# Patient Record
Sex: FEMALE | Race: WHITE | NOT HISPANIC OR LATINO | Employment: FULL TIME
[De-identification: names, ages, dates, MRNs, and addresses within clinical notes are randomized per-mention and may not be internally consistent; named-entity substitution may affect disease eponyms.]

---

## 2017-10-29 ENCOUNTER — HEALTH MAINTENANCE LETTER (OUTPATIENT)
Age: 42
End: 2017-10-29

## 2020-03-01 ENCOUNTER — HEALTH MAINTENANCE LETTER (OUTPATIENT)
Age: 45
End: 2020-03-01

## 2020-12-14 ENCOUNTER — HEALTH MAINTENANCE LETTER (OUTPATIENT)
Age: 45
End: 2020-12-14

## 2021-02-27 ENCOUNTER — HEALTH MAINTENANCE LETTER (OUTPATIENT)
Age: 46
End: 2021-02-27

## 2021-04-17 ENCOUNTER — HEALTH MAINTENANCE LETTER (OUTPATIENT)
Age: 46
End: 2021-04-17

## 2021-10-02 ENCOUNTER — HEALTH MAINTENANCE LETTER (OUTPATIENT)
Age: 46
End: 2021-10-02

## 2022-05-14 ENCOUNTER — HEALTH MAINTENANCE LETTER (OUTPATIENT)
Age: 47
End: 2022-05-14

## 2022-09-03 ENCOUNTER — HEALTH MAINTENANCE LETTER (OUTPATIENT)
Age: 47
End: 2022-09-03

## 2023-01-14 ENCOUNTER — HEALTH MAINTENANCE LETTER (OUTPATIENT)
Age: 48
End: 2023-01-14

## 2023-04-23 ENCOUNTER — HEALTH MAINTENANCE LETTER (OUTPATIENT)
Age: 48
End: 2023-04-23

## 2024-02-17 ENCOUNTER — HEALTH MAINTENANCE LETTER (OUTPATIENT)
Age: 49
End: 2024-02-17

## 2024-05-24 ENCOUNTER — APPOINTMENT (OUTPATIENT)
Dept: MRI IMAGING | Facility: CLINIC | Age: 49
End: 2024-05-24
Attending: STUDENT IN AN ORGANIZED HEALTH CARE EDUCATION/TRAINING PROGRAM
Payer: COMMERCIAL

## 2024-05-24 ENCOUNTER — HOSPITAL ENCOUNTER (EMERGENCY)
Facility: CLINIC | Age: 49
Discharge: HOME | End: 2024-05-24
Attending: STUDENT IN AN ORGANIZED HEALTH CARE EDUCATION/TRAINING PROGRAM | Admitting: STUDENT IN AN ORGANIZED HEALTH CARE EDUCATION/TRAINING PROGRAM
Payer: COMMERCIAL

## 2024-05-24 VITALS
OXYGEN SATURATION: 97 % | SYSTOLIC BLOOD PRESSURE: 154 MMHG | RESPIRATION RATE: 17 BRPM | HEIGHT: 64 IN | TEMPERATURE: 98 F | WEIGHT: 230 LBS | HEART RATE: 102 BPM | BODY MASS INDEX: 39.27 KG/M2 | DIASTOLIC BLOOD PRESSURE: 85 MMHG

## 2024-05-24 DIAGNOSIS — R20.0 NUMBNESS: ICD-10-CM

## 2024-05-24 LAB
ANION GAP SERPL CALCULATED.3IONS-SCNC: 13 MMOL/L (ref 7–15)
BASOPHILS # BLD AUTO: 0.1 10E3/UL (ref 0–0.2)
BASOPHILS NFR BLD AUTO: 1 %
BUN SERPL-MCNC: 15.7 MG/DL (ref 6–20)
CALCIUM SERPL-MCNC: 10 MG/DL (ref 8.6–10)
CHLORIDE SERPL-SCNC: 104 MMOL/L (ref 98–107)
CREAT SERPL-MCNC: 0.94 MG/DL (ref 0.51–0.95)
DEPRECATED HCO3 PLAS-SCNC: 23 MMOL/L (ref 22–29)
EGFRCR SERPLBLD CKD-EPI 2021: 74 ML/MIN/1.73M2
EOSINOPHIL # BLD AUTO: 0.2 10E3/UL (ref 0–0.7)
EOSINOPHIL NFR BLD AUTO: 2 %
ERYTHROCYTE [DISTWIDTH] IN BLOOD BY AUTOMATED COUNT: 13.5 % (ref 10–15)
GLUCOSE SERPL-MCNC: 89 MG/DL (ref 70–99)
HCT VFR BLD AUTO: 38.5 % (ref 35–47)
HGB BLD-MCNC: 13 G/DL (ref 11.7–15.7)
IMM GRANULOCYTES # BLD: 0.1 10E3/UL
IMM GRANULOCYTES NFR BLD: 1 %
LYMPHOCYTES # BLD AUTO: 2.8 10E3/UL (ref 0.8–5.3)
LYMPHOCYTES NFR BLD AUTO: 29 %
MCH RBC QN AUTO: 30 PG (ref 26.5–33)
MCHC RBC AUTO-ENTMCNC: 33.8 G/DL (ref 31.5–36.5)
MCV RBC AUTO: 89 FL (ref 78–100)
MONOCYTES # BLD AUTO: 0.5 10E3/UL (ref 0–1.3)
MONOCYTES NFR BLD AUTO: 5 %
NEUTROPHILS # BLD AUTO: 6.2 10E3/UL (ref 1.6–8.3)
NEUTROPHILS NFR BLD AUTO: 64 %
NRBC # BLD AUTO: 0 10E3/UL
NRBC BLD AUTO-RTO: 0 /100
PLATELET # BLD AUTO: 304 10E3/UL (ref 150–450)
POTASSIUM SERPL-SCNC: 3.7 MMOL/L (ref 3.4–5.3)
RBC # BLD AUTO: 4.34 10E6/UL (ref 3.8–5.2)
SODIUM SERPL-SCNC: 140 MMOL/L (ref 135–145)
WBC # BLD AUTO: 9.8 10E3/UL (ref 4–11)

## 2024-05-24 PROCEDURE — 80048 BASIC METABOLIC PNL TOTAL CA: CPT | Performed by: STUDENT IN AN ORGANIZED HEALTH CARE EDUCATION/TRAINING PROGRAM

## 2024-05-24 PROCEDURE — 99284 EMERGENCY DEPT VISIT MOD MDM: CPT | Mod: 25

## 2024-05-24 PROCEDURE — 36415 COLL VENOUS BLD VENIPUNCTURE: CPT | Performed by: STUDENT IN AN ORGANIZED HEALTH CARE EDUCATION/TRAINING PROGRAM

## 2024-05-24 PROCEDURE — 70551 MRI BRAIN STEM W/O DYE: CPT

## 2024-05-24 PROCEDURE — 85025 COMPLETE CBC W/AUTO DIFF WBC: CPT | Performed by: STUDENT IN AN ORGANIZED HEALTH CARE EDUCATION/TRAINING PROGRAM

## 2024-05-24 ASSESSMENT — ACTIVITIES OF DAILY LIVING (ADL)
ADLS_ACUITY_SCORE: 35
ADLS_ACUITY_SCORE: 37

## 2024-05-24 ASSESSMENT — COLUMBIA-SUICIDE SEVERITY RATING SCALE - C-SSRS
2. HAVE YOU ACTUALLY HAD ANY THOUGHTS OF KILLING YOURSELF IN THE PAST MONTH?: NO
6. HAVE YOU EVER DONE ANYTHING, STARTED TO DO ANYTHING, OR PREPARED TO DO ANYTHING TO END YOUR LIFE?: NO
1. IN THE PAST MONTH, HAVE YOU WISHED YOU WERE DEAD OR WISHED YOU COULD GO TO SLEEP AND NOT WAKE UP?: NO

## 2024-05-24 ASSESSMENT — ENCOUNTER SYMPTOMS
SHORTNESS OF BREATH: 0
ABDOMINAL PAIN: 0
FACIAL SWELLING: 1
EYE PAIN: 1
NEUROLOGICAL NEGATIVE: 1
PHOTOPHOBIA: 1

## 2024-05-24 NOTE — ED PROVIDER NOTES
EMERGENCY DEPARTMENT ENCOUNTER      NAME: Jory Ambrose  AGE: 48 year old female  YOB: 1975  MRN: [id number]  EVALUATION DATE & TIME: 2024  5:36 PM    PCP: [place] Joshua    ED PROVIDER: Joseph Platt M.D.      Chief Complaint   Patient presents with    Tingling         FINAL IMPRESSION:  1. Numbness          ED COURSE & MEDICAL DECISION MAKIN:14 PM 2024 I met with the patient to gather history and perform an initial exam.    Pertinent Labs & Imaging studies reviewed. (See chart for details)  48 year old female presents to the Emergency Department for evaluation of numbness which occurred at 2 AM today.  Patient describes an episode of numbness and then feeling faint.  Symptoms were very brief and have since improved.  Eye symptoms have completely abated.  Did not believe that this represented foreign body or even glaucoma or any significant eye issue although that was considered as was fluorescein exam and transfer for eye evaluation did not believe this is necessary she has not had any symptoms since then.  No symptoms to suggest ACS.  Blood work was sent to rule out electrolyte abnormality and these are all normal.  Because of the symptoms and the possibility of intracranial process we will pursue MRI and this was completely normal.  Patient remains asymptomatic and while we do not have an etiology for the patient's symptoms and certainly TIA is possible I felt that the relatively minor component to them was conducive to discharge and follow-up although certainly admission was considered based on that as a possible diagnosis of do not believe it is necessary and that the patient will be okay going home.  Shared decision-making was utilized to elaborate this and I discussed with the patient and her .    At the conclusion of the encounter I discussed the results of all of the tests and the disposition. The questions were answered. The patient or family  acknowledged understanding and was agreeable with the care plan.              Medical Decision Making  Obtained supplemental history:Supplemental history obtained?: Documented in chart and Family Member/Significant Other  Reviewed external records: External records reviewed?: Outpatient Record: [facility] on 5/24/2024   Care impacted by chronic illness:Cancer/Chemotherapy  Care significantly affected by social determinants of health:N/A  Did you consider but not order tests?: Work up considered but not performed and documented in chart, if applicable  Did you interpret images independently?: Independent interpretation of ECG and images noted in documentation, when applicable.  Consultation discussion with other provider:Did you involve another provider (consultant, , pharmacy, etc.)?: No  Discharge. No recommendations on prescription strength medication(s). See documentation for any additional details.    This patient involved a high degree of complexity in medical decision making, as significant risks were present and assessed. Recent encounters & results in medical record reviewed by me.     All workup (i.e. any EKG/labs/imaging as per charting below) reviewed and independently interpreted by me. See respective sections for details.       minutes of critical care time     MEDICATIONS GIVEN IN THE EMERGENCY:  Medications - No data to display    NEW PRESCRIPTIONS STARTED AT TODAY'S ER VISIT  Discharge Medication List as of 5/24/2024 10:10 PM             =================================================================    HPI    Patient information was obtained from: patient and     Use of : Doris Ring Rudy Ambrose is a 48 year old female with a pertinent history of hodgkin lymphoma, PE, and hypothyroidism who presents for evaluation of burning in the right eye.     Patient woke up at 2 AM this morning with a sharp, burning sensation in her right eye. She  "described the pain like there was \"chili oil or pepper spray\" in her eye. She then went to the bathroom where she flushed out the eye. Although the water did help relieve the eye pain for a while, the discomfort persisted a bit and the pain started to extend into the right side of her face, tongue, and left arm. She also had tongue swelling and drooling with difficulty speaking due to these symptoms. Additionally, she describes a subjective numbness sensation from around the right eye to the side of her face and left arm. Her  notes she suddenly became very weak in the legs and her legs gave out. He caught the patient and had to carry her back to bed. The patient's  reports this all occurred in the matter of ~2 minutes. Following this, the patient reports residual burning sensation surrounding the right eye and throat although less severe than the initial event. Patient reports a slight headache with associated photophobia, which may be affiliated with some medications she takes. Patient denies any eye injury, vision changes, other neurological issues, chest pain, abdominal pain, or shortness of breath. Otherwise no other complaints or concerns.     Patient also reports prior treatment for Hodgkins lymphoma. During her lymphoma treatment in 2015, she had had an episodes of left-sided weakness, but work-up was negative for stroke; her symptoms were attributed to stress, and she did not have any recurrent neurological symptoms until today. She also has a history of vertigo and syncope.     REVIEW OF SYSTEMS   Review of Systems   HENT:  Positive for drooling (Resolved) and facial swelling (tongue; resolved).    Eyes:  Positive for photophobia and pain (burning senstion in right eye).   Respiratory:  Negative for shortness of breath.    Cardiovascular:  Negative for chest pain.   Gastrointestinal:  Negative for abdominal pain.   Musculoskeletal:         Left arm burning sensation (resolved)   Neurological: " Negative.        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    brother    Seasonal allergies     Shingles     Aug 2010       PAST SURGICAL HISTORY:  Past Surgical History:   Procedure Laterality Date    CHOLECYSTECTOMY      gallstone pancreatitis Jan 2010    Lymphectomy  2/2010    right neck area           CURRENT MEDICATIONS:    acyclovir (ZOVIRAX) 400 MG tablet  albuterol (PROAIR HFA) 108 (90 BASE) MCG/ACT inhaler  Calcium Carbonate-Vitamin D (CALCIUM 600 + D OR)  cetirizine (ZYRTEC) 10 MG tablet  clindamycin (CLEOCIN T) 1 % lotion  diphenhydrAMINE (BENADRYL) 25 MG tablet  estradiol (VAGIFEM) 10 MCG TABS  fluticasone (FLONASE) 50 MCG/ACT nasal spray  fluticasone-salmeterol (ADVAIR DISKUS) 250-50 MCG/DOSE diskus inhaler  GENGRAF 25 MG capsule  levothyroxine (SYNTHROID, LEVOTHROID) 88 MCG tablet  Magnesium Oxide 500 MG CAPS  MELATONIN PO  methylPREDNISolone (MEDROL) 32 MG tablet  montelukast (SINGULAIR) 10 MG tablet  Multiple Vitamin (MULTI-VITAMIN PO)  omeprazole (PRILOSEC) 20 MG capsule  pentamidine (NEBUPENT) 300 MG inhalation solution  VITAMIN D PO        ALLERGIES:  Allergies   Allergen Reactions    Dye [Contrast Dye] Swelling     Pre-medicated with methylprednisolone    Erythromycin Hives    Morphine Hcl      Causes change in mental status    Penicillins Hives     Patient cannot recall if she has tried cephalosporins in the past.     Shellfish Allergy      Shrimp, crab, lobster    Sulfa Antibiotics Hives       FAMILY HISTORY:  Family History   Problem Relation Age of Onset    Cancer Mother          thyroid cancer    Unknown/Adopted Father     Breast Cancer No family hx of     Cancer - colorectal No family hx of     Prostate Cancer No family hx of     Hypertension Mother     C.A.EDWARD. Maternal Grandmother     KENIA.AGIULIA. Paternal Grandmother        SOCIAL HISTORY:   Social History     Socioeconomic History    Marital status:      Spouse name: Yasir    Number of children: 0   Tobacco Use    Smoking status: Never    Smokeless tobacco: Never    Tobacco comments:     denies tabacco use   Substance and Sexual Activity    Alcohol use: No     Comment: minimal alcohol use, 1 glass of wine in 6 months    Drug use: No    Sexual activity: Yes     Partners: Male     Birth control/protection: None     Social Determinants of Health     Financial Resource Strain: Low Risk  (1/16/2024)    [facility]    Overall Financial Resource Strain (CARDIA)     Difficulty of Paying Living Expenses: Not hard at all   Food Insecurity: No Food Insecurity (1/16/2024)    [facility]    Hunger Vital Sign     Worried About Running Out of Food in the Last Year: Never true     Ran Out of Food in the Last Year: Never true   Transportation Needs: No Transportation Needs (1/16/2024)    [facility]    PRAPARE - Transportation     Lack of Transportation (Medical): No     Lack of Transportation (Non-Medical): No   Physical Activity: Insufficiently Active (10/24/2023)    [facility]    Exercise Vital Sign     Days of Exercise per Week: 2 days     Minutes of Exercise per Session: 30 min   Stress: Stress Concern Present (10/24/2023)    Received from [facility]  "[facility]    [place] [employer] - Occupational Stress Questionnaire     Feeling of Stress : To some extent   Social Connections: Moderately Integrated (10/24/2023)    [facility]    Social Connection and Isolation Panel [NHANES]     Frequency of Communication with Friends and Family: More than three times a week     Frequency of Social Gatherings with Friends and Family: Once a week     Attends [employer] Services: More than 4 times per year     Active Member of Clubs or Organizations: No     Attends Club or Organization Meetings: Patient declined     Marital Status:    Interpersonal Safety: Not At Risk (10/24/2023)    [facility]    Humiliation, Afraid, Rape, and Kick questionnaire     Fear of Current or Ex-Partner: No     Emotionally Abused: No     Physically Abused: No     Sexually Abused: No   Housing Stability: Low Risk  (1/16/2024)    Received from [facility] [employer] [facility]     Retired - What is your living situation today? : I have a steady place to live       VITALS:  BP (!) 154/85   Pulse 102   Temp 98  F (36.7  C) (Temporal)   Resp 17   Ht 1.626 m (5' 4\")   Wt 104.3 kg (230 lb)   SpO2 97%   BMI 39.48 kg/m        PHYSICAL EXAM    PHYSICAL EXAM    VITAL SIGNS: BP (!) 154/85   Pulse 102   Temp 98  F (36.7  C) (Temporal)   Resp 17   Ht 1.626 m (5' 4\")   Wt 104.3 kg (230 lb)   SpO2 97%   BMI 39.48 kg/m    Constitutional:  Well developed, well nourished,    EYES: Conjunctivae clear, no discharge  HENT: Atraumatic, normocephalic, bilateral external ears normal.  Oropharynx moist. Nose normal.   Neck: Normal ROM , Supple   Respiratory:  No respiratory distress, normal " nonlabored respirations.   Cardiovascular:  Distal perfusion appears intact  Musculoskeletal:  No edema appreciated, No cyanosis, No clubbing. Good range of motion in all major joints.   Integument:  Warm, Dry, No erythema, No rash.   Neurologic:  Alert and oriented. No focal deficits noted.  Ambulatory  Patient is alert and oriented ×3.  Face is symmetric.  Speech is normal.  Visual fields are full.  Cranial nerves II through XII are intact.  Strength is full and equal in both upper and lower extremities.  Sensory is intact to sharp and light touch.  Patient has a normal steady gait.  Coordination is intact.  Normal Romberg and finger nose to finger.  Reflexes are 2+ throughout.  Toes are downgoing.   Psychiatric:  Affect normal         LAB:  All pertinent labs reviewed and interpreted.  Labs Ordered and Resulted from Time of ED Arrival to Time of ED Departure   BASIC METABOLIC PANEL - Normal       Result Value    Sodium 140      Potassium 3.7      Chloride 104      Carbon Dioxide (CO2) 23      Anion Gap 13      Urea Nitrogen 15.7      Creatinine 0.94      GFR Estimate 74      Calcium 10.0      Glucose 89     CBC WITH PLATELETS AND DIFFERENTIAL    WBC Count 9.8      RBC Count 4.34      Hemoglobin 13.0      Hematocrit 38.5      MCV 89      MCH 30.0      MCHC 33.8      RDW 13.5      Platelet Count 304      % Neutrophils 64      % Lymphocytes 29      % Monocytes 5      % Eosinophils 2      % Basophils 1      % Immature Granulocytes 1      NRBCs per 100 WBC 0      Absolute Neutrophils 6.2      Absolute Lymphocytes 2.8      Absolute Monocytes 0.5      Absolute Eosinophils 0.2      Absolute Basophils 0.1      Absolute Immature Granulocytes 0.1      Absolute NRBCs 0.0         RADIOLOGY:  Reviewed all pertinent imaging. Please see official radiology report.  MR Brain w/o Contrast   Final Result   IMPRESSION:   1.  No no evidence of acute infarction or other acute intracranial abnormality.   2.  Mild presumed chronic small  vessel ischemic change.            I, Vivien Guevara, am serving as a scribe to document services personally performed by Dr. Joseph Platt based on my observation and the provider's statements to me. I, Joseph Platt MD attest that Vivien Guevara is acting in a scribe capacity, has observed my performance of the services and has documented them in accordance with my direction.    Joseph Platt M.D.  Emergency Medicine  [facility] EMERGENCY ROOM  [address] [id number]  [phone]  Dept: [phone]       Joseph Platt MD  05/27/24 1148

## 2024-05-24 NOTE — ED TRIAGE NOTES
PT reports at 0200 this morning she woke up with throat, left arm, and lip tingling. She reports her tongue was swollen as well. PT states this lasted less than 5 minutes and went away. She went to sleep but noticed when she woke up she still feels tingling in throat and her left arm and minimal tingling on her forehead.      Triage Assessment (Adult)       Row Name 05/24/24 1445          Triage Assessment    Airway WDL WDL        Respiratory WDL    Respiratory WDL WDL        Skin Circulation/Temperature WDL    Skin Circulation/Temperature WDL WDL        Cardiac WDL    Cardiac WDL X     Cardiac Rhythm ST        Peripheral/Neurovascular WDL    Peripheral Neurovascular WDL neurovascular assessment upper        Cognitive/Neuro/Behavioral WDL    Cognitive/Neuro/Behavioral WDL WDL        LUE Neurovascular Assessment    Sensation LUE tingling present        RUE Neurovascular Assessment    Sensation RUE no numbness;no tenderness;no tingling        LLE Neurovascular Assessment    Sensation LLE no numbness;no tenderness;no tingling        RLE Neurovascular Assessment    Sensation RLE no numbness;no tenderness;no tingling

## 2024-06-06 ENCOUNTER — APPOINTMENT (OUTPATIENT)
Dept: NUCLEAR MEDICINE | Facility: CLINIC | Age: 49
End: 2024-06-06
Attending: EMERGENCY MEDICINE
Payer: COMMERCIAL

## 2024-06-06 ENCOUNTER — APPOINTMENT (OUTPATIENT)
Dept: ULTRASOUND IMAGING | Facility: CLINIC | Age: 49
End: 2024-06-06
Attending: EMERGENCY MEDICINE
Payer: COMMERCIAL

## 2024-06-06 ENCOUNTER — HOSPITAL ENCOUNTER (EMERGENCY)
Facility: CLINIC | Age: 49
Discharge: HOME | End: 2024-06-06
Attending: EMERGENCY MEDICINE | Admitting: EMERGENCY MEDICINE
Payer: COMMERCIAL

## 2024-06-06 ENCOUNTER — APPOINTMENT (OUTPATIENT)
Dept: RADIOLOGY | Facility: CLINIC | Age: 49
End: 2024-06-06
Attending: EMERGENCY MEDICINE
Payer: COMMERCIAL

## 2024-06-06 VITALS
BODY MASS INDEX: 39.48 KG/M2 | SYSTOLIC BLOOD PRESSURE: 123 MMHG | TEMPERATURE: 97.1 F | RESPIRATION RATE: 24 BRPM | OXYGEN SATURATION: 100 % | WEIGHT: 230 LBS | HEART RATE: 81 BPM | DIASTOLIC BLOOD PRESSURE: 78 MMHG

## 2024-06-06 DIAGNOSIS — R07.9 CHEST PAIN, UNSPECIFIED TYPE: ICD-10-CM

## 2024-06-06 LAB
ALBUMIN SERPL BCG-MCNC: 4.3 G/DL (ref 3.5–5.2)
ALP SERPL-CCNC: 92 U/L (ref 40–150)
ALT SERPL W P-5'-P-CCNC: 24 U/L (ref 0–50)
ANION GAP SERPL CALCULATED.3IONS-SCNC: 12 MMOL/L (ref 7–15)
AST SERPL W P-5'-P-CCNC: 25 U/L (ref 0–45)
ATRIAL RATE - MUSE: 101 BPM
BASOPHILS # BLD AUTO: 0.1 10E3/UL (ref 0–0.2)
BASOPHILS NFR BLD AUTO: 1 %
BILIRUB SERPL-MCNC: 0.2 MG/DL
BUN SERPL-MCNC: 10.2 MG/DL (ref 6–20)
CALCIUM SERPL-MCNC: 9.6 MG/DL (ref 8.6–10)
CHLORIDE SERPL-SCNC: 103 MMOL/L (ref 98–107)
CREAT SERPL-MCNC: 0.93 MG/DL (ref 0.51–0.95)
DEPRECATED HCO3 PLAS-SCNC: 24 MMOL/L (ref 22–29)
DIASTOLIC BLOOD PRESSURE - MUSE: NORMAL MMHG
EGFRCR SERPLBLD CKD-EPI 2021: 75 ML/MIN/1.73M2
EOSINOPHIL # BLD AUTO: 0.1 10E3/UL (ref 0–0.7)
EOSINOPHIL NFR BLD AUTO: 2 %
ERYTHROCYTE [DISTWIDTH] IN BLOOD BY AUTOMATED COUNT: 13.2 % (ref 10–15)
GLUCOSE SERPL-MCNC: 81 MG/DL (ref 70–99)
HCT VFR BLD AUTO: 39.1 % (ref 35–47)
HGB BLD-MCNC: 12.6 G/DL (ref 11.7–15.7)
IMM GRANULOCYTES # BLD: 0.1 10E3/UL
IMM GRANULOCYTES NFR BLD: 1 %
INTERPRETATION ECG - MUSE: NORMAL
LIPASE SERPL-CCNC: 23 U/L (ref 13–60)
LYMPHOCYTES # BLD AUTO: 2.3 10E3/UL (ref 0.8–5.3)
LYMPHOCYTES NFR BLD AUTO: 33 %
MCH RBC QN AUTO: 29.2 PG (ref 26.5–33)
MCHC RBC AUTO-ENTMCNC: 32.2 G/DL (ref 31.5–36.5)
MCV RBC AUTO: 91 FL (ref 78–100)
MONOCYTES # BLD AUTO: 0.5 10E3/UL (ref 0–1.3)
MONOCYTES NFR BLD AUTO: 7 %
NEUTROPHILS # BLD AUTO: 4.1 10E3/UL (ref 1.6–8.3)
NEUTROPHILS NFR BLD AUTO: 57 %
NRBC # BLD AUTO: 0 10E3/UL
NRBC BLD AUTO-RTO: 0 /100
NT-PROBNP SERPL-MCNC: 101 PG/ML (ref 0–450)
P AXIS - MUSE: 12 DEGREES
PLATELET # BLD AUTO: 318 10E3/UL (ref 150–450)
POTASSIUM SERPL-SCNC: 4.3 MMOL/L (ref 3.4–5.3)
PR INTERVAL - MUSE: 158 MS
PROT SERPL-MCNC: 7.2 G/DL (ref 6.4–8.3)
QRS DURATION - MUSE: 92 MS
QT - MUSE: 360 MS
QTC - MUSE: 466 MS
R AXIS - MUSE: -4 DEGREES
RBC # BLD AUTO: 4.32 10E6/UL (ref 3.8–5.2)
SODIUM SERPL-SCNC: 139 MMOL/L (ref 135–145)
SYSTOLIC BLOOD PRESSURE - MUSE: NORMAL MMHG
T AXIS - MUSE: 8 DEGREES
TROPONIN T SERPL HS-MCNC: 7 NG/L
TROPONIN T SERPL HS-MCNC: 8 NG/L
VENTRICULAR RATE- MUSE: 101 BPM
WBC # BLD AUTO: 7.1 10E3/UL (ref 4–11)

## 2024-06-06 PROCEDURE — 71046 X-RAY EXAM CHEST 2 VIEWS: CPT

## 2024-06-06 PROCEDURE — 84484 ASSAY OF TROPONIN QUANT: CPT | Mod: 91 | Performed by: EMERGENCY MEDICINE

## 2024-06-06 PROCEDURE — 83880 ASSAY OF NATRIURETIC PEPTIDE: CPT | Performed by: EMERGENCY MEDICINE

## 2024-06-06 PROCEDURE — 93970 EXTREMITY STUDY: CPT

## 2024-06-06 PROCEDURE — 93005 ELECTROCARDIOGRAM TRACING: CPT | Performed by: EMERGENCY MEDICINE

## 2024-06-06 PROCEDURE — 78582 LUNG VENTILAT&PERFUS IMAGING: CPT

## 2024-06-06 PROCEDURE — 82374 ASSAY BLOOD CARBON DIOXIDE: CPT | Performed by: EMERGENCY MEDICINE

## 2024-06-06 PROCEDURE — 96375 TX/PRO/DX INJ NEW DRUG ADDON: CPT

## 2024-06-06 PROCEDURE — 84075 ASSAY ALKALINE PHOSPHATASE: CPT | Performed by: EMERGENCY MEDICINE

## 2024-06-06 PROCEDURE — 96374 THER/PROPH/DIAG INJ IV PUSH: CPT | Mod: 59

## 2024-06-06 PROCEDURE — A9567 TECHNETIUM TC-99M AEROSOL: HCPCS | Performed by: EMERGENCY MEDICINE

## 2024-06-06 PROCEDURE — A9540 TC99M MAA: HCPCS | Performed by: EMERGENCY MEDICINE

## 2024-06-06 PROCEDURE — 83690 ASSAY OF LIPASE: CPT | Performed by: EMERGENCY MEDICINE

## 2024-06-06 PROCEDURE — 250N000011 HC RX IP 250 OP 636: Mod: JZ | Performed by: FAMILY MEDICINE

## 2024-06-06 PROCEDURE — 99285 EMERGENCY DEPT VISIT HI MDM: CPT | Mod: 25

## 2024-06-06 PROCEDURE — 85025 COMPLETE CBC W/AUTO DIFF WBC: CPT | Performed by: EMERGENCY MEDICINE

## 2024-06-06 PROCEDURE — 36415 COLL VENOUS BLD VENIPUNCTURE: CPT | Performed by: EMERGENCY MEDICINE

## 2024-06-06 PROCEDURE — 343N000001 HC RX 343: Performed by: EMERGENCY MEDICINE

## 2024-06-06 PROCEDURE — 272N000035 NM LUNG SCAN VENTILATION AND PERFUSION

## 2024-06-06 RX ORDER — ONDANSETRON 2 MG/ML
4 INJECTION INTRAMUSCULAR; INTRAVENOUS ONCE
Status: COMPLETED | OUTPATIENT
Start: 2024-06-06 | End: 2024-06-06

## 2024-06-06 RX ORDER — ZINC GLUCONATE 50 MG
50 TABLET ORAL DAILY
COMMUNITY
End: 2024-10-07

## 2024-06-06 RX ORDER — SPIRONOLACTONE 25 MG/1
1 TABLET ORAL DAILY
COMMUNITY
Start: 2024-01-01

## 2024-06-06 RX ORDER — ATORVASTATIN CALCIUM 20 MG/1
1 TABLET, FILM COATED ORAL AT BEDTIME
Status: ON HOLD | COMMUNITY
Start: 2023-09-29 | End: 2024-09-13

## 2024-06-06 RX ORDER — LORAZEPAM 0.5 MG/1
0.5 TABLET ORAL DAILY PRN
COMMUNITY
Start: 2023-11-29

## 2024-06-06 RX ORDER — ONDANSETRON 4 MG/1
4 TABLET, ORALLY DISINTEGRATING ORAL EVERY 6 HOURS PRN
Qty: 20 TABLET | Refills: 0 | Status: SHIPPED | OUTPATIENT
Start: 2024-06-06 | End: 2024-06-09

## 2024-06-06 RX ORDER — IPRATROPIUM BROMIDE AND ALBUTEROL SULFATE 2.5; .5 MG/3ML; MG/3ML
1 SOLUTION RESPIRATORY (INHALATION) EVERY 6 HOURS PRN
COMMUNITY
Start: 2024-01-05

## 2024-06-06 RX ORDER — PHENTERMINE HYDROCHLORIDE 37.5 MG/1
37.5 TABLET ORAL
COMMUNITY
Start: 2024-04-04 | End: 2024-10-07

## 2024-06-06 RX ORDER — CLOPIDOGREL BISULFATE 75 MG/1
1 TABLET ORAL DAILY
COMMUNITY
Start: 2024-06-05 | End: 2025-06-05

## 2024-06-06 RX ORDER — BUDESONIDE AND FORMOTEROL FUMARATE DIHYDRATE 80; 4.5 UG/1; UG/1
1 AEROSOL RESPIRATORY (INHALATION)
COMMUNITY
Start: 2023-12-18

## 2024-06-06 RX ORDER — SIMETHICONE 125 MG
125 TABLET,CHEWABLE ORAL 4 TIMES DAILY PRN
Qty: 40 TABLET | Refills: 0 | Status: SHIPPED | OUTPATIENT
Start: 2024-06-06

## 2024-06-06 RX ORDER — FLUDROCORTISONE ACETATE 0.1 MG/1
0.1 TABLET ORAL DAILY
COMMUNITY
Start: 2023-11-14

## 2024-06-06 RX ORDER — LEVOTHYROXINE SODIUM 125 UG/1
125 TABLET ORAL DAILY
COMMUNITY
Start: 2024-05-08

## 2024-06-06 RX ORDER — TRAZODONE HYDROCHLORIDE 100 MG/1
100 TABLET ORAL AT BEDTIME
COMMUNITY
Start: 2024-05-02

## 2024-06-06 RX ORDER — BUPROPION HYDROCHLORIDE 300 MG/1
300 TABLET ORAL EVERY MORNING
COMMUNITY
Start: 2023-11-07

## 2024-06-06 RX ADMIN — ONDANSETRON 4 MG: 2 INJECTION INTRAMUSCULAR; INTRAVENOUS at 18:29

## 2024-06-06 RX ADMIN — KIT FOR THE PREPARATION OF TECHNETIUM TC 99M ALBUMIN AGGREGATED 8.5 MILLICURIE: 2.5 INJECTION, POWDER, FOR SOLUTION INTRAVENOUS at 15:59

## 2024-06-06 RX ADMIN — KIT FOR THE PREPARATION OF TECHNETIUM TC 99M PENTETATE 57.5 MILLICURIE: 20 INJECTION, POWDER, LYOPHILIZED, FOR SOLUTION INTRAVENOUS; RESPIRATORY (INHALATION) at 15:58

## 2024-06-06 RX ADMIN — FAMOTIDINE 20 MG: 10 INJECTION INTRAVENOUS at 18:29

## 2024-06-06 ASSESSMENT — COLUMBIA-SUICIDE SEVERITY RATING SCALE - C-SSRS
1. IN THE PAST MONTH, HAVE YOU WISHED YOU WERE DEAD OR WISHED YOU COULD GO TO SLEEP AND NOT WAKE UP?: NO
2. HAVE YOU ACTUALLY HAD ANY THOUGHTS OF KILLING YOURSELF IN THE PAST MONTH?: NO
6. HAVE YOU EVER DONE ANYTHING, STARTED TO DO ANYTHING, OR PREPARED TO DO ANYTHING TO END YOUR LIFE?: NO

## 2024-06-06 ASSESSMENT — ACTIVITIES OF DAILY LIVING (ADL)
ADLS_ACUITY_SCORE: 37

## 2024-06-06 NOTE — DISCHARGE INSTRUCTIONS
Follow-up with the rapid access cardiology clinic to discuss your symptoms and potentially have further outpatient cardiology testing.  I have placed a referral and they should contact you to schedule appointment but if they do not please call the number to make an appointment.  Can also follow-up with your primary care doctor.    Return to the ER for any new or worsening concerns.

## 2024-06-06 NOTE — ED PROVIDER NOTES
"EMERGENCY DEPARTMENT ENCOUNTER      NAME: Jory Ambrose  AGE: 48 year old female  YOB: 1975  MRN: [id number]  EVALUATION DATE & TIME: 6/6/2024 12:28 PM    PCP: [facility]    ED PROVIDER: Katherine Jaimes MD      Chief Complaint   Patient presents with    Chest Pain    Nausea         FINAL IMPRESSION:  1. Chest pain, unspecified type          ED COURSE & MEDICAL DECISION MAKING:    Pertinent Labs & Imaging studies reviewed. (See chart for details)    12:34 PM I introduced myself to the patient, obtained patient history, performed a physical exam, and discussed plan for ED workup including potential diagnostic laboratory/imaging studies and interventions.    48 year old female with a pertinent history of Hodgkin's lymphoma s/p chemo and radiation now in remission since 2015, hypothyroidism, s/p bone marrow transplant, hypertension, hyperlipidemia, prediabetes, pulmonary embolus (no longer anticoagulated), s/p cholecystectomy, recent possible TIA started on plavix by PCP who presents to this ED for evaluation of chest pain and nausea.  On exam, patient is in no acute distress.  She reports that the pain she was having has now resolved since she arrived here and it lasted a couple of hours.  She states it started while she was working on her computer.  Denies any association with exertion.  She is somewhat reproducible with pain to palpation of the left-sided chest wall and under the axilla.  There is no obvious rash to suggest shingles at this time but did discuss monitoring for this.  She has had shingles in the past which would make this somewhat less likely but does states she has had a \"multiple times.\"  She does have a prior history of PE and is no longer anticoagulated other than recently starting Plavix for possible TIA.  She actually started this yesterday.  She has no signs or symptoms to suggest allergic reaction.  Do have concern for possible PE as it also " "appears that she is taking oral estrogen which would increase her risk factors for this.  Unfortunately she has a contrast allergy involving swelling and states that she needs the 24-hour prep with steroids and thus I cannot do a CT scan of the chest with contrast.  Will obtain VQ scan and bilateral lower extremity Doppler venous ultrasounds due to her report that she has some \"discomfort in her legs.\"  She has had frequent belching so did consider GERD as well.  Has no abdominal pain or tenderness thus making intra-abdominal pathology less likely.  Considered acute coronary syndrome with her presentation as well.  Also considered pneumonia, pneumothorax, costochondritis or musculoskeletal etiology, less likely aortic dissection as she has equal pulses and normal blood pressure and the pain has resolved.  Again she cannot tolerate contrast without premedication 24 hours in advance so cannot obtain CTA.  Will obtain chest x-ray to evaluate mediastinum.  She did have a CT with contrast back in 2021 that did not show any obvious sign of aortic pathology.  There was also no obvious sign of lymphadenopathy there.  Overall again felt that dissection was less likely with the fact that she has equal pulses no neurologic deficit and normal blood pressure with pain that has resolved.  She reported some \"numbness\" in her left arm but has no sensation changes here and this has resolved.  Also has no motor deficits or any focal neurologic deficit here.  As a result with her overall presentation did not feel that this would represent intracranial pathology and she just had a workup for this and has started Plavix.  No recent trauma to the area either.    Lipase within normal limits making pancreatitis unlikely.  2 troponins are within normal limits and high-sensitivity which is reassuring.  EKG obtained which reveals initially some mild sinus tachycardia at 101 however this resolved spontaneously and she has no signs of acute " ischemia on EKG.  No obvious sign of pericarditis on EKG either.  CBC reveals normal white blood cell count of 7.1 with a hemoglobin of 12.6.  CMP unremarkable as well.  Normal alk phos AST ALT and bilirubin.  Normal creatinine.    Chest x-ray reveals low lung volumes with bibasilar atelectasis however no focal consolidation.  Her lung sounds are clear on my exam and equal.  No pleural effusion or pneumothorax.  Normal heart size and mediastinal contours.  She does have some air-filled loops in the bowel in the visualized upper abdomen which is nonspecific.  Do feel this is likely with causing her belching but is having normal bowel movements and no abdominal tenderness thus again did not feel that intra-abdominal pathology or obstruction would be likely.  VQ scan without any sign of PE.  Bilateral lower extremity Doppler venous ultrasound pending.  She had declined any need for pain medication as her symptoms had resolved.  She remained symptom-free here.  Vital signs are within normal limits and she is afebrile.  At this point discussed that at this point we have not found any life-threatening pathology.  With her reassuring troponins and low risk heart score could follow-up with rapid access cardiology clinic for outpatient cardiac testing and she was in agreement with this.  At this point we are awaiting lower extremity Doppler venous ultrasound and if this is negative can go home with rapid access cardiology follow-up with referral that I have already placed.  She and her  were in agreement with this plan.  We did discuss that the exact etiology is not known at this time.  Do question potential for musculoskeletal versus GERD as it is reproducible and she is also having belching.  Discussed strict return precautions to the ER for any new or worsening concerns.  Patient and her  voiced understanding.  Patient's care was signed out to my colleague Dr. Riley pending ultrasound in stable  condition.  She was in agreement with this plan.         At the conclusion of the encounter I discussed the results of all of the tests and the disposition. The questions were answered. The patient or family acknowledged understanding and was agreeable with the care plan.      Medical Decision Making  Obtained supplemental history:Supplemental history obtained?: Family Member/Significant Other  Reviewed external records: External records reviewed?: Inpatient Record: [facility] ER on 5/24/24 and Outpatient Record: [facility] on 6/3/24  Care impacted by chronic illness:Hyperlipidemia and Hypertension  Care significantly affected by social determinants of health:N/A  Did you consider but not order tests?: Work up considered but not performed and documented in chart, if applicable  Did you interpret images independently?: Independent interpretation of ECG and images noted in documentation, when applicable.  Consultation discussion with other provider:Did you involve another provider (consultant, , pharmacy, etc.)?: No  Admission considered. Patient was signed out to the oncoming physician, disposition pending.      MEDICATIONS GIVEN IN THE EMERGENCY:  Medications   technetium pentetate Tc99m (DTPA) inhaled radioisotope 55-65 millicurie (57.5 millicuries Inhalation $Given 6/6/24 1558)   technetium pertechnetate with albumin (Tc99m MAA) radioisotope injection 7-10 millicurie (8.5 millicuries Intravenous $Given 6/6/24 1559)       NEW PRESCRIPTIONS STARTED AT TODAY'S ER VISIT  New Prescriptions    No medications on file          =================================================================    HPI    Patient information was obtained from: patient    Use of : Doris        Jory Ambrose is a 48 year old female with a pertinent history of Hodgkin's lymphoma s/p chemo and radiation now in remission since 2015, hypothyroidism, s/p bone marrow transplant, hypertension, hyperlipidemia,  "prediabetes, pulmonary embolus (no longer anticoagulated), s/p cholecystectomy, recent possible TIA started on plavix by PCP who presents to this ED for evaluation of chest pain and nausea.     Per chart review,  Patient visited [facility] ER on 5/24/24 regarding numbness occurring at 2 AM today. Patient also felt faint. Symptoms were brief and have since improved. Eye symptoms ablated. No symptoms to suggest ACS. Blood work and electrolytes normal. MRI normal. Patient remains asymptomatic and do not have etiology for patient's symptoms, TIA is possible. Minor component of symptoms conducive to discharge and follow-up. Admission considered but not necessary and patient is okay going home.     Patient visited [facility] on 6/3/24 regarding ER follow-up from 5/24/24. Transient neurologic episode with left-sided facial involvement and left upper extremity without deficits. Schedule carotid ultrasound, echocardiogram, review EKG and C-reactive protein, then follow-up with neurology for recommendations. Aspirin can not be used to do history of aspirin allergies with rashes in the past. If possible, use Plavix and/or Eliquis. Results show mild to moderate atherosclerotic plaque at the carotid bifurcations, greater on the left. 50-69% diameter bilateral CA stenoses by velocity criteria.     Patient was working at her desk from home when she began to develop left sided  She called her  at 11:40, and indicate symptoms began ~10 minutes prior to phone call. Patient reports left-sided chest pain that radiates under her jaw, and wraps around under her armpit. Patient notes left arm \"numbness\" and discomfort in her legs. She reports nausea and shortness of breath.  and patient also report constant burping that may be attributed to acid reflux. Patient's chest pain is currently subsided now. She reports the pain lasted approximately 2 hours and resolved on arrival here. Also no further numbness. She " "denies any association of the pain with exertion.     Patient denies syncope, vomiting, leg swelling, fever, cough, diarrhea, or abdominal pain. No recent injury and no recent sick contacts. She reports chest pressure in the past but no pain like this. Patient is not on anticoagulants other than plavix that was recently started. She reports history of PE when she had hodgkin's lymphoma back in 2015 and was eventually taken off anticoagulation at that time.     Patient reports history of cholecystectomy. Patient had a PE in 2013. Patient had Hodgkin's disease and during her treatment, she reported having a \"reduced heart function\" but denies any recent cardiac evaluation. Last stem cell treatment was in 2016-17. No rashes, has history of shingles previously.    Patient's mother has a history of heart attack 1.5 years ago (around 65 years old). Grandma on maternal side had 5 vessel bypass. Patient's aunt had a heart attack. Grandma on paternal side has 2 heart attacks.    REVIEW OF SYSTEMS   Pertinent positives and negatives are documented in the HPI. All other systems reviewed and are negative.      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    brother    Seasonal allergies     Shingles     Aug 2010       PAST SURGICAL HISTORY:  Past Surgical History:   Procedure Laterality Date    CHOLECYSTECTOMY      gallstone pancreatitis Jan 2010    Lymphectomy  2/2010    right neck area           CURRENT MEDICATIONS:    albuterol (PROAIR HFA) 108 (90 BASE) MCG/ACT inhaler  atorvastatin (LIPITOR) 20 MG " tablet  budesonide-formoterol (SYMBICORT) 80-4.5 MCG/ACT Inhaler  buPROPion (WELLBUTRIN XL) 300 MG 24 hr tablet  Calcium Carbonate-Vitamin D (CALCIUM 600 + D OR)  cetirizine (ZYRTEC) 10 MG tablet  clopidogrel (PLAVIX) 75 MG tablet  diphenhydrAMINE (BENADRYL) 25 MG tablet  estradiol (VAGIFEM) 10 MCG TABS  fludrocortisone (FLORINEF) 0.1 MG tablet  fluticasone (FLONASE) 50 MCG/ACT nasal spray  ipratropium - albuterol 0.5 mg/2.5 mg/3 mL (DUONEB) 0.5-2.5 (3) MG/3ML neb solution  levothyroxine (SYNTHROID/LEVOTHROID) 125 MCG tablet  LORazepam (ATIVAN) 0.5 MG tablet  Magnesium Oxide 500 MG CAPS  methylPREDNISolone (MEDROL) 32 MG tablet  Multiple Vitamin (MULTI-VITAMIN PO)  omeprazole (PRILOSEC) 20 MG capsule  phentermine (ADIPEX-P) 37.5 MG tablet  spironolactone (ALDACTONE) 25 MG tablet  traZODone (DESYREL) 100 MG tablet  zinc gluconate 50 MG tablet        ALLERGIES:  Allergies   Allergen Reactions    Dye [Contrast Dye] Swelling     Pre-medicated with methylprednisolone    Shellfish Allergy Shortness Of Breath, Anaphylaxis and Swelling     Shrimp, crab, lobster    Penicillins Hives     Patient cannot recall if she has tried cephalosporins in the past.     Erythromycin Hives    Morphine Hcl      Causes change in mental status    Sulfa Antibiotics Hives and Rash       FAMILY HISTORY:  Family History   Problem Relation Age of Onset    Cancer Mother         thyroid cancer    Unknown/Adopted Father     Breast Cancer No family hx of     Cancer - colorectal No family hx of     Prostate Cancer No family hx of     Hypertension Mother     C.A.D. Maternal Grandmother     C.A.D. Paternal Grandmother        SOCIAL HISTORY:   Social History     Socioeconomic History    Marital status:      Spouse name: Yasir    Number of children: 0   Tobacco Use    Smoking status: Never    Smokeless tobacco: Never    Tobacco comments:     denies tabacco use   Substance and Sexual Activity    Alcohol use: No     Comment: minimal alcohol use, 1  glass of wine in 6 months    Drug use: No    Sexual activity: Yes     Partners: Male     Birth control/protection: None     Social Determinants of Health     Financial Resource Strain: Low Risk  (1/16/2024)    [facility]    Overall Financial Resource Strain (CARDIA)     Difficulty of Paying Living Expenses: Not hard at all   Food Insecurity: No Food Insecurity (1/16/2024)    [facility]    Hunger Vital Sign     Worried About Running Out of Food in the Last Year: Never true     Ran Out of Food in the Last Year: Never true   Transportation Needs: No Transportation Needs (1/16/2024)    [facility]    PRAPARE - Transportation     Lack of Transportation (Medical): No     Lack of Transportation (Non-Medical): No   Physical Activity: Insufficiently Active (10/24/2023)    [facility]    Exercise Vital Sign     Days of Exercise per Week: 2 days     Minutes of Exercise per Session: 30 min   Stress: Stress Concern Present (10/24/2023)    [facility]    [place] [employer] - Occupational Stress Questionnaire     Feeling of Stress : To some extent   Social Connections: Moderately Integrated (10/24/2023)    [facility]    Social Connection and Isolation Panel [NHANES]     Frequency of Communication with Friends and Family: More than three times a week     Frequency of Social Gatherings with Friends and Family: Once a week     Attends  Denominational Services: More than 4 times per year     Active Member of Clubs or Organizations: No     Attends Club or Organization Meetings: Patient declined     Marital Status:    Interpersonal Safety: Not At Risk (10/24/2023)    [facility]    Humiliation, Afraid, Rape, and Kick questionnaire     Fear of Current or Ex-Partner: No     Emotionally Abused: No     Physically Abused: No     Sexually Abused: No   Housing Stability: Low Risk  (1/16/2024)    Received from [facility]     Retired - What is your living situation today? : I have a steady place to live       VITALS:  BP (!) 111/90   Pulse 83   Temp 97.1  F (36.2  C) (Temporal)   Resp 24   Wt 104.3 kg (230 lb)   SpO2 98%   BMI 39.48 kg/m      PHYSICAL EXAM    Physical Exam  Constitutional: Well developed, Well nourished, NAD, GCS 15  HENT: Normocephalic, Atraumatic, Bilateral external ears normal, Oropharynx normal, mucous membranes moist, Nose normal. Neck-  Normal range of motion, No tenderness, Supple, No stridor.    Eyes: PERRL, EOMI, Conjunctiva normal, No discharge.   Respiratory: Normal breath sounds, No respiratory distress, No wheezing or crackles, Speaks in full sentences easily.    Cardiovascular: Normal heart rate, Regular rhythm, No murmurs, No rubs, No gallops. 2+ radial pulses bilaterally. Patient has some mild reproducible pain in the left chest wall and under the axilla, no obvious lymphadenopathy, no rash  GI: Bowel sounds normal, Soft, No tenderness, No masses, No rebound or guarding. No CVA tenderness bilaterally   Musculoskeletal: 2+ DP pulses. No notable lower extremity edema. No cyanosis, No clubbing. Good range of motion in all major joints. No tenderness to palpation or major deformities noted. No midline tenderness of the CTLS spine.    Integument: Warm, Dry, No erythema,  No rash. No petechiae.    Neurologic: Alert & oriented x 3, 5/5 strength in all 4 extremities bilaterally. Sensation intact to light touch in all 4 extremities and the face bilaterally. No focal deficits noted.    Psychiatric: Cooperative.      LAB:  All pertinent labs reviewed and interpreted.  Results for orders placed or performed during the hospital encounter of 06/06/24   NM Lung Scan Ventilation and Perfusion    Impression    IMPRESSION:    No evidence of pulmonary embolism.   Chest XR,  PA & LAT    Impression    IMPRESSION: Low lung volumes with bibasilar atelectasis, however no focal consolidation. No pleural effusion or pneumothorax. Normal heart size and mediastinal contours.    Prominent air-filled loops of bowel in the visualized upper abdomen, nonspecific. Consider further evaluation with CT abdomen pelvis if clinically warranted.   Comprehensive metabolic panel   Result Value Ref Range    Sodium 139 135 - 145 mmol/L    Potassium 4.3 3.4 - 5.3 mmol/L    Carbon Dioxide (CO2) 24 22 - 29 mmol/L    Anion Gap 12 7 - 15 mmol/L    Urea Nitrogen 10.2 6.0 - 20.0 mg/dL    Creatinine 0.93 0.51 - 0.95 mg/dL    GFR Estimate 75 >60 mL/min/1.73m2    Calcium 9.6 8.6 - 10.0 mg/dL    Chloride 103 98 - 107 mmol/L    Glucose 81 70 - 99 mg/dL    Alkaline Phosphatase 92 40 - 150 U/L    AST 25 0 - 45 U/L    ALT 24 0 - 50 U/L    Protein Total 7.2 6.4 - 8.3 g/dL    Albumin 4.3 3.5 - 5.2 g/dL    Bilirubin Total 0.2 <=1.2 mg/dL   Result Value Ref Range    Troponin T, High Sensitivity 8 <=14 ng/L   Nt probnp inpatient (BNP)   Result Value Ref Range    N terminal Pro BNP Inpatient 101 0 - 450 pg/mL   Result Value Ref Range    Lipase 23 13 - 60 U/L   CBC with platelets and differential   Result Value Ref Range    WBC Count 7.1 4.0 - 11.0 10e3/uL    RBC Count 4.32 3.80 - 5.20 10e6/uL    Hemoglobin 12.6 11.7 - 15.7 g/dL    Hematocrit 39.1 35.0 - 47.0 %    MCV 91 78 - 100 fL    MCH 29.2 26.5 - 33.0 pg    MCHC 32.2 31.5 - 36.5 g/dL     RDW 13.2 10.0 - 15.0 %    Platelet Count 318 150 - 450 10e3/uL    % Neutrophils 57 %    % Lymphocytes 33 %    % Monocytes 7 %    % Eosinophils 2 %    % Basophils 1 %    % Immature Granulocytes 1 %    NRBCs per 100 WBC 0 <1 /100    Absolute Neutrophils 4.1 1.6 - 8.3 10e3/uL    Absolute Lymphocytes 2.3 0.8 - 5.3 10e3/uL    Absolute Monocytes 0.5 0.0 - 1.3 10e3/uL    Absolute Eosinophils 0.1 0.0 - 0.7 10e3/uL    Absolute Basophils 0.1 0.0 - 0.2 10e3/uL    Absolute Immature Granulocytes 0.1 <=0.4 10e3/uL    Absolute NRBCs 0.0 10e3/uL   Result Value Ref Range    Troponin T, High Sensitivity 7 <=14 ng/L   ECG 12-LEAD WITH MUSE (LHE)   Result Value Ref Range    Systolic Blood Pressure  mmHg    Diastolic Blood Pressure  mmHg    Ventricular Rate 101 BPM    Atrial Rate 101 BPM    DC Interval 158 ms    QRS Duration 92 ms     ms    QTc 466 ms    P Axis 12 degrees    R AXIS -4 degrees    T Axis 8 degrees    Interpretation ECG       Sinus tachycardia  Minimal voltage criteria for LVH, may be normal variant  Possible Inferior infarct (cited on or before 06-SEP-2013)  Abnormal ECG  When compared with ECG of 12-MAY-2014 09:57,  No significant change was found  Confirmed by SEE ED PROVIDER NOTE FOR, ECG INTERPRETATION ([id number]),  ALFIE VANCE ([id number]) on 6/6/2024 1:12:35 PM         RADIOLOGY:  Reviewed all pertinent imaging. Please see official radiology report.  NM Lung Scan Ventilation and Perfusion   Final Result   IMPRESSION:      No evidence of pulmonary embolism.      Chest XR,  PA & LAT   Final Result   IMPRESSION: Low lung volumes with bibasilar atelectasis, however no focal consolidation. No pleural effusion or pneumothorax. Normal heart size and mediastinal contours.      Prominent air-filled loops of bowel in the visualized upper abdomen, nonspecific. Consider further evaluation with CT abdomen pelvis if clinically warranted.      US Lower Extremity Venous Duplex Bilateral    (Results Pending)       EKG:     Performed at: 1313    Impression: Sinus tachycardia.  No evidence of acute ischemia.  Unchanged from prior EKG on 5-.      I have independently reviewed and interpreted the EKG(s) documented above.    PROCEDURES:   None      [facility] System Documentation:   CMS Diagnoses:               I, Vivien Guevara, am serving as a scribe to document services personally performed by Katherine Jaimes MD based on my observation and the provider's statements to me. I, Katherine Jaimes MD, attest that Vivien Guevara is acting in a scribe capacity, has observed my performance of the services and has documented them in accordance with my direction.    Katherine Jaimes MD  [facility] EMERGENCY ROOM  [address] [id number] [phone]     Katherine Jaimes MD  06/06/24 1800

## 2024-06-06 NOTE — ED NOTES
EMERGENCY DEPARTMENT SIGN OUT NOTE        ED COURSE AND MEDICAL DECISION MAKING  3:45 PM  Patient was signed out to me by Dr Katherine Jaimes.  In brief, Jory Ambrose is a 48 year old female who initially presented with left-sided chest pain radiating to the back, jaw, under armpits, associated with nausea and vomiting.  Pain-free in the emergency department.  Patient with multiple recent emergency department visits in the last couple of weeks for lightheadedness, numbness, transient neurologic symptoms of facial and left upper extremity numbness, with no findings at time of evaluation.  Patient with MRI brain May 24 with no evidence for intracranial abnormality.  Labs independently interpreted by me with normal hepatic panel, normal lipase, normal BMP, troponin 8 which likely is negative given time of onset of symptoms but repeat is pending.  VQ scan negative.  5:39 PM repeat troponin is 7, ultrasound is still pending.  6:14 PM ultrasound is negative for acute findings.  Patient stable for discharge.  She still having a little bit of nausea, but generally feeling better.    FINAL IMPRESSION    1. Chest pain, unspecified type        ED MEDS  Medications   technetium pentetate Tc99m (DTPA) inhaled radioisotope 55-65 millicurie (57.5 millicuries Inhalation $Given 6/6/24 1169)   technetium pertechnetate with albumin (Tc99m MAA) radioisotope injection 7-10 millicurie (8.5 millicuries Intravenous $Given 6/6/24 1559)       LAB  Labs Ordered and Resulted from Time of ED Arrival to Time of ED Departure   COMPREHENSIVE METABOLIC PANEL - Normal       Result Value    Sodium 139      Potassium 4.3      Carbon Dioxide (CO2) 24      Anion Gap 12      Urea Nitrogen 10.2      Creatinine 0.93      GFR Estimate 75      Calcium 9.6      Chloride 103      Glucose 81      Alkaline Phosphatase 92      AST 25      ALT 24      Protein Total 7.2      Albumin 4.3      Bilirubin Total 0.2     TROPONIN T, HIGH SENSITIVITY - Normal     Troponin T, High Sensitivity 8     NT PROBNP INPATIENT - Normal    N terminal Pro BNP Inpatient 101     LIPASE - Normal    Lipase 23     TROPONIN T, HIGH SENSITIVITY - Normal    Troponin T, High Sensitivity 7     CBC WITH PLATELETS AND DIFFERENTIAL    WBC Count 7.1      RBC Count 4.32      Hemoglobin 12.6      Hematocrit 39.1      MCV 91      MCH 29.2      MCHC 32.2      RDW 13.2      Platelet Count 318      % Neutrophils 57      % Lymphocytes 33      % Monocytes 7      % Eosinophils 2      % Basophils 1      % Immature Granulocytes 1      NRBCs per 100 WBC 0      Absolute Neutrophils 4.1      Absolute Lymphocytes 2.3      Absolute Monocytes 0.5      Absolute Eosinophils 0.1      Absolute Basophils 0.1      Absolute Immature Granulocytes 0.1      Absolute NRBCs 0.0         RADIOLOGY    NM Lung Scan Ventilation and Perfusion   Final Result   IMPRESSION:      No evidence of pulmonary embolism.      Chest XR,  PA & LAT   Final Result   IMPRESSION: Low lung volumes with bibasilar atelectasis, however no focal consolidation. No pleural effusion or pneumothorax. Normal heart size and mediastinal contours.      Prominent air-filled loops of bowel in the visualized upper abdomen, nonspecific. Consider further evaluation with CT abdomen pelvis if clinically warranted.      US Lower Extremity Venous Duplex Bilateral    (Results Pending)       DISCHARGE MEDS  New Prescriptions    No medications on file       Drew Riley MD  [facility] EMERGENCY ROOM  [facility] [facility] [address] [phone]         Drew Riley MD  06/06/24 2661

## 2024-06-06 NOTE — ED NOTES
Pt c/o her IV site hurting. The IV was assessed and found to patent. Pt was unable to tolerate the saline locked IV. IV removed.

## 2024-06-06 NOTE — MEDICATION SCRIBE - ADMISSION MEDICATION HISTORY
Medication Scribe Admission Medication History    Admission medication history is complete. The information provided in this note is only as accurate as the sources available at the time of the update.    Information Source(s): Patient and CareEverywhere/SureScripts via in-person    Pertinent Information: If admitted, patient is able to get inhalers here.     Changes made to PTA medication list:  Added:   Zinc 50 mg  Atorvastatin 20 mg  Budesonide-formoterol 80-4.5 mcg/act  Bupropion  mg  Clopidogrel 75 mg  Fludrocortisone 0.1 mg  Ipratropium-albuterol 0.5-3 mg/3 mL  Lorazepam 0.5 mg  Phentermine 37.5 mg  Spironolactone 25 mg  Trazodone 100 mg  Metformin  mg  Deleted:   Acyclovir 400 mg  Clindamycin 1% lotion  Fluticasone-salmeterol 250-50 mcg/act  Gengraf 25 mg  Melatonin 3 mg  Montelukast 10 mg  Pentamidine 300 mg inhalation solution  Vitamin D 2,000 international unit(s)   Changed:   Levothyroxine 88 mcg --> 125 mcg  Magnesium oxide 500 mg 2 tab TID --> 2 tab at bedtime     Allergies reviewed with patient and updates made in EHR: yes    Medication History Completed By: Cortez Lopez 6/6/2024 4:43 PM    PTA Med List   Medication Sig Last Dose    albuterol (PROAIR HFA) 108 (90 BASE) MCG/ACT inhaler Inhale 2 puffs into the lungs every 6 hours as needed. Past Week at last wk, has with    atorvastatin (LIPITOR) 20 MG tablet Take 1 tablet by mouth at bedtime 6/5/2024 at PM    budesonide-formoterol (SYMBICORT) 80-4.5 MCG/ACT Inhaler Inhale 2 puffs into the lungs daily Past Week at few days ago, able to bring if admitted    buPROPion (WELLBUTRIN XL) 300 MG 24 hr tablet Take 300 mg by mouth every morning 6/6/2024 at AM    Calcium Carbonate-Vitamin D (CALCIUM 600 + D OR) Take 1 tablet by mouth daily 6/5/2024 at AM    cetirizine (ZYRTEC) 10 MG tablet Take 1 tablet (10 mg) by mouth daily 6/5/2024 at PM    clopidogrel (PLAVIX) 75 MG tablet Take 1 tablet by mouth daily 6/6/2024 at 1100    diphenhydrAMINE  (BENADRYL) 25 MG tablet Take 25 mg by mouth every 6 hours as needed More than a month at PRN    estradiol (VAGIFEM) 10 MCG TABS Twice weekly (Patient taking differently: 1 tablet every 30 days Taking PRN) More than a month at PRN    fludrocortisone (FLORINEF) 0.1 MG tablet Take 0.05-0.1 mg by mouth daily 6/6/2024 at AM    fluticasone (FLONASE) 50 MCG/ACT nasal spray Spray 2 sprays into both nostrils daily as needed  Past Week at PRN few days ago    ipratropium - albuterol 0.5 mg/2.5 mg/3 mL (DUONEB) 0.5-2.5 (3) MG/3ML neb solution Take 1 vial by nebulization every 6 hours as needed More than a month at PRN Feb    levothyroxine (SYNTHROID/LEVOTHROID) 125 MCG tablet Take 125 mcg by mouth daily 6/6/2024 at AM    LORazepam (ATIVAN) 0.5 MG tablet Take 0.5 mg by mouth daily as needed for anxiety Past Week at 6/4 HS    Magnesium Oxide 500 MG CAPS Take 2 tablets by mouth at bedtime 6/5/2024 at HS    methylPREDNISolone (MEDROL) 32 MG tablet Take 1 tablet 12 hours prior to scan and 1 tablet 2 hours prior to scan More than a month at Feb    Multiple Vitamin (MULTI-VITAMIN PO) Take 1 tablet by mouth daily 6/5/2024 at AM    omeprazole (PRILOSEC) 20 MG capsule Take 1 capsule (20 mg) by mouth daily 6/5/2024 at PM    phentermine (ADIPEX-P) 37.5 MG tablet Take 37.5 mg by mouth every morning (before breakfast) 6/6/2024 at AM    spironolactone (ALDACTONE) 25 MG tablet Take 1 tablet by mouth daily 6/5/2024 at PM    traZODone (DESYREL) 100 MG tablet Take 100 mg by mouth at bedtime 6/5/2024 at HS    zinc gluconate 50 MG tablet Take 50 mg by mouth daily 6/5/2024 at PM

## 2024-06-06 NOTE — ED TRIAGE NOTES
Pt presents to the ED with c/o CP that started around 11:30am. Endorses left sided CP that radiates to left arm. Endorses that she was dx with having a TIA a week ago. Started plavix yesterday. Endorses nausea, no vomiting.     Triage Assessment (Adult)       Row Name 06/06/24 1216          Triage Assessment    Airway WDL WDL        Respiratory WDL    Respiratory WDL WDL        Skin Circulation/Temperature WDL    Skin Circulation/Temperature WDL WDL        Cardiac WDL    Cardiac WDL X;chest pain        Chest Pain Assessment    Chest Pain Location anterior chest, left     Chest Pain Radiation arm     Precipitating Factors at rest     Alleviating Factors nothing     Associated Signs/Symptoms anxiety     Chest Pain Intervention 12-lead ECG obtained        Peripheral/Neurovascular WDL    Peripheral Neurovascular WDL WDL        Cognitive/Neuro/Behavioral WDL    Cognitive/Neuro/Behavioral WDL WDL

## 2024-06-21 ENCOUNTER — OFFICE VISIT (OUTPATIENT)
Dept: CARDIOLOGY | Facility: CLINIC | Age: 49
End: 2024-06-21
Attending: EMERGENCY MEDICINE
Payer: COMMERCIAL

## 2024-06-21 VITALS
HEART RATE: 106 BPM | RESPIRATION RATE: 16 BRPM | BODY MASS INDEX: 39.2 KG/M2 | OXYGEN SATURATION: 96 % | SYSTOLIC BLOOD PRESSURE: 125 MMHG | DIASTOLIC BLOOD PRESSURE: 85 MMHG | WEIGHT: 228.4 LBS

## 2024-06-21 DIAGNOSIS — R07.9 CHEST PAIN, UNSPECIFIED TYPE: ICD-10-CM

## 2024-06-21 PROCEDURE — 99244 OFF/OP CNSLTJ NEW/EST MOD 40: CPT | Performed by: INTERNAL MEDICINE

## 2024-06-21 RX ORDER — DIPHENHYDRAMINE HCL 50 MG
50 CAPSULE ORAL EVERY 6 HOURS PRN
Qty: 6 CAPSULE | Refills: 0 | Status: SHIPPED | OUTPATIENT
Start: 2024-06-21 | End: 2024-06-27

## 2024-06-21 RX ORDER — METHYLPREDNISOLONE 32 MG/1
32 TABLET ORAL DAILY
Qty: 1 TABLET | Refills: 0 | Status: SHIPPED | OUTPATIENT
Start: 2024-06-21 | End: 2024-06-27

## 2024-06-21 NOTE — LETTER
6/21/2024    [facility]  [address] [address]    RE: Jory Ambrose       Dear Colleague,     I had the pleasure of seeing Jory Ambrose in the [facility].        Thank you, Dr. Jaimes, for asking [facility] to evaluate Ms. Jory Ambrose.      Assessment/Recommendations   Assessment:    Chest pain, uncertain etiology possibly angina  History of Hodgkin lymphoma status post chemo chest radiation and bone marrow transplant, in remission  Cerebrovascular disease mild to moderate, on clopidogrel  Hypercholesterolemia on statin  Obesity on phentermine    Plan:  Echo  CT coronary angiogram out of concerns for chest pain and presence of atherosclerosis already documented in cerebrovascular bed.  I do not think she can perform diagnostic treadmill stress test.  I am afraid that pharmacological nuclear stress test will be subject to soft tissue attenuation  Continue clopidogrel and atorvastatin  Should consider stopping phentermine if cardiac workup does not reveal any significant obstructive coronary artery disease or cardiomyopathy       History of Present Illness    Ms. Jory Ambrose is a 48 year old female who comes in for cardiac evaluation.  She had 2 recent visits to emergency room.  The first time she went with the pain around her eye and numbness in her arm.  She underwent imaging of head without evidence of an acute stroke.  She had moderate disease in the carotids.  The second visit to emergency room was because of a tight heavy sensation in the left side of her chest.  This sensation came on at rest.  Her EKG did not show any ischemic changes and troponins were negative.  She did not have recurrence of the discomfort.  She is not known to have coronary artery disease, valvular heart disease, cardiomyopathy.  She has a history of Hodgkin lymphoma she had chemotherapy, radiation, bone  "marrow transplant x 2.  She is in remission now.  2 months ago she started taking phentermine for weight loss.    ECG: Personally reviewed.  Normal sinus rhythm no ischemic changes.       Physical Examination Review of Systems   /85 (BP Location: Right arm, Patient Position: Sitting, Cuff Size: Adult Large)   Pulse 106   Resp 16   Wt 103.6 kg (228 lb 6.4 oz)   SpO2 96%   BMI 39.20 kg/m    Body mass index is 39.2 kg/m .  Wt Readings from Last 3 Encounters:   06/21/24 103.6 kg (228 lb 6.4 oz)   06/06/24 104.3 kg (230 lb)   05/24/24 104.3 kg (230 lb)     General Appearance:   Alert, cooperative, no distress, appears stated age   Head/ENT: Normocephalic, without obvious abnormality. Membranes moist      EYES:  no scleral icterus, normal conjunctivae   Neck: Supple, symmetrical, trachea midline, no adenopathy, thyroid: not enlarged, symmetric, no carotid bruit or JVD   Chest/Lungs:   Lungs are clear to auscultation, respirations unlabored. No tenderness or deformity    Cardiovascular:   Regular rhythm, S1, S2 normal, no murmur, rub or gallop.   Abdomen:  Soft, non-tender, bowel sounds active all four quadrants,  no masses, no organomegaly   Extremities: no cyanosis or clubbing. No edema   Skin: Skin color, texture, turgor normal, no rashes or lesions.    Psychiatric: Normal affect, calm   Neurologic: Alert and oriented x 3, moving all four extremities.     Enc Vitals  BP: 125/85  Pulse: 106  Resp: 16  SpO2: 96 %  Weight: 103.6 kg (228 lb 6.4 oz)                                          Lab Results    Chemistry/lipid CBC Cardiac Enzymes/BNP/TSH/INR   No results for input(s): \"CHOL\", \"HDL\", \"LDL\", \"TRIG\", \"CHOLHDLRATIO\" in the last 08454 hours.  No results for input(s): \"LDL\" in the last 52798 hours.  Recent Labs   Lab Test 06/06/24  1302      POTASSIUM 4.3   CHLORIDE 103   CO2 24   GLC 81   BUN 10.2   CR 0.93   GFRESTIMATED 75   CINDY 9.6     Recent Labs   Lab Test 06/06/24  1302 05/24/24  1858   CR 0.93 " "0.94     No results for input(s): \"A1C\" in the last 07134 hours.       Recent Labs   Lab Test 06/06/24  1302   WBC 7.1   HGB 12.6   HCT 39.1   MCV 91        Recent Labs   Lab Test 06/06/24  1302 05/24/24  1858   HGB 12.6 13.0    No results for input(s): \"TROPONINI\" in the last 93686 hours.  Recent Labs   Lab Test 06/06/24  1302   NTBNPI 101     No results for input(s): \"TSH\" in the last 89887 hours.  No results for input(s): \"INR\" in the last 38935 hours.     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    brother    Seasonal allergies     Shingles     Aug 2010     Past Surgical History:   Procedure Laterality Date    CHOLECYSTECTOMY      gallstone pancreatitis Jan 2010    Lymphectomy  2/2010    right neck area     Mother had stents in her 60s   Social History     Socioeconomic History    Marital status:      Spouse name: Yasir    Number of children: 0    Years of education: Not on file    Highest education level: Not on file   Occupational History    Not on file   Tobacco Use    Smoking status: Never    Smokeless tobacco: Never    Tobacco comments:     denies tabacco use   Substance and Sexual Activity    Alcohol use: No     Comment: minimal alcohol use, 1 glass of wine in 6 months    Drug use: No    Sexual activity: Yes     Partners: Male     Birth control/protection: None   Other Topics Concern    Parent/sibling w/ CABG, MI or angioplasty before 65F 55M? Not Asked   Social History Narrative    Not on file     Social Determinants of Health "     Financial Resource Strain: Low Risk  (1/16/2024)    [facility]    Overall Financial Resource Strain (CARDIA)     Difficulty of Paying Living Expenses: Not hard at all   Food Insecurity: No Food Insecurity (1/16/2024)    [facility]    Hunger Vital Sign     Worried About Running Out of Food in the Last Year: Never true     Ran Out of Food in the Last Year: Never true   Transportation Needs: No Transportation Needs (1/16/2024)    [facility]    PRAPARE - Transportation     Lack of Transportation (Medical): No     Lack of Transportation (Non-Medical): No   Physical Activity: Insufficiently Active (10/24/2023)    [facility] from [facility]    Exercise Vital Sign     Days of Exercise per Week: 2 days     Minutes of Exercise per Session: 30 min   Stress: Stress Concern Present (10/24/2023)    Received from [facility], [facility]    [place] [employer] - Occupational Stress Questionnaire     Feeling of Stress : To some extent   Social Connections: Moderately Integrated (10/24/2023)    Received from [facility], [facility]    Social Connection and Isolation Panel [NHANES]     Frequency of Communication with Friends and Family: More than three times a week     Frequency of Social Gatherings with Friends and Family: Once a week     Attends [employer] Services: More than 4 times per year     Active Member of Clubs or Organizations: No     Attends Club or Organization Meetings: Patient declined      Marital Status:    Interpersonal Safety: Not At Risk (10/24/2023)    [facility]    Humiliation, Afraid, Rape, and Kick questionnaire     Fear of Current or Ex-Partner: No     Emotionally Abused: No     Physically Abused: No     Sexually Abused: No   Housing Stability: Low Risk  (1/16/2024)    [facility]     Retired - What is your living situation today? : I have a steady place to live         Medications  Allergies   Current Outpatient Medications   Medication Sig Dispense Refill    albuterol (PROAIR HFA) 108 (90 BASE) MCG/ACT inhaler Inhale 2 puffs into the lungs every 6 hours as needed.      atorvastatin (LIPITOR) 20 MG tablet Take 1 tablet by mouth at bedtime      budesonide-formoterol (SYMBICORT) 80-4.5 MCG/ACT Inhaler Inhale 2 puffs into the lungs daily      buPROPion (WELLBUTRIN XL) 300 MG 24 hr tablet Take 300 mg by mouth every morning      Calcium Carbonate-Vitamin D (CALCIUM 600 + D OR) Take 1 tablet by mouth daily      cetirizine (ZYRTEC) 10 MG tablet Take 1 tablet (10 mg) by mouth daily 30 tablet 11    clopidogrel (PLAVIX) 75 MG tablet Take 1 tablet by mouth daily      diphenhydrAMINE (BENADRYL) 25 MG tablet Take 25 mg by mouth every 6 hours as needed      estradiol (VAGIFEM) 10 MCG TABS Twice weekly (Patient taking differently: 1 tablet every 30 days Taking PRN) 24 tablet 3    fludrocortisone (FLORINEF) 0.1 MG tablet Take 0.05-0.1 mg by mouth daily      fluticasone (FLONASE) 50 MCG/ACT nasal spray Spray 2 sprays into both nostrils daily as needed       ipratropium - albuterol 0.5 mg/2.5 mg/3 mL (DUONEB) 0.5-2.5 (3) MG/3ML neb solution Take 1 vial by nebulization every 6 hours as needed      levothyroxine (SYNTHROID/LEVOTHROID) 125 MCG tablet Take 125 mcg by mouth daily      LORazepam (ATIVAN) 0.5 MG tablet Take 0.5 mg by mouth  daily as needed for anxiety      Magnesium Oxide 500 MG CAPS Take 2 tablets by mouth at bedtime 30 capsule     methylPREDNISolone (MEDROL) 32 MG tablet Take 1 tablet 12 hours prior to scan and 1 tablet 2 hours prior to scan 2 tablet 1    Multiple Vitamin (MULTI-VITAMIN PO) Take 1 tablet by mouth daily      omeprazole (PRILOSEC) 20 MG capsule Take 1 capsule (20 mg) by mouth daily 30 capsule 12    phentermine (ADIPEX-P) 37.5 MG tablet Take 37.5 mg by mouth every morning (before breakfast)      simethicone (MYLICON) 125 MG chewable tablet Take 1 tablet (125 mg) by mouth 4 times daily as needed for intestinal gas 40 tablet 0    spironolactone (ALDACTONE) 25 MG tablet Take 1 tablet by mouth daily      traZODone (DESYREL) 100 MG tablet Take 100 mg by mouth at bedtime      zinc gluconate 50 MG tablet Take 50 mg by mouth daily          Allergies   Allergen Reactions    Dye [Contrast Dye] Swelling     Pre-medicated with methylprednisolone    Shellfish Allergy Shortness Of Breath, Anaphylaxis and Swelling     Shrimp, crab, lobster    Penicillins Hives     Patient cannot recall if she has tried cephalosporins in the past.     Erythromycin Hives    Morphine Hcl      Causes change in mental status    Sulfa Antibiotics Hives and Rash                        Thank you for allowing me to participate in the care of your patient.      Sincerely,     Jordon Rodriguez MD     [facility]  cc:   Katherine Jaimes MD  [address] [place] [address]

## 2024-06-21 NOTE — PROGRESS NOTES
Thank you, Dr. Jaimes, for asking [facility] to evaluate Ms. Jory Ambrose.      Assessment/Recommendations   Assessment:    Chest pain, uncertain etiology possibly angina  History of Hodgkin lymphoma status post chemo chest radiation and bone marrow transplant, in remission  Cerebrovascular disease mild to moderate, on clopidogrel  Hypercholesterolemia on statin  Obesity on phentermine    Plan:  Echo  CT coronary angiogram out of concerns for chest pain and presence of atherosclerosis already documented in cerebrovascular bed.  I do not think she can perform diagnostic treadmill stress test.  I am afraid that pharmacological nuclear stress test will be subject to soft tissue attenuation  Continue clopidogrel and atorvastatin  Should consider stopping phentermine if cardiac workup does not reveal any significant obstructive coronary artery disease or cardiomyopathy       History of Present Illness    Ms. Jory Ambrose is a 48 year old female who comes in for cardiac evaluation.  She had 2 recent visits to emergency room.  The first time she went with the pain around her eye and numbness in her arm.  She underwent imaging of head without evidence of an acute stroke.  She had moderate disease in the carotids.  The second visit to emergency room was because of a tight heavy sensation in the left side of her chest.  This sensation came on at rest.  Her EKG did not show any ischemic changes and troponins were negative.  She did not have recurrence of the discomfort.  She is not known to have coronary artery disease, valvular heart disease, cardiomyopathy.  She has a history of Hodgkin lymphoma she had chemotherapy, radiation, bone marrow transplant x 2.  She is in remission now.  2 months ago she started taking phentermine for weight loss.    ECG: Personally reviewed.  Normal sinus rhythm no ischemic changes.       Physical Examination Review of Systems   /85 (BP  "Location: Right arm, Patient Position: Sitting, Cuff Size: Adult Large)   Pulse 106   Resp 16   Wt 103.6 kg (228 lb 6.4 oz)   SpO2 96%   BMI 39.20 kg/m    Body mass index is 39.2 kg/m .  Wt Readings from Last 3 Encounters:   06/21/24 103.6 kg (228 lb 6.4 oz)   06/06/24 104.3 kg (230 lb)   05/24/24 104.3 kg (230 lb)     General Appearance:   Alert, cooperative, no distress, appears stated age   Head/ENT: Normocephalic, without obvious abnormality. Membranes moist      EYES:  no scleral icterus, normal conjunctivae   Neck: Supple, symmetrical, trachea midline, no adenopathy, thyroid: not enlarged, symmetric, no carotid bruit or JVD   Chest/Lungs:   Lungs are clear to auscultation, respirations unlabored. No tenderness or deformity    Cardiovascular:   Regular rhythm, S1, S2 normal, no murmur, rub or gallop.   Abdomen:  Soft, non-tender, bowel sounds active all four quadrants,  no masses, no organomegaly   Extremities: no cyanosis or clubbing. No edema   Skin: Skin color, texture, turgor normal, no rashes or lesions.    Psychiatric: Normal affect, calm   Neurologic: Alert and oriented x 3, moving all four extremities.     Enc Vitals  BP: 125/85  Pulse: 106  Resp: 16  SpO2: 96 %  Weight: 103.6 kg (228 lb 6.4 oz)                                          Lab Results    Chemistry/lipid CBC Cardiac Enzymes/BNP/TSH/INR   No results for input(s): \"CHOL\", \"HDL\", \"LDL\", \"TRIG\", \"CHOLHDLRATIO\" in the last 29570 hours.  No results for input(s): \"LDL\" in the last 77064 hours.  Recent Labs   Lab Test 06/06/24  1302      POTASSIUM 4.3   CHLORIDE 103   CO2 24   GLC 81   BUN 10.2   CR 0.93   GFRESTIMATED 75   CINDY 9.6     Recent Labs   Lab Test 06/06/24  1302 05/24/24  1858   CR 0.93 0.94     No results for input(s): \"A1C\" in the last 05997 hours.       Recent Labs   Lab Test 06/06/24  1302   WBC 7.1   HGB 12.6   HCT 39.1   MCV 91        Recent Labs   Lab Test 06/06/24  1302 05/24/24  1858   HGB 12.6 13.0    No " "results for input(s): \"TROPONINI\" in the last 01206 hours.  Recent Labs   Lab Test 06/06/24  1302   NTBNPI 101     No results for input(s): \"TSH\" in the last 36963 hours.  No results for input(s): \"INR\" in the last 50848 hours.     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    brother    Seasonal allergies     Shingles     Aug 2010     Past Surgical History:   Procedure Laterality Date    CHOLECYSTECTOMY      gallstone pancreatitis Jan 2010    Lymphectomy  2/2010    right neck area     Mother had stents in her 60s   Social History     Socioeconomic History    Marital status:      Spouse name: Yasir    Number of children: 0    Years of education: Not on file    Highest education level: Not on file   Occupational History    Not on file   Tobacco Use    Smoking status: Never    Smokeless tobacco: Never    Tobacco comments:     denies tabacco use   Substance and Sexual Activity    Alcohol use: No     Comment: minimal alcohol use, 1 glass of wine in 6 months    Drug use: No    Sexual activity: Yes     Partners: Male     Birth control/protection: None   Other Topics Concern    Parent/sibling w/ CABG, MI or angioplasty before 65F 55M? Not Asked   Social History Narrative    Not on file     Social Determinants of Health     Financial Resource Strain: Low Risk  (1/16/2024)    [facility]    Overall Financial Resource Strain (CARDIA)     Difficulty of Paying Living " Expenses: Not hard at all   Food Insecurity: No Food Insecurity (1/16/2024)    [facility], [facility]    Hunger Vital Sign     Worried About Running Out of Food in the Last Year: Never true     Ran Out of Food in the Last Year: Never true   Transportation Needs: No Transportation Needs (1/16/2024)    Received from [facility], [facility]    PRAPARE - Transportation     Lack of Transportation (Medical): No     Lack of Transportation (Non-Medical): No   Physical Activity: Insufficiently Active (10/24/2023)    Received from [facility], [facility]    Exercise Vital Sign     Days of Exercise per Week: 2 days     Minutes of Exercise per Session: 30 min   Stress: Stress Concern Present (10/24/2023)    Received from [facility], [facility]    [employer] - Occupational Stress Questionnaire     Feeling of Stress : To some extent   Social Connections: Moderately Integrated (10/24/2023)    Received from [facility], [facility]    Social Connection and Isolation Panel [NHANES]     Frequency of Communication with Friends and Family: More than three times a week     Frequency of Social Gatherings with Friends and Family: Once a week     Attends Tenriism Services: More than 4 times per year     Active Member of Clubs or Organizations: No     Attends Club or Organization Meetings: Patient declined     Marital Status:    Interpersonal Safety: Not At Risk (10/24/2023)    Received from [facility], [facility]    Humiliation, Afraid, Rape, and Kick questionnaire      Fear of Current or Ex-Partner: No     Emotionally Abused: No     Physically Abused: No     Sexually Abused: No   Housing Stability: Low Risk  (1/16/2024)    Received from [facility]    [facility] Domain     Retired - What is your living situation today? : I have a steady place to live         Medications  Allergies   Current Outpatient Medications   Medication Sig Dispense Refill    albuterol (PROAIR HFA) 108 (90 BASE) MCG/ACT inhaler Inhale 2 puffs into the lungs every 6 hours as needed.      atorvastatin (LIPITOR) 20 MG tablet Take 1 tablet by mouth at bedtime      budesonide-formoterol (SYMBICORT) 80-4.5 MCG/ACT Inhaler Inhale 2 puffs into the lungs daily      buPROPion (WELLBUTRIN XL) 300 MG 24 hr tablet Take 300 mg by mouth every morning      Calcium Carbonate-Vitamin D (CALCIUM 600 + D OR) Take 1 tablet by mouth daily      cetirizine (ZYRTEC) 10 MG tablet Take 1 tablet (10 mg) by mouth daily 30 tablet 11    clopidogrel (PLAVIX) 75 MG tablet Take 1 tablet by mouth daily      diphenhydrAMINE (BENADRYL) 25 MG tablet Take 25 mg by mouth every 6 hours as needed      estradiol (VAGIFEM) 10 MCG TABS Twice weekly (Patient taking differently: 1 tablet every 30 days Taking PRN) 24 tablet 3    fludrocortisone (FLORINEF) 0.1 MG tablet Take 0.05-0.1 mg by mouth daily      fluticasone (FLONASE) 50 MCG/ACT nasal spray Spray 2 sprays into both nostrils daily as needed       ipratropium - albuterol 0.5 mg/2.5 mg/3 mL (DUONEB) 0.5-2.5 (3) MG/3ML neb solution Take 1 vial by nebulization every 6 hours as needed      levothyroxine (SYNTHROID/LEVOTHROID) 125 MCG tablet Take 125 mcg by mouth daily      LORazepam (ATIVAN) 0.5 MG tablet Take 0.5 mg by mouth daily as needed for anxiety      Magnesium Oxide 500 MG CAPS Take 2 tablets by mouth at bedtime 30 capsule     methylPREDNISolone (MEDROL) 32 MG tablet Take 1 tablet 12 hours prior to scan and 1 tablet 2 hours prior to scan 2 tablet 1     Multiple Vitamin (MULTI-VITAMIN PO) Take 1 tablet by mouth daily      omeprazole (PRILOSEC) 20 MG capsule Take 1 capsule (20 mg) by mouth daily 30 capsule 12    phentermine (ADIPEX-P) 37.5 MG tablet Take 37.5 mg by mouth every morning (before breakfast)      simethicone (MYLICON) 125 MG chewable tablet Take 1 tablet (125 mg) by mouth 4 times daily as needed for intestinal gas 40 tablet 0    spironolactone (ALDACTONE) 25 MG tablet Take 1 tablet by mouth daily      traZODone (DESYREL) 100 MG tablet Take 100 mg by mouth at bedtime      zinc gluconate 50 MG tablet Take 50 mg by mouth daily          Allergies   Allergen Reactions    Dye [Contrast Dye] Swelling     Pre-medicated with methylprednisolone    Shellfish Allergy Shortness Of Breath, Anaphylaxis and Swelling     Shrimp, crab, lobster    Penicillins Hives     Patient cannot recall if she has tried cephalosporins in the past.     Erythromycin Hives    Morphine Hcl      Causes change in mental status    Sulfa Antibiotics Hives and Rash

## 2024-06-25 ENCOUNTER — TELEPHONE (OUTPATIENT)
Dept: CARDIOLOGY | Facility: CLINIC | Age: 49
End: 2024-06-25
Payer: COMMERCIAL

## 2024-06-25 DIAGNOSIS — R07.9 CHEST PAIN, UNSPECIFIED TYPE: ICD-10-CM

## 2024-06-25 NOTE — TELEPHONE ENCOUNTER
----- Message from Francy Boss sent at 6/24/2024  8:03 AM CDT -----  Regarding: [facility] - CCTA  Hi Mal!    This pt is scheduled for the CCTA that Dr. Rodriguez ordered. Pt said she has a contrast allergy, and may need to be pre-treated. Thanks!

## 2024-06-27 RX ORDER — METHYLPREDNISOLONE 32 MG/1
TABLET ORAL
Qty: 2 TABLET | Refills: 1 | Status: SHIPPED | OUTPATIENT
Start: 2024-06-27 | End: 2024-09-05

## 2024-06-27 RX ORDER — DIPHENHYDRAMINE HCL 50 MG
CAPSULE ORAL
Status: SHIPPED
Start: 2024-06-27 | End: 2024-09-05

## 2024-06-27 NOTE — TELEPHONE ENCOUNTER
Noted and chart reviewed- current medication on list says 1 tablet of Methylprednisone 32 mg was prescribed. Writer called patient and she confirmed instructions and has the right dose- Methylprednisone 32 mg 12 hours prior and 2 hours prior. She was dispensed two tablets. She also knows to take 50 mg of benadryl PO 1 hour prior. She has all she needs for CCTA in August. Encounter closed. -[facility] [place]        Addendum- chart reviewed again and it shows only 1 tablet dispensed for steroid- re-sent with correct instructions. -[facility] [place]

## 2024-07-01 ENCOUNTER — HOSPITAL ENCOUNTER (OUTPATIENT)
Dept: CARDIOLOGY | Facility: CLINIC | Age: 49
Discharge: HOME | End: 2024-07-01
Attending: INTERNAL MEDICINE | Admitting: INTERNAL MEDICINE
Payer: COMMERCIAL

## 2024-07-01 DIAGNOSIS — R07.9 CHEST PAIN, UNSPECIFIED TYPE: ICD-10-CM

## 2024-07-01 LAB — LVEF ECHO: NORMAL

## 2024-07-01 PROCEDURE — C8929 TTE W OR WO FOL WCON,DOPPLER: HCPCS

## 2024-07-01 PROCEDURE — 93306 TTE W/DOPPLER COMPLETE: CPT | Mod: 26 | Performed by: INTERNAL MEDICINE

## 2024-07-01 PROCEDURE — 255N000002 HC RX 255 OP 636: Performed by: INTERNAL MEDICINE

## 2024-07-01 RX ADMIN — PERFLUTREN 2 ML: 6.52 INJECTION, SUSPENSION INTRAVENOUS at 15:30

## 2024-07-18 RX ORDER — DILTIAZEM HYDROCHLORIDE 5 MG/ML
10-15 INJECTION INTRAVENOUS
Status: DISCONTINUED | OUTPATIENT
Start: 2024-07-18 | End: 2024-08-07 | Stop reason: HOSPADM

## 2024-07-18 RX ORDER — METOPROLOL TARTRATE 1 MG/ML
5-15 INJECTION, SOLUTION INTRAVENOUS
Status: DISCONTINUED | OUTPATIENT
Start: 2024-07-18 | End: 2024-08-07 | Stop reason: HOSPADM

## 2024-07-18 RX ORDER — DILTIAZEM HYDROCHLORIDE 30 MG/1
120 TABLET, FILM COATED ORAL
Status: DISCONTINUED | OUTPATIENT
Start: 2024-07-18 | End: 2024-08-07 | Stop reason: HOSPADM

## 2024-07-18 RX ORDER — METOPROLOL TARTRATE 25 MG/1
25-100 TABLET, FILM COATED ORAL
Status: DISCONTINUED | OUTPATIENT
Start: 2024-07-18 | End: 2024-08-07 | Stop reason: HOSPADM

## 2024-07-18 RX ORDER — NITROGLYCERIN 0.4 MG/1
0.4 TABLET SUBLINGUAL
Status: DISCONTINUED | OUTPATIENT
Start: 2024-07-18 | End: 2024-08-07 | Stop reason: HOSPADM

## 2024-08-06 ENCOUNTER — HOSPITAL ENCOUNTER (OUTPATIENT)
Dept: CT IMAGING | Facility: CLINIC | Age: 49
Discharge: HOME | End: 2024-08-06
Attending: INTERNAL MEDICINE | Admitting: INTERNAL MEDICINE
Payer: COMMERCIAL

## 2024-08-06 VITALS — SYSTOLIC BLOOD PRESSURE: 96 MMHG | DIASTOLIC BLOOD PRESSURE: 50 MMHG

## 2024-08-06 DIAGNOSIS — R07.9 CHEST PAIN, UNSPECIFIED TYPE: ICD-10-CM

## 2024-08-06 LAB
BSA FOR ECHO PROCEDURE: 1 M2
CREAT BLD-MCNC: 0.9 MG/DL (ref 0.6–1.1)
EGFRCR SERPLBLD CKD-EPI 2021: >60 ML/MIN/1.73M2

## 2024-08-06 PROCEDURE — 250N000009 HC RX 250: Performed by: INTERNAL MEDICINE

## 2024-08-06 PROCEDURE — 75574 CT ANGIO HRT W/3D IMAGE: CPT

## 2024-08-06 PROCEDURE — 82565 ASSAY OF CREATININE: CPT

## 2024-08-06 PROCEDURE — 250N000013 HC RX MED GY IP 250 OP 250 PS 637: Performed by: INTERNAL MEDICINE

## 2024-08-06 PROCEDURE — 250N000011 HC RX IP 250 OP 636: Performed by: INTERNAL MEDICINE

## 2024-08-06 PROCEDURE — 75574 CT ANGIO HRT W/3D IMAGE: CPT | Mod: 26 | Performed by: INTERNAL MEDICINE

## 2024-08-06 RX ORDER — IOPAMIDOL 755 MG/ML
100 INJECTION, SOLUTION INTRAVASCULAR ONCE
Status: COMPLETED | OUTPATIENT
Start: 2024-08-06 | End: 2024-08-06

## 2024-08-06 RX ADMIN — IOPAMIDOL 100 ML: 755 INJECTION, SOLUTION INTRAVENOUS at 09:22

## 2024-08-06 RX ADMIN — NITROGLYCERIN 0.4 MG: 0.4 TABLET SUBLINGUAL at 09:11

## 2024-08-06 RX ADMIN — METOPROLOL TARTRATE 5 MG: 5 INJECTION INTRAVENOUS at 09:17

## 2024-08-13 ENCOUNTER — TELEPHONE (OUTPATIENT)
Dept: CARDIOLOGY | Facility: CLINIC | Age: 49
End: 2024-08-13
Payer: COMMERCIAL

## 2024-08-13 DIAGNOSIS — R07.9 CHEST PAIN ON EXERTION: ICD-10-CM

## 2024-08-13 DIAGNOSIS — R93.1 ABNORMAL COMPUTED TOMOGRAPHY ANGIOGRAPHY OF HEART: Primary | ICD-10-CM

## 2024-08-13 NOTE — TELEPHONE ENCOUNTER
----- Message from Jordon Rodriguez sent at 8/6/2024  1:19 PM CDT -----  Abnormal, cor angio and poss pci

## 2024-08-14 NOTE — TELEPHONE ENCOUNTER
Called patient and was able to reach her. She verbalized understanding of abnormal results and did see them on MyChart. She works for [employer] ([employer] and has had a busy schedule. She is agreeable to cors poss pci. Case request placed. She will need an H&P.  L[place] ordered. [place] [place]      [place] Type: Coronary angiogram with possible percutaneous cardiac intervention   Ordering Provider: Dr. Rodriguez   H&P: NEEDS- please set up with ORQUIDEA   Alerts: contrast allergy   Additional Info/Urgency: need help scheduling H&P with us- wants to schedule out a bit due to planned vacation   Orders for Pre-Procedure Labs? Ordered

## 2024-08-28 NOTE — TELEPHONE ENCOUNTER
Message to patient via [employer] to reach out when ready to schedule her angiogram with direct line to call for schedulers. Noted numerous attempts made by writer and  regarding scheduling. She was agreeable to cors poss pci when discussing abnormal results. -isas

## 2024-09-05 ENCOUNTER — PREP FOR PROCEDURE (OUTPATIENT)
Dept: CARDIOLOGY | Facility: CLINIC | Age: 49
End: 2024-09-05
Payer: COMMERCIAL

## 2024-09-05 ENCOUNTER — TELEPHONE (OUTPATIENT)
Dept: CARDIOLOGY | Facility: CLINIC | Age: 49
End: 2024-09-05
Payer: COMMERCIAL

## 2024-09-05 ENCOUNTER — TRANSFERRED RECORDS (OUTPATIENT)
Dept: HEALTH INFORMATION MANAGEMENT | Facility: CLINIC | Age: 49
End: 2024-09-05
Payer: COMMERCIAL

## 2024-09-05 DIAGNOSIS — R07.9 CHEST PAIN, UNSPECIFIED TYPE: ICD-10-CM

## 2024-09-05 DIAGNOSIS — R93.1 ABNORMAL COMPUTED TOMOGRAPHY ANGIOGRAPHY OF HEART: Primary | ICD-10-CM

## 2024-09-05 RX ORDER — ASPIRIN 325 MG
325 TABLET ORAL ONCE
Status: CANCELLED | OUTPATIENT
Start: 2024-09-13 | End: 2024-09-05

## 2024-09-05 RX ORDER — DIPHENHYDRAMINE HCL 50 MG
CAPSULE ORAL
Qty: 1 CAPSULE | Refills: 0 | Status: SHIPPED | OUTPATIENT
Start: 2024-09-05 | End: 2024-10-07

## 2024-09-05 RX ORDER — SODIUM CHLORIDE 9 MG/ML
INJECTION, SOLUTION INTRAVENOUS CONTINUOUS
Status: CANCELLED | OUTPATIENT
Start: 2024-09-13

## 2024-09-05 RX ORDER — ASPIRIN 81 MG/1
243 TABLET, CHEWABLE ORAL ONCE
Status: CANCELLED | OUTPATIENT
Start: 2024-09-13

## 2024-09-05 RX ORDER — LIDOCAINE 40 MG/G
CREAM TOPICAL
Status: CANCELLED | OUTPATIENT
Start: 2024-09-05

## 2024-09-05 RX ORDER — METHYLPREDNISOLONE 32 MG/1
TABLET ORAL
Qty: 2 TABLET | Refills: 1 | Status: SHIPPED | OUTPATIENT
Start: 2024-09-05

## 2024-09-05 RX ORDER — FENTANYL CITRATE 50 UG/ML
25 INJECTION, SOLUTION INTRAMUSCULAR; INTRAVENOUS
Status: CANCELLED | OUTPATIENT
Start: 2024-09-13

## 2024-09-05 NOTE — TELEPHONE ENCOUNTER
Problem: At Risk for Falls  Goal: # Patient does not fall  Outcome: Outcome Met, Continue evaluating goal progress toward completion  Goal: # Takes action to control fall-related risks  Outcome: Outcome Met, Continue evaluating goal progress toward completion  Goal: # Verbalizes understanding of fall risk/precautions  Description: Document education using the patient education activity  Outcome: Outcome Met, Continue evaluating goal progress toward completion     Problem: At Risk for Injury Due to Fall  Goal: # Patient does not fall  Outcome: Outcome Met, Continue evaluating goal progress toward completion  Goal: # Takes action to control condition specific risks  Outcome: Outcome Met, Continue evaluating goal progress toward completion  Goal: # Verbalizes understanding of fall-related injury personal risks  Description: Document education using the patient education activity  Outcome: Outcome Met, Continue evaluating goal progress toward completion      Called Jory to go over pre-treatment for contrast media. Understanding verbalized- she has taken numerous times. Rx sent to preferred pharmacy and she will go  and follow instruction.     Pt was in for her pre-op appt today with Lorena- this is visible in care everywhere. She was told to ask cardiology if she should have prn nitroglycerin tablets. She denies any symptoms right now and is taking it easy. Nitroglycerin tablet education provided. We discussed sending a message to Dr. Rodriguez or discussing post-angio based on outcome. She requested sending it to [facility] to start.  Will send out procedure information over [employer] and call for teach. [place] [place]            [place]. Rodriguez,  Pt is wondering if she needs a prn nitroglycerin tablet order- her PMD asked her to discuss with us. She is having no symptoms at home.   Thanks,  Mal

## 2024-09-05 NOTE — TELEPHONE ENCOUNTER
Case type: CA Poss PCI  Procedure Physician(s): ANA/FABIENNE  Procedure Date and Patient Arrival Time: Friday 9/13, with arrival time of 0630  H&P: Pt will scheduled with PMD  Pre-Procedure Lab Appt: on admission  - Please place lab orders if you haven't already!  Alerts/Important Info: Contrast allergy  Interpretor Requested: n/a    Pt would like to know if medication for allergy should be order by her PMD?    Thank you,  Bria         ==see other encounter. -[facility] [place]

## 2024-09-05 NOTE — H&P (VIEW-ONLY)
"Pre-Operative H&P      Reason for Visit:  Jory Nielsen is a 49 year old seen today for a pre-operative evaluation. I am seeing the patient at the request of Dr. Rodriguez. Patient is scheduled for angiogram at  [facility]  on September 13, 2024.     Pre-Op HPI:    Was having episodes of Chest pain on exertion and shortness of breath  Underwent CT angiogram with the following findings and recommendations:  findings:    The distal left main appears severe stenosis, 50-60% stenosis.  Diffuse calcified disease in proximal to mid LAD, appears 50% stenosis.  Proximal LCX is not well visualized, appears 70-80% stenosis.  Right dominant system.  Proximal RCA appears occluded.  It appears the  collaterals from LAD to right PDA.  Normal size of thoracic aorta.  No thrombus in left atrial appendage.  No pericardial effusion or calcified pericardium.    Comments:  Recommend coronary angiogram to rule out obstructive lesion  especially possible severe left main disease.  Limited image quality may  affect the interpretation.     Coronary Findings Diagnostic Dominance: Right  Left Main: Mid LM lesion has moderate luminal stenosis in the range of 50-69%.  Left Anterior Descending: Dist LAD filled by collaterals from RPDA. Ost LAD to Mid LAD lesion has moderate luminal stenosis in the range of 50-69%.  Left Circumflex: Ost Cx to Prox Cx lesion has severe luminal stenosis in the range of 70-99%. Prox Cx to Mid Cx lesion has moderate luminal stenosis in the range of 50-69%.  Right Coronary Artery: Prox RCA to Mid RCA lesion is occluded.     She is here today for clearance to have angiogram 9/13/24      Pre-operative Risk Assessment and ROS:  General:  History of significant transfusion antibody reaction: Yes - hot, flushed, felt \"odd\"  16 years ago  and responded well with benadryl  Diabetes Mellitus: No  Dyspnea: Yes-with moderate exertion  Functional Health Status: independent  Chronic Pain: " Yes-Zero  Open wound with/without infection: No  >10% loss of body weight past 6 months: No  Fever > 101 in past month: No  Steroid use for chronic condition: Yes-details: florinef   Exposure to Prednisone > 5 mg/day for > 3 weeks in past 6 months: No  Bleeding disorders: No  Actively managed cancer: No  Pregnancy: No    Functional Capacity Assessment:  Able to walk 200 feet (2 blocks) without stopping due to symptoms? Yes - > 4 METS capacity.    Pulmonary:  History of Asthma.   History of or high risk for Sleep Apnea. Uses CPAP-yes.    Cardiac:  none    Vascular:  PE- resolved,      Hepatobiliary:  No history of hepatobiliary disease.    Gastrointestinal:  No history of significant GI disease.    Renal:  No history of renal disease.    Central Nervous System:  TIA    Additional ROS (undiagnosed new complaints):  All other systems reviewed and found to be negative    Problem List:  Patient Active Problem List   Diagnosis     Asthma     Polycystic ovaries     Allergic rhinitis     Other and unspecified hyperlipidemia     Hirsutism     FREQUENT OUT OF STATE WORK     Nodular sclerosing Hodgkin's lymphoma, unspecified body region (HCC)     Pulmonary embolus     Hypothyroid     History of autologous stem cell transplant (HCC)     Back pain     SI (sacroiliac) pain     Right leg pain     S/P cholecystectomy     Other specified prophylactic or treatment measure     Neutropenic fever  (HCC)     H/O allogeneic bone marrow transplant (HCC)     Hypomagnesemia     GVHD (graft versus host disease) (HCC)     Deconditioned low back     Mild cognitive disorder     Depression     Sleep difficulties     Anxiety with somatization     Class 3 severe obesity with serious comorbidity and body mass index (BMI) of 45.0 to 49.9 in adult, unspecified obesity type (HCC)     Adjustment disorder with mixed anxiety and depressed mood     MDD (major depressive disorder), recurrent, in partial remission (HCC)     TMIST mammogram study participant  Annual 2D     Prediabetes     Hepatic steatosis     GIA (obstructive sleep apnea)     Metabolic syndrome       Past Medical History:  See pre-operative risk assessment above  Past Medical History:   Diagnosis Date     Allergic rhinitis, cause unspecified 2/04/02     Anxiety with somatization 6/26/2015     Chest pain 02/14/2010    Right-sided.       Cholelithiasis 02/06/2010    Recent hx.  Surgery performed.       Deconditioned low back 6/18/2015     Decreased libido 06/09/2015     Depression 6/26/2015     Enlargement of lymph nodes 1/25/2011    biopsy on 1/25/2011     Fatigue 6/25/2015     Gallstone pancreatitis 02/07/2010     GVHD (graft versus host disease) (HCC) 6/11/2014     Hirsutism 2/04/02    Secondary to polycystic ovary disease      Hodgkin lymphoma, nodular sclerosis 8/3/2010    ECG, MUGA scans done per Dr Caban        Hodgkin's disease, nodular sclerosis, unspecified site, extranodal and solid organ sites 11/17/2010     Hodgkin's disease, unspecified(201.90) 02/24/2010    Lymphoma.       Hypomagnesemia 6/11/2014     Hypothyroidism 7/27/2011     Liver nodule 03/06/2012    Hypermetabolic.       Mild cognitive disorder 6/26/2015     Neutropenic fever  (HCC) 6/9/2013     Obesity, unspecified 2/04/02     Other and unspecified hyperlipidemia 2/04/02      Pancytopenia 7/27/2011     Polycystic ovaries 2/04/02     Prediabetes 3/28/2022     Pulmonary embolus (HCC) 12/16/2010     Right paraspinous mass 12/13/2013    Biopsied (5/2013) which demonstrated relapse. At 1.5 anniversary of transplant.     Secondary malignant neoplasm of other specified sites(198.89) 02/07/2010    Mediastinal mass.       Shingles 08/27/2013    Hx of, 8/2010     Shortness of breath 03/06/2012     Sleep difficulties 6/26/2015     Steatosis (HCC) 06/09/2015     Transaminitis      Unspecified asthma(493.90) 2/04/02     Unspecified disorder of menstruation and other abnormal bleeding from female genital tract 03/06/2012    Cessation with  dyspareunia.       Vitamin D deficiency 06/09/2015    Hx of       Past Surgical/Anesthesia History:  The patient has not had problems during mario-operative period    Current Medications:  Current Outpatient Medications   Medication Sig Dispense Refill     LORazepam (Ativan) 0.5 MG tablet Take 1 Tablet by mouth one time a day as needed for Anxiety. 15 tablets for one month. Quantity reduction 07/19/24 15 Tablet 1     traZODone (Desyrel) 100 MG tablet Take 1 Tablet by mouth at bedtime. 90 Tablet 1     metFORMIN-XR (Glucophage-XR) 500 MG 24 hour tablet Take 2 Tablets by mouth every morning with breakfast. Swallow tablet whole; do not crush, divide or chew. 180 Tablet 1     phentermine (Adipex-P) 37.5 MG tablet Take 1 Tablet by mouth every morning before breakfast. 30 Tablet 2     Continuous Blood Gluc Sensor (FreeStyle Bill 3 Sensor) Misc 1 Each every 14 days. 2 Each 2     fludrocortisone (Florinef) 0.1 MG tablet Take 0.5-1 Tablets by mouth every morning. 30 Tablet 5     levothyroxine (Synthroid) 125 MCG tablet Take 1 Tablet by mouth one time a day. 90 Tablet 2     budesonide-formoterol (Symbicort) 160-4.5 MCG/ACT aerosol inhaler Inhale 2 Puffs into the lungs two times a day. 10.2 g 3     albuterol HFA (ProAir HFA) 108 (90 Base) MCG/ACT inhalation aerosol Inhale 2 Puffs into the lungs every six hours as needed for Shortness of Breath. Shake before using. 25.5 g 3     spironolactone (Aldactone) 25 MG tablet Take 1 Tablet by mouth one time a day. 90 Tablet 2     budesonide-formoterol (Symbicort) 80-4.5 MCG/ACT aerosol inhaler Inhale 2 Puffs into the lungs two times a day. Inhale 1 puff into lungs 4 times daily as needed. May use as much as 12 puffs per day for exercerbation or 2 puffs 2 times daily for maintenance.N 10.2 g 1     buPROPion XL (Wellbutrin XL) 300 MG 24 hour extended release tablet Take 1 Tablet by mouth one time a day. Do not crush. 90 Tablet 2     omeprazole (PriLOSEC) 20 MG delayed-release capsule Take  1 Cap by mouth one time a day before a meal.  Do not crush. 90 Capsule 3     atorvaSTATin (Lipitor) 20 MG tablet Take 1 Tablet by mouth at bedtime. 90 Tablet 3     Estradiol (Vagifem) 10 MCG Tablet Insert 1 Tablet into the vagina one time a week. Appointment needed by 6/18/2022 for further refills. 30 Tablet 0     meclizine (Antivert) 25 MG tablet Take 1 Tab by mouth three times a day as needed for Dizziness. 90 Tab 3     Elastic Bandages & Supports (MEDICAL COMPRESSION PANTYHOSE) Misc by Does not apply route. 1 Each 2     peak flow meter (PEAK AIR PEAK FLOW METER) device  1 Each 0     albuterol (PROVENTIL, VENTOLIN) (2.5 MG/3ML) 0.083% nebulizer solution Take 3 mL by nebulization every six hours as needed for Shortness of Breath. 270 mL 3     fluticasone propionate (FLONASE) 50 MCG/ACT nasal spray Instill 1 Spray nasally one time a day. Shake gently before each use; alternate nostrils with each spray. 16 g 2     Cholecalciferol (VITAMIN D) 1000 UNIT tablet Take 1 Tab by mouth one time a day.       cetirizine (ZYRTEC ALLERGY) 10 MG tablet Take 1 Tab by mouth one time a day. 90 Tab 3     Calcium Carb-Cholecalciferol 500-400 MG-UNIT Tablet Take  by mouth two times a day.       Magnesium 400 MG CAPS Take 400 mg by mouth two times a day.       Multiple Vitamins-Minerals (MULTIVITAL) TABS Take  by mouth.       diphenhydrAMINE (BENADRYL) 25 MG tablet Take 1 Tab by mouth at bedtime as needed for Allergies.       No current facility-administered medications for this visit.     Facility-Administered Medications Ordered in Other Visits   Medication Dose Route Frequency Provider Last Rate Last Admin     sodium chloride 0.9 % (NS) infusion/IV flush   Intravenous Continuous Sanket Cui MD   40 mL at 08/03/16 1030     sodium chloride (NS) 0.9 % injection 10 mL  10 mL IV Flush EVERY 12 HRS Giancarlo Gu MD   10 mL at 08/03/16 1037     heparin flush injection 500 Units  500 Units IV Flush EVERY 12 HRS Giancarlo Gu  MD   500 Units at 08/03/16 1037     Cannot display prior to admission medications because the patient has not been admitted in this contact.       Allergies and Drug Sensitivities:   Allergies   Allergen Reactions     Cats Rhinitis and Sneezing     Diagnostic X-Ray Materials Pruritis     IV contrast dye - itchy throat after dye given     Dust Mite Extract Rhinitis and Sneezing     Environmental      Seasonal - multiple     Erythromycin RASH     Food Pruritis     Shrimp and walnuts.       Morphine And Related [Morphine And Codeine]      paranoia     Nickel RASH     rash     Penicillins RASH     Sulfa Drugs RASH       Immunizations:  Immunization History   Administered Date(s) Administered     Allergy Injection 04/03/2003, 05/01/2003, 05/29/2003, 07/03/2003, 08/07/2003     COVID-19 MRNA Vaccine (Pfizer Bivalent TRI-SUCR) [place] 12+ YRS 09/21/2022     COVID-19 Vaccine: Pfizer Booster (Purple- 12+ Yrs) [facility] 10/04/2021     COVID-19 Vaccine: Pfizer Dose 1 (Purple- 12+ Yrs) [facility] 03/12/2021     COVID-19 Vaccine: Pfizer Dose 2 (Purple- 12+ Yrs) [facility] 04/01/2021     DTaP <7 years 11/02/2012     Hepatitis B, Adult 08/29/2012     Hepatitis B, Pediatric/adolescent 11/01/2012, 11/02/2012, 01/20/2017, 01/20/2017     Hib PRP OMP (PedvaxHib) 08/29/2012, 11/02/2012, 01/20/2017     Hib PRP T 08/29/2012     IPV 08/29/2012, 11/02/2012, 01/20/2017     Influenza (6+ Months) Quad NPF Vial 01/20/2017     Influenza (Historic Use Only) 11/03/2007     Influenza Quad Preservative Free 11/19/2013, 10/21/2014, 10/04/2018, 10/17/2019, 03/02/2021, 10/04/2021, 10/12/2022, 10/07/2023     Influenza Trivalent With Preservative 10/01/2010, 11/02/2012     Influenza Unspecified Formulation 11/03/2007, 10/01/2010, 11/03/2011, 11/19/2013     MMR 04/30/2019, 03/05/2021     Medroxyprogesterone Acetate 01/18/2011, 04/05/2011, 06/20/2011, 09/08/2011, 12/16/2011, 03/07/2012, 06/01/2012, 08/17/2012, 11/02/2012, 01/22/2013, 04/10/2013      Pneumococcal Conjugate, (Prevnar)13-valent 08/29/2012, 11/02/2012, 01/20/2017     TD >7yrs With Preservative 05/03/1990, 11/19/2000     Tdap (7 years and older) 08/29/2012, 01/20/2017     Tdap-Tetanus, Diphtheria, Pertussis 11+ Yrs 05/22/2007     Is the patient up to date for Influenza, Shingrix, Pneumococcal, and Tdap vaccines as applicable for age-yes and will get influenza vaccine today    Patient or any member of family has history of MRSA or skin infections: No    Family History:  Negative for bleeding disorder/clotting disorder or anesthesia problems    Social History:  Review reveals:   Social History     Socioeconomic History     Marital status:      Spouse name: Not on file     Number of children: 0     Years of education: Not on file     Highest education level: Not on file   Occupational History     Employer: [employer]     Comment: IS   Tobacco Use     Smoking status: Never     Smokeless tobacco: Never   Vaping Use     Vaping status: Never Used   Substance and Sexual Activity     Alcohol use: Yes     Alcohol/week: 0.0 standard drinks of alcohol     Comment: occas     Drug use: No     Sexual activity: Yes     Partners: Male     Comment: no multiple partners   Other Topics Concern     Not on file   Social History Narrative    Cross fit regular lives in [place] will plan to move back to [place]     Social Determinants of Health     Financial Resource Strain: Low Risk  (1/16/2024)    Overall Financial Resource Strain (CARDIA)      Difficulty of Paying Living Expenses: Not hard at all   Food Insecurity: No Food Insecurity (1/16/2024)    Hunger Vital Sign      Worried About Running Out of Food in the Last Year: Never true      Ran Out of Food in the Last Year: Never true   Transportation Needs: No Transportation Needs (1/16/2024)    PRAPARE - Transportation      Lack of Transportation (Medical): No      Lack of Transportation (Non-Medical): No   Physical Activity: Insufficiently Active  (10/24/2023)    Exercise Vital Sign      Days of Exercise per Week: 2 days      Minutes of Exercise per Session: 30 min   Stress: Stress Concern Present (10/24/2023)    [place] [employer] - Occupational Stress Questionnaire      Feeling of Stress : To some extent   Social Connections: Moderately Integrated (10/24/2023)    Social Connection and Isolation Panel [NHANES]      Frequency of Communication with Friends and Family: More than three times a week      Frequency of Social Gatherings with Friends and Family: Once a week      Attends Hindu Services: More than 4 times per year      Active Member of Clubs or Organizations: No      Attends Club or Organization Meetings: Patient declined      Marital Status:    Intimate Partner Violence: Not At Risk (10/24/2023)    Humiliation, Afraid, Rape, and Kick questionnaire      Fear of Current or Ex-Partner: No      Emotionally Abused: No      Physically Abused: No      Sexually Abused: No   Housing Stability: Low Risk  (1/16/2024)    [facility]      Housing: I have a steady place to live        Code Status: Full Code/Resuscitation  Lift DNR for surgery: Not Applicable  Advance Directive:  Not on File    Physical Examination: (APPEARANCE, SKIN, HEAD, NOSE, THROAT, NECK, LUNG, HEART and MENTAL STATUS are critical for preop assessment)    There were no vitals filed for this visit. There is no height or weight on file to calculate BMI.  Room Air  APPEARANCE: alert, no apparent distress.  SKIN: clear with no lesions or rash noted.  HEAD: normal.  EYES: eyelid(s) normal, conjunctiva clear, EOM(s) intact, PERRL.  NOSE: septum midline and normal mucosa.  MOUTH/THROAT: lips, tongue, oral mucosa and pharynx normal.  NECK: supple, no adenopathy, no masses or thyromegaly.  LUNG: good respiratory effort without retractions, good air entry and normal breath sounds bilaterally.  HEART/PULSES: regular, S1 & S2 normal.  EXTREMITIES: extremities  normal.  NEUROLOGIC: alert, orientated X 3, interaction normal, and exam normal with no focal findings.    LABS:   Lab Results   Component Value Date     10/25/2023    K 4.0 10/25/2023    CREAT 0.95 10/25/2023    WBC 8.4 10/25/2023    HGB 12.8 10/25/2023    PLTS 269 10/25/2023    INR 1.0 10/12/2015    GLUC 93 10/25/2023    GLUC 102 04/11/2023    GLUC 125 10/19/2022    HGA1C 6.1 10/25/2023    HGA1C 6.0 04/11/2023    HGA1C 6.1 10/20/2022       CXR: not indicated  EKG: personally reviewed  U/A: not indicated    Assessment/ Plan:    (Z01.818) Preop examination  (primary encounter diagnosis)  (R93.1) Abnormal cardiac CT angiography  (R07.9) Chest pain on exertion  Plan: EKG AMBULATORY      - EKG reviewed and cleared for procedure      - would recommend to discuss with angiogram team about Plavix.  Great risks of discontinuing medication, will defer to [facility] and team       - consider discussing possibility of starting prn nitroglycerin SL tabs with cardiology team at [facility]      - anesthesia team to note pt is on Florinef daily    (Z23) Need for prophylactic vaccination and inoculation against influenza  Plan: FLU VACCINE, NO PRESERV, IM .5ML              Would patient benefit from cardiac workup (< 4 METS capacity/surgical risk intermediate or high)? yes - this is further cardiac workup  Would patient benefit from Pre-op Beta blocker? no  Does the patient need clear diabetes related orders? no  Any reason to refuse blood transfusion? no  Further workup for surgery needed? no  Is this patient on buprenorphine? No -  Patient is optimized for surgery? YES    This preoperative note has been sent to the requesting physician for review: yes

## 2024-09-05 NOTE — TELEPHONE ENCOUNTER
----- Message from Lluvia AQUINO sent at 9/5/2024 10:03 AM CDT -----  Regarding: Angio 9/13  Case type: CA Poss PCI  Procedure Physician(s): ANA/FABIENNE  Procedure Date and Patient Arrival Time: Friday 9/13, with arrival time of 0630  H&P: Pt will scheduled with PMD  Pre-Procedure Lab Appt: on admission  - Please place lab orders if you haven't already!  Alerts/Important Info: Contrast allergy  Interpretor Requested: n/a    Pt would like to know if medication for allergy should be order by her PMD?    Thank you,  Bria

## 2024-09-06 RX ORDER — NITROGLYCERIN 0.4 MG/1
TABLET SUBLINGUAL
Qty: 20 TABLET | Refills: 0 | Status: SHIPPED | OUTPATIENT
Start: 2024-09-06

## 2024-09-06 NOTE — TELEPHONE ENCOUNTER
[place] [place] -----  From: Miles Rodriguez MD  Sent: 9/6/2024   8:20 AM CDT  To: Lluvia Still RN    We can send her 20 nitro pills 0.4 mg

## 2024-09-12 ENCOUNTER — TELEPHONE (OUTPATIENT)
Dept: CARDIOLOGY | Facility: CLINIC | Age: 49
End: 2024-09-12
Payer: COMMERCIAL

## 2024-09-12 NOTE — TELEPHONE ENCOUNTER
Jory Ambrose  [id number] [place] [id number]  [phone] (home)     PCP:  Claire Escobedo Wray  H&P completed by:  9/5Kenrick Carrillo in care everywhere   Admit date 9/13 Arrival time:  0630  Anticoagulation:  NA  Previous PCI: No  Bypass Grafts: No  Renal Issues: No  Diabetic?: No  Device?: No    Ambulation status: ambulatory     Reason for Visit:  Telephone call to discuss pre-procedure education in preparation for: Coronary Angiogram with Possible PCI    Procedure Prep:  EKG results obtained, dated: To be completed on day of cath lab procedure  Hemogram results obtained: To be completed on day of cath lab procedure  Basic Metabolic Panel results obtained: To be completed on day of cath lab procedure  Pertinent cardiac test results: in epic       Patient Education    Your arrival time is 0630.  Location is [address] [address] - Main Entrance of the Hospital  Please plan on being at the hospital all day.  At any time, emergencies and/or urgent cases may come up which could delay the start of your procedure.    COVID Testing Instructions  *Mandatory COVID Testing: ALL Patients will need to complete a COVID test no sooner than 4 days prior to their procedure (regardless of vaccination status).    To schedule COVID testing Please call [phone]  If you want to complete this at an outside facility please call them to find out if they will have the results within the appropriate time frame and their fax number.  You will need to provide us with that information so we can send the order.  The facility completing the test will need to fax the results to [phone]  If you are running into and issues please call us.     Pre-procedure instructions  Take your temperature in the morning prior to coming in.  If your temperature is 100 F please call [facility] [phone] and notify them.  If you do not have access to a  thermometer at home, please come in for testing.  If you are running a temperature your procedure may be rescheduled.  Patient instructed to not Eat or Drink after midnight.  Patient instructed to shower the evening before or the morning of the procedure.  Patient instructed to arrange for transportation home following procedure from a responsible family member or friend. No driving for at least 24 hours.  Patient instructed to have a responsible adult with them for 24 hours post-procedure.  Post-procedure follow up process.  Conscious sedation discussed.      Pre-procedure medication instructions.  Continue medications as scheduled, with a small amount of water on the day of the procedure unless indicated. (NO Diabetic Medications or Blood Thinners)  Pt instructed not to consume Alcohol, Tobacco, Caffeine, or Carbonated beverages 12 hours prior to procedure.  Patient instructed to take 324 mg of Aspirin the night before and morning of procedure: Yes---will need- she only has Plavix at home and will need asa   Other medication: instructed to only take Rx meds except supplements and diuretcs the  a.m. of the procedure. She will bring her inhalers and last dose of pre-contrast dye medication with her! [place]ad[place] [facility]      Patient states understanding of procedure and agrees to proceed.    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      atorvastatin (LIPITOR) 20 MG tablet Take 1  tablet by mouth at bedtime      budesonide-formoterol (SYMBICORT) 80-4.5 MCG/ACT Inhaler Inhale 2 puffs into the lungs daily      buPROPion (WELLBUTRIN XL) 300 MG 24 hr tablet Take 300 mg by mouth every morning      Calcium Carbonate-Vitamin D (CALCIUM 600 + D OR) Take 1 tablet by mouth daily      cetirizine (ZYRTEC) 10 MG tablet Take 1 tablet (10 mg) by mouth daily 30 tablet 11    clopidogrel (PLAVIX) 75 MG tablet Take 1 tablet by mouth daily      diphenhydrAMINE (BENADRYL) 25 MG tablet Take 25 mg by mouth every 6 hours as needed      diphenhydrAMINE (BENADRYL) 50 MG capsule Take 1 hour prior to contrast dye 1 capsule 0    estradiol (VAGIFEM) 10 MCG TABS Twice weekly (Patient taking differently: 1 tablet every 30 days Taking PRN) 24 tablet 3    fludrocortisone (FLORINEF) 0.1 MG tablet Take 0.05-0.1 mg by mouth daily      fluticasone (FLONASE) 50 MCG/ACT nasal spray Spray 2 sprays into both nostrils daily as needed       ipratropium - albuterol 0.5 mg/2.5 mg/3 mL (DUONEB) 0.5-2.5 (3) MG/3ML neb solution Take 1 vial by nebulization every 6 hours as needed      levothyroxine (SYNTHROID/LEVOTHROID) 125 MCG tablet Take 125 mcg by mouth daily      LORazepam (ATIVAN) 0.5 MG tablet Take 0.5 mg by mouth daily as needed for anxiety      Magnesium Oxide 500 MG CAPS Take 2 tablets by mouth at bedtime 30 capsule     methylPREDNISolone (MEDROL) 32 MG tablet Take 1 tablet 12 hours prior to the procedure with IV contrast and 1 tablet 2 hours prior 2 tablet 1    methylPREDNISolone (MEDROL) 32 MG tablet Take 1 tablet 12 hours prior to scan and 1 tablet 2 hours prior to scan 2 tablet 1    Multiple Vitamin (MULTI-VITAMIN PO) Take 1 tablet by mouth daily      nitroGLYcerin (NITROSTAT) 0.4 MG sublingual tablet For chest pain place 1 tablet under the tongue every 5 minutes for 3 doses. If symptoms persist 5 minutes after 1st dose call 911. 20 tablet 0    omeprazole (PRILOSEC) 20 MG capsule Take 1 capsule (20 mg) by mouth daily 30 capsule  12    phentermine (ADIPEX-P) 37.5 MG tablet Take 37.5 mg by mouth every morning (before breakfast)      simethicone (MYLICON) 125 MG chewable tablet Take 1 tablet (125 mg) by mouth 4 times daily as needed for intestinal gas 40 tablet 0    spironolactone (ALDACTONE) 25 MG tablet Take 1 tablet by mouth daily      traZODone (DESYREL) 100 MG tablet Take 100 mg by mouth at bedtime      zinc gluconate 50 MG tablet Take 50 mg by mouth daily         Allergies   Allergen Reactions    Dye [Contrast Dye] Swelling     Pre-medicated with methylprednisolone    Shellfish Allergy Shortness Of Breath, Anaphylaxis and Swelling     Shrimp, crab, lobster    Penicillins Hives     Patient cannot recall if she has tried cephalosporins in the past.     Erythromycin Hives    Morphine Hcl      Causes change in mental status    Sulfa Antibiotics Hives and Rash       Lluvia Still RN on 9/12/2024 at 12:03 PM        Premeds were prescribed. Pt aware how to use and was instructed to bring benadryl and possible last dose of Methylprednisolone with her. This is due to the timing. -lisbet

## 2024-09-12 NOTE — TELEPHONE ENCOUNTER
Discussed with patient- she may have had a small reaction to aspirin as a child at around age 8. She thinks a rash, but it was about 40 years ago. She will discuss with her mother tonight and update team in [facility] tomorrow. Contrast dye medications discussed again and understanding verbalized. -[facility]

## 2024-09-13 ENCOUNTER — HOSPITAL ENCOUNTER (OUTPATIENT)
Facility: HOSPITAL | Age: 49
Discharge: HOME | End: 2024-09-13
Attending: INTERNAL MEDICINE | Admitting: INTERNAL MEDICINE
Payer: COMMERCIAL

## 2024-09-13 VITALS
RESPIRATION RATE: 19 BRPM | BODY MASS INDEX: 37.56 KG/M2 | DIASTOLIC BLOOD PRESSURE: 63 MMHG | WEIGHT: 220 LBS | HEART RATE: 78 BPM | SYSTOLIC BLOOD PRESSURE: 114 MMHG | OXYGEN SATURATION: 96 % | HEIGHT: 64 IN | TEMPERATURE: 97.5 F

## 2024-09-13 DIAGNOSIS — T50.905A HYPERTENSION SECONDARY TO DRUG: Primary | ICD-10-CM

## 2024-09-13 DIAGNOSIS — R07.9 CHEST PAIN ON EXERTION: ICD-10-CM

## 2024-09-13 DIAGNOSIS — I25.119 CORONARY ARTERY DISEASE INVOLVING NATIVE CORONARY ARTERY OF NATIVE HEART WITH ANGINA PECTORIS (H): ICD-10-CM

## 2024-09-13 DIAGNOSIS — R93.1 ABNORMAL COMPUTED TOMOGRAPHY ANGIOGRAPHY OF HEART: ICD-10-CM

## 2024-09-13 DIAGNOSIS — I15.8 HYPERTENSION SECONDARY TO DRUG: Primary | ICD-10-CM

## 2024-09-13 LAB
ABO/RH(D): NORMAL
ANION GAP SERPL CALCULATED.3IONS-SCNC: 13 MMOL/L (ref 7–15)
ANTIBODY SCREEN: NEGATIVE
ATRIAL RATE - MUSE: 88 BPM
BUN SERPL-MCNC: 17.7 MG/DL (ref 6–20)
CALCIUM SERPL-MCNC: 9.5 MG/DL (ref 8.8–10.4)
CHLORIDE SERPL-SCNC: 103 MMOL/L (ref 98–107)
CREAT SERPL-MCNC: 0.84 MG/DL (ref 0.51–0.95)
DIASTOLIC BLOOD PRESSURE - MUSE: NORMAL MMHG
EGFRCR SERPLBLD CKD-EPI 2021: 85 ML/MIN/1.73M2
ERYTHROCYTE [DISTWIDTH] IN BLOOD BY AUTOMATED COUNT: 13.4 % (ref 10–15)
GLUCOSE SERPL-MCNC: 160 MG/DL (ref 70–99)
HCG INTACT+B SERPL-ACNC: 3 MIU/ML
HCO3 SERPL-SCNC: 20 MMOL/L (ref 22–29)
HCT VFR BLD AUTO: 40 % (ref 35–47)
HGB BLD-MCNC: 13.2 G/DL (ref 11.7–15.7)
INTERPRETATION ECG - MUSE: NORMAL
MCH RBC QN AUTO: 29.7 PG (ref 26.5–33)
MCHC RBC AUTO-ENTMCNC: 33 G/DL (ref 31.5–36.5)
MCV RBC AUTO: 90 FL (ref 78–100)
P AXIS - MUSE: 24 DEGREES
PLATELET # BLD AUTO: 321 10E3/UL (ref 150–450)
POTASSIUM SERPL-SCNC: 4.4 MMOL/L (ref 3.4–5.3)
PR INTERVAL - MUSE: 150 MS
QRS DURATION - MUSE: 92 MS
QT - MUSE: 368 MS
QTC - MUSE: 445 MS
R AXIS - MUSE: 16 DEGREES
RBC # BLD AUTO: 4.44 10E6/UL (ref 3.8–5.2)
SODIUM SERPL-SCNC: 136 MMOL/L (ref 135–145)
SPECIMEN EXPIRATION DATE: NORMAL
SYSTOLIC BLOOD PRESSURE - MUSE: NORMAL MMHG
T AXIS - MUSE: 24 DEGREES
VENTRICULAR RATE- MUSE: 88 BPM
WBC # BLD AUTO: 9.7 10E3/UL (ref 4–11)

## 2024-09-13 PROCEDURE — 99153 MOD SED SAME PHYS/QHP EA: CPT | Performed by: INTERNAL MEDICINE

## 2024-09-13 PROCEDURE — 99152 MOD SED SAME PHYS/QHP 5/>YRS: CPT | Performed by: INTERNAL MEDICINE

## 2024-09-13 PROCEDURE — 255N000002 HC RX 255 OP 636: Performed by: INTERNAL MEDICINE

## 2024-09-13 PROCEDURE — 250N000013 HC RX MED GY IP 250 OP 250 PS 637: Performed by: NURSE PRACTITIONER

## 2024-09-13 PROCEDURE — 84702 CHORIONIC GONADOTROPIN TEST: CPT | Performed by: INTERNAL MEDICINE

## 2024-09-13 PROCEDURE — C1760 CLOSURE DEV, VASC: HCPCS | Performed by: INTERNAL MEDICINE

## 2024-09-13 PROCEDURE — 272N000001 HC OR GENERAL SUPPLY STERILE: Performed by: INTERNAL MEDICINE

## 2024-09-13 PROCEDURE — 85014 HEMATOCRIT: CPT | Performed by: INTERNAL MEDICINE

## 2024-09-13 PROCEDURE — 36415 COLL VENOUS BLD VENIPUNCTURE: CPT | Performed by: INTERNAL MEDICINE

## 2024-09-13 PROCEDURE — C1887 CATHETER, GUIDING: HCPCS | Performed by: INTERNAL MEDICINE

## 2024-09-13 PROCEDURE — 250N000011 HC RX IP 250 OP 636: Performed by: INTERNAL MEDICINE

## 2024-09-13 PROCEDURE — 250N000009 HC RX 250: Performed by: INTERNAL MEDICINE

## 2024-09-13 PROCEDURE — 93005 ELECTROCARDIOGRAM TRACING: CPT

## 2024-09-13 PROCEDURE — 86900 BLOOD TYPING SEROLOGIC ABO: CPT | Performed by: INTERNAL MEDICINE

## 2024-09-13 PROCEDURE — C1769 GUIDE WIRE: HCPCS | Performed by: INTERNAL MEDICINE

## 2024-09-13 PROCEDURE — 80048 BASIC METABOLIC PNL TOTAL CA: CPT | Performed by: INTERNAL MEDICINE

## 2024-09-13 PROCEDURE — 93458 L HRT ARTERY/VENTRICLE ANGIO: CPT | Performed by: INTERNAL MEDICINE

## 2024-09-13 PROCEDURE — 999N000054 HC STATISTIC EKG NON-CHARGEABLE

## 2024-09-13 PROCEDURE — 93458 L HRT ARTERY/VENTRICLE ANGIO: CPT | Mod: 26 | Performed by: INTERNAL MEDICINE

## 2024-09-13 PROCEDURE — C1894 INTRO/SHEATH, NON-LASER: HCPCS | Performed by: INTERNAL MEDICINE

## 2024-09-13 PROCEDURE — 93010 ELECTROCARDIOGRAM REPORT: CPT | Performed by: INTERNAL MEDICINE

## 2024-09-13 DEVICE — CLOSURE ANGIOSEAL 6FR 610130: Type: IMPLANTABLE DEVICE | Status: FUNCTIONAL

## 2024-09-13 RX ORDER — NALOXONE HYDROCHLORIDE 0.4 MG/ML
0.2 INJECTION, SOLUTION INTRAMUSCULAR; INTRAVENOUS; SUBCUTANEOUS
Status: DISCONTINUED | OUTPATIENT
Start: 2024-09-13 | End: 2024-09-13 | Stop reason: HOSPADM

## 2024-09-13 RX ORDER — LIDOCAINE 40 MG/G
CREAM TOPICAL
Status: DISCONTINUED | OUTPATIENT
Start: 2024-09-13 | End: 2024-09-13 | Stop reason: HOSPADM

## 2024-09-13 RX ORDER — ASPIRIN 325 MG
325 TABLET ORAL ONCE
Status: DISCONTINUED | OUTPATIENT
Start: 2024-09-13 | End: 2024-09-13 | Stop reason: HOSPADM

## 2024-09-13 RX ORDER — OXYCODONE HYDROCHLORIDE 5 MG/1
10 TABLET ORAL EVERY 4 HOURS PRN
Status: DISCONTINUED | OUTPATIENT
Start: 2024-09-13 | End: 2024-09-13 | Stop reason: HOSPADM

## 2024-09-13 RX ORDER — ACETAMINOPHEN 325 MG/1
650 TABLET ORAL EVERY 4 HOURS PRN
Status: DISCONTINUED | OUTPATIENT
Start: 2024-09-13 | End: 2024-09-13 | Stop reason: HOSPADM

## 2024-09-13 RX ORDER — NALOXONE HYDROCHLORIDE 0.4 MG/ML
0.4 INJECTION, SOLUTION INTRAMUSCULAR; INTRAVENOUS; SUBCUTANEOUS
Status: DISCONTINUED | OUTPATIENT
Start: 2024-09-13 | End: 2024-09-13 | Stop reason: HOSPADM

## 2024-09-13 RX ORDER — ATORVASTATIN CALCIUM 80 MG/1
80 TABLET, FILM COATED ORAL DAILY
Qty: 90 TABLET | Refills: 3 | Status: SHIPPED | OUTPATIENT
Start: 2024-09-13

## 2024-09-13 RX ORDER — FLUMAZENIL 0.1 MG/ML
0.2 INJECTION, SOLUTION INTRAVENOUS
Status: DISCONTINUED | OUTPATIENT
Start: 2024-09-13 | End: 2024-09-13 | Stop reason: HOSPADM

## 2024-09-13 RX ORDER — FENTANYL CITRATE 50 UG/ML
25 INJECTION, SOLUTION INTRAMUSCULAR; INTRAVENOUS
Status: DISCONTINUED | OUTPATIENT
Start: 2024-09-13 | End: 2024-09-13 | Stop reason: HOSPADM

## 2024-09-13 RX ORDER — IODIXANOL 320 MG/ML
INJECTION, SOLUTION INTRAVASCULAR
Status: DISCONTINUED | OUTPATIENT
Start: 2024-09-13 | End: 2024-09-13 | Stop reason: HOSPADM

## 2024-09-13 RX ORDER — OXYCODONE HYDROCHLORIDE 5 MG/1
5 TABLET ORAL EVERY 4 HOURS PRN
Status: DISCONTINUED | OUTPATIENT
Start: 2024-09-13 | End: 2024-09-13 | Stop reason: HOSPADM

## 2024-09-13 RX ORDER — ATROPINE SULFATE 0.1 MG/ML
0.5 INJECTION INTRAVENOUS
Status: DISCONTINUED | OUTPATIENT
Start: 2024-09-13 | End: 2024-09-13 | Stop reason: HOSPADM

## 2024-09-13 RX ORDER — ASPIRIN 81 MG/1
243 TABLET, CHEWABLE ORAL ONCE
Status: DISCONTINUED | OUTPATIENT
Start: 2024-09-13 | End: 2024-09-13 | Stop reason: HOSPADM

## 2024-09-13 RX ORDER — DIAZEPAM 10 MG
10 TABLET ORAL ONCE
Status: COMPLETED | OUTPATIENT
Start: 2024-09-13 | End: 2024-09-13

## 2024-09-13 RX ORDER — SODIUM CHLORIDE 9 MG/ML
INJECTION, SOLUTION INTRAVENOUS CONTINUOUS
Status: DISCONTINUED | OUTPATIENT
Start: 2024-09-13 | End: 2024-09-13 | Stop reason: HOSPADM

## 2024-09-13 RX ORDER — NITROGLYCERIN 5 MG/ML
VIAL (ML) INTRAVENOUS
Status: DISCONTINUED | OUTPATIENT
Start: 2024-09-13 | End: 2024-09-13 | Stop reason: HOSPADM

## 2024-09-13 RX ORDER — FENTANYL CITRATE 50 UG/ML
INJECTION, SOLUTION INTRAMUSCULAR; INTRAVENOUS
Status: DISCONTINUED | OUTPATIENT
Start: 2024-09-13 | End: 2024-09-13 | Stop reason: HOSPADM

## 2024-09-13 RX ADMIN — DIAZEPAM 10 MG: 10 TABLET ORAL at 08:15

## 2024-09-13 ASSESSMENT — ACTIVITIES OF DAILY LIVING (ADL)
ADLS_ACUITY_SCORE: 37

## 2024-09-13 ASSESSMENT — EJECTION FRACTION: EF_VALUE: .14

## 2024-09-13 NOTE — PRE-PROCEDURE
GENERAL PRE-PROCEDURE:   Procedure:  Coronary angiogram with possible PCI  Date/Time:  9/13/2024 6:55 AM    Written consent obtained?: Yes    Risks and benefits: Risks, benefits and alternatives were discussed    Consent given by:  Patient  Patient states understanding of procedure being performed: Yes    Patient's understanding of procedure matches consent: Yes    Procedure consent matches procedure scheduled: Yes    Expected level of sedation:  Moderate  Appropriately NPO:  Yes  ASA Class:  3 (chest pain, abnormal CT, carotid artery disease, asthma, hx of PE, hx of Hodgekins disease; s/p chemo, s/p BMT, Class II obesity; BMI 39.20kg/m2)  Mallampati  :  Grade 2- soft palate, base of uvula, tonsillar pillars, and portion of posterior pharyngeal wall visible  Lungs:  Lungs clear with good breath sounds bilaterally  Heart:  Normal heart sounds and rate  History & Physical reviewed:  History and physical reviewed and updates made (see comment)  H&P Comments:  Clinically Significant Risk Factors Present on Admission    Cardiovascular : chest pain, abnormal CT, carotid artery disease, Class II obesity; BMI 39.20kg/m2    Fluid & Electrolyte Disorders : Not present on admission    Gastroenterology: Liver Failure: Not present on admission    Hematology/Oncology : hx of PE, hx of Hodgekins disease; s/p chemo, s/p BMT,     Nephrology : Not present on admission    Neurology : Not present on admission    Pulmonology : asthma, hx of PE     Systemic : Not present on admission  Statement of review:  I have reviewed the lab findings, diagnostic data, medications, and the plan for sedation

## 2024-09-13 NOTE — CONFIDENTIAL NOTE
Cardiac Surgery Consultation      Jory Ambrose MRN# [id number]   YOB: 1975 Age: 49 year old   Date of Admission: 9/13/2024     Reason for consult: I was asked by Dr. Tarun Johnston to evaluate this patient for severe multivessel and moderate left main coronary artery disease.           Assessment and Plan:   Ms. Jory Ambrose is a 49-year-old woman with severe multivessel and moderate left main coronary artery disease and stable angina. I recommend coronary artery bypass grafting. She has moderate mitral regurgitation on echocardiography, and this is a central regurgitant jet that is consistent with functional MR. I do not recommend replacement, but I may need to repair the mitral valve with an annuloplasty ring. I will make this determination at the time of surgery with intraoperative transesophageal echocardiography.     I described the technical details, as well as the expected postoperative course and recovery to the patient. I also discussed the risks and benefits of the procedure. The risks include but are not limited to bleeding, infection, stroke, heart or graft failure with myocardial infarction, dysrhythmia, respiratory failure, kidney or liver injury, bowel or limb ischemia, and death. The calculated STS risk of mortality is <1%, and the calculated STS risk of major morbidity or mortality is <10%. The patient understands these risks and wishes to undergo the operation.     Surgery will be scheduled in the coming weeks.    After carotid US, the preoperative evaluation will be complete.    She has a true aspirin allergy characterized by anaphylaxis so we will use Plavix postoperatively, and she will not get the usual preoperative dose of aspirin. She is currently on Plavix, and this will need to be stopped preoperatively.    History is obtained from the patient         History of Present Illness:   This patient is a 49 year old female who presents with exertional angina  May 14, 2021       Aurelia Vazquez MD  [address] [facility] [address]  Via In Basket      Patient: Heriberto Ko   YOB: 1973   Date of Visit: 5/14/2021       Dear Dr. Vazquez:    Thank you for referring Heriberto Ko to me for evaluation. Below are my notes for this visit with him.    If you have questions, please do not hesitate to call me. I look forward to following your patient along with you.      Sincerely,        Evette Avila CNP        CC: No Recipients  Evette Avila CNP  5/14/2021 10:35 AM  Signed  Chief Complaint/Reason for Visit:   Chief Complaint   Patient presents with   • Hospital F/U     [facility] F/uP oN Chronic Subdural Hematoma, S/P AVR     HISTORY OF PRESENT ILLNESS: Heriberto Ko is a 48 year old male with a past medical history of mechanical aortic valve replacement and hypertension who presented to [facility] for treatment of a subdural hematoma.  He is status post craniotomy.    He presents to the office today for hospital follow-up and preoperative cardiac evaluation.  He is accompanied by his wife.  He is scheduled to undergo replacement of skull bone flap on 5/26/2021.    He denies any chest pain, shortness of breath, dizziness, palpitations, orthopnea, or lower extremity edema.  He is currently enrolled in outpatient rehab at [facility].  He is able to exercise to greater than 4 METS.    REVIEW OF SYSTEMS:  Review of Systems   Constitution: Negative for chills and fever.   HENT: Negative for nosebleeds.    Eyes: Negative for blurred vision.   Cardiovascular: Negative for chest pain, dyspnea on exertion, leg swelling, orthopnea, palpitations and paroxysmal nocturnal dyspnea.   Respiratory: Negative for cough, shortness of breath and sleep disturbances due to breathing.    Hematologic/Lymphatic: Does not bruise/bleed easily.   Skin: Negative for color change and poor wound healing.   Musculoskeletal: Negative for myalgias.   Gastrointestinal:  Negative for hematemesis and melena.   Genitourinary: Negative for dysuria and hematuria.   Neurological: Negative for dizziness and light-headedness.   Psychiatric/Behavioral: Negative for altered mental status.   Allergic/Immunologic: Negative for environmental allergies.     PAST MEDICAL HISTORY:   Past Medical History:   Diagnosis Date   • ASD (atrial septal defect)    • Chronic respiratory failure (CMS/HCC) 4/21/2021   • HTN (hypertension)      PAST SURGICAL HISTORY:   Past Surgical History:   Procedure Laterality Date   • Aortic valve replacement      mechanical   • Asd repair       FAMILY HISTORY:   Family History   Problem Relation Age of Onset   • Stroke Neg Hx      SOCIAL HISTORY:   Social History     Tobacco Use   • Smoking status: Never Smoker   • Smokeless tobacco: Never Used   Substance Use Topics   • Alcohol use: Never   • Drug use: Never       Drug Use:    Never           ALLERGIES:   ALLERGIES:  No Known Allergies    MEDICATIONS:   Current Outpatient Medications   Medication Sig Dispense Refill   • Cholecalciferol 50 mcg (2,000 units) tablet Take 1 tablet by mouth daily. 30 tablet 0   • traZODone (DESYREL) 50 MG tablet Take 1 tablet by mouth nightly. 30 tablet 0   • amLODIPine (NORVASC) 10 MG tablet Take 1 tablet by mouth daily. Do not start before May 6, 2021. 30 tablet 0   • losartan (COZAAR) 100 MG tablet Take 1 tablet by mouth daily. Do not start before May 6, 2021. 30 tablet 0   • polyethylene glycol (MIRALAX) 17 g packet Take 17 g by mouth daily. Do not start before May 6, 2021. Stir and dissolve powder in any 4 to 8 ounces of beverage, then drink.     • melatonin 3 MG Take 2 tablets by mouth nightly.  0   • famotidine (PEPCID) 20 MG tablet Take 1 tablet by mouth daily. 30 tablet 0   • docusate sodium, DSS, 100 MG Cap Take 100 mg by mouth 2 times daily.  0   • warfarin (COUMADIN) 7.5 MG tablet Take 1-1/2 tablets every Sunday, Tuesday, Friday and take 1 tablet every Monday, Wednesday, Thursday  characterized by pain in the neck and face with exertion. 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 She has no symptoms of heart failure. She was thinking of starting an exercise program but she noticed symptoms of     Ms. Jory Ambrose is a 48 year old female who comes in for cardiac evaluation.  She had 2 recent visits to emergency room.  The first time she went with the pain around her eye and numbness in her arm.  She underwent imaging of head without evidence of an acute stroke.  She had moderate disease in the carotids.  The second visit to emergency room was because of a tight heavy sensation in the left side of her chest.  This sensation came on at rest.  Her EKG did not show any ischemic changes and troponins were negative.  She did not have recurrence of the discomfort.  She is not known to have coronary artery disease, valvular heart disease, cardiomyopathy.  She has a history of Hodgkin lymphoma she had chemotherapy, radiation, bone marrow transplant x 2.  She is in remission now.  2 months ago she started taking phentermine for weight loss.                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    brother    Seasonal allergies     Shingles     Aug 2010             Past Surgical History:     Past Surgical History:   Procedure Laterality Date    CHOLECYSTECTOMY      gallstone pancreatitis Jan 2010    Lymphectomy  2/2010    right neck area               Social History:     Social  History     Tobacco Use    Smoking status: Never    Smokeless tobacco: Never    Tobacco comments:     denies tabacco use   Substance Use Topics    Alcohol use: No     Comment: minimal alcohol use, 1 glass of wine in 6 months             Family History:     Family History   Problem Relation Age of Onset    Cancer Mother         thyroid cancer    Unknown/Adopted Father     Breast Cancer No family hx of     Cancer - colorectal No family hx of     Prostate Cancer No family hx of     Hypertension Mother     C.A.D. Maternal Grandmother     C.A.D. Paternal Grandmother              Immunizations:     Immunization History   Administered Date(s) Administered    COVID-19 Bivalent 12+ (Pfizer) 09/21/2022    COVID-19 MONOVALENT 12+ (Pfizer) 03/12/2021, 04/01/2021, 10/04/2021    HIB (PRP-T) 08/29/2012    HepB 08/29/2012    Influenza (IIV3) PF 10/01/2010, 11/03/2011    Influenza Vaccine >6 months,quad, PF 11/19/2013    Pneumo Conj 13-V (2010&after) 08/29/2012    Poliovirus, inactivated (IPV) 08/29/2012    TDAP Vaccine (Boostrix) 08/29/2012             Allergies:     Allergies   Allergen Reactions    Dye [Contrast Dye] Swelling     Pre-medicated with methylprednisolone    Shellfish Allergy Shortness Of Breath, Anaphylaxis and Swelling     Shrimp, crab, lobster    Aspirin Hives    Penicillins Hives     Patient cannot recall if she has tried cephalosporins in the past.     Erythromycin Hives    Morphine Hcl      Causes change in mental status    Sulfa Antibiotics Hives and Rash             Medications:     Current Facility-Administered Medications   Medication Dose Route Frequency Provider Last Rate Last Admin    acetaminophen (TYLENOL) tablet 650 mg  650 mg Oral Q4H PRN Miles Rodriguez MD        atropine injection 0.5 mg  0.5 mg Intravenous Once PRN Miles Rodriguez MD        fentaNYL (PF) (SUBLIMAZE) injection 25 mcg  25 mcg Intravenous Q15 Min PRN Miles Rodriguez MD        flumazenil (ROMAZICON) injection 0.2 mg  0.2 mg  and Saturday 60 tablet 0   • Multiple Vitamin (MULTIVITAMIN ADULT PO) Take 1 tablet by mouth daily.     • acetaminophen (TYLENOL) 325 MG tablet Take 650 mg by mouth every 4 hours as needed for Pain.     • spironolactone (ALDACTONE) 25 MG tablet TAKE 1 TABLET BY MOUTH EVERY DAY (Patient taking differently: Take 25 mg by mouth daily. ) 30 tablet 3   • atorvastatin (LIPITOR) 20 MG tablet Take 1 tablet by mouth daily. (Patient taking differently: Take 20 mg by mouth nightly. ) 90 tablet 1     No current facility-administered medications for this visit.     PHYSICAL  EXAMINATION  Visit Vitals  BP (!) 142/82 (BP Location: RUE - Right upper extremity, Patient Position: Sitting, Cuff Size: Regular)   Pulse 68   Resp 18   Ht 6' (1.829 m)   Wt 111.6 kg (246 lb)   SpO2 100%   BMI 33.36 kg/m²     Physical Exam   Constitutional: He is oriented to person, place, and time. He appears well-developed and well-nourished. No distress.   HENT:   Head: Normocephalic and atraumatic.   Mouth/Throat: Oropharynx is clear and moist.   Eyes: Conjunctivae are normal.   Neck: No JVD present. Carotid bruit is not present.   Cardiovascular: Normal rate, regular rhythm, S1 normal, S2 normal and normal pulses.   No murmur heard.  Mechanical heart sounds   Pulmonary/Chest: Effort normal and breath sounds normal. No respiratory distress. He has no wheezes. He has no rales. He exhibits no tenderness.   Abdominal: Soft. He exhibits no distension.   Musculoskeletal:         General: No edema. Normal range of motion.      Cervical back: Normal range of motion.      Comments: Helmet in place   Neurological: He is alert and oriented to person, place, and time.   Skin: Skin is warm and dry. He is not diaphoretic.   Psychiatric: He has a normal mood and affect.   Vitals reviewed.    Diagnostic Testing:     TTE 12/30/2019  STUDY CONCLUSIONS  SUMMARY:     1. Left ventricle: The cavity size is normal. Wall thickness is moderately increased. There is concentric  Intravenous q1 min prn Miles Rodriguez MD        Hold: metformin and metformin containing medications on day of the procedure and for 48 hours after IV contrast given- Patients with acute kidney injury or severe chronic kidney disease (stage IV or stage V; i.e., eGFR less than 30)   Does not apply HOLD Miles Rodriguez MD        lidocaine (LMX4) cream   Topical Q1H PRN Miles Rodriguez MD        lidocaine 1 % 0.1-1 mL  0.1-1 mL Other Q1H PRN Miles Rodriguez MD        midazolam (VERSED) injection 0.5 mg  0.5 mg Intravenous Q5 Min PRN Miles Rodriguez MD        naloxone (NARCAN) injection 0.2 mg  0.2 mg Intravenous Q2 Min PRN Miles Rodriguez MD        Or    naloxone (NARCAN) injection 0.4 mg  0.4 mg Intravenous Q2 Min PRN Miles Rodriguez MD        Or    naloxone (NARCAN) injection 0.2 mg  0.2 mg Intramuscular Q2 Min PRN Miles Rodriguez MD        Or    naloxone (NARCAN) injection 0.4 mg  0.4 mg Intramuscular Q2 Min PRN Miles Rodriguez MD        oxyCODONE (ROXICODONE) tablet 5 mg  5 mg Oral Q4H PRN Miles Rodriguez MD        Or    oxyCODONE (ROXICODONE) tablet 10 mg  10 mg Oral Q4H PRN Miles Rodriguez MD        sodium chloride (PF) 0.9% PF flush 3 mL  3 mL Intracatheter Q8H Miles Rodriguez MD        sodium chloride (PF) 0.9% PF flush 3 mL  3 mL Intracatheter q1 min prn Miles Rodriguez MD        sodium chloride 0.9% BOLUS 250 mL  250 mL Intravenous Once PRN Miles Rodriguez MD                 Review of Systems:     The 10 point Review of Systems is negative other than noted in the HPI            Physical Exam:   Vitals were reviewed  Temp: 97.5  F (36.4  C) Temp src: Oral BP: 110/63 Pulse: 79   Resp: 22 SpO2: 96 % O2 Device: None (Room air) Oxygen Delivery: 2 LPM  Constitutional:   awake, alert, cooperative, no apparent distress, and appears stated age     Eyes:   Lids and lashes normal, pupils equal, round and reactive to light, extra ocular muscles intact, sclera  hypertrophy. The estimated ejection fraction is 55%, by 3D assessment.  2. Left atrium: The atrium is mildly dilated.    EKG 4/21/2021  Sinus rhythm     Labs:    Recent Labs   Lab 04/30/21  0604 03/26/21  0436 10/03/20  1500   Sodium 141 140 140   Chloride 108* 106 106   BUN 9 19 14   GFR Estimate,   --   --  84  eGFR 60 - 89 mL/min/1.73m2 = Mild decrease in kidney function.   BUN/Creatinine Ratio  --   --  12   BUN/ Creatinine Ratio 10 24  --    Potassium 3.7 4.2 4.0   Glucose 97 124* 91   Creatinine 0.88 0.79 1.19*   GFR Estimate, Non   --   --  72  eGFR 60 - 89 mL/min/1.73m2 = Mild decrease in kidney function.   CALCIUM  --   --  9.0   Calcium 8.7 9.1  --    TSH  --  7.850*  --      IMPRESSION/PLAN:    1. Hospital discharge follow-up  -Subdural hematoma    2. Chronic subdural hematoma (CMS/HCC)  -Status post craniotomy 2/22/2021  -Following with neurology/neurosurgery  -Upcoming bone flap replacement 5/26/2021    3. Essential hypertension  -Blood pressure is slightly above goal, 142/82  -Repeat blood pressure 134/70  -Overall has been well controlled  -Patient's wife reports that he is in some pain related to his toe which may be why it is elevated    4. S/P AVR (aortic valve replacement)  -Status post mechanical aortic valve replacement in 2017  -TTE 2019 shows mean systolic gradient of 20 mmHg and peak systolic gradient of 41 mmHg  -On warfarin    5. Preoperative cardiac evaluation   -Patient with no current or recent symptoms of angina, congestive heart failure, or significant valvular heart disease  -Able to exercise to greater than 4 METS  -Patient may proceed with proposed procedure at moderate cardiac risk  -Recommend bridging with Lovenox    5/14/2021    Return in about 2 months (around 7/14/2021) for Dr. Boyd.    Evette Avila, CNP   [facility]                clear, conjunctiva normal     ENT:   normocephalic, without obvious abnormality     Neck:   supple, symmetrical, trachea midline     Lungs:   no increased work of breathing, good air exchange, and no retractions     Cardiovascular:   regular rate and rhythm     Abdomen:   non-distended     Musculoskeletal:   no lower extremity pitting edema present  there is no redness, warmth, or swelling of the joints  full range of motion noted  motor strength is 5 out of 5 all extremities bilaterally     Neurologic:   Mental Status Exam:  Level of Alertness:   awake  Orientation:   person, place, time  Memory:   normal  Cranial Nerves:  cranial nerves II-XII are grossly intact  Motor Exam:  moves all extremities well and symmetrically     Neuropsychiatric:   General: normal, calm, and normal eye contact  Level of consciousness: alert / normal  Affect: normal     Skin:   no bruising or bleeding, normal skin color, texture, turgor, and no redness, warmth, or swelling          Data:   All laboratory data reviewed  All cardiac studies reviewed by me.  All imaging studies reviewed by me.    CARDIAC CATHETERIZATION:  LM:mid-distal 60-70%   LAD:mid-vessel 85% narrowing at the take off of a diagonal branch  Lcx:distal 50- 60% narrowing  RCA:dominant, 100% occluded ostial, fills distally via L-R collaterals     LVEDP:10    Left Main   The vessel was visualized by selective angiography. There was 60% vessel disease.      Left Anterior Descending   The vessel was visualized by selective angiography. There was 80% vessel disease.      Left Circumflex   The vessel was visualized by selective angiography. There was 60% vessel disease.      Right Coronary Artery   The vessel was visualized by selective angiography. There was 100% vessel disease.          TRANSTHORACIC ECHOCARDIOGRAPHY:  1.Left ventricular size, wall motion and function are normal. The ejection  fraction is 55-60%.  2.Normal right ventricle size and systolic function.  3.There  is moderate (2+) mitral regurgitation. PISA not measured or performed.  4.Poor images for analysis.  Compared to the prior study dated 12/30/2014, the MR is now seen.    EKG:  Sinus rhythm   Normal ECG   When compared with ECG of 06-Jun-2024 12:19,   No significant change was found     CAROTID US:  pending

## 2024-09-13 NOTE — LETTER
[facility]  [address] [place] [id number]  Phone: [phone]  Fax: [phone]    September 13, 2024        Jory Ambrose  [address] [place] [id number]          To whom it may concern:    RE: Jory Ambrose    This patient was under our care on 9/13/2024. Please excuse her from work on 9/17/2024 for an upcoming follow up appointment to discuss further treatment options.     Please contact me for questions or concerns.      Sincerely,    Xena Lockhart CNP

## 2024-09-13 NOTE — DISCHARGE INSTRUCTIONS

## 2024-09-13 NOTE — PROGRESS NOTES
Discharged to home with post radial and femoral angio care instructions. Pt and  both state a good understanding and have no concerns at this time. Pt up and ambulated. Sites intact, VSS. Follow up with CV Surgery next week.

## 2024-09-17 ENCOUNTER — OFFICE VISIT (OUTPATIENT)
Dept: CARDIOLOGY | Facility: CLINIC | Age: 49
End: 2024-09-17
Attending: INTERNAL MEDICINE
Payer: COMMERCIAL

## 2024-09-17 VITALS
HEART RATE: 80 BPM | RESPIRATION RATE: 16 BRPM | HEIGHT: 64 IN | WEIGHT: 220 LBS | OXYGEN SATURATION: 99 % | SYSTOLIC BLOOD PRESSURE: 112 MMHG | BODY MASS INDEX: 37.56 KG/M2 | DIASTOLIC BLOOD PRESSURE: 79 MMHG

## 2024-09-17 DIAGNOSIS — R07.9 CHEST PAIN ON EXERTION: ICD-10-CM

## 2024-09-17 DIAGNOSIS — I25.119 CORONARY ARTERY DISEASE INVOLVING NATIVE CORONARY ARTERY OF NATIVE HEART WITH ANGINA PECTORIS (H): Primary | ICD-10-CM

## 2024-09-17 DIAGNOSIS — R93.1 ABNORMAL COMPUTED TOMOGRAPHY ANGIOGRAPHY OF HEART: ICD-10-CM

## 2024-09-17 DIAGNOSIS — I15.8 HYPERTENSION SECONDARY TO DRUG: ICD-10-CM

## 2024-09-17 DIAGNOSIS — T50.905A HYPERTENSION SECONDARY TO DRUG: ICD-10-CM

## 2024-09-17 PROCEDURE — 99204 OFFICE O/P NEW MOD 45 MIN: CPT | Performed by: SURGERY

## 2024-09-17 NOTE — PROGRESS NOTES
Cardiac surgery clinic follow up    Ms. Jory Ambrose is a 49-year-old woman with severe multivessel and moderate left main coronary artery disease and stable angina. I recently saw her in in the [facility] after her coronary angiogram, and I recommended coronary artery bypass grafting. She also has moderate mitral regurgitation on echocardiography with a central regurgitant jet that is consistent with functional MR. I do not recommend replacement, but I may need to repair the mitral valve with an annuloplasty ring. I will make this determination at the time of surgery with intraoperative transesophageal echocardiography. I am seeing her again in clinic today to answer her questions.     I reviewed the technical details, as well as the expected postoperative course and recovery with the patient. I also discussed the risks and benefits of the procedure once more. The risks include but are not limited to bleeding, infection, stroke, heart or graft failure with myocardial infarction, dysrhythmia, respiratory failure, kidney or liver injury, bowel or limb ischemia, and death. The calculated STS risk of mortality is <1%, and the calculated STS risk of major morbidity or mortality is <10%. The patient understands these risks and wishes to undergo the operation.      Surgery will be scheduled in the coming weeks.      She has history of aspirin allergy that may have been characterized by anaphylaxis so we will use Plavix postoperatively, and she will not get the usual preoperative dose of aspirin. She is currently on Plavix, and this will need to be stopped preoperatively. She is going to confirm the details of her allergy with her mother since the patient was a child when this occurred.    David Wade MD

## 2024-09-17 NOTE — PATIENT INSTRUCTIONS
You were seen today in the [facility]    Surgery will be scheduled with Dr. Wade on October 14, Monday. Pre-admission testing will be scheduled on Monday, October 7.    Please call AMARILYS Corley surgery coordinator with any questions.  Thank you.    Jory Jo RNCC  Cardiovascular Surgery  Phone [phone]  Fax [phone]

## 2024-09-17 NOTE — LETTER
9/17/2024    [facility]  [address] [address]    RE: Jory Ambrose       Dear Colleague,     I had the pleasure of seeing Jory Ambrose in the [facility].  Cardiac surgery clinic follow up    Ms. Jory Ambrose is a 49-year-old woman with severe multivessel and moderate left main coronary artery disease and stable angina. I recently saw her in in the [facility] after her coronary angiogram, and I recommended coronary artery bypass grafting. She also has moderate mitral regurgitation on echocardiography with a central regurgitant jet that is consistent with functional MR. I do not recommend replacement, but I may need to repair the mitral valve with an annuloplasty ring. I will make this determination at the time of surgery with intraoperative transesophageal echocardiography. I am seeing her again in clinic today to answer her questions.     I reviewed the technical details, as well as the expected postoperative course and recovery with the patient. I also discussed the risks and benefits of the procedure once more. The risks include but are not limited to bleeding, infection, stroke, heart or graft failure with myocardial infarction, dysrhythmia, respiratory failure, kidney or liver injury, bowel or limb ischemia, and death. The calculated STS risk of mortality is <1%, and the calculated STS risk of major morbidity or mortality is <10%. The patient understands these risks and wishes to undergo the operation.      Surgery will be scheduled in the coming weeks.      She has history of aspirin allergy that may have been characterized by anaphylaxis so we will use Plavix postoperatively, and she will not get the usual preoperative dose of aspirin. She is currently on Plavix, and this will need to be stopped preoperatively. She is going to confirm the details of her allergy with her mother since the patient was a child when this  occurred.    David Wade MD         Thank you for allowing me to participate in the care of your patient.      Sincerely,     David Wade MD     [facility]  cc:   Tarun Johnston MD  [address]  [place] [address]

## 2024-09-18 ENCOUNTER — PREP FOR PROCEDURE (OUTPATIENT)
Dept: ADMINISTRATIVE | Facility: CLINIC | Age: 49
End: 2024-09-18
Payer: COMMERCIAL

## 2024-09-18 DIAGNOSIS — I34.0 MITRAL REGURGITATION: ICD-10-CM

## 2024-09-18 DIAGNOSIS — I25.119 CORONARY ARTERY DISEASE INVOLVING NATIVE CORONARY ARTERY OF NATIVE HEART WITH ANGINA PECTORIS (H): Primary | ICD-10-CM

## 2024-09-18 RX ORDER — LIDOCAINE 40 MG/G
CREAM TOPICAL
OUTPATIENT
Start: 2024-09-18

## 2024-09-18 RX ORDER — DEXMEDETOMIDINE HYDROCHLORIDE 4 UG/ML
.2-1.2 INJECTION, SOLUTION INTRAVENOUS CONTINUOUS
OUTPATIENT
Start: 2024-10-08

## 2024-09-18 RX ORDER — SODIUM CHLORIDE, SODIUM GLUCONATE, SODIUM ACETATE, POTASSIUM CHLORIDE AND MAGNESIUM CHLORIDE 526; 502; 368; 37; 30 MG/100ML; MG/100ML; MG/100ML; MG/100ML; MG/100ML
1000 INJECTION, SOLUTION INTRAVENOUS
OUTPATIENT
Start: 2024-10-08 | End: 2024-10-08

## 2024-09-18 RX ORDER — CHLORHEXIDINE GLUCONATE ORAL RINSE 1.2 MG/ML
10 SOLUTION DENTAL ONCE
OUTPATIENT
Start: 2024-10-08 | End: 2024-09-18

## 2024-09-18 RX ORDER — CEFAZOLIN SODIUM/WATER 2 G/20 ML
2 SYRINGE (ML) INTRAVENOUS SEE ADMIN INSTRUCTIONS
OUTPATIENT
Start: 2024-10-08

## 2024-09-18 RX ORDER — PHENYLEPHRINE HCL IN 0.9% NACL 50MG/250ML
.1-6 PLASTIC BAG, INJECTION (ML) INTRAVENOUS CONTINUOUS
OUTPATIENT
Start: 2024-10-08

## 2024-09-18 RX ORDER — CEFAZOLIN SODIUM/WATER 2 G/20 ML
2 SYRINGE (ML) INTRAVENOUS
OUTPATIENT
Start: 2024-10-08

## 2024-09-23 ENCOUNTER — HOSPITAL ENCOUNTER (OUTPATIENT)
Dept: CT IMAGING | Facility: HOSPITAL | Age: 49
Discharge: HOME | End: 2024-09-23
Attending: INTERNAL MEDICINE | Admitting: INTERNAL MEDICINE
Payer: COMMERCIAL

## 2024-09-23 DIAGNOSIS — I25.119 CORONARY ARTERY DISEASE INVOLVING NATIVE CORONARY ARTERY OF NATIVE HEART WITH ANGINA PECTORIS (H): ICD-10-CM

## 2024-09-23 PROCEDURE — 71250 CT THORAX DX C-: CPT

## 2024-09-25 ENCOUNTER — APPOINTMENT (OUTPATIENT)
Dept: RADIOLOGY | Facility: CLINIC | Age: 49
End: 2024-09-25
Attending: EMERGENCY MEDICINE
Payer: COMMERCIAL

## 2024-09-25 ENCOUNTER — TELEPHONE (OUTPATIENT)
Dept: CARDIOLOGY | Facility: CLINIC | Age: 49
End: 2024-09-25
Payer: COMMERCIAL

## 2024-09-25 ENCOUNTER — HOSPITAL ENCOUNTER (EMERGENCY)
Facility: CLINIC | Age: 49
Discharge: HOME | End: 2024-09-25
Attending: EMERGENCY MEDICINE | Admitting: EMERGENCY MEDICINE
Payer: COMMERCIAL

## 2024-09-25 VITALS
OXYGEN SATURATION: 99 % | BODY MASS INDEX: 37.39 KG/M2 | RESPIRATION RATE: 27 BRPM | WEIGHT: 219 LBS | DIASTOLIC BLOOD PRESSURE: 77 MMHG | SYSTOLIC BLOOD PRESSURE: 140 MMHG | HEART RATE: 79 BPM | HEIGHT: 64 IN | TEMPERATURE: 97.6 F

## 2024-09-25 DIAGNOSIS — R07.9 CHEST PAIN, UNSPECIFIED TYPE: ICD-10-CM

## 2024-09-25 DIAGNOSIS — Z86.79 HISTORY OF CORONARY ARTERY DISEASE: ICD-10-CM

## 2024-09-25 LAB
ANION GAP SERPL CALCULATED.3IONS-SCNC: 14 MMOL/L (ref 7–15)
ATRIAL RATE - MUSE: 81 BPM
BASOPHILS # BLD AUTO: 0.1 10E3/UL (ref 0–0.2)
BASOPHILS NFR BLD AUTO: 1 %
BUN SERPL-MCNC: 15.7 MG/DL (ref 6–20)
CALCIUM SERPL-MCNC: 9.6 MG/DL (ref 8.8–10.4)
CHLORIDE SERPL-SCNC: 103 MMOL/L (ref 98–107)
CREAT SERPL-MCNC: 0.91 MG/DL (ref 0.51–0.95)
DIASTOLIC BLOOD PRESSURE - MUSE: NORMAL MMHG
EGFRCR SERPLBLD CKD-EPI 2021: 77 ML/MIN/1.73M2
EOSINOPHIL # BLD AUTO: 0.1 10E3/UL (ref 0–0.7)
EOSINOPHIL NFR BLD AUTO: 1 %
ERYTHROCYTE [DISTWIDTH] IN BLOOD BY AUTOMATED COUNT: 13.9 % (ref 10–15)
FLUAV RNA SPEC QL NAA+PROBE: NEGATIVE
FLUBV RNA RESP QL NAA+PROBE: NEGATIVE
GLUCOSE SERPL-MCNC: 111 MG/DL (ref 70–99)
HCO3 SERPL-SCNC: 20 MMOL/L (ref 22–29)
HCT VFR BLD AUTO: 39.7 % (ref 35–47)
HGB BLD-MCNC: 12.8 G/DL (ref 11.7–15.7)
HOLD SPECIMEN: NORMAL
HOLD SPECIMEN: NORMAL
IMM GRANULOCYTES # BLD: 0 10E3/UL
IMM GRANULOCYTES NFR BLD: 0 %
INTERPRETATION ECG - MUSE: NORMAL
LYMPHOCYTES # BLD AUTO: 2.6 10E3/UL (ref 0.8–5.3)
LYMPHOCYTES NFR BLD AUTO: 31 %
MCH RBC QN AUTO: 29.5 PG (ref 26.5–33)
MCHC RBC AUTO-ENTMCNC: 32.2 G/DL (ref 31.5–36.5)
MCV RBC AUTO: 92 FL (ref 78–100)
MONOCYTES # BLD AUTO: 0.4 10E3/UL (ref 0–1.3)
MONOCYTES NFR BLD AUTO: 5 %
NEUTROPHILS # BLD AUTO: 5.4 10E3/UL (ref 1.6–8.3)
NEUTROPHILS NFR BLD AUTO: 63 %
NRBC # BLD AUTO: 0 10E3/UL
NRBC BLD AUTO-RTO: 0 /100
P AXIS - MUSE: 22 DEGREES
PLATELET # BLD AUTO: 333 10E3/UL (ref 150–450)
POTASSIUM SERPL-SCNC: 4.4 MMOL/L (ref 3.4–5.3)
PR INTERVAL - MUSE: 160 MS
QRS DURATION - MUSE: 92 MS
QT - MUSE: 384 MS
QTC - MUSE: 446 MS
R AXIS - MUSE: -1 DEGREES
RBC # BLD AUTO: 4.34 10E6/UL (ref 3.8–5.2)
RSV RNA SPEC NAA+PROBE: NEGATIVE
SARS-COV-2 RNA RESP QL NAA+PROBE: NEGATIVE
SODIUM SERPL-SCNC: 137 MMOL/L (ref 135–145)
SYSTOLIC BLOOD PRESSURE - MUSE: NORMAL MMHG
T AXIS - MUSE: 17 DEGREES
TROPONIN T SERPL HS-MCNC: 8 NG/L
TROPONIN T SERPL HS-MCNC: 9 NG/L
TROPONIN T SERPL HS-MCNC: 9 NG/L
TSH SERPL DL<=0.005 MIU/L-ACNC: 2.5 UIU/ML (ref 0.3–4.2)
VENTRICULAR RATE- MUSE: 81 BPM
WBC # BLD AUTO: 8.6 10E3/UL (ref 4–11)

## 2024-09-25 PROCEDURE — 93005 ELECTROCARDIOGRAM TRACING: CPT | Performed by: EMERGENCY MEDICINE

## 2024-09-25 PROCEDURE — 80048 BASIC METABOLIC PNL TOTAL CA: CPT | Performed by: EMERGENCY MEDICINE

## 2024-09-25 PROCEDURE — 36415 COLL VENOUS BLD VENIPUNCTURE: CPT | Performed by: EMERGENCY MEDICINE

## 2024-09-25 PROCEDURE — 85025 COMPLETE CBC W/AUTO DIFF WBC: CPT | Performed by: EMERGENCY MEDICINE

## 2024-09-25 PROCEDURE — 84484 ASSAY OF TROPONIN QUANT: CPT | Performed by: EMERGENCY MEDICINE

## 2024-09-25 PROCEDURE — 71046 X-RAY EXAM CHEST 2 VIEWS: CPT

## 2024-09-25 PROCEDURE — 84443 ASSAY THYROID STIM HORMONE: CPT | Performed by: EMERGENCY MEDICINE

## 2024-09-25 PROCEDURE — 99285 EMERGENCY DEPT VISIT HI MDM: CPT | Mod: 25

## 2024-09-25 PROCEDURE — 250N000013 HC RX MED GY IP 250 OP 250 PS 637: Performed by: EMERGENCY MEDICINE

## 2024-09-25 PROCEDURE — 87637 SARSCOV2&INF A&B&RSV AMP PRB: CPT | Performed by: EMERGENCY MEDICINE

## 2024-09-25 RX ORDER — ISOSORBIDE DINITRATE 30 MG/1
30 TABLET ORAL
Qty: 28 TABLET | Refills: 0 | Status: SHIPPED | OUTPATIENT
Start: 2024-09-25

## 2024-09-25 RX ORDER — ISOSORBIDE MONONITRATE 30 MG/1
30 TABLET, EXTENDED RELEASE ORAL ONCE
Status: COMPLETED | OUTPATIENT
Start: 2024-09-25 | End: 2024-09-25

## 2024-09-25 RX ORDER — ASPIRIN 81 MG/1
162 TABLET, CHEWABLE ORAL ONCE
Status: COMPLETED | OUTPATIENT
Start: 2024-09-25 | End: 2024-09-25

## 2024-09-25 RX ORDER — NITROGLYCERIN 80 MG/1
1 PATCH TRANSDERMAL ONCE
Status: DISCONTINUED | OUTPATIENT
Start: 2024-09-25 | End: 2024-09-25 | Stop reason: HOSPADM

## 2024-09-25 RX ORDER — ISOSORBIDE DINITRATE 30 MG/1
30 TABLET ORAL
Qty: 28 TABLET | Refills: 0 | Status: SHIPPED | OUTPATIENT
Start: 2024-09-25 | End: 2024-09-25

## 2024-09-25 RX ADMIN — ASPIRIN 81 MG CHEWABLE TABLET 162 MG: 81 TABLET CHEWABLE at 14:21

## 2024-09-25 RX ADMIN — ISOSORBIDE MONONITRATE 30 MG: 30 TABLET, EXTENDED RELEASE ORAL at 16:21

## 2024-09-25 RX ADMIN — NITROGLYCERIN 1 PATCH: 0.4 PATCH TRANSDERMAL at 16:21

## 2024-09-25 ASSESSMENT — ENCOUNTER SYMPTOMS
CONSTIPATION: 1
MYALGIAS: 1
HEADACHES: 1
FATIGUE: 1
CHEST TIGHTNESS: 0
SHORTNESS OF BREATH: 1
ABDOMINAL PAIN: 0

## 2024-09-25 ASSESSMENT — ACTIVITIES OF DAILY LIVING (ADL)
ADLS_ACUITY_SCORE: 37

## 2024-09-25 ASSESSMENT — COLUMBIA-SUICIDE SEVERITY RATING SCALE - C-SSRS
1. IN THE PAST MONTH, HAVE YOU WISHED YOU WERE DEAD OR WISHED YOU COULD GO TO SLEEP AND NOT WAKE UP?: NO
6. HAVE YOU EVER DONE ANYTHING, STARTED TO DO ANYTHING, OR PREPARED TO DO ANYTHING TO END YOUR LIFE?: NO
2. HAVE YOU ACTUALLY HAD ANY THOUGHTS OF KILLING YOURSELF IN THE PAST MONTH?: NO

## 2024-09-25 NOTE — ED PROVIDER NOTES
EMERGENCY DEPARTMENT ENCOUNTER       ED Course & Medical Decision Making       I saw and examined the patient.  IV was established and she was placed on the monitor.  She does still endorse generalized fatigue but no current chest pain or shortness of breath.  I reviewed her cath results from about 10 days ago and this does show significant multivessel disease as noted below.    I do suspect that this is contributing to her fatigue and her symptoms, however thankfully her initial troponin here returned negative.    D-dimer is normal.  CBC, CMP unremarkable.  Given her significant cardiovascular findings on recent cardiac catheterization I did speak to Dr. Larkin, cardiology and he recommended starting some Imdur as an antianginal and still thought that discharge would be most appropriate so long as serial troponins returned negative.  She can be referred to rapid access cardiology to discuss if they want to move up the date of her bypass.    If she develops any worsening symptoms or return of her chest pain despite being on the Imdur at home she would return to [facility] emergency department for reevaluation      1:11 PM I met the patient and performed my initial interview and exam.     Medical Decision Making  Obtained supplemental history:Supplemental history obtained?: No  Reviewed external records: External records reviewed?: Documented in chart  Care impacted by chronic illness:Heart Disease and Other: asthma  Care significantly affected by social determinants of health:N/A  Did you consider but not order tests?: Work up considered but not performed and documented in chart, if applicable  Did you interpret images independently?: Independent interpretation of ECG and images noted in documentation, when applicable.  Consultation discussion with other provider:Did you involve another provider (consultant, , pharmacy, etc.)?: I discussed the care with another health care provider, see documentation for  "details.      Diagnostic 9/13/24  Dominance: Right  Left Main   The vessel was visualized by selective angiography. There was 60% vessel disease.      Left Anterior Descending   The vessel was visualized by selective angiography. There was 80% vessel disease.      Left Circumflex   The vessel was visualized by selective angiography. There was 60% vessel disease.      Right Coronary Artery   The vessel was visualized by selective angiography. There was 100% vessel disease.      Intervention     No interventions have been documented.       Prior to making a final disposition on this patient the results of patient's tests and other diagnostic studies were discussed with the patient. All questions were answered. Patient expressed understanding of the plan and was amenable to it.    Medications   nitroGLYcerin (NITRODUR) 0.4 MG/HR 24 hr patch 1 patch (1 patch Transdermal $Patch/Med Applied 9/25/24 1621)   aspirin (ASA) chewable tablet 162 mg (162 mg Oral $Given 9/25/24 1421)   isosorbide mononitrate (IMDUR) 24 hr tablet 30 mg (30 mg Oral $Given 9/25/24 1621)       Final Impression     1. Chest pain, unspecified type    2. History of coronary artery disease            Chief Complaint     Chief Complaint   Patient presents with    Near Syncope    Hypotension       Arrives to ED with c/o near syncope and hypotension. Began this morning while showering. Pt scheduled for bypass on 10/14. \"I have a heart blockage of 100% and 60%\". Assistance required for ambulation. \"I feel like I could pass out\". Denies CP. Reports some SOB, speaking in full sentences.      Triage Assessment (Adult)       Row Name 09/25/24 7790          Triage Assessment    Airway WDL WDL        Respiratory WDL    Respiratory WDL WDL        Skin Circulation/Temperature WDL    Skin Circulation/Temperature WDL WDL        Cardiac WDL    Cardiac WDL WDL        Peripheral/Neurovascular WDL    Peripheral Neurovascular WDL WDL        Cognitive/Neuro/Behavioral WDL "    Cognitive/Neuro/Behavioral WDL WDL                         HPI       Jory Ambrose is a pleasant 49 year old female, history of asthma, who presents via walk in for evaluation of near syncope.    This morning, the patient woke and felt like she didn't sleep much despite going to bed at a decent time. When moving around to get ready ofr work, she felt sluggish and very tired. She had to sit on a chair and try to recover, but she was still tired and noted increased shortness of breath too, as well as a bit of a headache. She had a bit of nausea and generalized body aches, and decided to present to the ER due to feeling poorly. At the ER, her blood pressure was low, and she did not check it at home prior to at all.    The patient has a history of coronary artery disease with artery blockages of various degrees. She learned this after an angiography on 13-Sep-2024. She was given plavix to take afterwards, due to a suspected aspirin allergy. She called her mother yesterday to discuss the allergy and it was thought that she actually didn't have an allergy, so the patient was planning to call cardiology to see if she needed to start aspirin. The patient is on 80 mg atorvastatin and follows with Dr. Wade, cardiology.     The patient had some constipation which resolved yesterday. She has been having bilateral arm and shoulder pain for the past few days. The patient otherwise denies abdominal pain, bowel abnormality, and bladder abnormality. She also denies chest tightness, pain, or pressure, as well as denying other flu symptoms.      I, Angelo Chowdhury am serving as a scribe to document services personally performed by Gene Fenton M.D. based on my observation and the provider's statements to me. IGene M.D attest that Angelo Chowdhury is acting in a scribe capacity, has observed my performance of the services and has documented them in accordance with my direction.    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    Seasonal allergies     Shingles      Past Surgical History:   Procedure Laterality Date    CHOLECYSTECTOMY      gallstone pancreatitis Jan 2010    CV CORONARY ANGIOGRAM N/A 9/13/2024    Procedure: Coronary Angiogram;  Surgeon: Tarun Johnston MD;  Location: [facility] CATH LAB CV    CV LEFT HEART CATH N/A 9/13/2024    Procedure: Left Heart Catheterization;  Surgeon: Tarun Johnston MD;  Location: [facility] CATH LAB CV    Lymphectomy  2/2010    right neck area     Family History   Problem Relation Age of Onset    Cancer Mother         thyroid cancer    Unknown/Adopted Father     Breast Cancer No family hx of     Cancer - colorectal No family hx of     Prostate Cancer No family hx of     Hypertension Mother     C.A.D. Maternal Grandmother     C.A.D. Paternal Grandmother       Social History     Tobacco Use    Smoking status: Never    Smokeless tobacco: Never    Tobacco comments:     denies tabacco use   Substance Use Topics    Alcohol use: No     Comment: minimal alcohol use, 1 glass of wine in 6 months    Drug use: No       Relevant past medical, surgical, family and social history as documented above, has been reviewed and discussed with patient. No changes or additions, unless otherwise noted in the HPI.    Current Medications     isosorbide dinitrate (ISORDIL) 30 MG tablet  albuterol (PROAIR HFA) 108 (90 BASE) MCG/ACT inhaler  atorvastatin (LIPITOR) 80 MG tablet  budesonide-formoterol (SYMBICORT) 80-4.5 MCG/ACT Inhaler  buPROPion (WELLBUTRIN XL) 300 MG 24 hr tablet  Calcium Carbonate-Vitamin D (CALCIUM 600 + D OR)  cetirizine (ZYRTEC)  10 MG tablet  clopidogrel (PLAVIX) 75 MG tablet  diphenhydrAMINE (BENADRYL) 25 MG tablet  diphenhydrAMINE (BENADRYL) 50 MG capsule  fludrocortisone (FLORINEF) 0.1 MG tablet  fluticasone (FLONASE) 50 MCG/ACT nasal spray  ipratropium - albuterol 0.5 mg/2.5 mg/3 mL (DUONEB) 0.5-2.5 (3) MG/3ML neb solution  levothyroxine (SYNTHROID/LEVOTHROID) 125 MCG tablet  LORazepam (ATIVAN) 0.5 MG tablet  Magnesium Oxide 500 MG CAPS  metFORMIN (GLUCOPHAGE) 1000 MG tablet  methylPREDNISolone (MEDROL) 32 MG tablet  methylPREDNISolone (MEDROL) 32 MG tablet  Multiple Vitamin (MULTI-VITAMIN PO)  nitroGLYcerin (NITROSTAT) 0.4 MG sublingual tablet  omeprazole (PRILOSEC) 20 MG capsule  phentermine (ADIPEX-P) 37.5 MG tablet  simethicone (MYLICON) 125 MG chewable tablet  spironolactone (ALDACTONE) 25 MG tablet  traZODone (DESYREL) 100 MG tablet  zinc gluconate 50 MG tablet        Allergies     Allergies   Allergen Reactions    Dye [Contrast Dye] Swelling     Pre-medicated with methylprednisolone    Shellfish Allergy Shortness Of Breath, Anaphylaxis and Swelling     Shrimp, crab, lobster    Aspirin Hives    Penicillins Hives     Patient cannot recall if she has tried cephalosporins in the past.     Erythromycin Hives    Morphine Hcl      Causes change in mental status    Sulfa Antibiotics Hives and Rash       Review of Systems     Review of Systems   Constitutional:  Positive for fatigue.   HENT:          Negative for other flu symptoms.   Respiratory:  Positive for shortness of breath. Negative for chest tightness.    Cardiovascular:  Negative for chest pain.   Gastrointestinal:  Positive for constipation (resolved). Negative for abdominal pain.   Genitourinary: Negative.    Musculoskeletal:  Positive for myalgias.        Positive for bilateral arm and shoulder pain.   Neurological:  Positive for headaches.        Remainder of systems reviewed, unless noted in HPI all others negative.    Physical Exam     /76   Pulse 77   Temp 97.6  F  "(36.4  C) (Oral)   Resp 21   Ht 1.626 m (5' 4\")   Wt 99.3 kg (219 lb)   SpO2 100%   BMI 37.59 kg/m      Physical Exam  Vitals and nursing note reviewed.   HENT:      Head: Normocephalic.      Nose: Nose normal.   Cardiovascular:      Rate and Rhythm: Normal rate and regular rhythm.      Heart sounds: Normal heart sounds.   Pulmonary:      Effort: Pulmonary effort is normal.      Breath sounds: Normal breath sounds.   Neurological:      Mental Status: She is alert. Mental status is at baseline.   Psychiatric:         Mood and Affect: Mood normal.             Labs & Imaging         Labs Ordered and Resulted from Time of ED Arrival to Time of ED Departure   BASIC METABOLIC PANEL - Abnormal       Result Value    Sodium 137      Potassium 4.4      Chloride 103      Carbon Dioxide (CO2) 20 (*)     Anion Gap 14      Urea Nitrogen 15.7      Creatinine 0.91      GFR Estimate 77      Calcium 9.6      Glucose 111 (*)    TROPONIN T, HIGH SENSITIVITY - Normal    Troponin T, High Sensitivity 9     TROPONIN T, HIGH SENSITIVITY - Normal    Troponin T, High Sensitivity 9     TSH WITH FREE T4 REFLEX - Normal    TSH 2.50     INFLUENZA A/B, RSV, & SARS-COV2 PCR - Normal    Influenza A PCR Negative      Influenza B PCR Negative      RSV PCR Negative      SARS CoV2 PCR Negative     TROPONIN T, HIGH SENSITIVITY - Normal    Troponin T, High Sensitivity 8     CBC WITH PLATELETS AND DIFFERENTIAL    WBC Count 8.6      RBC Count 4.34      Hemoglobin 12.8      Hematocrit 39.7      MCV 92      MCH 29.5      MCHC 32.2      RDW 13.9      Platelet Count 333      % Neutrophils 63      % Lymphocytes 31      % Monocytes 5      % Eosinophils 1      % Basophils 1      % Immature Granulocytes 0      NRBCs per 100 WBC 0      Absolute Neutrophils 5.4      Absolute Lymphocytes 2.6      Absolute Monocytes 0.4      Absolute Eosinophils 0.1      Absolute Basophils 0.1      Absolute Immature Granulocytes 0.0      Absolute NRBCs 0.0           Results for " orders placed or performed during the hospital encounter of 09/25/24   Basic metabolic panel   Result Value Ref Range    Sodium 137 135 - 145 mmol/L    Potassium 4.4 3.4 - 5.3 mmol/L    Chloride 103 98 - 107 mmol/L    Carbon Dioxide (CO2) 20 (L) 22 - 29 mmol/L    Anion Gap 14 7 - 15 mmol/L    Urea Nitrogen 15.7 6.0 - 20.0 mg/dL    Creatinine 0.91 0.51 - 0.95 mg/dL    GFR Estimate 77 >60 mL/min/1.73m2    Calcium 9.6 8.8 - 10.4 mg/dL    Glucose 111 (H) 70 - 99 mg/dL   Result Value Ref Range    Troponin T, High Sensitivity 9 <=14 ng/L   CBC with platelets and differential   Result Value Ref Range    WBC Count 8.6 4.0 - 11.0 10e3/uL    RBC Count 4.34 3.80 - 5.20 10e6/uL    Hemoglobin 12.8 11.7 - 15.7 g/dL    Hematocrit 39.7 35.0 - 47.0 %    MCV 92 78 - 100 fL    MCH 29.5 26.5 - 33.0 pg    MCHC 32.2 31.5 - 36.5 g/dL    RDW 13.9 10.0 - 15.0 %    Platelet Count 333 150 - 450 10e3/uL    % Neutrophils 63 %    % Lymphocytes 31 %    % Monocytes 5 %    % Eosinophils 1 %    % Basophils 1 %    % Immature Granulocytes 0 %    NRBCs per 100 WBC 0 <1 /100    Absolute Neutrophils 5.4 1.6 - 8.3 10e3/uL    Absolute Lymphocytes 2.6 0.8 - 5.3 10e3/uL    Absolute Monocytes 0.4 0.0 - 1.3 10e3/uL    Absolute Eosinophils 0.1 0.0 - 0.7 10e3/uL    Absolute Basophils 0.1 0.0 - 0.2 10e3/uL    Absolute Immature Granulocytes 0.0 <=0.4 10e3/uL    Absolute NRBCs 0.0 10e3/uL   Extra Blue Top Tube   Result Value Ref Range    Hold Specimen JIC    Extra Red Top Tube   Result Value Ref Range    Hold Specimen JIC    Result Value Ref Range    Troponin T, High Sensitivity 9 <=14 ng/L   TSH with free T4 reflex   Result Value Ref Range    TSH 2.50 0.30 - 4.20 uIU/mL   Symptomatic Influenza A/B, RSV, & SARS-CoV2 PCR (COVID-19) Nasopharyngeal    Specimen: Nasopharyngeal; Swab   Result Value Ref Range    Influenza A PCR Negative Negative    Influenza B PCR Negative Negative    RSV PCR Negative Negative    SARS CoV2 PCR Negative Negative   Result Value Ref Range     Troponin T, High Sensitivity 8 <=14 ng/L       Gene Fenton MD  Emergency Medicine  [facility] EMERGENCY ROOM  [address] [facility] [facility] [id number] [phone]  9/25/2024         Gene Fenton MD  09/25/24 4080

## 2024-09-25 NOTE — TELEPHONE ENCOUNTER
Pt called in today stating she suddenly feels very fatigued this morning. She is unsure if she is dehydrated or hypotensive, as she does not have a BP cuff at home.    Spouse is driving back home to be with her. Pt was advised to go to nearest urgent care or ED to get her blood pressure checked. Pt agreed to the plan.

## 2024-09-25 NOTE — ED TRIAGE NOTES
"Arrives to ED with c/o near syncope and hypotension. Began this morning while showering. Pt scheduled for bypass on 10/14. \"I have a heart blockage of 100% and 60%\". Assistance required for ambulation. \"I feel like I could pass out\". Denies CP. Reports some SOB, speaking in full sentences.      Triage Assessment (Adult)       Row Name 09/25/24 6449          Triage Assessment    Airway WDL WDL        Respiratory WDL    Respiratory WDL WDL        Skin Circulation/Temperature WDL    Skin Circulation/Temperature WDL WDL        Cardiac WDL    Cardiac WDL WDL        Peripheral/Neurovascular WDL    Peripheral Neurovascular WDL WDL        Cognitive/Neuro/Behavioral WDL    Cognitive/Neuro/Behavioral WDL WDL                     "

## 2024-09-25 NOTE — Clinical Note
Jory Ambrose was seen and treated in our emergency department on 9/25/2024.  She may return to work on 09/26/2024.       If you have any questions or concerns, please don't hesitate to call.      Elsie Saldaña MD

## 2024-09-25 NOTE — Clinical Note
Jory Ambrose was seen and treated in our emergency department on 9/25/2024.         Sincerely,     [facility]

## 2024-09-25 NOTE — DISCHARGE INSTRUCTIONS
Your next dose of imdur is probably best either at midnight if you are up, or else just in the am when you wake.    Return for any further unusual episodes just like you did today for evaluation.    See the cardiology clinic to discuss the aspirin you are going to start, and when to stop it (if at all) before your surgery, as well as how you are feeling so they can re-evaluate if they need to change the planned date of your surgery.

## 2024-09-25 NOTE — ED PROVIDER NOTES
EMERGENCY DEPARTMENT PROGRESS NOTE         ED COURSE AND MEDICAL DECISION MAKING  Patient was signed out to me by Dr. Gene Fenton at 3:40 PM    Jory Ambrose is a 49 year old female who presents to this ED for evaluation of fatigue with exertion and shortness of breath. Patient reports no chest pain.      I talked with her it sounds like episodes of fatigue are part of what she has been experiencing that led up to her diagnosis of significant coronary artery disease.  This episode today was a bit more than previous but still fits with what she had been experiencing that led to her diagnosis.  She does get lightheaded with standing which has also been longstanding with this disease process but has not had any syncope at all with it at least not in the last few weeks.  There is no new worsened short of breath in the last few days otherwise.  She occasionally would have episodes of chest pain in the past but really has not had any new episodes of chest pain unusual for her in the last few days.  She had not been taking aspirin, her upcoming CABG is due on October 14 of this year.  She has been on Plavix because she was not sure if she was allergic to aspirin but has since spoken with her mother and found out that she is not allergic to aspirin so is planning to start that.    I spoke with her about her sequential troponins not significantly changing in the plan for her to go home with the new long-acting nitroglycerin.  We talked about how nitroglycerin works why it is of benefit to her and that it will not mask heart attack but will help her to feel a bit better while she waits for her CABG.  We discussed again if she has any episodes that are outside normal for her or persist to return to the emergency department for evaluation and not to go to an urgent care.  She is comfortable with the plan and will discharge.    Medications   nitroGLYcerin (NITRODUR) 0.4 MG/HR 24 hr patch 1 patch (1 patch Transdermal  $Patch/Med Applied 9/25/24 1621)   aspirin (ASA) chewable tablet 162 mg (162 mg Oral $Given 9/25/24 1421)   isosorbide mononitrate (IMDUR) 24 hr tablet 30 mg (30 mg Oral $Given 9/25/24 1621)     New Prescriptions    ISOSORBIDE DINITRATE (ISORDIL) 30 MG TABLET    Take 1 tablet (30 mg) by mouth 2 times daily for 14 days.       LAB  Pertinent labs results reviewed   Results for orders placed or performed during the hospital encounter of 09/25/24   Basic metabolic panel     Status: Abnormal   Result Value Ref Range    Sodium 137 135 - 145 mmol/L    Potassium 4.4 3.4 - 5.3 mmol/L    Chloride 103 98 - 107 mmol/L    Carbon Dioxide (CO2) 20 (L) 22 - 29 mmol/L    Anion Gap 14 7 - 15 mmol/L    Urea Nitrogen 15.7 6.0 - 20.0 mg/dL    Creatinine 0.91 0.51 - 0.95 mg/dL    GFR Estimate 77 >60 mL/min/1.73m2    Calcium 9.6 8.8 - 10.4 mg/dL    Glucose 111 (H) 70 - 99 mg/dL   Troponin T, High Sensitivity     Status: Normal   Result Value Ref Range    Troponin T, High Sensitivity 9 <=14 ng/L   [place] Draw     Status: None    Narrative    The following orders were created for panel order [place] Draw.  Procedure                               Abnormality         Status                     ---------                               -----------         ------                     Extra Blue Top Tube[[id number]]                              Final result               Extra Red Top Tube[[id number]]                               Final result                 Please view results for these tests on the individual orders.   CBC with platelets and differential     Status: None   Result Value Ref Range    WBC Count 8.6 4.0 - 11.0 10e3/uL    RBC Count 4.34 3.80 - 5.20 10e6/uL    Hemoglobin 12.8 11.7 - 15.7 g/dL    Hematocrit 39.7 35.0 - 47.0 %    MCV 92 78 - 100 fL    MCH 29.5 26.5 - 33.0 pg    MCHC 32.2 31.5 - 36.5 g/dL    RDW 13.9 10.0 - 15.0 %    Platelet Count 333 150 - 450 10e3/uL    % Neutrophils 63 %    % Lymphocytes 31 %    % Monocytes 5 %    %  Eosinophils 1 %    % Basophils 1 %    % Immature Granulocytes 0 %    NRBCs per 100 WBC 0 <1 /100    Absolute Neutrophils 5.4 1.6 - 8.3 10e3/uL    Absolute Lymphocytes 2.6 0.8 - 5.3 10e3/uL    Absolute Monocytes 0.4 0.0 - 1.3 10e3/uL    Absolute Eosinophils 0.1 0.0 - 0.7 10e3/uL    Absolute Basophils 0.1 0.0 - 0.2 10e3/uL    Absolute Immature Granulocytes 0.0 <=0.4 10e3/uL    Absolute NRBCs 0.0 10e3/uL   Extra Blue Top Tube     Status: None   Result Value Ref Range    Hold Specimen JIC    Extra Red Top Tube     Status: None   Result Value Ref Range    Hold Specimen JIC    Troponin T, High Sensitivity     Status: Normal   Result Value Ref Range    Troponin T, High Sensitivity 9 <=14 ng/L   TSH with free T4 reflex     Status: Normal   Result Value Ref Range    TSH 2.50 0.30 - 4.20 uIU/mL   Symptomatic Influenza A/B, RSV, & SARS-CoV2 PCR (COVID-19) Nasopharyngeal     Status: Normal    Specimen: Nasopharyngeal; Swab   Result Value Ref Range    Influenza A PCR Negative Negative    Influenza B PCR Negative Negative    RSV PCR Negative Negative    SARS CoV2 PCR Negative Negative    Narrative    Testing was performed using the Xpert Xpress CoV2/Flu/RSV Assay on the Cepheid GeneXpert Instrument. This test should be ordered for the detection of SARS-CoV2, influenza, and RSV viruses in individuals with signs and symptoms of respiratory tract infection. This test is for in vitro diagnostic use under the [place] FDA for laboratories certified under CLIA to perform high or moderate complexity testing. This test has been [place] FDA cleared. A negative result does not rule out the presence of PCR inhibitors in the specimen or target RNA in concentration below the limit of detection for the assay. If only one viral target is positive but coinfection with multiple targets is suspected, the sample should be re-tested with another FDA cleared, approved, or authorized test, if coninfection would change clinical management. This test was validated  by the [facility]. These laboratories are certified under the Clinical Laboratory Improvement Amendments of 1988 (CLIA-88) as qualified to perfom high complexity laboratory testing.   Troponin T, High Sensitivity     Status: Normal   Result Value Ref Range    Troponin T, High Sensitivity 8 <=14 ng/L   CBC with Platelets & Differential     Status: None    Narrative    The following orders were created for panel order CBC with Platelets & Differential.  Procedure                               Abnormality         Status                     ---------                               -----------         ------                     CBC with platelets and d...[[id number]]                      Final result                 Please view results for these tests on the individual orders.         RADIOLOGY    Pertinent imaging reviewed   Please see official radiology report.  Chest XR,  PA & LAT    (Results Pending)       FINAL IMPRESSION    1. Chest pain, unspecified type    2. History of coronary artery disease            Elsie Saldaña MD  09/25/24 0785

## 2024-09-25 NOTE — Clinical Note
Jory Ambrose was seen and treated in our emergency department on 9/25/2024.         Sincerely,     [facility] Room

## 2024-10-01 ENCOUNTER — TELEPHONE (OUTPATIENT)
Dept: CARDIOLOGY | Facility: CLINIC | Age: 49
End: 2024-10-01
Payer: COMMERCIAL

## 2024-10-01 NOTE — TELEPHONE ENCOUNTER
Pt offered 10/8 for surgery date. Pt will call CV RN back once she's able to check on arrangements and care after surgery.    ----- Message from David Wade sent at 10/1/2024  6:01 AM CDT -----    ALFONSO Hunter I was away last week. We can try to get her added on as a second case sooner. I am copying Earline HUERTAJupiter, the cardiac surgery nurse care coordinator, to look at out schedule. Elsa for the delay, and thank you for taking care of her.    Best regards  Ramon  ----- Message -----  From: Anabela Mora MD  Sent: 9/27/2024   2:45 PM CDT  To: David Wade MD    Hi Dr Wade,    This is Anabela Mora, one of the new cardiologists.  I got a message from the ER about this patient being in with chest pain and dyspnea on 9/25.  You saw her 9/17 and I think her CABG is planned 10/14/2024.  I wonder if it should be sooner with her on-going symptoms?  I believe the ER added some Imdur in the meantime.    Appreciate your thoughts,  Anabela

## 2024-10-04 ENCOUNTER — OFFICE VISIT (OUTPATIENT)
Dept: CARDIOLOGY | Facility: CLINIC | Age: 49
End: 2024-10-04
Attending: EMERGENCY MEDICINE
Payer: COMMERCIAL

## 2024-10-04 VITALS
SYSTOLIC BLOOD PRESSURE: 120 MMHG | RESPIRATION RATE: 14 BRPM | DIASTOLIC BLOOD PRESSURE: 70 MMHG | BODY MASS INDEX: 37.08 KG/M2 | HEART RATE: 99 BPM | WEIGHT: 216 LBS

## 2024-10-04 DIAGNOSIS — I10 PRIMARY HYPERTENSION: ICD-10-CM

## 2024-10-04 DIAGNOSIS — E78.5 HYPERLIPIDEMIA LDL GOAL <70: ICD-10-CM

## 2024-10-04 DIAGNOSIS — I25.110 CORONARY ARTERY DISEASE INVOLVING NATIVE CORONARY ARTERY OF NATIVE HEART WITH UNSTABLE ANGINA PECTORIS (H): Primary | ICD-10-CM

## 2024-10-04 DIAGNOSIS — I34.0 NONRHEUMATIC MITRAL VALVE REGURGITATION: ICD-10-CM

## 2024-10-04 PROCEDURE — 99214 OFFICE O/P EST MOD 30 MIN: CPT | Performed by: INTERNAL MEDICINE

## 2024-10-04 PROCEDURE — G2211 COMPLEX E/M VISIT ADD ON: HCPCS | Performed by: INTERNAL MEDICINE

## 2024-10-04 NOTE — PROGRESS NOTES
HEART CARE OUT-PATIENT CONSULTATON NOTE      [facility]  [phone]      Assessment/Recommendations   Assessment: 49 year old female with CAD, mitral regurgitation     Plan:  CAD with unstable angina  Reviewed the indications for SL NTG (chest pain not resolved with 5 minutes of rest, if still chest pain 5 minutes after NTG take 2nd and call 911).  Discussed diagnosis of CAD, likely genetic (strong family history CAD) as well as radiation induced (prior XRT for Hodgkins)       -FMLA form completed, given to patient       -No ASA due to allergies, continue Plavix 75mg      -Continue Isordil 30mg BID, Lipitor 80mg      -Consider adding beta blockers       -CABG planned 10/8/2024    Mitral regurgitation  Moderate on echocardiogram likely from radiation valve disease       -Repair planned 10/8/2024      -Consider stopping Phentermine     HTN  Well controlled      -Continue Isordil 30mg BID, Spironolactone 25mg    Hyperlipidemia   LDL = 106, not clear if this was on statin      -Continue Lipitor 80mg      -Recheck lipids, if LDL still > 70 add Zetia 10mg     The longitudinal plan of care for the diagnosis(es)/condition(s) as documented were addressed during this visit. Due to the added complexity in care, I will continue to support Jory in the subsequent management and with ongoing continuity of care.          History of Present Illness/Subjective    Indication for Consult:  Jory Ambrose returns for follow up of chest pain and was last seen 6/21/2024 by Dr Rodriguez       HPI: Jory Ambrose is a 49 year old female with a history of Hodgkin lymphoma she had chemotherapy, radiation, bone marrow transplant x 2 who saw Dr Rodriguez 6/21/2024 for evaluation of chest pain.  An echocardiogram was done and found moderate MR and a CTA that showed significant CAD.  That led to an angiogram showing multivessel CAD.  She saw Dr Wade and had CABG and likely MVR planned for Oct 17.       She returns now after recent ER visit 9/25/2024 for chest pain.  I contacted Dr Wade and her surgical date was moved to Oct 8.   She reports she is nervous about her upcoming surgery, is not sure when to use the NTG, is not sure if she can take pain meds for her upcoming dental visit, and needs her FMLA form filled out.    I reviewed notes from ER, PCP, Dr Wade and Dr Rodriguez prior to this visit.         Physical Examination  Past Cardiac History   Vitals: /70 (BP Location: Right arm, Patient Position: Sitting, Cuff Size: Adult Large)   Pulse 99   Resp 14   Wt 98 kg (216 lb)   BMI 37.08 kg/m    BMI= Body mass index is 37.08 kg/m .  Wt Readings from Last 3 Encounters:   10/04/24 98 kg (216 lb)   09/25/24 99.3 kg (219 lb)   09/17/24 99.8 kg (220 lb)       General Appearance:   no distress, normal body habitus   ENT/Mouth: membranes moist, no oral lesions or bleeding gums.      EYES:  no scleral icterus, normal conjunctivae   Neck: no carotid bruits or thyromegaly   Chest/Lungs:   lungs are clear to auscultation, no rales or wheezing,  sternal scar, equal chest wall expansion    Cardiovascular:   Regular. Normal first and second heart sounds with 1/6 systolic murmur, no rubs, or gallops; the carotid, radial and posterior tibial pulses are intact, Jugular venous pressure normal, No edema bilaterally    Abdomen:  no organomegaly, masses, bruits, or tenderness; bowel sounds are present   Extremities: no cyanosis or clubbing   Skin: no xanthelasma, warm.    Neurologic: normal  bilateral, no tremors           Mitral regurgitation, likely due to prior chest radiation for Hodgkins   CAD    Most Recent Echocardiogram: 7/1/2024  1.Left ventricular size, wall motion and function are normal. The ejection  fraction is 55-60%.  2.Normal right ventricle size and systolic function.  3.There is moderate (2+) mitral regurgitation. PISA not measured or performed.  4.Poor images for analysis.  Compared to  the prior study dated 12/30/2014, the MR is now seen.    Most Recent Stress Test: None    CTA: 8/6/2024  The distal left main appears severe stenosis, 50-60% stenosis.  Diffuse calcified disease in proximal to mid LAD, appears 50% stenosis.  Proximal LCX is not well visualized, appears 70-80% stenosis.  Right dominant system.  Proximal RCA appears occluded.  It appears the collaterals from LAD to right PDA.  Normal size of thoracic aorta.  No thrombus in left atrial appendage.  No pericardial effusion or calcified pericardium.       Most Recent Angiogram: 9/13/2024  LM:mid-distal 60-70%   LAD:mid-vessel 85% narrowing at the take off of a diagonal branch  Lcx:distal 50- 60% narrowing  RCA:dominant, 100% occluded ostial, fills distally via L-R collaterals     LVEDP:10    ECG (reviewed by myself): 9/25/2024 NSR 81 bpm           Medical History  Family History Social History   Past Medical History:   Diagnosis Date    Asthma     H/O autologous stem cell transplant (H) 7/26/2011    History of radiation therapy 2010    3600 cGy to mediastinum and neck    Hodgkin lymphoma, nodular sclerosis (H)     Dx Feb 2010    Hypothyroidism, secondary     r/t radiation    Morbid obesity with BMI of 40.0-44.9, adult (H)     Polycystic ovary disease     Pulmonary embolism (H)     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 Maternal Grandmother     C.A.D. Paternal Grandmother         Social History     Socioeconomic History    Marital status:      Spouse name: Yasir    Number of children: 0    Years of education: Not on file    Highest education level: Not on file   Occupational History    Not on file   Tobacco  Use    Smoking status: Never    Smokeless tobacco: Never    Tobacco comments:     denies tabacco use   Substance and Sexual Activity    Alcohol use: No     Comment: minimal alcohol use, 1 glass of wine in 6 months    Drug use: No    Sexual activity: Yes     Partners: Male     Birth control/protection: None   Other Topics Concern    Parent/sibling w/ CABG, MI or angioplasty before 65F 55M? Not Asked   Social History Narrative    Not on file     Social Determinants of Health     Financial Resource Strain: Low Risk  (1/16/2024)    [facility]    Overall Financial Resource Strain (CARDIA)     Difficulty of Paying Living Expenses: Not hard at all   Food Insecurity: No Food Insecurity (1/16/2024)    [facility]    Hunger Vital Sign     Worried About Running Out of Food in the Last Year: Never true     Ran Out of Food in the Last Year: Never true   Transportation Needs: No Transportation Needs (1/16/2024)    [facility]    PRAPARE - Transportation     Lack of Transportation (Medical): No     Lack of Transportation (Non-Medical): No   Physical Activity: Insufficiently Active (10/24/2023)    [facility]    Exercise Vital Sign     Days of Exercise per Week: 2 days     Minutes of Exercise per Session: 30 min   Stress: Stress Concern Present (10/24/2023)    [facility]    [employer] - Occupational Stress Questionnaire     Feeling of Stress : To some extent   Social Connections: Moderately Integrated (10/24/2023)     [facility] [employer] [facility]    Social Connection and Isolation Panel [NHANES]     Frequency of Communication with Friends and Family: More than three times a week     Frequency of Social Gatherings with Friends and Family: Once a week     Attends Quaker Services: More than 4 times per year     Active Member of Clubs or Organizations: No     Attends Club or Organization Meetings: Patient declined     Marital Status:    Interpersonal Safety: Not At Risk (10/24/2023)    [facility] [employer] [facility]    Humiliation, Afraid, Rape, and Kick questionnaire     Fear of Current or Ex-Partner: No     Emotionally Abused: No     Physically Abused: No     Sexually Abused: No   Housing Stability: Low Risk  (1/16/2024)    [facility] [employer] [facility]     Retired - What is your living situation today? : I have a steady place to live           Medications  Allergies   Current Outpatient Medications   Medication Sig Dispense Refill    albuterol (PROAIR HFA) 108 (90 BASE) MCG/ACT inhaler Inhale 2 puffs into the lungs every 6 hours as needed.      atorvastatin (LIPITOR) 80 MG tablet Take 1 tablet (80 mg) by mouth daily. 90 tablet 3    budesonide-formoterol (SYMBICORT) 80-4.5 MCG/ACT Inhaler Inhale 2 puffs into the lungs daily      buPROPion (WELLBUTRIN XL) 300 MG 24 hr tablet Take 300 mg by mouth every morning      Calcium Carbonate-Vitamin D (CALCIUM 600 + D OR) Take 1 tablet by mouth daily      cetirizine (ZYRTEC) 10 MG tablet Take 1 tablet (10 mg) by mouth daily 30 tablet 11    clopidogrel (PLAVIX) 75 MG tablet Take 1 tablet by mouth daily      diphenhydrAMINE (BENADRYL) 25 MG tablet Take 25 mg by mouth every 6 hours as needed      diphenhydrAMINE (BENADRYL) 50 MG capsule Take 1 hour prior to contrast dye 1  capsule 0    fludrocortisone (FLORINEF) 0.1 MG tablet Take 0.05-0.1 mg by mouth daily      fluticasone (FLONASE) 50 MCG/ACT nasal spray Spray 2 sprays into both nostrils daily as needed       ipratropium - albuterol 0.5 mg/2.5 mg/3 mL (DUONEB) 0.5-2.5 (3) MG/3ML neb solution Take 1 vial by nebulization every 6 hours as needed      isosorbide dinitrate (ISORDIL) 30 MG tablet Take 1 tablet (30 mg) by mouth 2 times daily. 28 tablet 0    levothyroxine (SYNTHROID/LEVOTHROID) 125 MCG tablet Take 125 mcg by mouth daily      LORazepam (ATIVAN) 0.5 MG tablet Take 0.5 mg by mouth daily as needed for anxiety      Magnesium Oxide 500 MG CAPS Take 2 tablets by mouth at bedtime 30 capsule     metFORMIN (GLUCOPHAGE) 1000 MG tablet Take 1,000 mg by mouth daily.      methylPREDNISolone (MEDROL) 32 MG tablet Take 1 tablet 12 hours prior to the procedure with IV contrast and 1 tablet 2 hours prior 2 tablet 1    Multiple Vitamin (MULTI-VITAMIN PO) Take 1 tablet by mouth daily      nitroGLYcerin (NITROSTAT) 0.4 MG sublingual tablet For chest pain place 1 tablet under the tongue every 5 minutes for 3 doses. If symptoms persist 5 minutes after 1st dose call 911. 20 tablet 0    omeprazole (PRILOSEC) 20 MG capsule Take 1 capsule (20 mg) by mouth daily 30 capsule 12    simethicone (MYLICON) 125 MG chewable tablet Take 1 tablet (125 mg) by mouth 4 times daily as needed for intestinal gas 40 tablet 0    spironolactone (ALDACTONE) 25 MG tablet Take 1 tablet by mouth daily      traZODone (DESYREL) 100 MG tablet Take 100 mg by mouth at bedtime      zinc gluconate 50 MG tablet Take 50 mg by mouth daily      methylPREDNISolone (MEDROL) 32 MG tablet Take 1 tablet 12 hours prior to scan and 1 tablet 2 hours prior to scan 2 tablet 1    phentermine (ADIPEX-P) 37.5 MG tablet Take 37.5 mg by mouth every morning (before breakfast)         Allergies   Allergen Reactions    Dye [Contrast Dye] Swelling     Pre-medicated with methylprednisolone    Shellfish  "Allergy Shortness Of Breath, Anaphylaxis and Swelling     Shrimp, crab, lobster    Aspirin Hives    Penicillins Hives     Patient cannot recall if she has tried cephalosporins in the past.     Erythromycin Hives    Morphine Hcl      Causes change in mental status    Sulfa Antibiotics Hives and Rash          Lab Results    Chemistry/lipid CBC Cardiac Enzymes/BNP/TSH/INR   No results for input(s): \"CHOL\", \"HDL\", \"LDL\", \"TRIG\", \"CHOLHDLRATIO\" in the last 25725 hours.  No results for input(s): \"LDL\" in the last 81293 hours.  Recent Labs   Lab Test 09/25/24  1149      POTASSIUM 4.4   CHLORIDE 103   CO2 20*   *   BUN 15.7   CR 0.91   GFRESTIMATED 77   CINDY 9.6     Recent Labs   Lab Test 09/25/24  1149 09/13/24  0720 08/06/24  0912   CR 0.91 0.84 0.9     No results for input(s): \"A1C\" in the last 92215 hours.       Recent Labs   Lab Test 09/25/24  1149   WBC 8.6   HGB 12.8   HCT 39.7   MCV 92        Recent Labs   Lab Test 09/25/24  1149 09/13/24  0720 06/06/24  1302   HGB 12.8 13.2 12.6    No results for input(s): \"TROPONINI\" in the last 45227 hours.  Recent Labs   Lab Test 06/06/24  1302   NTBNPI 101     Recent Labs   Lab Test 09/25/24  1149   TSH 2.50     No results for input(s): \"INR\" in the last 88807 hours.     Anabela Mora MD  Noninvasive Cardiologist   [facility]                                    "

## 2024-10-04 NOTE — LETTER
10/4/2024    Rome Joshua MD  [address] [facility] [place] [address]    RE: Jory Ambrose       Dear Colleague,     I had the pleasure of seeing Jory Ambrose in the [facility].    HEART CARE OUT-PATIENT CONSULTATON NOTE      [facility]  [phone]      Assessment/Recommendations   Assessment: 49 year old female with CAD, mitral regurgitation     Plan:  CAD with unstable angina  Reviewed the indications for SL NTG (chest pain not resolved with 5 minutes of rest, if still chest pain 5 minutes after NTG take 2nd and call 911).  Discussed diagnosis of CAD, likely genetic (strong family history CAD) as well as radiation induced (prior XRT for Hodgkins)       -FMLA form completed, given to patient       -No ASA due to allergies, continue Plavix 75mg      -Continue Isordil 30mg BID, Lipitor 80mg      -Consider adding beta blockers       -CABG planned 10/8/2024    Mitral regurgitation  Moderate on echocardiogram likely from radiation valve disease       -Repair planned 10/8/2024      -Consider stopping Phentermine     HTN  Well controlled      -Continue Isordil 30mg BID, Spironolactone 25mg    Hyperlipidemia   LDL = 106, not clear if this was on statin      -Continue Lipitor 80mg      -Recheck lipids, if LDL still > 70 add Zetia 10mg     The longitudinal plan of care for the diagnosis(es)/condition(s) as documented were addressed during this visit. Due to the added complexity in care, I will continue to support Jory in the subsequent management and with ongoing continuity of care.          History of Present Illness/Subjective    Indication for Consult:  Jory Ambrose returns for follow up of chest pain and was last seen 6/21/2024 by Dr Rodriguez       HPI: Jory Ambrose is a 49 year old female with a history of Hodgkin lymphoma she had chemotherapy, radiation, bone marrow transplant x 2 who saw Dr Rodriguez 6/21/2024 for evaluation of  chest pain.  An echocardiogram was done and found moderate MR and a CTA that showed significant CAD.  That led to an angiogram showing multivessel CAD.  She saw Dr Wade and had CABG and likely MVR planned for Oct 17.      She returns now after recent ER visit 9/25/2024 for chest pain.  I contacted Dr Wade and her surgical date was moved to Oct 8.   She reports she is nervous about her upcoming surgery, is not sure when to use the NTG, is not sure if she can take pain meds for her upcoming dental visit, and needs her FMLA form filled out.    I reviewed notes from ER, PCP, Dr Wade and Dr Rodriguez prior to this visit.         Physical Examination  Past Cardiac History   Vitals: /70 (BP Location: Right arm, Patient Position: Sitting, Cuff Size: Adult Large)   Pulse 99   Resp 14   Wt 98 kg (216 lb)   BMI 37.08 kg/m    BMI= Body mass index is 37.08 kg/m .  Wt Readings from Last 3 Encounters:   10/04/24 98 kg (216 lb)   09/25/24 99.3 kg (219 lb)   09/17/24 99.8 kg (220 lb)       General Appearance:   no distress, normal body habitus   ENT/Mouth: membranes moist, no oral lesions or bleeding gums.      EYES:  no scleral icterus, normal conjunctivae   Neck: no carotid bruits or thyromegaly   Chest/Lungs:   lungs are clear to auscultation, no rales or wheezing,  sternal scar, equal chest wall expansion    Cardiovascular:   Regular. Normal first and second heart sounds with 1/6 systolic murmur, no rubs, or gallops; the carotid, radial and posterior tibial pulses are intact, Jugular venous pressure normal, No edema bilaterally    Abdomen:  no organomegaly, masses, bruits, or tenderness; bowel sounds are present   Extremities: no cyanosis or clubbing   Skin: no xanthelasma, warm.    Neurologic: normal  bilateral, no tremors           Mitral regurgitation, likely due to prior chest radiation for Hodgkins   CAD    Most Recent Echocardiogram: 7/1/2024  1.Left ventricular size, wall motion and function  are normal. The ejection  fraction is 55-60%.  2.Normal right ventricle size and systolic function.  3.There is moderate (2+) mitral regurgitation. PISA not measured or performed.  4.Poor images for analysis.  Compared to the prior study dated 12/30/2014, the MR is now seen.    Most Recent Stress Test: None    CTA: 8/6/2024  The distal left main appears severe stenosis, 50-60% stenosis.  Diffuse calcified disease in proximal to mid LAD, appears 50% stenosis.  Proximal LCX is not well visualized, appears 70-80% stenosis.  Right dominant system.  Proximal RCA appears occluded.  It appears the collaterals from LAD to right PDA.  Normal size of thoracic aorta.  No thrombus in left atrial appendage.  No pericardial effusion or calcified pericardium.       Most Recent Angiogram: 9/13/2024  LM:mid-distal 60-70%   LAD:mid-vessel 85% narrowing at the take off of a diagonal branch  Lcx:distal 50- 60% narrowing  RCA:dominant, 100% occluded ostial, fills distally via L-R collaterals     LVEDP:10    ECG (reviewed by myself): 9/25/2024 NSR 81 bpm           Medical History  Family History Social History   Past Medical History:   Diagnosis Date     Asthma      H/O autologous stem cell transplant (H) 7/26/2011     History of radiation therapy 2010    3600 cGy to mediastinum and neck     Hodgkin lymphoma, nodular sclerosis (H)     Dx Feb 2010     Hypothyroidism, secondary     r/t radiation     Morbid obesity with BMI of 40.0-44.9, adult (H)      Polycystic ovary disease      Pulmonary embolism (H)      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 Maternal Grandmother      CJoselynAGIULIA. Paternal Grandmother          Social History     Socioeconomic History     Marital status:      Spouse name: Yasir     Number of children: 0     Years of education: Not on file     Highest education level: Not on file   Occupational History     Not on file   Tobacco Use     Smoking status: Never     Smokeless tobacco: Never     Tobacco comments:     denies tabacco use   Substance and Sexual Activity     Alcohol use: No     Comment: minimal alcohol use, 1 glass of wine in 6 months     Drug use: No     Sexual activity: Yes     Partners: Male     Birth control/protection: None   Other Topics Concern     Parent/sibling w/ CABG, MI or angioplasty before 65F 55M? Not Asked   Social History Narrative     Not on file     Social Determinants of Health     Financial Resource Strain: Low Risk  (1/16/2024)    [facility]    Overall Financial Resource Strain (CARDIA)      Difficulty of Paying Living Expenses: Not hard at all   Food Insecurity: No Food Insecurity (1/16/2024)    Received from [facility], [facility]    Hunger Vital Sign      Worried About Running Out of Food in the Last Year: Never true      Ran Out of Food in the Last Year: Never true   Transportation Needs: No Transportation Needs (1/16/2024)    Received from [facility], [facility]    PRAPARE - Transportation      Lack of Transportation (Medical): No      Lack of Transportation (Non-Medical): No   Physical Activity: Insufficiently Active (10/24/2023)    Received from [facility], [facility]    Exercise Vital Sign      Days of Exercise per Week: 2 days      Minutes of Exercise per Session: 30 min   Stress: Stress Concern Present (10/24/2023)    Received from [facility]  [facility], [facility]    [employer] - Occupational Stress Questionnaire      Feeling of Stress : To some extent   Social Connections: Moderately Integrated (10/24/2023)    [facility]    Social Connection and Isolation Panel [NHANES]      Frequency of Communication with Friends and Family: More than three times a week      Frequency of Social Gatherings with Friends and Family: Once a week      Attends Faith Services: More than 4 times per year      Active Member of Clubs or Organizations: No      Attends Club or Organization Meetings: Patient declined      Marital Status:    Interpersonal Safety: Not At Risk (10/24/2023)    [facility]    Humiliation, Afraid, Rape, and Kick questionnaire      Fear of Current or Ex-Partner: No      Emotionally Abused: No      Physically Abused: No      Sexually Abused: No   Housing Stability: Low Risk  (1/16/2024)    Received from [facility]      Retired - What is your living situation today? : I have a steady place to live           Medications  Allergies   Current Outpatient Medications   Medication Sig Dispense Refill     albuterol (PROAIR HFA) 108 (90 BASE) MCG/ACT inhaler Inhale 2 puffs into the lungs every 6 hours as needed.       atorvastatin (LIPITOR) 80 MG tablet Take 1 tablet (80 mg) by mouth daily. 90 tablet 3     budesonide-formoterol (SYMBICORT) 80-4.5 MCG/ACT Inhaler Inhale 2 puffs into the lungs daily       buPROPion (WELLBUTRIN XL) 300 MG 24 hr tablet Take 300 mg by mouth every morning       Calcium Carbonate-Vitamin D (CALCIUM 600 + D OR) Take 1 tablet by mouth daily       cetirizine (ZYRTEC) 10 MG tablet Take 1 tablet  (10 mg) by mouth daily 30 tablet 11     clopidogrel (PLAVIX) 75 MG tablet Take 1 tablet by mouth daily       diphenhydrAMINE (BENADRYL) 25 MG tablet Take 25 mg by mouth every 6 hours as needed       diphenhydrAMINE (BENADRYL) 50 MG capsule Take 1 hour prior to contrast dye 1 capsule 0     fludrocortisone (FLORINEF) 0.1 MG tablet Take 0.05-0.1 mg by mouth daily       fluticasone (FLONASE) 50 MCG/ACT nasal spray Spray 2 sprays into both nostrils daily as needed        ipratropium - albuterol 0.5 mg/2.5 mg/3 mL (DUONEB) 0.5-2.5 (3) MG/3ML neb solution Take 1 vial by nebulization every 6 hours as needed       isosorbide dinitrate (ISORDIL) 30 MG tablet Take 1 tablet (30 mg) by mouth 2 times daily. 28 tablet 0     levothyroxine (SYNTHROID/LEVOTHROID) 125 MCG tablet Take 125 mcg by mouth daily       LORazepam (ATIVAN) 0.5 MG tablet Take 0.5 mg by mouth daily as needed for anxiety       Magnesium Oxide 500 MG CAPS Take 2 tablets by mouth at bedtime 30 capsule      metFORMIN (GLUCOPHAGE) 1000 MG tablet Take 1,000 mg by mouth daily.       methylPREDNISolone (MEDROL) 32 MG tablet Take 1 tablet 12 hours prior to the procedure with IV contrast and 1 tablet 2 hours prior 2 tablet 1     Multiple Vitamin (MULTI-VITAMIN PO) Take 1 tablet by mouth daily       nitroGLYcerin (NITROSTAT) 0.4 MG sublingual tablet For chest pain place 1 tablet under the tongue every 5 minutes for 3 doses. If symptoms persist 5 minutes after 1st dose call 911. 20 tablet 0     omeprazole (PRILOSEC) 20 MG capsule Take 1 capsule (20 mg) by mouth daily 30 capsule 12     simethicone (MYLICON) 125 MG chewable tablet Take 1 tablet (125 mg) by mouth 4 times daily as needed for intestinal gas 40 tablet 0     spironolactone (ALDACTONE) 25 MG tablet Take 1 tablet by mouth daily       traZODone (DESYREL) 100 MG tablet Take 100 mg by mouth at bedtime       zinc gluconate 50 MG tablet Take 50 mg by mouth daily       methylPREDNISolone (MEDROL) 32 MG tablet Take 1  "tablet 12 hours prior to scan and 1 tablet 2 hours prior to scan 2 tablet 1     phentermine (ADIPEX-P) 37.5 MG tablet Take 37.5 mg by mouth every morning (before breakfast)         Allergies   Allergen Reactions     Dye [Contrast Dye] Swelling     Pre-medicated with methylprednisolone     Shellfish Allergy Shortness Of Breath, Anaphylaxis and Swelling     Shrimp, crab, lobster     Aspirin Hives     Penicillins Hives     Patient cannot recall if she has tried cephalosporins in the past.      Erythromycin Hives     Morphine Hcl      Causes change in mental status     Sulfa Antibiotics Hives and Rash          Lab Results    Chemistry/lipid CBC Cardiac Enzymes/BNP/TSH/INR   No results for input(s): \"CHOL\", \"HDL\", \"LDL\", \"TRIG\", \"CHOLHDLRATIO\" in the last 54696 hours.  No results for input(s): \"LDL\" in the last 88441 hours.  Recent Labs   Lab Test 09/25/24  1149      POTASSIUM 4.4   CHLORIDE 103   CO2 20*   *   BUN 15.7   CR 0.91   GFRESTIMATED 77   CINDY 9.6     Recent Labs   Lab Test 09/25/24  1149 09/13/24  0720 08/06/24  0912   CR 0.91 0.84 0.9     No results for input(s): \"A1C\" in the last 06687 hours.       Recent Labs   Lab Test 09/25/24  1149   WBC 8.6   HGB 12.8   HCT 39.7   MCV 92        Recent Labs   Lab Test 09/25/24  1149 09/13/24  0720 06/06/24  1302   HGB 12.8 13.2 12.6    No results for input(s): \"TROPONINI\" in the last 83455 hours.  Recent Labs   Lab Test 06/06/24  1302   NTBNPI 101     Recent Labs   Lab Test 09/25/24  1149   TSH 2.50     No results for input(s): \"INR\" in the last 77554 hours.     Anabela Mora MD  Noninvasive Cardiologist   [facility]                                        Thank you for allowing me to participate in the care of your patient.      Sincerely,     Anabela Mora MD     [facility]  cc:   Gene Fenton MD  [address] [place] [address]      "

## 2024-10-04 NOTE — PATIENT INSTRUCTIONS
When to take the nitroglycerin: If you have chest pain stop what you are doing and rest.  If the chest pain is still present after 5 minutes take a nitroglycerin tablet under the tongue.  If after another 5 minutes the chest pain is still present take a 2nd nitroglycerin tablet and call 911.    OK to take Advil or Tylenol for non-heart pain such as dental work, OK to take the Ativan as needed for anxiety.

## 2024-10-07 ENCOUNTER — ANESTHESIA EVENT (OUTPATIENT)
Dept: SURGERY | Facility: HOSPITAL | Age: 49
DRG: 219 | End: 2024-10-07
Payer: COMMERCIAL

## 2024-10-07 ENCOUNTER — HOSPITAL ENCOUNTER (OUTPATIENT)
Dept: SURGERY | Facility: HOSPITAL | Age: 49
Discharge: HOME | End: 2024-10-07
Attending: SURGERY | Admitting: SURGERY
Payer: COMMERCIAL

## 2024-10-07 ENCOUNTER — PREP FOR PROCEDURE (OUTPATIENT)
Dept: ADMINISTRATIVE | Facility: CLINIC | Age: 49
End: 2024-10-07

## 2024-10-07 VITALS
TEMPERATURE: 98.6 F | RESPIRATION RATE: 20 BRPM | SYSTOLIC BLOOD PRESSURE: 116 MMHG | OXYGEN SATURATION: 99 % | DIASTOLIC BLOOD PRESSURE: 59 MMHG | HEART RATE: 90 BPM

## 2024-10-07 DIAGNOSIS — I25.119 CORONARY ARTERY DISEASE INVOLVING NATIVE CORONARY ARTERY OF NATIVE HEART WITH ANGINA PECTORIS (H): ICD-10-CM

## 2024-10-07 DIAGNOSIS — R93.1 ABNORMAL COMPUTED TOMOGRAPHY ANGIOGRAPHY OF HEART: ICD-10-CM

## 2024-10-07 DIAGNOSIS — I34.0 MITRAL REGURGITATION: ICD-10-CM

## 2024-10-07 DIAGNOSIS — R07.9 CHEST PAIN ON EXERTION: ICD-10-CM

## 2024-10-07 LAB
ABO/RH(D): NORMAL
ALBUMIN SERPL BCG-MCNC: 4.1 G/DL (ref 3.5–5.2)
ALBUMIN UR-MCNC: NEGATIVE MG/DL
ALP SERPL-CCNC: 88 U/L (ref 40–150)
ALT SERPL W P-5'-P-CCNC: 22 U/L (ref 0–50)
ANION GAP SERPL CALCULATED.3IONS-SCNC: 12 MMOL/L (ref 7–15)
ANTIBODY SCREEN: NEGATIVE
APPEARANCE UR: CLEAR
APTT PPP: 26 SECONDS (ref 22–38)
AST SERPL W P-5'-P-CCNC: 20 U/L (ref 0–45)
BILIRUB SERPL-MCNC: 0.2 MG/DL
BILIRUB UR QL STRIP: NEGATIVE
BUN SERPL-MCNC: 21.5 MG/DL (ref 6–20)
CALCIUM SERPL-MCNC: 9 MG/DL (ref 8.8–10.4)
CHLORIDE SERPL-SCNC: 103 MMOL/L (ref 98–107)
COLOR UR AUTO: NORMAL
CREAT SERPL-MCNC: 0.92 MG/DL (ref 0.51–0.95)
EGFRCR SERPLBLD CKD-EPI 2021: 76 ML/MIN/1.73M2
ERYTHROCYTE [DISTWIDTH] IN BLOOD BY AUTOMATED COUNT: 13.6 % (ref 10–15)
EST. AVERAGE GLUCOSE BLD GHB EST-MCNC: 117 MG/DL
GLUCOSE SERPL-MCNC: 92 MG/DL (ref 70–99)
GLUCOSE UR STRIP-MCNC: NEGATIVE MG/DL
HBA1C MFR BLD: 5.7 %
HCO3 SERPL-SCNC: 22 MMOL/L (ref 22–29)
HCT VFR BLD AUTO: 36.4 % (ref 35–47)
HGB BLD-MCNC: 11.8 G/DL (ref 11.7–15.7)
HGB UR QL STRIP: NEGATIVE
INR PPP: 0.94 (ref 0.85–1.15)
KETONES UR STRIP-MCNC: NEGATIVE MG/DL
LEUKOCYTE ESTERASE UR QL STRIP: NEGATIVE
MAGNESIUM SERPL-MCNC: 2 MG/DL (ref 1.7–2.3)
MCH RBC QN AUTO: 29.8 PG (ref 26.5–33)
MCHC RBC AUTO-ENTMCNC: 32.4 G/DL (ref 31.5–36.5)
MCV RBC AUTO: 92 FL (ref 78–100)
NITRATE UR QL: NEGATIVE
PH UR STRIP: 6 [PH] (ref 5–7)
PLATELET # BLD AUTO: 297 10E3/UL (ref 150–450)
POTASSIUM SERPL-SCNC: 4.1 MMOL/L (ref 3.4–5.3)
PREALB SERPL-MCNC: 17.5 MG/DL (ref 20–40)
PROT SERPL-MCNC: 6.7 G/DL (ref 6.4–8.3)
RBC # BLD AUTO: 3.96 10E6/UL (ref 3.8–5.2)
RBC URINE: <1 /HPF
SODIUM SERPL-SCNC: 137 MMOL/L (ref 135–145)
SP GR UR STRIP: 1.02 (ref 1–1.03)
SPECIMEN EXPIRATION DATE: NORMAL
SQUAMOUS EPITHELIAL: <1 /HPF
UROBILINOGEN UR STRIP-MCNC: <2 MG/DL
WBC # BLD AUTO: 7.6 10E3/UL (ref 4–11)
WBC URINE: <1 /HPF

## 2024-10-07 PROCEDURE — 80053 COMPREHEN METABOLIC PANEL: CPT | Performed by: SURGERY

## 2024-10-07 PROCEDURE — 83036 HEMOGLOBIN GLYCOSYLATED A1C: CPT | Performed by: SURGERY

## 2024-10-07 PROCEDURE — 85730 THROMBOPLASTIN TIME PARTIAL: CPT | Performed by: SURGERY

## 2024-10-07 PROCEDURE — 83735 ASSAY OF MAGNESIUM: CPT | Performed by: SURGERY

## 2024-10-07 PROCEDURE — 84134 ASSAY OF PREALBUMIN: CPT | Performed by: SURGERY

## 2024-10-07 PROCEDURE — 81001 URINALYSIS AUTO W/SCOPE: CPT | Performed by: SURGERY

## 2024-10-07 PROCEDURE — 86900 BLOOD TYPING SEROLOGIC ABO: CPT | Performed by: INTERNAL MEDICINE

## 2024-10-07 PROCEDURE — 36415 COLL VENOUS BLD VENIPUNCTURE: CPT | Performed by: SURGERY

## 2024-10-07 PROCEDURE — 86901 BLOOD TYPING SEROLOGIC RH(D): CPT | Performed by: INTERNAL MEDICINE

## 2024-10-07 PROCEDURE — 85610 PROTHROMBIN TIME: CPT | Performed by: SURGERY

## 2024-10-07 PROCEDURE — 85014 HEMATOCRIT: CPT | Performed by: SURGERY

## 2024-10-07 RX ORDER — SODIUM CHLORIDE, SODIUM LACTATE, POTASSIUM CHLORIDE, CALCIUM CHLORIDE 600; 310; 30; 20 MG/100ML; MG/100ML; MG/100ML; MG/100ML
INJECTION, SOLUTION INTRAVENOUS CONTINUOUS
Status: CANCELLED | OUTPATIENT
Start: 2024-10-08

## 2024-10-07 RX ORDER — SODIUM CHLORIDE, SODIUM LACTATE, POTASSIUM CHLORIDE, CALCIUM CHLORIDE 600; 310; 30; 20 MG/100ML; MG/100ML; MG/100ML; MG/100ML
INJECTION, SOLUTION INTRAVENOUS CONTINUOUS
Status: DISCONTINUED | OUTPATIENT
Start: 2024-10-07 | End: 2024-10-07

## 2024-10-07 RX ORDER — ACETAMINOPHEN 500 MG
1000 TABLET ORAL EVERY 6 HOURS PRN
COMMUNITY
End: 2024-11-02

## 2024-10-07 RX ORDER — LIDOCAINE 40 MG/G
CREAM TOPICAL
Status: DISCONTINUED | OUTPATIENT
Start: 2024-10-07 | End: 2024-10-08 | Stop reason: HOSPADM

## 2024-10-07 RX ORDER — MAGNESIUM SULFATE 4 G/50ML
4 INJECTION INTRAVENOUS ONCE
Status: DISCONTINUED | OUTPATIENT
Start: 2024-10-07 | End: 2024-10-07

## 2024-10-07 RX ORDER — METHADONE HYDROCHLORIDE 10 MG/ML
20 INJECTION, SOLUTION INTRAMUSCULAR; INTRAVENOUS; SUBCUTANEOUS ONCE
Status: CANCELLED | OUTPATIENT
Start: 2024-10-08 | End: 2024-10-07

## 2024-10-07 RX ORDER — METFORMIN HYDROCHLORIDE 500 MG/1
1000 TABLET, EXTENDED RELEASE ORAL DAILY
Status: ON HOLD | COMMUNITY
End: 2024-10-30

## 2024-10-07 RX ORDER — ACETAMINOPHEN 325 MG/1
975 TABLET ORAL ONCE
Status: DISCONTINUED | OUTPATIENT
Start: 2024-10-07 | End: 2024-10-07

## 2024-10-07 RX ORDER — MAGNESIUM SULFATE 4 G/50ML
4 INJECTION INTRAVENOUS ONCE
Status: CANCELLED | OUTPATIENT
Start: 2024-10-08 | End: 2024-10-07

## 2024-10-07 RX ORDER — ACETAMINOPHEN 325 MG/1
975 TABLET ORAL ONCE
Status: CANCELLED | OUTPATIENT
Start: 2024-10-08 | End: 2024-10-07

## 2024-10-07 RX ORDER — LIDOCAINE 40 MG/G
CREAM TOPICAL
Status: CANCELLED | OUTPATIENT
Start: 2024-10-07

## 2024-10-07 NOTE — PROGRESS NOTES
Pharmacist Admission Medication History    Admission medication history is complete. The information provided in this note is only as accurate as the sources available at the time of the update.    Information Source(s): Patient, Clinic records, and CareEverywhere/SureScripts via in-person    Pertinent Information: Symbicort last filled May 2024. Patient states she is using regularly twice daily and received multiple inhalers the last time it was filled.     Allergies reviewed with patient and updates made in EHR: yes    Medication History Completed By: Abhijit Sheets [facility] 10/7/2024 9:17 AM    PTA Med List   Medication Sig Last Dose    acetaminophen (TYLENOL) 500 MG tablet Take 1,000 mg by mouth every 6 hours as needed for mild pain.     albuterol (PROAIR HFA) 108 (90 BASE) MCG/ACT inhaler Inhale 2 puffs into the lungs every 6 hours as needed.     atorvastatin (LIPITOR) 80 MG tablet Take 1 tablet (80 mg) by mouth daily.  at PM    budesonide-formoterol (SYMBICORT) 80-4.5 MCG/ACT Inhaler Inhale 1 puff into the lungs two times daily.     buPROPion (WELLBUTRIN XL) 300 MG 24 hr tablet Take 300 mg by mouth every morning  at AM    cetirizine (ZYRTEC) 10 MG tablet Take 1 tablet (10 mg) by mouth daily  at AM    cholecalciferol (VITAMIN D3) 125 mcg (5000 units) capsule Take 125 mcg by mouth daily.  at AM    clopidogrel (PLAVIX) 75 MG tablet Take 1 tablet by mouth daily 10/4/2024 at AM    diphenhydrAMINE (BENADRYL) 25 MG tablet Take 25 mg by mouth every 6 hours as needed     fludrocortisone (FLORINEF) 0.1 MG tablet Take 0.1 mg by mouth daily.  at AM    fluticasone (FLONASE) 50 MCG/ACT nasal spray Spray 2 sprays into both nostrils daily as needed      ipratropium - albuterol 0.5 mg/2.5 mg/3 mL (DUONEB) 0.5-2.5 (3) MG/3ML neb solution Take 1 vial by nebulization every 6 hours as needed More than a month    isosorbide dinitrate (ISORDIL) 30 MG tablet Take 1 tablet (30 mg) by mouth 2 times daily.     levothyroxine  (SYNTHROID/LEVOTHROID) 125 MCG tablet Take 125 mcg by mouth daily  at AM    LORazepam (ATIVAN) 0.5 MG tablet Take 0.5 mg by mouth daily as needed for anxiety     Magnesium Oxide 500 MG CAPS Take 2 tablets by mouth at bedtime     metFORMIN (GLUCOPHAGE XR) 500 MG 24 hr tablet Take 1,000 mg by mouth daily.  at AM    methylPREDNISolone (MEDROL) 32 MG tablet Take 1 tablet 12 hours prior to the procedure with IV contrast and 1 tablet 2 hours prior     Multiple Vitamin (MULTI-VITAMIN PO) Take 1 tablet by mouth daily     nitroGLYcerin (NITROSTAT) 0.4 MG sublingual tablet For chest pain place 1 tablet under the tongue every 5 minutes for 3 doses. If symptoms persist 5 minutes after 1st dose call 911.     omeprazole (PRILOSEC) 20 MG capsule Take 1 capsule (20 mg) by mouth daily  at PM    simethicone (MYLICON) 125 MG chewable tablet Take 1 tablet (125 mg) by mouth 4 times daily as needed for intestinal gas (Patient taking differently: Take 125 mg by mouth two times daily.)     spironolactone (ALDACTONE) 25 MG tablet Take 1 tablet by mouth daily  at PM    traZODone (DESYREL) 100 MG tablet Take 100 mg by mouth at bedtime  at PM

## 2024-10-07 NOTE — ANESTHESIA PREPROCEDURE EVALUATION
Anesthesia Pre-Procedure Evaluation    Patient: Jory Ambrose   MRN: [id number] : 1975        Procedure : Procedure(s):  CORONARY ARTERY BYPASS GRAFT,  LEFT RADIAL ARTERY HARVEST, ENDOSCOPIC VESSEL PROCUREMENT,  ANESTHESIA TRANSESOPHAGEAL ECHOCARDIOGRAM WITH  LIGATION, LEFT ATRIAL APPENDAGE,  POSSIBLE MITRAL VALVE REPAIR  POSSIBLE REPLACEMENT  Pre-Admission Testing       Past Medical History:   Diagnosis Date     Asthma      H/O autologous stem cell transplant (H) 2011     History of radiation therapy     3600 cGy to mediastinum and neck     Hodgkin lymphoma, nodular sclerosis (H)     Dx 2010     Hypothyroidism, secondary     r/t radiation     Morbid obesity with BMI of 40.0-44.9, adult (H)      Polycystic ovary disease      Pulmonary embolism (H)      S/P allogeneic bone marrow transplant (H) 2013    brother     Seasonal allergies      Shingles     Aug 2010      Past Surgical History:   Procedure Laterality Date     CHOLECYSTECTOMY      gallstone pancreatitis 2010     CV CORONARY ANGIOGRAM N/A 2024    Procedure: Coronary Angiogram;  Surgeon: Tarun Johnston MD;  Location: [facility] CATH LAB CV     CV LEFT HEART CATH N/A 2024    Procedure: Left Heart Catheterization;  Surgeon: Tarun Johnston MD;  Location: [facility] CATH LAB CV     Lymphectomy  2010    right neck area      Allergies   Allergen Reactions     Dye [Contrast Dye] Swelling     Pre-medicated with methylprednisolone     Shellfish Allergy Shortness Of Breath, Anaphylaxis and Swelling     Shrimp, crab, lobster     Aspirin Hives     Penicillins Hives     Patient cannot recall if she has tried cephalosporins in the past.      Erythromycin Hives     Morphine Hcl      Causes change in mental status     Sulfa Antibiotics Hives and Rash      Social History     Tobacco Use     Smoking status: Never     Smokeless tobacco: Never     Tobacco comments:     denies tabacco use   Substance Use Topics     Alcohol use:  No     Comment: minimal alcohol use, 1 glass of wine in 6 months      Wt Readings from Last 1 Encounters:   10/04/24 98 kg (216 lb)        Anesthesia Evaluation   Pt has had prior anesthetic.     No history of anesthetic complications       ROS/MED HX  ENT/Pulmonary:     (+) sleep apnea, uses CPAP,                   Intermittent, asthma  Treatment: Inhaler prn,                 Neurologic: Comment: Carotid US 6/2024:  IMPRESSION:   1. Mild to moderate atherosclerotic plaque at the carotid bifurcations, greater on the left.   2.  50-69% diameter bilateral ICA stenoses by velocity criteria.       (+)              TIA, date: June 2024, features: slurred speech, left sided weakness.                Cardiovascular:     (+) Dyslipidemia hypertension- Peripheral Vascular Disease-- Carotid Stenosis.   -  - -                                 Previous cardiac testing   Echo: Date: Results:  Interpretation Summary     1.Left ventricular size, wall motion and function are normal. The ejection  fraction is 55-60%.  2.Normal right ventricle size and systolic function.  3.There is moderate (2+) mitral regurgitation. PISA not measured or performed.  4.Poor images for analysis.  Compared to the prior study dated 12/30/2014, the MR is now seen.    Stress Test:  Date: Results:    ECG Reviewed:  Date: Results:    Cath:  Date: Results:  Findings:  LM:mid-distal 60-70%   LAD:mid-vessel 85% narrowing at the take off of a diagonal branch  Lcx:distal 50- 60% narrowing  RCA:dominant, 100% occluded ostial, fills distally via L-R collaterals     LVEDP:10     Access:  R Radial artery, switched to  R Femoral artery      METS/Exercise Tolerance:     Hematologic:     (+) History of blood clots,    pt is anticoagulated,           Musculoskeletal:  - neg musculoskeletal ROS     GI/Hepatic:     (+) GERD, Symptomatic,                  Renal/Genitourinary:  - neg Renal ROS     Endo:     (+)          thyroid problem, hypothyroidism,    Obesity,        Psychiatric/Substance Use: Comment: Taking ativan PRN    (+) psychiatric history anxiety       Infectious Disease:       Malignancy:   (+) Malignancy (bone marrow transplant x 2, last 2013), History of Lymphoma/Leukemia.    Other:            Physical Exam    Airway        Mallampati: III   TM distance: > 3 FB   Neck ROM: full   Mouth opening: > 3 cm    Respiratory Devices and Support         Dental       (+) Minor Abnormalities - some fillings, tiny chips      Cardiovascular          Rhythm and rate: regular and normal     Pulmonary           breath sounds clear to auscultation         OUTSIDE LABS:  CBC:   Lab Results   Component Value Date    WBC 8.6 09/25/2024    WBC 9.7 09/13/2024    HGB 12.8 09/25/2024    HGB 13.2 09/13/2024    HCT 39.7 09/25/2024    HCT 40.0 09/13/2024     09/25/2024     09/13/2024     BMP:   Lab Results   Component Value Date     09/25/2024     09/13/2024    POTASSIUM 4.4 09/25/2024    POTASSIUM 4.4 09/13/2024    CHLORIDE 103 09/25/2024    CHLORIDE 103 09/13/2024    CO2 20 (L) 09/25/2024    CO2 20 (L) 09/13/2024    BUN 15.7 09/25/2024    BUN 17.7 09/13/2024    CR 0.91 09/25/2024    CR 0.84 09/13/2024     (H) 09/25/2024     (H) 09/13/2024     COAGS:   Lab Results   Component Value Date    PTT 35 05/06/2014    INR 0.92 05/06/2014     POC:   Lab Results   Component Value Date     (H) 08/04/2015    HCGS Negative 08/12/2013     HEPATIC:   Lab Results   Component Value Date    ALBUMIN 4.3 06/06/2024    PROTTOTAL 7.2 06/06/2024    ALT 24 06/06/2024    AST 25 06/06/2024    ALKPHOS 92 06/06/2024    BILITOTAL 0.2 06/06/2024     OTHER:   Lab Results   Component Value Date    PH 7.50 (H) 08/15/2013    A1C 4.8 11/30/2011    CINDY 9.6 09/25/2024    PHOS 4.5 09/09/2013    MAG 1.8 06/09/2015    LIPASE 23 06/06/2024    TSH 2.50 09/25/2024    T4 1.05 09/09/2014    T3 79 11/30/2011       Anesthesia Plan    ASA Status:  4    NPO Status:  NPO Appropriate     Anesthesia Type: General.     - Airway: ETT   Induction: Propofol, RSI, Intravenous.   Maintenance: Balanced.   Techniques and Equipment:     - Airway: Video-Laryngoscope     - Lines/Monitors: 2nd IV, Arterial Line, Central Line, CVP, NIRS, CARMEN            CARMEN Absolute Contra-indication: NONE            CARMEN Relative Contra-indication: NONE     - Blood: Blood in Room, PRBC, Cell Saver, T&C     - Drips/Meds: Dexmed. infusion, Fentanyl, Phenylephrine, Epinephrine, Nicardipine     Consents    Anesthesia Plan(s) and associated risks, benefits, and realistic alternatives discussed. Questions answered and patient/representative(s) expressed understanding.     - Discussed: Risks, Benefits and Alternatives for BOTH SEDATION and the PROCEDURE were discussed     - Discussed with:  Patient, Parent (Mother and/or Father)      - Extended Intubation/Ventilatory Support Discussed: Yes.      - Patient is DNR/DNI Status: No     Use of blood products discussed: Yes.     - Discussed with: Patient.     - Consented: consented to blood products     Postoperative Care    Pain management: Multi-modal analgesia.     - Plan for long acting post-op opioid use   PONV prophylaxis: Ondansetron (or other 5HT-3), Dexamethasone or Solumedrol     Comments:    Other Comments: Hodgkin lymphoma s/p chemo/radiation to mediastinum and neck noted, now s/p BM transplant x2.   Phentermine noted on medication list but patient states she stopped taking this medication in June.   Left radial artery harvest, right radial artery spasm/difficult access during LHC. Consider right brachial arterial line.   Discussed nimo, CVC, GETA, CARMEN, prolonged ventilation.            Amadou Rider MD    I have reviewed the pertinent notes and labs in the chart from the past 30 days and (re)examined the patient.  Any updates or changes from those notes are reflected in this note.            # Drug Induced Platelet Defect: home medication list includes an antiplatelet  "medication   # Hypertension: Noted on problem list         # Obesity: Estimated body mass index is 37.08 kg/m  as calculated from the following:    Height as of 9/25/24: 1.626 m (5' 4\").    Weight as of 10/4/24: 98 kg (216 lb).             "

## 2024-10-08 ENCOUNTER — HOSPITAL ENCOUNTER (INPATIENT)
Facility: HOSPITAL | Age: 49
LOS: 24 days | Discharge: NURSING, REHAB OR LONG-TERM CARE | DRG: 219 | End: 2024-11-01
Attending: SURGERY | Admitting: SURGERY
Payer: COMMERCIAL

## 2024-10-08 ENCOUNTER — APPOINTMENT (OUTPATIENT)
Dept: RADIOLOGY | Facility: HOSPITAL | Age: 49
DRG: 219 | End: 2024-10-08
Attending: SURGERY
Payer: COMMERCIAL

## 2024-10-08 ENCOUNTER — APPOINTMENT (OUTPATIENT)
Dept: RADIOLOGY | Facility: HOSPITAL | Age: 49
DRG: 219 | End: 2024-10-08
Payer: COMMERCIAL

## 2024-10-08 ENCOUNTER — ANESTHESIA (OUTPATIENT)
Dept: SURGERY | Facility: HOSPITAL | Age: 49
DRG: 219 | End: 2024-10-08
Payer: COMMERCIAL

## 2024-10-08 DIAGNOSIS — I48.92 PAROXYSMAL ATRIAL FLUTTER (H): ICD-10-CM

## 2024-10-08 DIAGNOSIS — I25.119 CORONARY ARTERY DISEASE INVOLVING NATIVE CORONARY ARTERY OF NATIVE HEART WITH ANGINA PECTORIS (H): Primary | ICD-10-CM

## 2024-10-08 DIAGNOSIS — N17.0 ACUTE RENAL FAILURE WITH TUBULAR NECROSIS (H): ICD-10-CM

## 2024-10-08 DIAGNOSIS — J45.909 PERSISTENT ASTHMA WITHOUT COMPLICATION, UNSPECIFIED ASTHMA SEVERITY: ICD-10-CM

## 2024-10-08 DIAGNOSIS — Z95.1 S/P CABG (CORONARY ARTERY BYPASS GRAFT): Primary | ICD-10-CM

## 2024-10-08 DIAGNOSIS — I34.0 MITRAL REGURGITATION: ICD-10-CM

## 2024-10-08 DIAGNOSIS — R23.9 IMPAIRED SKIN INTEGRITY: ICD-10-CM

## 2024-10-08 DIAGNOSIS — J34.89 DRY NOSE: ICD-10-CM

## 2024-10-08 DIAGNOSIS — Z95.1 S/P CABG (CORONARY ARTERY BYPASS GRAFT): ICD-10-CM

## 2024-10-08 DIAGNOSIS — Z95.2 H/O MITRAL VALVE REPLACEMENT WITH MECHANICAL VALVE: ICD-10-CM

## 2024-10-08 DIAGNOSIS — I48.0 PAROXYSMAL ATRIAL FIBRILLATION (H): ICD-10-CM

## 2024-10-08 DIAGNOSIS — R60.9 EDEMA, UNSPECIFIED TYPE: ICD-10-CM

## 2024-10-08 LAB
ALBUMIN SERPL BCG-MCNC: 3.1 G/DL (ref 3.5–5.2)
ALP SERPL-CCNC: 39 U/L (ref 40–150)
ALT SERPL W P-5'-P-CCNC: 47 U/L (ref 0–50)
ANION GAP SERPL CALCULATED.3IONS-SCNC: 12 MMOL/L (ref 7–15)
APTT PPP: 54 SECONDS (ref 22–38)
APTT PPP: 60 SECONDS (ref 22–38)
AST SERPL W P-5'-P-CCNC: ABNORMAL U/L
BASE EXCESS BLDA CALC-SCNC: -0.3 MMOL/L (ref -3–3)
BASE EXCESS BLDA CALC-SCNC: -0.5 MMOL/L (ref -3–3)
BASE EXCESS BLDA CALC-SCNC: -0.7 MMOL/L (ref -3–3)
BASE EXCESS BLDA CALC-SCNC: -1.3 MMOL/L (ref -3–3)
BASE EXCESS BLDA CALC-SCNC: -1.8 MMOL/L (ref -3–3)
BASE EXCESS BLDA CALC-SCNC: -2.6 MMOL/L (ref -3–3)
BASE EXCESS BLDA CALC-SCNC: -2.9 MMOL/L (ref -3–3)
BASE EXCESS BLDA CALC-SCNC: -3 MMOL/L (ref -3–3)
BASE EXCESS BLDA CALC-SCNC: -5.7 MMOL/L (ref -3–3)
BASE EXCESS BLDA CALC-SCNC: -5.7 MMOL/L (ref -3–3)
BASE EXCESS BLDA CALC-SCNC: -6.1 MMOL/L (ref -3–3)
BASE EXCESS BLDA CALC-SCNC: 0 MMOL/L (ref -3–3)
BASE EXCESS BLDA CALC-SCNC: 1 MMOL/L (ref -3–3)
BASE EXCESS BLDA CALC-SCNC: 1.6 MMOL/L (ref -3–3)
BASE EXCESS BLDV CALC-SCNC: 1.5 MMOL/L (ref -3–3)
BILIRUB SERPL-MCNC: 0.8 MG/DL
BLD PROD TYP BPU: NORMAL
BLOOD COMPONENT TYPE: NORMAL
BUN SERPL-MCNC: 16.5 MG/DL (ref 6–20)
CA-I BLD-MCNC: 3.5 MG/DL (ref 4.4–5.2)
CA-I BLD-MCNC: 3.6 MG/DL (ref 4.4–5.2)
CA-I BLD-MCNC: 3.9 MG/DL (ref 4.4–5.2)
CA-I BLD-MCNC: 4 MG/DL (ref 4.4–5.2)
CA-I BLD-MCNC: 4 MG/DL (ref 4.4–5.2)
CA-I BLD-MCNC: 4.1 MG/DL (ref 4.4–5.2)
CA-I BLD-MCNC: 4.1 MG/DL (ref 4.4–5.2)
CA-I BLD-MCNC: 4.2 MG/DL (ref 4.4–5.2)
CA-I BLD-MCNC: 4.3 MG/DL (ref 4.4–5.2)
CA-I BLD-MCNC: 4.3 MG/DL (ref 4.4–5.2)
CA-I BLD-MCNC: 4.4 MG/DL (ref 4.4–5.2)
CA-I BLD-MCNC: 4.7 MG/DL (ref 4.4–5.2)
CA-I BLD-MCNC: 4.8 MG/DL (ref 4.4–5.2)
CA-I BLD-MCNC: 5 MG/DL (ref 4.4–5.2)
CALCIUM SERPL-MCNC: 7.8 MG/DL (ref 8.8–10.4)
CHLORIDE SERPL-SCNC: 112 MMOL/L (ref 98–107)
CODING SYSTEM: NORMAL
COHGB MFR BLD: 96.2 % (ref 96–97)
COHGB MFR BLD: 96.5 % (ref 96–97)
CPB POCT: NO
CREAT SERPL-MCNC: 0.95 MG/DL (ref 0.51–0.95)
CROSSMATCH: NORMAL
EGFRCR SERPLBLD CKD-EPI 2021: 73 ML/MIN/1.73M2
ERYTHROCYTE [DISTWIDTH] IN BLOOD BY AUTOMATED COUNT: 15.2 % (ref 10–15)
ERYTHROCYTE [DISTWIDTH] IN BLOOD BY AUTOMATED COUNT: 15.2 % (ref 10–15)
FIBRINOGEN PPP-MCNC: 205 MG/DL (ref 170–510)
GLUCOSE BLD-MCNC: 103 MG/DL (ref 70–99)
GLUCOSE BLD-MCNC: 131 MG/DL (ref 70–99)
GLUCOSE BLD-MCNC: 144 MG/DL (ref 70–99)
GLUCOSE BLD-MCNC: 147 MG/DL (ref 70–99)
GLUCOSE BLD-MCNC: 148 MG/DL (ref 70–99)
GLUCOSE BLD-MCNC: 150 MG/DL (ref 70–99)
GLUCOSE BLD-MCNC: 154 MG/DL (ref 70–99)
GLUCOSE BLD-MCNC: 159 MG/DL (ref 70–99)
GLUCOSE BLD-MCNC: 160 MG/DL (ref 70–99)
GLUCOSE BLD-MCNC: 172 MG/DL (ref 70–99)
GLUCOSE BLD-MCNC: 172 MG/DL (ref 70–99)
GLUCOSE BLD-MCNC: 183 MG/DL (ref 70–99)
GLUCOSE BLDC GLUCOMTR-MCNC: 115 MG/DL (ref 70–99)
GLUCOSE BLDC GLUCOMTR-MCNC: 142 MG/DL (ref 70–99)
GLUCOSE BLDC GLUCOMTR-MCNC: 164 MG/DL (ref 70–99)
GLUCOSE BLDC GLUCOMTR-MCNC: 170 MG/DL (ref 70–99)
GLUCOSE BLDC GLUCOMTR-MCNC: 171 MG/DL (ref 70–99)
GLUCOSE BLDC GLUCOMTR-MCNC: 171 MG/DL (ref 70–99)
GLUCOSE BLDC GLUCOMTR-MCNC: 94 MG/DL (ref 70–99)
GLUCOSE SERPL-MCNC: 183 MG/DL (ref 70–99)
HCO3 BLD-SCNC: 19 MMOL/L (ref 21–28)
HCO3 BLD-SCNC: 20 MMOL/L (ref 21–28)
HCO3 BLDA-SCNC: 22 MMOL/L (ref 21–28)
HCO3 BLDV-SCNC: 25 MMOL/L (ref 21–28)
HCO3 SERPL-SCNC: 21 MMOL/L (ref 22–29)
HCT VFR BLD AUTO: 24.9 % (ref 35–47)
HCT VFR BLD AUTO: 30.8 % (ref 35–47)
HCT VFR BLD CALC: 26 % (ref 35–47)
HGB BLD-MCNC: 10.3 G/DL (ref 11.7–15.7)
HGB BLD-MCNC: 10.4 G/DL (ref 11.7–15.7)
HGB BLD-MCNC: 11.7 G/DL (ref 11.7–15.7)
HGB BLD-MCNC: 7.2 G/DL (ref 11.7–15.7)
HGB BLD-MCNC: 8.1 G/DL (ref 11.7–15.7)
HGB BLD-MCNC: 8.3 G/DL (ref 11.7–15.7)
HGB BLD-MCNC: 8.4 G/DL (ref 11.7–15.7)
HGB BLD-MCNC: 8.5 G/DL (ref 11.7–15.7)
HGB BLD-MCNC: 8.7 G/DL (ref 11.7–15.7)
HGB BLD-MCNC: 8.8 G/DL (ref 11.7–15.7)
HGB BLD-MCNC: 9.5 G/DL (ref 11.7–15.7)
INR PPP: 1.48 (ref 0.85–1.15)
INR PPP: 1.65 (ref 0.85–1.15)
ISSUE DATE AND TIME: NORMAL
LACTATE BLD-SCNC: 0.7 MMOL/L (ref 0.7–2)
LACTATE BLD-SCNC: 0.7 MMOL/L (ref 0.7–2)
LACTATE BLD-SCNC: 1.1 MMOL/L (ref 0.7–2)
LACTATE BLD-SCNC: 1.4 MMOL/L (ref 0.7–2)
LACTATE BLD-SCNC: 1.5 MMOL/L (ref 0.7–2)
LACTATE BLD-SCNC: 1.6 MMOL/L (ref 0.7–2)
LACTATE BLD-SCNC: 1.9 MMOL/L (ref 0.7–2)
LACTATE BLD-SCNC: 2.5 MMOL/L (ref 0.7–2)
LACTATE BLD-SCNC: 2.7 MMOL/L (ref 0.7–2)
LACTATE BLD-SCNC: 3.6 MMOL/L (ref 0.7–2)
LACTATE BLD-SCNC: 3.8 MMOL/L (ref 0.7–2)
LACTATE BLD-SCNC: 4.3 MMOL/L (ref 0.7–2)
LACTATE SERPL-SCNC: 4 MMOL/L (ref 0.7–2)
LACTATE SERPL-SCNC: 4.2 MMOL/L (ref 0.7–2)
MAGNESIUM SERPL-MCNC: 3.3 MG/DL (ref 1.7–2.3)
MCH RBC QN AUTO: 29.4 PG (ref 26.5–33)
MCH RBC QN AUTO: 29.6 PG (ref 26.5–33)
MCHC RBC AUTO-ENTMCNC: 33.7 G/DL (ref 31.5–36.5)
MCHC RBC AUTO-ENTMCNC: 33.8 G/DL (ref 31.5–36.5)
MCV RBC AUTO: 87 FL (ref 78–100)
MCV RBC AUTO: 88 FL (ref 78–100)
O2/TOTAL GAS SETTING VFR VENT: 30 %
O2/TOTAL GAS SETTING VFR VENT: 30 %
OXYHGB MFR BLDA: 100 % (ref 92–100)
OXYHGB MFR BLDA: 99 % (ref 92–100)
OXYHGB MFR BLDV: 62 % (ref 70–75)
PCO2 BLD: 34 MM HG (ref 35–45)
PCO2 BLD: 41 MM HG (ref 35–45)
PCO2 BLDA: 30 MM HG (ref 35–45)
PCO2 BLDA: 32 MM HG (ref 35–45)
PCO2 BLDA: 34 MM HG (ref 35–45)
PCO2 BLDA: 35 MM HG (ref 35–45)
PCO2 BLDA: 36 MM HG (ref 35–45)
PCO2 BLDA: 38 MM HG (ref 35–45)
PCO2 BLDA: 39 MM HG (ref 35–45)
PCO2 BLDA: 40 MM HG (ref 35–45)
PCO2 BLDA: 40 MM HG (ref 35–45)
PCO2 BLDA: 46 MM HG (ref 35–45)
PCO2 BLDA: 49 MM HG (ref 35–45)
PCO2 BLDA: 52 MM HG (ref 35–45)
PCO2 BLDV: 48 MM HG (ref 40–50)
PEEP: 5 CM H2O
PH BLD: 7.3 [PH] (ref 7.35–7.45)
PH BLD: 7.36 [PH] (ref 7.35–7.45)
PH BLDA: 7.29 [PH] (ref 7.35–7.45)
PH BLDA: 7.3 [PH] (ref 7.35–7.45)
PH BLDA: 7.33 [PH] (ref 7.35–7.45)
PH BLDA: 7.35 [PH] (ref 7.35–7.45)
PH BLDA: 7.4 [PH] (ref 7.35–7.45)
PH BLDA: 7.42 [PH] (ref 7.35–7.45)
PH BLDA: 7.44 [PH] (ref 7.35–7.45)
PH BLDA: 7.46 [PH] (ref 7.35–7.45)
PH BLDA: 7.47 [PH] (ref 7.35–7.45)
PH BLDV: 7.36 [PH] (ref 7.32–7.43)
PHOSPHATE SERPL-MCNC: 2.6 MG/DL (ref 2.5–4.5)
PLATELET # BLD AUTO: 139 10E3/UL (ref 150–450)
PLATELET # BLD AUTO: 148 10E3/UL (ref 150–450)
PO2 BLD: 84 MM HG (ref 80–105)
PO2 BLD: 95 MM HG (ref 80–105)
PO2 BLDA: 304 MM HG (ref 80–105)
PO2 BLDA: 391 MM HG (ref 80–105)
PO2 BLDA: 395 MM HG (ref 80–105)
PO2 BLDA: 404 MM HG (ref 80–105)
PO2 BLDA: 405 MM HG (ref 80–105)
PO2 BLDA: 409 MM HG (ref 80–105)
PO2 BLDA: 424 MM HG (ref 80–105)
PO2 BLDA: 425 MM HG (ref 80–105)
PO2 BLDA: 445 MM HG (ref 80–105)
PO2 BLDA: 499 MM HG (ref 80–105)
PO2 BLDA: 499 MM HG (ref 80–105)
PO2 BLDA: 566 MM HG (ref 80–105)
PO2 BLDV: 35 MM HG (ref 25–47)
POTASSIUM BLD-SCNC: 3.2 MMOL/L (ref 3.4–5.3)
POTASSIUM BLD-SCNC: 3.6 MMOL/L (ref 3.4–5.3)
POTASSIUM BLD-SCNC: 3.7 MMOL/L (ref 3.4–5.3)
POTASSIUM BLD-SCNC: 3.8 MMOL/L (ref 3.4–5.3)
POTASSIUM BLD-SCNC: 3.8 MMOL/L (ref 3.4–5.3)
POTASSIUM BLD-SCNC: 4 MMOL/L (ref 3.4–5.3)
POTASSIUM BLD-SCNC: 4.1 MMOL/L (ref 3.4–5.3)
POTASSIUM BLD-SCNC: 4.1 MMOL/L (ref 3.4–5.3)
POTASSIUM BLD-SCNC: 4.2 MMOL/L (ref 3.4–5.3)
POTASSIUM BLD-SCNC: 4.4 MMOL/L (ref 3.4–5.3)
POTASSIUM BLD-SCNC: 4.7 MMOL/L (ref 3.4–5.3)
POTASSIUM BLD-SCNC: 4.8 MMOL/L (ref 3.4–5.3)
POTASSIUM BLD-SCNC: 4.9 MMOL/L (ref 3.4–5.3)
POTASSIUM SERPL-SCNC: 4.4 MMOL/L (ref 3.4–5.3)
POTASSIUM SERPL-SCNC: 4.4 MMOL/L (ref 3.4–5.3)
PROT SERPL-MCNC: 4.3 G/DL (ref 6.4–8.3)
RBC # BLD AUTO: 2.84 10E6/UL (ref 3.8–5.2)
RBC # BLD AUTO: 3.54 10E6/UL (ref 3.8–5.2)
SAO2 % BLDA: 100 % (ref 92–100)
SAO2 % BLDA: 95 % (ref 92–100)
SAO2 % BLDA: 95 % (ref 92–100)
SAO2 % BLDA: >100 % (ref 96–97)
SAO2 % BLDV: 63 % (ref 70–75)
SODIUM BLD-SCNC: 138 MMOL/L (ref 135–145)
SODIUM BLD-SCNC: 139 MMOL/L (ref 135–145)
SODIUM BLD-SCNC: 140 MMOL/L (ref 135–145)
SODIUM BLD-SCNC: 141 MMOL/L (ref 135–145)
SODIUM BLD-SCNC: 142 MMOL/L (ref 135–145)
SODIUM BLD-SCNC: 143 MMOL/L (ref 135–145)
SODIUM BLD-SCNC: 143 MMOL/L (ref 135–145)
SODIUM BLD-SCNC: 145 MMOL/L (ref 135–145)
SODIUM SERPL-SCNC: 145 MMOL/L (ref 135–145)
UNIT ABO/RH: NORMAL
UNIT NUMBER: NORMAL
UNIT STATUS: NORMAL
UNIT TYPE ISBT: 6200
WBC # BLD AUTO: 16.9 10E3/UL (ref 4–11)
WBC # BLD AUTO: 21.7 10E3/UL (ref 4–11)

## 2024-10-08 PROCEDURE — 999N000157 HC STATISTIC RCP TIME EA 10 MIN

## 2024-10-08 PROCEDURE — 250N000009 HC RX 250: Performed by: NURSE ANESTHETIST, CERTIFIED REGISTERED

## 2024-10-08 PROCEDURE — 250N000024 HC ISOFLURANE, PER MIN: Performed by: SURGERY

## 2024-10-08 PROCEDURE — 250N000013 HC RX MED GY IP 250 OP 250 PS 637

## 2024-10-08 PROCEDURE — 021209W BYPASS CORONARY ARTERY, THREE ARTERIES FROM AORTA WITH AUTOLOGOUS VENOUS TISSUE, OPEN APPROACH: ICD-10-PCS | Performed by: SURGERY

## 2024-10-08 PROCEDURE — 86923 COMPATIBILITY TEST ELECTRIC: CPT | Performed by: SURGERY

## 2024-10-08 PROCEDURE — 410N000003 HC PER-PERFUSION 1ST 30 MIN: Performed by: SURGERY

## 2024-10-08 PROCEDURE — 999N000065 XR ABDOMEN PORT 1 VIEW

## 2024-10-08 PROCEDURE — 82330 ASSAY OF CALCIUM: CPT

## 2024-10-08 PROCEDURE — 999N000253 HC STATISTIC WEANING TRIALS

## 2024-10-08 PROCEDURE — 83735 ASSAY OF MAGNESIUM: CPT

## 2024-10-08 PROCEDURE — 02RG0JZ REPLACEMENT OF MITRAL VALVE WITH SYNTHETIC SUBSTITUTE, OPEN APPROACH: ICD-10-PCS | Performed by: SURGERY

## 2024-10-08 PROCEDURE — 999N000009 HC STATISTIC AIRWAY CARE

## 2024-10-08 PROCEDURE — 85027 COMPLETE CBC AUTOMATED: CPT | Performed by: NURSE ANESTHETIST, CERTIFIED REGISTERED

## 2024-10-08 PROCEDURE — 999N000141 HC STATISTIC PRE-PROCEDURE NURSING ASSESSMENT: Performed by: SURGERY

## 2024-10-08 PROCEDURE — 370N000017 HC ANESTHESIA TECHNICAL FEE, PER MIN: Performed by: SURGERY

## 2024-10-08 PROCEDURE — 02UG08Z SUPPLEMENT MITRAL VALVE WITH ZOOPLASTIC TISSUE, OPEN APPROACH: ICD-10-PCS | Performed by: SURGERY

## 2024-10-08 PROCEDURE — 250N000013 HC RX MED GY IP 250 OP 250 PS 637: Performed by: INTERNAL MEDICINE

## 2024-10-08 PROCEDURE — 06BQ4ZZ EXCISION OF LEFT SAPHENOUS VEIN, PERCUTANEOUS ENDOSCOPIC APPROACH: ICD-10-PCS | Performed by: SURGERY

## 2024-10-08 PROCEDURE — 5A1221Z PERFORMANCE OF CARDIAC OUTPUT, CONTINUOUS: ICD-10-PCS | Performed by: SURGERY

## 2024-10-08 PROCEDURE — 250N000013 HC RX MED GY IP 250 OP 250 PS 637: Performed by: SURGERY

## 2024-10-08 PROCEDURE — 250N000011 HC RX IP 250 OP 636: Performed by: ANESTHESIOLOGY

## 2024-10-08 PROCEDURE — 82803 BLOOD GASES ANY COMBINATION: CPT

## 2024-10-08 PROCEDURE — 82330 ASSAY OF CALCIUM: CPT | Performed by: SURGERY

## 2024-10-08 PROCEDURE — 250N000009 HC RX 250: Performed by: INTERNAL MEDICINE

## 2024-10-08 PROCEDURE — 88305 TISSUE EXAM BY PATHOLOGIST: CPT | Mod: 26 | Performed by: PATHOLOGY

## 2024-10-08 PROCEDURE — 250N000011 HC RX IP 250 OP 636: Performed by: SURGERY

## 2024-10-08 PROCEDURE — 3E043XZ INTRODUCTION OF VASOPRESSOR INTO CENTRAL VEIN, PERCUTANEOUS APPROACH: ICD-10-PCS | Performed by: SURGERY

## 2024-10-08 PROCEDURE — 84100 ASSAY OF PHOSPHORUS: CPT

## 2024-10-08 PROCEDURE — 33430 REPLACEMENT OF MITRAL VALVE: CPT | Performed by: SURGERY

## 2024-10-08 PROCEDURE — 410N000004: Performed by: SURGERY

## 2024-10-08 PROCEDURE — 84155 ASSAY OF PROTEIN SERUM: CPT

## 2024-10-08 PROCEDURE — 02B70ZK EXCISION OF LEFT ATRIAL APPENDAGE, OPEN APPROACH: ICD-10-PCS | Performed by: SURGERY

## 2024-10-08 PROCEDURE — 85730 THROMBOPLASTIN TIME PARTIAL: CPT | Performed by: NURSE ANESTHETIST, CERTIFIED REGISTERED

## 2024-10-08 PROCEDURE — 36415 COLL VENOUS BLD VENIPUNCTURE: CPT | Performed by: NURSE ANESTHETIST, CERTIFIED REGISTERED

## 2024-10-08 PROCEDURE — 258N000003 HC RX IP 258 OP 636: Performed by: NURSE ANESTHETIST, CERTIFIED REGISTERED

## 2024-10-08 PROCEDURE — 85014 HEMATOCRIT: CPT

## 2024-10-08 PROCEDURE — P9040 RBC LEUKOREDUCED IRRADIATED: HCPCS | Performed by: SURGERY

## 2024-10-08 PROCEDURE — 84132 ASSAY OF SERUM POTASSIUM: CPT

## 2024-10-08 PROCEDURE — 02100Z9 BYPASS CORONARY ARTERY, ONE ARTERY FROM LEFT INTERNAL MAMMARY, OPEN APPROACH: ICD-10-PCS | Performed by: SURGERY

## 2024-10-08 PROCEDURE — 272N000004 HC RX 272: Performed by: SURGERY

## 2024-10-08 PROCEDURE — 250N000009 HC RX 250: Performed by: SURGERY

## 2024-10-08 PROCEDURE — 83605 ASSAY OF LACTIC ACID: CPT

## 2024-10-08 PROCEDURE — 82805 BLOOD GASES W/O2 SATURATION: CPT

## 2024-10-08 PROCEDURE — 33430 REPLACEMENT OF MITRAL VALVE: CPT | Performed by: ANESTHESIOLOGY

## 2024-10-08 PROCEDURE — 85610 PROTHROMBIN TIME: CPT | Performed by: NURSE ANESTHETIST, CERTIFIED REGISTERED

## 2024-10-08 PROCEDURE — 33519 CABG ARTERY-VEIN THREE: CPT | Performed by: SURGERY

## 2024-10-08 PROCEDURE — 250N000011 HC RX IP 250 OP 636: Performed by: NURSE ANESTHETIST, CERTIFIED REGISTERED

## 2024-10-08 PROCEDURE — 250N000012 HC RX MED GY IP 250 OP 636 PS 637: Performed by: SURGERY

## 2024-10-08 PROCEDURE — 82805 BLOOD GASES W/O2 SATURATION: CPT | Performed by: INTERNAL MEDICINE

## 2024-10-08 PROCEDURE — 272N000001 HC OR GENERAL SUPPLY STERILE: Performed by: SURGERY

## 2024-10-08 PROCEDURE — 258N000003 HC RX IP 258 OP 636: Performed by: SURGERY

## 2024-10-08 PROCEDURE — 999N000065 XR CHEST PORT 1 VIEW

## 2024-10-08 PROCEDURE — 360N000079 HC SURGERY LEVEL 6, PER MIN: Performed by: SURGERY

## 2024-10-08 PROCEDURE — 85610 PROTHROMBIN TIME: CPT

## 2024-10-08 PROCEDURE — 258N000003 HC RX IP 258 OP 636: Performed by: ANESTHESIOLOGY

## 2024-10-08 PROCEDURE — 200N000001 HC R&B ICU

## 2024-10-08 PROCEDURE — 250N000025 HC SEVOFLURANE, PER MIN: Performed by: SURGERY

## 2024-10-08 PROCEDURE — 33533 CABG ARTERIAL SINGLE: CPT | Performed by: SURGERY

## 2024-10-08 PROCEDURE — 94640 AIRWAY INHALATION TREATMENT: CPT | Mod: 76

## 2024-10-08 PROCEDURE — 85384 FIBRINOGEN ACTIVITY: CPT | Performed by: NURSE ANESTHETIST, CERTIFIED REGISTERED

## 2024-10-08 PROCEDURE — 99222 1ST HOSP IP/OBS MODERATE 55: CPT | Performed by: INTERNAL MEDICINE

## 2024-10-08 PROCEDURE — 33430 REPLACEMENT OF MITRAL VALVE: CPT | Performed by: NURSE ANESTHETIST, CERTIFIED REGISTERED

## 2024-10-08 PROCEDURE — C1898 LEAD, PMKR, OTHER THAN TRANS: HCPCS | Performed by: SURGERY

## 2024-10-08 PROCEDURE — 250N000011 HC RX IP 250 OP 636

## 2024-10-08 PROCEDURE — 258N000003 HC RX IP 258 OP 636

## 2024-10-08 PROCEDURE — 88305 TISSUE EXAM BY PATHOLOGIST: CPT | Mod: TC | Performed by: SURGERY

## 2024-10-08 PROCEDURE — 94002 VENT MGMT INPAT INIT DAY: CPT

## 2024-10-08 PROCEDURE — 86923 COMPATIBILITY TEST ELECTRIC: CPT | Performed by: ANESTHESIOLOGY

## 2024-10-08 PROCEDURE — 250N000013 HC RX MED GY IP 250 OP 250 PS 637: Performed by: ANESTHESIOLOGY

## 2024-10-08 PROCEDURE — 85730 THROMBOPLASTIN TIME PARTIAL: CPT

## 2024-10-08 PROCEDURE — C1889 IMPLANT/INSERT DEVICE, NOC: HCPCS | Performed by: SURGERY

## 2024-10-08 DEVICE — VALVE MITRAL ST JUDE 29MM 29MJ-501: Type: IMPLANTABLE DEVICE | Site: CHEST | Status: FUNCTIONAL

## 2024-10-08 RX ORDER — AMOXICILLIN 250 MG
1 CAPSULE ORAL 2 TIMES DAILY
Status: DISCONTINUED | OUTPATIENT
Start: 2024-10-08 | End: 2024-11-01 | Stop reason: HOSPADM

## 2024-10-08 RX ORDER — HEPARIN SODIUM 5000 [USP'U]/.5ML
5000 INJECTION, SOLUTION INTRAVENOUS; SUBCUTANEOUS EVERY 8 HOURS
Status: DISCONTINUED | OUTPATIENT
Start: 2024-10-09 | End: 2024-10-09

## 2024-10-08 RX ORDER — CEFAZOLIN SODIUM/WATER 2 G/20 ML
2 SYRINGE (ML) INTRAVENOUS SEE ADMIN INSTRUCTIONS
Status: DISCONTINUED | OUTPATIENT
Start: 2024-10-08 | End: 2024-10-08

## 2024-10-08 RX ORDER — ACETAMINOPHEN 325 MG/1
975 TABLET ORAL ONCE
Status: COMPLETED | OUTPATIENT
Start: 2024-10-08 | End: 2024-10-08

## 2024-10-08 RX ORDER — FLUTICASONE FUROATE AND VILANTEROL 100; 25 UG/1; UG/1
1 POWDER RESPIRATORY (INHALATION) AT BEDTIME
Status: DISCONTINUED | OUTPATIENT
Start: 2024-10-08 | End: 2024-11-01 | Stop reason: HOSPADM

## 2024-10-08 RX ORDER — LIDOCAINE 4 G/G
1-2 PATCH TOPICAL EVERY 24 HOURS
Status: DISCONTINUED | OUTPATIENT
Start: 2024-10-08 | End: 2024-11-01 | Stop reason: HOSPADM

## 2024-10-08 RX ORDER — PROPOFOL 10 MG/ML
INJECTION, EMULSION INTRAVENOUS PRN
Status: DISCONTINUED | OUTPATIENT
Start: 2024-10-08 | End: 2024-10-08

## 2024-10-08 RX ORDER — METHADONE HYDROCHLORIDE 10 MG/ML
20 INJECTION, SOLUTION INTRAMUSCULAR; INTRAVENOUS; SUBCUTANEOUS ONCE
Status: DISCONTINUED | OUTPATIENT
Start: 2024-10-08 | End: 2024-10-08

## 2024-10-08 RX ORDER — IPRATROPIUM BROMIDE AND ALBUTEROL SULFATE 2.5; .5 MG/3ML; MG/3ML
3 SOLUTION RESPIRATORY (INHALATION) EVERY 4 HOURS PRN
Status: DISCONTINUED | OUTPATIENT
Start: 2024-10-08 | End: 2024-10-29

## 2024-10-08 RX ORDER — CLOPIDOGREL BISULFATE 75 MG/1
75 TABLET ORAL DAILY
Status: COMPLETED | OUTPATIENT
Start: 2024-10-08 | End: 2024-10-08

## 2024-10-08 RX ORDER — PROCHLORPERAZINE MALEATE 5 MG/1
10 TABLET ORAL EVERY 6 HOURS PRN
Status: DISCONTINUED | OUTPATIENT
Start: 2024-10-08 | End: 2024-11-01 | Stop reason: HOSPADM

## 2024-10-08 RX ORDER — SODIUM CHLORIDE 9 MG/ML
INJECTION, SOLUTION INTRAVENOUS CONTINUOUS PRN
Status: DISCONTINUED | OUTPATIENT
Start: 2024-10-08 | End: 2024-10-08

## 2024-10-08 RX ORDER — NALOXONE HYDROCHLORIDE 0.4 MG/ML
0.4 INJECTION, SOLUTION INTRAMUSCULAR; INTRAVENOUS; SUBCUTANEOUS
Status: DISCONTINUED | OUTPATIENT
Start: 2024-10-08 | End: 2024-11-01 | Stop reason: HOSPADM

## 2024-10-08 RX ORDER — CHLORHEXIDINE GLUCONATE ORAL RINSE 1.2 MG/ML
15 SOLUTION DENTAL EVERY 12 HOURS
Status: DISCONTINUED | OUTPATIENT
Start: 2024-10-08 | End: 2024-10-09

## 2024-10-08 RX ORDER — ACETAMINOPHEN 325 MG/1
650 TABLET ORAL EVERY 4 HOURS PRN
Status: DISCONTINUED | OUTPATIENT
Start: 2024-10-11 | End: 2024-10-14

## 2024-10-08 RX ORDER — PROTAMINE SULFATE 10 MG/ML
INJECTION, SOLUTION INTRAVENOUS PRN
Status: DISCONTINUED | OUTPATIENT
Start: 2024-10-08 | End: 2024-10-08

## 2024-10-08 RX ORDER — CEFAZOLIN SODIUM/WATER 2 G/20 ML
2 SYRINGE (ML) INTRAVENOUS
Status: COMPLETED | OUTPATIENT
Start: 2024-10-08 | End: 2024-10-08

## 2024-10-08 RX ORDER — ONDANSETRON 2 MG/ML
4 INJECTION INTRAMUSCULAR; INTRAVENOUS EVERY 6 HOURS PRN
Status: DISCONTINUED | OUTPATIENT
Start: 2024-10-08 | End: 2024-11-01 | Stop reason: HOSPADM

## 2024-10-08 RX ORDER — METHOCARBAMOL 750 MG/1
750 TABLET, FILM COATED ORAL EVERY 6 HOURS PRN
Status: DISCONTINUED | OUTPATIENT
Start: 2024-10-08 | End: 2024-10-19

## 2024-10-08 RX ORDER — PHENYLEPHRINE HCL IN 0.9% NACL 50MG/250ML
.1-6 PLASTIC BAG, INJECTION (ML) INTRAVENOUS CONTINUOUS
Status: DISCONTINUED | OUTPATIENT
Start: 2024-10-08 | End: 2024-10-08

## 2024-10-08 RX ORDER — HYDROMORPHONE HCL IN WATER/PF 6 MG/30 ML
0.4 PATIENT CONTROLLED ANALGESIA SYRINGE INTRAVENOUS
Status: DISCONTINUED | OUTPATIENT
Start: 2024-10-08 | End: 2024-10-11

## 2024-10-08 RX ORDER — HYDROMORPHONE HCL IN WATER/PF 6 MG/30 ML
0.2 PATIENT CONTROLLED ANALGESIA SYRINGE INTRAVENOUS
Status: DISCONTINUED | OUTPATIENT
Start: 2024-10-08 | End: 2024-10-11

## 2024-10-08 RX ORDER — DEXTROSE MONOHYDRATE 25 G/50ML
25-50 INJECTION, SOLUTION INTRAVENOUS
Status: DISCONTINUED | OUTPATIENT
Start: 2024-10-08 | End: 2024-10-10

## 2024-10-08 RX ORDER — CHLORHEXIDINE GLUCONATE ORAL RINSE 1.2 MG/ML
10 SOLUTION DENTAL ONCE
Status: COMPLETED | OUTPATIENT
Start: 2024-10-08 | End: 2024-10-08

## 2024-10-08 RX ORDER — SODIUM CHLORIDE, SODIUM GLUCONATE, SODIUM ACETATE, POTASSIUM CHLORIDE AND MAGNESIUM CHLORIDE 526; 502; 368; 37; 30 MG/100ML; MG/100ML; MG/100ML; MG/100ML; MG/100ML
1000 INJECTION, SOLUTION INTRAVENOUS
Status: DISCONTINUED | OUTPATIENT
Start: 2024-10-08 | End: 2024-10-08

## 2024-10-08 RX ORDER — METHADONE HYDROCHLORIDE 10 MG/ML
INJECTION, SOLUTION INTRAMUSCULAR; INTRAVENOUS; SUBCUTANEOUS
Status: COMPLETED
Start: 2024-10-08 | End: 2024-10-08

## 2024-10-08 RX ORDER — GLYCOPYRROLATE 0.2 MG/ML
INJECTION, SOLUTION INTRAMUSCULAR; INTRAVENOUS PRN
Status: DISCONTINUED | OUTPATIENT
Start: 2024-10-08 | End: 2024-10-08

## 2024-10-08 RX ORDER — CEFAZOLIN SODIUM 2 G/100ML
2 INJECTION, SOLUTION INTRAVENOUS EVERY 8 HOURS
Status: COMPLETED | OUTPATIENT
Start: 2024-10-08 | End: 2024-10-09

## 2024-10-08 RX ORDER — OXYCODONE HYDROCHLORIDE 5 MG/1
5 TABLET ORAL EVERY 4 HOURS PRN
Status: DISCONTINUED | OUTPATIENT
Start: 2024-10-08 | End: 2024-10-11

## 2024-10-08 RX ORDER — DEXMEDETOMIDINE HYDROCHLORIDE 4 UG/ML
.2-1.2 INJECTION, SOLUTION INTRAVENOUS CONTINUOUS
Status: DISCONTINUED | OUTPATIENT
Start: 2024-10-08 | End: 2024-10-08

## 2024-10-08 RX ORDER — NALOXONE HYDROCHLORIDE 0.4 MG/ML
0.2 INJECTION, SOLUTION INTRAMUSCULAR; INTRAVENOUS; SUBCUTANEOUS
Status: DISCONTINUED | OUTPATIENT
Start: 2024-10-08 | End: 2024-11-01 | Stop reason: HOSPADM

## 2024-10-08 RX ORDER — ONDANSETRON 4 MG/1
4 TABLET, ORALLY DISINTEGRATING ORAL EVERY 6 HOURS PRN
Status: DISCONTINUED | OUTPATIENT
Start: 2024-10-08 | End: 2024-11-01 | Stop reason: HOSPADM

## 2024-10-08 RX ORDER — CLOPIDOGREL BISULFATE 75 MG/1
75 TABLET ORAL DAILY
Status: DISCONTINUED | OUTPATIENT
Start: 2024-10-08 | End: 2024-10-08

## 2024-10-08 RX ORDER — LIDOCAINE HYDROCHLORIDE 10 MG/ML
INJECTION, SOLUTION INFILTRATION; PERINEURAL PRN
Status: DISCONTINUED | OUTPATIENT
Start: 2024-10-08 | End: 2024-10-08

## 2024-10-08 RX ORDER — FLUTICASONE PROPIONATE 50 MCG
2 SPRAY, SUSPENSION (ML) NASAL DAILY PRN
Status: DISCONTINUED | OUTPATIENT
Start: 2024-10-08 | End: 2024-11-01 | Stop reason: HOSPADM

## 2024-10-08 RX ORDER — IPRATROPIUM BROMIDE AND ALBUTEROL SULFATE 2.5; .5 MG/3ML; MG/3ML
1 SOLUTION RESPIRATORY (INHALATION) EVERY 6 HOURS PRN
Status: DISCONTINUED | OUTPATIENT
Start: 2024-10-08 | End: 2024-10-08

## 2024-10-08 RX ORDER — POLYETHYLENE GLYCOL 3350 17 G/17G
17 POWDER, FOR SOLUTION ORAL DAILY
Status: DISCONTINUED | OUTPATIENT
Start: 2024-10-09 | End: 2024-10-12

## 2024-10-08 RX ORDER — EPHEDRINE SULFATE 50 MG/ML
INJECTION, SOLUTION INTRAMUSCULAR; INTRAVENOUS; SUBCUTANEOUS PRN
Status: DISCONTINUED | OUTPATIENT
Start: 2024-10-08 | End: 2024-10-08

## 2024-10-08 RX ORDER — MULTIPLE VITAMINS W/ MINERALS TAB 9MG-400MCG
1 TAB ORAL DAILY
Status: DISCONTINUED | OUTPATIENT
Start: 2024-10-09 | End: 2024-11-01 | Stop reason: HOSPADM

## 2024-10-08 RX ORDER — BUPROPION HYDROCHLORIDE 150 MG/1
300 TABLET ORAL EVERY MORNING
Status: DISCONTINUED | OUTPATIENT
Start: 2024-10-09 | End: 2024-10-14

## 2024-10-08 RX ORDER — ONDANSETRON 2 MG/ML
INJECTION INTRAMUSCULAR; INTRAVENOUS PRN
Status: DISCONTINUED | OUTPATIENT
Start: 2024-10-08 | End: 2024-10-08

## 2024-10-08 RX ORDER — OXYCODONE HYDROCHLORIDE 5 MG/1
10 TABLET ORAL EVERY 4 HOURS PRN
Status: DISCONTINUED | OUTPATIENT
Start: 2024-10-08 | End: 2024-10-11

## 2024-10-08 RX ORDER — DEXAMETHASONE SODIUM PHOSPHATE 10 MG/ML
INJECTION, SOLUTION INTRAMUSCULAR; INTRAVENOUS PRN
Status: DISCONTINUED | OUTPATIENT
Start: 2024-10-08 | End: 2024-10-08

## 2024-10-08 RX ORDER — LIDOCAINE 40 MG/G
CREAM TOPICAL
Status: DISCONTINUED | OUTPATIENT
Start: 2024-10-08 | End: 2024-10-08

## 2024-10-08 RX ORDER — CALCIUM GLUCONATE 20 MG/ML
2 INJECTION, SOLUTION INTRAVENOUS
Status: ACTIVE | OUTPATIENT
Start: 2024-10-08 | End: 2024-10-16

## 2024-10-08 RX ORDER — TRAZODONE HYDROCHLORIDE 50 MG/1
100 TABLET, FILM COATED ORAL AT BEDTIME
Status: DISCONTINUED | OUTPATIENT
Start: 2024-10-08 | End: 2024-10-14

## 2024-10-08 RX ORDER — CEFAZOLIN SODIUM/WATER 2 G/20 ML
2 SYRINGE (ML) INTRAVENOUS EVERY 8 HOURS
Status: DISCONTINUED | OUTPATIENT
Start: 2024-10-08 | End: 2024-10-08

## 2024-10-08 RX ORDER — BUDESONIDE 0.5 MG/2ML
1 INHALANT ORAL 2 TIMES DAILY
Status: DISCONTINUED | OUTPATIENT
Start: 2024-10-08 | End: 2024-11-01 | Stop reason: HOSPADM

## 2024-10-08 RX ORDER — HEPARIN SODIUM 1000 [USP'U]/ML
INJECTION, SOLUTION INTRAVENOUS; SUBCUTANEOUS PRN
Status: DISCONTINUED | OUTPATIENT
Start: 2024-10-08 | End: 2024-10-08

## 2024-10-08 RX ORDER — DEXMEDETOMIDINE HYDROCHLORIDE 4 UG/ML
.1-1.2 INJECTION, SOLUTION INTRAVENOUS CONTINUOUS
Status: DISCONTINUED | OUTPATIENT
Start: 2024-10-08 | End: 2024-10-09

## 2024-10-08 RX ORDER — PANTOPRAZOLE SODIUM 40 MG/1
40 TABLET, DELAYED RELEASE ORAL DAILY
Status: DISCONTINUED | OUTPATIENT
Start: 2024-10-08 | End: 2024-11-01 | Stop reason: HOSPADM

## 2024-10-08 RX ORDER — ATORVASTATIN CALCIUM 40 MG/1
80 TABLET, FILM COATED ORAL AT BEDTIME
Status: DISCONTINUED | OUTPATIENT
Start: 2024-10-08 | End: 2024-11-01 | Stop reason: HOSPADM

## 2024-10-08 RX ORDER — CLOPIDOGREL BISULFATE 75 MG/1
75 TABLET ORAL DAILY
Status: DISCONTINUED | OUTPATIENT
Start: 2024-10-09 | End: 2024-10-08

## 2024-10-08 RX ORDER — KETAMINE HYDROCHLORIDE 10 MG/ML
INJECTION INTRAMUSCULAR; INTRAVENOUS PRN
Status: DISCONTINUED | OUTPATIENT
Start: 2024-10-08 | End: 2024-10-08

## 2024-10-08 RX ORDER — BISACODYL 10 MG
10 SUPPOSITORY, RECTAL RECTAL DAILY PRN
Status: DISCONTINUED | OUTPATIENT
Start: 2024-10-11 | End: 2024-11-01 | Stop reason: HOSPADM

## 2024-10-08 RX ORDER — NEOSTIGMINE METHYLSULFATE 1 MG/ML
VIAL (ML) INJECTION PRN
Status: DISCONTINUED | OUTPATIENT
Start: 2024-10-08 | End: 2024-10-08

## 2024-10-08 RX ORDER — METHADONE HYDROCHLORIDE 10 MG/ML
20 INJECTION, SOLUTION INTRAMUSCULAR; INTRAVENOUS; SUBCUTANEOUS ONCE
Status: COMPLETED | OUTPATIENT
Start: 2024-10-08 | End: 2024-10-08

## 2024-10-08 RX ORDER — PHENYLEPHRINE HCL IN 0.9% NACL 50MG/250ML
.1-4 PLASTIC BAG, INJECTION (ML) INTRAVENOUS CONTINUOUS PRN
Status: DISCONTINUED | OUTPATIENT
Start: 2024-10-08 | End: 2024-10-11

## 2024-10-08 RX ORDER — NICOTINE POLACRILEX 4 MG
15-30 LOZENGE BUCCAL
Status: DISCONTINUED | OUTPATIENT
Start: 2024-10-08 | End: 2024-10-10

## 2024-10-08 RX ORDER — ACETAMINOPHEN 325 MG/1
975 TABLET ORAL EVERY 8 HOURS
Status: DISCONTINUED | OUTPATIENT
Start: 2024-10-08 | End: 2024-10-11

## 2024-10-08 RX ORDER — VANCOMYCIN HYDROCHLORIDE 1 G/20ML
INJECTION, POWDER, LYOPHILIZED, FOR SOLUTION INTRAVENOUS PRN
Status: DISCONTINUED | OUTPATIENT
Start: 2024-10-08 | End: 2024-10-08 | Stop reason: HOSPADM

## 2024-10-08 RX ORDER — CETIRIZINE HYDROCHLORIDE 10 MG/1
10 TABLET ORAL DAILY
Status: DISCONTINUED | OUTPATIENT
Start: 2024-10-08 | End: 2024-11-01 | Stop reason: HOSPADM

## 2024-10-08 RX ORDER — LEVOTHYROXINE SODIUM 125 UG/1
125 TABLET ORAL
Status: DISCONTINUED | OUTPATIENT
Start: 2024-10-09 | End: 2024-11-01 | Stop reason: HOSPADM

## 2024-10-08 RX ORDER — SODIUM CHLORIDE, SODIUM LACTATE, POTASSIUM CHLORIDE, CALCIUM CHLORIDE 600; 310; 30; 20 MG/100ML; MG/100ML; MG/100ML; MG/100ML
INJECTION, SOLUTION INTRAVENOUS CONTINUOUS
Status: DISCONTINUED | OUTPATIENT
Start: 2024-10-08 | End: 2024-10-08

## 2024-10-08 RX ORDER — CLOPIDOGREL BISULFATE 75 MG/1
75 TABLET ORAL DAILY
Status: DISCONTINUED | OUTPATIENT
Start: 2024-10-09 | End: 2024-11-01 | Stop reason: HOSPADM

## 2024-10-08 RX ORDER — MAGNESIUM SULFATE 4 G/50ML
4 INJECTION INTRAVENOUS ONCE
Status: COMPLETED | OUTPATIENT
Start: 2024-10-08 | End: 2024-10-08

## 2024-10-08 RX ORDER — SIMETHICONE 80 MG
120 TABLET,CHEWABLE ORAL 4 TIMES DAILY PRN
Status: DISCONTINUED | OUTPATIENT
Start: 2024-10-08 | End: 2024-10-19

## 2024-10-08 RX ORDER — FENTANYL CITRATE 50 UG/ML
INJECTION, SOLUTION INTRAMUSCULAR; INTRAVENOUS PRN
Status: DISCONTINUED | OUTPATIENT
Start: 2024-10-08 | End: 2024-10-08

## 2024-10-08 RX ORDER — METFORMIN HYDROCHLORIDE 500 MG/1
1000 TABLET, EXTENDED RELEASE ORAL DAILY
Status: DISCONTINUED | OUTPATIENT
Start: 2024-10-08 | End: 2024-10-14

## 2024-10-08 RX ORDER — PROTAMINE SULFATE 10 MG/ML
50 INJECTION, SOLUTION INTRAVENOUS ONCE
Status: COMPLETED | OUTPATIENT
Start: 2024-10-08 | End: 2024-10-08

## 2024-10-08 RX ORDER — HYDRALAZINE HYDROCHLORIDE 20 MG/ML
10 INJECTION INTRAMUSCULAR; INTRAVENOUS EVERY 30 MIN PRN
Status: DISCONTINUED | OUTPATIENT
Start: 2024-10-08 | End: 2024-11-01 | Stop reason: HOSPADM

## 2024-10-08 RX ORDER — CALCIUM GLUCONATE 20 MG/ML
1 INJECTION, SOLUTION INTRAVENOUS
Status: DISPENSED | OUTPATIENT
Start: 2024-10-08 | End: 2024-10-16

## 2024-10-08 RX ORDER — FLUDROCORTISONE ACETATE 0.1 MG/1
0.1 TABLET ORAL DAILY
Status: DISCONTINUED | OUTPATIENT
Start: 2024-10-08 | End: 2024-11-01 | Stop reason: HOSPADM

## 2024-10-08 RX ORDER — ALBUTEROL SULFATE 90 UG/1
2 INHALANT RESPIRATORY (INHALATION) EVERY 6 HOURS PRN
Status: DISCONTINUED | OUTPATIENT
Start: 2024-10-08 | End: 2024-11-01 | Stop reason: HOSPADM

## 2024-10-08 RX ADMIN — ROCURONIUM BROMIDE 10 MG: 50 INJECTION, SOLUTION INTRAVENOUS at 14:15

## 2024-10-08 RX ADMIN — PHENYLEPHRINE HYDROCHLORIDE 100 MCG: 10 INJECTION INTRAVENOUS at 15:45

## 2024-10-08 RX ADMIN — KETAMINE HYDROCHLORIDE 50 MG: 10 INJECTION INTRAMUSCULAR; INTRAVENOUS at 07:39

## 2024-10-08 RX ADMIN — SODIUM CHLORIDE, POTASSIUM CHLORIDE, SODIUM LACTATE AND CALCIUM CHLORIDE 250 ML: 600; 310; 30; 20 INJECTION, SOLUTION INTRAVENOUS at 16:34

## 2024-10-08 RX ADMIN — PHENYLEPHRINE HYDROCHLORIDE 50 MCG: 10 INJECTION INTRAVENOUS at 09:34

## 2024-10-08 RX ADMIN — EPINEPHRINE 0.03 MCG/KG/MIN: 1 INJECTION INTRAMUSCULAR; INTRAVENOUS; SUBCUTANEOUS at 12:55

## 2024-10-08 RX ADMIN — PHENYLEPHRINE HYDROCHLORIDE 100 MCG: 10 INJECTION INTRAVENOUS at 10:10

## 2024-10-08 RX ADMIN — NITROGLYCERIN 20 MCG: 5 INJECTION, SOLUTION INTRAVENOUS at 15:48

## 2024-10-08 RX ADMIN — PHENYLEPHRINE HYDROCHLORIDE 100 MCG: 10 INJECTION INTRAVENOUS at 13:05

## 2024-10-08 RX ADMIN — VANCOMYCIN HYDROCHLORIDE 1500 MG: 1 INJECTION, POWDER, LYOPHILIZED, FOR SOLUTION INTRAVENOUS at 06:25

## 2024-10-08 RX ADMIN — FENTANYL CITRATE 50 MCG: 50 INJECTION INTRAMUSCULAR; INTRAVENOUS at 07:20

## 2024-10-08 RX ADMIN — PHENYLEPHRINE HYDROCHLORIDE 100 MCG: 10 INJECTION INTRAVENOUS at 15:43

## 2024-10-08 RX ADMIN — SENNOSIDES AND DOCUSATE SODIUM 1 TABLET: 8.6; 5 TABLET ORAL at 19:46

## 2024-10-08 RX ADMIN — ROCURONIUM BROMIDE 80 MG: 50 INJECTION, SOLUTION INTRAVENOUS at 07:40

## 2024-10-08 RX ADMIN — MAGNESIUM SULFATE HEPTAHYDRATE 4 G: 80 INJECTION, SOLUTION INTRAVENOUS at 06:21

## 2024-10-08 RX ADMIN — FENTANYL CITRATE 100 MCG: 50 INJECTION INTRAMUSCULAR; INTRAVENOUS at 13:17

## 2024-10-08 RX ADMIN — BUDESONIDE 1 MG: 0.5 INHALANT ORAL at 21:47

## 2024-10-08 RX ADMIN — PHENYLEPHRINE HYDROCHLORIDE 200 MCG: 10 INJECTION INTRAVENOUS at 13:06

## 2024-10-08 RX ADMIN — NEOSTIGMINE METHYLSULFATE 5 MG: 1 INJECTION, SOLUTION INTRAVENOUS at 16:15

## 2024-10-08 RX ADMIN — HEPARIN SODIUM 20000 UNITS: 1000 INJECTION INTRAVENOUS; SUBCUTANEOUS at 09:34

## 2024-10-08 RX ADMIN — PROPOFOL 80 MG: 10 INJECTION, EMULSION INTRAVENOUS at 07:39

## 2024-10-08 RX ADMIN — DEXMEDETOMIDINE HYDROCHLORIDE 0.5 MCG/KG/HR: 400 INJECTION INTRAVENOUS at 08:11

## 2024-10-08 RX ADMIN — ROCURONIUM BROMIDE 20 MG: 50 INJECTION, SOLUTION INTRAVENOUS at 12:47

## 2024-10-08 RX ADMIN — SODIUM CHLORIDE, POTASSIUM CHLORIDE, SODIUM LACTATE AND CALCIUM CHLORIDE 250 ML: 600; 310; 30; 20 INJECTION, SOLUTION INTRAVENOUS at 16:49

## 2024-10-08 RX ADMIN — FENTANYL CITRATE 50 MCG: 50 INJECTION INTRAMUSCULAR; INTRAVENOUS at 07:16

## 2024-10-08 RX ADMIN — PHENYLEPHRINE HYDROCHLORIDE 100 MCG: 10 INJECTION INTRAVENOUS at 14:50

## 2024-10-08 RX ADMIN — SODIUM CHLORIDE: 9 INJECTION, SOLUTION INTRAVENOUS at 08:05

## 2024-10-08 RX ADMIN — CHLORHEXIDINE GLUCONATE 15 ML: 1.2 RINSE ORAL at 19:46

## 2024-10-08 RX ADMIN — SODIUM CHLORIDE, POTASSIUM CHLORIDE, SODIUM LACTATE AND CALCIUM CHLORIDE 250 ML: 600; 310; 30; 20 INJECTION, SOLUTION INTRAVENOUS at 17:13

## 2024-10-08 RX ADMIN — METOPROLOL TARTRATE 12.5 MG: 25 TABLET, FILM COATED ORAL at 06:32

## 2024-10-08 RX ADMIN — Medication 20 MG: at 07:39

## 2024-10-08 RX ADMIN — PHENYLEPHRINE HYDROCHLORIDE 200 MCG: 10 INJECTION INTRAVENOUS at 13:08

## 2024-10-08 RX ADMIN — PHENYLEPHRINE HYDROCHLORIDE 200 MCG: 10 INJECTION INTRAVENOUS at 15:56

## 2024-10-08 RX ADMIN — Medication 2 G: at 12:13

## 2024-10-08 RX ADMIN — PROTAMINE SULFATE 200 MG: 10 INJECTION, SOLUTION INTRAVENOUS at 14:44

## 2024-10-08 RX ADMIN — ATORVASTATIN CALCIUM 80 MG: 40 TABLET, FILM COATED ORAL at 20:04

## 2024-10-08 RX ADMIN — DEXAMETHASONE SODIUM PHOSPHATE 10 MG: 10 INJECTION, SOLUTION INTRAMUSCULAR; INTRAVENOUS at 08:05

## 2024-10-08 RX ADMIN — PHENYLEPHRINE HYDROCHLORIDE 50 MCG: 10 INJECTION INTRAVENOUS at 10:00

## 2024-10-08 RX ADMIN — SODIUM CHLORIDE, POTASSIUM CHLORIDE, SODIUM LACTATE AND CALCIUM CHLORIDE: 600; 310; 30; 20 INJECTION, SOLUTION INTRAVENOUS at 06:16

## 2024-10-08 RX ADMIN — Medication 2 G: at 08:13

## 2024-10-08 RX ADMIN — LIDOCAINE 1 PATCH: 4 PATCH TOPICAL at 17:18

## 2024-10-08 RX ADMIN — ACETAMINOPHEN 975 MG: 325 TABLET ORAL at 23:33

## 2024-10-08 RX ADMIN — ONDANSETRON 4 MG: 2 INJECTION INTRAMUSCULAR; INTRAVENOUS at 16:15

## 2024-10-08 RX ADMIN — CEFAZOLIN SODIUM 2 G: 2 INJECTION, SOLUTION INTRAVENOUS at 19:38

## 2024-10-08 RX ADMIN — MIDAZOLAM 1 MG: 1 INJECTION INTRAMUSCULAR; INTRAVENOUS at 07:20

## 2024-10-08 RX ADMIN — GLYCOPYRROLATE 0.8 MG: 0.2 INJECTION INTRAMUSCULAR; INTRAVENOUS at 16:15

## 2024-10-08 RX ADMIN — Medication 40 MG: at 17:29

## 2024-10-08 RX ADMIN — NITROGLYCERIN 10 MCG: 5 INJECTION, SOLUTION INTRAVENOUS at 08:30

## 2024-10-08 RX ADMIN — ACETAMINOPHEN 975 MG: 325 TABLET ORAL at 06:32

## 2024-10-08 RX ADMIN — PHENYLEPHRINE HYDROCHLORIDE 50 MCG: 10 INJECTION INTRAVENOUS at 08:42

## 2024-10-08 RX ADMIN — NICARDIPINE HYDROCHLORIDE 2 MG/HR: 0.2 INJECTION, SOLUTION INTRAVENOUS at 16:04

## 2024-10-08 RX ADMIN — AMINOCAPROIC ACID 5 G: 250 INJECTION, SOLUTION INTRAVENOUS at 08:04

## 2024-10-08 RX ADMIN — PHENYLEPHRINE HYDROCHLORIDE 100 MCG: 10 INJECTION INTRAVENOUS at 10:08

## 2024-10-08 RX ADMIN — CHLORHEXIDINE GLUCONATE 10 ML: 1.2 RINSE ORAL at 06:34

## 2024-10-08 RX ADMIN — HYDROMORPHONE HYDROCHLORIDE 0.4 MG: 0.2 INJECTION, SOLUTION INTRAMUSCULAR; INTRAVENOUS; SUBCUTANEOUS at 16:46

## 2024-10-08 RX ADMIN — PHENYLEPHRINE HYDROCHLORIDE 100 MCG: 10 INJECTION INTRAVENOUS at 10:09

## 2024-10-08 RX ADMIN — ACETAMINOPHEN 975 MG: 325 TABLET ORAL at 17:29

## 2024-10-08 RX ADMIN — INSULIN HUMAN 3 UNITS/HR: 1 INJECTION, SOLUTION INTRAVENOUS at 10:36

## 2024-10-08 RX ADMIN — PROTAMINE SULFATE 50 MG: 10 INJECTION, SOLUTION INTRAVENOUS at 17:15

## 2024-10-08 RX ADMIN — SODIUM BICARBONATE 50 MEQ: 84 INJECTION, SOLUTION INTRAVENOUS at 15:55

## 2024-10-08 RX ADMIN — CLOPIDOGREL BISULFATE 75 MG: 75 TABLET ORAL at 18:36

## 2024-10-08 RX ADMIN — NITROGLYCERIN 10 MCG: 5 INJECTION, SOLUTION INTRAVENOUS at 08:52

## 2024-10-08 RX ADMIN — SODIUM CHLORIDE, POTASSIUM CHLORIDE, SODIUM LACTATE AND CALCIUM CHLORIDE 250 ML: 600; 310; 30; 20 INJECTION, SOLUTION INTRAVENOUS at 18:54

## 2024-10-08 RX ADMIN — AMINOCAPROIC ACID 1 G/HR: 250 INJECTION, SOLUTION INTRAVENOUS at 09:00

## 2024-10-08 RX ADMIN — Medication 5 MG: at 07:44

## 2024-10-08 RX ADMIN — MIDAZOLAM 1 MG: 1 INJECTION INTRAMUSCULAR; INTRAVENOUS at 07:16

## 2024-10-08 RX ADMIN — LIDOCAINE HYDROCHLORIDE 5 ML: 10 INJECTION, SOLUTION INFILTRATION; PERINEURAL at 07:39

## 2024-10-08 RX ADMIN — Medication 0.3 MCG/KG/MIN: at 07:33

## 2024-10-08 RX ADMIN — CALCIUM GLUCONATE 1 G: 20 INJECTION, SOLUTION INTRAVENOUS at 16:46

## 2024-10-08 RX ADMIN — SODIUM CHLORIDE: 9 INJECTION, SOLUTION INTRAVENOUS at 08:04

## 2024-10-08 RX ADMIN — PHENYLEPHRINE HYDROCHLORIDE 100 MCG: 10 INJECTION INTRAVENOUS at 13:07

## 2024-10-08 RX ADMIN — PHENYLEPHRINE HYDROCHLORIDE 100 MCG: 10 INJECTION INTRAVENOUS at 13:02

## 2024-10-08 ASSESSMENT — ACTIVITIES OF DAILY LIVING (ADL)
ADLS_ACUITY_SCORE: 22
ADLS_ACUITY_SCORE: 22
ADLS_ACUITY_SCORE: 30
ADLS_ACUITY_SCORE: 28
ADLS_ACUITY_SCORE: 22
ADLS_ACUITY_SCORE: 22
ADLS_ACUITY_SCORE: 30
ADLS_ACUITY_SCORE: 22
ADLS_ACUITY_SCORE: 22
ADLS_ACUITY_SCORE: 28
ADLS_ACUITY_SCORE: 22
ADLS_ACUITY_SCORE: 22
ADLS_ACUITY_SCORE: 30
ADLS_ACUITY_SCORE: 22

## 2024-10-08 NOTE — ANESTHESIA POSTPROCEDURE EVALUATION
Patient: Jory Ambrose    Procedure: Procedure(s):  CORONARY ARTERY BYPASS GRAFT TIMES FOUR, LEFT INTERNAL MAMMARY ARTERY HARVEST, RIGHT LEG ENDOSCOPIC VESSEL PROCUREMENT, EPIAORTIC ULTRASOUND, MITRAL VALVE REPAIR CONVERTED TO MITRAL VALVE REPLACEMENT, LEFT ATRIAL APPENDAGE LIGATION, ANESTHESIA TRASESOPHAGEAL ULTRASOUND       Anesthesia Type:  General    Note:  Disposition: ICU            ICU Sign Out: Anesthesiologist/ICU physician sign out WAS performed   Postop Pain Control: Uneventful            Sign Out: Well controlled pain   PONV: No   Neuro/Psych:             Sign Out: PLANNED postop sedation   Airway/Respiratory:             Sign Out: AIRWAY IN SITU/Resp. Support               Airway in situ/Resp. Support: ETT                 Reason: Planned Pre-op   CV/Hemodynamics:             Sign Out: Detailed CV status               Blood Pressure: Normal               Rate/Rhythm: PM dependent               Perfusion:  Inotropes   Other NRE: NONE   DID A NON-ROUTINE EVENT OCCUR? No    Event details/Postop Comments:  Ok to fast track.  A sign out was given at bedside to the intensivist.  Ventilator management per ICU.         Last vitals:  Vitals:    10/08/24 1620 10/08/24 1630 10/08/24 1645   BP: 109/59 92/54    Pulse: 79 79 80   Resp:  20 26   Temp:  36.4  C (97.6  F)    SpO2: 100% 100% 100%       Electronically Signed By: Nimesh Peña MD  October 8, 2024  5:22 PM

## 2024-10-08 NOTE — PHARMACY-ADMISSION MEDICATION HISTORY
Pharmacist Admission Medication History    Admission medication history is complete. The information provided in this note is only as accurate as the sources available at the time of the update.    Information Source(s): Patient via in-person    Medication History Completed By: ROBBY OCASIO [facility] 10/8/2024 6:07 AM    PTA Med List   Medication Sig Last Dose    acetaminophen (TYLENOL) 500 MG tablet Take 1,000 mg by mouth every 6 hours as needed for mild pain. Unknown    albuterol (PROAIR HFA) 108 (90 BASE) MCG/ACT inhaler Inhale 2 puffs into the lungs every 6 hours as needed. Unknown    atorvastatin (LIPITOR) 80 MG tablet Take 1 tablet (80 mg) by mouth daily. 10/7/2024 at PM    budesonide-formoterol (SYMBICORT) 80-4.5 MCG/ACT Inhaler Inhale 1 puff into the lungs two times daily. 10/7/2024 at pm    buPROPion (WELLBUTRIN XL) 300 MG 24 hr tablet Take 300 mg by mouth every morning 10/7/2024 at AM    cetirizine (ZYRTEC) 10 MG tablet Take 1 tablet (10 mg) by mouth daily 10/7/2024 at AM    cholecalciferol (VITAMIN D3) 125 mcg (5000 units) capsule Take 125 mcg by mouth daily. 10/7/2024 at AM    clopidogrel (PLAVIX) 75 MG tablet Take 1 tablet by mouth daily 10/7/2024 at AM    diphenhydrAMINE (BENADRYL) 25 MG tablet Take 25 mg by mouth every 6 hours as needed Unknown    fludrocortisone (FLORINEF) 0.1 MG tablet Take 0.1 mg by mouth daily. 10/7/2024 at AM    fluticasone (FLONASE) 50 MCG/ACT nasal spray Spray 2 sprays into both nostrils daily as needed  Unknown    ipratropium - albuterol 0.5 mg/2.5 mg/3 mL (DUONEB) 0.5-2.5 (3) MG/3ML neb solution Take 1 vial by nebulization every 6 hours as needed More than a month    isosorbide dinitrate (ISORDIL) 30 MG tablet Take 1 tablet (30 mg) by mouth 2 times daily. 10/7/2024 at pm    levothyroxine (SYNTHROID/LEVOTHROID) 125 MCG tablet Take 125 mcg by mouth daily 10/8/2024 at 0500    LORazepam (ATIVAN) 0.5 MG tablet Take 0.5 mg by mouth daily as needed for anxiety Unknown    Magnesium  Oxide 500 MG CAPS Take 2 tablets by mouth at bedtime 10/7/2024 at pm    metFORMIN (GLUCOPHAGE XR) 500 MG 24 hr tablet Take 1,000 mg by mouth daily. 10/7/2024 at AM    methylPREDNISolone (MEDROL) 32 MG tablet Take 1 tablet 12 hours prior to the procedure with IV contrast and 1 tablet 2 hours prior Unknown    Multiple Vitamin (MULTI-VITAMIN PO) Take 1 tablet by mouth daily 10/7/2024    nitroGLYcerin (NITROSTAT) 0.4 MG sublingual tablet For chest pain place 1 tablet under the tongue every 5 minutes for 3 doses. If symptoms persist 5 minutes after 1st dose call 911. Unknown    omeprazole (PRILOSEC) 20 MG capsule Take 1 capsule (20 mg) by mouth daily 10/7/2024 at PM    simethicone (MYLICON) 125 MG chewable tablet Take 1 tablet (125 mg) by mouth 4 times daily as needed for intestinal gas (Patient taking differently: Take 125 mg by mouth two times daily.) 10/7/2024 at pm    spironolactone (ALDACTONE) 25 MG tablet Take 1 tablet by mouth daily 10/7/2024 at PM    traZODone (DESYREL) 100 MG tablet Take 100 mg by mouth at bedtime 10/7/2024 at PM

## 2024-10-08 NOTE — BRIEF OP NOTE
[facility]    Brief Operative Note    Pre-operative diagnosis: Coronary artery disease involving native coronary artery of native heart with angina pectoris (H) [I25.119]  Post-operative diagnosis Same as pre-operative diagnosis    Procedure: CORONARY ARTERY BYPASS GRAFT TIMES FOUR, LEFT INTERNAL MAMMARY ARTERY HARVEST, RIGHT LEG ENDOSCOPIC VESSEL PROCUREMENT, EPIAORTIC ULTRASOUND, MITRAL VALVE REPAIR CONVERTED TO MITRAL VALVE REPLACEMENT, LEFT ATRIAL APPENDAGE LIGATION, ANESTHESIA TRASESOPHAGEAL ULTRASOUND, N/A - Chest    Surgeon: Surgeons and Role:     * David Wade MD - Primary     * Peyman Lomas MD - Assisting     * Catherine Galvez PA-C - Assisting  Anesthesia: General   Estimated Blood Loss: 775 ml    Drains: 1x right pleural, 1 left pleural, 1x mediastinal CT    Specimens:   ID Type Source Tests Collected by Time Destination   1 : Left atrial appendage Tissue Heart SURGICAL PATHOLOGY EXAM David Wade MD 10/8/2024 10:31 AM    2 : Anterior leaflet mitral valve Tissue Heart Valve, Mitral (Bicuspid) SURGICAL PATHOLOGY EXAM David Wade MD 10/8/2024  1:47 PM      Findings:   CAGB 4v, Attempted MV repair, MV replacement with 29mm [employer] Brian mechanical valve .     Complications: None.    Implants:   Implant Name Type Inv. Item Serial No.  Lot No. LRB No. Used Action   PHYSIO II Annuloplasty Ring - Deanne-Sanchez   [id number] S[employer] NA N/A 1 Implanted   VALVE MITRAL [employer]RI[employer] 29MM [id number] - [id number] Valve VALVE MITRAL [employer] BRIAN 29MM [id number] [employer]  N/A 1 Implanted

## 2024-10-08 NOTE — OP NOTE
DATE OF SERVICE:  10/8/2024      PREOPERATIVE DIAGNOSES:   1.  Severe multivessel coronary artery disease.  2.  Moderate mitral regurgitation.   3.  Heart failure secondary to valvular insufficiency.  4.  History of Hodgkin lymphoma she had chemotherapy, radiation, bone marrow transplant.    POSTOPERATIVE DIAGNOSES:   1.  Severe multivessel coronary artery disease.  2.  Moderate-severe mitral regurgitation.   3.  Heart failure secondary to valvular insufficiency.  4.  History of Hodgkin lymphoma she had chemotherapy, radiation, bone marrow transplant.    PROCEDURE PERFORMED:   1.  Mitral valve replacement (29 mm [employer] Epic mitral bioprosthetic valve).   2.  Coronary artery bypass grafting x 4 (reversed saphenous vein graft to the right posterior descending coronary artery, reversed saphenous vein graft  to the posterolateral branch of the left circumflex coronary artery, reversed saphenous vein graft to the second diagonal branch of the left anterior descending coronary artery, and pedicled left internal mammary artery to left anterior descending coronary artery).  3.  Endoscopic vein harvest of the greater saphenous vein from the left lower extremity.  4.  Left atrial appendage excision.      SURGEON:  David Wade MD, PhD.      RESIDENT SURGEON: Peyman Lomas MD.     FIRST ASSISTANT: Sammie Olvera, certified surgical first assistant.      SECOND ASSISTANT: Catherine Galvez PA-C.      ANESTHESIA:  General endotracheal anesthesia with a single-lumen endotracheal tube.       ESTIMATED BLOOD LOSS:  1 liter.       SPECIMENS:  Anterior mitral valve leaflet with attached cords and left atrial appendage.      INDICATIONS FOR PROCEDURE:  Ms. Jory Ambrose is a 49-year-old woman with severe multivessel and moderate left main coronary artery disease and stable angina. I recommend coronary artery bypass grafting and possible mitral repair of replacement. She has moderate mitral regurgitation on  echocardiography, and this is a central regurgitant jet thought to be due to functional MR. She has a history of Hodgkin lymphoma she had chemotherapy, radiation, bone marrow transplant x 2. I described the technical details, as well as the expected postoperative course and recovery to the patient. I also discussed the risks and benefits of the procedure. The risks include but are not limited to bleeding, infection, stroke, heart or graft failure with myocardial infarction, dysrhythmia, respiratory failure, kidney or liver injury, bowel or limb ischemia, and death. The calculated STS risk of mortality is <1%, and the calculated STS risk of major morbidity or mortality is <10%. The patient understands these risks and wishes to undergo the operation.       OPERATIVE FINDINGS:  The left internal mammary artery was 2 mm in diameter and had excellent flow.  The greater saphenous vein from the left lower extremity was 4 mm in diameter and suitable for conduit.The ascending aorta was free of calcified plaque. The right posterior descending coronary artery was 1.75 mm in diameter and free of disease at the site of anastomosis. The obtuse marginal branch of the left circumflex coronary artery was 2 mm in diameter and free of disease at the site of anastomosis. The diagonal branch of the left anterior descending coronary artery was 1.75 mm in diameter and free of disease at the site anastomosis. The left anterior descending coronary artery 2 mm in diameter and free of disease at the site of anastomosis.  The anterior mitral valve leaflet was mildly thickened but the anterior chordae were severely thickened. The posterior mitral leaflet was more normal but the posterior chordae were mildly thickened. After an unsuccessful attempt at mitral valve repair with a 30 mm Sanchez Physio II mitral annuloplasty ring, mitral valve replacement was performed with a 29 mm [employer] Epic mechanical valve, after which there was no  perivalvular leak and the gradient was normal and low (1-3 mmHg). After reperfusion and defibrillation, junctional rhythm resumed, and ventricular pacing was commenced.  Left ventricular function was normal preoperatively and unchanged after bypass on moderate dose inotropic support.       OPERATIVE DESCRIPTION IN DETAIL:  After obtaining informed consent, the patient was brought to the operating room and placed in the supine position on the operating room table.  Appropriate lines and devices for monitoring were placed by the anesthesia team.  The patient underwent smooth induction of general anesthesia, and the trachea was intubated orally with a double-lumen endotracheal tube.  A timeout was performed to confirm the correct patient identity, as well as the procedure to be performed.      A median sternotomy was performed and the left internal mammary artery was harvested.  The greater saphenous vein was harvested from the left lower extremity using endoscopic vein harvesting by the physician assistant, Catherine Galvez PA-C. After full heparinization to an activated clotting time over 480 seconds and after cannulation of the distal ascending aorta, superior vena cava, and inferior vena cava, cardiopulmonary bypass was commenced. An antegrade cardioplegia cannula was placed. The cavae were snared to achieve complete cardiopulmonary bypass, and a through a right atriotomy, a retrograde cardioplegia cannula was placed. The aortic cross clamp was applied across the distal ascending aorta on low flow cardiopulmonary bypass, and the heart was arrested with 1 liter of cold antegrade blood cardioplegia. Subsequent maintenance doses of 300 mL of cold retrograde blood cardioplegia were administered every 20 minutes. The patient was cooled passively to 34 degrees Celsius, and topical cold saline slush was applied for additional protection.    Left atrial appendage excision was performed and the defect was closed with  running 3-0 Prolene; the first layer was running horizontal mattress and the second layer was running continuous.     The following grafts were constructed in end-to-side fashion using running 7-0 Prolene:  A reversed saphenous vein graft was sewn to the proximal right posterior descending coronary artery, a reversed saphenous vein graft was sewn to the mid posterolateral branch of the left circumflex coronary artery, and a reversed saphenous vein graft was sewn to the mid second diagonal branch of the left anterior descending coronary artery. The pedicled left internal mammary artery was sewn to the mid left anterior descending coronary artery in end-to-side fashion using running 8-0 Prolene.       Through the right atriotomy, an interatrial septostomy was performed starting in the fossa ovalis and extending into the muscular interatrial septum. The right atriotomy was continued over onto the dome of the left atrium to establish an extended trans-septal approach. The mitral valve was exposed with the above findings noted. A 30 mm Sanchez Physio II ring was placed with circumferential 2-0 Ethibond simple annular sutures secured with the CoreKnot device. The dome of the left atrium and the interatrial septum were closed in two layers with running 4-0 Prolene in simple continuous fashion. The right atriotomy was closed in two layers with running 4-0 Prolene; the first layer in running horizontal mattress and the second layer in simple continuous fashion.     The proximal anastomoses of the 3 vein grafts were constructed on the ascending aorta in end-to-side fashion using running 6-0 Prolene under a single crossclamp. The crossclamp was released after a retrograde dose of terminal warm blood cardioplegia was delivered, and the heart was resuscitated.  Transesophageal echocardiography demonstrated moderate-severe mitral regurgitation with a posteriorly directed regurgitant jet and tethered chordae.    The aortic cross  clamp was applied across the distal ascending aorta on low flow cardiopulmonary bypass, and the heart was arrested with 1 liter of cold antegrade blood cardioplegia. Subsequent maintenance doses of 300 mL of cold antegrade blood cardioplegia were administered every 20 minutes. The patient was cooled passively to 30 degrees Celsius, and topical cold saline slush was applied for additional protection.    Through the previous right atriotomy, the interatrial septostomy was re-opened starting in the fossa ovalis and extending into the muscular interatrial septum. The right atriotomy was re-opened over onto the dome of the left atrium to establish an extended trans-septal approach. The mitral valve was exposed with the above findings noted. The 30 mm Sanchez Physio II ring was excised sharply with all of the 2-0 Ethibond annular sutures and CoreKnots.The anterior mitral valve leaflet with attached cords was excised. The posterior leaflet and chordae were preserved. Next, pledgeted 2-0 Ethibond transannular sutures were placed in everting fashion (from the atrial to the ventricular side) circumferentially. After appropriate sizing, these annular sutures were brought through the sewing ring of a 29 mm [employer] Epic mitral valve. The valve was seated in place and the sutures were secured down and cut with the CoreKnot device. The motion of the valve leaflets and competence of the mitral valve was confirmed with saline. The dome of the left atrium and the interatrial septum were closed in two layers with running 4-0 Prolene in simple continuous fashion. The right atriotomy was closed in two layers with running 4-0 Prolene; the first layer in running horizontal mattress and the second layer in simple continuous fashion.      The heart was de-aired. With the patient in Trendelenberg position, the crossclamp was released after a retrograde dose of terminal warm blood cardioplegia was delivered, and the heart was  resuscitated.        Ventilation was commenced. Ventricular pacing wire was placed and brought out through a separate stab incision. Backup ventricular pacing wires was placed and brought out through a separate stab incision. The patient weaned from cardiopulmonary bypass, was given protamine and decannulated.  All bleeding points were controlled.  Topical hemostatic agents were applied. A 24-Khmer Frank drain was placed in the left pleural space and brought out through a separate stab incision.  A 24-Khmer Frank drain was placed in the right pleural space and brought out through a separate stab incision.  A 28-Khmer straight chest tube was placed in the anterior mediastinum and brought out through a separate stab incision.  The sternal edges were re-approximated with three #6 stainless steel sternal wires in the manubrium, 3 double wires in the body of the sternum, and one additional #6 stainless steel sternal wire at the lower aspect of the sternum.  The muscle, fascia and skin were closed in layers with absorbable suture.  Dermabond was applied.  All sponge, needle and instrument counts were correct at the end of the case.        MONTRELL FITZGERALD MD

## 2024-10-08 NOTE — PROGRESS NOTES
Patient was transferred to ICU EAST room 350. Patient was placed on mechanical ventilation 18 400 +5 100%. 7.5 ETT 21@teeth  I-STAT results are as follows:  Recent Labs   Lab 10/08/24  1650 10/08/24  1550 10/08/24  1456 10/08/24  1417   PH 7.40 7.35 7.47* 7.42   PCO2 36 34* 30* 39   PO2 304* 445* 499* 405*   HCO3 22  --   --   --       FIO2 lowered to 80%.    No obstructions noted. RT will follow.    Josh Cordero, RT  10/9/24

## 2024-10-08 NOTE — H&P
H&P Update    The patient has no new complaints and there are no new exam findings. The H&P is up to date. Please see the note below for details.    David Wade MD  7:09 AM  10/8/2024    Cardiac Surgery Consultation      Jory Ambrose MRN# [id number]   YOB: 1975 Age: 49 year old   Date of Admission:      9/13/2024     Reason for consult: I was asked by Dr. Tarun Johnston to evaluate this patient for severe multivessel and moderate left main coronary artery disease.           Assessment and Plan:   Ms. Jory Ambrose is a 49-year-old woman with severe multivessel and moderate left main coronary artery disease and stable angina. I recommend coronary artery bypass grafting. She has moderate mitral regurgitation on echocardiography, and this is a central regurgitant jet that is consistent with functional MR. I do not recommend replacement, but I may need to repair the mitral valve with an annuloplasty ring. I will make this determination at the time of surgery with intraoperative transesophageal echocardiography.      I described the technical details, as well as the expected postoperative course and recovery to the patient. I also discussed the risks and benefits of the procedure. The risks include but are not limited to bleeding, infection, stroke, heart or graft failure with myocardial infarction, dysrhythmia, respiratory failure, kidney or liver injury, bowel or limb ischemia, and death. The calculated STS risk of mortality is <1%, and the calculated STS risk of major morbidity or mortality is <10%. The patient understands these risks and wishes to undergo the operation.      Surgery will be scheduled in the coming weeks.     After carotid US, the preoperative evaluation will be complete.     She has a true aspirin allergy characterized by anaphylaxis so we will use Plavix postoperatively, and she will not get the usual preoperative dose of aspirin. She is currently  on Plavix, and this will need to be stopped preoperatively.     History is obtained from the patient          History of Present Illness:   This patient is a 49 year old female who presents with exertional angina characterized by pain in the neck and face with exertion. 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 She has no symptoms of heart failure. She was thinking of starting an exercise program but she noticed symptoms of      Ms. Jory Ambrose is a 48 year old female who comes in for cardiac evaluation.  She had 2 recent visits to emergency room.  The first time she went with the pain around her eye and numbness in her arm.  She underwent imaging of head without evidence of an acute stroke.  She had moderate disease in the carotids.  The second visit to emergency room was because of a tight heavy sensation in the left side of her chest.  This sensation came on at rest.  Her EKG did not show any ischemic changes and troponins were negative.  She did not have recurrence of the discomfort.  She is not known to have coronary artery disease, valvular heart disease, cardiomyopathy.  She has a history of Hodgkin lymphoma she had chemotherapy, radiation, bone marrow transplant x 2.  She is in remission now.  2 months ago she started taking phentermine for weight loss.                 Past Medical History:      Past Medical History        Past Medical History:   Diagnosis Date    Asthma      H/O autologous stem cell transplant (H) 7/26/2011    History of radiation therapy 2010     3600 cGy to mediastinum and neck    Hodgkin lymphoma, nodular sclerosis (H)       Dx Feb 2010    Hypothyroidism, secondary       r/t radiation    Morbid obesity with BMI of 40.0-44.9, adult (H)      Polycystic ovary disease      Pulmonary embolism (H)      S/P allogeneic bone marrow transplant (H) 09/2013     brother    Seasonal allergies       Shingles       Aug 2010                 Past Surgical History:      Past Surgical History         Past Surgical History:   Procedure Laterality Date    CHOLECYSTECTOMY         gallstone pancreatitis Jan 2010    Lymphectomy   2/2010     right neck area                   Social History:      Social History            Tobacco Use    Smoking status: Never    Smokeless tobacco: Never    Tobacco comments:       denies tabacco use   Substance Use Topics    Alcohol use: No       Comment: minimal alcohol use, 1 glass of wine in 6 months              Family History:      Family History         Family History   Problem Relation Age of Onset    Cancer Mother           thyroid cancer    Unknown/Adopted Father      Breast Cancer No family hx of      Cancer - colorectal No family hx of      Prostate Cancer No family hx of      Hypertension Mother      C.A.D. Maternal Grandmother      C.A.D. Paternal Grandmother                   Immunizations:           Immunization History   Administered Date(s) Administered    COVID-19 Bivalent 12+ (Pfizer) 09/21/2022    COVID-19 MONOVALENT 12+ (Pfizer) 03/12/2021, 04/01/2021, 10/04/2021    HIB (PRP-T) 08/29/2012    HepB 08/29/2012    Influenza (IIV3) PF 10/01/2010, 11/03/2011    Influenza Vaccine >6 months,quad, PF 11/19/2013    Pneumo Conj 13-V (2010&after) 08/29/2012    Poliovirus, inactivated (IPV) 08/29/2012    TDAP Vaccine (Boostrix) 08/29/2012              Allergies:      Allergies         Allergies   Allergen Reactions    Dye [Contrast Dye] Swelling       Pre-medicated with methylprednisolone    Shellfish Allergy Shortness Of Breath, Anaphylaxis and Swelling       Shrimp, crab, lobster    Aspirin Hives    Penicillins Hives       Patient cannot recall if she has tried cephalosporins in the past.     Erythromycin Hives    Morphine Hcl         Causes change in mental status    Sulfa Antibiotics Hives and Rash                 Medications:      Current Facility-Administered Medications  Ordered in Epic             Current Facility-Administered Medications   Medication Dose Route Frequency Provider Last Rate Last Admin    acetaminophen (TYLENOL) tablet 650 mg  650 mg Oral Q4H PRN Miles Rodriguez MD        atropine injection 0.5 mg  0.5 mg Intravenous Once PRN Miles Rodriguez MD        fentaNYL (PF) (SUBLIMAZE) injection 25 mcg  25 mcg Intravenous Q15 Min PRN Miles Rodriguez MD        flumazenil (ROMAZICON) injection 0.2 mg  0.2 mg Intravenous q1 min prn Miles Rodriguez MD        Hold: metformin and metformin containing medications on day of the procedure and for 48 hours after IV contrast given- Patients with acute kidney injury or severe chronic kidney disease (stage IV or stage V; i.e., eGFR less than 30)   Does not apply HOLD Miles Rodriguez MD        lidocaine (LMX4) cream   Topical Q1H PRN Miles Rodriguez MD        lidocaine 1 % 0.1-1 mL  0.1-1 mL Other Q1H PRN Miles Rodriguez MD        midazolam (VERSED) injection 0.5 mg  0.5 mg Intravenous Q5 Min PRN Miles Rodriguez MD        naloxone (NARCAN) injection 0.2 mg  0.2 mg Intravenous Q2 Min PRN Miles Rodriguez MD         Or    naloxone (NARCAN) injection 0.4 mg  0.4 mg Intravenous Q2 Min PRN Miles Rodriguez MD         Or    naloxone (NARCAN) injection 0.2 mg  0.2 mg Intramuscular Q2 Min PRN Miles Rodriguez MD         Or    naloxone (NARCAN) injection 0.4 mg  0.4 mg Intramuscular Q2 Min PRN Miles Rodriguez MD        oxyCODONE (ROXICODONE) tablet 5 mg  5 mg Oral Q4H PRN Miles Rodriguez MD         Or    oxyCODONE (ROXICODONE) tablet 10 mg  10 mg Oral Q4H PRN Miles Rodriguez MD        sodium chloride (PF) 0.9% PF flush 3 mL  3 mL Intracatheter Q8H Miles Rodriguez MD        sodium chloride (PF) 0.9% PF flush 3 mL  3 mL Intracatheter q1 min prn Miles Rodriguez MD        sodium chloride 0.9% BOLUS 250 mL  250 mL Intravenous Once PRN Miles Rodriguez MD                     Review of  Systems:      The 10 point Review of Systems is negative other than noted in the HPI            Physical Exam:   Vitals were reviewed  Temp: 97.5  F (36.4  C) Temp src: Oral BP: 110/63 Pulse: 79   Resp: 22 SpO2: 96 % O2 Device: None (Room air) Oxygen Delivery: 2 LPM  Constitutional:    awake, alert, cooperative, no apparent distress, and appears stated age      Eyes:    Lids and lashes normal, pupils equal, round and reactive to light, extra ocular muscles intact, sclera clear, conjunctiva normal      ENT:    normocephalic, without obvious abnormality      Neck:    supple, symmetrical, trachea midline      Lungs:    no increased work of breathing, good air exchange, and no retractions      Cardiovascular:    regular rate and rhythm      Abdomen:    non-distended      Musculoskeletal:    no lower extremity pitting edema present  there is no redness, warmth, or swelling of the joints  full range of motion noted  motor strength is 5 out of 5 all extremities bilaterally      Neurologic:    Mental Status Exam:  Level of Alertness:   awake  Orientation:   person, place, time  Memory:   normal  Cranial Nerves:  cranial nerves II-XII are grossly intact  Motor Exam:  moves all extremities well and symmetrically      Neuropsychiatric:    General: normal, calm, and normal eye contact  Level of consciousness: alert / normal  Affect: normal      Skin:    no bruising or bleeding, normal skin color, texture, turgor, and no redness, warmth, or swelling           Data:   All laboratory data reviewed  All cardiac studies reviewed by me.  All imaging studies reviewed by me.     CARDIAC CATHETERIZATION:  LM:mid-distal 60-70%   LAD:mid-vessel 85% narrowing at the take off of a diagonal branch  Lcx:distal 50- 60% narrowing  RCA:dominant, 100% occluded ostial, fills distally via L-R collaterals     LVEDP:10     Left Main   The vessel was visualized by selective angiography. There was 60% vessel disease.      Left Anterior Descending   The  vessel was visualized by selective angiography. There was 80% vessel disease.      Left Circumflex   The vessel was visualized by selective angiography. There was 60% vessel disease.      Right Coronary Artery   The vessel was visualized by selective angiography. There was 100% vessel disease.            TRANSTHORACIC ECHOCARDIOGRAPHY:  1.Left ventricular size, wall motion and function are normal. The ejection  fraction is 55-60%.  2.Normal right ventricle size and systolic function.  3.There is moderate (2+) mitral regurgitation. PISA not measured or performed.  4.Poor images for analysis.  Compared to the prior study dated 12/30/2014, the MR is now seen.     EKG:  Sinus rhythm   Normal ECG   When compared with ECG of 06-Jun-2024 12:19,   No significant change was found      CAROTID US:  pending

## 2024-10-08 NOTE — ANESTHESIA PROCEDURE NOTES
Airway       Patient location during procedure: OR       Procedure Start/Stop Times: 10/8/2024 7:43 AM  Staff -        CRNA: Emily Kent APRN CRNA       Performed By: CRNAIndications and Patient Condition       Indications for airway management: mario-procedural        Final Airway Details       Final airway type: endotracheal airway       Successful airway: ETT - single  Endotracheal Airway Details        ETT size (mm): 7.5       Cuffed: yes       Successful intubation technique: video laryngoscopy       VL Blade Size: Glidescope 3       Grade View of Cords: 1       Adjucts: stylet       Position: Right       Measured from: lips       Secured at (cm): 21    Post intubation assessment        Placement verified by: capnometry, equal breath sounds and chest rise        Number of attempts at approach: 1       Secured with: tape       Ease of procedure: easy       Dentition: Intact and Unchanged    Medication(s) Administered   Medication Administration Time: 10/8/2024 7:43 AM        
Arterial Line Procedure Note    Pre-Procedure   Staff -        Anesthesiologist:  Nimesh Peña MD       Performed By: anesthesiologist       Location: pre-op       Pre-Anesthestic Checklist: patient identified, IV checked, risks and benefits discussed, informed consent, monitors and equipment checked, pre-op evaluation and at physician/surgeon's request  Timeout:       Correct Patient: Yes        Correct Procedure: Yes        Correct Site: Yes        Correct Position: Yes   Line Placement:   This line was placed Pre Induction starting at 10/8/2024 7:27 AM and ending at 10/8/2024 7:32 AM  Procedure   Procedure: new line and arterial line       Laterality: right       Insertion Site: brachial.  Sterile Prep        Standard elements of sterile barrier followed       Skin prep: Chloraprep  Insertion/Injection        Technique: ultrasound guided, Seldinger Technique and Kal's test completed        1. Ultrasound was used to evaluate the access site.       2. Artery evaluated via ultrasound for patency/adequacy.       3. Using real-time ultrasound the needle/catheter was observed entering the artery/vein.       4. Permanent image was captured and entered into the patient's record.       5. The visualized structures were anatomically normal.       6. There were no apparent abnormal pathologic findings.       Catheter Type/Size: 20 G, 12 cm  Narrative         Secured by: suture       Tegaderm and Biopatch dressing used.       Complications: None apparent,        Arterial waveform: Yes        IBP within 10% of NIBP: Yes      
Central Line/PA Catheter Placement    Pre-Procedure   Staff -        Anesthesiologist:  Nimesh Peña MD       Performed By: anesthesiologist       Location: OR       Pre-Anesthestic Checklist: patient identified, IV checked, site marked, risks and benefits discussed, informed consent, monitors and equipment checked, pre-op evaluation and at physician/surgeon's request  Timeout:       Correct Patient: Yes        Correct Procedure: Yes        Correct Site: Yes        Correct Position: Yes        Correct Laterality: Yes   Line Placement:   This line was placed Post Induction starting at 10/8/2024 7:47 AM and ending at 10/8/2024 8:07 AM    Procedure   Procedure: central line and elective       Laterality: right       Insertion Site: internal jugular.       Patient Position: Trendelenburg  Sterile Prep        All elements of maximal sterile barrier technique followed       Patient Prep/Sterile Barriers: draped, hand hygiene, gloves , hat , mask , draped, gown, sterile gel and probe cover       Skin prep: Chloraprep  Insertion/Injection        Technique: ultrasound guided and Seldinger Technique        1. Ultrasound was used to evaluate the access site.       2. Vein evaluated via ultrasound for patency/adequacy.       3. Using real-time ultrasound the needle/catheter was observed entering the artery/vein.       4. Permanent image was captured and entered into the patient's record.       5. The visualized structures were anatomically normal.       6. There were no apparent abnormal pathologic findings.       Introducer Type: 9 Fr, 2-lumen MAC        Type: Introducer  Narrative         Secured by: suture       Tegaderm and Biopatch dressing used.       Complications: None apparent,        blood aspirated from all lumens,        All lumens flushed: Yes       Verification method: Placement to be verified post-op and Ultrasound      
Perioperative CARMEN Procedure Note    Staff -        Anesthesiologist:  Nimesh Peña MD       Performed By: anesthesiologist  Preanesthesia Checklist:  Patient identified, IV assessed, risks and benefits discussed, monitors and equipment assessed, procedure being performed at surgeon's request and anesthesia consent obtained.    CARMEN Probe Insertion    Probe Status PRE Insertion: NO obvious damage  Probe type:  Adult 3D  Bite block used:   Soft  Insertion Technique: Easy, no oropharyngeal manipulation  Insertion complications: None obvious  Billing Report:A CARMEN report is NOT being generated.  Probe Status POST Removal: NO obvious damage              
,DirectAddress_Unknown

## 2024-10-08 NOTE — CONSULTS
[facility]:  CRITICAL CARE Consult     Date / Time of Admission:  10/8/2024  5:15 AM    ID: Jory Lyn  Female, 49 year old, 1975  MRN: [id number]           Assessment/Plan:   Ms. Jory Ambrose is a 49-year-old woman with severe multivessel and moderate left main coronary artery disease and stable angina who underwent oronary artery bypass grafting today.  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 She has no symptoms of heart failure.     Surgery consisted inCORONARY ARTERY BYPASS GRAFT TIMES FOUR, LEFT INTERNAL MAMMARY ARTERY HARVEST, RIGHT LEG ENDOSCOPIC VESSEL PROCUREMENT, EPIAORTIC ULTRASOUND, MITRAL VALVE REPAIR CONVERTED TO MITRAL VALVE REPLACEMENT, LEFT ATRIAL APPENDAGE LIGATION, ANESTHESIA TRASESOPHAGEAL ULTRASOUND, N/A - Chest     We were consulted for ventilator management    The plan is as follow ;     Neurologic:   # post op pain  # Post op sedation  Wean sedation as able to work towards extubation  Pain management per primary team     Pulmonary:   # on MV post op  # GIA  #  Asthma  # HX of PE  Per primary team, the patient is on  fast track  PAP therapy post extubation  Monitor for asthma. PULMICORT ORDERED AND prn DUOneBS Given that on symbicort as outpatient.  Resume PTA symbicort and allergy treatment post exubation  Lung protective strategy and wean accordingly when hemodynamically        Cardiovascular:   # Hx of PE  # CAD status post CABG  Management per primary team  AVOID HYPOTENSION GIVEN CAROTID STENOSIS     GI/:   gerd  PPI prophylaxis       Renal:   Monitor I/O's.  Electrolyte repletion PRN.  Avoid/limit nephrotoxic agents.    Heme/Onc:  monitor CBC  - ebl 730 GOY CELL SAVER AND 2 prbc IRRADIATED  - MONITOR AND TRANSFUSE PER PRIMARY TEAM     Endocrine:     "Glucose per ICU protocol   Resume metformin when able per primary team  Resume synthoroid     Prophylaxis:  #GI:Management per primary team  #DVT:Management per primary team    Other:    Other:    Risk Factors Present on Admission:  Clinically Significant Risk Factors Present on Admission                  # Drug Induced Platelet Defect: home medication list includes an antiplatelet medication       # Hypertension: Noted on problem list             # Obesity: Estimated body mass index is 37.97 kg/m  as calculated from the following:    Height as of 9/25/24: 1.626 m (5' 4\").    Weight as of this encounter: 100.3 kg (221 lb 3.2 oz).                   Code Status:  Prior        Total consult  time, not including separate billable procedure time: 60 minutes.    Bharat Lu MD  [facility] Pulmonary/Critical Care   10/08/2024   9:08 AM          Chief Complaint    Consulted forpost KENIA ULLOA ventilator management          HPI:   Ms. Jory Ambrose is a 49-year-old woman with severe multivessel and moderate left main coronary artery disease and stable angina who underwent oronary artery bypass grafting today.  She has a history of GIA, asthma., hypothyroidism, Pe,  Hodgkin's lymphoma status post chemotherapy and then mantle radiation and 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, currently in remission. She has no symptoms of heart failure.           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    Seasonal allergies     " Shingles     Aug 2010    Sleep apnea       Past Surgical History:   Procedure Laterality Date    CHOLECYSTECTOMY      gallstone pancreatitis Jan 2010    CV CORONARY ANGIOGRAM N/A 9/13/2024    Procedure: Coronary Angiogram;  Surgeon: Tarun Johnston MD;  Location: [facility] CATH LAB CV    CV LEFT HEART CATH N/A 9/13/2024    Procedure: Left Heart Catheterization;  Surgeon: Tarun Johnston MD;  Location: [facility] CATH LAB CV    Lymphectomy  2/2010    right neck area            Allergies/Medications:   Allergies:     Allergies   Allergen Reactions    Dye [Contrast Dye] Swelling     Pre-medicated with methylprednisolone    Shellfish Allergy Shortness Of Breath, Anaphylaxis and Swelling     Shrimp, crab, lobster    Penicillins Hives     Patient cannot recall if she has tried cephalosporins in the past.     Erythromycin Hives    Morphine Hcl      Causes change in mental status    Sulfa Antibiotics Hives and Rash       OUTPATIENT Medications:  Prior to Admission medications    Medication Sig Start Date End Date Taking? Authorizing Provider   acetaminophen (TYLENOL) 500 MG tablet Take 1,000 mg by mouth every 6 hours as needed for mild pain.   Yes Unknown, Entered By History   albuterol (PROAIR HFA) 108 (90 BASE) MCG/ACT inhaler Inhale 2 puffs into the lungs every 6 hours as needed.   Yes Reported, Patient   atorvastatin (LIPITOR) 80 MG tablet Take 1 tablet (80 mg) by mouth daily. 9/13/24  Yes Tarun Johnston MD   budesonide-formoterol (SYMBICORT) 80-4.5 MCG/ACT Inhaler Inhale 1 puff into the lungs two times daily. 12/18/23  Yes Reported, Patient   buPROPion (WELLBUTRIN XL) 300 MG 24 hr tablet Take 300 mg by mouth every morning 11/7/23  Yes Reported, Patient   cetirizine (ZYRTEC) 10 MG tablet Take 1 tablet (10 mg) by mouth daily 4/6/15  Yes Drew Caban MD   cholecalciferol (VITAMIN D3) 125 mcg (5000 units) capsule Take 125 mcg by mouth daily.   Yes Unknown, Entered By History   clopidogrel (PLAVIX) 75 MG  tablet Take 1 tablet by mouth daily 6/5/24 6/5/25 Yes Reported, Patient   diphenhydrAMINE (BENADRYL) 25 MG tablet Take 25 mg by mouth every 6 hours as needed   Yes Reported, Patient   fludrocortisone (FLORINEF) 0.1 MG tablet Take 0.1 mg by mouth daily. 11/14/23  Yes Reported, Patient   fluticasone (FLONASE) 50 MCG/ACT nasal spray Spray 2 sprays into both nostrils daily as needed    Yes Reported, Patient   ipratropium - albuterol 0.5 mg/2.5 mg/3 mL (DUONEB) 0.5-2.5 (3) MG/3ML neb solution Take 1 vial by nebulization every 6 hours as needed 1/5/24  Yes Reported, Patient   isosorbide dinitrate (ISORDIL) 30 MG tablet Take 1 tablet (30 mg) by mouth 2 times daily. 9/25/24  Yes Elsie Saldaña MD   levothyroxine (SYNTHROID/LEVOTHROID) 125 MCG tablet Take 125 mcg by mouth daily 5/8/24  Yes Reported, Patient   LORazepam (ATIVAN) 0.5 MG tablet Take 0.5 mg by mouth daily as needed for anxiety 11/29/23  Yes Reported, Patient   Magnesium Oxide 500 MG CAPS Take 2 tablets by mouth at bedtime 10/28/14  Yes Drew Caban MD   metFORMIN (GLUCOPHAGE XR) 500 MG 24 hr tablet Take 1,000 mg by mouth daily.   Yes Unknown, Entered By History   methylPREDNISolone (MEDROL) 32 MG tablet Take 1 tablet 12 hours prior to the procedure with IV contrast and 1 tablet 2 hours prior 9/5/24  Yes Miles Rodriguez MD   Multiple Vitamin (MULTI-VITAMIN PO) Take 1 tablet by mouth daily   Yes Reported, Patient   nitroGLYcerin (NITROSTAT) 0.4 MG sublingual tablet For chest pain place 1 tablet under the tongue every 5 minutes for 3 doses. If symptoms persist 5 minutes after 1st dose call 911. 9/6/24  Yes Miles Rodriguez MD   omeprazole (PRILOSEC) 20 MG capsule Take 1 capsule (20 mg) by mouth daily 9/19/14  Yes Drew Caban MD   simethicone (MYLICON) 125 MG chewable tablet Take 1 tablet (125 mg) by mouth 4 times daily as needed for intestinal gas  Patient taking differently: Take 125 mg by mouth two times daily. 6/6/24  Yes Rosemary,  Drew GRECO MD   spironolactone (ALDACTONE) 25 MG tablet Take 1 tablet by mouth daily 1/1/24  Yes Reported, Patient   traZODone (DESYREL) 100 MG tablet Take 100 mg by mouth at bedtime 5/2/24  Yes Reported, Patient        INPATIENT Medications: Continuous Infusions:  Current Facility-Administered Medications   Medication Dose Route Frequency Provider Last Rate Last Admin    aminocaproic acid (AMICAR) 5 g in sodium chloride 0.9 % 100 mL infusion  1 g/hr Intravenous Continuous David Wade MD 20 mL/hr at 10/08/24 0900 1 g/hr at 10/08/24 0900    dexmedeTOMIDine (PRECEDEX) 4 mcg/mL in sodium chloride 0.9 % 100 mL infusion  0.2-1.2 mcg/kg/hr Intravenous Continuous David Wade MD 12.475 mL/hr at 10/08/24 0811 0.5 mcg/kg/hr at 10/08/24 0811    EPINEPHrine (ADRENALIN) 5 mg in sodium chloride 0.9 % 250 mL infusion CENTRAL  0.01-0.3 mcg/kg/min Intravenous Continuous David Wade MD        insulin regular (MYXREDLIN) 1 unit/mL infusion  0-24 Units/hr Intravenous Continuous David Wade MD        lactated ringers infusion   Intravenous Continuous Amadou Rider  mL/hr at 10/08/24 0711 Rate Change at 10/08/24 0711    niCARdipine 40 mg in 200 mL NS (CARDENE) infusion  0.5-15 mg/hr Intravenous Continuous David Wade MD        phenylephrine (MARCIANO-SYNEPHRINE) 50 mg in NaCl 0.9 % 250 mL infusion  0.1-6 mcg/kg/min Intravenous Continuous David Wade MD 8.982 mL/hr at 10/08/24 0850 0.3 mcg/kg/min at 10/08/24 0850       INPATIENT Medications: Scheduled Medications:  Current Facility-Administered Medications   Medication Dose Route Frequency Provider Last Rate Last Admin    cardioplegia low K - standard PERFUSION   PERFUSION On Call to OR David Wade MD        cardioplegic soln Hi K - Standard PERFUSION   PERFUSION On Call to OR David Wade MD        cardioplegic soln Hot Shot - Standard PERFUSION   PERFUSION On Call  to OR David Wade MD        ceFAZolin Sodium (ANCEF) injection 2 g  2 g Intravenous See Admin Instructions David Wade MD        heparin cell saver PERFUSION solution   PERFUSION On Call to OR David Wade MD        magnesium sulfate 4 g in 50 mL sterile water intermittent infusion  4 g Intravenous Once Amadou Rider MD 12.5 mL/hr at 10/08/24 0621 4 g at 10/08/24 0621    prime solution for OR INFUSION   Intravenous On Call to OR David Wade MD        sodium chloride (PF) 0.9% PF flush 3 mL  3 mL Intracatheter Q8H David Wade MD        sodium chloride (PF) 0.9% PF flush 3 mL  3 mL Intracatheter Q8H Amadou Rider MD        vein mixture infusion   PERFUSION On Call to OR David Wade MD                   Objective:   Vitals:  /63   Pulse 78   Temp 97.9  F (36.6  C) (Oral)   Resp 18   Wt 100.3 kg (221 lb 3.2 oz)   LMP  (LMP Unknown)   SpO2 97%   BMI 37.97 kg/m    Vent: Resp: 18  GEN: Sedated in ND  HEENT: Normocephalic, atraumatic.  Pupils equals, anicteric sclera. Orally intubated  NECK: Supple.    PULM: Non-labored breathing.  No use of accessory muscles.  Clear to ausculation bilaterally except for some rhonchi  CVS: Paced rhythm Normal S1, S2.  No rubs, murmurs, or gallops.    ABDOMEN: Normoactive bowel sounds.  Non-tender to palpation.  Non-distended.    EXTREMITES:  No clubbing, cyanosis, or edema.    NEURO: sedated    Intake/Output: No intake/output data recorded.        Pertinent Studies:     I have personally reviewed the following data over the past 24 hrs:    N/A  \   11.7   / N/A     140 N/A N/A /  103 (H)   3.8 N/A N/A \     TSH: N/A T4: N/A A1C: N/A     Procal: N/A CRP: N/A Lactic Acid: 0.7         Imaging results reviewed over the past 24 hrs:   Recent Results (from the past 24 hour(s))   POC US Guided Vascular Access    Narrative    Ultrasound was performed as guidance to an anesthesia  "procedure.  Click   \"PACS images\" hyperlink below to view any stored images.  For specific   procedure details, view procedure note authored by anesthesia.   POC US Guided Vascular Access    Narrative    Ultrasound was performed as guidance to an anesthesia procedure.  Click   \"PACS images\" hyperlink below to view any stored images.  For specific   procedure details, view procedure note authored by anesthesia.     Recent Labs   Lab 10/08/24  0821 10/08/24  0537 10/07/24  0842   WBC  --   --  7.6   HGB 11.7  --  11.8   MCV  --   --  92   PLT  --   --  297   INR  --   --  0.94     --  137   POTASSIUM 3.8  --  4.1   CHLORIDE  --   --  103   CO2  --   --  22   BUN  --   --  21.5*   CR  --   --  0.92   ANIONGAP  --   --  12   CINDY  --   --  9.0   * 94 92   ALBUMIN  --   --  4.1   PROTTOTAL  --   --  6.7   BILITOTAL  --   --  0.2   ALKPHOS  --   --  88   ALT  --   --  22   AST  --   --  20       Most Recent 3 CBC's:  Recent Labs   Lab Test 10/08/24  0821 10/07/24  0842 09/25/24  1149 09/13/24  0720   WBC  --  7.6 8.6 9.7   HGB 11.7 11.8 12.8 13.2   MCV  --  92 92 90   PLT  --  297 333 321     Most Recent 3 BMP's:  Recent Labs   Lab Test 10/08/24  1650 10/08/24  1628 10/08/24  1550 10/08/24  1456 10/08/24  1417 10/08/24  0537 10/07/24  0842 09/25/24  1149 09/13/24  0720     --  142 142 143   < > 137 137 136   POTASSIUM 4.4 4.4 4.0 4.1 4.2   < > 4.1 4.4 4.4   CHLORIDE  --   --   --   --   --   --  103 103 103   CO2  --   --   --   --   --   --  22 20* 20*   BUN  --   --   --   --   --   --  21.5* 15.7 17.7   CR  --   --   --   --   --   --  0.92 0.91 0.84   ANIONGAP  --   --   --   --   --   --  12 14 13   CINDY  --   --   --   --   --   --  9.0 9.6 9.5   GLC  --   --  183* 148* 131*   < > 92 111* 160*    < > = values in this interval not displayed.     Most Recent 6 glucoses:  Recent Labs   Lab Test 10/08/24  0821 10/08/24  0537 10/07/24  0842 09/25/24  1149 09/13/24  0720 06/06/24  1302   * 94 92 " 111* 160* 81     Most Recent ABG:  Recent Labs   Lab Test 10/08/24  0821   PH 7.46*   PO2 566*   PCO2 32*   YESENIA -0.5         Bharat Lu MD

## 2024-10-08 NOTE — ANESTHESIA CARE TRANSFER NOTE
Patient: Jory Ambrose    Procedure: Procedure(s):  CORONARY ARTERY BYPASS GRAFT TIMES FOUR, LEFT INTERNAL MAMMARY ARTERY HARVEST, RIGHT LEG ENDOSCOPIC VESSEL PROCUREMENT, EPIAORTIC ULTRASOUND, MITRAL VALVE REPAIR CONVERTED TO MITRAL VALVE REPLACEMENT, LEFT ATRIAL APPENDAGE LIGATION, ANESTHESIA TRASESOPHAGEAL ULTRASOUND       Diagnosis: Coronary artery disease involving native coronary artery of native heart with angina pectoris (H) [I25.119]  Diagnosis Additional Information: No value filed.    Anesthesia Type:   General     Note:    Oropharynx: endotracheal tube in place  Level of Consciousness: iatrogenic sedation    Level of Supplemental Oxygen (L/min / FiO2): 10  Independent Airway: airway patency not satisfactory and stable  Dentition: dentition unchanged  Vital Signs Stable: post-procedure vital signs reviewed and stable  Report to RN Given: handoff report given  Patient transferred to: ICU  Comments: Transferred to ICU on monitors uneventfully with MAU and Dr. Peña.   ICU Handoff: Call for PAUSE to initiate/utilize ICU HANDOFF, Identified Patient, Identified Responsible Provider, Reviewed the Pertinent Medical History, Discussed Surgical Course, Reviewed Intra-OP Anesthesia Management and Issues during Anesthesia, Set Expectations for Post Procedure Period and Allowed Opportunity for Questions and Acknowledgement of Understanding      Vitals:  Vitals Value Taken Time   /59 10/08/24 1620   Temp 36.7  C (98.1  F) 10/08/24 1615   Pulse 79 10/08/24 1626   Resp 18 10/08/24 1615   SpO2 100 % 10/08/24 1626   Vitals shown include unfiled device data.    Electronically Signed By: RAFY Monique CRNA  October 8, 2024  4:27 PM

## 2024-10-09 ENCOUNTER — APPOINTMENT (OUTPATIENT)
Dept: ULTRASOUND IMAGING | Facility: HOSPITAL | Age: 49
DRG: 219 | End: 2024-10-09
Payer: COMMERCIAL

## 2024-10-09 ENCOUNTER — APPOINTMENT (OUTPATIENT)
Dept: RADIOLOGY | Facility: HOSPITAL | Age: 49
DRG: 219 | End: 2024-10-09
Payer: COMMERCIAL

## 2024-10-09 LAB
ALBUMIN SERPL BCG-MCNC: 3.1 G/DL (ref 3.5–5.2)
ALBUMIN SERPL BCG-MCNC: 3.2 G/DL (ref 3.5–5.2)
ALP SERPL-CCNC: 53 U/L (ref 40–150)
ALP SERPL-CCNC: 56 U/L (ref 40–150)
ALP SERPL-CCNC: 58 U/L (ref 40–150)
ALP SERPL-CCNC: 65 U/L (ref 40–150)
ALT SERPL W P-5'-P-CCNC: 1157 U/L (ref 0–50)
ALT SERPL W P-5'-P-CCNC: 3823 U/L (ref 0–50)
ALT SERPL W P-5'-P-CCNC: 4748 U/L (ref 0–50)
ALT SERPL W P-5'-P-CCNC: 5698 U/L (ref 0–50)
AMMONIA PLAS-SCNC: 89 UMOL/L (ref 11–51)
ANION GAP SERPL CALCULATED.3IONS-SCNC: 10 MMOL/L (ref 7–15)
ANION GAP SERPL CALCULATED.3IONS-SCNC: 11 MMOL/L (ref 7–15)
ANION GAP SERPL CALCULATED.3IONS-SCNC: 12 MMOL/L (ref 7–15)
ANION GAP SERPL CALCULATED.3IONS-SCNC: 15 MMOL/L (ref 7–15)
APAP SERPL-MCNC: <5 UG/ML (ref 10–30)
APTT PPP: 48 SECONDS (ref 22–38)
AST SERPL W P-5'-P-CCNC: 1058 U/L (ref 0–45)
AST SERPL W P-5'-P-CCNC: 4639 U/L (ref 0–45)
AST SERPL W P-5'-P-CCNC: >7000 U/L (ref 0–45)
AST SERPL W P-5'-P-CCNC: >7000 U/L (ref 0–45)
ATRIAL RATE - MUSE: 94 BPM
BASE EXCESS BLDA CALC-SCNC: -2.9 MMOL/L (ref -3–3)
BASE EXCESS BLDA CALC-SCNC: -7.4 MMOL/L (ref -3–3)
BILIRUB SERPL-MCNC: 0.4 MG/DL
BILIRUB SERPL-MCNC: 0.5 MG/DL
BILIRUB SERPL-MCNC: 0.6 MG/DL
BILIRUB SERPL-MCNC: 0.7 MG/DL
BUN SERPL-MCNC: 21.5 MG/DL (ref 6–20)
BUN SERPL-MCNC: 24.9 MG/DL (ref 6–20)
BUN SERPL-MCNC: 28.4 MG/DL (ref 6–20)
BUN SERPL-MCNC: 31.2 MG/DL (ref 6–20)
CA-I BLD-MCNC: 4.4 MG/DL (ref 4.4–5.2)
CA-I BLD-MCNC: 4.5 MG/DL (ref 4.4–5.2)
CA-I BLD-MCNC: 4.5 MG/DL (ref 4.4–5.2)
CALCIUM SERPL-MCNC: 7.7 MG/DL (ref 8.8–10.4)
CALCIUM SERPL-MCNC: 7.8 MG/DL (ref 8.8–10.4)
CALCIUM SERPL-MCNC: 7.8 MG/DL (ref 8.8–10.4)
CALCIUM SERPL-MCNC: 7.9 MG/DL (ref 8.8–10.4)
CHLORIDE SERPL-SCNC: 104 MMOL/L (ref 98–107)
CHLORIDE SERPL-SCNC: 105 MMOL/L (ref 98–107)
CHLORIDE SERPL-SCNC: 106 MMOL/L (ref 98–107)
CHLORIDE SERPL-SCNC: 110 MMOL/L (ref 98–107)
COHGB MFR BLD: 95.8 % (ref 96–97)
COHGB MFR BLD: 96.1 % (ref 96–97)
CREAT SERPL-MCNC: 1.15 MG/DL (ref 0.51–0.95)
CREAT SERPL-MCNC: 1.19 MG/DL (ref 0.51–0.95)
CREAT SERPL-MCNC: 1.52 MG/DL (ref 0.51–0.95)
CREAT SERPL-MCNC: 1.86 MG/DL (ref 0.51–0.95)
DIASTOLIC BLOOD PRESSURE - MUSE: NORMAL MMHG
EGFRCR SERPLBLD CKD-EPI 2021: 33 ML/MIN/1.73M2
EGFRCR SERPLBLD CKD-EPI 2021: 42 ML/MIN/1.73M2
EGFRCR SERPLBLD CKD-EPI 2021: 56 ML/MIN/1.73M2
EGFRCR SERPLBLD CKD-EPI 2021: 58 ML/MIN/1.73M2
ERYTHROCYTE [DISTWIDTH] IN BLOOD BY AUTOMATED COUNT: 16.2 % (ref 10–15)
ERYTHROCYTE [DISTWIDTH] IN BLOOD BY AUTOMATED COUNT: 16.2 % (ref 10–15)
ERYTHROCYTE [DISTWIDTH] IN BLOOD BY AUTOMATED COUNT: 16.3 % (ref 10–15)
GLUCOSE BLDC GLUCOMTR-MCNC: 102 MG/DL (ref 70–99)
GLUCOSE BLDC GLUCOMTR-MCNC: 108 MG/DL (ref 70–99)
GLUCOSE BLDC GLUCOMTR-MCNC: 110 MG/DL (ref 70–99)
GLUCOSE BLDC GLUCOMTR-MCNC: 112 MG/DL (ref 70–99)
GLUCOSE BLDC GLUCOMTR-MCNC: 113 MG/DL (ref 70–99)
GLUCOSE BLDC GLUCOMTR-MCNC: 114 MG/DL (ref 70–99)
GLUCOSE BLDC GLUCOMTR-MCNC: 118 MG/DL (ref 70–99)
GLUCOSE BLDC GLUCOMTR-MCNC: 118 MG/DL (ref 70–99)
GLUCOSE BLDC GLUCOMTR-MCNC: 121 MG/DL (ref 70–99)
GLUCOSE BLDC GLUCOMTR-MCNC: 121 MG/DL (ref 70–99)
GLUCOSE BLDC GLUCOMTR-MCNC: 122 MG/DL (ref 70–99)
GLUCOSE BLDC GLUCOMTR-MCNC: 123 MG/DL (ref 70–99)
GLUCOSE BLDC GLUCOMTR-MCNC: 129 MG/DL (ref 70–99)
GLUCOSE BLDC GLUCOMTR-MCNC: 138 MG/DL (ref 70–99)
GLUCOSE BLDC GLUCOMTR-MCNC: 141 MG/DL (ref 70–99)
GLUCOSE BLDC GLUCOMTR-MCNC: 143 MG/DL (ref 70–99)
GLUCOSE BLDC GLUCOMTR-MCNC: 159 MG/DL (ref 70–99)
GLUCOSE BLDC GLUCOMTR-MCNC: 61 MG/DL (ref 70–99)
GLUCOSE BLDC GLUCOMTR-MCNC: 80 MG/DL (ref 70–99)
GLUCOSE BLDC GLUCOMTR-MCNC: 83 MG/DL (ref 70–99)
GLUCOSE BLDC GLUCOMTR-MCNC: 88 MG/DL (ref 70–99)
GLUCOSE BLDC GLUCOMTR-MCNC: 94 MG/DL (ref 70–99)
GLUCOSE BLDC GLUCOMTR-MCNC: 97 MG/DL (ref 70–99)
GLUCOSE SERPL-MCNC: 116 MG/DL (ref 70–99)
GLUCOSE SERPL-MCNC: 123 MG/DL (ref 70–99)
GLUCOSE SERPL-MCNC: 135 MG/DL (ref 70–99)
GLUCOSE SERPL-MCNC: 180 MG/DL (ref 70–99)
HCO3 BLD-SCNC: 19 MMOL/L (ref 21–28)
HCO3 BLD-SCNC: 23 MMOL/L (ref 21–28)
HCO3 SERPL-SCNC: 18 MMOL/L (ref 22–29)
HCO3 SERPL-SCNC: 20 MMOL/L (ref 22–29)
HCO3 SERPL-SCNC: 22 MMOL/L (ref 22–29)
HCO3 SERPL-SCNC: 23 MMOL/L (ref 22–29)
HCT VFR BLD AUTO: 27.3 % (ref 35–47)
HCT VFR BLD AUTO: 27.5 % (ref 35–47)
HCT VFR BLD AUTO: 29.8 % (ref 35–47)
HGB BLD-MCNC: 8.9 G/DL (ref 11.7–15.7)
HGB BLD-MCNC: 9.1 G/DL (ref 11.7–15.7)
HGB BLD-MCNC: 9.9 G/DL (ref 11.7–15.7)
INR PPP: 2.09 (ref 0.85–1.15)
INTERPRETATION ECG - MUSE: NORMAL
LACTATE SERPL-SCNC: 1.9 MMOL/L (ref 0.7–2)
LACTATE SERPL-SCNC: 5 MMOL/L (ref 0.7–2)
LIPASE SERPL-CCNC: 100 U/L (ref 13–60)
MAGNESIUM SERPL-MCNC: 2.7 MG/DL (ref 1.7–2.3)
MAGNESIUM SERPL-MCNC: 2.7 MG/DL (ref 1.7–2.3)
MCH RBC QN AUTO: 29.3 PG (ref 26.5–33)
MCH RBC QN AUTO: 29.4 PG (ref 26.5–33)
MCH RBC QN AUTO: 29.7 PG (ref 26.5–33)
MCHC RBC AUTO-ENTMCNC: 32.4 G/DL (ref 31.5–36.5)
MCHC RBC AUTO-ENTMCNC: 33.2 G/DL (ref 31.5–36.5)
MCHC RBC AUTO-ENTMCNC: 33.3 G/DL (ref 31.5–36.5)
MCV RBC AUTO: 88 FL (ref 78–100)
MCV RBC AUTO: 89 FL (ref 78–100)
MCV RBC AUTO: 91 FL (ref 78–100)
O2/TOTAL GAS SETTING VFR VENT: 21 %
O2/TOTAL GAS SETTING VFR VENT: 23 %
P AXIS - MUSE: NORMAL DEGREES
PCO2 BLD: 39 MM HG (ref 35–45)
PCO2 BLD: 46 MM HG (ref 35–45)
PH BLD: 7.29 [PH] (ref 7.35–7.45)
PH BLD: 7.31 [PH] (ref 7.35–7.45)
PHOSPHATE SERPL-MCNC: 5.1 MG/DL (ref 2.5–4.5)
PHOSPHATE SERPL-MCNC: 6.1 MG/DL (ref 2.5–4.5)
PLATELET # BLD AUTO: 138 10E3/UL (ref 150–450)
PLATELET # BLD AUTO: 149 10E3/UL (ref 150–450)
PLATELET # BLD AUTO: 151 10E3/UL (ref 150–450)
PO2 BLD: 86 MM HG (ref 80–105)
PO2 BLD: 92 MM HG (ref 80–105)
POTASSIUM SERPL-SCNC: 5.2 MMOL/L (ref 3.4–5.3)
POTASSIUM SERPL-SCNC: 5.4 MMOL/L (ref 3.4–5.3)
POTASSIUM SERPL-SCNC: 5.6 MMOL/L (ref 3.4–5.3)
POTASSIUM SERPL-SCNC: 5.9 MMOL/L (ref 3.4–5.3)
PR INTERVAL - MUSE: NORMAL MS
PROCALCITONIN SERPL IA-MCNC: 0.76 NG/ML
PROT SERPL-MCNC: 4.2 G/DL (ref 6.4–8.3)
PROT SERPL-MCNC: 4.5 G/DL (ref 6.4–8.3)
PROT SERPL-MCNC: 4.5 G/DL (ref 6.4–8.3)
PROT SERPL-MCNC: 4.7 G/DL (ref 6.4–8.3)
QRS DURATION - MUSE: 86 MS
QT - MUSE: 368 MS
QTC - MUSE: 442 MS
R AXIS - MUSE: 59 DEGREES
RBC # BLD AUTO: 3.03 10E6/UL (ref 3.8–5.2)
RBC # BLD AUTO: 3.06 10E6/UL (ref 3.8–5.2)
RBC # BLD AUTO: 3.38 10E6/UL (ref 3.8–5.2)
SAO2 % BLDA: 94 % (ref 92–100)
SAO2 % BLDA: 95 % (ref 92–100)
SODIUM SERPL-SCNC: 137 MMOL/L (ref 135–145)
SODIUM SERPL-SCNC: 138 MMOL/L (ref 135–145)
SODIUM SERPL-SCNC: 138 MMOL/L (ref 135–145)
SODIUM SERPL-SCNC: 143 MMOL/L (ref 135–145)
SYSTOLIC BLOOD PRESSURE - MUSE: NORMAL MMHG
T AXIS - MUSE: 24 DEGREES
VENTRICULAR RATE- MUSE: 87 BPM
WBC # BLD AUTO: 19.7 10E3/UL (ref 4–11)
WBC # BLD AUTO: 20.7 10E3/UL (ref 4–11)
WBC # BLD AUTO: 22 10E3/UL (ref 4–11)

## 2024-10-09 PROCEDURE — P9045 ALBUMIN (HUMAN), 5%, 250 ML: HCPCS | Performed by: SURGERY

## 2024-10-09 PROCEDURE — 82330 ASSAY OF CALCIUM: CPT

## 2024-10-09 PROCEDURE — 999N000157 HC STATISTIC RCP TIME EA 10 MIN

## 2024-10-09 PROCEDURE — 94640 AIRWAY INHALATION TREATMENT: CPT

## 2024-10-09 PROCEDURE — 250N000009 HC RX 250

## 2024-10-09 PROCEDURE — 250N000011 HC RX IP 250 OP 636

## 2024-10-09 PROCEDURE — 71045 X-RAY EXAM CHEST 1 VIEW: CPT

## 2024-10-09 PROCEDURE — 999N000156 HC STATISTIC RCP CONSULT EA 30 MIN

## 2024-10-09 PROCEDURE — 250N000013 HC RX MED GY IP 250 OP 250 PS 637

## 2024-10-09 PROCEDURE — 86709 HEPATITIS A IGM ANTIBODY: CPT

## 2024-10-09 PROCEDURE — 250N000009 HC RX 250: Performed by: SURGERY

## 2024-10-09 PROCEDURE — 258N000001 HC RX 258

## 2024-10-09 PROCEDURE — 83735 ASSAY OF MAGNESIUM: CPT | Performed by: SURGERY

## 2024-10-09 PROCEDURE — 82550 ASSAY OF CK (CPK): CPT | Performed by: INTERNAL MEDICINE

## 2024-10-09 PROCEDURE — 83735 ASSAY OF MAGNESIUM: CPT

## 2024-10-09 PROCEDURE — 76705 ECHO EXAM OF ABDOMEN: CPT

## 2024-10-09 PROCEDURE — 82140 ASSAY OF AMMONIA: CPT

## 2024-10-09 PROCEDURE — 94640 AIRWAY INHALATION TREATMENT: CPT | Mod: 76

## 2024-10-09 PROCEDURE — 85027 COMPLETE CBC AUTOMATED: CPT | Performed by: SURGERY

## 2024-10-09 PROCEDURE — 200N000001 HC R&B ICU

## 2024-10-09 PROCEDURE — 82805 BLOOD GASES W/O2 SATURATION: CPT

## 2024-10-09 PROCEDURE — 82040 ASSAY OF SERUM ALBUMIN: CPT

## 2024-10-09 PROCEDURE — 258N000003 HC RX IP 258 OP 636

## 2024-10-09 PROCEDURE — 84155 ASSAY OF PROTEIN SERUM: CPT

## 2024-10-09 PROCEDURE — 84100 ASSAY OF PHOSPHORUS: CPT

## 2024-10-09 PROCEDURE — 83690 ASSAY OF LIPASE: CPT | Performed by: INTERNAL MEDICINE

## 2024-10-09 PROCEDURE — 250N000011 HC RX IP 250 OP 636: Performed by: SURGERY

## 2024-10-09 PROCEDURE — 82805 BLOOD GASES W/O2 SATURATION: CPT | Performed by: SURGERY

## 2024-10-09 PROCEDURE — 85610 PROTHROMBIN TIME: CPT

## 2024-10-09 PROCEDURE — 85730 THROMBOPLASTIN TIME PARTIAL: CPT

## 2024-10-09 PROCEDURE — 85027 COMPLETE CBC AUTOMATED: CPT

## 2024-10-09 PROCEDURE — 99291 CRITICAL CARE FIRST HOUR: CPT | Performed by: INTERNAL MEDICINE

## 2024-10-09 PROCEDURE — 80143 DRUG ASSAY ACETAMINOPHEN: CPT

## 2024-10-09 PROCEDURE — 250N000013 HC RX MED GY IP 250 OP 250 PS 637: Performed by: SURGERY

## 2024-10-09 PROCEDURE — 84145 PROCALCITONIN (PCT): CPT | Performed by: INTERNAL MEDICINE

## 2024-10-09 PROCEDURE — 82330 ASSAY OF CALCIUM: CPT | Performed by: SURGERY

## 2024-10-09 PROCEDURE — 94660 CPAP INITIATION&MGMT: CPT

## 2024-10-09 PROCEDURE — 93005 ELECTROCARDIOGRAM TRACING: CPT

## 2024-10-09 PROCEDURE — 93010 ELECTROCARDIOGRAM REPORT: CPT | Performed by: INTERNAL MEDICINE

## 2024-10-09 PROCEDURE — 84100 ASSAY OF PHOSPHORUS: CPT | Performed by: SURGERY

## 2024-10-09 PROCEDURE — 83605 ASSAY OF LACTIC ACID: CPT | Performed by: SURGERY

## 2024-10-09 PROCEDURE — 250N000009 HC RX 250: Performed by: INTERNAL MEDICINE

## 2024-10-09 RX ORDER — CALCIUM GLUCONATE 20 MG/ML
1 INJECTION, SOLUTION INTRAVENOUS ONCE
Status: COMPLETED | OUTPATIENT
Start: 2024-10-09 | End: 2024-10-09

## 2024-10-09 RX ORDER — HEPARIN SODIUM 10000 [USP'U]/100ML
500 INJECTION, SOLUTION INTRAVENOUS CONTINUOUS
Status: DISCONTINUED | OUTPATIENT
Start: 2024-10-09 | End: 2024-10-10

## 2024-10-09 RX ORDER — KETOROLAC TROMETHAMINE 30 MG/ML
30 INJECTION, SOLUTION INTRAMUSCULAR; INTRAVENOUS EVERY 6 HOURS PRN
Status: DISCONTINUED | OUTPATIENT
Start: 2024-10-09 | End: 2024-10-09

## 2024-10-09 RX ADMIN — OXYCODONE HYDROCHLORIDE 5 MG: 5 TABLET ORAL at 08:09

## 2024-10-09 RX ADMIN — BUPROPION HYDROCHLORIDE 300 MG: 300 TABLET, EXTENDED RELEASE ORAL at 08:09

## 2024-10-09 RX ADMIN — DEXTROSE MONOHYDRATE 50 ML: 25 INJECTION, SOLUTION INTRAVENOUS at 22:20

## 2024-10-09 RX ADMIN — SODIUM BICARBONATE 50 MEQ: 84 INJECTION, SOLUTION INTRAVENOUS at 23:41

## 2024-10-09 RX ADMIN — BUDESONIDE 1 MG: 0.5 INHALANT ORAL at 07:18

## 2024-10-09 RX ADMIN — OXYCODONE HYDROCHLORIDE 5 MG: 5 TABLET ORAL at 03:14

## 2024-10-09 RX ADMIN — PANTOPRAZOLE SODIUM 40 MG: 40 TABLET, DELAYED RELEASE ORAL at 08:09

## 2024-10-09 RX ADMIN — BUDESONIDE 1 MG: 0.5 INHALANT ORAL at 19:58

## 2024-10-09 RX ADMIN — Medication 1.9 MCG/KG/MIN: at 20:46

## 2024-10-09 RX ADMIN — Medication 125 MCG: at 08:09

## 2024-10-09 RX ADMIN — SODIUM CHLORIDE, POTASSIUM CHLORIDE, SODIUM LACTATE AND CALCIUM CHLORIDE 250 ML: 600; 310; 30; 20 INJECTION, SOLUTION INTRAVENOUS at 01:36

## 2024-10-09 RX ADMIN — SENNOSIDES AND DOCUSATE SODIUM 1 TABLET: 8.6; 5 TABLET ORAL at 08:09

## 2024-10-09 RX ADMIN — CALCIUM GLUCONATE 1 G: 20 INJECTION, SOLUTION INTRAVENOUS at 01:23

## 2024-10-09 RX ADMIN — HYDROMORPHONE HYDROCHLORIDE 0.4 MG: 0.2 INJECTION, SOLUTION INTRAMUSCULAR; INTRAVENOUS; SUBCUTANEOUS at 05:41

## 2024-10-09 RX ADMIN — POLYETHYLENE GLYCOL 3350 17 G: 17 POWDER, FOR SOLUTION ORAL at 08:09

## 2024-10-09 RX ADMIN — SODIUM CHLORIDE, POTASSIUM CHLORIDE, SODIUM LACTATE AND CALCIUM CHLORIDE 250 ML: 600; 310; 30; 20 INJECTION, SOLUTION INTRAVENOUS at 01:11

## 2024-10-09 RX ADMIN — HYDROMORPHONE HYDROCHLORIDE 0.2 MG: 0.2 INJECTION, SOLUTION INTRAMUSCULAR; INTRAVENOUS; SUBCUTANEOUS at 15:48

## 2024-10-09 RX ADMIN — SODIUM CHLORIDE, POTASSIUM CHLORIDE, SODIUM LACTATE AND CALCIUM CHLORIDE 250 ML: 600; 310; 30; 20 INJECTION, SOLUTION INTRAVENOUS at 03:24

## 2024-10-09 RX ADMIN — HYDROMORPHONE HYDROCHLORIDE 0.4 MG: 0.2 INJECTION, SOLUTION INTRAMUSCULAR; INTRAVENOUS; SUBCUTANEOUS at 00:27

## 2024-10-09 RX ADMIN — SODIUM CHLORIDE 1500 MG: 9 INJECTION, SOLUTION INTRAVENOUS at 13:06

## 2024-10-09 RX ADMIN — SODIUM CHLORIDE 1500 MG: 9 INJECTION, SOLUTION INTRAVENOUS at 01:17

## 2024-10-09 RX ADMIN — LIDOCAINE 2 PATCH: 4 PATCH TOPICAL at 16:03

## 2024-10-09 RX ADMIN — Medication 1.7 MCG/KG/MIN: at 15:49

## 2024-10-09 RX ADMIN — HEPARIN SODIUM 500 UNITS/HR: 10000 INJECTION, SOLUTION INTRAVENOUS at 12:12

## 2024-10-09 RX ADMIN — CALCIUM GLUCONATE 1 G: 20 INJECTION, SOLUTION INTRAVENOUS at 04:41

## 2024-10-09 RX ADMIN — KETOROLAC TROMETHAMINE 30 MG: 30 INJECTION, SOLUTION INTRAMUSCULAR at 01:14

## 2024-10-09 RX ADMIN — ALBUMIN HUMAN 12.5 G: 0.05 INJECTION, SOLUTION INTRAVENOUS at 22:56

## 2024-10-09 RX ADMIN — SENNOSIDES AND DOCUSATE SODIUM 1 TABLET: 8.6; 5 TABLET ORAL at 20:22

## 2024-10-09 RX ADMIN — CEFAZOLIN SODIUM 2 G: 2 INJECTION, SOLUTION INTRAVENOUS at 11:09

## 2024-10-09 RX ADMIN — CETIRIZINE HYDROCHLORIDE 10 MG: 10 TABLET, FILM COATED ORAL at 08:09

## 2024-10-09 RX ADMIN — HYDROMORPHONE HYDROCHLORIDE 0.2 MG: 0.2 INJECTION, SOLUTION INTRAMUSCULAR; INTRAVENOUS; SUBCUTANEOUS at 08:48

## 2024-10-09 RX ADMIN — HYDROMORPHONE HYDROCHLORIDE 0.2 MG: 0.2 INJECTION, SOLUTION INTRAMUSCULAR; INTRAVENOUS; SUBCUTANEOUS at 12:17

## 2024-10-09 RX ADMIN — HYDROMORPHONE HYDROCHLORIDE 0.2 MG: 0.2 INJECTION, SOLUTION INTRAMUSCULAR; INTRAVENOUS; SUBCUTANEOUS at 18:47

## 2024-10-09 RX ADMIN — SODIUM BICARBONATE 50 MEQ: 84 INJECTION, SOLUTION INTRAVENOUS at 22:56

## 2024-10-09 RX ADMIN — SODIUM CHLORIDE, POTASSIUM CHLORIDE, SODIUM LACTATE AND CALCIUM CHLORIDE 250 ML: 600; 310; 30; 20 INJECTION, SOLUTION INTRAVENOUS at 02:30

## 2024-10-09 RX ADMIN — LEVOTHYROXINE SODIUM 125 MCG: 0.03 TABLET ORAL at 07:13

## 2024-10-09 RX ADMIN — Medication 50 MEQ: at 00:05

## 2024-10-09 RX ADMIN — CALCIUM GLUCONATE 1 G: 20 INJECTION, SOLUTION INTRAVENOUS at 12:08

## 2024-10-09 RX ADMIN — Medication 0.9 MCG/KG/MIN: at 09:57

## 2024-10-09 RX ADMIN — FLUTICASONE FUROATE AND VILANTEROL TRIFENATATE 1 PUFF: 100; 25 POWDER RESPIRATORY (INHALATION) at 23:49

## 2024-10-09 RX ADMIN — HEPARIN SODIUM 5000 UNITS: 10000 INJECTION, SOLUTION INTRAVENOUS; SUBCUTANEOUS at 11:09

## 2024-10-09 RX ADMIN — SODIUM BICARBONATE 50 MEQ: 84 INJECTION, SOLUTION INTRAVENOUS at 00:05

## 2024-10-09 RX ADMIN — Medication 1 TABLET: at 11:09

## 2024-10-09 RX ADMIN — SODIUM CHLORIDE, POTASSIUM CHLORIDE, SODIUM LACTATE AND CALCIUM CHLORIDE 250 ML: 600; 310; 30; 20 INJECTION, SOLUTION INTRAVENOUS at 00:19

## 2024-10-09 RX ADMIN — CLOPIDOGREL BISULFATE 75 MG: 75 TABLET ORAL at 08:09

## 2024-10-09 RX ADMIN — CEFAZOLIN SODIUM 2 G: 2 INJECTION, SOLUTION INTRAVENOUS at 03:20

## 2024-10-09 RX ADMIN — SODIUM CHLORIDE, POTASSIUM CHLORIDE, SODIUM LACTATE AND CALCIUM CHLORIDE 250 ML: 600; 310; 30; 20 INJECTION, SOLUTION INTRAVENOUS at 04:17

## 2024-10-09 ASSESSMENT — ACTIVITIES OF DAILY LIVING (ADL)
ADLS_ACUITY_SCORE: 38
ADLS_ACUITY_SCORE: 36
ADLS_ACUITY_SCORE: 38
ADLS_ACUITY_SCORE: 36
ADLS_ACUITY_SCORE: 36
ADLS_ACUITY_SCORE: 30
ADLS_ACUITY_SCORE: 38
ADLS_ACUITY_SCORE: 38
ADLS_ACUITY_SCORE: 34
DEPENDENT_IADLS:: INDEPENDENT
ADLS_ACUITY_SCORE: 39
ADLS_ACUITY_SCORE: 41
ADLS_ACUITY_SCORE: 38
ADLS_ACUITY_SCORE: 39
ADLS_ACUITY_SCORE: 36
ADLS_ACUITY_SCORE: 38
ADLS_ACUITY_SCORE: 34
ADLS_ACUITY_SCORE: 41
ADLS_ACUITY_SCORE: 34
ADLS_ACUITY_SCORE: 39
ADLS_ACUITY_SCORE: 36
ADLS_ACUITY_SCORE: 41

## 2024-10-09 NOTE — PLAN OF CARE
[facility] - ICU    RN Progress Note:            Pertinent Assessments:      Please refer to flowsheet rows for full assessment     Alert and oriented x4, fatigued. Numbness in hands and left foot, improving. Hoarse voice.    NSR, pulses palpable. Pericardial friction rub.    Clear diminished lung sounds. On home CPAP with 2L bled in, shallow breaths.    Hypoactive bowel sounds.    Padron in place, urine john, oliguric.    Incisions CDI, approximated.           Key Events - This Shift:         CT EXTUBATION FAST TRACK:    [facility] - ICU     FastTrack Candidate: No,  Extubation Goal Time ( 24 Hours ) 1609     Patient Status: Extubated     Patient got extubated at midnight. Initially patient had significant pain and difficulty taking deep breaths. Once pain controlled patient breathing more normally, still shallow.      Significant pain after extubation, toradol ordered and given as well as dilaudid and oxy, toradol helped the most.    Epi infusing at 0.02, katie at 0.5, insulin at 1 (algorithm 2). 1.5L of LR given on my shift (3L total since arrival to ICU).    Chest tube output: 10-20 q1h from mediastinal (330 in atrium, 150 out in my 8 hours) and 5-15 from pleural (190 in atrium, 68 in my hours), sanguinous.    Urine output has been low about 40 q1h (275 out in my 8 hours).    Attempted dangle around 0545, patient fainted and became apneic for about 1 minute, breaths given with ambu bag. Patient is currently in bed in sitting position. She is now more drowsy than prior, otherwise neuro same as prior.                Barriers to Discharge / Downgrade:     Gtts, lines         Point of Contact Update: YES-OR-NO: Yes    Name:Mia Ambrose  Phone Number:spoke in person  Summary of Conversation: Explained cares as they were happening, once patient got extubated and stabilized, Mia left. Mia said she would be back in the AM.         Problem: Cardiovascular Surgery  Goal:  Effective Oxygenation and Ventilation  Outcome: Progressing  Intervention: Promote Airway Secretion Clearance  Recent Flowsheet Documentation  Taken 10/9/2024 0000 by Mireya Nagel RN  Cough And Deep Breathing: done with encouragement  Airway/Ventilation Management:   airway patency maintained   calming measures promoted   pulmonary hygiene promoted  Intervention: Optimize Oxygenation and Ventilation  Recent Flowsheet Documentation  Taken 10/9/2024 0000 by Mireya Nagel, RN  Chest Tube Safety:   all connections secured   petroleum gauze dressing with patient   rubber-tipped hemostat(s) with patient   suction checked     Problem: Cardiovascular Surgery  Goal: Effective Urinary Elimination  Outcome: Progressing  Intervention: Monitor and Manage Urinary Retention  Recent Flowsheet Documentation  Taken 10/9/2024 0000 by Mireya Nagel, RN  Urinary Elimination Promotion:   absorbent pad/diaper use encouraged   catheter patency maintained     Problem: Cardiovascular Surgery  Goal: Acceptable Pain Control  Outcome: Progressing  Intervention: Prevent or Manage Pain  Recent Flowsheet Documentation  Taken 10/9/2024 0027 by Mireya Nagel RN  Pain Management Interventions: medication (see MAR)     Problem: Cardiovascular Surgery  Goal: Anesthesia/Sedation Recovery  Outcome: Progressing  Intervention: Optimize Anesthesia Recovery  Recent Flowsheet Documentation  Taken 10/9/2024 0000 by Mireya Nagel, RN  Safety Promotion/Fall Prevention:   activity supervised   clutter free environment maintained   increased rounding and observation   increase visualization of patient   lighting adjusted   nonskid shoes/slippers when out of bed   patient and family education   room door open   room near nurse's station   room organization consistent   safety round/check completed   supervised activity  Reorientation Measures: reorientation provided  Stabilization Measures: fluid resuscitation initiated     Problem:  Cardiovascular Surgery  Goal: Blood Glucose Level Within Targeted Range  Outcome: Progressing  Intervention: Optimize Glycemic Control  Recent Flowsheet Documentation  Taken 10/9/2024 0000 by Mireya Nagel RN  Glycemic Management:   blood glucose monitored   insulin infusion adjusted     Problem: Cardiovascular Surgery  Goal: Fluid and Electrolyte Balance  Outcome: Progressing  Intervention: Monitor and Manage Fluid and Electrolyte Balance  Recent Flowsheet Documentation  Taken 10/9/2024 0000 by Mireya Nagel RN  Fluid/Electrolyte Management:   electrolyte supplement adjusted   intravenous fluids adjusted     Problem: Cardiovascular Surgery  Goal: Optimal Cerebral Tissue Perfusion  Outcome: Progressing  Intervention: Protect and Optimize Cerebral Perfusion  Recent Flowsheet Documentation  Taken 10/9/2024 0200 by Mireya Nagel RN  Head of Bed (HOB) Positioning: HOB at 30 degrees  Taken 10/9/2024 0000 by Mireya Nagel RN  Sensory Stimulation Regulation:   care clustered   lighting decreased   quiet environment promoted  Cerebral Perfusion Promotion:   blood pressure monitored   normothermia promoted  Glycemic Management:   blood glucose monitored   insulin infusion adjusted  Head of Bed (HOB) Positioning: HOB at 30 degrees     Problem: Cardiovascular Surgery  Goal: Effective Cardiac Function  Outcome: Progressing  Intervention: Optimize Cardiac Output and Blood Flow  Recent Flowsheet Documentation  Taken 10/9/2024 0000 by Mireya Nagel RN  Dysrhythmia Management: pacing wires maintained  Stabilization Measures: fluid resuscitation initiated     Problem: Cardiovascular Surgery  Goal: Absence of Bleeding  Outcome: Progressing  Intervention: Monitor and Manage Bleeding  Recent Flowsheet Documentation  Taken 10/9/2024 0000 by Mireya Nagel RN  Bleeding Management: dressing monitored     Problem: Cardiovascular Surgery  Goal: Improved Activity Tolerance  Outcome: Progressing  Intervention:  Optimize Tolerance for Activity  Recent Flowsheet Documentation  Taken 10/9/2024 0000 by Mireya Nagel RN  Environmental Support: calm environment promoted     Problem: Restraint, Nonviolent  Goal: Absence of Harm or Injury  Intervention: Protect Skin and Joint Integrity  Recent Flowsheet Documentation  Taken 10/9/2024 0200 by Mireya Nagel, RN  Body Position: weight shifting  Taken 10/9/2024 0000 by Mireya Nagel RN  Body Position: weight shifting  Skin Protection:   adhesive use limited   incontinence pads utilized   pulse oximeter probe site changed   silicone foam dressing in place   skin to device areas padded   skin to skin areas padded   transparent dressing maintained     Problem: Adult Inpatient Plan of Care  Goal: Absence of Hospital-Acquired Illness or Injury  Intervention: Prevent and Manage VTE (Venous Thromboembolism) Risk  Recent Flowsheet Documentation  Taken 10/9/2024 0000 by Mireya Nagel RN  VTE Prevention/Management: SCDs on (sequential compression devices)

## 2024-10-09 NOTE — PROGRESS NOTES
Patient weaned on PS 5/5 and extubated per MD orders.  Currently on room air.  Patient able to vocalize, no stridor auscultated.  Non-productive cough.          /63   Pulse 91   Temp 97.1  F (36.2  C) (Axillary)   Resp 16   Wt 100.3 kg (221 lb 3.2 oz)   LMP  (LMP Unknown)   SpO2 91%   BMI 37.97 kg/m

## 2024-10-09 NOTE — CONSULTS
Care Management Initial Consult    General Information  Assessment completed with: Patient,    Type of CM/SW Visit: Initial Assessment    Primary Care Provider verified and updated as needed: Yes   Readmission within the last 30 days: no previous admission in last 30 days         Advance Care Planning: Advance Care Planning Reviewed:  (has forms, needs to complete)          Communication Assessment  Patient's communication style: spoken language (English or Bilingual)    Hearing Difficulty or Deaf: no   Wear Glasses or Blind: no    Cognitive  Cognitive/Neuro/Behavioral: .WDL except, speech, level of consciousness  Level of Consciousness: lethargic  Arousal Level: opens eyes spontaneously  Orientation: oriented x 4  Mood/Behavior: calm, cooperative  Best Language: 0 - No aphasia  Speech: spontaneous, logical, hoarse    Living Environment:   People in home: spouse   Yasir  Current living Arrangements: apartment      Able to return to prior arrangements: yes       Family/Social Support:  Care provided by: self  Provides care for: no one  Marital Status:   Support system: , Parent(s)          Description of Support System: Supportive, Involved         Current Resources:   Patient receiving home care services: No        Community Resources: None  Equipment currently used at home: walker, rolling (currently not using)  Supplies currently used at home: None    Employment/Financial:  Employment Status: employed full-time        Financial Concerns:             Does the patient's insurance plan have a 3 day qualifying hospital stay waiver?  No    Lifestyle & Psychosocial Needs:  Social Determinants of Health     Food Insecurity: Low Risk  (10/9/2024)    Food Insecurity     Within the past 12 months, did you worry that your food would run out before you got money to buy more?: No     Within the past 12 months, did the food you bought just not last and you didn t have money to get more?: No   Depression: Not  at risk (7/19/2024)    Received from [facility]    PHQ-2     PHQ-2 Total: 0   Housing Stability: Low Risk  (10/9/2024)    Housing Stability     Do you have housing? : Yes     Are you worried about losing your housing?: No   Tobacco Use: Low Risk  (10/8/2024)    Patient History     Smoking Tobacco Use: Never     Smokeless Tobacco Use: Never     Passive Exposure: Not on file   Financial Resource Strain: Low Risk  (10/9/2024)    Financial Resource Strain     Within the past 12 months, have you or your family members you live with been unable to get utilities (heat, electricity) when it was really needed?: No   Alcohol Use: Not At Risk (10/24/2023)    [facility], [facility]    AUDIT-C     Frequency of Alcohol Consumption: 2-4 times a month     Average Number of Drinks: 1 or 2     Frequency of Binge Drinking: Never   Transportation Needs: Low Risk  (10/9/2024)    Transportation Needs     Within the past 12 months, has lack of transportation kept you from medical appointments, getting your medicines, non-medical meetings or appointments, work, or from getting things that you need?: No   Physical Activity: Insufficiently Active (10/24/2023)    [facility]    Exercise Vital Sign     Days of Exercise per Week: 2 days     Minutes of Exercise per Session: 30 min   Interpersonal Safety: Low Risk  (10/8/2024)    Interpersonal Safety     Do you feel physically and emotionally safe where you currently live?: Yes     Within the past 12 months, have you been hit, slapped, kicked or otherwise physically hurt by someone?: No     Within the past 12 months, have you been humiliated or emotionally abused in other ways by your partner or ex-partner?: No   Stress: Stress Concern Present (10/24/2023)    Received from [facility]  [facility], [facility]    [employer] [place] [employer] - Occupational Stress Questionnaire     Feeling of Stress : To some extent   Social Connections: Moderately Integrated (10/24/2023)    Received from [facility]], [facility]    Social Connection and Isolation Panel [NHANES]     Frequency of Communication with Friends and Family: More than three times a week     Frequency of Social Gatherings with Friends and Family: Once a week     Attends L[employer] day Services: More than 4 times per year     Active Member of Clubs or Organizations: No     Attends Club or Organization Meetings: Patient declined     Marital Status:    Health Literacy: Not on file       Functional Status:  Prior to admission patient needed assistance:   Dependent ADLs:: Independent  Dependent IADLs:: Independent             Discussed  Partnership in Safe Discharge Planning  document with patient/family: No    Additional Information:    Assessment completed with patient; mom at bedside.  Patient reports she lives in her apartment (has elevator) with spouse.  She is independent with ADLs/IADLs, ambulates without devices, has no services in community and works full time. Spouse is primary family contact and family willing to transport at discharge.          Nahomy Cortez RN

## 2024-10-09 NOTE — PROGRESS NOTES
Patient was reverted back to AC/VC settings at 2215 due to increased work of breathing and fatigue. RT will attempt to wean at a later time. RT will follow.    Josh Cordero, RT

## 2024-10-09 NOTE — TREATMENT PLAN
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 BS diminished bases. O2 I-2 lpm/NC SpO2 96 %. HX GIA with home CPAP. CXR low volumes, Hx Asthma. Hodgkin's, Pt performing Flutter valve intermittently, RT will continue to assist with flutter valve. Pulmicort is a home regimen, PRN Albuterol. RT to follow    EXAM: XR CHEST PORT 1 VIEW  LOCATION: [facility]  DATE: 10/9/2024     INDICATION: Post Op CVTS Surgery  COMPARISON: 10/8/2024                                                                      IMPRESSION: Poststernotomy changes with prosthetic mitral valve. Patient's been extubated and the NG tube has been removed. Mediastinal and pleural chest tubes are in place.     Left IJ cordis sheath is in the distal left innominate artery. Catheter has a very subtle kink within it 3.5 centimeters from the tip.     Low lung volumes. No pneumothorax. Likely trace left effusion at the base. No signs of pneumonia or failure. Heart is of normal size    Sanket Vasquez, RT  10/9/2024

## 2024-10-09 NOTE — PROGRESS NOTES
Critical Care Sign Off Note     The critical care service will sign off. Please contact us for questions or re-engagement.     GENO Kaye MD  Critical Care Medicine

## 2024-10-09 NOTE — PLAN OF CARE
Goal Outcome Evaluation:      Plan of Care Reviewed With: patient    Overall Patient Progress: improvingOverall Patient Progress: improving    Outcome Evaluation: VSS. NSR. Afebrile. Weaning ongoing. Moves all extremities, followed commands. CT output appropriate. UO about 30-40 mL/hr. [facility] team notified. Denied pain.       CT EXTUBATION FAST TRACK:    [facility] - ICU     FastTrack Candidate: Yes, Extubation Goal Time ( 6 Hours ) 2123     Patient Status: Stable. Weaned x1, but wean stopped due to patient lethargic, and low Vt.  Remains Intubated Wean Ongoing

## 2024-10-09 NOTE — PROGRESS NOTES
CT EXTUBATION FAST TRACK:    [facility] - ICU     FastTrack Candidate: Yes, Extubation Goal Time ( 6 Hours )       Patient Status: Remains Intubated Wean Ongoing         Patient very anxious when waking, becomes significantly hypotensive. Slowly weaning sedation to allow for a more gentle awakening, however patient still anxious and hypotensive on 0.2 mcg/kg of Precedex.     Dory Funes RN

## 2024-10-09 NOTE — PROGRESS NOTES
CVTS Daily Progress Note   POD#1 s/p CABG x4 (LIMA>LAD, rSVG>RPDA, rSVG>LPL, rSVG>D2), RLE EVH, Attempted MVR/r, MVR ( 29 mm [employer] Mechanical Mitral Valve), LAAE  Attending: Mauro  LOS: 1    SUBJECTIVE/INTERVAL EVENTS:    Patient arrived to ICU from OR yesterday afternoon. She was subsequently extubated and is weaning from pressors. Lost consciousness and had ~60 seconds of apnea when dangling. Patient progressing overall well. Maintaining oxygen saturations on 2L NC. Normotensive on 0.02 epi, 0.7 katie. Ambulating with therapy deferred thus far d/t above. Fair pain control. - BM / flatus. Tolerating diet of clears w/o nausea. UOP adequate but borderline. Chest tube output appropriate. Hgb 9.9, received 2u PRBCs periop. LFTS in the 1000s, likely ischemic hepatopathy. Patient denies new chest pain, shortness of breath, abdominal pain, calf pain, nausea. Patient has no questions today.     OBJECTIVE:  Temp:  [96.8  F (36  C)-98.1  F (36.7  C)] 96.8  F (36  C)  Pulse:  [] 86  Resp:  [7-37] 19  BP: ()/(50-69) 88/53  MAP:  [48 mmHg-105 mmHg] 72 mmHg  Arterial Line BP: ()/(39-96) 114/54  FiO2 (%):  [30 %-80 %] 30 %  SpO2:  [81 %-100 %] 98 %  Vitals:    10/08/24 0527 10/09/24 0530   Weight: 100.3 kg (221 lb 3.2 oz) 109.1 kg (240 lb 8.4 oz)       Clinically Significant Risk Factors        # Hypokalemia: Lowest K = 3.2 mmol/L in last 2 days, will replace as needed  # Hyperkalemia: Highest K = 5.4 mmol/L in last 2 days, will monitor as appropriate   # Hyperchloremia: Highest Cl = 112 mmol/L in last 2 days, will monitor as appropriate      # Hypocalcemia: Lowest iCa = 3.5 mg/dL in last 2 days, will monitor and replace as appropriate     # Hypoalbuminemia: Lowest albumin = 3.1 g/dL at 10/9/2024  4:14 AM, will monitor as appropriate  # Coagulation Defect: INR = 1.48 (Ref range: 0.85 - 1.15) and/or PTT = 60 Seconds (Ref range: 22 - 38 Seconds), will monitor for bleeding    # Hypertension: Noted on problem  "list           # Severe Obesity: Estimated body mass index is 41.29 kg/m  as calculated from the following:    Height as of 9/25/24: 1.626 m (5' 4\").    Weight as of this encounter: 109.1 kg (240 lb 8.4 oz)., PRESENT ON ADMISSION               Current Medications:    Scheduled Meds:  Current Facility-Administered Medications   Medication Dose Route Frequency Provider Last Rate Last Admin    [Held by provider] acetaminophen (TYLENOL) tablet 975 mg  975 mg Oral Q8H Catherine Galvez PA-C   975 mg at 10/08/24 2333    [Held by provider] atorvastatin (LIPITOR) tablet 80 mg  80 mg Oral At Bedtime Catherine Galvez PA-C   80 mg at 10/08/24 2004    budesonide (PULMICORT) neb solution 1 mg  1 mg Nebulization BID Bharat Lu MD   1 mg at 10/09/24 0718    buPROPion (WELLBUTRIN XL) 24 hr tablet 300 mg  300 mg Oral QAM Catherine Galvez PA-C   300 mg at 10/09/24 0809    ceFAZolin (ANCEF) 2 g in 100 mL D5W intermittent infusion  2 g Intravenous Q8H David Wade  mL/hr at 10/09/24 0320 2 g at 10/09/24 0320    cetirizine (zyrTEC) tablet 10 mg  10 mg Oral Daily Catherine Galvez PA-C   10 mg at 10/09/24 0809    clopidogrel (PLAVIX) tablet 75 mg  75 mg Oral Daily David Wade MD   75 mg at 10/09/24 0809    [Held by provider] fludrocortisone (FLORINEF) tablet 0.1 mg  0.1 mg Oral Daily Catherine Galvez PA-C        fluticasone-vilanterol (BREO ELLIPTA) 100-25 MCG/ACT inhaler 1 puff  1 puff Inhalation At Bedtime Catherine Galvez PA-C        heparin ANTICOAGULANT injection 5,000 Units  5,000 Units Subcutaneous Q8H Catherine Galvez PA-C        levothyroxine (SYNTHROID/LEVOTHROID) tablet 125 mcg  125 mcg Oral QAM AC Catherine Galvez PA-C   125 mcg at 10/09/24 0713    Lidocaine (LIDOCARE) 4 % Patch 1-2 patch  1-2 patch Transdermal Q24H Catherine Galvez PA-C   1 patch at 10/08/24 1718    [Held by provider] metFORMIN (GLUCOPHAGE XR) 24 hr tablet 1,000 mg  " 1,000 mg Oral Daily Catherine Galvez PA-C        multivitamin w/minerals (THERA-VIT-M) tablet 1 tablet  1 tablet Oral Daily Catherine Galvez PA-C        pantoprazole (PROTONIX) 2 mg/mL suspension 40 mg  40 mg Oral or NG Tube Daily Catherine Galvez PA-C   40 mg at 10/08/24 1729    Or    pantoprazole (PROTONIX) EC tablet 40 mg  40 mg Oral Daily Catherine Galvez PA-C   40 mg at 10/09/24 0809    polyethylene glycol (MIRALAX) Packet 17 g  17 g Oral Daily Catherine Galvez PA-C   17 g at 10/09/24 0809    senna-docusate (SENOKOT-S/PERICOLACE) 8.6-50 MG per tablet 1 tablet  1 tablet Oral BID Catherine Galvez PA-C   1 tablet at 10/09/24 0809    [Held by provider] traZODone (DESYREL) tablet 100 mg  100 mg Oral At Bedtime Catherine Galvez PA-C        vancomycin (VANCOCIN) 1,500 mg in 0.9% NaCl 265 mL intermittent infusion  1,500 mg Intravenous Q12H Catherine Galvez PA-C   1,500 mg at 10/09/24 0117    Vitamin D3 (CHOLECALCIFEROL) tablet 125 mcg  125 mcg Oral Daily Catherine Galvez PA-C   125 mcg at 10/09/24 0809     Continuous Infusions:  Current Facility-Administered Medications   Medication Dose Route Frequency Provider Last Rate Last Admin    EPINEPHrine (ADRENALIN) 5 mg in sodium chloride 0.9 % 250 mL infusion CENTRAL  0.01-0.1 mcg/kg/min Intravenous Continuous PRN Catherine Galvez PA-C 6 mL/hr at 10/09/24 0800 0.02 mcg/kg/min at 10/09/24 0800    insulin regular (MYXREDLIN) 1 unit/mL infusion  0-24 Units/hr Intravenous Continuous Catherine Galvez PA-C 1 mL/hr at 10/09/24 1006 1 Units/hr at 10/09/24 1006    niCARdipine 40 mg in 200 mL NS (CARDENE) infusion  0.5-15 mg/hr Intravenous Continuous PRN Catherine Galvez PA-C        phenylephrine (MARCIANO-SYNEPHRINE) 50 mg in NaCl 0.9 % 250 mL infusion  0.1-4 mcg/kg/min Intravenous Continuous PRN Catherine Galvez PA-C 27.1 mL/hr at 10/09/24 0957 0.9 mcg/kg/min at 10/09/24 0957     PRN Meds:.  Current Facility-Administered  Medications   Medication Dose Route Frequency Provider Last Rate Last Admin    [Held by provider] acetaminophen (TYLENOL) tablet 650 mg  650 mg Oral Q4H PRN Catherine Galvez PA-C        albuterol (PROVENTIL HFA/VENTOLIN HFA) inhaler  2 puff Inhalation Q6H PRN Catherine Galvez PA-C        [START ON 10/11/2024] bisacodyl (DULCOLAX) suppository 10 mg  10 mg Rectal Daily PRN Catherine Galvez PA-C        calcium gluconate 1 g in 50 mL in sodium chloride intermittent infusion  1 g Intravenous Once PRN Catherine Galvez PA-C   1 g at 10/09/24 0441    calcium gluconate 2 g in  mL intermittent infusion  2 g Intravenous Once PRN Catherine Galvez PA-C        calcium gluconate 3 g in sodium chloride 0.9 % 100 mL intermittent infusion  3 g Intravenous Once PRN Catherine Galvez PA-C        glucose gel 15-30 g  15-30 g Oral Q15 Min PRN Catherine Galvez PA-C        Or    dextrose 50 % injection 25-50 mL  25-50 mL Intravenous Q15 Min PRN Catherine Galvez PA-C        Or    glucagon injection 1 mg  1 mg Subcutaneous Q15 Min PRN Catherine Galvez PA-C        EPINEPHrine (ADRENALIN) 5 mg in sodium chloride 0.9 % 250 mL infusion CENTRAL  0.01-0.1 mcg/kg/min Intravenous Continuous PRN Catherine Galvez PA-C 6 mL/hr at 10/09/24 0800 0.02 mcg/kg/min at 10/09/24 0800    fluticasone (FLONASE) 50 MCG/ACT spray 2 spray  2 spray Both Nostrils Daily PRN Catherine Galvez PA-C        hydrALAZINE (APRESOLINE) injection 10 mg  10 mg Intravenous Q30 Min PRN Catherine Galvez PA-C        HYDROmorphone (DILAUDID) injection 0.2 mg  0.2 mg Intravenous Q2H PRN Catherine Galvez PA-C   0.2 mg at 10/09/24 0848    Or    HYDROmorphone (DILAUDID) injection 0.4 mg  0.4 mg Intravenous Q2H PRN Catherine Galvez PA-C   0.4 mg at 10/09/24 0541    ipratropium - albuterol 0.5 mg/2.5 mg/3 mL (DUONEB) neb solution 3 mL  3 mL Nebulization Q4H PRN Bharat Lu MD        [Held by provider]  ketorolac (TORADOL) injection 30 mg  30 mg Intravenous Q6H PRN David Wade MD   30 mg at 10/09/24 0114    lactated ringers BOLUS 250 mL  250 mL Intravenous Q15 Min PRN Catherine Galvez PA-C   250 mL at 10/09/24 0417    [START ON 10/10/2024] magnesium hydroxide (MILK OF MAGNESIA) suspension 30 mL  30 mL Oral Daily PRN Catherine Galvez PA-C        methocarbamol (ROBAXIN) tablet 750 mg  750 mg Oral Q6H PRN Catherine Galvez PA-C        naloxone (NARCAN) injection 0.2 mg  0.2 mg Intravenous Q2 Min PRN David Wade MD        Or    naloxone (NARCAN) injection 0.4 mg  0.4 mg Intravenous Q2 Min PRN David Wade MD        Or    naloxone (NARCAN) injection 0.2 mg  0.2 mg Intramuscular Q2 Min PRN David Wade MD        Or    naloxone (NARCAN) injection 0.4 mg  0.4 mg Intramuscular Q2 Min PRN David Wade MD        niCARdipine 40 mg in 200 mL NS (CARDENE) infusion  0.5-15 mg/hr Intravenous Continuous PRN Catherine Galvez PA-C        ondansetron (ZOFRAN ODT) ODT tab 4 mg  4 mg Oral Q6H PRN Catherine Galvez PA-C        Or    ondansetron (ZOFRAN) injection 4 mg  4 mg Intravenous Q6H PRN Catherine Galvez PA-C        oxyCODONE (ROXICODONE) tablet 5 mg  5 mg Oral Q4H PRN Catherine Galvez PA-C   5 mg at 10/09/24 0809    Or    oxyCODONE (ROXICODONE) tablet 10 mg  10 mg Oral Q4H PRN Catherine Galvez PA-C        phenylephrine (MARCIANO-SYNEPHRINE) 50 mg in NaCl 0.9 % 250 mL infusion  0.1-4 mcg/kg/min Intravenous Continuous PRN Catherine Galvez PA-C 27.1 mL/hr at 10/09/24 0957 0.9 mcg/kg/min at 10/09/24 0957    prochlorperazine (COMPAZINE) injection 10 mg  10 mg Intravenous Q6H PRN Catherine Galvez PA-C        Or    prochlorperazine (COMPAZINE) tablet 10 mg  10 mg Oral Q6H PRN Catherine Galvez PA-C        simethicone (MYLICON) chewable half-tab 120 mg  120 mg Oral 4x Daily PRN Catherine Galvez PA-C            Cardiographics:    Telemetry monitoring demonstrates accelerated junctional rhythm with rates in the 80s per my personal review.    Imaging:  Results for orders placed or performed during the hospital encounter of 10/08/24   XR Chest Port 1 View    Impression    IMPRESSION: Interval sternotomy, CABG procedure and mitral valve repair. Endotracheal tube proximally 2 cm above the sofia. Nasogastric tube in the stomach. Bilateral chest tubes and midline mediastinal drain in expected position. Epicardial leads are   present. Left IJ line in the brachiocephalic vein.    Low lung volumes. No evidence for CHF or pneumonia. No pleural effusion or pneumothorax.   XR Chest Port 1 View    Impression    IMPRESSION: Poststernotomy changes with prosthetic mitral valve. Patient's been extubated and the NG tube has been removed. Mediastinal and pleural chest tubes are in place.    Left IJ cordis sheath is in the distal left innominate artery. Catheter has a very subtle kink within it 3.5 centimeters from the tip.    Low lung volumes. No pneumothorax. Likely trace left effusion at the base. No signs of pneumonia or failure. Heart is of normal size.   XR Abdomen Port 1 View    Impression    IMPRESSION: Orogastric tube tip in the proximal stomach and oriented superiorly towards the left diaphragm. Visualized bowel gas pattern unremarkable. Numerous tubes and lines lower chest and upper abdomen as described on chest x-ray report from today.        Labs, personally reviewed.  Hemoglobin   Date Value Ref Range Status   10/09/2024 9.9 (L) 11.7 - 15.7 g/dL Final   10/08/2024 10.4 (L) 11.7 - 15.7 g/dL Final   10/08/2024 8.4 (L) 11.7 - 15.7 g/dL Final   06/09/2015 13.5 11.7 - 15.7 g/dL Final   03/11/2015 12.3 11.7 - 15.7 g/dL Final   01/08/2015 13.6 11.7 - 15.7 g/dL Final     Hemoglobin POCT   Date Value Ref Range Status   10/08/2024 8.8 (L) 11.7 - 15.7 g/dL Final   10/08/2024 10.3 (L) 11.7 - 15.7 g/dL Final   10/08/2024 8.8 (L) 11.7 -  15.7 g/dL Final     WBC   Date Value Ref Range Status   06/09/2015 7.9 4.0 - 11.0 10e9/L Final   03/11/2015 9.4 4.0 - 11.0 10e9/L Final   01/08/2015 8.1 4.0 - 11.0 10e9/L Final     WBC Count   Date Value Ref Range Status   10/09/2024 22.0 (H) 4.0 - 11.0 10e3/uL Final   10/08/2024 21.7 (H) 4.0 - 11.0 10e3/uL Final   10/08/2024 16.9 (H) 4.0 - 11.0 10e3/uL Final     Platelet Count   Date Value Ref Range Status   10/09/2024 149 (L) 150 - 450 10e3/uL Final   10/08/2024 148 (L) 150 - 450 10e3/uL Final   10/08/2024 139 (L) 150 - 450 10e3/uL Final   06/09/2015 219 150 - 450 10e9/L Final   03/11/2015 240 150 - 450 10e9/L Final   01/08/2015 221 150 - 450 10e9/L Final     Creatinine   Date Value Ref Range Status   10/09/2024 1.15 (H) 0.51 - 0.95 mg/dL Final   10/08/2024 0.95 0.51 - 0.95 mg/dL Final   10/07/2024 0.92 0.51 - 0.95 mg/dL Final   06/09/2015 0.80 0.52 - 1.04 mg/dL Final   03/11/2015 0.87 0.52 - 1.04 mg/dL Final   01/08/2015 0.79 0.52 - 1.04 mg/dL Final     Potassium   Date Value Ref Range Status   10/09/2024 5.4 (H) 3.4 - 5.3 mmol/L Final   10/08/2024 4.4 3.4 - 5.3 mmol/L Final     Comment:     Specimen slightly hemolyzed. The reported potassium value may be falsely elevated. Analysis of a non-hemolyzed specimen (i.e. re-draw) may result in a lower potassium value.  Specimen slightly hemolyzed. The reported potassium value may be falsely elevated. Analysis of a non-hemolyzed specimen (i.e. re-draw) may result in a lower potassium value.   10/08/2024 4.4 3.4 - 5.3 mmol/L Final     Comment:     Specimen slightly hemolyzed. The reported potassium value may be falsely elevated. Analysis of a non-hemolyzed specimen (i.e. re-draw) may result in a lower potassium value.   06/09/2015 3.8 3.4 - 5.3 mmol/L Final   03/11/2015 3.9 3.4 - 5.3 mmol/L Final   01/08/2015 4.1 3.4 - 5.3 mmol/L Final     Potassium POCT   Date Value Ref Range Status   10/08/2024 4.4 3.4 - 5.3 mmol/L Final   10/08/2024 4.0 3.4 - 5.3 mmol/L Final    10/08/2024 4.1 3.4 - 5.3 mmol/L Final     Magnesium   Date Value Ref Range Status   10/09/2024 2.7 (H) 1.7 - 2.3 mg/dL Final   10/08/2024 3.3 (H) 1.7 - 2.3 mg/dL Final   10/07/2024 2.0 1.7 - 2.3 mg/dL Final   06/09/2015 1.8 1.6 - 2.3 mg/dL Final   03/11/2015 1.7 1.6 - 2.3 mg/dL Final   01/08/2015 1.8 1.6 - 2.3 mg/dL Final          I/O:  I/O last 3 completed shifts:  In: 7167.8 [P.O.:450; I.V.:2539.8; Other:335; NG/GT:200; IV Piggyback:3043]  Out: 3103 [Urine:1820; Blood:775; Chest Tube:508]       Physical Exam:    General: Patient seen in bed. NAD. Conversant. Pleasant  HEENT: DORITA, no sclera icterus, moist mucosa, ET tube secure, no tracheal deviation  CV: RRR on monitor. 2+ peripheral pulses in all extremities. Mild peripheral edema. Incision C/D/I.  Pulm: Non-labored effort on nasal cannula. Chest tubes in place, serosanguinous output, no air leak.  Abd: Soft, NT, ND  : Padron with john urine  Ext: Mild pedal edema, SCDs in place, warm, distal pulses intact  Neuro: CNs grossly intact      ASSESSMENT/PLAN:    Jory Ambrose is a 49 year old female with a history of GIA, asthma, GERD,  hypothyroidism, h/o PE on plavix, carotid stenosis, Hodgkin's lymphoma (s/p chemo/radiation and bone marrow transplant x2 after recurrence) currently in remission who is s/p CABG x4, RLE EVH, attempted mitral repair, mechanical MVR, LAAE.    Active Problems:    Mitral regurgitation    Coronary artery disease involving native coronary artery of native heart with angina pectoris (H)  #Accelerated Junctional Rhythm, ongoing.  #Severe transaminitis, ongoing.   #Hyperkalemia, ongoing.      NEURO:   - Scheduled lidocaine patches and PRN robaxin/oxycodone/dilaudid for pain  - No toradol given Cr bump  - PTA bupropion  - Tylenol HELD (transaminitis)    CV:   - Pre-op EF 55-60%  - Weaning pressors as hemodynamics allow; currently on 0.02 epi, 0.5 katie.  - Beta blocker pending weaning from pressors and rhythm  - Planning for  plavix daily indefinitely d/t unclear h/o anaphylaxis to ASA  - Atorvastatin 80mg daily - HELD  (transaminitis)  - Chest tubes to remain today   - New baseline echo prior to discharge  - Straight rate heparin gtt today, transition to low intensity heparin gtt w/ no boluses POD#2 to bridge to warfarin for mechanical MVR when chest tubes/TPW are removed. INR goal 2.5-3.5  - If afib, NO AMIO (transaminitis)  - Consider echo if ongoing severe orthostatic hypotension  - Currently in a juntional rhythm - monitor w/ daily ECG and leave temporary pacer at a backup of 60 bpm    PULM:   - Extubated on POD#0  - Maintaining oxygen saturations on nasal cannula  - Encourage pulmonary toilet  - PTA zyrtec, fludrocortisone, flonase, simethicone, albuterol, pulmicort, breo ellipta, duoneb    FEN/GI:  - Continue electrolyte replacement protocol  - Cardiac; ADAT  - Bowel regimen, LBM preop  - Severe transaminitis, LFTs in 1000s. Likely 2/2 ischemic hepatitis. Monitor CMP q8 today  - PTA vitamin D3  - Minimal hyperkalemia, K+ 5.4. Monitor for now, consider lasix if worsening.    RENAL:  - Adequate UOP/hr. Continue to monitor closely.  - Cr 1.15 (baseline ~0.9)  - Padron to remain in for close monitoring of I/O and during period of diuresis/relative immobility  - CMP q8 today as above  - Diuresis PRN and pending weaning from pressors    HEME:  - H/o bone marrow tx. Needs irradiated blood  - Acute blood loss anemia post-op.   - Hgb stable after transfusion as below, no bleeding concerns. Hep SQ, ASA  - Received 2u PRBCs periop    ID:  - Lydia op ppx complete, afebrile. No concerns for infection    ENDO:   - HbA1c 5.7%  - Continue insulin gtt while on epi  - Holding PTA metformin, plan to resume POD#3 pending renal function  - PTA synthroid    PPx:   - DVT: SCDs, SQ heparin TID, ambulation   - GI: Protonix 40mg IV/PO daily    DISPO:   - Continue critical care in ICU pending pressors  - Medically Ready for Discharge: Anticipated in 5+  Days         Patient discussed with Dr. Wade.        Eva Galvez PA-C  [facility] Cardiothoracic Surgery  Pager: [phone]  October 9, 2024

## 2024-10-10 ENCOUNTER — APPOINTMENT (OUTPATIENT)
Dept: CARDIOLOGY | Facility: HOSPITAL | Age: 49
DRG: 219 | End: 2024-10-10
Payer: COMMERCIAL

## 2024-10-10 ENCOUNTER — APPOINTMENT (OUTPATIENT)
Dept: ULTRASOUND IMAGING | Facility: HOSPITAL | Age: 49
DRG: 219 | End: 2024-10-10
Attending: INTERNAL MEDICINE
Payer: COMMERCIAL

## 2024-10-10 LAB
ALBUMIN SERPL BCG-MCNC: 3.2 G/DL (ref 3.5–5.2)
ALBUMIN UR-MCNC: 100 MG/DL
ALP SERPL-CCNC: 62 U/L (ref 40–150)
ALP SERPL-CCNC: 72 U/L (ref 40–150)
ALP SERPL-CCNC: 79 U/L (ref 40–150)
ALT SERPL W P-5'-P-CCNC: 6493 U/L (ref 0–50)
ALT SERPL W P-5'-P-CCNC: 6555 U/L (ref 0–50)
ALT SERPL W P-5'-P-CCNC: >7000 U/L (ref 0–50)
AMMONIA PLAS-SCNC: 44 UMOL/L (ref 11–51)
ANION GAP SERPL CALCULATED.3IONS-SCNC: 12 MMOL/L (ref 7–15)
ANION GAP SERPL CALCULATED.3IONS-SCNC: 12 MMOL/L (ref 7–15)
ANION GAP SERPL CALCULATED.3IONS-SCNC: 14 MMOL/L (ref 7–15)
APPEARANCE UR: ABNORMAL
AST SERPL W P-5'-P-CCNC: >7000 U/L (ref 0–45)
ATRIAL RATE - MUSE: 114 BPM
BACTERIA #/AREA URNS HPF: ABNORMAL /HPF
BASE EXCESS BLDA CALC-SCNC: -1 MMOL/L (ref -3–3)
BASE EXCESS BLDA CALC-SCNC: 0.2 MMOL/L (ref -3–3)
BASE EXCESS BLDV CALC-SCNC: 1.5 MMOL/L (ref -3–3)
BILIRUB SERPL-MCNC: 0.5 MG/DL
BILIRUB SERPL-MCNC: 0.5 MG/DL
BILIRUB SERPL-MCNC: 0.6 MG/DL
BILIRUB UR QL STRIP: NEGATIVE
BUN SERPL-MCNC: 35.3 MG/DL (ref 6–20)
BUN SERPL-MCNC: 41 MG/DL (ref 6–20)
BUN SERPL-MCNC: 45 MG/DL (ref 6–20)
CA-I BLD-MCNC: 4.3 MG/DL (ref 4.4–5.2)
CA-I BLD-MCNC: 4.4 MG/DL (ref 4.4–5.2)
CA-I BLD-MCNC: 4.6 MG/DL (ref 4.4–5.2)
CALCIUM SERPL-MCNC: 7.6 MG/DL (ref 8.8–10.4)
CALCIUM SERPL-MCNC: 8.2 MG/DL (ref 8.8–10.4)
CALCIUM SERPL-MCNC: 8.3 MG/DL (ref 8.8–10.4)
CHLORIDE SERPL-SCNC: 100 MMOL/L (ref 98–107)
CHLORIDE SERPL-SCNC: 103 MMOL/L (ref 98–107)
CHLORIDE SERPL-SCNC: 104 MMOL/L (ref 98–107)
CK SERPL-CCNC: 1677 U/L (ref 26–192)
CK SERPL-CCNC: 1706 U/L (ref 26–192)
CK SERPL-CCNC: 1973 U/L (ref 26–192)
COHGB MFR BLD: 96.7 % (ref 96–97)
COHGB MFR BLD: 99.2 % (ref 96–97)
COLOR UR AUTO: YELLOW
CREAT SERPL-MCNC: 2.06 MG/DL (ref 0.51–0.95)
CREAT SERPL-MCNC: 2.56 MG/DL (ref 0.51–0.95)
CREAT SERPL-MCNC: 2.95 MG/DL (ref 0.51–0.95)
DIASTOLIC BLOOD PRESSURE - MUSE: NORMAL MMHG
EGFRCR SERPLBLD CKD-EPI 2021: 19 ML/MIN/1.73M2
EGFRCR SERPLBLD CKD-EPI 2021: 22 ML/MIN/1.73M2
EGFRCR SERPLBLD CKD-EPI 2021: 29 ML/MIN/1.73M2
ERYTHROCYTE [DISTWIDTH] IN BLOOD BY AUTOMATED COUNT: 15.5 % (ref 10–15)
ERYTHROCYTE [DISTWIDTH] IN BLOOD BY AUTOMATED COUNT: 15.9 % (ref 10–15)
ERYTHROCYTE [DISTWIDTH] IN BLOOD BY AUTOMATED COUNT: 15.9 % (ref 10–15)
FIBRINOGEN PPP-MCNC: 285 MG/DL (ref 170–510)
GLUCOSE BLDC GLUCOMTR-MCNC: 104 MG/DL (ref 70–99)
GLUCOSE BLDC GLUCOMTR-MCNC: 104 MG/DL (ref 70–99)
GLUCOSE BLDC GLUCOMTR-MCNC: 108 MG/DL (ref 70–99)
GLUCOSE BLDC GLUCOMTR-MCNC: 110 MG/DL (ref 70–99)
GLUCOSE BLDC GLUCOMTR-MCNC: 110 MG/DL (ref 70–99)
GLUCOSE BLDC GLUCOMTR-MCNC: 112 MG/DL (ref 70–99)
GLUCOSE BLDC GLUCOMTR-MCNC: 118 MG/DL (ref 70–99)
GLUCOSE BLDC GLUCOMTR-MCNC: 124 MG/DL (ref 70–99)
GLUCOSE BLDC GLUCOMTR-MCNC: 126 MG/DL (ref 70–99)
GLUCOSE BLDC GLUCOMTR-MCNC: 128 MG/DL (ref 70–99)
GLUCOSE BLDC GLUCOMTR-MCNC: 128 MG/DL (ref 70–99)
GLUCOSE BLDC GLUCOMTR-MCNC: 129 MG/DL (ref 70–99)
GLUCOSE BLDC GLUCOMTR-MCNC: 136 MG/DL (ref 70–99)
GLUCOSE BLDC GLUCOMTR-MCNC: 138 MG/DL (ref 70–99)
GLUCOSE BLDC GLUCOMTR-MCNC: 138 MG/DL (ref 70–99)
GLUCOSE BLDC GLUCOMTR-MCNC: 152 MG/DL (ref 70–99)
GLUCOSE BLDC GLUCOMTR-MCNC: 153 MG/DL (ref 70–99)
GLUCOSE BLDC GLUCOMTR-MCNC: 153 MG/DL (ref 70–99)
GLUCOSE BLDC GLUCOMTR-MCNC: 89 MG/DL (ref 70–99)
GLUCOSE BLDC GLUCOMTR-MCNC: 98 MG/DL (ref 70–99)
GLUCOSE SERPL-MCNC: 137 MG/DL (ref 70–99)
GLUCOSE SERPL-MCNC: 145 MG/DL (ref 70–99)
GLUCOSE SERPL-MCNC: 162 MG/DL (ref 70–99)
GLUCOSE UR STRIP-MCNC: 30 MG/DL
GRANULAR CAST: 60 /LPF
HAV IGM SERPL QL IA: NONREACTIVE
HBV CORE IGM SERPL QL IA: NONREACTIVE
HBV SURFACE AG SERPL QL IA: NONREACTIVE
HCO3 BLD-SCNC: 25 MMOL/L (ref 21–28)
HCO3 BLD-SCNC: 26 MMOL/L (ref 21–28)
HCO3 BLDV-SCNC: 28 MMOL/L (ref 21–28)
HCO3 SERPL-SCNC: 24 MMOL/L (ref 22–29)
HCO3 SERPL-SCNC: 25 MMOL/L (ref 22–29)
HCO3 SERPL-SCNC: 26 MMOL/L (ref 22–29)
HCT VFR BLD AUTO: 22.5 % (ref 35–47)
HCT VFR BLD AUTO: 22.8 % (ref 35–47)
HCT VFR BLD AUTO: 23.2 % (ref 35–47)
HCV AB SERPL QL IA: NONREACTIVE
HGB BLD-MCNC: 7.4 G/DL (ref 11.7–15.7)
HGB BLD-MCNC: 7.4 G/DL (ref 11.7–15.7)
HGB BLD-MCNC: 7.6 G/DL (ref 11.7–15.7)
HGB UR QL STRIP: ABNORMAL
HYALINE CASTS: 20 /LPF
INR PPP: 2.7 (ref 0.85–1.15)
INR PPP: 2.82 (ref 0.85–1.15)
INR PPP: 3.08 (ref 0.85–1.15)
INTERPRETATION ECG - MUSE: NORMAL
KETONES UR STRIP-MCNC: ABNORMAL MG/DL
LACTATE SERPL-SCNC: 2.3 MMOL/L (ref 0.7–2)
LACTATE SERPL-SCNC: 2.4 MMOL/L (ref 0.7–2)
LACTATE SERPL-SCNC: 2.9 MMOL/L (ref 0.7–2)
LEUKOCYTE ESTERASE UR QL STRIP: NEGATIVE
MAGNESIUM SERPL-MCNC: 2.7 MG/DL (ref 1.7–2.3)
MCH RBC QN AUTO: 29.1 PG (ref 26.5–33)
MCH RBC QN AUTO: 29.4 PG (ref 26.5–33)
MCH RBC QN AUTO: 29.5 PG (ref 26.5–33)
MCHC RBC AUTO-ENTMCNC: 32.5 G/DL (ref 31.5–36.5)
MCHC RBC AUTO-ENTMCNC: 32.8 G/DL (ref 31.5–36.5)
MCHC RBC AUTO-ENTMCNC: 32.9 G/DL (ref 31.5–36.5)
MCV RBC AUTO: 89 FL (ref 78–100)
MCV RBC AUTO: 90 FL (ref 78–100)
MCV RBC AUTO: 90 FL (ref 78–100)
MUCOUS THREADS #/AREA URNS LPF: PRESENT /LPF
NITRATE UR QL: NEGATIVE
O2/TOTAL GAS SETTING VFR VENT: 0 %
O2/TOTAL GAS SETTING VFR VENT: 0 %
O2/TOTAL GAS SETTING VFR VENT: 40 %
OXYHGB MFR BLDV: 48 % (ref 70–75)
P AXIS - MUSE: NORMAL DEGREES
PCO2 BLD: 44 MM HG (ref 35–45)
PCO2 BLD: 45 MM HG (ref 35–45)
PCO2 BLDV: 54 MM HG (ref 40–50)
PH BLD: 7.35 [PH] (ref 7.35–7.45)
PH BLD: 7.37 [PH] (ref 7.35–7.45)
PH BLDV: 7.33 [PH] (ref 7.32–7.43)
PH UR STRIP: 5.5 [PH] (ref 5–7)
PHOSPHATE SERPL-MCNC: 5.2 MG/DL (ref 2.5–4.5)
PLATELET # BLD AUTO: 117 10E3/UL (ref 150–450)
PLATELET # BLD AUTO: 79 10E3/UL (ref 150–450)
PLATELET # BLD AUTO: 99 10E3/UL (ref 150–450)
PO2 BLD: 125 MM HG (ref 80–105)
PO2 BLD: 90 MM HG (ref 80–105)
PO2 BLDV: 32 MM HG (ref 25–47)
POTASSIUM SERPL-SCNC: 4.6 MMOL/L (ref 3.4–5.3)
POTASSIUM SERPL-SCNC: 4.6 MMOL/L (ref 3.4–5.3)
POTASSIUM SERPL-SCNC: 4.7 MMOL/L (ref 3.4–5.3)
POTASSIUM SERPL-SCNC: 4.8 MMOL/L (ref 3.4–5.3)
POTASSIUM SERPL-SCNC: 5 MMOL/L (ref 3.4–5.3)
PR INTERVAL - MUSE: 224 MS
PROT SERPL-MCNC: 4.5 G/DL (ref 6.4–8.3)
PROT SERPL-MCNC: 4.8 G/DL (ref 6.4–8.3)
PROT SERPL-MCNC: 4.8 G/DL (ref 6.4–8.3)
QRS DURATION - MUSE: 88 MS
QT - MUSE: 334 MS
QTC - MUSE: 460 MS
R AXIS - MUSE: 57 DEGREES
RBC # BLD AUTO: 2.52 10E6/UL (ref 3.8–5.2)
RBC # BLD AUTO: 2.54 10E6/UL (ref 3.8–5.2)
RBC # BLD AUTO: 2.58 10E6/UL (ref 3.8–5.2)
RBC URINE: 83 /HPF
SAO2 % BLDA: 95 % (ref 92–100)
SAO2 % BLDA: 98 % (ref 92–100)
SAO2 % BLDV: 48.7 % (ref 70–75)
SODIUM SERPL-SCNC: 138 MMOL/L (ref 135–145)
SODIUM SERPL-SCNC: 141 MMOL/L (ref 135–145)
SODIUM SERPL-SCNC: 141 MMOL/L (ref 135–145)
SP GR UR STRIP: 1.02 (ref 1–1.03)
SQUAMOUS EPITHELIAL: 2 /HPF
SYSTOLIC BLOOD PRESSURE - MUSE: NORMAL MMHG
T AXIS - MUSE: -17 DEGREES
UFH PPP CHRO-ACNC: <0.1 IU/ML
UFH PPP CHRO-ACNC: <0.1 IU/ML
UROBILINOGEN UR STRIP-MCNC: <2 MG/DL
VANCOMYCIN SERPL-MCNC: 25.5 UG/ML
VENTRICULAR RATE- MUSE: 114 BPM
WBC # BLD AUTO: 18.3 10E3/UL (ref 4–11)
WBC # BLD AUTO: 19.4 10E3/UL (ref 4–11)
WBC # BLD AUTO: 19.6 10E3/UL (ref 4–11)
WBC URINE: 0 /HPF

## 2024-10-10 PROCEDURE — 85384 FIBRINOGEN ACTIVITY: CPT | Performed by: INTERNAL MEDICINE

## 2024-10-10 PROCEDURE — 81001 URINALYSIS AUTO W/SCOPE: CPT | Performed by: SURGERY

## 2024-10-10 PROCEDURE — 93005 ELECTROCARDIOGRAM TRACING: CPT

## 2024-10-10 PROCEDURE — 84155 ASSAY OF PROTEIN SERUM: CPT

## 2024-10-10 PROCEDURE — 87040 BLOOD CULTURE FOR BACTERIA: CPT | Performed by: SURGERY

## 2024-10-10 PROCEDURE — 82330 ASSAY OF CALCIUM: CPT

## 2024-10-10 PROCEDURE — 82330 ASSAY OF CALCIUM: CPT | Performed by: SURGERY

## 2024-10-10 PROCEDURE — 93308 TTE F-UP OR LMTD: CPT | Mod: 26 | Performed by: INTERNAL MEDICINE

## 2024-10-10 PROCEDURE — 93325 DOPPLER ECHO COLOR FLOW MAPG: CPT | Mod: 26 | Performed by: INTERNAL MEDICINE

## 2024-10-10 PROCEDURE — 999N000287 HC ICU ADULT ROUNDING, EACH 10 MINS

## 2024-10-10 PROCEDURE — 250N000009 HC RX 250: Performed by: INTERNAL MEDICINE

## 2024-10-10 PROCEDURE — 94660 CPAP INITIATION&MGMT: CPT

## 2024-10-10 PROCEDURE — 250N000011 HC RX IP 250 OP 636: Mod: JZ

## 2024-10-10 PROCEDURE — 93321 DOPPLER ECHO F-UP/LMTD STD: CPT | Mod: 26 | Performed by: INTERNAL MEDICINE

## 2024-10-10 PROCEDURE — 82805 BLOOD GASES W/O2 SATURATION: CPT

## 2024-10-10 PROCEDURE — 250N000009 HC RX 250

## 2024-10-10 PROCEDURE — 94640 AIRWAY INHALATION TREATMENT: CPT

## 2024-10-10 PROCEDURE — 84132 ASSAY OF SERUM POTASSIUM: CPT | Performed by: INTERNAL MEDICINE

## 2024-10-10 PROCEDURE — 82550 ASSAY OF CK (CPK): CPT | Performed by: SURGERY

## 2024-10-10 PROCEDURE — 84100 ASSAY OF PHOSPHORUS: CPT | Performed by: SURGERY

## 2024-10-10 PROCEDURE — 99291 CRITICAL CARE FIRST HOUR: CPT | Performed by: INTERNAL MEDICINE

## 2024-10-10 PROCEDURE — 93325 DOPPLER ECHO COLOR FLOW MAPG: CPT

## 2024-10-10 PROCEDURE — 250N000011 HC RX IP 250 OP 636: Performed by: INTERNAL MEDICINE

## 2024-10-10 PROCEDURE — 82550 ASSAY OF CK (CPK): CPT

## 2024-10-10 PROCEDURE — 250N000013 HC RX MED GY IP 250 OP 250 PS 637

## 2024-10-10 PROCEDURE — 80202 ASSAY OF VANCOMYCIN: CPT | Performed by: SURGERY

## 2024-10-10 PROCEDURE — 82805 BLOOD GASES W/O2 SATURATION: CPT | Performed by: SURGERY

## 2024-10-10 PROCEDURE — 258N000003 HC RX IP 258 OP 636

## 2024-10-10 PROCEDURE — 85014 HEMATOCRIT: CPT

## 2024-10-10 PROCEDURE — 83735 ASSAY OF MAGNESIUM: CPT | Performed by: SURGERY

## 2024-10-10 PROCEDURE — 83605 ASSAY OF LACTIC ACID: CPT

## 2024-10-10 PROCEDURE — 250N000013 HC RX MED GY IP 250 OP 250 PS 637: Performed by: SURGERY

## 2024-10-10 PROCEDURE — 200N000001 HC R&B ICU

## 2024-10-10 PROCEDURE — 82140 ASSAY OF AMMONIA: CPT

## 2024-10-10 PROCEDURE — 999N000157 HC STATISTIC RCP TIME EA 10 MIN

## 2024-10-10 PROCEDURE — 258N000003 HC RX IP 258 OP 636: Performed by: SURGERY

## 2024-10-10 PROCEDURE — 83605 ASSAY OF LACTIC ACID: CPT | Performed by: SURGERY

## 2024-10-10 PROCEDURE — 258N000003 HC RX IP 258 OP 636: Performed by: INTERNAL MEDICINE

## 2024-10-10 PROCEDURE — 94640 AIRWAY INHALATION TREATMENT: CPT | Mod: 76

## 2024-10-10 PROCEDURE — 85610 PROTHROMBIN TIME: CPT

## 2024-10-10 PROCEDURE — 250N000011 HC RX IP 250 OP 636: Performed by: SURGERY

## 2024-10-10 PROCEDURE — 93010 ELECTROCARDIOGRAM REPORT: CPT | Performed by: INTERNAL MEDICINE

## 2024-10-10 PROCEDURE — 93308 TTE F-UP OR LMTD: CPT

## 2024-10-10 PROCEDURE — 93976 VASCULAR STUDY: CPT

## 2024-10-10 PROCEDURE — 85520 HEPARIN ASSAY: CPT

## 2024-10-10 PROCEDURE — 87086 URINE CULTURE/COLONY COUNT: CPT | Performed by: SURGERY

## 2024-10-10 RX ORDER — CEFTRIAXONE 1 G/1
1 INJECTION, POWDER, FOR SOLUTION INTRAMUSCULAR; INTRAVENOUS EVERY 24 HOURS
Status: DISCONTINUED | OUTPATIENT
Start: 2024-10-10 | End: 2024-10-13

## 2024-10-10 RX ORDER — HEPARIN SODIUM 10000 [USP'U]/100ML
0-5000 INJECTION, SOLUTION INTRAVENOUS CONTINUOUS
Status: DISCONTINUED | OUTPATIENT
Start: 2024-10-10 | End: 2024-10-13

## 2024-10-10 RX ORDER — CALCIUM GLUCONATE 20 MG/ML
1 INJECTION, SOLUTION INTRAVENOUS ONCE
Status: COMPLETED | OUTPATIENT
Start: 2024-10-10 | End: 2024-10-10

## 2024-10-10 RX ORDER — DEXTROSE MONOHYDRATE 25 G/50ML
25 INJECTION, SOLUTION INTRAVENOUS ONCE
Status: DISCONTINUED | OUTPATIENT
Start: 2024-10-10 | End: 2024-10-10

## 2024-10-10 RX ORDER — NICOTINE POLACRILEX 4 MG
15-30 LOZENGE BUCCAL
Status: DISCONTINUED | OUTPATIENT
Start: 2024-10-10 | End: 2024-11-01 | Stop reason: HOSPADM

## 2024-10-10 RX ORDER — DEXTROSE MONOHYDRATE 25 G/50ML
25-50 INJECTION, SOLUTION INTRAVENOUS
Status: DISCONTINUED | OUTPATIENT
Start: 2024-10-10 | End: 2024-11-01 | Stop reason: HOSPADM

## 2024-10-10 RX ORDER — CHLOROTHIAZIDE SODIUM 500 MG/1
500 INJECTION INTRAVENOUS EVERY 8 HOURS
Status: COMPLETED | OUTPATIENT
Start: 2024-10-10 | End: 2024-10-10

## 2024-10-10 RX ADMIN — EPINEPHRINE 0.05 MCG/KG/MIN: 1 INJECTION INTRAMUSCULAR; INTRAVENOUS; SUBCUTANEOUS at 15:17

## 2024-10-10 RX ADMIN — PANTOPRAZOLE SODIUM 40 MG: 40 TABLET, DELAYED RELEASE ORAL at 09:00

## 2024-10-10 RX ADMIN — HYDROMORPHONE HYDROCHLORIDE 0.2 MG: 0.2 INJECTION, SOLUTION INTRAMUSCULAR; INTRAVENOUS; SUBCUTANEOUS at 05:30

## 2024-10-10 RX ADMIN — SENNOSIDES AND DOCUSATE SODIUM 1 TABLET: 8.6; 5 TABLET ORAL at 21:20

## 2024-10-10 RX ADMIN — CLOPIDOGREL BISULFATE 75 MG: 75 TABLET ORAL at 09:00

## 2024-10-10 RX ADMIN — HYDROMORPHONE HYDROCHLORIDE 0.2 MG: 0.2 INJECTION, SOLUTION INTRAMUSCULAR; INTRAVENOUS; SUBCUTANEOUS at 01:56

## 2024-10-10 RX ADMIN — CETIRIZINE HYDROCHLORIDE 10 MG: 10 TABLET, FILM COATED ORAL at 09:00

## 2024-10-10 RX ADMIN — POLYETHYLENE GLYCOL 3350 17 G: 17 POWDER, FOR SOLUTION ORAL at 08:59

## 2024-10-10 RX ADMIN — LEVOTHYROXINE SODIUM 125 MCG: 0.03 TABLET ORAL at 09:00

## 2024-10-10 RX ADMIN — BUDESONIDE 1 MG: 0.5 INHALANT ORAL at 08:19

## 2024-10-10 RX ADMIN — VASOPRESSIN 2.4 UNITS/HR: 20 INJECTION, SOLUTION INTRAMUSCULAR; SUBCUTANEOUS at 11:41

## 2024-10-10 RX ADMIN — FLUDROCORTISONE ACETATE 0.1 MG: 0.1 TABLET ORAL at 09:00

## 2024-10-10 RX ADMIN — BUDESONIDE 1 MG: 0.5 INHALANT ORAL at 20:39

## 2024-10-10 RX ADMIN — FLUTICASONE FUROATE AND VILANTEROL TRIFENATATE 1 PUFF: 100; 25 POWDER RESPIRATORY (INHALATION) at 21:20

## 2024-10-10 RX ADMIN — FUROSEMIDE 10 MG/HR: 10 INJECTION, SOLUTION INTRAVENOUS at 00:51

## 2024-10-10 RX ADMIN — CHLOROTHIAZIDE SODIUM 500 MG: 500 INJECTION, POWDER, LYOPHILIZED, FOR SOLUTION INTRAVENOUS at 08:59

## 2024-10-10 RX ADMIN — Medication 125 MCG: at 09:00

## 2024-10-10 RX ADMIN — FUROSEMIDE 20 MG/HR: 10 INJECTION, SOLUTION INTRAVENOUS at 20:57

## 2024-10-10 RX ADMIN — Medication 0.3 MCG/KG/MIN: at 05:57

## 2024-10-10 RX ADMIN — SODIUM BICARBONATE: 84 INJECTION, SOLUTION INTRAVENOUS at 00:48

## 2024-10-10 RX ADMIN — HYDROMORPHONE HYDROCHLORIDE 0.2 MG: 0.2 INJECTION, SOLUTION INTRAMUSCULAR; INTRAVENOUS; SUBCUTANEOUS at 11:47

## 2024-10-10 RX ADMIN — BUPROPION HYDROCHLORIDE 300 MG: 300 TABLET, EXTENDED RELEASE ORAL at 09:00

## 2024-10-10 RX ADMIN — LIDOCAINE 2 PATCH: 4 PATCH TOPICAL at 15:31

## 2024-10-10 RX ADMIN — HEPARIN SODIUM 1500 UNITS/HR: 10000 INJECTION, SOLUTION INTRAVENOUS at 23:03

## 2024-10-10 RX ADMIN — CALCIUM GLUCONATE 1 G: 20 INJECTION, SOLUTION INTRAVENOUS at 09:55

## 2024-10-10 RX ADMIN — CALCIUM GLUCONATE 1 G: 20 INJECTION, SOLUTION INTRAVENOUS at 04:54

## 2024-10-10 RX ADMIN — ONDANSETRON 4 MG: 2 INJECTION INTRAMUSCULAR; INTRAVENOUS at 19:45

## 2024-10-10 RX ADMIN — CALCIUM GLUCONATE 1 G: 20 INJECTION, SOLUTION INTRAVENOUS at 12:06

## 2024-10-10 RX ADMIN — EPINEPHRINE 0.05 MCG/KG/MIN: 1 INJECTION INTRAMUSCULAR; INTRAVENOUS; SUBCUTANEOUS at 01:10

## 2024-10-10 RX ADMIN — Medication 1 TABLET: at 11:46

## 2024-10-10 RX ADMIN — CHLOROTHIAZIDE SODIUM 500 MG: 500 INJECTION, POWDER, LYOPHILIZED, FOR SOLUTION INTRAVENOUS at 01:32

## 2024-10-10 RX ADMIN — CEFTRIAXONE SODIUM 1 G: 1 INJECTION, POWDER, FOR SOLUTION INTRAMUSCULAR; INTRAVENOUS at 00:37

## 2024-10-10 RX ADMIN — SENNOSIDES AND DOCUSATE SODIUM 1 TABLET: 8.6; 5 TABLET ORAL at 09:00

## 2024-10-10 ASSESSMENT — ACTIVITIES OF DAILY LIVING (ADL)
ADLS_ACUITY_SCORE: 41

## 2024-10-10 NOTE — PROGRESS NOTES
CVTS Daily Progress Note   POD#2 s/p CABG x4 (LIMA>LAD, rSVG>RPDA, rSVG>LPL, rSVG>D2), RLE EVH, Attempted MVR/r, MVR ( 29 mm [employer] Mechanical Mitral Valve), LAAE  Attending: Mauro  LOS: 2    SUBJECTIVE/INTERVAL EVENTS:    Hepatic and renal function continued to decline overnight but no acute events occurred. Maintaining oxygen saturations on 2L NC. Normotensive on 0.07 epi, 0.3 katie. CVP 15, CI 3.5, . Aiming for -130 in s/o renal and hepatic dysfunction. Ambulating with therapy deferred thus far d/t hypotension w/ raising HOB. Pain well controlled. - BM / flatus. Tolerating diet of clears w/o nausea. UOP dropped overnight and she was initiated on lasix gtt per renal. Chest tube output appropriate. Hgb continuing to drift, 7.4 this AM (9.9>>9.1>>8.9), likely at least some dilutional component given low UOP but did receive 2u PRBCs periop. LFTS in the 6-7000s, likely ischemic hepatitis, INR 3.08 (2.09). Patient denies new chest pain, shortness of breath, abdominal pain, calf pain, nausea. Patient has no questions today.     OBJECTIVE:  Temp:  [96.2  F (35.7  C)-100  F (37.8  C)] 99  F (37.2  C)  Pulse:  [] 116  Resp:  [0-41] 11  BP: (86-88)/(47-53) 86/47  MAP:  [52 mmHg-89 mmHg] 80 mmHg  Arterial Line BP: ()/(35-66) 122/63  SpO2:  [79 %-99 %] 92 %  Vitals:    10/08/24 0527 10/09/24 0530 10/10/24 0600   Weight: 100.3 kg (221 lb 3.2 oz) 109.1 kg (240 lb 8.4 oz) 111.8 kg (246 lb 7.6 oz)       Clinically Significant Risk Factors        # Hypokalemia: Lowest K = 3.2 mmol/L in last 2 days, will replace as needed  # Hyperkalemia: Highest K = 5.9 mmol/L in last 2 days, will monitor as appropriate   # Hyperchloremia: Highest Cl = 112 mmol/L in last 2 days, will monitor as appropriate      # Hypocalcemia: Lowest iCa = 3.5 mg/dL in last 2 days, will monitor and replace as appropriate     # Hypoalbuminemia: Lowest albumin = 3.1 g/dL at 10/9/2024  6:35 PM, will monitor as appropriate    #  "Coagulation Defect: INR = 3.08 (Ref range: 0.85 - 1.15) and/or PTT = 48 Seconds (Ref range: 22 - 38 Seconds), will monitor for bleeding  # Thrombocytopenia: Lowest platelets = 117 in last 2 days, will monitor for bleeding  # Acute Kidney Injury, unspecified: based on a >150% or 0.3 mg/dL increase in last creatinine compared to past 90 day average, will monitor renal function  # Hypertension: Noted on problem list           # Severe Obesity: Estimated body mass index is 42.31 kg/m  as calculated from the following:    Height as of 9/25/24: 1.626 m (5' 4\").    Weight as of this encounter: 111.8 kg (246 lb 7.6 oz)., PRESENT ON ADMISSION      # History of CABG: noted on surgical history          Current Medications:    Scheduled Meds:  Current Facility-Administered Medications   Medication Dose Route Frequency Provider Last Rate Last Admin    [Held by provider] acetaminophen (TYLENOL) tablet 975 mg  975 mg Oral Q8H Catherine Galvez PA-C   975 mg at 10/08/24 2333    [Held by provider] atorvastatin (LIPITOR) tablet 80 mg  80 mg Oral At Bedtime Catherine Galvez PA-C   80 mg at 10/08/24 2004    budesonide (PULMICORT) neb solution 1 mg  1 mg Nebulization BID Bharat Lu MD   1 mg at 10/10/24 0819    buPROPion (WELLBUTRIN XL) 24 hr tablet 300 mg  300 mg Oral QAM Catherine Galvez PA-C   300 mg at 10/09/24 0809    cefTRIAXone (ROCEPHIN) 1 g vial to attach to  mL bag for ADULTS or NS 50 mL bag for PEDS  1 g Intravenous Q24H David Wade MD   1 g at 10/10/24 0037    cetirizine (zyrTEC) tablet 10 mg  10 mg Oral Daily Catherine Galvez PA-C   10 mg at 10/09/24 0809    chlorothiazide (DIURIL) injection 500 mg  500 mg Intravenous Q8H Doc Dhillon MD   500 mg at 10/10/24 0132    clopidogrel (PLAVIX) tablet 75 mg  75 mg Oral Daily David Wade MD   75 mg at 10/09/24 0809    fludrocortisone (FLORINEF) tablet 0.1 mg  0.1 mg Oral Daily Catherine Galvez PA-C        " fluticasone-vilanterol (BREO ELLIPTA) 100-25 MCG/ACT inhaler 1 puff  1 puff Inhalation At Bedtime Catherine Galvez PA-C   1 puff at 10/09/24 2349    levothyroxine (SYNTHROID/LEVOTHROID) tablet 125 mcg  125 mcg Oral QAM AC Catherine Galvez PA-C   125 mcg at 10/09/24 0713    Lidocaine (LIDOCARE) 4 % Patch 1-2 patch  1-2 patch Transdermal Q24H Catherine Galvez PA-C   2 patch at 10/09/24 1603    [Held by provider] metFORMIN (GLUCOPHAGE XR) 24 hr tablet 1,000 mg  1,000 mg Oral Daily Catherine Galvez PA-C        multivitamin w/minerals (THERA-VIT-M) tablet 1 tablet  1 tablet Oral Daily Catherine Galvez PA-C   1 tablet at 10/09/24 1109    pantoprazole (PROTONIX) 2 mg/mL suspension 40 mg  40 mg Oral or NG Tube Daily Catherine Galvez PA-C   40 mg at 10/08/24 1729    Or    pantoprazole (PROTONIX) EC tablet 40 mg  40 mg Oral Daily Catherine Galvez PA-C   40 mg at 10/09/24 0809    polyethylene glycol (MIRALAX) Packet 17 g  17 g Oral Daily Catherine Galvez PA-C   17 g at 10/09/24 0809    senna-docusate (SENOKOT-S/PERICOLACE) 8.6-50 MG per tablet 1 tablet  1 tablet Oral BID Catherine Galvez PA-C   1 tablet at 10/09/24 2022    [Held by provider] traZODone (DESYREL) tablet 100 mg  100 mg Oral At Bedtime Catherine Galvez PA-C        vancomycin place [facility] - receiving intermittent dosing  1 each Intravenous See Admin Instructions David Wade MD        Vitamin D3 (CHOLECALCIFEROL) tablet 125 mcg  125 mcg Oral Daily Catherine Galvez PA-C   125 mcg at 10/09/24 0809     Continuous Infusions:  Current Facility-Administered Medications   Medication Dose Route Frequency Provider Last Rate Last Admin    EPINEPHrine (ADRENALIN) 5 mg in sodium chloride 0.9 % 250 mL infusion CENTRAL  0.01-0.1 mcg/kg/min Intravenous Continuous PRN Catherine Galvez PA-C 21.1 mL/hr at 10/10/24 0600 0.07 mcg/kg/min at 10/10/24 0600    furosemide (LASIX) 500 mg/50mL infusion ADULT MAX CONC   10 mg/hr Intravenous Continuous Doc Dhillon MD 1 mL/hr at 10/10/24 0600 10 mg/hr at 10/10/24 0600    heparin infusion 25,000 units in 0.45% NaCl 250 mL ANTICOAGULANT  500 Units/hr Intravenous Continuous Catherine Galvez PA-C 5 mL/hr at 10/10/24 0600 500 Units/hr at 10/10/24 0600    insulin regular (MYXREDLIN) 1 unit/mL infusion  0-24 Units/hr Intravenous Continuous Catherine Galvez PA-C 0.5 mL/hr at 10/10/24 0810 0.5 Units/hr at 10/10/24 0810    niCARdipine 40 mg in 200 mL NS (CARDENE) infusion  0.5-15 mg/hr Intravenous Continuous PRN Catherine Galvez PA-C        phenylephrine (MARCIANO-SYNEPHRINE) 50 mg in NaCl 0.9 % 250 mL infusion  0.1-4 mcg/kg/min Intravenous Continuous PRN Catherine Galvez PA-C 9 mL/hr at 10/10/24 0557 0.3 mcg/kg/min at 10/10/24 0557    [Held by provider] sodium bicarbonate 150 mEq in D5W 1,000 mL infusion   Intravenous Continuous Doc Dhillon MD   Stopped at 10/10/24 0820     PRN Meds:.  Current Facility-Administered Medications   Medication Dose Route Frequency Provider Last Rate Last Admin    [Held by provider] acetaminophen (TYLENOL) tablet 650 mg  650 mg Oral Q4H PRN Catherine Galvez PA-C        albuterol (PROVENTIL HFA/VENTOLIN HFA) inhaler  2 puff Inhalation Q6H PRN Catherine Galvez PA-C        [START ON 10/11/2024] bisacodyl (DULCOLAX) suppository 10 mg  10 mg Rectal Daily PRN Catherine Galvez PA-C        calcium gluconate 1 g in 50 mL in sodium chloride intermittent infusion  1 g Intravenous Once PRN Catherine Galvez PA-C   1 g at 10/10/24 0454    calcium gluconate 2 g in  mL intermittent infusion  2 g Intravenous Once PRN Catherine Galvez PA-C        calcium gluconate 3 g in sodium chloride 0.9 % 100 mL intermittent infusion  3 g Intravenous Once PRN Catherine Galvez PA-C        glucose gel 15-30 g  15-30 g Oral Q15 Min PRN Doc Dhillon MD        Or    dextrose 50 % injection 25-50 mL  25-50 mL Intravenous Q15 Min PRN Jacquie,  MD Doc        Or    glucagon injection 1 mg  1 mg Subcutaneous Q15 Min PRN Doc Dhillon MD        glucose gel 15-30 g  15-30 g Oral Q15 Min PRN Catherine Galvez PA-C        Or    dextrose 50 % injection 25-50 mL  25-50 mL Intravenous Q15 Min PRN Catherine Galvez PA-C   50 mL at 10/09/24 2220    Or    glucagon injection 1 mg  1 mg Subcutaneous Q15 Min PRN Catherine Galvez PA-C        EPINEPHrine (ADRENALIN) 5 mg in sodium chloride 0.9 % 250 mL infusion CENTRAL  0.01-0.1 mcg/kg/min Intravenous Continuous PRN Catherine Galvez PA-C 21.1 mL/hr at 10/10/24 0600 0.07 mcg/kg/min at 10/10/24 0600    fluticasone (FLONASE) 50 MCG/ACT spray 2 spray  2 spray Both Nostrils Daily PRN Catherine Galvez PA-C        hydrALAZINE (APRESOLINE) injection 10 mg  10 mg Intravenous Q30 Min PRN Catherine Galvez PA-C        HYDROmorphone (DILAUDID) injection 0.2 mg  0.2 mg Intravenous Q2H PRN Catherine Galvez PA-C   0.2 mg at 10/10/24 0530    Or    HYDROmorphone (DILAUDID) injection 0.4 mg  0.4 mg Intravenous Q2H PRN Catherine Galvez PA-C   0.4 mg at 10/09/24 0541    ipratropium - albuterol 0.5 mg/2.5 mg/3 mL (DUONEB) neb solution 3 mL  3 mL Nebulization Q4H PRN Bharat Lu MD        lactated ringers BOLUS 250 mL  250 mL Intravenous Q15 Min PRN Catherine Galvez PA-C   250 mL at 10/09/24 0417    magnesium hydroxide (MILK OF MAGNESIA) suspension 30 mL  30 mL Oral Daily PRN Catherine Galvez PA-C        methocarbamol (ROBAXIN) tablet 750 mg  750 mg Oral Q6H PRN Henshue, Catherine K, PA-C        naloxone (NARCAN) injection 0.2 mg  0.2 mg Intravenous Q2 Min PRN David Wade MD        Or    naloxone (NARCAN) injection 0.4 mg  0.4 mg Intravenous Q2 Min PRN David Wade MD        Or    naloxone (NARCAN) injection 0.2 mg  0.2 mg Intramuscular Q2 Min PRN David Wade MD        Or    naloxone (NARCAN) injection 0.4 mg  0.4 mg Intramuscular Q2 Min  PRN David Wade MD        niCARdipine 40 mg in 200 mL NS (CARDENE) infusion  0.5-15 mg/hr Intravenous Continuous PRN Catherine Galvez PA-C        ondansetron (ZOFRAN ODT) ODT tab 4 mg  4 mg Oral Q6H PRN Catherine Galvez PA-C        Or    ondansetron (ZOFRAN) injection 4 mg  4 mg Intravenous Q6H PRN Catherine Galvez PA-C        oxyCODONE (ROXICODONE) tablet 5 mg  5 mg Oral Q4H PRN Catherine Galvez PA-C   5 mg at 10/09/24 0809    Or    oxyCODONE (ROXICODONE) tablet 10 mg  10 mg Oral Q4H PRN Catherine Galvez PA-C        phenylephrine (MARCIANO-SYNEPHRINE) 50 mg in NaCl 0.9 % 250 mL infusion  0.1-4 mcg/kg/min Intravenous Continuous PRN Catherine Galvez PA-C 9 mL/hr at 10/10/24 0557 0.3 mcg/kg/min at 10/10/24 0557    prochlorperazine (COMPAZINE) injection 10 mg  10 mg Intravenous Q6H PRN Catherine Galevz PA-C        Or    prochlorperazine (COMPAZINE) tablet 10 mg  10 mg Oral Q6H PRN Catherine Galvez PA-C        simethicone (MYLICON) chewable half-tab 120 mg  120 mg Oral 4x Daily PRN Catherine Galvez PA-C           Cardiographics:    Telemetry monitoring demonstrates accelerated junctional rhythm with rates in the 110s per my personal review.    Imaging:  Results for orders placed or performed during the hospital encounter of 10/08/24   XR Chest Port 1 View    Impression    IMPRESSION: Interval sternotomy, CABG procedure and mitral valve repair. Endotracheal tube proximally 2 cm above the sofia. Nasogastric tube in the stomach. Bilateral chest tubes and midline mediastinal drain in expected position. Epicardial leads are   present. Left IJ line in the brachiocephalic vein.    Low lung volumes. No evidence for CHF or pneumonia. No pleural effusion or pneumothorax.   XR Chest Port 1 View    Impression    IMPRESSION: Poststernotomy changes with prosthetic mitral valve. Patient's been extubated and the NG tube has been removed. Mediastinal and pleural chest tubes are in  place.    Left IJ cordis sheath is in the distal left innominate artery. Catheter has a very subtle kink within it 3.5 centimeters from the tip.    Low lung volumes. No pneumothorax. Likely trace left effusion at the base. No signs of pneumonia or failure. Heart is of normal size.   XR Abdomen Port 1 View    Impression    IMPRESSION: Orogastric tube tip in the proximal stomach and oriented superiorly towards the left diaphragm. Visualized bowel gas pattern unremarkable. Numerous tubes and lines lower chest and upper abdomen as described on chest x-ray report from today.        Labs, personally reviewed.  Hemoglobin   Date Value Ref Range Status   10/10/2024 7.4 (L) 11.7 - 15.7 g/dL Final   10/09/2024 8.9 (L) 11.7 - 15.7 g/dL Final   10/09/2024 9.1 (L) 11.7 - 15.7 g/dL Final   06/09/2015 13.5 11.7 - 15.7 g/dL Final   03/11/2015 12.3 11.7 - 15.7 g/dL Final   01/08/2015 13.6 11.7 - 15.7 g/dL Final     WBC   Date Value Ref Range Status   06/09/2015 7.9 4.0 - 11.0 10e9/L Final   03/11/2015 9.4 4.0 - 11.0 10e9/L Final   01/08/2015 8.1 4.0 - 11.0 10e9/L Final     WBC Count   Date Value Ref Range Status   10/10/2024 19.6 (H) 4.0 - 11.0 10e3/uL Final   10/09/2024 19.7 (H) 4.0 - 11.0 10e3/uL Final   10/09/2024 20.7 (H) 4.0 - 11.0 10e3/uL Final     Platelet Count   Date Value Ref Range Status   10/10/2024 117 (L) 150 - 450 10e3/uL Final   10/09/2024 138 (L) 150 - 450 10e3/uL Final   10/09/2024 151 150 - 450 10e3/uL Final   06/09/2015 219 150 - 450 10e9/L Final   03/11/2015 240 150 - 450 10e9/L Final   01/08/2015 221 150 - 450 10e9/L Final     Creatinine   Date Value Ref Range Status   10/10/2024 2.06 (H) 0.51 - 0.95 mg/dL Final   10/09/2024 1.86 (H) 0.51 - 0.95 mg/dL Final   10/09/2024 1.52 (H) 0.51 - 0.95 mg/dL Final   06/09/2015 0.80 0.52 - 1.04 mg/dL Final   03/11/2015 0.87 0.52 - 1.04 mg/dL Final   01/08/2015 0.79 0.52 - 1.04 mg/dL Final     Potassium   Date Value Ref Range Status   10/10/2024 4.7 3.4 - 5.3 mmol/L Final    10/10/2024 4.6 3.4 - 5.3 mmol/L Final   10/10/2024 5.0 3.4 - 5.3 mmol/L Final   06/09/2015 3.8 3.4 - 5.3 mmol/L Final   03/11/2015 3.9 3.4 - 5.3 mmol/L Final   01/08/2015 4.1 3.4 - 5.3 mmol/L Final     Potassium POCT   Date Value Ref Range Status   10/08/2024 4.4 3.4 - 5.3 mmol/L Final   10/08/2024 4.0 3.4 - 5.3 mmol/L Final   10/08/2024 4.1 3.4 - 5.3 mmol/L Final     Magnesium   Date Value Ref Range Status   10/10/2024 2.7 (H) 1.7 - 2.3 mg/dL Final   10/09/2024 2.7 (H) 1.7 - 2.3 mg/dL Final   10/09/2024 2.7 (H) 1.7 - 2.3 mg/dL Final   06/09/2015 1.8 1.6 - 2.3 mg/dL Final   03/11/2015 1.7 1.6 - 2.3 mg/dL Final   01/08/2015 1.8 1.6 - 2.3 mg/dL Final          I/O:  I/O last 3 completed shifts:  In: 3143.95 [P.O.:910; I.V.:1983.95]  Out: 1695 [Urine:935; Chest Tube:760]       Physical Exam:    General: Patient seen in bed. NAD. Conversant. Pleasant  HEENT: DORITA, no sclera icterus, moist mucosa  CV: RRR on monitor. 2+ peripheral pulses in all extremities. Mild peripheral edema. Incision C/D/I.  Pulm: Non-labored effort on nasal cannula. Chest tubes in place, serosanguinous output, no air leak.  Abd: Soft, NT, ND  : Padron with john urine  Ext: Mild pedal edema, SCDs in place, warm, distal pulses intact  Neuro: CNs grossly intact, patient sleepy but arouses to voice, FAM, strengths intact, A&Ox3      ASSESSMENT/PLAN:    Jory Ambrose is a 49 year old female with a history of GIA, asthma, GERD,  hypothyroidism, h/o PE on plavix, carotid stenosis, Hodgkin's lymphoma (s/p chemo/radiation and bone marrow transplant x2 after recurrence) currently in remission who is s/p CABG x4, RLE EVH, attempted mitral repair, mechanical MVR, LAAE.    Active Problems:    Mitral regurgitation    Coronary artery disease involving native coronary artery of native heart with angina pectoris (H)  #Accelerated Junctional Rhythm, ongoing.  #Severe transaminitis, ongoing.   #Acute Renal Failure d/t ATN, ongoing.  #Probable  UTI  #Hyperkalemia, ongoing.      NEURO:   - Scheduled lidocaine patches and PRN robaxin/oxycodone/dilaudid for pain  - No toradol given ARF  - PTA bupropion  - Tylenol HELD (transaminitis)  - Monitor for signs of encephalopathy given renal function    CV:   - Pre-op EF 55-60%  - Weaning pressors as hemodynamics allow; currently on 0.07 epi, 0.3 katie.  - SBP goal 120-130 to promote renal and hepatic perfusion  - Beta blocker pending weaning from pressors and rhythm  - Planning for plavix daily indefinitely d/t unclear h/o anaphylaxis to ASA  - Atorvastatin 80mg daily - HELD  (transaminitis)  - Chest tubes to remain today   - New baseline echo prior to discharge  - Holding heparin gtt this AM given hepatic dysfunction w/ rise in INR. Trend INR, plan to initiate LIHD no boluses if/when INR decreases. Warfarin for mechanical MVR when chest tubes/TPW are removed. INR goal 2.5-3.5  - If afib, NO AMIO (transaminitis)  - Consider echo if ongoing severe orthostatic hypotension  - Accelerated junctional rhythm and sinus tach past 24 hours. Rates 50-110s. Leave pacer backup of 60 for now.    PULM:   - Extubated on POD#0  - Maintaining oxygen saturations on nasal cannula  - Encourage pulmonary toilet  - PTA zyrtec, fludrocortisone, flonase, simethicone, albuterol, pulmicort, breo ellipta, duoneb  - Procal 0.76, not unexpected postop    FEN/GI:  - Continue electrolyte replacement protocol  - Cardiac; ADAT - clears at this time  - Bowel regimen, LBM preop  - Severe transaminitis, LFTs in 6-7000s. Likely 2/2 ischemic hepatitis. Bili and AP WNL  - PTA vitamin D3  - Hyperkalemia, improved w/ lasix/bicarb/insulin gtts 4.7 (5.9).   - Hypermagnesemia, hyperphosphoremia, monitor.  - Replace iCa++ aggressively  - Lipase 100  - CK 1677 (1973)  - CMP q8  - US RUQ unremarkable, dopper w/ patent portal and hepatic circulation  - Acetaminophen level and hepatitis panel checked for completeness, negative thus far    RENAL:  - Nephrology  consulted overnight for ARF w/ decreased UOP, appreciate. Following.   - 10mg/hr lasix gtt   - q8 diuril x2   - bicarb gtt for hyperkalemia and metabolic acidosis - HELD this AM given improved K+ and pH  - Cr 2.06 (1.19>>1.86) (baseline ~0.9)  - Padron to remain in for close monitoring of I/O and titration of vasopressors (order updated)  - CMP q8 today as above  - UA/Urine cx as below.    HEME:  - H/o bone marrow tx. Needs irradiated blood  - Acute blood loss anemia post-op.   - Hgb drifting today 7.4 (8.9), likely at least partly dilutional, no bleeding concerns. Hep SQ, ASA  - Received 2u PRBCs periop  - INR 3.08 (2.09). Plan to hold heparin gtt for now and initiate LIHD when INR begins to fall    ID:  - Lydia op ppx complete. Febrile to 100 overnight. Leukocytosis, grossly improving. WBC 19.6 (22.0>>20.7>>19.7)  - UA appears dirty although neg leukocyte esterase/nitrites. Cx pending  - Blood cx NGTD  - On vanc and rocephin for presumed urosepsis - narrow abx as able  - Plan for ID consult when Urine cx results to advise abx regimen. Prefer at least 10 days in s/o mechanical prosthetic valve  - Lactate 2.9 (5.0). Trend    ENDO:   - HbA1c 5.7%  - Continue insulin gtt while on epi  - Holding PTA metformin, plans to resume pending renal function  - PTA synthroid    PPx:   - DVT: SCDs, SQ heparin TID, ambulation   - GI: Protonix 40mg IV/PO daily    DISPO:   - Continue critical care in ICU pending pressors and monitoring for need for CRRT  - Medically Ready for Discharge: Anticipated in 5+ Days         Patient discussed with Dr. Wade, plan to see w/ Dr. Hanson when available.        Eva Galvez PA-C  [facility]  Pager: [phone]  October 10, 2024

## 2024-10-10 NOTE — PHARMACY-VANCOMYCIN DOSING SERVICE
Pharmacy Vancomycin Initial Note  Date of Service October 10, 2024  Patient's  1975  49 year old, female    Indication: Sepsis    Current estimated CrCl = Estimated Creatinine Clearance: 44.2 mL/min (A) (based on SCr of 1.86 mg/dL (H)).    Creatinine for last 3 days  10/7/2024:  8:42 AM Creatinine 0.92 mg/dL  10/8/2024:  4:28 PM Creatinine 0.95 mg/dL  10/9/2024:  4:14 AM Creatinine 1.15 mg/dL; 12:06 PM Creatinine 1.19 mg/dL;  6:35 PM Creatinine 1.52 mg/dL; 10:16 PM Creatinine 1.86 mg/dL    Recent Vancomycin Level(s) for last 3 days  No results found for requested labs within last 3 days.      Vancomycin IV Administrations (past 72 hours)                     vancomycin (VANCOCIN) 1,500 mg in 0.9% NaCl 265 mL intermittent infusion (mg) 1,500 mg New Bag 10/09/24 1306     1,500 mg New Bag  0117    vancomycin (VANCOCIN) 1,500 mg in sodium chloride 0.9 % 290 mL intermittent infusion (mg) 1,500 mg New Bag 10/08/24 0625                    Nephrotoxins and other renal medications (From now, onward)      Start     Dose/Rate Route Frequency Ordered Stop    10/10/24 0030  furosemide (LASIX) 500 mg/50mL infusion ADULT MAX CONC         10 mg/hr  1 mL/hr  Intravenous CONTINUOUS 10/10/24 0012              Contrast Orders - past 72 hours (72h ago, onward)      None                  Plan:  Given  A) worsening renal function (creatinine 0.92 --> 1.86), and B) previous doses of vancomycin (1500 mg on 10/8@06:25 & 10/9@01:13 & 13:06), will obtain vancomycin level prior to redosing.   Vancomycin level (10/10/24 @04:30) = 25.5.  Additional dosing info to follow (intermittent).      Cecelia Bey [facility]

## 2024-10-10 NOTE — PROGRESS NOTES
Pulmonary/Critical Care Consult Team Note    Jory Ambrose,  1975, MRN [id number]  Admitting Dx: Coronary artery disease involving native coronary artery of native heart with angina pectoris (H) [I25.119]  Date / Time of Admission:  10/8/2024  5:15 AM      Intake/Output last 3 shifts:  I/O last 3 completed shifts:  In: 7478.55 [P.O.:1180; I.V.:2820.55; Other:335; NG/GT:200; IV Piggyback:2943]  Out: 2373 [Urine:840; Blood:775; Chest Tube:758]    This evening pts liver enzymes climbed rapidly and elevated lactate   Pt denies any abd pain, no bloating, no pressure  No other complaints  She does not feel sick  She was sleeping when I woke her up comfortably on her home CPAP    Assessment/Plan: Jory Ambrose is a 49 year old female with PMHx of GIA, asthma.GERD,  hypothyroidism, Pe on plavix, carotid stenosis,  Hodgkin's lymphoma status post chemotherapy and then mantle radiation and 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, currently in remission with severe multivessel and moderate left main coronary artery disease and stable angina who underwent coronary artery bypass grafting and MVR on 10/8.    Shock liver with very elevated LFTs and elevated lactic acid  - will add on lipase and total CK  - she denies any abdominal pain and abdomen is soft (unlikely compartment syndrome)  - US liver was not diagnostic, will try to get US doppler to look for compromised blood flow  - may need CT abdomen/Pelvis, but would not be with contrast given her ALEXEI  - low yield to order viral hepatitis panels given the acuity and severity of above  - continue to trend LFTs and lactate    CV: coronary artery bypass grafting and MVR on 10/8.  - vasoactive drips per CVSx  - would consider titrating vasopressor aggressively, phenylephrine first    ALEXEI  - watching UOP  - CVSx added albumin and bicarb    Pulm: Hx of GIA and PE  - on home CPAP with bleed in O2  - was on plavix for her PE, now on heparin  "gtt    Medical Care Time excluding procedures and family discussions greater than: 45 Minutes    Risk Factors Present on Admission:  Clinically Significant Risk Factors        # Hypokalemia: Lowest K = 3.2 mmol/L in last 2 days, will replace as needed  # Hyperkalemia: Highest K = 5.6 mmol/L in last 2 days, will monitor as appropriate   # Hyperchloremia: Highest Cl = 112 mmol/L in last 2 days, will monitor as appropriate      # Hypocalcemia: Lowest iCa = 3.5 mg/dL in last 2 days, will monitor and replace as appropriate     # Hypoalbuminemia: Lowest albumin = 3.1 g/dL at 10/9/2024  6:35 PM, will monitor as appropriate  # Coagulation Defect: INR = 2.09 (Ref range: 0.85 - 1.15) and/or PTT = 48 Seconds (Ref range: 22 - 38 Seconds), will monitor for bleeding   # Acute Kidney Injury, unspecified: based on a >150% or 0.3 mg/dL increase in last creatinine compared to past 90 day average, will monitor renal function  # Hypertension: Noted on problem list           # Severe Obesity: Estimated body mass index is 41.29 kg/m  as calculated from the following:    Height as of 9/25/24: 1.626 m (5' 4\").    Weight as of this encounter: 109.1 kg (240 lb 8.4 oz)., PRESENT ON ADMISSION      # History of CABG: noted on surgical history              Marina Bell DO  Pulmonary and Critical Care Attending  pgr [phone]    Allergies   Allergen Reactions    Dye [Contrast Dye] Swelling     Pre-medicated with methylprednisolone    Shellfish Allergy Shortness Of Breath, Anaphylaxis and Swelling     Shrimp, crab, lobster    Penicillins Hives     Patient cannot recall if she has tried cephalosporins in the past.     Erythromycin Hives    Morphine Hcl      Causes change in mental status    Sulfa Antibiotics Hives and Rash       Meds: See MAR    Physical Exam:  BP (!) 86/47   Pulse 80   Temp 98  F (36.7  C) (Oral)   Resp 27   Wt 109.1 kg (240 lb 8.4 oz)   LMP  (LMP Unknown)   SpO2 97%   BMI 41.29 kg/m    Intake/Output this shift:  I/O " this shift:  In: 432.2 [P.O.:60; I.V.:372.2]  Out: 460 [Urine:190; Chest Tube:270]  GEN: sitting up in bed, NAD  HEENT: MMM, Large collar size  CVS: regular rhythm, no murmurs  RESP: CTA BL   ABD: Soft, No abdominal pain with palpation, no guarding, no rigidity  EXT: Warm, well perfused, no LE edema  NEURO: moving all extremities, nonfocal  PSYCH: pleasant    Pertinent Labs: Latest lab results in EHR personally reviewed.   CMP  Recent Labs   Lab 10/09/24  2230 10/09/24  2216 10/09/24  2209 10/09/24  2108 10/09/24  1949 10/09/24  1836 10/09/24  1835 10/09/24  1322 10/09/24  1206 10/09/24  0513 10/09/24  0414 10/08/24  1800 10/08/24  1650 10/08/24  1628 10/08/24  0537 10/07/24  0842   NA  --   --   --   --   --   --  138  --  138  --  143  --  145 145   < > 137   POTASSIUM  --   --   --   --   --   --  5.6*  --  5.2  --  5.4*  --  4.4 4.4  4.4   < > 4.1   CHLORIDE  --   --   --   --   --   --  106  --  105  --  110*  --   --  112*  --  103   CO2  --   --   --   --   --   --  20*  --  22  --  23  --   --  21*  --  22   ANIONGAP  --   --   --   --   --   --  12  --  11  --  10  --   --  12  --  12   *  --  61* 83 80   < > 123*   < > 180*   < > 135*   < >  --  164*  183*   < > 92   BUN  --   --   --   --   --   --  28.4*  --  24.9*  --  21.5*  --   --  16.5  --  21.5*   CR  --   --   --   --   --   --  1.52*  --  1.19*  --  1.15*  --   --  0.95  --  0.92   GFRESTIMATED  --   --   --   --   --   --  42*  --  56*  --  58*  --   --  73  --  76   CINDY  --   --   --   --   --   --  7.8*  --  7.8*  --  7.7*  --   --  7.8*  --  9.0   MAG  --  2.7*  --   --   --   --   --   --   --   --  2.7*  --   --  3.3*  --  2.0   PHOS  --  6.1*  --   --   --   --   --   --   --   --  5.1*  --   --  2.6  --   --    PROTTOTAL  --   --   --   --   --   --  4.5*  --  4.5*  --  4.2*  --   --  4.3*  --  6.7   ALBUMIN  --   --   --   --   --   --  3.1*  --  3.1*  --  3.1*  --   --  3.1*  --  4.1   BILITOTAL  --   --   --   --   --   --   0.5  --  0.4  --  0.6  --   --  0.8  --  0.2   ALKPHOS  --   --   --   --   --   --  58  --  56  --  53  --   --  39*  --  88   AST  --   --   --   --   --   --  >7,000*  --  4,639*  --  1,058*  --   --   --   --  20   ALT  --   --   --   --   --   --  4,748*  --  3,823*  --  1,157*  --   --  47  --  22    < > = values in this interval not displayed.     CBC  Recent Labs   Lab 10/09/24  2216 10/09/24  1206 10/09/24  0414 10/08/24  1650 10/08/24  1628   WBC 19.7* 20.7* 22.0*  --  21.7*   RBC 3.03* 3.06* 3.38*  --  3.54*   HGB 8.9* 9.1* 9.9* 8.8* 10.4*   HCT 27.5* 27.3* 29.8* 26* 30.8*   MCV 91 89 88  --  87   MCH 29.4 29.7 29.3  --  29.4   MCHC 32.4 33.3 33.2  --  33.8   RDW 16.2* 16.2* 16.3*  --  15.2*   * 151 149*  --  148*     INR  Recent Labs   Lab 10/09/24  1206 10/08/24  1628 10/08/24  1457 10/07/24  0842   INR 2.09* 1.48* 1.65* 0.94     Arterial Blood Gas  Recent Labs   Lab 10/09/24  2216 10/09/24  0413 10/08/24  2339 10/08/24  2102   PH 7.29* 7.31* 7.30* 7.36   PCO2 39 46* 41 34*   PO2 92 86 95 84   HCO3 19* 23 20* 19*   O2PER 23 21 30 30       Cultures: not yet available.      Imaging: personally reviewed.   Results for orders placed during the hospital encounter of 10/08/24    XR Chest Port 1 View    Narrative  EXAM: XR CHEST PORT 1 VIEW  LOCATION: [facility]  DATE: 10/9/2024    INDICATION: Post Op CVTS Surgery  COMPARISON: 10/8/2024    Impression  IMPRESSION: Poststernotomy changes with prosthetic mitral valve. Patient's been extubated and the NG tube has been removed. Mediastinal and pleural chest tubes are in place.    Left IJ cordis sheath is in the distal left innominate artery. Catheter has a very subtle kink within it 3.5 centimeters from the tip.    Low lung volumes. No pneumothorax. Likely trace left effusion at the base. No signs of pneumonia or failure. Heart is of normal size.      XR Chest Port 1 View    Narrative  EXAM: XR CHEST PORT 1 VIEW  LOCATION: [facility]  [facility]  DATE: 10/8/2024    INDICATION: Post Op CVTS Surgery  COMPARISON: 9/25/2024    Impression  IMPRESSION: Interval sternotomy, CABG procedure and mitral valve repair. Endotracheal tube proximally 2 cm above the sofia. Nasogastric tube in the stomach. Bilateral chest tubes and midline mediastinal drain in expected position. Epicardial leads are  present. Left IJ line in the brachiocephalic vein.    Low lung volumes. No evidence for CHF or pneumonia. No pleural effusion or pneumothorax.    Patient Active Problem List   Diagnosis    Hodgkin disease (H)    Hodgkin lymphoma, nodular sclerosis (H)    Subclinical hypothyroidism    Pulmonary function studies abnormal    Obesity    Hodgkin's disease (H)    Vitamin D deficiency    Hypomagnesemia    S/P bone marrow transplant (H)    Hypertension secondary to drug    Mitral regurgitation    Coronary artery disease involving native coronary artery of native heart with angina pectoris (H)     Marina Bell DO  Pulmonary and Critical Care Attending  [facility] [phone]    Securely message with the Vocera Web Console (learn more here)

## 2024-10-10 NOTE — CONSULTS
CARDIAC SURGERY NUTRITION CONSULT    Received standing order to educate patient.    Patient not ready for diet education today.   Will follow and complete diet education after patient is extubated and/or is transferred to medical unit.    Patient will receive additional nutrition education during the Outpatient Cardiac Rehab Program (nutrition classes/dietitian counseling).    Reviewed chart with positive nutrition risk screen.  Patient able to nod/shake head and indicates she was not eating well prior to surgery.  Per chart review no significant wt loss noted.    Plan to follow up tomorrow with patient to further check on nutrition risk.

## 2024-10-10 NOTE — PROGRESS NOTES
Pulmonary/Critical Care Consult Team Note    Jory Ambrose,  1975, MRN [id number]  Admitting Dx: Coronary artery disease involving native coronary artery of native heart with angina pectoris (H) [I25.119]  Date / Time of Admission:  10/8/2024  5:15 AM      Intake/Output last 3 shifts:  I/O last 3 completed shifts:  In: 3143.95 [P.O.:910; I.V.:1983.95]  Out: 1695 [Urine:935; Chest Tube:760]     liver enzymes climbed rapidly \without symptoms. We were asked to resume following the patient    Assessment/Plan: Jory Ambrose is a 49 year old female with PMHx of GIA, asthma.GERD,  hypothyroidism, Pe on plavix, carotid stenosis,  Hodgkin's lymphoma status post chemotherapy and then mantle radiation and 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, currently in remission with severe multivessel and moderate left main coronary artery disease and stable angina who underwent coronary artery bypass grafting and MVR on 10/8.    Shock liver with very elevated LFTs and elevated lactic acid likely complication due to pump and bypass  - total CK not elevated  - US liver was not diagnostic, will try to get US doppler to look for compromised blood flow  - low yield to order viral hepatitis panels given the acuity and severity of above but would order and get GI involved if not improving as would be expected if ischemic hepatitis  - continue to trend LFTs and lactate, glucose and coag and meld score  - hold medication that can affect the liver or metabolized the liver  - check fibrinogen  -  monitor for encephalopathy  MELD 3.0: 26 at 10/10/2024  4:30 AM  MELD-Na: 26 at 10/10/2024  4:30 AM  Calculated from:  Serum Creatinine: 2.06 mg/dL at 10/10/2024  4:30 AM  Serum Sodium: 141 mmol/L (Using max of 137 mmol/L) at 10/10/2024  4:30 AM  Total Bilirubin: 0.5 mg/dL (Using min of 1 mg/dL) at 10/10/2024  4:30 AM  Serum Albumin: 3.2 g/dL at 10/10/2024  4:30 AM  INR(ratio): 3.08 at 10/10/2024  4:30 AM  Age at  "listing (hypothetical): 49 years  Sex: Female at 10/10/2024  4:30 AM       CV: coronary artery bypass grafting and MVR on 10/8.  - vasoactive drips per CVSx    ALEXEI  - watching UOP  - CVSx added albumin and bicarb    Pulm: Hx of GIA and PE  - on home CPAP with bleed in O2  - was on plavix for her PE, now on heparin gtt    Medical Care Time excluding procedures and family discussions greater than: 35 Minutes    Risk Factors Present on Admission:  Clinically Significant Risk Factors        # Hypokalemia: Lowest K = 3.2 mmol/L in last 2 days, will replace as needed  # Hyperkalemia: Highest K = 5.9 mmol/L in last 2 days, will monitor as appropriate   # Hyperchloremia: Highest Cl = 112 mmol/L in last 2 days, will monitor as appropriate      # Hypocalcemia: Lowest iCa = 3.5 mg/dL in last 2 days, will monitor and replace as appropriate     # Hypoalbuminemia: Lowest albumin = 3.1 g/dL at 10/9/2024  6:35 PM, will monitor as appropriate    # Coagulation Defect: INR = 3.08 (Ref range: 0.85 - 1.15) and/or PTT = 48 Seconds (Ref range: 22 - 38 Seconds), will monitor for bleeding  # Thrombocytopenia: Lowest platelets = 117 in last 2 days, will monitor for bleeding  # Acute Kidney Injury, unspecified: based on a >150% or 0.3 mg/dL increase in last creatinine compared to past 90 day average, will monitor renal function  # Hypertension: Noted on problem list           # Severe Obesity: Estimated body mass index is 42.31 kg/m  as calculated from the following:    Height as of 9/25/24: 1.626 m (5' 4\").    Weight as of this encounter: 111.8 kg (246 lb 7.6 oz)., PRESENT ON ADMISSION      # History of CABG: noted on surgical history            Total time critical care 35 min    Allergies   Allergen Reactions    Dye [Contrast Dye] Swelling     Pre-medicated with methylprednisolone    Shellfish Allergy Shortness Of Breath, Anaphylaxis and Swelling     Shrimp, crab, lobster    Penicillins Hives     Patient cannot recall if she has tried " cephalosporins in the past.     Erythromycin Hives    Morphine Hcl      Causes change in mental status    Sulfa Antibiotics Hives and Rash       Meds: See MAR    Physical Exam:  BP (!) 86/47   Pulse 116   Temp 99  F (37.2  C) (Oral)   Resp 22   Wt 111.8 kg (246 lb 7.6 oz)   LMP  (LMP Unknown)   SpO2 97%   BMI 42.31 kg/m    Intake/Output this shift:  No intake/output data recorded.  GEN: , NAD  HEENT: MMM, Large collar size  CVS: regular rhythm, no murmurs  RESP: CTA BL   ABD: Soft, No abdominal pain with palpation, no guarding, no rigidity  EXT: Warm, well perfused, no LE edema  NEURO: moving all extremities, nonfocal, oriented 3x, no asterixis slow to respond,   PSYCH: pleasant    Pertinent Labs: Latest lab results in EHR personally reviewed.   CMP  Recent Labs   Lab 10/10/24  0629 10/10/24  0533 10/10/24  0430 10/10/24  0419 10/10/24  0307 10/10/24  0207 10/10/24  0120 10/10/24  0036 10/09/24  2230 10/09/24  2216 10/09/24  1836 10/09/24  1835 10/09/24  1322 10/09/24  1206 10/09/24  0513 10/09/24  0414 10/08/24  1650 10/08/24  1628   NA  --   --  141  --   --   --   --   --   --  137  --  138  --  138  --  143   < > 145   POTASSIUM  --   --  4.7  --   --  4.6  --  5.0  --  5.9*  --  5.6*  --  5.2  --  5.4*   < > 4.4  4.4   CHLORIDE  --   --  104  --   --   --   --   --   --  104  --  106  --  105  --  110*  --  112*   CO2  --   --  25  --   --   --   --   --   --  18*  --  20*  --  22  --  23  --  21*   ANIONGAP  --   --  12  --   --   --   --   --   --  15  --  12  --  11  --  10  --  12   * 124* 162* 118*   < > 152*   < >  --    < > 116*   < > 123*   < > 180*   < > 135*   < > 164*  183*   BUN  --   --  35.3*  --   --   --   --   --   --  31.2*  --  28.4*  --  24.9*  --  21.5*  --  16.5   CR  --   --  2.06*  --   --   --   --   --   --  1.86*  --  1.52*  --  1.19*  --  1.15*  --  0.95   GFRESTIMATED  --   --  29*  --   --   --   --   --   --  33*  --  42*  --  56*  --  58*  --  73   CINDY  --   --   7.6*  --   --   --   --   --   --  7.9*  --  7.8*  --  7.8*  --  7.7*  --  7.8*   MAG  --   --  2.7*  --   --   --   --   --   --  2.7*  --   --   --   --   --  2.7*  --  3.3*   PHOS  --   --  5.2*  --   --   --   --   --   --  6.1*  --   --   --   --   --  5.1*  --  2.6   PROTTOTAL  --   --  4.5*  --   --   --   --   --   --  4.7*  --  4.5*  --  4.5*  --  4.2*  --  4.3*   ALBUMIN  --   --  3.2*  --   --   --   --   --   --  3.2*  --  3.1*  --  3.1*  --  3.1*  --  3.1*   BILITOTAL  --   --  0.5  --   --   --   --   --   --  0.7  --  0.5  --  0.4  --  0.6  --  0.8   ALKPHOS  --   --  62  --   --   --   --   --   --  65  --  58  --  56  --  53  --  39*   AST  --   --  >7,000*  --   --   --   --   --   --  >7,000*  --  >7,000*  --  4,639*  --  1,058*  --   --    ALT  --   --  6,555*  --   --   --   --   --   --  5,698*  --  4,748*  --  3,823*  --  1,157*  --  47    < > = values in this interval not displayed.     CBC  Recent Labs   Lab 10/10/24  0430 10/09/24  2216 10/09/24  1206 10/09/24  8197   WBC 19.6* 19.7* 20.7* 22.0*   RBC 2.52* 3.03* 3.06* 3.38*   HGB 7.4* 8.9* 9.1* 9.9*   HCT 22.5* 27.5* 27.3* 29.8*   MCV 89 91 89 88   MCH 29.4 29.4 29.7 29.3   MCHC 32.9 32.4 33.3 33.2   RDW 15.9* 16.2* 16.2* 16.3*   * 138* 151 149*     INR  Recent Labs   Lab 10/10/24  0430 10/09/24  1206 10/08/24  1628 10/08/24  1457   INR 3.08* 2.09* 1.48* 1.65*     Arterial Blood Gas  Recent Labs   Lab 10/10/24  0120 10/09/24  2216 10/09/24  0413 10/08/24  2339   PH 7.35 7.29* 7.31* 7.30*   PCO2 44 39 46* 41   PO2 90 92 86 95   HCO3 25 19* 23 20*   O2PER 40 23 21 30       Cultures: not yet available.      Imaging: personally reviewed.   Results for orders placed during the hospital encounter of 10/08/24    XR Chest Port 1 View    Narrative  EXAM: XR CHEST PORT 1 VIEW  LOCATION: [facility]  DATE: 10/9/2024    INDICATION: Post Op CVTS Surgery  COMPARISON: 10/8/2024    Impression  IMPRESSION: Poststernotomy  changes with prosthetic mitral valve. Patient's been extubated and the NG tube has been removed. Mediastinal and pleural chest tubes are in place.    Left IJ cordis sheath is in the distal left innominate artery. Catheter has a very subtle kink within it 3.5 centimeters from the tip.    Low lung volumes. No pneumothorax. Likely trace left effusion at the base. No signs of pneumonia or failure. Heart is of normal size.      XR Chest Port 1 View    Narrative  EXAM: XR CHEST PORT 1 VIEW  LOCATION: [facility]  DATE: 10/8/2024    INDICATION: Post Op CVTS Surgery  COMPARISON: 9/25/2024    Impression  IMPRESSION: Interval sternotomy, CABG procedure and mitral valve repair. Endotracheal tube proximally 2 cm above the sofia. Nasogastric tube in the stomach. Bilateral chest tubes and midline mediastinal drain in expected position. Epicardial leads are  present. Left IJ line in the brachiocephalic vein.    Low lung volumes. No evidence for CHF or pneumonia. No pleural effusion or pneumothorax.    Patient Active Problem List   Diagnosis    Hodgkin disease (H)    Hodgkin lymphoma, nodular sclerosis (H)    Subclinical hypothyroidism    Pulmonary function studies abnormal    Obesity    Hodgkin's disease (H)    Vitamin D deficiency    Hypomagnesemia    S/P bone marrow transplant (H)    Hypertension secondary to drug    Mitral regurgitation    Coronary artery disease involving native coronary artery of native heart with angina pectoris (H)     Bharat Lu MD  Pulmonary and Critical Care Attending      Securely message with the Vocera Web Console (learn more here)     Detail Level: Zone Continue Regimen: Moisturizers daily

## 2024-10-10 NOTE — TREATMENT PLAN
RCAT Treatment Plan    Patient Score: 12  Patient Acuity: 3    Clinical Indication for Therapy: unable to deep breath, Diminished BS    Therapy Ordered: Flutter valve and IS QID, Home cpap NOC    Assessment Summary: Patient is on 2 LPM via nasal cannula. BS clear and diminished. Lethargic but arousable and following commands. ABG this AM on 2 LPM nasal cannula: Last Arterial Blood Gas: Ph 7.37, CO2 45, PO2 125, Bicarb 26, O2 sat 99.2, BE 0.2    RT will continue to follow.     Leslie Jules, RT  10/10/2024

## 2024-10-10 NOTE — CONSULTS
RENAL CONSULTATION:     Date of Consultation:  10/10/2024    Requesting Physician: Dr Wade  Reason for Consult:  ALEXEI    Assessment/ Recommendations:     Acute kidney injury.  Acute tubular necrosis.  Secondary to CABG and mitral valve replacement followed by cardiogenic shock.  Baseline creatinine 0.95 (10/8/2024).     furosemide drip at 10 mg/h  Renal function closely. Serial labs. Intake and output.   No acute indication for CRRT currently, monitor closely. Consent obtained for dialysis if needed in the next 24-48 hours.     2. Hyperkalemia. Improved.  Secondary to acute kidney injury.   Furosemide as above.    3. Metabolic acidosis.  Secondary to acute kidney injury and cardiogenic shock - Hold bicarb at this time.Improved after bicarb overnight.      4. Status post coronary artery bypass grafting x 4 vessels and mitral valve replacement on 10/8.  Diffuse coronary artery disease.  On vasopressor support including epinephrine and phenylephrine.    5. Shock liver.  Continue supportive care.  PER icu    6. History of Hodgkin's lymphoma.  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    Arnav Napoles DO  [facility]  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 Patient underwent CABG x 4 and mitral valve replacement on 10/9/24. Patient noted to have worsening renal function in the setting of cardiogenic shock requiring pressors support. Patient was oliguric and started on diuretics overnight. Seen in the ICU this am. Patient reports feeling very fatiuged. No hx of CKD. Denies any sob. Some pain. Discussed above plan. Consent obtained for HD if needed. Discussed indication for HD and risk and benefit.     Discussed with ICU nurse.      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     Erythromycin Hives    Morphine Hcl      Causes change in mental status    Sulfa Antibiotics Hives and Rash       Social History     Socioeconomic History    Marital status:      Spouse name: Yasir    Number of children: 0    Years of education: None    Highest education level: None   Tobacco Use    Smoking status: Never    Smokeless tobacco: Never    Tobacco comments:     denies tabacco use   Vaping Use    Vaping status: Never Used   Substance and Sexual Activity    Alcohol use: No     Comment: minimal alcohol use, 1 glass of wine in 6 months    Drug use: No    Sexual activity: Yes     Partners: Male     Birth control/protection: None     Social Determinants of Health     Financial Resource Strain: Low Risk  (10/9/2024)    Financial Resource Strain     Within the past 12 months, have you or your family members you live with been unable to get utilities (heat, electricity) when it was really needed?: No   Food Insecurity: Low Risk  (10/9/2024)    Food Insecurity     Within the past 12  months, did you worry that your food would run out before you got money to buy more?: No     Within the past 12 months, did the food you bought just not last and you didn t have money to get more?: No   Transportation Needs: Low Risk  (10/9/2024)    Transportation Needs     Within the past 12 months, has lack of transportation kept you from medical appointments, getting your medicines, non-medical meetings or appointments, work, or from getting things that you need?: No   Physical Activity: Insufficiently Active (10/24/2023)    [facility], [facility] [employer] [facility]    Exercise Vital Sign     Days of Exercise per Week: 2 days     Minutes of Exercise per Session: 30 min   Stress: Stress Concern Present (10/24/2023)    Received from [facility] [employer] [facility], [facility] [place] of Occupational Health - Occupational Stress Questionnaire     Feeling of Stress : To some extent   Social Connections: Moderately Integrated (10/24/2023)    [facility] from [facility], [facility]    Social Connection and Isolation Panel [NHANES]     Frequency of Communication with Friends and Family: More than three times a week     Frequency of Social Gatherings with Friends and Family: Once a week     Attends Anabaptism Services: More than 4 times per year     Active Member of Clubs or Organizations: No     Attends Club or Organization Meetings: Patient declined     Marital Status:    Interpersonal Safety: Low Risk  (10/8/2024)    Interpersonal Safety     Do you feel physically and emotionally safe where you currently live?: Yes     Within the past 12 months, have you been hit, slapped, kicked or otherwise physically hurt by someone?: No     Within the past 12 months, have you been humiliated or emotionally abused in other ways  by your partner or ex-partner?: No   Housing Stability: Low Risk  (10/9/2024)    Housing Stability     Do you have housing? : Yes     Are you worried about losing your housing?: No       Family History   Problem Relation Age of Onset    Cancer Mother         thyroid cancer    Unknown/Adopted Father     Breast Cancer No family hx of     Cancer - colorectal No family hx of     Prostate Cancer No family hx of     Hypertension Mother     C.A.D. Maternal Grandmother     C.A.D. Paternal Grandmother        Review of Systems: . The remainder of 10 point review of systems is negative except as noted in HPI above.     BP (!) 86/47   Pulse 116   Temp 99  F (37.2  C) (Oral)   Resp 11   Wt 111.8 kg (246 lb 7.6 oz)   LMP  (LMP Unknown)   SpO2 92%   BMI 42.31 kg/m      Intake/Output Summary (Last 24 hours) at 10/10/2024 0958  Last data filed at 10/10/2024 0600  Gross per 24 hour   Intake 2616.35 ml   Output 1595 ml   Net 1021.35 ml     Physical Exam:   GENERAL: NAD  EYES: No scleral icterus, conjunctiva clear  ENT: Hearing normal, Oral mucosa moist  RESP: Anterior clear.   CV: RRR, + leg edema.    GI: NT/ND,  Musculoskeletal: Normal muscle bulk/ tone; No gross joint abnormalities  SKIN: No rash,  PSYCH:  Appropriate mood and affect    Most Recent 3 CBC's:  Recent Labs   Lab Test 10/10/24  0430 10/09/24  2216 10/09/24  1206   WBC 19.6* 19.7* 20.7*   HGB 7.4* 8.9* 9.1*   MCV 89 91 89   * 138* 151     Most Recent 3 BMP's:  Recent Labs   Lab Test 10/10/24  0949 10/10/24  0912 10/10/24  0818 10/10/24  0533 10/10/24  0430 10/10/24  0307 10/10/24  0207 10/10/24  0120 10/10/24  0036 10/09/24  2230 10/09/24  2216 10/09/24  1836 10/09/24  1835   NA  --   --   --   --  141  --   --   --   --   --  137  --  138   POTASSIUM  --   --   --   --  4.7  --  4.6  --  5.0  --  5.9*  --  5.6*   CHLORIDE  --   --   --   --  104  --   --   --   --   --  104  --  106   CO2  --   --   --   --  25  --   --   --   --   --  18*  --  20*   BUN   --   --   --   --  35.3*  --   --   --   --   --  31.2*  --  28.4*   CR  --   --   --   --  2.06*  --   --   --   --   --  1.86*  --  1.52*   ANIONGAP  --   --   --   --  12  --   --   --   --   --  15  --  12   CINDY  --   --   --   --  7.6*  --   --   --   --   --  7.9*  --  7.8*   * 110* 126*   < > 162*   < > 152*   < >  --    < > 116*   < > 123*    < > = values in this interval not displayed.     Most Recent 2 LFT's:  Recent Labs   Lab Test 10/10/24  0430 10/09/24  2216   AST >7,000* >7,000*   ALT 6,555* 5,698*   ALKPHOS 62 65   BILITOTAL 0.5 0.7     Most Recent 3 INR's:  Recent Labs   Lab Test 10/10/24  0430 10/09/24  1206 10/08/24  1628   INR 3.08* 2.09* 1.48*     Most Recent 3 Creatinines:  Recent Labs   Lab Test 10/10/24  0430 10/09/24  2216 10/09/24  1835   CR 2.06* 1.86* 1.52*     Most Recent 3 Hemoglobins:  Recent Labs   Lab Test 10/10/24  0430 10/09/24  2216 10/09/24  1206   HGB 7.4* 8.9* 9.1*     Most Recent 3 Troponin's:No lab results found.  Most Recent 3 BNP's:  Recent Labs   Lab Test 06/06/24  1302   NTBNPI 101     Most Recent D-dimer:No lab results found.    All lab data was reviewed at 9:58 AM

## 2024-10-10 NOTE — PROGRESS NOTES
Care Management Follow Up    Length of Stay (days): 2    Expected Discharge Date: 10/15/2024    Anticipated Discharge Plan:   TBD    Transportation: TBD    PT Recommendations:    OT Recommendations:   eval and recs pending     Barriers to Discharge: medical stability, post procedure care and monitoring, renal status, pulm on NC    Prior Living Situation:  Patient reports she lives in her apartment (has elevator) with spouse.  She is independent with ADLs/IADLs, ambulates without devices, has no services in community, uses cpap and works full time. Spouse is primary family contact and family willing to transport at discharge.       Discussed  Partnership in Safe Discharge Planning  document with patient/family: No     Handoff Completed: No, handoff not indicated or clinically appropriate    Patient/Spokesperson Updated: patient and her her mom 10/9    Additional Information:  Medical:  Patient with PMHx of GIA, asthma.GERD,  hypothyroidism, Pe on plavix, carotid stenosis,  Hodgkin's lymphoma status post chemotherapy and then mantle radiation and 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, currently in remission with severe multivessel and moderate left main coronary artery disease and stable angina who underwent coronary artery bypass grafting and MVR on 10/8.   CT surgery following. Nephrology consulted for ALEXEI.         10/10/24:  Patient is not medically stable to initiate discharge planning.        Nahomy Cortez RN

## 2024-10-10 NOTE — PLAN OF CARE
Problem: Cardiovascular Surgery  Goal: Fluid and Electrolyte Balance  10/10/2024 0647 by Brenda Zamora, RN  Outcome: Progressing  10/10/2024 0639 by Brenda Zamora, RN  Outcome: Progressing   Goal Outcome Evaluation:           Overall Patient Progress: improvingOverall Patient Progress: improving    Outcome Evaluation: stable electrolytes    [facility] - ICU    RN Progress Note:            Pertinent Assessments:      Please refer to flowsheet rows for full assessment       CV, Electrolytes, Pain,            Key Events - This Shift:       Patient had electrolyte change and LFT changes see orders for meds given and fluid  Changes.   CV was able to come down on pressor but did make changes, was pt was able to turn   And have the HOB more elevated.  Pain was well controlled with PRN meds              Barriers to Discharge / Downgrade:     Pressors

## 2024-10-10 NOTE — PROGRESS NOTES
Renal  Preliminary Note.    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  Patient underwent coronary artery bypass grafting x 4 vessels and mitral valve replacement on 10/8/24.    Clinic today and patient develop worsening renal function associated to cardiogenic shock and need for high doses of vasopressors, she also developed shock liver, acute kidney injury and has been oliguric with urine output 25 mL/h.    BP (!) 86/47   Pulse 103   Temp 98  F (36.7  C) (Oral)   Resp 26   Wt 109.1 kg (240 lb 8.4 oz)   LMP  (LMP Unknown)   SpO2 97%   BMI 41.29 kg/m    Arterial line shows blood pressure 131/63.  At present on:  Heparin drip  Epinephrine  Phenylephrine drip.    Most recent laboratories on 10/9/2024 at 2216 hours:  Sodium 137, potassium 5.9, chloride 104, venous CO2 18, creatinine 1.86 (baseline creatinine 0.95 prior to surgery), calcium 7.9, AST greater than 7000, ALT 5600.    Assessment and plan:    Acute kidney injury.  Acute tubular necrosis.  Secondary to CABG and mitral valve replacement followed by cardiogenic shock.  Baseline creatinine 0.95 (10/8/2024).  Urine output is slowly dropping during the day.  Begin furosemide drip at 10 mg/h  Diuril 500 mg IV every 8 hours x 2 doses.  Renal function closely.  May need CRRT support  Hyperkalemia.  Secondary to acute kidney injury.  Potassium 5.9 this evening.  Furosemide as above.  Potassium shifting per protocol.  If worsening hyperkalemia will need to initiate CRRT  Metabolic acidosis.  Secondary to acute kidney injury and cardiogenic shock.  Begin sodium bicarbonate drip which will also help to control hyperkalemia.  Status post coronary artery bypass grafting x 4 vessels and mitral valve replacement on  10/8.  Diffuse coronary artery disease.  On vasopressor support including epinephrine and phenylephrine.  Continue management as per CVS.  Shock liver.  Continue supportive care.  Management as per intensivist service.  History of Hodgkin's lymphoma.  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    Full consultation to follow tomorrow.  Call with questions.      Doc Dhillon MD  Nephrology

## 2024-10-11 ENCOUNTER — APPOINTMENT (OUTPATIENT)
Dept: OCCUPATIONAL THERAPY | Facility: HOSPITAL | Age: 49
DRG: 219 | End: 2024-10-11
Payer: COMMERCIAL

## 2024-10-11 ENCOUNTER — APPOINTMENT (OUTPATIENT)
Dept: INTERVENTIONAL RADIOLOGY/VASCULAR | Facility: HOSPITAL | Age: 49
DRG: 219 | End: 2024-10-11
Attending: RADIOLOGY
Payer: COMMERCIAL

## 2024-10-11 ENCOUNTER — APPOINTMENT (OUTPATIENT)
Dept: OCCUPATIONAL THERAPY | Facility: HOSPITAL | Age: 49
DRG: 219 | End: 2024-10-11
Attending: SURGERY
Payer: COMMERCIAL

## 2024-10-11 ENCOUNTER — APPOINTMENT (OUTPATIENT)
Dept: RADIOLOGY | Facility: HOSPITAL | Age: 49
DRG: 219 | End: 2024-10-11
Payer: COMMERCIAL

## 2024-10-11 ENCOUNTER — APPOINTMENT (OUTPATIENT)
Dept: CARDIOLOGY | Facility: HOSPITAL | Age: 49
DRG: 219 | End: 2024-10-11
Payer: COMMERCIAL

## 2024-10-11 LAB
ABO/RH(D): NORMAL
ALBUMIN SERPL BCG-MCNC: 2.8 G/DL (ref 3.5–5.2)
ALBUMIN SERPL BCG-MCNC: 3.1 G/DL (ref 3.5–5.2)
ALBUMIN SERPL BCG-MCNC: 3.3 G/DL (ref 3.5–5.2)
ALP SERPL-CCNC: 88 U/L (ref 40–150)
ALP SERPL-CCNC: 96 U/L (ref 40–150)
ALP SERPL-CCNC: 98 U/L (ref 40–150)
ALP SERPL-CCNC: 99 U/L (ref 40–150)
ALT SERPL W P-5'-P-CCNC: 3242 U/L (ref 0–50)
ALT SERPL W P-5'-P-CCNC: 3594 U/L (ref 0–50)
ALT SERPL W P-5'-P-CCNC: 4836 U/L (ref 0–50)
ALT SERPL W P-5'-P-CCNC: 5452 U/L (ref 0–50)
AMMONIA PLAS-SCNC: 57 UMOL/L (ref 11–51)
ANION GAP SERPL CALCULATED.3IONS-SCNC: 11 MMOL/L (ref 7–15)
ANION GAP SERPL CALCULATED.3IONS-SCNC: 13 MMOL/L (ref 7–15)
ANION GAP SERPL CALCULATED.3IONS-SCNC: 13 MMOL/L (ref 7–15)
ANION GAP SERPL CALCULATED.3IONS-SCNC: 14 MMOL/L (ref 7–15)
ANION GAP SERPL CALCULATED.3IONS-SCNC: 15 MMOL/L (ref 7–15)
ANTIBODY SCREEN: NEGATIVE
APTT PPP: 83 SECONDS (ref 22–38)
AST SERPL W P-5'-P-CCNC: 4341 U/L (ref 0–45)
AST SERPL W P-5'-P-CCNC: 5755 U/L (ref 0–45)
AST SERPL W P-5'-P-CCNC: >7000 U/L (ref 0–45)
AST SERPL W P-5'-P-CCNC: >7000 U/L (ref 0–45)
BACTERIA UR CULT: NO GROWTH
BASE EXCESS BLDA CALC-SCNC: -1.7 MMOL/L (ref -3–3)
BI-PLANE LVEF ECHO: NORMAL
BILIRUB SERPL-MCNC: 0.5 MG/DL
BILIRUB SERPL-MCNC: 0.5 MG/DL
BILIRUB SERPL-MCNC: 0.6 MG/DL
BILIRUB SERPL-MCNC: 0.6 MG/DL
BLD PROD TYP BPU: NORMAL
BLOOD COMPONENT TYPE: NORMAL
BUN SERPL-MCNC: 52.2 MG/DL (ref 6–20)
BUN SERPL-MCNC: 53.8 MG/DL (ref 6–20)
BUN SERPL-MCNC: 56.4 MG/DL (ref 6–20)
BUN SERPL-MCNC: 58.4 MG/DL (ref 6–20)
BUN SERPL-MCNC: 60 MG/DL (ref 6–20)
CA-I BLD-MCNC: 4.3 MG/DL (ref 4.4–5.2)
CA-I BLD-MCNC: 4.3 MG/DL (ref 4.4–5.2)
CALCIUM SERPL-MCNC: 7.6 MG/DL (ref 8.8–10.4)
CALCIUM SERPL-MCNC: 7.7 MG/DL (ref 8.8–10.4)
CALCIUM SERPL-MCNC: 8 MG/DL (ref 8.8–10.4)
CALCIUM SERPL-MCNC: 8.1 MG/DL (ref 8.8–10.4)
CALCIUM SERPL-MCNC: 8.2 MG/DL (ref 8.8–10.4)
CHLORIDE SERPL-SCNC: 100 MMOL/L (ref 98–107)
CHLORIDE SERPL-SCNC: 100 MMOL/L (ref 98–107)
CHLORIDE SERPL-SCNC: 101 MMOL/L (ref 98–107)
CHLORIDE SERPL-SCNC: 102 MMOL/L (ref 98–107)
CHLORIDE SERPL-SCNC: 99 MMOL/L (ref 98–107)
CODING SYSTEM: NORMAL
COHGB MFR BLD: 97.5 % (ref 96–97)
CREAT SERPL-MCNC: 3.32 MG/DL (ref 0.51–0.95)
CREAT SERPL-MCNC: 3.38 MG/DL (ref 0.51–0.95)
CREAT SERPL-MCNC: 3.62 MG/DL (ref 0.51–0.95)
CREAT SERPL-MCNC: 3.7 MG/DL (ref 0.51–0.95)
CREAT SERPL-MCNC: 3.87 MG/DL (ref 0.51–0.95)
CROSSMATCH: NORMAL
EGFRCR SERPLBLD CKD-EPI 2021: 14 ML/MIN/1.73M2
EGFRCR SERPLBLD CKD-EPI 2021: 14 ML/MIN/1.73M2
EGFRCR SERPLBLD CKD-EPI 2021: 15 ML/MIN/1.73M2
EGFRCR SERPLBLD CKD-EPI 2021: 16 ML/MIN/1.73M2
EGFRCR SERPLBLD CKD-EPI 2021: 16 ML/MIN/1.73M2
ERYTHROCYTE [DISTWIDTH] IN BLOOD BY AUTOMATED COUNT: 15.4 % (ref 10–15)
ERYTHROCYTE [DISTWIDTH] IN BLOOD BY AUTOMATED COUNT: 15.6 % (ref 10–15)
ERYTHROCYTE [DISTWIDTH] IN BLOOD BY AUTOMATED COUNT: 16 % (ref 10–15)
ERYTHROCYTE [DISTWIDTH] IN BLOOD BY AUTOMATED COUNT: 16.6 % (ref 10–15)
GLUCOSE BLDC GLUCOMTR-MCNC: 109 MG/DL (ref 70–99)
GLUCOSE BLDC GLUCOMTR-MCNC: 123 MG/DL (ref 70–99)
GLUCOSE BLDC GLUCOMTR-MCNC: 127 MG/DL (ref 70–99)
GLUCOSE BLDC GLUCOMTR-MCNC: 133 MG/DL (ref 70–99)
GLUCOSE BLDC GLUCOMTR-MCNC: 134 MG/DL (ref 70–99)
GLUCOSE BLDC GLUCOMTR-MCNC: 141 MG/DL (ref 70–99)
GLUCOSE BLDC GLUCOMTR-MCNC: 144 MG/DL (ref 70–99)
GLUCOSE BLDC GLUCOMTR-MCNC: 148 MG/DL (ref 70–99)
GLUCOSE BLDC GLUCOMTR-MCNC: 154 MG/DL (ref 70–99)
GLUCOSE BLDC GLUCOMTR-MCNC: 156 MG/DL (ref 70–99)
GLUCOSE BLDC GLUCOMTR-MCNC: 157 MG/DL (ref 70–99)
GLUCOSE BLDC GLUCOMTR-MCNC: 93 MG/DL (ref 70–99)
GLUCOSE SERPL-MCNC: 165 MG/DL (ref 70–99)
GLUCOSE SERPL-MCNC: 166 MG/DL (ref 70–99)
GLUCOSE SERPL-MCNC: 171 MG/DL (ref 70–99)
GLUCOSE SERPL-MCNC: 92 MG/DL (ref 70–99)
GLUCOSE SERPL-MCNC: 98 MG/DL (ref 70–99)
HCO3 BLD-SCNC: 24 MMOL/L (ref 21–28)
HCO3 SERPL-SCNC: 23 MMOL/L (ref 22–29)
HCO3 SERPL-SCNC: 24 MMOL/L (ref 22–29)
HCO3 SERPL-SCNC: 25 MMOL/L (ref 22–29)
HCT VFR BLD AUTO: 22.5 % (ref 35–47)
HCT VFR BLD AUTO: 22.7 % (ref 35–47)
HCT VFR BLD AUTO: 24.6 % (ref 35–47)
HCT VFR BLD AUTO: 24.7 % (ref 35–47)
HGB BLD-MCNC: 7.3 G/DL (ref 11.7–15.7)
HGB BLD-MCNC: 7.3 G/DL (ref 11.7–15.7)
HGB BLD-MCNC: 8 G/DL (ref 11.7–15.7)
HGB BLD-MCNC: 8.2 G/DL (ref 11.7–15.7)
INR PPP: 2.03 (ref 0.85–1.15)
INR PPP: 2.63 (ref 0.85–1.15)
INR PPP: 2.67 (ref 0.85–1.15)
ISSUE DATE AND TIME: NORMAL
LACTATE SERPL-SCNC: 1.1 MMOL/L (ref 0.7–2)
LACTATE SERPL-SCNC: 2.6 MMOL/L (ref 0.7–2)
LACTATE SERPL-SCNC: 2.7 MMOL/L (ref 0.7–2)
LACTATE SERPL-SCNC: 2.7 MMOL/L (ref 0.7–2)
MAGNESIUM SERPL-MCNC: 2.7 MG/DL (ref 1.7–2.3)
MAGNESIUM SERPL-MCNC: 2.9 MG/DL (ref 1.7–2.3)
MCH RBC QN AUTO: 28.7 PG (ref 26.5–33)
MCH RBC QN AUTO: 28.8 PG (ref 26.5–33)
MCH RBC QN AUTO: 29.2 PG (ref 26.5–33)
MCH RBC QN AUTO: 29.3 PG (ref 26.5–33)
MCHC RBC AUTO-ENTMCNC: 32.2 G/DL (ref 31.5–36.5)
MCHC RBC AUTO-ENTMCNC: 32.4 G/DL (ref 31.5–36.5)
MCHC RBC AUTO-ENTMCNC: 32.5 G/DL (ref 31.5–36.5)
MCHC RBC AUTO-ENTMCNC: 33.2 G/DL (ref 31.5–36.5)
MCV RBC AUTO: 86 FL (ref 78–100)
MCV RBC AUTO: 89 FL (ref 78–100)
MCV RBC AUTO: 90 FL (ref 78–100)
MCV RBC AUTO: 91 FL (ref 78–100)
O2/TOTAL GAS SETTING VFR VENT: 2 %
PCO2 BLD: 44 MM HG (ref 35–45)
PH BLD: 7.35 [PH] (ref 7.35–7.45)
PHOSPHATE SERPL-MCNC: 5.4 MG/DL (ref 2.5–4.5)
PHOSPHATE SERPL-MCNC: 6.3 MG/DL (ref 2.5–4.5)
PLATELET # BLD AUTO: 47 10E3/UL (ref 150–450)
PLATELET # BLD AUTO: 49 10E3/UL (ref 150–450)
PLATELET # BLD AUTO: 61 10E3/UL (ref 150–450)
PLATELET # BLD AUTO: 73 10E3/UL (ref 150–450)
PO2 BLD: 97 MM HG (ref 80–105)
POTASSIUM SERPL-SCNC: 4.1 MMOL/L (ref 3.4–5.3)
POTASSIUM SERPL-SCNC: 4.1 MMOL/L (ref 3.4–5.3)
POTASSIUM SERPL-SCNC: 4.2 MMOL/L (ref 3.4–5.3)
POTASSIUM SERPL-SCNC: 4.6 MMOL/L (ref 3.4–5.3)
POTASSIUM SERPL-SCNC: 4.8 MMOL/L (ref 3.4–5.3)
PROT SERPL-MCNC: 4.4 G/DL (ref 6.4–8.3)
PROT SERPL-MCNC: 4.7 G/DL (ref 6.4–8.3)
PROT SERPL-MCNC: 4.8 G/DL (ref 6.4–8.3)
PROT SERPL-MCNC: 4.8 G/DL (ref 6.4–8.3)
RBC # BLD AUTO: 2.49 10E6/UL (ref 3.8–5.2)
RBC # BLD AUTO: 2.5 10E6/UL (ref 3.8–5.2)
RBC # BLD AUTO: 2.78 10E6/UL (ref 3.8–5.2)
RBC # BLD AUTO: 2.86 10E6/UL (ref 3.8–5.2)
SAO2 % BLDA: 95 % (ref 92–100)
SODIUM SERPL-SCNC: 137 MMOL/L (ref 135–145)
SODIUM SERPL-SCNC: 137 MMOL/L (ref 135–145)
SODIUM SERPL-SCNC: 138 MMOL/L (ref 135–145)
SPECIMEN EXPIRATION DATE: NORMAL
UFH PPP CHRO-ACNC: 0.15 IU/ML
UFH PPP CHRO-ACNC: 0.3 IU/ML
UFH PPP CHRO-ACNC: 0.34 IU/ML
UNIT ABO/RH: NORMAL
UNIT NUMBER: NORMAL
UNIT STATUS: NORMAL
UNIT TYPE ISBT: 6200
VANCOMYCIN SERPL-MCNC: 19.3 UG/ML
WBC # BLD AUTO: 10.8 10E3/UL (ref 4–11)
WBC # BLD AUTO: 16.7 10E3/UL (ref 4–11)
WBC # BLD AUTO: 18.3 10E3/UL (ref 4–11)
WBC # BLD AUTO: 9.6 10E3/UL (ref 4–11)

## 2024-10-11 PROCEDURE — 94640 AIRWAY INHALATION TREATMENT: CPT

## 2024-10-11 PROCEDURE — 200N000001 HC R&B ICU

## 2024-10-11 PROCEDURE — 82040 ASSAY OF SERUM ALBUMIN: CPT

## 2024-10-11 PROCEDURE — 84155 ASSAY OF PROTEIN SERUM: CPT

## 2024-10-11 PROCEDURE — 83735 ASSAY OF MAGNESIUM: CPT | Performed by: SURGERY

## 2024-10-11 PROCEDURE — 83735 ASSAY OF MAGNESIUM: CPT | Performed by: INTERNAL MEDICINE

## 2024-10-11 PROCEDURE — 250N000011 HC RX IP 250 OP 636

## 2024-10-11 PROCEDURE — 93321 DOPPLER ECHO F-UP/LMTD STD: CPT

## 2024-10-11 PROCEDURE — 93325 DOPPLER ECHO COLOR FLOW MAPG: CPT

## 2024-10-11 PROCEDURE — 84100 ASSAY OF PHOSPHORUS: CPT | Performed by: SURGERY

## 2024-10-11 PROCEDURE — 250N000011 HC RX IP 250 OP 636: Performed by: RADIOLOGY

## 2024-10-11 PROCEDURE — 250N000013 HC RX MED GY IP 250 OP 250 PS 637: Performed by: INTERNAL MEDICINE

## 2024-10-11 PROCEDURE — 83605 ASSAY OF LACTIC ACID: CPT

## 2024-10-11 PROCEDURE — 85027 COMPLETE CBC AUTOMATED: CPT

## 2024-10-11 PROCEDURE — 250N000011 HC RX IP 250 OP 636: Performed by: NURSE PRACTITIONER

## 2024-10-11 PROCEDURE — 250N000009 HC RX 250: Performed by: INTERNAL MEDICINE

## 2024-10-11 PROCEDURE — 97530 THERAPEUTIC ACTIVITIES: CPT | Mod: GO

## 2024-10-11 PROCEDURE — 250N000009 HC RX 250: Performed by: RADIOLOGY

## 2024-10-11 PROCEDURE — 82140 ASSAY OF AMMONIA: CPT

## 2024-10-11 PROCEDURE — 93321 DOPPLER ECHO F-UP/LMTD STD: CPT | Mod: 26 | Performed by: INTERNAL MEDICINE

## 2024-10-11 PROCEDURE — 250N000013 HC RX MED GY IP 250 OP 250 PS 637

## 2024-10-11 PROCEDURE — 93308 TTE F-UP OR LMTD: CPT | Mod: 26 | Performed by: INTERNAL MEDICINE

## 2024-10-11 PROCEDURE — 86901 BLOOD TYPING SEROLOGIC RH(D): CPT

## 2024-10-11 PROCEDURE — 85610 PROTHROMBIN TIME: CPT

## 2024-10-11 PROCEDURE — 999N000156 HC STATISTIC RCP CONSULT EA 30 MIN

## 2024-10-11 PROCEDURE — 84100 ASSAY OF PHOSPHORUS: CPT | Performed by: INTERNAL MEDICINE

## 2024-10-11 PROCEDURE — 5A1D90Z PERFORMANCE OF URINARY FILTRATION, CONTINUOUS, GREATER THAN 18 HOURS PER DAY: ICD-10-PCS | Performed by: INTERNAL MEDICINE

## 2024-10-11 PROCEDURE — 999N000157 HC STATISTIC RCP TIME EA 10 MIN

## 2024-10-11 PROCEDURE — 86900 BLOOD TYPING SEROLOGIC ABO: CPT

## 2024-10-11 PROCEDURE — 85520 HEPARIN ASSAY: CPT | Performed by: SURGERY

## 2024-10-11 PROCEDURE — P9040 RBC LEUKOREDUCED IRRADIATED: HCPCS

## 2024-10-11 PROCEDURE — 258N000003 HC RX IP 258 OP 636: Performed by: SURGERY

## 2024-10-11 PROCEDURE — 250N000011 HC RX IP 250 OP 636: Performed by: SURGERY

## 2024-10-11 PROCEDURE — 250N000013 HC RX MED GY IP 250 OP 250 PS 637: Performed by: NURSE PRACTITIONER

## 2024-10-11 PROCEDURE — C1752 CATH,HEMODIALYSIS,SHORT-TERM: HCPCS

## 2024-10-11 PROCEDURE — C1769 GUIDE WIRE: HCPCS

## 2024-10-11 PROCEDURE — 76937 US GUIDE VASCULAR ACCESS: CPT

## 2024-10-11 PROCEDURE — 99233 SBSQ HOSP IP/OBS HIGH 50: CPT | Performed by: NURSE PRACTITIONER

## 2024-10-11 PROCEDURE — 250N000012 HC RX MED GY IP 250 OP 636 PS 637

## 2024-10-11 PROCEDURE — 250N000013 HC RX MED GY IP 250 OP 250 PS 637: Performed by: SURGERY

## 2024-10-11 PROCEDURE — 80202 ASSAY OF VANCOMYCIN: CPT | Performed by: SURGERY

## 2024-10-11 PROCEDURE — 94660 CPAP INITIATION&MGMT: CPT

## 2024-10-11 PROCEDURE — 82330 ASSAY OF CALCIUM: CPT | Performed by: INTERNAL MEDICINE

## 2024-10-11 PROCEDURE — 255N000002 HC RX 255 OP 636

## 2024-10-11 PROCEDURE — 85730 THROMBOPLASTIN TIME PARTIAL: CPT | Performed by: INTERNAL MEDICINE

## 2024-10-11 PROCEDURE — 82330 ASSAY OF CALCIUM: CPT

## 2024-10-11 PROCEDURE — 272N000500 HC NEEDLE CR2

## 2024-10-11 PROCEDURE — 94640 AIRWAY INHALATION TREATMENT: CPT | Mod: 76

## 2024-10-11 PROCEDURE — 86923 COMPATIBILITY TEST ELECTRIC: CPT

## 2024-10-11 PROCEDURE — 93325 DOPPLER ECHO COLOR FLOW MAPG: CPT | Mod: 26 | Performed by: INTERNAL MEDICINE

## 2024-10-11 PROCEDURE — 82805 BLOOD GASES W/O2 SATURATION: CPT | Performed by: SURGERY

## 2024-10-11 PROCEDURE — 86022 PLATELET ANTIBODIES: CPT

## 2024-10-11 PROCEDURE — 71045 X-RAY EXAM CHEST 1 VIEW: CPT

## 2024-10-11 PROCEDURE — 97166 OT EVAL MOD COMPLEX 45 MIN: CPT | Mod: GO

## 2024-10-11 PROCEDURE — 90947 DIALYSIS REPEATED EVAL: CPT

## 2024-10-11 PROCEDURE — 02H633Z INSERTION OF INFUSION DEVICE INTO RIGHT ATRIUM, PERCUTANEOUS APPROACH: ICD-10-PCS | Performed by: RADIOLOGY

## 2024-10-11 PROCEDURE — 94799 UNLISTED PULMONARY SVC/PX: CPT

## 2024-10-11 RX ORDER — POTASSIUM CHLORIDE 29.8 MG/ML
20 INJECTION INTRAVENOUS EVERY 8 HOURS PRN
Status: DISCONTINUED | OUTPATIENT
Start: 2024-10-11 | End: 2024-10-13

## 2024-10-11 RX ORDER — FENTANYL CITRATE 50 UG/ML
INJECTION, SOLUTION INTRAMUSCULAR; INTRAVENOUS PRN
Status: COMPLETED | OUTPATIENT
Start: 2024-10-11 | End: 2024-10-11

## 2024-10-11 RX ORDER — HEPARIN SODIUM 1000 [USP'U]/ML
2500 INJECTION, SOLUTION INTRAVENOUS; SUBCUTANEOUS ONCE
Status: COMPLETED | OUTPATIENT
Start: 2024-10-11 | End: 2024-10-11

## 2024-10-11 RX ORDER — CALCIUM CHLORIDE, MAGNESIUM CHLORIDE, SODIUM CHLORIDE, SODIUM BICARBONATE, POTASSIUM CHLORIDE AND SODIUM PHOSPHATE DIBASIC DIHYDRATE 3.68; 3.05; 6.34; 3.09; .314; .187 G/L; G/L; G/L; G/L; G/L; G/L
12.5 INJECTION INTRAVENOUS CONTINUOUS
Status: DISCONTINUED | OUTPATIENT
Start: 2024-10-11 | End: 2024-10-13

## 2024-10-11 RX ORDER — METOLAZONE 5 MG/1
5 TABLET ORAL
Status: DISCONTINUED | OUTPATIENT
Start: 2024-10-11 | End: 2024-10-11

## 2024-10-11 RX ORDER — DOBUTAMINE HYDROCHLORIDE 200 MG/100ML
1-3 INJECTION INTRAVENOUS CONTINUOUS
Status: DISCONTINUED | OUTPATIENT
Start: 2024-10-11 | End: 2024-10-14

## 2024-10-11 RX ORDER — CALCIUM CHLORIDE, MAGNESIUM CHLORIDE, SODIUM CHLORIDE, SODIUM BICARBONATE, POTASSIUM CHLORIDE AND SODIUM PHOSPHATE DIBASIC DIHYDRATE 3.68; 3.05; 6.34; 3.09; .314; .187 G/L; G/L; G/L; G/L; G/L; G/L
200 INJECTION INTRAVENOUS CONTINUOUS
Status: DISCONTINUED | OUTPATIENT
Start: 2024-10-11 | End: 2024-10-13

## 2024-10-11 RX ORDER — CALCIUM GLUCONATE 20 MG/ML
1 INJECTION, SOLUTION INTRAVENOUS ONCE
Status: COMPLETED | OUTPATIENT
Start: 2024-10-11 | End: 2024-10-11

## 2024-10-11 RX ORDER — CALCIUM GLUCONATE 20 MG/ML
2 INJECTION, SOLUTION INTRAVENOUS EVERY 8 HOURS PRN
Status: DISCONTINUED | OUTPATIENT
Start: 2024-10-11 | End: 2024-10-13

## 2024-10-11 RX ORDER — MAGNESIUM SULFATE HEPTAHYDRATE 40 MG/ML
2 INJECTION, SOLUTION INTRAVENOUS EVERY 8 HOURS PRN
Status: DISCONTINUED | OUTPATIENT
Start: 2024-10-11 | End: 2024-10-13

## 2024-10-11 RX ORDER — VANCOMYCIN HYDROCHLORIDE 1 G/200ML
1000 INJECTION, SOLUTION INTRAVENOUS EVERY 12 HOURS
Status: DISCONTINUED | OUTPATIENT
Start: 2024-10-11 | End: 2024-10-13 | Stop reason: DRUGHIGH

## 2024-10-11 RX ORDER — OXYCODONE HYDROCHLORIDE 5 MG/1
5 TABLET ORAL EVERY 4 HOURS PRN
Status: DISCONTINUED | OUTPATIENT
Start: 2024-10-11 | End: 2024-10-14

## 2024-10-11 RX ORDER — VANCOMYCIN HYDROCHLORIDE 1 G/200ML
1000 INJECTION, SOLUTION INTRAVENOUS EVERY 12 HOURS
Status: DISCONTINUED | OUTPATIENT
Start: 2024-10-11 | End: 2024-10-11

## 2024-10-11 RX ORDER — LACTULOSE 10 G/15ML
20 SOLUTION ORAL 3 TIMES DAILY
Status: DISCONTINUED | OUTPATIENT
Start: 2024-10-11 | End: 2024-10-13

## 2024-10-11 RX ADMIN — SENNOSIDES AND DOCUSATE SODIUM 1 TABLET: 8.6; 5 TABLET ORAL at 08:03

## 2024-10-11 RX ADMIN — LIDOCAINE 1 PATCH: 4 PATCH TOPICAL at 17:38

## 2024-10-11 RX ADMIN — LACTULOSE SOLUTION USP, 10 G/15 ML 20 G: 10 SOLUTION ORAL; RECTAL at 20:19

## 2024-10-11 RX ADMIN — LIDOCAINE HYDROCHLORIDE 10 ML: 10 INJECTION, SOLUTION EPIDURAL; INFILTRATION; INTRACAUDAL; PERINEURAL at 16:35

## 2024-10-11 RX ADMIN — CALCIUM CHLORIDE, MAGNESIUM CHLORIDE, SODIUM CHLORIDE, SODIUM BICARBONATE, POTASSIUM CHLORIDE AND SODIUM PHOSPHATE DIBASIC DIHYDRATE 12.5 ML/KG/HR: 3.68; 3.05; 6.34; 3.09; .314; .187 INJECTION INTRAVENOUS at 16:28

## 2024-10-11 RX ADMIN — CLOPIDOGREL BISULFATE 75 MG: 75 TABLET ORAL at 08:02

## 2024-10-11 RX ADMIN — CALCIUM CHLORIDE, MAGNESIUM CHLORIDE, SODIUM CHLORIDE, SODIUM BICARBONATE, POTASSIUM CHLORIDE AND SODIUM PHOSPHATE DIBASIC DIHYDRATE 12.5 ML/KG/HR: 3.68; 3.05; 6.34; 3.09; .314; .187 INJECTION INTRAVENOUS at 16:26

## 2024-10-11 RX ADMIN — CALCIUM CHLORIDE, MAGNESIUM CHLORIDE, SODIUM CHLORIDE, SODIUM BICARBONATE, POTASSIUM CHLORIDE AND SODIUM PHOSPHATE DIBASIC DIHYDRATE 12.5 ML/KG/HR: 3.68; 3.05; 6.34; 3.09; .314; .187 INJECTION INTRAVENOUS at 20:51

## 2024-10-11 RX ADMIN — PANTOPRAZOLE SODIUM 40 MG: 40 TABLET, DELAYED RELEASE ORAL at 08:03

## 2024-10-11 RX ADMIN — CALCIUM CHLORIDE, MAGNESIUM CHLORIDE, SODIUM CHLORIDE, SODIUM BICARBONATE, POTASSIUM CHLORIDE AND SODIUM PHOSPHATE DIBASIC DIHYDRATE 12.5 ML/KG/HR: 3.68; 3.05; 6.34; 3.09; .314; .187 INJECTION INTRAVENOUS at 20:52

## 2024-10-11 RX ADMIN — CALCIUM GLUCONATE 1 G: 20 INJECTION, SOLUTION INTRAVENOUS at 05:07

## 2024-10-11 RX ADMIN — VANCOMYCIN HYDROCHLORIDE 1000 MG: 1 INJECTION, SOLUTION INTRAVENOUS at 22:17

## 2024-10-11 RX ADMIN — POLYETHYLENE GLYCOL 3350 17 G: 17 POWDER, FOR SOLUTION ORAL at 08:02

## 2024-10-11 RX ADMIN — LEVOTHYROXINE SODIUM 125 MCG: 0.03 TABLET ORAL at 06:43

## 2024-10-11 RX ADMIN — OXYCODONE HYDROCHLORIDE 5 MG: 5 TABLET ORAL at 00:46

## 2024-10-11 RX ADMIN — SODIUM CHLORIDE 750 MG: 9 INJECTION, SOLUTION INTRAVENOUS at 11:18

## 2024-10-11 RX ADMIN — Medication 1 TABLET: at 11:42

## 2024-10-11 RX ADMIN — FUROSEMIDE 40 MG/HR: 10 INJECTION, SOLUTION INTRAVENOUS at 15:49

## 2024-10-11 RX ADMIN — METOLAZONE 5 MG: 5 TABLET ORAL at 10:42

## 2024-10-11 RX ADMIN — CALCIUM CHLORIDE, MAGNESIUM CHLORIDE, SODIUM CHLORIDE, SODIUM BICARBONATE, POTASSIUM CHLORIDE AND SODIUM PHOSPHATE DIBASIC DIHYDRATE 200 ML/HR: 3.68; 3.05; 6.34; 3.09; .314; .187 INJECTION INTRAVENOUS at 16:30

## 2024-10-11 RX ADMIN — Medication 125 MCG: at 08:02

## 2024-10-11 RX ADMIN — SENNOSIDES AND DOCUSATE SODIUM 1 TABLET: 8.6; 5 TABLET ORAL at 20:19

## 2024-10-11 RX ADMIN — DOBUTAMINE HYDROCHLORIDE 1 MCG/KG/MIN: 200 INJECTION INTRAVENOUS at 11:29

## 2024-10-11 RX ADMIN — FENTANYL CITRATE 50 MCG: 50 INJECTION INTRAMUSCULAR; INTRAVENOUS at 16:32

## 2024-10-11 RX ADMIN — BUDESONIDE 1 MG: 0.5 INHALANT ORAL at 08:30

## 2024-10-11 RX ADMIN — CALCIUM GLUCONATE 1 G: 20 INJECTION, SOLUTION INTRAVENOUS at 22:23

## 2024-10-11 RX ADMIN — HEPARIN SODIUM 2500 UNITS: 1000 INJECTION INTRAVENOUS; SUBCUTANEOUS at 16:43

## 2024-10-11 RX ADMIN — INSULIN ASPART 1 UNITS: 100 INJECTION, SOLUTION INTRAVENOUS; SUBCUTANEOUS at 11:42

## 2024-10-11 RX ADMIN — BUDESONIDE 1 MG: 0.5 INHALANT ORAL at 19:06

## 2024-10-11 RX ADMIN — PERFLUTREN 2 ML: 6.52 INJECTION, SUSPENSION INTRAVENOUS at 09:33

## 2024-10-11 RX ADMIN — FLUDROCORTISONE ACETATE 0.1 MG: 0.1 TABLET ORAL at 08:02

## 2024-10-11 RX ADMIN — FLUTICASONE FUROATE AND VILANTEROL TRIFENATATE 1 PUFF: 100; 25 POWDER RESPIRATORY (INHALATION) at 20:28

## 2024-10-11 RX ADMIN — CEFTRIAXONE SODIUM 1 G: 1 INJECTION, POWDER, FOR SOLUTION INTRAMUSCULAR; INTRAVENOUS at 00:54

## 2024-10-11 RX ADMIN — CALCIUM GLUCONATE 1 G: 20 INJECTION, SOLUTION INTRAVENOUS at 10:48

## 2024-10-11 ASSESSMENT — ACTIVITIES OF DAILY LIVING (ADL)
ADLS_ACUITY_SCORE: 41
ADLS_ACUITY_SCORE: 41
ADLS_ACUITY_SCORE: 37
ADLS_ACUITY_SCORE: 41
ADLS_ACUITY_SCORE: 37
ADLS_ACUITY_SCORE: 37
ADLS_ACUITY_SCORE: 41
ADLS_ACUITY_SCORE: 37
ADLS_ACUITY_SCORE: 37
ADLS_ACUITY_SCORE: 41
ADLS_ACUITY_SCORE: 37
ADLS_ACUITY_SCORE: 41
ADLS_ACUITY_SCORE: 37
ADLS_ACUITY_SCORE: 37
ADLS_ACUITY_SCORE: 41
ADLS_ACUITY_SCORE: 37
ADLS_ACUITY_SCORE: 41

## 2024-10-11 NOTE — PLAN OF CARE
Goal Outcome Evaluation:      Plan of Care Reviewed With: patient, spouse, parent    Overall Patient Progress: improvingOverall Patient Progress: improving    Outcome Evaluation: Potassium stable throughout night.    [facility] - ICU    RN Progress Note:            Pertinent Assessments:      Please refer to flowsheet rows for full assessment     - Urine output increasing throughout night  - ALT falling, AST still >7000  - Creatinine continuing to rise           Key Events - This Shift:       - Contacted on call nephrologist at start of shift to notify that creatinine was still rising. Was instructed to call if potassium > 5.5, bicarbonate <16, or respiratory status declining.                Barriers to Discharge / Downgrade:     - Pressor support  - Liver enzymes still critically high  - Rising creatinine

## 2024-10-11 NOTE — PROGRESS NOTES
"Critical Care Progress Note  10/11/2024   11:08 AM    Admit Date: 10/8/2024  ICU Admission Day: 10/8  Code Status: full code      Problem List:   Active Problems:    Mitral regurgitation    Coronary artery disease involving native coronary artery of native heart with angina pectoris (H)       Plan by System:     Neuro/Psych: acute post surgical pain   - pain treatment per surgery   - needs to increase activity, out of bed, therapies.     Pulmonary: No acute issues; Hx of GIA on CPAP.    - Push pulmonary toilet; if unable to do IS effectively would start EZ-PAP   - push therapies and get out of bed   - CPAP/BiPAP with sleep     CV:  Post op course complicated by cardiogenic shock; CAD s/p CABG x 4; s/p MV replacement.    - Hemodynamic management as directed by Surgery.    - Currently on epi; vaso has been stopped.     GI/: shock liver in setting of cardiogenic shock. ABD US showed patent flow throughout.    - LFTs slowly downtrending. Continue to follow.    - Diet: cardiac   - Last BM: none recorded    - Bowel meds: prn    Renal: ALEXEI, ATN - cardiogenic shock.    - Renal consulted; appreciate input.    - Furosemide gtt up to 40mg/hr and receiving metolazone.    - per Dr. Napoles's note from today: \"If not responding to diuretic changes, will need CRRT later today\"    ID: treating for urosepsis per surgery with vanc and ctx.     Heme/Coag:  acute blood loss anemia   - transfusing per surgery.     - DVT proph: scd    Endocrine: hyperglycemia   - RHI gtt per surgery      Family: mother at bedside      Clinically Significant Risk Factors        # Hyperkalemia: Highest K = 5.9 mmol/L in last 2 days, will monitor as appropriate    # Hypocalcemia: Lowest iCa = 4.3 mg/dL in last 2 days, will monitor and replace as appropriate     # Hypoalbuminemia: Lowest albumin = 3.1 g/dL at 10/11/2024  4:40 AM, will monitor as appropriate  # Coagulation Defect: INR = 2.67 (Ref range: 0.85 - 1.15) and/or PTT = 83 Seconds (Ref range: 22 - 38 " "Seconds), will monitor for bleeding  # Thrombocytopenia: Lowest platelets = 61 in last 2 days, will monitor for bleeding  # Acute Kidney Injury, unspecified: based on a >150% or 0.3 mg/dL increase in last creatinine compared to past 90 day average, will monitor renal function  # Hypertension: Noted on problem list           # Severe Obesity: Estimated body mass index is 43.29 kg/m  as calculated from the following:    Height as of 9/25/24: 1.626 m (5' 4\").    Weight as of this encounter: 114.4 kg (252 lb 3.3 oz)., PRESENT ON ADMISSION      # History of CABG: noted on surgical history                Dispo: ICU     Felicitas Vegas, [facility] Pulmonary/Critical Care       _______________________________________________________________    HPI: Jory Ambrose is a 49 year old female with PMHx of GIA, asthma.GERD,  hypothyroidism, Pe on plavix, carotid stenosis,  Hodgkin's lymphoma status post chemotherapy and then mantle radiation and 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, currently in remission with severe multivessel and moderate left main coronary artery disease and stable angina who underwent coronary artery bypass grafting and MVR on 10/8.  She was extubated without difficulty DOS. Post op course complicated by cardiogenic shock and shock liver with very elevated liver enzymes.     ROS: states she's having pain; no nausea.     Events over last 24-hours: no acute changes     Objective:     Vitals:    10/11/24 1000 10/11/24 1015 10/11/24 1030 10/11/24 1045   BP:       BP Location:       Pulse: 101 101 100 102   Resp: 24 21 19 27   Temp:       TempSrc:       SpO2: 99% 100% 100% 97%   Weight:              I/O:   Intake/Output Summary (Last 24 hours) at 10/11/2024 1108  Last data filed at 10/11/2024 1000  Gross per 24 hour   Intake 1714.19 ml   Output 1485 ml   Net 229.19 ml     Wt Readings from Last 3 Encounters:   10/11/24 114.4 kg (252 lb 3.3 oz)   10/04/24 98 kg (216 lb)   09/25/24 99.3 " kg (219 lb)      Weight change: 2.6 kg (5 lb 11.7 oz)    Physical Exam:  Gen: no distress, quite sleepy   HEENMT: AT/NC  NEURO: wakes to name, answers appropriately. Weak.  CARDIOVASCULAR: RRR S1S2 postive rub. No murmur. Chest tubes in place.   PULMONARY: unlabored on NC; shallow breaths, lungs sounds diminished, no wheeze.   GASTROINTESTINAL: soft, sluggish bowel sounds   INTEGUMENT: pale; visible skin intact   PSYCH: flat affect, sleepy    Labs:   Recent Labs   Lab 10/11/24  0440      CO2 25   BUN 56.4*   ALKPHOS 96   ALT 4,836*   AST >7,000*       Recent Labs   Lab 10/11/24  0440   WBC 16.7*   HGB 7.3*   HCT 22.7*   PLT 61*       Micro:   Reviewed - no growth     Imaging: all imaging personalized reviewed         Felicitas Vegas, CNP  [facility] Pulmonary/Critical Care

## 2024-10-11 NOTE — PHARMACY-VANCOMYCIN DOSING SERVICE
Pharmacy Vancomycin Initial Note  Date of Service 2024  Patient's  1975  49 year old, female    Indication: Sepsis    Current estimated CrCl = Estimated Creatinine Clearance: 21.8 mL/min (A) (based on SCr of 3.87 mg/dL (H)).    Creatinine for last 3 days  10/8/2024:  4:28 PM Creatinine 0.95 mg/dL  10/9/2024:  4:14 AM Creatinine 1.15 mg/dL; 12:06 PM Creatinine 1.19 mg/dL;  6:35 PM Creatinine 1.52 mg/dL; 10:16 PM Creatinine 1.86 mg/dL  10/10/2024:  4:30 AM Creatinine 2.06 mg/dL; 12:02 PM Creatinine 2.56 mg/dL;  6:06 PM Creatinine 2.95 mg/dL  10/11/2024: 12:09 AM Creatinine 3.38 mg/dL;  4:40 AM Creatinine 3.62 mg/dL; 11:38 AM Creatinine 3.87 mg/dL    Recent Vancomycin Level(s) for last 3 days  10/10/2024:  4:30 AM Vancomycin 25.5 ug/mL  10/11/2024:  4:40 AM Vancomycin 19.3 ug/mL      Vancomycin IV Administrations (past 72 hours)                     vancomycin (VANCOCIN) 750 mg in 0.9% NaCl 257.5 mL intermittent infusion (mg) 750 mg New Bag 10/11/24 1118    vancomycin (VANCOCIN) 1,500 mg in 0.9% NaCl 265 mL intermittent infusion (mg) 1,500 mg New Bag 10/09/24 1306     1,500 mg New Bag  0117                    Nephrotoxins and other renal medications (From now, onward)      Start     Dose/Rate Route Frequency Ordered Stop    10/11/24 2300  vancomycin (VANCOCIN) 1,000 mg in 200 mL dextrose intermittent infusion         1,000 mg  200 mL/hr over 1 Hours Intravenous EVERY 12 HOURS 10/11/24 1442      10/10/24 0030  furosemide (LASIX) 500 mg/50mL infusion ADULT MAX CONC         40 mg/hr  4 mL/hr  Intravenous CONTINUOUS 10/10/24 0012              Contrast Orders - past 72 hours (72h ago, onward)      Start     Dose/Rate Route Frequency Stop    10/11/24 1000  perflutren lipid microsphere (DEFINITY) injection SUSP 2 mL         2 mL Intravenous ONCE 10/11/24 0933          Patient started on CRRT, will adjust from intermittent dosing to scheduled dosing          Plan:  Change to vancomycin  1000 mg IV q12h  starting at 2300.   Vancomycin monitoring method: Renal Replacement Therapy  Vancomycin therapeutic monitoring goal: 15-20 mg/L  Pharmacy will check vancomycin levels as appropriate in 1-3 Days.    Serum creatinine levels will be ordered daily for the first week of therapy and at least twice weekly for subsequent weeks.      Karol Van RPH

## 2024-10-11 NOTE — PROGRESS NOTES
Care Management Follow Up    Length of Stay (days): 3    Expected Discharge Date: 10/16/2024    Anticipated Discharge Plan:       Transportation: Anticipate Family/friend    PT Recommendations:    OT Recommendations:  home with outpatient cardiac rehab     Barriers to Discharge: medical stability    Prior Living Situation: apartment with spouse,  Patient reports she lives in her apartment (has elevator) with spouse.  She is independent with ADLs/IADLs, ambulates without devices, has no services in community, uses cpap and works full time. Spouse is primary family contact and family willing to transport at discharge     Discussed  Partnership in Safe Discharge Planning  document with patient/family: No     Handoff Completed: No, handoff not indicated or clinically appropriate    Patient/Spokesperson Updated: No    Additional Information:  Chart reviewed. Patient not ready for discharge. Therapy rec home with outpatient cardiac rehab    Next Steps: continue to follow     Becka Stern RN

## 2024-10-11 NOTE — PROGRESS NOTES
RCAT Treatment Plan    Patient Score: 13  Patient Acuity: 3    Clinical Indication for Therapy: prevent atelectasis    Therapy Ordered: Flutter valve, IS, and EzPAP QID, Home CPAP NOC    Assessment Summary: Pt remains on 4L NC, BS are clear/diminished. Pt is lethargic but arousable, unable to complete IS/FV/and EzPAP.    Olinda Woods, RT  10/11/2024

## 2024-10-11 NOTE — PROCEDURES
Interventional Radiology Post-Procedure Note   ?   Brief Procedure Note:   Patient name: Jory Ambrose  Pt MRN:[id number]   Date of procedure: 10/11/2024     Procedure(s): Nontunneled hemodialysis catheter placement  Sedation method: Local lidocaine only. The patient was monitored by an interventional radiology nurse at all times throughout the procedure under my direct guidance.  Pre Procedure Diagnosis: ALEXEI  Post Procedure Diagnosis: Same  Indications: Need for short-term central venous access for hemodialysis.   ?   Attending: Arnav Romano M.D.  Specimen(s) removed: None   Additional studies ordered: None  Drains: None   Lines: 20 cm nontunneled hemodialysis catheter  Estimated Blood Loss: Minimal  Complications: None  Vascular closure method: N/A    Findings/Notes/Comments: Uncomplicated placement of right external jugular hemodialysis catheter. Catheter tip lies in the mid right atrium. Catheter is ready for immediate use.   ?   Please see dictation in PACS or under the Imaging tab in [facility] for detailed procedure note.     Arnav Romano M.D.   Vascular and Interventional Radiology   Pager: [phone]   After Hours / Scheduling: [phone]     10/11/2024  4:41 PM

## 2024-10-11 NOTE — PROGRESS NOTES
Nutrition Therapy    Patient not ready for diet ed today, fatigued.  Patient on clear liquid diet currently.    Plan:  Follow-up early next week with diet education and assessment.  Consider advancing diet

## 2024-10-11 NOTE — PHARMACY-VANCOMYCIN DOSING SERVICE
Pharmacy Vancomycin Initial Note  Date of Service 2024  Patient's  1975  49 year old, female    Indication: Sepsis    Current estimated CrCl = Estimated Creatinine Clearance: 23.3 mL/min (A) (based on SCr of 3.62 mg/dL (H)).    Creatinine for last 3 days  10/8/2024:  4:28 PM Creatinine 0.95 mg/dL  10/9/2024:  4:14 AM Creatinine 1.15 mg/dL; 12:06 PM Creatinine 1.19 mg/dL;  6:35 PM Creatinine 1.52 mg/dL; 10:16 PM Creatinine 1.86 mg/dL  10/10/2024:  4:30 AM Creatinine 2.06 mg/dL; 12:02 PM Creatinine 2.56 mg/dL;  6:06 PM Creatinine 2.95 mg/dL  10/11/2024: 12:09 AM Creatinine 3.38 mg/dL;  4:40 AM Creatinine 3.62 mg/dL    Recent Vancomycin Level(s) for last 3 days  10/10/2024:  4:30 AM Vancomycin 25.5 ug/mL  10/11/2024:  4:40 AM Vancomycin 19.3 ug/mL      Vancomycin IV Administrations (past 72 hours)                     vancomycin (VANCOCIN) 1,500 mg in 0.9% NaCl 265 mL intermittent infusion (mg) 1,500 mg New Bag 10/09/24 1306     1,500 mg New Bag  0117                    Nephrotoxins and other renal medications (From now, onward)      Start     Dose/Rate Route Frequency Ordered Stop    10/10/24 0554  vancomycin place blackwell - receiving intermittent dosing         1 each Intravenous SEE ADMIN INSTRUCTIONS 10/10/24 0554      10/10/24 0030  furosemide (LASIX) 500 mg/50mL infusion ADULT MAX CONC         40 mg/hr  4 mL/hr  Intravenous CONTINUOUS 10/10/24 0012              Contrast Orders - past 72 hours (72h ago, onward)      Start     Dose/Rate Route Frequency Stop    10/11/24 1000  perflutren lipid microsphere (DEFINITY) injection SUSP 2 mL         2 mL Intravenous ONCE 10/11/24 0933                Plan:  Start vancomycin  750 mg IV once.   Vancomycin monitoring method:  Intermittent  Vancomycin therapeutic monitoring goal: 15-20 mg/L  Pharmacy will check vancomycin levels as appropriate in 1-3 Days.    Serum creatinine levels will be ordered daily for the first week of therapy and at least twice weekly  for subsequent weeks.      Amanda Spivey, [facility]

## 2024-10-11 NOTE — PROGRESS NOTES
10/11/24 0835   Appointment Info   Signing Clinician's Name / Credentials (OT) Pat Scott OTD OTR/L   Living Environment   People in Home spouse   Current Living Arrangements apartment   Home Accessibility no concerns  (care mgmt note elevator access)   Living Environment Comments Living environment info gathered through chart review - check   Self-Care   Equipment Currently Used at Home none   Activity/Exercise/Self-Care Comment Pt not reliable historian during eval - Per chart review pt ind with all ADLs and IADLs   General Information   Onset of Illness/Injury or Date of Surgery 10/08/24   Referring Physician David Wade MD   Patient/Family Therapy Goal Statement (OT) None stated   Additional Occupational Profile Info/Pertinent History of Current Problem Jory Ambrose is a 49 year old female with PMHx of GIA, asthma.GERD,  hypothyroidism, Pe on plavix, carotid stenosis,  Hodgkin's lymphoma status post chemotherapy and then mantle radiation and 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, currently in remission with severe multivessel and moderate left main coronary artery disease and stable angina who underwent coronary artery bypass grafting and MVR on 10/8   Existing Precautions/Restrictions cardiac;sternal   Left Upper Extremity (Weight-bearing Status) other (see comments)  (Sternal)   Right Upper Extremity (Weight-bearing Status) other (see comments)  (Sternal)   Cognitive Status Examination   Orientation Status person   Affect/Mental Status (Cognitive) low arousal/lethargic;confused   Follows Commands follows one-step commands;increased processing time needed;repetition of directions required;verbal cues/prompting required   Cognitive Status Comments Very lethargic this date - only able to provide yes or no or short one word answers   Posture   Posture not impaired   Range of Motion Comprehensive   General Range of Motion bilateral upper extremity ROM WFL   Comment, General Range  of Motion Not formally assessed d/t sternal precautions   Strength Comprehensive (MMT)   Comment, General Manual Muscle Testing (MMT) Assessment Min generalized weakness from prolonged bed rest   Bed Mobility   Bed Mobility supine-sit   Supine-Sit [place] (Bed Mobility) maximum assist (25% patient effort);2 person assist   Transfers   Transfers sit-stand transfer;toilet transfer   Sit-Stand Transfer   Sit-Stand [place] (Transfers) unable to assess   Sit/Stand Transfer Comments D/t mental status and weakness   Toilet Transfer   [place]frey Level (Toilet Transfer) unable to assess   Toilet Transfer Comments D/t mental status and weakness   Balance   Balance Assessment sitting dynamic balance   Sitting Balance: Dynamic minimal assist;2-person assist   Activities of Daily Living   BADL Assessment/Intervention lower body dressing;toileting;grooming   Lower Body Dressing Assessment/Training   [place]frey Level (Lower Body Dressing) dependent (less than 25% patient effort)   Grooming Assessment/Training   [place]frey Level (Grooming) moderate assist (50% patient effort)   Toileting   [place]frey Level (Toileting) dependent (less than 25% patient effort)   Clinical Impression   Criteria for Skilled Therapeutic Interventions Met (OT) Yes, treatment indicated   OT Diagnosis Decreased activity tolerance and ind with ADLs   Influenced by the following impairments S/p CABG   OT Problem List-Impairments impacting ADL activity tolerance impaired;balance;cognition;mobility;strength;pain;post-surgical precautions   Assessment of Occupational Performance 3-5 Performance Deficits   Identified Performance Deficits fxl transfers, mobility, activity tolerance, cognition   Planned Therapy Interventions (OT) ADL retraining;bed mobility training;cognition;strengthening;home program guidelines;progressive activity/exercise;transfer training;risk factor education   Clinical Decision Making Complexity (OT) detailed  assessment/moderate complexity   Risk & Benefits of therapy have been explained evaluation/treatment results reviewed;care plan/treatment goals reviewed;risks/benefits reviewed;current/potential barriers reviewed;patient   OT Total Evaluation Time   OT Eval, Moderate Complexity Minutes (04601) 8   OT Goals   Therapy Frequency (OT) 2 times/day   OT Predicted Duration/Target Date for Goal Attainment 10/17/24   OT Goals Cardiac Phase 1   OT: Understanding of cardiac education to maximize quality of life, condition management, and health outcomes Patient;Verbalize   OT: Perform aerobic activity with stable cardiovascular response intermittent;10 minutes   OT: Functional/aerobic ambulation tolerance with stable cardiovascular response in order to return to home and community environment Modified independent;Greater than 300 feet   Interventions   Interventions Quick Adds Cardiac Rehab;Therapeutic Procedures/Exercise;Therapeutic Activity   Therapeutic Activities   Therapeutic Activity Minutes (83972) 10   Symptoms noted during/after treatment fatigue;dizziness   Treatment Detail/Skilled Intervention Eval completed, treatment intiaited. Tolerated sitting EOB ~ 7-8 minutes min A x2, intermittently CGA - unable to follow sternal precautions continually attempting to place arms onto bed, max A at draw sheet to square hips. Pt more alert with sitting EOB, eyes able to remain open following more commands. Returned to supine max A x 2, dependent x2 for supine scoot, more lethargic once laying down - difficulty verbalizing answers to questions.   OT Discharge Planning   OT Plan Dangle, progress to stand, sitting tolerance, precautions   OT Discharge Recommendation (DC Rec) home with outpatient cardiac rehab   OT Rationale for DC Rec Pt medically limited this date, may require more rehab pending progress with therapy and medical status - continue to reassess   OT Brief overview of current status Max A x 2 bed mob, min A x 2  sitting balance   Total Session Time   Timed Code Treatment Minutes 10   Total Session Time (sum of timed and untimed services) 18

## 2024-10-11 NOTE — PROGRESS NOTES
'    [facility] ([facility]) progress note  CC: F/U ALEXEI  S: Since last visit, remains very weak and fatiuged. Denies worsening sob or pain but limited interaction. Will open eyes. Discussed labs and below plan.       Discussed with ICU nurse.     A/P:   Active Problems:    Mitral regurgitation    Coronary artery disease involving native coronary artery of native heart with angina pectoris (H)      Acute kidney injury.  Acute tubular necrosis.  Secondary to CABG and mitral valve replacement followed by cardiogenic shock.  Baseline creatinine 0.95 (10/8/2024).     furosemide drip increase from 20 to 40 mg/hr, metolazone x 2 today.   Renal function closely. Serial labs. Intake and output.   No acute indication for CRRT currently, monitor closely. Consent obtained for dialysis if needed in the next 24-48 hours.   IF not responding to above diuretic changes will need CRRT later today.   **Not responding - start CRRT, discussed with ICU and CR surgery.      2. Hyperkalemia. Improved.  Secondary to acute kidney injury.   Furosemide as above.     3. Metabolic acidosis.  Secondary to acute kidney injury and cardiogenic shock - Hold bicarb at this time.Improved after bicarb overnight.       4. Status post coronary artery bypass grafting x 4 vessels and mitral valve replacement on 10/8.  Diffuse coronary artery disease     5. Shock liver.  Continue supportive care.  PER icu     6. History of Hodgkin's lymphoma.  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    Arnav Napoles,   [facility]  [phone] (off)       No interval changes to past medical history, social history or family history to report.    BP (!) 86/47   Pulse 100   Temp 98.1  F (36.7  C) (Oral)   Resp 21   Wt 114.4 kg (252 lb 3.3 oz)   LMP  (LMP Unknown)   SpO2 100%   BMI 43.29 kg/m      I/O last 3 completed shifts:  In: 1643.12 [P.O.:660; I.V.:983.12]  Out: 2465  [Urine:985; Chest Tube:550]    Physical Exam:   GENERAL: NAD  EYES: No scleral icterus, conjunctiva clear  ENT: Hearing normal, Oral mucosa moist  RESP: Anterior clear.   CV: RRR, + leg edema.    GI: NT/ND,  Musculoskeletal: Normal muscle bulk/ tone; No gross joint abnormalities  SKIN: No rash,  PSYCH:  Appropriate mood and affect    Recent Labs   Lab 10/11/24  0440 10/11/24  0009 10/10/24  1806 10/10/24  1202 10/10/24  0430 10/10/24  0207 10/10/24  0036 10/09/24  2216 10/09/24  1835 10/09/24  1206 10/09/24  0414 10/08/24  1650 10/08/24  1628 10/08/24  0821 10/07/24  0842    138 138 141 141  --   --  137 138   < > 143   < > 145   < > 137   POTASSIUM 4.6 4.8 4.8 4.6 4.7 4.6 5.0 5.9* 5.6*   < > 5.4*   < > 4.4  4.4   < > 4.1   CHLORIDE 100 100 100 103 104  --   --  104 106   < > 110*  --  112*  --  103   CO2 25 24 24 26 25  --   --  18* 20*   < > 23  --  21*  --  22   BUN 56.4* 52.2* 45.0* 41.0* 35.3*  --   --  31.2* 28.4*   < > 21.5*  --  16.5  --  21.5*   CR 3.62* 3.38* 2.95* 2.56* 2.06*  --   --  1.86* 1.52*   < > 1.15*  --  0.95  --  0.92   GFRESTIMATED 15* 16* 19* 22* 29*  --   --  33* 42*   < > 58*  --  73  --  76   CINDY 8.0* 8.1* 8.2* 8.3* 7.6*  --   --  7.9* 7.8*   < > 7.7*  --  7.8*  --  9.0   PHOS 6.3*  --   --   --  5.2*  --   --  6.1*  --   --  5.1*  --  2.6  --   --    MAG 2.9*  --   --   --  2.7*  --   --  2.7*  --   --  2.7*  --  3.3*  --  2.0   ALBUMIN 3.1* 3.3* 3.2* 3.2* 3.2*  --   --  3.2* 3.1*   < > 3.1*  --  3.1*  --  4.1    < > = values in this interval not displayed.       Recent Labs   Lab 10/11/24  0440 10/11/24  0009 10/10/24  1806 10/10/24  1202 10/10/24  0430 10/09/24  2216 10/09/24  1206   WBC 16.7* 18.3* 18.3* 19.4* 19.6* 19.7* 20.7*   HGB 7.3* 7.3* 7.4* 7.6* 7.4* 8.9* 9.1*   HCT 22.7* 22.5* 22.8* 23.2* 22.5* 27.5* 27.3*   MCV 91 90 90 90 89 91 89   PLT 61* 73* 79* 99* 117* 138* 151             Current Facility-Administered Medications:     [Held by provider] acetaminophen (TYLENOL)  tablet 650 mg, 650 mg, Oral, Q4H PRN, Catherine Galvez PA-C    albuterol (PROVENTIL HFA/VENTOLIN HFA) inhaler, 2 puff, Inhalation, Q6H PRN, Catherine Galvez PA-C    [Held by provider] atorvastatin (LIPITOR) tablet 80 mg, 80 mg, Oral, At Bedtime, Catherine Galvez PA-C, 80 mg at 10/08/24 2004    bisacodyl (DULCOLAX) suppository 10 mg, 10 mg, Rectal, Daily PRN, Catherine Galvez PA-C    budesonide (PULMICORT) neb solution 1 mg, 1 mg, Nebulization, BID, Bharat Lu MD, 1 mg at 10/11/24 0830    [Held by provider] buPROPion (WELLBUTRIN XL) 24 hr tablet 300 mg, 300 mg, Oral, QAM, Catherine Galvez PA-C, 300 mg at 10/10/24 0900    calcium gluconate 1 g in 50 mL in sodium chloride intermittent infusion, 1 g, Intravenous, Once, Catherine Galvez PA-C    calcium gluconate 1 g in 50 mL in sodium chloride intermittent infusion, 1 g, Intravenous, Once PRN, Catherine Galvez PA-C, 1 g at 10/11/24 0507    calcium gluconate 2 g in  mL intermittent infusion, 2 g, Intravenous, Once PRN, Catherine Galvez PA-C    calcium gluconate 3 g in sodium chloride 0.9 % 100 mL intermittent infusion, 3 g, Intravenous, Once PRN, Catherine Galvez PA-C    cefTRIAXone (ROCEPHIN) 1 g vial to attach to  mL bag for ADULTS or NS 50 mL bag for PEDS, 1 g, Intravenous, Q24H, David Wade MD, 1 g at 10/11/24 0054    [Held by provider] cetirizine (zyrTEC) tablet 10 mg, 10 mg, Oral, Daily, Catherine Galvez PA-C, 10 mg at 10/10/24 0900    clopidogrel (PLAVIX) tablet 75 mg, 75 mg, Oral, Daily, [place] MD, 75 mg at 10/11/24 0802    glucose gel 15-30 g, 15-30 g, Oral, Q15 Min PRN **OR** dextrose 50 % injection 25-50 mL, 25-50 mL, Intravenous, Q15 Min PRN **OR** glucagon injection 1 mg, 1 mg, Subcutaneous, Q15 Min PRN, Doc Dhillon MD    EPINEPHrine (ADRENALIN) 16 mg in sodium chloride 0.9 % 250 mL infusion CENTRAL, 0.01-0.3 mcg/kg/min, Intravenous, Continuous,  Mauro, David Croft MD, Last Rate: 4.2 mL/hr at 10/11/24 0931, 0.04 mcg/kg/min at 10/11/24 0931    fludrocortisone (FLORINEF) tablet 0.1 mg, 0.1 mg, Oral, Daily, Catherine Galvez PA-C, 0.1 mg at 10/11/24 0802    fluticasone (FLONASE) 50 MCG/ACT spray 2 spray, 2 spray, Both Nostrils, Daily PRN, Catherine Galvez PA-C    fluticasone-vilanterol (BREO ELLIPTA) 100-25 MCG/ACT inhaler 1 puff, 1 puff, Inhalation, At Bedtime, Catherine Galvez PA-C, 1 puff at 10/10/24 2120    furosemide (LASIX) 500 mg/50mL infusion ADULT MAX CONC, 40 mg/hr, Intravenous, Continuous, Arnav Napoles DO, Last Rate: 2 mL/hr at 10/11/24 0756, 20 mg/hr at 10/11/24 0756    heparin 25,000 units in 0.45% NaCl 250 mL ANTICOAGULANT infusion, 0-5,000 Units/hr, Intravenous, Continuous, Catherine Galvez PA-C, Last Rate: 16.5 mL/hr at 10/11/24 0756, 1,650 Units/hr at 10/11/24 0756    hydrALAZINE (APRESOLINE) injection 10 mg, 10 mg, Intravenous, Q30 Min PRN, Catherine Galvez PA-C    insulin aspart (NovoLOG) injection (RAPID ACTING), 1-7 Units, Subcutaneous, Q4H, Catherine Galvez PA-C    ipratropium - albuterol 0.5 mg/2.5 mg/3 mL (DUONEB) neb solution 3 mL, 3 mL, Nebulization, Q4H PRN, Bharat Lu MD    lactated ringers BOLUS 250 mL, 250 mL, Intravenous, Q15 Min PRN, Catherine Galvez PA-C, 250 mL at 10/09/24 0417    levothyroxine (SYNTHROID/LEVOTHROID) tablet 125 mcg, 125 mcg, Oral, QAM AC, Catherine Galvez PA-C, 125 mcg at 10/11/24 0643    Lidocaine (LIDOCARE) 4 % Patch 1-2 patch, 1-2 patch, Transdermal, Q24H, Catherine Galvez PA-C, 2 patch at 10/10/24 1531    magnesium hydroxide (MILK OF MAGNESIA) suspension 30 mL, 30 mL, Oral, Daily PRN, Catherine Galvez PA-C    [Held by provider] metFORMIN (GLUCOPHAGE XR) 24 hr tablet 1,000 mg, 1,000 mg, Oral, Daily, Catherine Galvez PA-C    methocarbamol (ROBAXIN) tablet 750 mg, 750 mg, Oral, Q6H PRN, Catherine Galvez PA-C    metolazone  (ZAROXOLYN) tablet 5 mg, 5 mg, Oral, BID, Arnav Napoles DO    multivitamin w/minerals (THERA-VIT-M) tablet 1 tablet, 1 tablet, Oral, Daily, Catherine Galvez PA-C, 1 tablet at 10/10/24 1146    naloxone (NARCAN) injection 0.2 mg, 0.2 mg, Intravenous, Q2 Min PRN **OR** naloxone (NARCAN) injection 0.4 mg, 0.4 mg, Intravenous, Q2 Min PRN **OR** naloxone (NARCAN) injection 0.2 mg, 0.2 mg, Intramuscular, Q2 Min PRN **OR** naloxone (NARCAN) injection 0.4 mg, 0.4 mg, Intramuscular, Q2 Min PRN, David Wade MD    ondansetron (ZOFRAN ODT) ODT tab 4 mg, 4 mg, Oral, Q6H PRN **OR** ondansetron (ZOFRAN) injection 4 mg, 4 mg, Intravenous, Q6H PRN, Catherine Galvez PA-C, 4 mg at 10/10/24 1945    oxyCODONE IR (ROXICODONE) half-tab 2.5 mg, 2.5 mg, Oral, Q4H PRN **OR** oxyCODONE (ROXICODONE) tablet 5 mg, 5 mg, Oral, Q4H PRN, Catherine Galvez PA-C    pantoprazole (PROTONIX) 2 mg/mL suspension 40 mg, 40 mg, Oral or NG Tube, Daily, 40 mg at 10/08/24 1729 **OR** pantoprazole (PROTONIX) EC tablet 40 mg, 40 mg, Oral, Daily, Catherine Galvez PA-C, 40 mg at 10/11/24 0803    polyethylene glycol (MIRALAX) Packet 17 g, 17 g, Oral, Daily, Catherine Galvez PA-C, 17 g at 10/11/24 0802    prochlorperazine (COMPAZINE) injection 10 mg, 10 mg, Intravenous, Q6H PRN **OR** prochlorperazine (COMPAZINE) tablet 10 mg, 10 mg, Oral, Q6H PRN, Catherine Galvez PA-C    senna-docusate (SENOKOT-S/PERICOLACE) 8.6-50 MG per tablet 1 tablet, 1 tablet, Oral, BID, Catherine Galvez PA-C, 1 tablet at 10/11/24 0803    simethicone (MYLICON) chewable half-tab 120 mg, 120 mg, Oral, 4x Daily PRN, Catherine Galvez PA-C    [Held by provider] sodium bicarbonate 150 mEq in D5W 1,000 mL infusion, , Intravenous, Continuous, Doc Dhillon MD, Stopped at 10/10/24 0820    [Held by provider] traZODone (DESYREL) tablet 100 mg, 100 mg, Oral, At Bedtime, Catherine Galvez PA-C    vancomycin place blackwell - receiving intermittent  dosing, 1 each, Intravenous, See Admin Instructions, David Wade MD    Vitamin D3 (CHOLECALCIFEROL) tablet 125 mcg, 125 mcg, Oral, Daily, Catherine Galvez PA-C, 125 mcg at 10/11/24 0802      Labs personally reviewed today during this evaluation at 10:07 AM

## 2024-10-11 NOTE — PROGRESS NOTES
Respiratory Care Note    Multiple attempts to complete IS, FV, and EzPAP. Each time, patient would attempt but then fall asleep. Will continue to attempt pulmonary hygiene techniques.   Pt uses home CPAP, 4L O2 bleed in.     Olinda Woods, RT

## 2024-10-11 NOTE — PROGRESS NOTES
Patient did not tolerate hospital cpap. Cpap on standby at bedside. RN aware.  Patient on 2-4LPM nasal cannula. RT will continue to monitor.

## 2024-10-11 NOTE — IR NOTE
Patient Name: Jory Ambrose  Medical Record Number: [id number]  Today's Date: 10/11/2024    Procedure: non-tunneled dialysis catheter placement  Proceduralist:  Dr Romano    Procedure Start: 1635  Procedure end: 1646  Sedation medications administered: 0 mg midazolam and 50 mcg fentanyl   Sedation time: 0 minutes    Report given to: ICU RN. Site clean, dry and intact.

## 2024-10-11 NOTE — PROGRESS NOTES
CVTS Daily Progress Note   POD#3 s/p CABG x4 (LIMA>LAD, rSVG>RPDA, rSVG>LPL, rSVG>D2), RLE EVH, Attempted MVR/r, MVR ( 29 mm [employer] Mechanical Mitral Valve), LAAE  Attending: Mauro  LOS: 3    SUBJECTIVE/INTERVAL EVENTS:    UOP continuing to decrease despite increase in lasix gtt, K+ and pH WNL. LFTs now trending down. Maintaining oxygen saturations on 1-2L NC. Normotensive on 0.02 epi. CVP 14, CI 3.0, . Aiming for -130 in s/o renal and hepatic dysfunction. Plan is to dangle w/ therapy this AM. Pain well controlled. - BM / - flatus. Tolerating diet of clears w/o nausea. Chest tube output appropriate. Hgb continuing to drift, 7.3 this AM (9.1>>8.9>>7.4), likely at least some dilutional component given low UOP but did receive 2u PRBCs periop so plan is to transfuse again. LFTs now downtrending, INR 2.67 (2.70). Lactate remains elevated at 2.7, but still on epi. Patient denies new chest pain, shortness of breath, abdominal pain, calf pain, nausea. Patient has no questions today.     OBJECTIVE:  Temp:  [98.2  F (36.8  C)-99.6  F (37.6  C)] 98.9  F (37.2  C)  Pulse:  [] 97  Resp:  [5-43] 24  MAP:  [65 mmHg-92 mmHg] 69 mmHg  Arterial Line BP: (100-143)/(49-73) 105/53  FiO2 (%):  [30 %-40 %] 40 %  SpO2:  [88 %-100 %] 94 %  Vitals:    10/08/24 0527 10/09/24 0530 10/10/24 0600 10/11/24 0454   Weight: 100.3 kg (221 lb 3.2 oz) 109.1 kg (240 lb 8.4 oz) 111.8 kg (246 lb 7.6 oz) 114.4 kg (252 lb 3.3 oz)       Clinically Significant Risk Factors        # Hyperkalemia: Highest K = 5.9 mmol/L in last 2 days, will monitor as appropriate    # Hypocalcemia: Lowest iCa = 4.3 mg/dL in last 2 days, will monitor and replace as appropriate     # Hypoalbuminemia: Lowest albumin = 3.1 g/dL at 10/11/2024  4:40 AM, will monitor as appropriate    # Coagulation Defect: INR = 2.67 (Ref range: 0.85 - 1.15) and/or PTT = 83 Seconds (Ref range: 22 - 38 Seconds), will monitor for bleeding  # Thrombocytopenia: Lowest platelets  "= 61 in last 2 days, will monitor for bleeding  # Acute Kidney Injury, unspecified: based on a >150% or 0.3 mg/dL increase in last creatinine compared to past 90 day average, will monitor renal function  # Hypertension: Noted on problem list           # Severe Obesity: Estimated body mass index is 43.29 kg/m  as calculated from the following:    Height as of 9/25/24: 1.626 m (5' 4\").    Weight as of this encounter: 114.4 kg (252 lb 3.3 oz)., PRESENT ON ADMISSION      # History of CABG: noted on surgical history          Current Medications:    Scheduled Meds:  Current Facility-Administered Medications   Medication Dose Route Frequency Provider Last Rate Last Admin    [Held by provider] atorvastatin (LIPITOR) tablet 80 mg  80 mg Oral At Bedtime Catherine Galvez PA-C   80 mg at 10/08/24 2004    budesonide (PULMICORT) neb solution 1 mg  1 mg Nebulization BID Bharat Lu MD   1 mg at 10/11/24 0830    [Held by provider] buPROPion (WELLBUTRIN XL) 24 hr tablet 300 mg  300 mg Oral QAM Catherine Galvez PA-C   300 mg at 10/10/24 0900    calcium gluconate 1 g in 50 mL in sodium chloride intermittent infusion  1 g Intravenous Once Catherine Galvez PA-C        cefTRIAXone (ROCEPHIN) 1 g vial to attach to  mL bag for ADULTS or NS 50 mL bag for PEDS  1 g Intravenous Q24H David Wade MD   1 g at 10/11/24 0054    [Held by provider] cetirizine (zyrTEC) tablet 10 mg  10 mg Oral Daily Catherine Galvez PA-C   10 mg at 10/10/24 0900    clopidogrel (PLAVIX) tablet 75 mg  75 mg Oral Daily David Wade MD   75 mg at 10/11/24 0802    fludrocortisone (FLORINEF) tablet 0.1 mg  0.1 mg Oral Daily Catherine Galvez PA-C   0.1 mg at 10/11/24 0802    fluticasone-vilanterol (BREO ELLIPTA) 100-25 MCG/ACT inhaler 1 puff  1 puff Inhalation At Bedtime Catherine Galvez PA-C   1 puff at 10/10/24 2120    levothyroxine (SYNTHROID/LEVOTHROID) tablet 125 mcg  125 mcg Oral QAM AC " Catherine Galvez PA-C   125 mcg at 10/11/24 0643    Lidocaine (LIDOCARE) 4 % Patch 1-2 patch  1-2 patch Transdermal Q24H Catherine Galvez PA-C   2 patch at 10/10/24 1531    [Held by provider] metFORMIN (GLUCOPHAGE XR) 24 hr tablet 1,000 mg  1,000 mg Oral Daily Catherine Galvez PA-C        multivitamin w/minerals (THERA-VIT-M) tablet 1 tablet  1 tablet Oral Daily Catherine Galvez PA-C   1 tablet at 10/10/24 1146    pantoprazole (PROTONIX) 2 mg/mL suspension 40 mg  40 mg Oral or NG Tube Daily Catherine Galvez PA-C   40 mg at 10/08/24 1729    Or    pantoprazole (PROTONIX) EC tablet 40 mg  40 mg Oral Daily Catherine Galvez PA-C   40 mg at 10/11/24 0803    polyethylene glycol (MIRALAX) Packet 17 g  17 g Oral Daily Catherine Galvez PA-C   17 g at 10/11/24 0802    senna-docusate (SENOKOT-S/PERICOLACE) 8.6-50 MG per tablet 1 tablet  1 tablet Oral BID Catherine Galvez PA-C   1 tablet at 10/11/24 0803    [Held by provider] traZODone (DESYREL) tablet 100 mg  100 mg Oral At Bedtime Catherine Galvez PA-C        vancomycin place [facility] - receiving intermittent dosing  1 each Intravenous See Admin Instructions David Wade MD        Vitamin D3 (CHOLECALCIFEROL) tablet 125 mcg  125 mcg Oral Daily Catherine Galvez PA-C   125 mcg at 10/11/24 0802     Continuous Infusions:  Current Facility-Administered Medications   Medication Dose Route Frequency Provider Last Rate Last Admin    EPINEPHrine (ADRENALIN) 16 mg in sodium chloride 0.9 % 250 mL infusion CENTRAL  0.01-0.3 mcg/kg/min Intravenous Continuous David Wade MD 2.1 mL/hr at 10/11/24 0827 0.02 mcg/kg/min at 10/11/24 0827    furosemide (LASIX) 500 mg/50mL infusion ADULT MAX CONC  20 mg/hr Intravenous Continuous Arnav Napoles DO 2 mL/hr at 10/11/24 0756 20 mg/hr at 10/11/24 0756    heparin 25,000 units in 0.45% NaCl 250 mL ANTICOAGULANT infusion  0-5,000 Units/hr Intravenous Continuous Catherine Galvez  EARNESTINE MCKEON 16.5 mL/hr at 10/11/24 0756 1,650 Units/hr at 10/11/24 0756    insulin regular (MYXREDLIN) 1 unit/mL infusion  0-24 Units/hr Intravenous Continuous Catherine Galvez PA-C 0.5 mL/hr at 10/11/24 0757 0.5 Units/hr at 10/11/24 0757    niCARdipine 40 mg in 200 mL NS (CARDENE) infusion  0.5-15 mg/hr Intravenous Continuous PRN Catherine Galvez PA-C        [Held by provider] sodium bicarbonate 150 mEq in D5W 1,000 mL infusion   Intravenous Continuous Doc Dhillon MD   Stopped at 10/10/24 0820     PRN Meds:.  Current Facility-Administered Medications   Medication Dose Route Frequency Provider Last Rate Last Admin    [Held by provider] acetaminophen (TYLENOL) tablet 650 mg  650 mg Oral Q4H PRN Catherine Galvez PA-C        albuterol (PROVENTIL HFA/VENTOLIN HFA) inhaler  2 puff Inhalation Q6H PRN Catherine Galvez PA-C        bisacodyl (DULCOLAX) suppository 10 mg  10 mg Rectal Daily PRN Catherine Galvez PA-C        calcium gluconate 1 g in 50 mL in sodium chloride intermittent infusion  1 g Intravenous Once PRN Catherine Galvez PA-C   1 g at 10/11/24 0507    calcium gluconate 2 g in  mL intermittent infusion  2 g Intravenous Once PRN Catherine Galvez PA-C        calcium gluconate 3 g in sodium chloride 0.9 % 100 mL intermittent infusion  3 g Intravenous Once PRN Catherine Galvez PA-C        glucose gel 15-30 g  15-30 g Oral Q15 Min PRN Doc Dhillon MD        Or    dextrose 50 % injection 25-50 mL  25-50 mL Intravenous Q15 Min PRN Doc Dhillon MD        Or    glucagon injection 1 mg  1 mg Subcutaneous Q15 Min PRN Doc Dhillon MD        fluticasone (FLONASE) 50 MCG/ACT spray 2 spray  2 spray Both Nostrils Daily PRN Catherine Galvez PA-C        hydrALAZINE (APRESOLINE) injection 10 mg  10 mg Intravenous Q30 Min PRN Catherine Galvez PA-C        ipratropium - albuterol 0.5 mg/2.5 mg/3 mL (DUONEB) neb solution 3 mL  3 mL Nebulization Q4H PRN Bharat Lu  MD Rich        lactated ringers BOLUS 250 mL  250 mL Intravenous Q15 Min PRN Catherine Galvez PA-C   250 mL at 10/09/24 0417    magnesium hydroxide (MILK OF MAGNESIA) suspension 30 mL  30 mL Oral Daily PRN Catherine Galvez PA-C        methocarbamol (ROBAXIN) tablet 750 mg  750 mg Oral Q6H PRN Catherine Galvez PA-C        naloxone (NARCAN) injection 0.2 mg  0.2 mg Intravenous Q2 Min PRN David Wade MD        Or    naloxone (NARCAN) injection 0.4 mg  0.4 mg Intravenous Q2 Min PRN David Wade MD        Or    naloxone (NARCAN) injection 0.2 mg  0.2 mg Intramuscular Q2 Min PRN David Wade MD        Or    naloxone (NARCAN) injection 0.4 mg  0.4 mg Intramuscular Q2 Min PRN David Wade MD        niCARdipine 40 mg in 200 mL NS (CARDENE) infusion  0.5-15 mg/hr Intravenous Continuous PRN Catherine Galvez PA-C        ondansetron (ZOFRAN ODT) ODT tab 4 mg  4 mg Oral Q6H PRN Catherine Galvez PA-C        Or    ondansetron (ZOFRAN) injection 4 mg  4 mg Intravenous Q6H PRN Catherine Galvez PA-C   4 mg at 10/10/24 1945    oxyCODONE IR (ROXICODONE) half-tab 2.5 mg  2.5 mg Oral Q4H PRN Catherine Galvez PA-C        Or    oxyCODONE (ROXICODONE) tablet 5 mg  5 mg Oral Q4H PRN Catherine Galvez PA-C        prochlorperazine (COMPAZINE) injection 10 mg  10 mg Intravenous Q6H PRN Catherine Galvez PA-C        Or    prochlorperazine (COMPAZINE) tablet 10 mg  10 mg Oral Q6H PRN Catherine Galvez PA-C        simethicone (MYLICON) chewable half-tab 120 mg  120 mg Oral 4x Daily PRN Catherine Galvez PA-C           Cardiographics:    Telemetry monitoring demonstrates accelerated junctional rhythm with rates in the 100s per my personal review.    Imaging:  Results for orders placed or performed during the hospital encounter of 10/08/24   XR Chest Port 1 View    Impression    IMPRESSION: Interval sternotomy, CABG procedure and mitral  valve repair. Endotracheal tube proximally 2 cm above the sofia. Nasogastric tube in the stomach. Bilateral chest tubes and midline mediastinal drain in expected position. Epicardial leads are   present. Left IJ line in the brachiocephalic vein.    Low lung volumes. No evidence for CHF or pneumonia. No pleural effusion or pneumothorax.   XR Chest Port 1 View    Impression    IMPRESSION: Poststernotomy changes with prosthetic mitral valve. Patient's been extubated and the NG tube has been removed. Mediastinal and pleural chest tubes are in place.    Left IJ cordis sheath is in the distal left innominate artery. Catheter has a very subtle kink within it 3.5 centimeters from the tip.    Low lung volumes. No pneumothorax. Likely trace left effusion at the base. No signs of pneumonia or failure. Heart is of normal size.   XR Abdomen Port 1 View    Impression    IMPRESSION: Orogastric tube tip in the proximal stomach and oriented superiorly towards the left diaphragm. Visualized bowel gas pattern unremarkable. Numerous tubes and lines lower chest and upper abdomen as described on chest x-ray report from today.        Labs, personally reviewed.  Hemoglobin   Date Value Ref Range Status   10/11/2024 7.3 (L) 11.7 - 15.7 g/dL Final   10/11/2024 7.3 (L) 11.7 - 15.7 g/dL Final   10/10/2024 7.4 (L) 11.7 - 15.7 g/dL Final   06/09/2015 13.5 11.7 - 15.7 g/dL Final   03/11/2015 12.3 11.7 - 15.7 g/dL Final   01/08/2015 13.6 11.7 - 15.7 g/dL Final     WBC   Date Value Ref Range Status   06/09/2015 7.9 4.0 - 11.0 10e9/L Final   03/11/2015 9.4 4.0 - 11.0 10e9/L Final   01/08/2015 8.1 4.0 - 11.0 10e9/L Final     WBC Count   Date Value Ref Range Status   10/11/2024 16.7 (H) 4.0 - 11.0 10e3/uL Final   10/11/2024 18.3 (H) 4.0 - 11.0 10e3/uL Final   10/10/2024 18.3 (H) 4.0 - 11.0 10e3/uL Final     Platelet Count   Date Value Ref Range Status   10/11/2024 61 (L) 150 - 450 10e3/uL Final   10/11/2024 73 (L) 150 - 450 10e3/uL Final   10/10/2024  79 (L) 150 - 450 10e3/uL Final   06/09/2015 219 150 - 450 10e9/L Final   03/11/2015 240 150 - 450 10e9/L Final   01/08/2015 221 150 - 450 10e9/L Final     Creatinine   Date Value Ref Range Status   10/11/2024 3.62 (H) 0.51 - 0.95 mg/dL Final   10/11/2024 3.38 (H) 0.51 - 0.95 mg/dL Final   10/10/2024 2.95 (H) 0.51 - 0.95 mg/dL Final   06/09/2015 0.80 0.52 - 1.04 mg/dL Final   03/11/2015 0.87 0.52 - 1.04 mg/dL Final   01/08/2015 0.79 0.52 - 1.04 mg/dL Final     Potassium   Date Value Ref Range Status   10/11/2024 4.6 3.4 - 5.3 mmol/L Final   10/11/2024 4.8 3.4 - 5.3 mmol/L Final   10/10/2024 4.8 3.4 - 5.3 mmol/L Final   06/09/2015 3.8 3.4 - 5.3 mmol/L Final   03/11/2015 3.9 3.4 - 5.3 mmol/L Final   01/08/2015 4.1 3.4 - 5.3 mmol/L Final     Potassium POCT   Date Value Ref Range Status   10/08/2024 4.4 3.4 - 5.3 mmol/L Final   10/08/2024 4.0 3.4 - 5.3 mmol/L Final   10/08/2024 4.1 3.4 - 5.3 mmol/L Final     Magnesium   Date Value Ref Range Status   10/11/2024 2.9 (H) 1.7 - 2.3 mg/dL Final   10/10/2024 2.7 (H) 1.7 - 2.3 mg/dL Final   10/09/2024 2.7 (H) 1.7 - 2.3 mg/dL Final   06/09/2015 1.8 1.6 - 2.3 mg/dL Final   03/11/2015 1.7 1.6 - 2.3 mg/dL Final   01/08/2015 1.8 1.6 - 2.3 mg/dL Final          I/O:  I/O last 3 completed shifts:  In: 1643.12 [P.O.:660; I.V.:983.12]  Out: 1535 [Urine:985; Chest Tube:550]       Physical Exam:    General: Patient seen in bed. NAD. Conversant. Pleasant  HEENT: DORITA, no sclera icterus, moist mucosa  CV: RRR on monitor. 2+ peripheral pulses in all extremities. Mild peripheral edema. Incision C/D/I.  Pulm: Non-labored effort on nasal cannula. Chest tubes in place, serosanguinous output, no air leak.  Abd: Soft, NT, ND  : Padron with john urine  Ext: Mild pedal edema, SCDs in place, warm, distal pulses intact  Neuro: CNs grossly intact, patient sleepy but arouses to voice, FAM, strengths intact, A&Ox3      ASSESSMENT/PLAN:    Jory Ambrose is a 49 year old female with a history  of GIA, asthma, GERD,  hypothyroidism, h/o PE on plavix, carotid stenosis, Hodgkin's lymphoma (s/p chemo/radiation and bone marrow transplant x2 after recurrence) currently in remission who is s/p CABG x4, RLE EVH, attempted mitral repair, mechanical MVR, LAAE.    Active Problems:    Mitral regurgitation    Coronary artery disease involving native coronary artery of native heart with angina pectoris (H)  #Severe transaminitis, ongoing.   #Acute Renal Failure d/t ATN, ongoing.  #Probable UTI      NEURO:   - Scheduled lidocaine patches and PRN robaxin/oxycodone for pain  - No toradol given ARF  - PTA bupropion HELD (somnolence)  - Tylenol HELD (transaminitis)    CV:   - Pre-op EF 55-60%  - Weaning pressors as hemodynamics allow; currently on 0.02 epi. Consider dobutamine.  - SBP goal 120-130, CI goal 2.5-3.5 to promote renal and hepatic perfusion  - Beta blocker pending weaning from pressors  - Planning for plavix daily indefinitely d/t unclear h/o anaphylaxis to ASA  - Atorvastatin 80mg daily - HELD  (transaminitis)  - Chest tubes to remain today   - New baseline echo prior to discharge  - Low intensity heparin gtt bridge to warfarin for mechanical MVR when chest tubes/TPW are removed. INR goal 2.5-3.5  - If afib, NO AMIO (transaminitis)  - Echo very poor quality yesterday, repeat today w/o dressings etc.  - Sinus tach, leave TPW on backup of 60    PULM:   - Extubated on POD#0  - Maintaining oxygen saturations on nasal cannula  - Encourage pulmonary toilet  - PTA zyrtec, fludrocortisone, flonase, simethicone, albuterol, pulmicort, breo ellipta, duoneb    FEN/GI:  - Continue electrolyte replacement protocol  - Cardiac; ADAT - clears at this time  - Bowel regimen, LBM preop  - Severe transaminitis, ALT 4836, AST> 7000, appear to be downtrending. Likely 2/2 ischemic hepatitis. Bili and AP WNL. INR grossly improving.   - PTA vitamin D3  - Hyperkalemia, improved w/ lasix/bicarb/insulin gtts 4.6 (4.8).   - Hypermagnesemia,  hyperphosphoremia, monitor.  - Replace iCa++ aggressively  - CMP q6  - US RUQ unremarkable, dopper w/ patent portal and hepatic circulation  - Acute hepatitis panel, lipase, and acetaminophen level checked for completeness, unremarkable  - MELD score 30 (26) today    RENAL:  - Nephrology consulted overnight for ARF w/ decreased UOP, appreciate. Following.   - 20mg/hr lasix gtt   - bicarb gtt for hyperkalemia & metabolic acidosis - HELD this AM given improved K+ and pH  - Cr 3.62 (2.95) (baseline ~0.9)  - Padron to remain in for close monitoring of I/O and titration of vasopressors (order updated)  - CMP q6 today as above  - UA/Urine cx as below.    HEME:  - H/o bone marrow tx. Needs irradiated blood  - Acute blood loss anemia post-op.   - Hgb drifting today 7.3 (7.4), likely dilutional although will tranfuse to improve perfusion given that she's already received PRBCs postop. Hep SQ, ASA.   - Received 2u PRBCs periop, 1u PRBCs today  - Low intensity heparin gtt bridge to warfarin. INR goal 2.5-3.5 in s/o mech MVR  - Thrombocytopenia, plts 61 (79). Monitor for now. 4T score 2, consider HIT screen if worsening  - INR 2.67 (2.70)    ID:  - Lydia op ppx complete. Afebrile. Leukocytosis, grossly improving. WBC 16.7 (18.3)  - UA appears dirty although neg leukocyte esterase/nitrites. Cx NGTD  - Blood cx NGTD  - On vanc and rocephin for presumed urosepsis - narrow abx as able  - Plan for ID consult when Urine cx results to advise abx regimen. Prefer at least 10 days in s/o mechanical prosthetic valve  - Lactate 2.7 (2.3). Likely partly type B given epi gtt. Trend    ENDO:   - HbA1c 5.7%  - Ok to switch to SSI w/ q4 BG checks, discussed w/ nephrology  - Holding PTA metformin, plans to resume pending renal function  - PTA synthroid    PPx:   - DVT: SCDs, SQ heparin TID, ambulation   - GI: Protonix 40mg IV/PO daily    DISPO:   - Continue critical care in ICU pending pressors and monitoring for need for CRRT  - Medically Ready  for Discharge: Anticipated in 5+ Days         Patient seen and discussed w/ Dr. Wade.        Eva Galvez PA-C  [facility] Cardiothoracic Surgery  Pager: [phone]  October 11, 2024

## 2024-10-12 LAB
ALBUMIN SERPL BCG-MCNC: 3 G/DL (ref 3.5–5.2)
ALBUMIN SERPL BCG-MCNC: 3 G/DL (ref 3.5–5.2)
ALBUMIN SERPL BCG-MCNC: 3.1 G/DL (ref 3.5–5.2)
ALBUMIN SERPL BCG-MCNC: 3.1 G/DL (ref 3.5–5.2)
ALBUMIN SERPL BCG-MCNC: 3.2 G/DL (ref 3.5–5.2)
ALP SERPL-CCNC: 100 U/L (ref 40–150)
ALP SERPL-CCNC: 104 U/L (ref 40–150)
ALP SERPL-CCNC: 113 U/L (ref 40–150)
ALP SERPL-CCNC: 119 U/L (ref 40–150)
ALT SERPL W P-5'-P-CCNC: 1916 U/L (ref 0–50)
ALT SERPL W P-5'-P-CCNC: 2183 U/L (ref 0–50)
ALT SERPL W P-5'-P-CCNC: 2329 U/L (ref 0–50)
ALT SERPL W P-5'-P-CCNC: 2698 U/L (ref 0–50)
AMMONIA PLAS-SCNC: 39 UMOL/L (ref 11–51)
ANION GAP SERPL CALCULATED.3IONS-SCNC: 10 MMOL/L (ref 7–15)
ANION GAP SERPL CALCULATED.3IONS-SCNC: 11 MMOL/L (ref 7–15)
ANION GAP SERPL CALCULATED.3IONS-SCNC: 9 MMOL/L (ref 7–15)
AST SERPL W P-5'-P-CCNC: 1284 U/L (ref 0–45)
AST SERPL W P-5'-P-CCNC: 1775 U/L (ref 0–45)
AST SERPL W P-5'-P-CCNC: 2327 U/L (ref 0–45)
AST SERPL W P-5'-P-CCNC: 3219 U/L (ref 0–45)
BASE EXCESS BLDA CALC-SCNC: 0.1 MMOL/L (ref -3–3)
BILIRUB DIRECT SERPL-MCNC: 0.34 MG/DL (ref 0–0.3)
BILIRUB SERPL-MCNC: 0.7 MG/DL
BUN SERPL-MCNC: 26.7 MG/DL (ref 6–20)
BUN SERPL-MCNC: 33.1 MG/DL (ref 6–20)
BUN SERPL-MCNC: 39.2 MG/DL (ref 6–20)
BUN SERPL-MCNC: 41.6 MG/DL (ref 6–20)
BUN SERPL-MCNC: 46.9 MG/DL (ref 6–20)
CA-I BLD-MCNC: 4 MG/DL (ref 4.4–5.2)
CA-I BLD-MCNC: 4.2 MG/DL (ref 4.4–5.2)
CA-I BLD-MCNC: 4.3 MG/DL (ref 4.4–5.2)
CA-I BLD-MCNC: 4.4 MG/DL (ref 4.4–5.2)
CALCIUM SERPL-MCNC: 7 MG/DL (ref 8.8–10.4)
CALCIUM SERPL-MCNC: 7.4 MG/DL (ref 8.8–10.4)
CALCIUM SERPL-MCNC: 7.6 MG/DL (ref 8.8–10.4)
CALCIUM SERPL-MCNC: 7.8 MG/DL (ref 8.8–10.4)
CALCIUM SERPL-MCNC: 7.9 MG/DL (ref 8.8–10.4)
CHLORIDE SERPL-SCNC: 102 MMOL/L (ref 98–107)
CHLORIDE SERPL-SCNC: 102 MMOL/L (ref 98–107)
CHLORIDE SERPL-SCNC: 103 MMOL/L (ref 98–107)
CHLORIDE SERPL-SCNC: 104 MMOL/L (ref 98–107)
CHLORIDE SERPL-SCNC: 105 MMOL/L (ref 98–107)
COHGB MFR BLD: 97.4 % (ref 96–97)
CREAT SERPL-MCNC: 1.79 MG/DL (ref 0.51–0.95)
CREAT SERPL-MCNC: 2.08 MG/DL (ref 0.51–0.95)
CREAT SERPL-MCNC: 2.51 MG/DL (ref 0.51–0.95)
CREAT SERPL-MCNC: 2.62 MG/DL (ref 0.51–0.95)
CREAT SERPL-MCNC: 2.93 MG/DL (ref 0.51–0.95)
EGFRCR SERPLBLD CKD-EPI 2021: 19 ML/MIN/1.73M2
EGFRCR SERPLBLD CKD-EPI 2021: 22 ML/MIN/1.73M2
EGFRCR SERPLBLD CKD-EPI 2021: 23 ML/MIN/1.73M2
EGFRCR SERPLBLD CKD-EPI 2021: 29 ML/MIN/1.73M2
EGFRCR SERPLBLD CKD-EPI 2021: 34 ML/MIN/1.73M2
ERYTHROCYTE [DISTWIDTH] IN BLOOD BY AUTOMATED COUNT: 16.8 % (ref 10–15)
ERYTHROCYTE [DISTWIDTH] IN BLOOD BY AUTOMATED COUNT: 17 % (ref 10–15)
ERYTHROCYTE [DISTWIDTH] IN BLOOD BY AUTOMATED COUNT: 17.1 % (ref 10–15)
ERYTHROCYTE [DISTWIDTH] IN BLOOD BY AUTOMATED COUNT: 17.2 % (ref 10–15)
GLUCOSE BLDC GLUCOMTR-MCNC: 102 MG/DL (ref 70–99)
GLUCOSE BLDC GLUCOMTR-MCNC: 118 MG/DL (ref 70–99)
GLUCOSE BLDC GLUCOMTR-MCNC: 166 MG/DL (ref 70–99)
GLUCOSE BLDC GLUCOMTR-MCNC: 64 MG/DL (ref 70–99)
GLUCOSE BLDC GLUCOMTR-MCNC: 73 MG/DL (ref 70–99)
GLUCOSE BLDC GLUCOMTR-MCNC: 73 MG/DL (ref 70–99)
GLUCOSE BLDC GLUCOMTR-MCNC: 77 MG/DL (ref 70–99)
GLUCOSE BLDC GLUCOMTR-MCNC: 98 MG/DL (ref 70–99)
GLUCOSE BLDC GLUCOMTR-MCNC: 99 MG/DL (ref 70–99)
GLUCOSE SERPL-MCNC: 121 MG/DL (ref 70–99)
GLUCOSE SERPL-MCNC: 85 MG/DL (ref 70–99)
GLUCOSE SERPL-MCNC: 87 MG/DL (ref 70–99)
GLUCOSE SERPL-MCNC: 96 MG/DL (ref 70–99)
GLUCOSE SERPL-MCNC: 98 MG/DL (ref 70–99)
HCO3 BLD-SCNC: 25 MMOL/L (ref 21–28)
HCO3 SERPL-SCNC: 22 MMOL/L (ref 22–29)
HCO3 SERPL-SCNC: 23 MMOL/L (ref 22–29)
HCO3 SERPL-SCNC: 24 MMOL/L (ref 22–29)
HCO3 SERPL-SCNC: 24 MMOL/L (ref 22–29)
HCO3 SERPL-SCNC: 25 MMOL/L (ref 22–29)
HCT VFR BLD AUTO: 23.8 % (ref 35–47)
HCT VFR BLD AUTO: 24.4 % (ref 35–47)
HCT VFR BLD AUTO: 25 % (ref 35–47)
HCT VFR BLD AUTO: 25.1 % (ref 35–47)
HGB BLD-MCNC: 8 G/DL (ref 11.7–15.7)
HGB BLD-MCNC: 8 G/DL (ref 11.7–15.7)
HGB BLD-MCNC: 8.1 G/DL (ref 11.7–15.7)
HGB BLD-MCNC: 8.2 G/DL (ref 11.7–15.7)
INR PPP: 1.8 (ref 0.85–1.15)
INR PPP: 1.96 (ref 0.85–1.15)
LACTATE SERPL-SCNC: 1.1 MMOL/L (ref 0.7–2)
LACTATE SERPL-SCNC: 1.1 MMOL/L (ref 0.7–2)
LACTATE SERPL-SCNC: 1.2 MMOL/L (ref 0.7–2)
LACTATE SERPL-SCNC: 1.3 MMOL/L (ref 0.7–2)
MAGNESIUM SERPL-MCNC: 2.4 MG/DL (ref 1.7–2.3)
MAGNESIUM SERPL-MCNC: 2.6 MG/DL (ref 1.7–2.3)
MAGNESIUM SERPL-MCNC: 2.6 MG/DL (ref 1.7–2.3)
MCH RBC QN AUTO: 28.1 PG (ref 26.5–33)
MCH RBC QN AUTO: 28.6 PG (ref 26.5–33)
MCH RBC QN AUTO: 28.7 PG (ref 26.5–33)
MCH RBC QN AUTO: 29 PG (ref 26.5–33)
MCHC RBC AUTO-ENTMCNC: 32.4 G/DL (ref 31.5–36.5)
MCHC RBC AUTO-ENTMCNC: 32.7 G/DL (ref 31.5–36.5)
MCHC RBC AUTO-ENTMCNC: 32.8 G/DL (ref 31.5–36.5)
MCHC RBC AUTO-ENTMCNC: 33.6 G/DL (ref 31.5–36.5)
MCV RBC AUTO: 86 FL (ref 78–100)
MCV RBC AUTO: 87 FL (ref 78–100)
MCV RBC AUTO: 87 FL (ref 78–100)
MCV RBC AUTO: 88 FL (ref 78–100)
O2/TOTAL GAS SETTING VFR VENT: 1 %
PCO2 BLD: 40 MM HG (ref 35–45)
PF4 HEPARIN CMPLX AB SER QL: NEGATIVE
PH BLD: 7.4 [PH] (ref 7.35–7.45)
PHOSPHATE SERPL-MCNC: 4.1 MG/DL (ref 2.5–4.5)
PHOSPHATE SERPL-MCNC: 4.4 MG/DL (ref 2.5–4.5)
PHOSPHATE SERPL-MCNC: 5 MG/DL (ref 2.5–4.5)
PLATELET # BLD AUTO: 51 10E3/UL (ref 150–450)
PLATELET # BLD AUTO: 53 10E3/UL (ref 150–450)
PLATELET # BLD AUTO: 59 10E3/UL (ref 150–450)
PLATELET # BLD AUTO: 82 10E3/UL (ref 150–450)
PO2 BLD: 92 MM HG (ref 80–105)
POTASSIUM SERPL-SCNC: 4.2 MMOL/L (ref 3.4–5.3)
POTASSIUM SERPL-SCNC: 4.3 MMOL/L (ref 3.4–5.3)
PROT SERPL-MCNC: 4.5 G/DL (ref 6.4–8.3)
PROT SERPL-MCNC: 4.6 G/DL (ref 6.4–8.3)
PROT SERPL-MCNC: 4.7 G/DL (ref 6.4–8.3)
PROT SERPL-MCNC: 4.7 G/DL (ref 6.4–8.3)
RBC # BLD AUTO: 2.76 10E6/UL (ref 3.8–5.2)
RBC # BLD AUTO: 2.8 10E6/UL (ref 3.8–5.2)
RBC # BLD AUTO: 2.86 10E6/UL (ref 3.8–5.2)
RBC # BLD AUTO: 2.88 10E6/UL (ref 3.8–5.2)
SAO2 % BLDA: 95 % (ref 92–100)
SODIUM SERPL-SCNC: 137 MMOL/L (ref 135–145)
SODIUM SERPL-SCNC: 138 MMOL/L (ref 135–145)
UFH PPP CHRO-ACNC: <0.1 IU/ML
VANCOMYCIN SERPL-MCNC: 21.4 UG/ML
WBC # BLD AUTO: 12.2 10E3/UL (ref 4–11)
WBC # BLD AUTO: 9.1 10E3/UL (ref 4–11)
WBC # BLD AUTO: 9.3 10E3/UL (ref 4–11)
WBC # BLD AUTO: 9.3 10E3/UL (ref 4–11)

## 2024-10-12 PROCEDURE — 82248 BILIRUBIN DIRECT: CPT | Performed by: INTERNAL MEDICINE

## 2024-10-12 PROCEDURE — 82330 ASSAY OF CALCIUM: CPT | Performed by: SURGERY

## 2024-10-12 PROCEDURE — 250N000011 HC RX IP 250 OP 636: Mod: JZ

## 2024-10-12 PROCEDURE — 83735 ASSAY OF MAGNESIUM: CPT | Performed by: INTERNAL MEDICINE

## 2024-10-12 PROCEDURE — 85520 HEPARIN ASSAY: CPT | Performed by: SURGERY

## 2024-10-12 PROCEDURE — 250N000009 HC RX 250: Performed by: INTERNAL MEDICINE

## 2024-10-12 PROCEDURE — 94640 AIRWAY INHALATION TREATMENT: CPT

## 2024-10-12 PROCEDURE — 85027 COMPLETE CBC AUTOMATED: CPT

## 2024-10-12 PROCEDURE — 82140 ASSAY OF AMMONIA: CPT | Performed by: NURSE PRACTITIONER

## 2024-10-12 PROCEDURE — 94799 UNLISTED PULMONARY SVC/PX: CPT

## 2024-10-12 PROCEDURE — 85027 COMPLETE CBC AUTOMATED: CPT | Performed by: INTERNAL MEDICINE

## 2024-10-12 PROCEDURE — 250N000011 HC RX IP 250 OP 636: Performed by: SURGERY

## 2024-10-12 PROCEDURE — 258N000001 HC RX 258: Performed by: INTERNAL MEDICINE

## 2024-10-12 PROCEDURE — 84100 ASSAY OF PHOSPHORUS: CPT | Performed by: INTERNAL MEDICINE

## 2024-10-12 PROCEDURE — 250N000011 HC RX IP 250 OP 636: Performed by: NURSE PRACTITIONER

## 2024-10-12 PROCEDURE — 83605 ASSAY OF LACTIC ACID: CPT

## 2024-10-12 PROCEDURE — 200N000001 HC R&B ICU

## 2024-10-12 PROCEDURE — 94640 AIRWAY INHALATION TREATMENT: CPT | Mod: 76

## 2024-10-12 PROCEDURE — 999N000157 HC STATISTIC RCP TIME EA 10 MIN

## 2024-10-12 PROCEDURE — 83605 ASSAY OF LACTIC ACID: CPT | Performed by: SURGERY

## 2024-10-12 PROCEDURE — 250N000009 HC RX 250: Performed by: SURGERY

## 2024-10-12 PROCEDURE — 250N000013 HC RX MED GY IP 250 OP 250 PS 637

## 2024-10-12 PROCEDURE — 99233 SBSQ HOSP IP/OBS HIGH 50: CPT | Performed by: NURSE PRACTITIONER

## 2024-10-12 PROCEDURE — 250N000013 HC RX MED GY IP 250 OP 250 PS 637: Performed by: NURSE PRACTITIONER

## 2024-10-12 PROCEDURE — 82330 ASSAY OF CALCIUM: CPT | Performed by: INTERNAL MEDICINE

## 2024-10-12 PROCEDURE — 85610 PROTHROMBIN TIME: CPT

## 2024-10-12 PROCEDURE — 94660 CPAP INITIATION&MGMT: CPT

## 2024-10-12 PROCEDURE — 80202 ASSAY OF VANCOMYCIN: CPT | Performed by: SURGERY

## 2024-10-12 PROCEDURE — 82805 BLOOD GASES W/O2 SATURATION: CPT

## 2024-10-12 PROCEDURE — 82248 BILIRUBIN DIRECT: CPT

## 2024-10-12 PROCEDURE — 80048 BASIC METABOLIC PNL TOTAL CA: CPT | Performed by: SURGERY

## 2024-10-12 RX ORDER — HEPARIN SODIUM 10000 [USP'U]/100ML
0-5000 INJECTION, SOLUTION INTRAVENOUS CONTINUOUS
Status: DISCONTINUED | OUTPATIENT
Start: 2024-10-12 | End: 2024-10-16

## 2024-10-12 RX ORDER — NOREPINEPHRINE BITARTRATE 0.02 MG/ML
.01-.6 INJECTION, SOLUTION INTRAVENOUS CONTINUOUS
Status: DISCONTINUED | OUTPATIENT
Start: 2024-10-12 | End: 2024-10-16

## 2024-10-12 RX ADMIN — Medication 1 TABLET: at 11:59

## 2024-10-12 RX ADMIN — CALCIUM CHLORIDE, MAGNESIUM CHLORIDE, SODIUM CHLORIDE, SODIUM BICARBONATE, POTASSIUM CHLORIDE AND SODIUM PHOSPHATE DIBASIC DIHYDRATE 12.5 ML/KG/HR: 3.68; 3.05; 6.34; 3.09; .314; .187 INJECTION INTRAVENOUS at 07:49

## 2024-10-12 RX ADMIN — LIDOCAINE 1 PATCH: 4 PATCH TOPICAL at 16:18

## 2024-10-12 RX ADMIN — Medication 125 MCG: at 08:17

## 2024-10-12 RX ADMIN — METHOCARBAMOL 750 MG: 750 TABLET ORAL at 06:34

## 2024-10-12 RX ADMIN — FLUDROCORTISONE ACETATE 0.1 MG: 0.1 TABLET ORAL at 08:17

## 2024-10-12 RX ADMIN — FLUTICASONE FUROATE AND VILANTEROL TRIFENATATE 1 PUFF: 100; 25 POWDER RESPIRATORY (INHALATION) at 20:17

## 2024-10-12 RX ADMIN — LACTULOSE SOLUTION USP, 10 G/15 ML 20 G: 10 SOLUTION ORAL; RECTAL at 08:17

## 2024-10-12 RX ADMIN — AMIODARONE HYDROCHLORIDE 1 MG/MIN: 1.8 INJECTION, SOLUTION INTRAVENOUS at 21:36

## 2024-10-12 RX ADMIN — LACTULOSE SOLUTION USP, 10 G/15 ML 20 G: 10 SOLUTION ORAL; RECTAL at 20:12

## 2024-10-12 RX ADMIN — BUDESONIDE 1 MG: 0.5 INHALANT ORAL at 19:00

## 2024-10-12 RX ADMIN — CALCIUM CHLORIDE, MAGNESIUM CHLORIDE, SODIUM CHLORIDE, SODIUM BICARBONATE, POTASSIUM CHLORIDE AND SODIUM PHOSPHATE DIBASIC DIHYDRATE 12.5 ML/KG/HR: 3.68; 3.05; 6.34; 3.09; .314; .187 INJECTION INTRAVENOUS at 18:36

## 2024-10-12 RX ADMIN — LACTULOSE SOLUTION USP, 10 G/15 ML 20 G: 10 SOLUTION ORAL; RECTAL at 14:07

## 2024-10-12 RX ADMIN — DEXTROSE MONOHYDRATE 50 ML: 25 INJECTION, SOLUTION INTRAVENOUS at 09:34

## 2024-10-12 RX ADMIN — BUDESONIDE 1 MG: 0.5 INHALANT ORAL at 08:59

## 2024-10-12 RX ADMIN — PANTOPRAZOLE SODIUM 40 MG: 40 TABLET, DELAYED RELEASE ORAL at 08:17

## 2024-10-12 RX ADMIN — CEFTRIAXONE SODIUM 1 G: 1 INJECTION, POWDER, FOR SOLUTION INTRAMUSCULAR; INTRAVENOUS at 00:44

## 2024-10-12 RX ADMIN — CALCIUM CHLORIDE, MAGNESIUM CHLORIDE, SODIUM CHLORIDE, SODIUM BICARBONATE, POTASSIUM CHLORIDE AND SODIUM PHOSPHATE DIBASIC DIHYDRATE 12.5 ML/KG/HR: 3.68; 3.05; 6.34; 3.09; .314; .187 INJECTION INTRAVENOUS at 15:05

## 2024-10-12 RX ADMIN — AMIODARONE HYDROCHLORIDE 150 MG: 1.5 INJECTION, SOLUTION INTRAVENOUS at 21:08

## 2024-10-12 RX ADMIN — CALCIUM CHLORIDE, MAGNESIUM CHLORIDE, SODIUM CHLORIDE, SODIUM BICARBONATE, POTASSIUM CHLORIDE AND SODIUM PHOSPHATE DIBASIC DIHYDRATE 12.5 ML/KG/HR: 3.68; 3.05; 6.34; 3.09; .314; .187 INJECTION INTRAVENOUS at 22:10

## 2024-10-12 RX ADMIN — METHOCARBAMOL 750 MG: 750 TABLET ORAL at 21:43

## 2024-10-12 RX ADMIN — VANCOMYCIN HYDROCHLORIDE 1000 MG: 1 INJECTION, SOLUTION INTRAVENOUS at 22:54

## 2024-10-12 RX ADMIN — SENNOSIDES AND DOCUSATE SODIUM 1 TABLET: 8.6; 5 TABLET ORAL at 20:12

## 2024-10-12 RX ADMIN — CALCIUM CHLORIDE, MAGNESIUM CHLORIDE, SODIUM CHLORIDE, SODIUM BICARBONATE, POTASSIUM CHLORIDE AND SODIUM PHOSPHATE DIBASIC DIHYDRATE 12.5 ML/KG/HR: 3.68; 3.05; 6.34; 3.09; .314; .187 INJECTION INTRAVENOUS at 04:27

## 2024-10-12 RX ADMIN — CALCIUM GLUCONATE 1 G: 20 INJECTION, SOLUTION INTRAVENOUS at 04:25

## 2024-10-12 RX ADMIN — CALCIUM CHLORIDE, MAGNESIUM CHLORIDE, SODIUM CHLORIDE, SODIUM BICARBONATE, POTASSIUM CHLORIDE AND SODIUM PHOSPHATE DIBASIC DIHYDRATE 12.5 ML/KG/HR: 3.68; 3.05; 6.34; 3.09; .314; .187 INJECTION INTRAVENOUS at 11:25

## 2024-10-12 RX ADMIN — CALCIUM GLUCONATE 1 G: 20 INJECTION, SOLUTION INTRAVENOUS at 21:40

## 2024-10-12 RX ADMIN — CALCIUM CHLORIDE, MAGNESIUM CHLORIDE, SODIUM CHLORIDE, SODIUM BICARBONATE, POTASSIUM CHLORIDE AND SODIUM PHOSPHATE DIBASIC DIHYDRATE 12.5 ML/KG/HR: 3.68; 3.05; 6.34; 3.09; .314; .187 INJECTION INTRAVENOUS at 00:29

## 2024-10-12 RX ADMIN — SENNOSIDES AND DOCUSATE SODIUM 1 TABLET: 8.6; 5 TABLET ORAL at 08:17

## 2024-10-12 RX ADMIN — CALCIUM CHLORIDE, MAGNESIUM CHLORIDE, SODIUM CHLORIDE, SODIUM BICARBONATE, POTASSIUM CHLORIDE AND SODIUM PHOSPHATE DIBASIC DIHYDRATE 12.5 ML/KG/HR: 3.68; 3.05; 6.34; 3.09; .314; .187 INJECTION INTRAVENOUS at 00:31

## 2024-10-12 RX ADMIN — LEVOTHYROXINE SODIUM 125 MCG: 0.03 TABLET ORAL at 06:34

## 2024-10-12 RX ADMIN — VANCOMYCIN HYDROCHLORIDE 1000 MG: 1 INJECTION, SOLUTION INTRAVENOUS at 10:01

## 2024-10-12 RX ADMIN — CALCIUM CHLORIDE, MAGNESIUM CHLORIDE, SODIUM CHLORIDE, SODIUM BICARBONATE, POTASSIUM CHLORIDE AND SODIUM PHOSPHATE DIBASIC DIHYDRATE 12.5 ML/KG/HR: 3.68; 3.05; 6.34; 3.09; .314; .187 INJECTION INTRAVENOUS at 22:09

## 2024-10-12 RX ADMIN — CALCIUM CHLORIDE, MAGNESIUM CHLORIDE, SODIUM CHLORIDE, SODIUM BICARBONATE, POTASSIUM CHLORIDE AND SODIUM PHOSPHATE DIBASIC DIHYDRATE 200 ML/HR: 3.68; 3.05; 6.34; 3.09; .314; .187 INJECTION INTRAVENOUS at 18:36

## 2024-10-12 RX ADMIN — NOREPINEPHRINE BITARTRATE 0.02 MCG/KG/MIN: 0.02 INJECTION, SOLUTION INTRAVENOUS at 10:49

## 2024-10-12 RX ADMIN — HEPARIN SODIUM 1200 UNITS/HR: 10000 INJECTION, SOLUTION INTRAVENOUS at 21:14

## 2024-10-12 RX ADMIN — CALCIUM CHLORIDE, MAGNESIUM CHLORIDE, SODIUM CHLORIDE, SODIUM BICARBONATE, POTASSIUM CHLORIDE AND SODIUM PHOSPHATE DIBASIC DIHYDRATE 12.5 ML/KG/HR: 3.68; 3.05; 6.34; 3.09; .314; .187 INJECTION INTRAVENOUS at 04:28

## 2024-10-12 ASSESSMENT — ACTIVITIES OF DAILY LIVING (ADL)
ADLS_ACUITY_SCORE: 37

## 2024-10-12 NOTE — PROGRESS NOTES
Shallow breathing noted. Achieved 400 ml od IS. Remains on 2 lpm O2 and is placed on home CPAP. Nebs given. BS diminished. Pt tolerated bronchial hygiene. Deep breath and coughing encouraged.     Srinivas Saldaña, RT

## 2024-10-12 NOTE — PLAN OF CARE
[facility] - ICU    RN Progress Note:            Pertinent Assessments:      Please refer to flowsheet rows for full assessment     VS/Pain           Key Events - This Shift:     VSS/afebrile. Pt in and out of Afib with RVR up to 160's. Dr. Wade notified. Dobutamine stopped and Norepi started to keep MAP > 65. CI 2.6-4 all shift. CRRT continues; pt tolerating well. UF decreased to 50ml/hr per Dr. Wade's request. Pt still very sleepy, but arousable and answers questions appropriately. ABG sent this AM and WNL. Pt's appetite poor; encouraged and educated pt and her  on importance of nutrition. Diet advanced to Regular/Renal diet to give pt more options. LFT's continue to trend down.            Barriers to Discharge / Downgrade:     ICU level of cares         Point of Contact Update: YES-OR-NO: Yes  Name: Yasir  Phone Number: at bedside  Summary of Conversation: updated on POC   Goal Outcome Evaluation:    Plan of Care Reviewed With: patient, spouse, parent    Overall Patient Progress: improvingOverall Patient Progress: improving    Outcome Evaluation: see shift note

## 2024-10-12 NOTE — PROGRESS NOTES
'    [facility] ([facility]) progress note  CC: F/U ALEXEI  S: Since last visit, now on CRRT. Seen/examined on CRRT.       Discussed with ICU nurse.     A/P:     Acute kidney injury.  Acute tubular necrosis.  Secondary to CABG and mitral valve replacement followed by cardiogenic shock.  Baseline creatinine 0.95 (10/8/2024).    Renal function closely. Serial labs. Intake and output.   Continue CRRT for volume and metabolic management in this critically ill patient in the ICU.     2. Hyperkalemia.resolved with CRRT. Trend.      3. Metabolic acidosis.  RESOLVED with CRRT. Secondary to acute kidney injury and cardiogenic shock - Hold bicarb at this time.Improved after bicarb overnight.       4. Status post coronary artery bypass grafting x 4 vessels and mitral valve replacement on 10/8.  Diffuse coronary artery disease     5. Shock liver.  Continue supportive care.  PER icu     6. History of Hodgkin's lymphoma.  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    Rohan Polo MD  [facility]  [phone]         No interval changes to past medical history, social history or family history to report.    /69   Pulse 97   Temp 98.4  F (36.9  C) (Oral)   Resp 19   Wt 113.3 kg (249 lb 12.5 oz)   LMP  (LMP Unknown)   SpO2 99%   BMI 42.87 kg/m      I/O last 3 completed shifts:  In: 1814.42 [P.O.:400; I.V.:1114.42]  Out: 2948.1 [Urine:1015; Other:1293.1; Chest Tube:640]    Physical Exam:   GENERAL: NAD on CRRT, lethargic  EYES: No scleral icterus, conjunctiva clear  ENT: MMM  RESP: Anterior clear.   CV: RRR, tr leg edema.    GI: ND  SKIN: No rash,      Recent Labs   Lab 10/12/24  1153 10/12/24  0615 10/12/24  0408 10/12/24  0016 10/11/24  2013 10/11/24  1833 10/11/24  1138 10/11/24  0440 10/10/24  1202 10/10/24  0430 10/10/24  0036 10/09/24  2216 10/09/24  1206 10/09/24  0414 10/08/24  1650 10/08/24  1628    138 137 137 137 138 137 138   < >  141  --  137   < > 143   < > 145   POTASSIUM 4.2 4.3 4.3 4.3 4.1 4.1 4.2 4.6   < > 4.7   < > 5.9*   < > 5.4*   < > 4.4  4.4   CHLORIDE 105 103 102 102 102 101 99 100   < > 104  --  104   < > 110*  --  112*   CO2 23 24 25 24 24 24 23 25   < > 25  --  18*   < > 23  --  21*   BUN 33.1* 39.2* 41.6* 46.9* 53.8* 58.4* 60.0* 56.4*   < > 35.3*  --  31.2*   < > 21.5*  --  16.5   CR 2.08* 2.51* 2.62* 2.93* 3.32* 3.70* 3.87* 3.62*   < > 2.06*  --  1.86*   < > 1.15*  --  0.95   GFRESTIMATED 29* 23* 22* 19* 16* 14* 14* 15*   < > 29*  --  33*   < > 58*  --  73   CINDY 7.4* 7.8* 7.6* 7.9* 7.6* 7.7* 8.2* 8.0*   < > 7.6*  --  7.9*   < > 7.7*  --  7.8*   PHOS  --   --  5.0*  --  5.4*  --   --  6.3*  --  5.2*  --  6.1*  --  5.1*  --  2.6   MAG 2.6*  --  2.6*  --  2.7*  --   --  2.9*  --  2.7*  --  2.7*  --  2.7*  --  3.3*   ALBUMIN 3.0* 3.1* 3.0* 3.2* 3.1* 3.1* 2.8* 3.1*   < > 3.2*  --  3.2*   < > 3.1*  --  3.1*    < > = values in this interval not displayed.       Recent Labs   Lab 10/12/24  0615 10/12/24  0408 10/12/24  0016 10/11/24  1833 10/11/24  1138 10/11/24  0440 10/11/24  0009   WBC 9.3 9.1 9.3 9.6 10.8 16.7* 18.3*   HGB 8.0* 8.0* 8.1* 8.2* 8.0* 7.3* 7.3*   HCT 24.4* 23.8* 25.0* 24.7* 24.6* 22.7* 22.5*   MCV 87 86 87 86 89 91 90   PLT 59* 53* 51* 47* 49* 61* 73*             Current Facility-Administered Medications:     [Held by provider] acetaminophen (TYLENOL) tablet 650 mg, 650 mg, Oral, Q4H PRN, Catherine Galvez PA-C    albuterol (PROVENTIL HFA/VENTOLIN HFA) inhaler, 2 puff, Inhalation, Q6H PRN, Catherine Galvez PA-C    [Held by provider] atorvastatin (LIPITOR) tablet 80 mg, 80 mg, Oral, At Bedtime, Catherine Galvez PA-C, 80 mg at 10/08/24 2004    bisacodyl (DULCOLAX) suppository 10 mg, 10 mg, Rectal, Daily PRN, Catherine Galvez PA-C    budesonide (PULMICORT) neb solution 1 mg, 1 mg, Nebulization, BID, Bharat Lu MD, 1 mg at 10/12/24 0859    [Held by provider] buPROPion (WELLBUTRIN XL) 24  hr tablet 300 mg, 300 mg, Oral, QAM, Morgan Galvezrielle K, PA-C, 300 mg at 10/10/24 0900    calcium gluconate 1 g in 50 mL in sodium chloride intermittent infusion, 1 g, Intravenous, Once PRN, Lenny Catherine K, PA-C, 1 g at 10/12/24 0425    calcium gluconate 2 g in  mL intermittent infusion, 2 g, Intravenous, Q8H PRN, Arnav Napoles DO    calcium gluconate 2 g in  mL intermittent infusion, 2 g, Intravenous, Once PRN, Lenny Catherine K, PA-C    calcium gluconate 3 g in sodium chloride 0.9 % 100 mL intermittent infusion, 3 g, Intravenous, Once PRN, Lenny Catherine K, PA-C    calcium gluconate 4 g in sodium chloride 0.9 % 100 mL intermittent infusion, 4 g, Intravenous, Q8H PRN, Arnav Napoles DO    cefTRIAXone (ROCEPHIN) 1 g vial to attach to  mL bag for ADULTS or NS 50 mL bag for PEDS, 1 g, Intravenous, Q24H, David Wade MD, 1 g at 10/12/24 0044    [Held by provider] cetirizine (zyrTEC) tablet 10 mg, 10 mg, Oral, Daily, Catherine Galvez PA-C, 10 mg at 10/10/24 0900    [Held by provider] clopidogrel (PLAVIX) tablet 75 mg, 75 mg, Oral, Daily, David Wade MD, 75 mg at 10/11/24 0802    CRRT Pre-Filter replacement solution for CVVHD & CVVHDF (Phoxillum BK4/2.5), 12.5 mL/kg/hr, CRRT, Continuous, Arnav Napoles DO, Last Rate: 1,400 mL/hr at 10/12/24 1125, 12.5 mL/kg/hr at 10/12/24 1125    CRRT Replacement solution for CVVHD and CVVHDF premixed replacement solution (Phoxillum 4/2.5), 200 mL/hr, CRRT, Continuous, Arnav Napoles DO, Last Rate: 200 mL/hr at 10/11/24 1630, 200 mL/hr at 10/11/24 1630    glucose gel 15-30 g, 15-30 g, Oral, Q15 Min PRN **OR** dextrose 50 % injection 25-50 mL, 25-50 mL, Intravenous, Q15 Min PRN, 50 mL at 10/12/24 0934 **OR** glucagon injection 1 mg, 1 mg, Subcutaneous, Q15 Min PRN, Doc Dhillon MD    dialysate for CVVHD & CVVHDF premixed replacement solution (Phoxillum 4/2.5) - total potassium 4 mEq/L, 12.5 mL/kg/hr, CRRT, Continuous, Dony,  DO Arnav, Last Rate: 1,400 mL/hr at 10/12/24 1125, 12.5 mL/kg/hr at 10/12/24 1125    DOBUTamine (DOBUTREX) 500 mg in D5W 250 mL infusion (adult std conc), 1-3 mcg/kg/min, Intravenous, Continuous, Catherine Galvez PA-C, Stopped at 10/12/24 1055    [Held by provider] EPINEPHrine (ADRENALIN) 16 mg in sodium chloride 0.9 % 250 mL infusion CENTRAL, 0.01-0.3 mcg/kg/min, Intravenous, Continuous, David Wade MD, Stopped at 10/11/24 1140    fludrocortisone (FLORINEF) tablet 0.1 mg, 0.1 mg, Oral, Daily, Catherine Galvez PA-C, 0.1 mg at 10/12/24 0817    fluticasone (FLONASE) 50 MCG/ACT spray 2 spray, 2 spray, Both Nostrils, Daily PRN, Catherine Galvez PA-C    fluticasone-vilanterol (BREO ELLIPTA) 100-25 MCG/ACT inhaler 1 puff, 1 puff, Inhalation, At Bedtime, Catherine Galvez PA-C, 1 puff at 10/11/24 2028    [Held by provider] heparin 25,000 units in 0.45% NaCl 250 mL ANTICOAGULANT infusion, 0-5,000 Units/hr, Intravenous, Continuous, Catherine Galvez PA-C, Stopped at 10/11/24 1312    hydrALAZINE (APRESOLINE) injection 10 mg, 10 mg, Intravenous, Q30 Min PRN, Catherine Galvez PA-C    insulin aspart (NovoLOG) injection (RAPID ACTING), 1-7 Units, Subcutaneous, Q4H, Catherine Galvez PA-C, 1 Units at 10/11/24 1142    ipratropium - albuterol 0.5 mg/2.5 mg/3 mL (DUONEB) neb solution 3 mL, 3 mL, Nebulization, Q4H PRN, Bharat Lu MD    lactated ringers BOLUS 250 mL, 250 mL, Intravenous, Q15 Min PRN, Catherine Galvez PA-C, 250 mL at 10/09/24 0417    lactulose (CHRONULAC) solution 20 g, 20 g, Oral, TID, Felicitas Vegas, RAFY CNP, 20 g at 10/12/24 0817    levothyroxine (SYNTHROID/LEVOTHROID) tablet 125 mcg, 125 mcg, Oral, QAM AC, Catherine Galvez PA-C, 125 mcg at 10/12/24 0634    Lidocaine (LIDOCARE) 4 % Patch 1-2 patch, 1-2 patch, Transdermal, Q24H, Catherine Galvez PA-C, 1 patch at 10/11/24 1738    magnesium hydroxide (MILK OF MAGNESIA) suspension 30 mL,  30 mL, Oral, Daily PRN, Catherine Galvez PA-C    magnesium sulfate 2 g in 50 mL sterile water intermittent infusion, 2 g, Intravenous, Q8H PRN, Arnav Napoles DO    [Held by provider] metFORMIN (GLUCOPHAGE XR) 24 hr tablet 1,000 mg, 1,000 mg, Oral, Daily, Catherine Galvez PA-C    methocarbamol (ROBAXIN) tablet 750 mg, 750 mg, Oral, Q6H PRN, Catherine Galvez PA-C, 750 mg at 10/12/24 0634    multivitamin w/minerals (THERA-VIT-M) tablet 1 tablet, 1 tablet, Oral, Daily, Catherine Galvez PA-C, 1 tablet at 10/12/24 1159    naloxone (NARCAN) injection 0.2 mg, 0.2 mg, Intravenous, Q2 Min PRN **OR** naloxone (NARCAN) injection 0.4 mg, 0.4 mg, Intravenous, Q2 Min PRN **OR** naloxone (NARCAN) injection 0.2 mg, 0.2 mg, Intramuscular, Q2 Min PRN **OR** naloxone (NARCAN) injection 0.4 mg, 0.4 mg, Intramuscular, Q2 Min PRN, David Wade MD    No heparin required, , Does not apply, Continuous PRN, Arnav Napoles DO    norepinephrine (LEVOPHED) 4 mg in  mL infusion PREMIX, 0.01-0.6 mcg/kg/min, Intravenous, Continuous, David Wade MD, Last Rate: 17 mL/hr at 10/12/24 1200, 0.04 mcg/kg/min at 10/12/24 1200    ondansetron (ZOFRAN ODT) ODT tab 4 mg, 4 mg, Oral, Q6H PRN **OR** ondansetron (ZOFRAN) injection 4 mg, 4 mg, Intravenous, Q6H PRN, Catherine Galvez PA-C, 4 mg at 10/10/24 1945    [Held by provider] oxyCODONE IR (ROXICODONE) half-tab 2.5 mg, 2.5 mg, Oral, Q4H PRN **OR** [Held by provider] oxyCODONE (ROXICODONE) tablet 5 mg, 5 mg, Oral, Q4H PRN, Catherine Galvez PA-C    pantoprazole (PROTONIX) 2 mg/mL suspension 40 mg, 40 mg, Oral or NG Tube, Daily, 40 mg at 10/08/24 1729 **OR** pantoprazole (PROTONIX) EC tablet 40 mg, 40 mg, Oral, Daily, Catherine Galvez PA-C, 40 mg at 10/12/24 0817    potassium chloride 20 mEq in 50 mL intermittent infusion, 20 mEq, Intravenous, Q8H PRN, Arnav Napoles DO    prochlorperazine (COMPAZINE) injection 10 mg, 10 mg, Intravenous, Q6H PRN  **OR** prochlorperazine (COMPAZINE) tablet 10 mg, 10 mg, Oral, Q6H PRN, Catherine Galvez PA-KENIA    senna-docusate (SENOKOT-S/PERICOLACE) 8.6-50 MG per tablet 1 tablet, 1 tablet, Oral, BID, Catherine Galvez PA-C, 1 tablet at 10/12/24 0817    simethicone (MYLICON) chewable half-tab 120 mg, 120 mg, Oral, 4x Daily PRN, Catherine Galvez PA-C    [Held by provider] sodium bicarbonate 150 mEq in D5W 1,000 mL infusion, , Intravenous, Continuous, Doc Dhillon MD, Stopped at 10/10/24 0820    sodium chloride (PF) 0.9% PF flush 10-20 mL, 10-20 mL, Intracatheter, q1 min prn, Catherine Galvez PA-C    sodium chloride (PF) 0.9% PF flush 10-40 mL, 10-40 mL, Intracatheter, Q7 Days, Catherine Galvez PA-C    sodium chloride (PF) 0.9% PF flush 10-40 mL, 10-40 mL, Intracatheter, Q1H PRN, Catherine Galvez PA-C    sodium phosphate 15 mmol in sodium chloride 0.9 % 250 mL intermittent infusion, 15 mmol, Intravenous, Q8H PRN, Arnav Napoles DO    [Held by provider] traZODone (DESYREL) tablet 100 mg, 100 mg, Oral, At Bedtime, Catherine Galvez PA-C    vancomycin (VANCOCIN) 1,000 mg in 200 mL dextrose intermittent infusion, 1,000 mg, Intravenous, Q12H, Felicitas Vegas APRN CNP, Last Rate: 200 mL/hr at 10/12/24 1001, 1,000 mg at 10/12/24 1001    Vitamin D3 (CHOLECALCIFEROL) tablet 125 mcg, 125 mcg, Oral, Daily, Catherine Galvez PA-C, 125 mcg at 10/12/24 0817      Labs personally reviewed today during this evaluation at 10:07 AM

## 2024-10-12 NOTE — PROGRESS NOTES
Critical Care Progress Note  10/12/2024     Admit Date: 10/8/2024  ICU Admission Day: 10/8  Code Status: full code      Problem List:   Active Problems:    Mitral regurgitation    Coronary artery disease involving native coronary artery of native heart with angina pectoris (H)       Plan by System:     Neuro/Psych: acute post surgical pain   - pain treatment per surgery   - needs to increase activity, out of bed, therapies.     Pulmonary: No acute issues; Hx of GIA on CPAP.    - Push pulmonary toilet; EZ-PAP started yesterday. Continue.    - CPAP/BiPAP with sleep     CV:  Post op course complicated by cardiogenic shock; CAD s/p CABG x 4; s/p MV replacement.    - Hemodynamic management as directed by Surgery.    - Currently on dobutamine. Having frequent bursts of tachy - ? Related to dobutamine? Discussed with CV surgery - defer hemodynamic management to their service.     GI/: shock liver in setting of cardiogenic shock. ABD US showed patent flow throughout.    - LFTs continue to slowly downtrend. Continue to follow.    - Diet: cardiac   - Last BM: none recorded. Lactulose added yesterday due to rise in ammonia and persistent somnolence.    - Bowel meds: prn    Renal: ALEXEI, ATN - cardiogenic shock.    - Renal consulted; appreciate input.    - CRRT started 10/11/     ID: treating for urosepsis per surgery with vanc and ctx.     Heme/Coag:  acute blood loss anemia   - transfusing per surgery.     - DVT proph: scd    Endocrine: hyperglycemia   - sliding scale insulin           Clinically Significant Risk Factors           # Hypocalcemia: Lowest iCa = 4.2 mg/dL in last 2 days, will monitor and replace as appropriate     # Hypoalbuminemia: Lowest albumin = 2.8 g/dL at 10/11/2024 11:38 AM, will monitor as appropriate    # Coagulation Defect: INR = 1.96 (Ref range: 0.85 - 1.15) and/or PTT = 83 Seconds (Ref range: 22 - 38 Seconds), will monitor for bleeding  # Thrombocytopenia: Lowest platelets = 47 in last 2 days, will  "monitor for bleeding  # Acute Kidney Injury, unspecified: based on a >150% or 0.3 mg/dL increase in last creatinine compared to past 90 day average, will monitor renal function  # Hypertension: Noted on problem list           # Severe Obesity: Estimated body mass index is 42.87 kg/m  as calculated from the following:    Height as of 9/25/24: 1.626 m (5' 4\").    Weight as of this encounter: 113.3 kg (249 lb 12.5 oz).       # History of CABG: noted on surgical history                Dispo: ICU     Felicitas Vegas, [facility] Pulmonary/Critical Care       _______________________________________________________________    HPI: Jory Ambrose is a 49 year old female with PMHx of GIA, asthma.GERD,  hypothyroidism, Pe on plavix, carotid stenosis,  Hodgkin's lymphoma status post chemotherapy and then mantle radiation and 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, currently in remission with severe multivessel and moderate left main coronary artery disease and stable angina who underwent coronary artery bypass grafting and MVR on 10/8.  She was extubated without difficulty DOS. Post op course complicated by cardiogenic shock and shock liver with very elevated liver enzymes.     ROS: states she's \"probably having pain\". No difficulty breathing, no nausea.     Events over last 24-hours: bursts of afib/svt overnight    Objective:     Vitals:    10/12/24 0715 10/12/24 0730 10/12/24 0745 10/12/24 0800   BP:       BP Location:       Pulse: 97 97 97 98   Resp: 16 17 17 16   Temp:       TempSrc:       SpO2: 96% 96% 96% 97%   Weight:              I/O:   Intake/Output Summary (Last 24 hours) at 10/11/2024 1108  Last data filed at 10/11/2024 1000  Gross per 24 hour   Intake 1714.19 ml   Output 1485 ml   Net 229.19 ml     Wt Readings from Last 3 Encounters:   10/12/24 113.3 kg (249 lb 12.5 oz)   10/04/24 98 kg (216 lb)   09/25/24 99.3 kg (219 lb)      Weight change: -1.1 kg (-2 lb 6.8 oz)    Physical Exam:  Gen: " no distress, still quite sleepy   HEENMT: AT/NC  NEURO: wakes to name, answers appropriately. Weak.  CARDIOVASCULAR: RRR S1S2 postive rub. No murmur. Chest tubes in place.   PULMONARY: unlabored on home CPAP;   lungs sounds diminished, no wheeze.   GASTROINTESTINAL: soft, sluggish bowel sounds   INTEGUMENT: pale; visible skin intact   PSYCH: flat affect, sleepy    Labs:   Recent Labs   Lab 10/12/24  0615      CO2 24   BUN 39.2*   ALKPHOS 104   ALT 2,329*   AST 2,327*       Recent Labs   Lab 10/12/24  0615   WBC 9.3   HGB 8.0*   HCT 24.4*   PLT 59*       Micro:   Reviewed - no growth     Imaging: all imaging personalized reviewed         Felicitas Vegas, CNP  [facility] Pulmonary/Critical Care

## 2024-10-12 NOTE — PLAN OF CARE
Problem: Cardiovascular Surgery  Goal: Effective Cardiac Function  Outcome: Progressing  Intervention: Optimize Cardiac Output and Blood Flow      Goal Outcome Evaluation:      Plan of Care Reviewed With: patient, parent, sibling    Overall Patient Progress: improvingOverall Patient Progress: improving    Outcome Evaluation: Tolerating CRRT well. improving LFTs and renal labs    [facility] - ICU    RN Progress Note:            Pertinent Assessments:      Please refer to flowsheet rows for full assessment     VSS. Uncontrolled incisional pain. No BM yet.           Key Events - This Shift:       -CRRT continued, pulling 100 ml/hr throughout this shift. Patient tolerated well. Padron output ~30 q1h.   -Intermittent short runs of A fib/S tachy with 's per telemetry, usually lasts <10 sec, then coverts back to SR HR 90's.  -Calcium replacement x2  -No pacing noted with epicardia pacemaker throughout shift, pacer wirers capped this am.  -Placed on home CPAP at HS with 1-2L O2, tolerated well.   -Blood glucose trending down.                 Barriers to Discharge / Downgrade:     CRRT         Point of Contact Update: YES-OR-NO: Yes  If No, reason: N/A  Name: Pat  Phone Number:See Chart  Summary of Conversation: POC

## 2024-10-12 NOTE — PROGRESS NOTES
CVTS Daily Progress Note   POD#4 s/p CABG x4 (LIMA>LAD, rSVG>RPDA, rSVG>LPL, rSVG>D2), RLE EVH, Attempted MVR/r, MVR ( 29 mm [employer] Mechanical Mitral Valve), LAAE  Attending: Mauro  LOS: 4    SUBJECTIVE/INTERVAL EVENTS:    No acute events overnight. CRRT continued and pulling 100ml/hr overnight. LFTs now trending down. Maintaining oxygen saturations on CPAP overnight/1-2L NC. Normotensive on  2mcg. CVP 17, CI 2.9, . Aiming for -130 in s/o renal and hepatic dysfunction. Resting in bed, seems lethargic but answers appropriately. Pain well controlled. - BM / - flatus. Tolerating diet of clears w/o nausea. Chest tube output appropriate. Hgb 8.0 after 1 uPRBC's, likely  some dilutional component given low UOP but did receive 2u PRBCs periop. Plts 59(47)  INR 1.96 (2.03). Lactate 1.1. Patient denies new chest pain, shortness of breath, abdominal pain, calf pain, nausea. Patient has no questions today.     OBJECTIVE:  Temp:  [97.5  F (36.4  C)-98.4  F (36.9  C)] 98.4  F (36.9  C)  Pulse:  [] 100  Resp:  [8-42] 24  BP: (121-148)/(67-76) 121/69  MAP:  [20 mmHg-105 mmHg] 20 mmHg  Arterial Line BP: ()/(13-83) 95/13  SpO2:  [91 %-100 %] 100 %  Vitals:    10/08/24 0527 10/09/24 0530 10/10/24 0600 10/11/24 0454   Weight: 100.3 kg (221 lb 3.2 oz) 109.1 kg (240 lb 8.4 oz) 111.8 kg (246 lb 7.6 oz) 114.4 kg (252 lb 3.3 oz)    10/12/24 0400   Weight: 113.3 kg (249 lb 12.5 oz)       Clinically Significant Risk Factors           # Hypocalcemia: Lowest iCa = 4.2 mg/dL in last 2 days, will monitor and replace as appropriate     # Hypoalbuminemia: Lowest albumin = 2.8 g/dL at 10/11/2024 11:38 AM, will monitor as appropriate    # Coagulation Defect: INR = 1.96 (Ref range: 0.85 - 1.15) and/or PTT = 83 Seconds (Ref range: 22 - 38 Seconds), will monitor for bleeding  # Thrombocytopenia: Lowest platelets = 47 in last 2 days, will monitor for bleeding  # Acute Kidney Injury, unspecified: based on a >150% or  "0.3 mg/dL increase in last creatinine compared to past 90 day average, will monitor renal function  # Hypertension: Noted on problem list           # Severe Obesity: Estimated body mass index is 42.87 kg/m  as calculated from the following:    Height as of 9/25/24: 1.626 m (5' 4\").    Weight as of this encounter: 113.3 kg (249 lb 12.5 oz).       # History of CABG: noted on surgical history          Current Medications:    Scheduled Meds:  Current Facility-Administered Medications   Medication Dose Route Frequency Provider Last Rate Last Admin    [Held by provider] atorvastatin (LIPITOR) tablet 80 mg  80 mg Oral At Bedtime Catherine Galvez PA-C   80 mg at 10/08/24 2004    budesonide (PULMICORT) neb solution 1 mg  1 mg Nebulization BID Bharat Lu MD   1 mg at 10/12/24 0859    [Held by provider] buPROPion (WELLBUTRIN XL) 24 hr tablet 300 mg  300 mg Oral QAM Catherine Galvez PA-C   300 mg at 10/10/24 0900    cefTRIAXone (ROCEPHIN) 1 g vial to attach to  mL bag for ADULTS or NS 50 mL bag for PEDS  1 g Intravenous Q24H David Wade MD   1 g at 10/12/24 0044    [Held by provider] cetirizine (zyrTEC) tablet 10 mg  10 mg Oral Daily Catherine Galvez PA-C   10 mg at 10/10/24 0900    [Held by provider] clopidogrel (PLAVIX) tablet 75 mg  75 mg Oral Daily David Wade MD   75 mg at 10/11/24 0802    fludrocortisone (FLORINEF) tablet 0.1 mg  0.1 mg Oral Daily Catherine Galvez PA-C   0.1 mg at 10/12/24 0817    fluticasone-vilanterol (BREO ELLIPTA) 100-25 MCG/ACT inhaler 1 puff  1 puff Inhalation At Bedtime Catherine Galvez PA-C   1 puff at 10/11/24 2028    insulin aspart (NovoLOG) injection (RAPID ACTING)  1-7 Units Subcutaneous Q4H Catherine Galvez PA-C   1 Units at 10/11/24 1142    lactulose (CHRONULAC) solution 20 g  20 g Oral TID Felicitas Vegas APRN CNP   20 g at 10/12/24 0817    levothyroxine (SYNTHROID/LEVOTHROID) tablet 125 mcg  125 mcg Oral " QAM AC Catherine Galvez PA-C   125 mcg at 10/12/24 0634    Lidocaine (LIDOCARE) 4 % Patch 1-2 patch  1-2 patch Transdermal Q24H Catherine Galvez PA-C   1 patch at 10/11/24 1738    [Held by provider] metFORMIN (GLUCOPHAGE XR) 24 hr tablet 1,000 mg  1,000 mg Oral Daily Catherine Galvez PA-C        multivitamin w/minerals (THERA-VIT-M) tablet 1 tablet  1 tablet Oral Daily Catherine Galvez PA-C   1 tablet at 10/11/24 1142    pantoprazole (PROTONIX) 2 mg/mL suspension 40 mg  40 mg Oral or NG Tube Daily Catherine Galvez PA-C   40 mg at 10/08/24 1729    Or    pantoprazole (PROTONIX) EC tablet 40 mg  40 mg Oral Daily Catherine Galvez PA-C   40 mg at 10/12/24 0817    senna-docusate (SENOKOT-S/PERICOLACE) 8.6-50 MG per tablet 1 tablet  1 tablet Oral BID Catherine Galvez PA-C   1 tablet at 10/12/24 0817    sodium chloride (PF) 0.9% PF flush 10-40 mL  10-40 mL Intracatheter Q7 Days Catherine Galvez PA-C        [Held by provider] traZODone (DESYREL) tablet 100 mg  100 mg Oral At Bedtime Catherine Galvez PA-C        vancomycin (VANCOCIN) 1,000 mg in 200 mL dextrose intermittent infusion  1,000 mg Intravenous Q12H Felicitas Vegas APRN  mL/hr at 10/11/24 2217 1,000 mg at 10/11/24 2217    Vitamin D3 (CHOLECALCIFEROL) tablet 125 mcg  125 mcg Oral Daily Catherine Galvez PA-C   125 mcg at 10/12/24 0817     Continuous Infusions:  Current Facility-Administered Medications   Medication Dose Route Frequency Provider Last Rate Last Admin    CRRT Pre-Filter replacement solution for CVVHD & CVVHDF (Phoxillum BK4/2.5)  12.5 mL/kg/hr CRRT Continuous Arnav Napoles DO 1,400 mL/hr at 10/12/24 0749 12.5 mL/kg/hr at 10/12/24 0749    CRRT Replacement solution for CVVHD and CVVHDF premixed replacement solution (Phoxillum 4/2.5)  200 mL/hr CRRT Continuous Arnav Napoles  mL/hr at 10/11/24 1630 200 mL/hr at 10/11/24 1630    dialysate for CVVHD & CVVHDF premixed replacement solution  (Phoxillum 4/2.5) - total potassium 4 mEq/L  12.5 mL/kg/hr CRRT Continuous Arnav Napoles DO 1,400 mL/hr at 10/12/24 0749 12.5 mL/kg/hr at 10/12/24 0749    DOBUTamine (DOBUTREX) 500 mg in D5W 250 mL infusion (adult std conc)  1-3 mcg/kg/min Intravenous Continuous Catherine Galvez PA-C 6.9 mL/hr at 10/12/24 0813 2 mcg/kg/min at 10/12/24 0813    [Held by provider] EPINEPHrine (ADRENALIN) 16 mg in sodium chloride 0.9 % 250 mL infusion CENTRAL  0.01-0.3 mcg/kg/min Intravenous Continuous David Wade MD   Stopped at 10/11/24 1140    [Held by provider] heparin 25,000 units in 0.45% NaCl 250 mL ANTICOAGULANT infusion  0-5,000 Units/hr Intravenous Continuous Catherine Galvez PA-C   Stopped at 10/11/24 1312    No heparin required   Does not apply Continuous PRN Arnav Napoles DO        [Held by provider] sodium bicarbonate 150 mEq in D5W 1,000 mL infusion   Intravenous Continuous Doc Dhillon MD   Stopped at 10/10/24 0820     PRN Meds:.  Current Facility-Administered Medications   Medication Dose Route Frequency Provider Last Rate Last Admin    [Held by provider] acetaminophen (TYLENOL) tablet 650 mg  650 mg Oral Q4H PRN Catherine Galvez PA-C        albuterol (PROVENTIL HFA/VENTOLIN HFA) inhaler  2 puff Inhalation Q6H PRN Catherine Galvez PA-C        bisacodyl (DULCOLAX) suppository 10 mg  10 mg Rectal Daily PRN Catherine Galvez PA-C        calcium gluconate 1 g in 50 mL in sodium chloride intermittent infusion  1 g Intravenous Once PRN Catherine Galvez PA-C   1 g at 10/12/24 0425    calcium gluconate 2 g in  mL intermittent infusion  2 g Intravenous Q8H PRN Arnav Napoles DO        calcium gluconate 2 g in  mL intermittent infusion  2 g Intravenous Once PRN Catherine Galvez PA-C        calcium gluconate 3 g in sodium chloride 0.9 % 100 mL intermittent infusion  3 g Intravenous Once PRN Catherine Galvez PA-C        calcium gluconate 4 g in sodium chloride 0.9 %  100 mL intermittent infusion  4 g Intravenous Q8H PRN Arnav Napoles DO        glucose gel 15-30 g  15-30 g Oral Q15 Min PRN Doc Dhillon MD        Or    dextrose 50 % injection 25-50 mL  25-50 mL Intravenous Q15 Min PRN Doc Dhillon MD        Or    glucagon injection 1 mg  1 mg Subcutaneous Q15 Min PRN Doc Dhillon MD        fluticasone (FLONASE) 50 MCG/ACT spray 2 spray  2 spray Both Nostrils Daily PRN Catherine Galvez PA-C        hydrALAZINE (APRESOLINE) injection 10 mg  10 mg Intravenous Q30 Min PRN Catherine Galvez PA-C        ipratropium - albuterol 0.5 mg/2.5 mg/3 mL (DUONEB) neb solution 3 mL  3 mL Nebulization Q4H PRN Bharat Lu MD        lactated ringers BOLUS 250 mL  250 mL Intravenous Q15 Min PRN Catherine Galvez PA-C   250 mL at 10/09/24 0417    magnesium hydroxide (MILK OF MAGNESIA) suspension 30 mL  30 mL Oral Daily PRN Catherine Galvez PA-C        magnesium sulfate 2 g in 50 mL sterile water intermittent infusion  2 g Intravenous Q8H PRN Arnav Napoles DO        methocarbamol (ROBAXIN) tablet 750 mg  750 mg Oral Q6H PRN Catherine Galvez PA-C   750 mg at 10/12/24 0634    naloxone (NARCAN) injection 0.2 mg  0.2 mg Intravenous Q2 Min PRN David Wade MD        Or    naloxone (NARCAN) injection 0.4 mg  0.4 mg Intravenous Q2 Min PRN David Wade MD        Or    naloxone (NARCAN) injection 0.2 mg  0.2 mg Intramuscular Q2 Min PRN David Wade MD        Or    naloxone (NARCAN) injection 0.4 mg  0.4 mg Intramuscular Q2 Min PRN David Wade MD        No heparin required   Does not apply Continuous PRN Arnav Napoles DO        ondansetron (ZOFRAN ODT) ODT tab 4 mg  4 mg Oral Q6H PRN Catherine Galvez PA-C        Or    ondansetron (ZOFRAN) injection 4 mg  4 mg Intravenous Q6H PRN Catherine Galvez PA-C   4 mg at 10/10/24 1945    [Held by provider] oxyCODONE IR (ROXICODONE) half-tab 2.5 mg  2.5 mg Oral Q4H  PRN Catherine Galvez PA-C        Or    [Held by provider] oxyCODONE (ROXICODONE) tablet 5 mg  5 mg Oral Q4H PRN Catherine Galvez PA-KENIA        potassium chloride 20 mEq in 50 mL intermittent infusion  20 mEq Intravenous Q8H PRN Arnav Napoles DO        prochlorperazine (COMPAZINE) injection 10 mg  10 mg Intravenous Q6H PRN Catherine Galvez PA-C        Or    prochlorperazine (COMPAZINE) tablet 10 mg  10 mg Oral Q6H PRN Catherine Galvez PA-C        simethicone (MYLICON) chewable half-tab 120 mg  120 mg Oral 4x Daily PRN Catherine Galvez PA-C        sodium chloride (PF) 0.9% PF flush 10-20 mL  10-20 mL Intracatheter q1 min prn Catherine Galvez PA-KENIA        sodium chloride (PF) 0.9% PF flush 10-40 mL  10-40 mL Intracatheter Q1H PRN Catherine Galvez PA-C        sodium phosphate 15 mmol in sodium chloride 0.9 % 250 mL intermittent infusion  15 mmol Intravenous Q8H PRN Arnav Napoles,            Cardiographics:    Telemetry monitoring demonstrates NSR with intermittent afib with rates in the 90-100s per my personal review.    Imaging:  Results for orders placed or performed during the hospital encounter of 10/08/24   XR Chest Port 1 View    Impression    IMPRESSION: Interval sternotomy, CABG procedure and mitral valve repair. Endotracheal tube proximally 2 cm above the sofia. Nasogastric tube in the stomach. Bilateral chest tubes and midline mediastinal drain in expected position. Epicardial leads are   present. Left IJ line in the brachiocephalic vein.    Low lung volumes. No evidence for CHF or pneumonia. No pleural effusion or pneumothorax.   XR Chest Port 1 View    Impression    IMPRESSION: Poststernotomy changes with prosthetic mitral valve. Patient's been extubated and the NG tube has been removed. Mediastinal and pleural chest tubes are in place.    Left IJ cordis sheath is in the distal left innominate artery. Catheter has a very subtle kink within it 3.5 centimeters from the  tip.    Low lung volumes. No pneumothorax. Likely trace left effusion at the base. No signs of pneumonia or failure. Heart is of normal size.   XR Abdomen Port 1 View    Impression    IMPRESSION: Orogastric tube tip in the proximal stomach and oriented superiorly towards the left diaphragm. Visualized bowel gas pattern unremarkable. Numerous tubes and lines lower chest and upper abdomen as described on chest x-ray report from today.        Labs, personally reviewed.  Hemoglobin   Date Value Ref Range Status   10/12/2024 8.0 (L) 11.7 - 15.7 g/dL Final   10/12/2024 8.0 (L) 11.7 - 15.7 g/dL Final   10/12/2024 8.1 (L) 11.7 - 15.7 g/dL Final   06/09/2015 13.5 11.7 - 15.7 g/dL Final   03/11/2015 12.3 11.7 - 15.7 g/dL Final   01/08/2015 13.6 11.7 - 15.7 g/dL Final     WBC   Date Value Ref Range Status   06/09/2015 7.9 4.0 - 11.0 10e9/L Final   03/11/2015 9.4 4.0 - 11.0 10e9/L Final   01/08/2015 8.1 4.0 - 11.0 10e9/L Final     WBC Count   Date Value Ref Range Status   10/12/2024 9.3 4.0 - 11.0 10e3/uL Final   10/12/2024 9.1 4.0 - 11.0 10e3/uL Final   10/12/2024 9.3 4.0 - 11.0 10e3/uL Final     Platelet Count   Date Value Ref Range Status   10/12/2024 59 (L) 150 - 450 10e3/uL Final   10/12/2024 53 (L) 150 - 450 10e3/uL Final   10/12/2024 51 (L) 150 - 450 10e3/uL Final   06/09/2015 219 150 - 450 10e9/L Final   03/11/2015 240 150 - 450 10e9/L Final   01/08/2015 221 150 - 450 10e9/L Final     Creatinine   Date Value Ref Range Status   10/12/2024 2.51 (H) 0.51 - 0.95 mg/dL Final   10/12/2024 2.62 (H) 0.51 - 0.95 mg/dL Final   10/12/2024 2.93 (H) 0.51 - 0.95 mg/dL Final   06/09/2015 0.80 0.52 - 1.04 mg/dL Final   03/11/2015 0.87 0.52 - 1.04 mg/dL Final   01/08/2015 0.79 0.52 - 1.04 mg/dL Final     Potassium   Date Value Ref Range Status   10/12/2024 4.3 3.4 - 5.3 mmol/L Final   10/12/2024 4.3 3.4 - 5.3 mmol/L Final   10/12/2024 4.3 3.4 - 5.3 mmol/L Final   06/09/2015 3.8 3.4 - 5.3 mmol/L Final   03/11/2015 3.9 3.4 - 5.3 mmol/L  Final   01/08/2015 4.1 3.4 - 5.3 mmol/L Final     Potassium POCT   Date Value Ref Range Status   10/08/2024 4.4 3.4 - 5.3 mmol/L Final   10/08/2024 4.0 3.4 - 5.3 mmol/L Final   10/08/2024 4.1 3.4 - 5.3 mmol/L Final     Magnesium   Date Value Ref Range Status   10/12/2024 2.6 (H) 1.7 - 2.3 mg/dL Final   10/11/2024 2.7 (H) 1.7 - 2.3 mg/dL Final   10/11/2024 2.9 (H) 1.7 - 2.3 mg/dL Final   06/09/2015 1.8 1.6 - 2.3 mg/dL Final   03/11/2015 1.7 1.6 - 2.3 mg/dL Final   01/08/2015 1.8 1.6 - 2.3 mg/dL Final          I/O:  I/O last 3 completed shifts:  In: 1814.42 [P.O.:400; I.V.:1114.42]  Out: 2948.1 [Urine:1015; Other:1293.1; Chest Tube:640]       Physical Exam:    General: Patient seen in bed. Answers appropriately, opens eyes when asked. Seems lethargic  HEENT: DORITA, no sclera icterus, moist mucosa  CV: RRR on monitor. 2+ peripheral pulses in all extremities. Mild peripheral edema. Incision C/D/I.  Pulm: Non-labored effort on nasal cannula. Chest tubes in place, serosanguinous output, no air leak.  Abd: Soft, NT, ND  : Padron with john urine  Ext: Mild pedal edema, SCDs in place, warm, distal pulses intact  Neuro: CNs grossly intact, patient sleepy but arouses to voice, FAM, strengths intact, A&Ox3      ASSESSMENT/PLAN:    Jory Ambrose is a 49 year old female with a history of GIA, asthma, GERD,  hypothyroidism, h/o PE on plavix, carotid stenosis, Hodgkin's lymphoma (s/p chemo/radiation and bone marrow transplant x2 after recurrence) currently in remission who is s/p CABG x4, RLE EVH, attempted mitral repair, mechanical MVR, LAAE.    Active Problems:    Mitral regurgitation    Coronary artery disease involving native coronary artery of native heart with angina pectoris (H)  #Severe transaminitis, ongoing.   #Acute Renal Failure d/t ATN, ongoing.  #Probable UTI      NEURO:   - Scheduled lidocaine patches and PRN robaxin/oxycodone for pain  - No toradol given ARF  - PTA bupropion HELD (somnolence)  - Tylenol  HELD (transaminitis)    CV:   - Pre-op EF 55-60%  - Weaning pressors as hemodynamics allow; currently on  . Dobutamine 2mcq  - SBP goal 120-130, CI goal 2.5-3.5 to promote renal and hepatic perfusion  - Beta blocker pending weaning from pressors  - Planning for plavix daily indefinitely d/t unclear h/o anaphylaxis to ASA  - Atorvastatin 80mg daily - HELD  (transaminitis)  - Chest tubes to remain today   - New baseline echo prior to discharge  - Low intensity heparin gtt bridge to warfarin for mechanical MVR when chest tubes/TPW are removed. INR goal 2.5-3.5  - If afib, NO AMIO (transaminitis)  - Echo very poor quality yesterday, repeat today w/o dressings etc.  - Sinus tach, leave TPW on backup of 60    PULM:   - Extubated on POD#0  - Maintaining oxygen saturations on nasal cannula, CPAP overnight  - Encourage pulmonary toilet  - PTA zyrtec, fludrocortisone, flonase, simethicone, albuterol, pulmicort, breo ellipta, duoneb    FEN/GI:  - Continue electrolyte replacement protocol  - Cardiac; ADAT - clears at this time  - Bowel regimen, LBM preop  - Severe transaminitis, ALT 2329, AST 2327 and downtrending. Likely 2/2 ischemic hepatitis. Bili and AP WNL. INR 1.96   - PTA vitamin D3   - Hypermagnesemia, hyperphosphoremia, monitor.  - Replace iCa++ aggressively  - CMP q8  - US RUQ unremarkable, dopper w/ patent portal and hepatic circulation  - Acute hepatitis panel, lipase, and acetaminophen level checked for completeness, unremarkable  - MELD score 30 (26) today    RENAL:  - Nephrology consulted  for ARF w/ decreased UOP, appreciate. Following.  - CRRT started 10/11. Pulling 100ml/hr and tolerating  - Cr 2.51 (3.62) (baseline ~0.9)  - Padron to remain in for close monitoring of I/O and titration of vasopressors (order updated)  - CMP q8  - UA/Urine cx as below.    HEME:  - H/o bone marrow tx. Needs irradiated blood  - Acute blood loss anemia post-op.   - Hgb 8.0, likely dilutional although will tranfuse to improve  perfusion given that she's already received PRBCs postop. Hep SQ, ASA.   - Received 2u PRBCs periop, 1u PRBCs 10/11  - Low intensity heparin gtt bridge to warfarin-held until Plt above 100. IN[facility]5 [facility]  - Thrombocytopenia, plts 59 (47). Monitor for now. 4T score 2, consider HIT pending  - INR 1.96 (2.03)    ID:  - Lydia op ppx complete. Afebrile. Leukocytosis improved. WBC 9.3 (18.3)  - UA appears dirty although neg leukocyte esterase/nitrites. Cx NGTD  - Blood cx NGTD  - On vanc and rocephin for presumed urosepsis - narrow abx as able  - Plan for ID consult when Urine cx results to advise abx regimen. Prefer at least 10 days in s/o mechanical prosthetic valve  - Lactate 1.1.    ENDO:   - HbA1c 5.7%  - Ok to switch to SSI w/ q4 BG checks, discussed w/ nephrology  - Holding PTA metformin, plans to resume pending renal function  - PTA synthroid    PPx:   - DVT: SCDs, SQ heparin TID, ambulation   - GI: Protonix 40mg IV/PO daily    DISPO:   - Continue critical care in ICU pending pressors and  CRRT  - Medically Ready for Discharge: Anticipated in 5+ Days         Patient seen with Dr Campoverde and discussed w/ Dr. Wade.      Kay Ann PA-C  [facility]othoracic [facility]  Vocera or pager [phone]

## 2024-10-12 NOTE — PHARMACY-VANCOMYCIN DOSING SERVICE
Pharmacy Vancomycin Note  Date of Service 2024  Patient's  1975   49 year old, female    Indication: Sepsis  Day of Therapy: 3  Current vancomycin regimen:  1000 mg IV q12h  Current vancomycin monitoring method: Renal Replacement Therapy  Current vancomycin therapeutic monitoring goal: 15-20 mg/L      Current estimated CrCl = Estimated Creatinine Clearance: 33.4 mL/min (A) (based on SCr of 2.51 mg/dL (H)).    Creatinine for last 3 days  10/9/2024: 12:06 PM Creatinine 1.19 mg/dL;  6:35 PM Creatinine 1.52 mg/dL; 10:16 PM Creatinine 1.86 mg/dL  10/10/2024:  4:30 AM Creatinine 2.06 mg/dL; 12:02 PM Creatinine 2.56 mg/dL;  6:06 PM Creatinine 2.95 mg/dL  10/11/2024: 12:09 AM Creatinine 3.38 mg/dL;  4:40 AM Creatinine 3.62 mg/dL; 11:38 AM Creatinine 3.87 mg/dL;  6:33 PM Creatinine 3.70 mg/dL;  8:13 PM Creatinine 3.32 mg/dL  10/12/2024: 12:16 AM Creatinine 2.93 mg/dL;  4:08 AM Creatinine 2.62 mg/dL;  6:15 AM Creatinine 2.51 mg/dL    Recent Vancomycin Levels (past 3 days)  10/10/2024:  4:30 AM Vancomycin 25.5 ug/mL  10/11/2024:  4:40 AM Vancomycin 19.3 ug/mL  10/12/2024:  6:15 AM Vancomycin 21.4 ug/mL    Vancomycin IV Administrations (past 72 hours)                     vancomycin (VANCOCIN) 1,000 mg in 200 mL dextrose intermittent infusion (mg) 1,000 mg New Bag 10/11/24 2217    vancomycin (VANCOCIN) 750 mg in 0.9% NaCl 257.5 mL intermittent infusion (mg) 750 mg New Bag 10/11/24 1118    vancomycin (VANCOCIN) 1,500 mg in 0.9% NaCl 265 mL intermittent infusion (mg) 1,500 mg New Bag 10/09/24 1306                    Nephrotoxins and other renal medications (From now, onward)      Start     Dose/Rate Route Frequency Ordered Stop    10/11/24 2300  vancomycin (VANCOCIN) 1,000 mg in 200 mL dextrose intermittent infusion         1,000 mg  200 mL/hr over 1 Hours Intravenous EVERY 12 HOURS 10/11/24 1442                 Contrast Orders - past 72 hours (72h ago, onward)      Start     Dose/Rate Route Frequency Stop     10/11/24 1000  perflutren lipid microsphere (DEFINITY) injection SUSP 2 mL         2 mL Intravenous ONCE 10/11/24 0933            Interpretation of levels and current regimen:  Vancomycin level is reflective of therapeutic level. The level was drawn with am labs so it was 6 hours prior to the next dose    Has serum creatinine changed greater than 50% in last 72 hours: On CRRT    Urine output:  CRRT    Renal Function:  CRRT      Plan:  Continue Current Dose  Vancomycin monitoring method: Renal Replacement Therapy  Vancomycin therapeutic monitoring goal: 15-20 mg/L  Pharmacy will check vancomycin levels as appropriate in 1-3 Days.  Serum creatinine levels will be ordered daily for the first week of therapy and at least twice weekly for subsequent weeks.    Karol Van [facility]

## 2024-10-13 ENCOUNTER — APPOINTMENT (OUTPATIENT)
Dept: RADIOLOGY | Facility: HOSPITAL | Age: 49
DRG: 219 | End: 2024-10-13
Attending: PHYSICIAN ASSISTANT
Payer: COMMERCIAL

## 2024-10-13 LAB
ALBUMIN SERPL BCG-MCNC: 3.1 G/DL (ref 3.5–5.2)
ALBUMIN SERPL BCG-MCNC: 3.2 G/DL (ref 3.5–5.2)
ALBUMIN SERPL BCG-MCNC: 3.3 G/DL (ref 3.5–5.2)
ALBUMIN UR-MCNC: 70 MG/DL
ALP SERPL-CCNC: 141 U/L (ref 40–150)
ALP SERPL-CCNC: 142 U/L (ref 40–150)
ALT SERPL W P-5'-P-CCNC: 1667 U/L (ref 0–50)
ALT SERPL W P-5'-P-CCNC: 1946 U/L (ref 0–50)
ANION GAP SERPL CALCULATED.3IONS-SCNC: 11 MMOL/L (ref 7–15)
ANION GAP SERPL CALCULATED.3IONS-SCNC: 14 MMOL/L (ref 7–15)
ANION GAP SERPL CALCULATED.3IONS-SCNC: 16 MMOL/L (ref 7–15)
APPEARANCE UR: ABNORMAL
AST SERPL W P-5'-P-CCNC: 1027 U/L (ref 0–45)
AST SERPL W P-5'-P-CCNC: 748 U/L (ref 0–45)
BACTERIA #/AREA URNS HPF: ABNORMAL /HPF
BILIRUB DIRECT SERPL-MCNC: 0.32 MG/DL (ref 0–0.3)
BILIRUB SERPL-MCNC: 0.7 MG/DL
BILIRUB SERPL-MCNC: 0.8 MG/DL
BILIRUB UR QL STRIP: NEGATIVE
BUN SERPL-MCNC: 22.2 MG/DL (ref 6–20)
BUN SERPL-MCNC: 23.9 MG/DL (ref 6–20)
BUN SERPL-MCNC: 27 MG/DL (ref 6–20)
CA-I BLD-MCNC: 4.2 MG/DL (ref 4.4–5.2)
CA-I BLD-MCNC: 4.2 MG/DL (ref 4.4–5.2)
CALCIUM SERPL-MCNC: 7.3 MG/DL (ref 8.8–10.4)
CALCIUM SERPL-MCNC: 7.4 MG/DL (ref 8.8–10.4)
CALCIUM SERPL-MCNC: 7.9 MG/DL (ref 8.8–10.4)
CHLORIDE SERPL-SCNC: 100 MMOL/L (ref 98–107)
CHLORIDE SERPL-SCNC: 100 MMOL/L (ref 98–107)
CHLORIDE SERPL-SCNC: 102 MMOL/L (ref 98–107)
COLOR UR AUTO: YELLOW
CREAT SERPL-MCNC: 1.51 MG/DL (ref 0.51–0.95)
CREAT SERPL-MCNC: 1.6 MG/DL (ref 0.51–0.95)
CREAT SERPL-MCNC: 1.85 MG/DL (ref 0.51–0.95)
EGFRCR SERPLBLD CKD-EPI 2021: 33 ML/MIN/1.73M2
EGFRCR SERPLBLD CKD-EPI 2021: 39 ML/MIN/1.73M2
EGFRCR SERPLBLD CKD-EPI 2021: 42 ML/MIN/1.73M2
ERYTHROCYTE [DISTWIDTH] IN BLOOD BY AUTOMATED COUNT: 17.2 % (ref 10–15)
ERYTHROCYTE [DISTWIDTH] IN BLOOD BY AUTOMATED COUNT: 17.3 % (ref 10–15)
GLUCOSE BLDC GLUCOMTR-MCNC: 108 MG/DL (ref 70–99)
GLUCOSE BLDC GLUCOMTR-MCNC: 119 MG/DL (ref 70–99)
GLUCOSE BLDC GLUCOMTR-MCNC: 143 MG/DL (ref 70–99)
GLUCOSE BLDC GLUCOMTR-MCNC: 173 MG/DL (ref 70–99)
GLUCOSE BLDC GLUCOMTR-MCNC: 196 MG/DL (ref 70–99)
GLUCOSE BLDC GLUCOMTR-MCNC: 98 MG/DL (ref 70–99)
GLUCOSE SERPL-MCNC: 106 MG/DL (ref 70–99)
GLUCOSE SERPL-MCNC: 132 MG/DL (ref 70–99)
GLUCOSE SERPL-MCNC: 185 MG/DL (ref 70–99)
GLUCOSE UR STRIP-MCNC: 30 MG/DL
HCO3 SERPL-SCNC: 19 MMOL/L (ref 22–29)
HCO3 SERPL-SCNC: 21 MMOL/L (ref 22–29)
HCO3 SERPL-SCNC: 22 MMOL/L (ref 22–29)
HCT VFR BLD AUTO: 25.8 % (ref 35–47)
HCT VFR BLD AUTO: 26.3 % (ref 35–47)
HGB BLD-MCNC: 8.5 G/DL (ref 11.7–15.7)
HGB BLD-MCNC: 8.6 G/DL (ref 11.7–15.7)
HGB UR QL STRIP: ABNORMAL
HYALINE CASTS: 119 /LPF
INR PPP: 1.67 (ref 0.85–1.15)
KETONES UR STRIP-MCNC: NEGATIVE MG/DL
LACTATE SERPL-SCNC: 1.5 MMOL/L (ref 0.7–2)
LACTATE SERPL-SCNC: 1.6 MMOL/L (ref 0.7–2)
LEUKOCYTE ESTERASE UR QL STRIP: ABNORMAL
MAGNESIUM SERPL-MCNC: 2.7 MG/DL (ref 1.7–2.3)
MAGNESIUM SERPL-MCNC: 2.7 MG/DL (ref 1.7–2.3)
MCH RBC QN AUTO: 29.1 PG (ref 26.5–33)
MCH RBC QN AUTO: 29.4 PG (ref 26.5–33)
MCHC RBC AUTO-ENTMCNC: 32.7 G/DL (ref 31.5–36.5)
MCHC RBC AUTO-ENTMCNC: 32.9 G/DL (ref 31.5–36.5)
MCV RBC AUTO: 89 FL (ref 78–100)
MCV RBC AUTO: 89 FL (ref 78–100)
MUCOUS THREADS #/AREA URNS LPF: PRESENT /LPF
NITRATE UR QL: NEGATIVE
PH UR STRIP: 5 [PH] (ref 5–7)
PHOSPHATE SERPL-MCNC: 4.4 MG/DL (ref 2.5–4.5)
PHOSPHATE SERPL-MCNC: 4.4 MG/DL (ref 2.5–4.5)
PHOSPHATE SERPL-MCNC: 5.1 MG/DL (ref 2.5–4.5)
PLATELET # BLD AUTO: 90 10E3/UL (ref 150–450)
PLATELET # BLD AUTO: 98 10E3/UL (ref 150–450)
POTASSIUM SERPL-SCNC: 4.1 MMOL/L (ref 3.4–5.3)
POTASSIUM SERPL-SCNC: 4.1 MMOL/L (ref 3.4–5.3)
POTASSIUM SERPL-SCNC: 4.3 MMOL/L (ref 3.4–5.3)
PROCALCITONIN SERPL IA-MCNC: 0.87 NG/ML
PROT SERPL-MCNC: 4.8 G/DL (ref 6.4–8.3)
PROT SERPL-MCNC: 5.1 G/DL (ref 6.4–8.3)
RBC # BLD AUTO: 2.89 10E6/UL (ref 3.8–5.2)
RBC # BLD AUTO: 2.96 10E6/UL (ref 3.8–5.2)
RBC URINE: 65 /HPF
SODIUM SERPL-SCNC: 134 MMOL/L (ref 135–145)
SODIUM SERPL-SCNC: 135 MMOL/L (ref 135–145)
SODIUM SERPL-SCNC: 136 MMOL/L (ref 135–145)
SP GR UR STRIP: 1.02 (ref 1–1.03)
SQUAMOUS EPITHELIAL: 5 /HPF
UFH PPP CHRO-ACNC: 0.15 IU/ML
UFH PPP CHRO-ACNC: 0.4 IU/ML
UFH PPP CHRO-ACNC: 0.42 IU/ML
UROBILINOGEN UR STRIP-MCNC: <2 MG/DL
WBC # BLD AUTO: 16.3 10E3/UL (ref 4–11)
WBC # BLD AUTO: 16.5 10E3/UL (ref 4–11)
WBC URINE: 44 /HPF

## 2024-10-13 PROCEDURE — 250N000011 HC RX IP 250 OP 636: Performed by: NURSE PRACTITIONER

## 2024-10-13 PROCEDURE — 87086 URINE CULTURE/COLONY COUNT: CPT | Performed by: PHYSICIAN ASSISTANT

## 2024-10-13 PROCEDURE — 250N000009 HC RX 250: Performed by: SURGERY

## 2024-10-13 PROCEDURE — 81001 URINALYSIS AUTO W/SCOPE: CPT | Performed by: PHYSICIAN ASSISTANT

## 2024-10-13 PROCEDURE — 250N000011 HC RX IP 250 OP 636: Performed by: PHYSICIAN ASSISTANT

## 2024-10-13 PROCEDURE — 250N000011 HC RX IP 250 OP 636: Performed by: INTERNAL MEDICINE

## 2024-10-13 PROCEDURE — 85027 COMPLETE CBC AUTOMATED: CPT | Performed by: INTERNAL MEDICINE

## 2024-10-13 PROCEDURE — 94799 UNLISTED PULMONARY SVC/PX: CPT

## 2024-10-13 PROCEDURE — 99254 IP/OBS CNSLTJ NEW/EST MOD 60: CPT | Performed by: STUDENT IN AN ORGANIZED HEALTH CARE EDUCATION/TRAINING PROGRAM

## 2024-10-13 PROCEDURE — 85520 HEPARIN ASSAY: CPT | Performed by: SURGERY

## 2024-10-13 PROCEDURE — 71045 X-RAY EXAM CHEST 1 VIEW: CPT

## 2024-10-13 PROCEDURE — 83735 ASSAY OF MAGNESIUM: CPT | Performed by: INTERNAL MEDICINE

## 2024-10-13 PROCEDURE — 250N000009 HC RX 250: Performed by: INTERNAL MEDICINE

## 2024-10-13 PROCEDURE — 250N000013 HC RX MED GY IP 250 OP 250 PS 637: Performed by: PHYSICIAN ASSISTANT

## 2024-10-13 PROCEDURE — 250N000011 HC RX IP 250 OP 636: Mod: JZ

## 2024-10-13 PROCEDURE — 94640 AIRWAY INHALATION TREATMENT: CPT

## 2024-10-13 PROCEDURE — 80048 BASIC METABOLIC PNL TOTAL CA: CPT | Performed by: SURGERY

## 2024-10-13 PROCEDURE — 83605 ASSAY OF LACTIC ACID: CPT | Performed by: SURGERY

## 2024-10-13 PROCEDURE — 999N000287 HC ICU ADULT ROUNDING, EACH 10 MINS

## 2024-10-13 PROCEDURE — 82330 ASSAY OF CALCIUM: CPT | Performed by: SURGERY

## 2024-10-13 PROCEDURE — 36415 COLL VENOUS BLD VENIPUNCTURE: CPT | Performed by: PHYSICIAN ASSISTANT

## 2024-10-13 PROCEDURE — 87040 BLOOD CULTURE FOR BACTERIA: CPT | Performed by: PHYSICIAN ASSISTANT

## 2024-10-13 PROCEDURE — 250N000013 HC RX MED GY IP 250 OP 250 PS 637

## 2024-10-13 PROCEDURE — 250N000013 HC RX MED GY IP 250 OP 250 PS 637: Performed by: NURSE PRACTITIONER

## 2024-10-13 PROCEDURE — 94640 AIRWAY INHALATION TREATMENT: CPT | Mod: 76

## 2024-10-13 PROCEDURE — 80069 RENAL FUNCTION PANEL: CPT | Performed by: INTERNAL MEDICINE

## 2024-10-13 PROCEDURE — 85610 PROTHROMBIN TIME: CPT

## 2024-10-13 PROCEDURE — 84100 ASSAY OF PHOSPHORUS: CPT | Performed by: INTERNAL MEDICINE

## 2024-10-13 PROCEDURE — 200N000001 HC R&B ICU

## 2024-10-13 PROCEDURE — 999N000157 HC STATISTIC RCP TIME EA 10 MIN

## 2024-10-13 PROCEDURE — 84145 PROCALCITONIN (PCT): CPT | Performed by: STUDENT IN AN ORGANIZED HEALTH CARE EDUCATION/TRAINING PROGRAM

## 2024-10-13 PROCEDURE — 250N000011 HC RX IP 250 OP 636: Performed by: SURGERY

## 2024-10-13 PROCEDURE — 250N000011 HC RX IP 250 OP 636: Performed by: STUDENT IN AN ORGANIZED HEALTH CARE EDUCATION/TRAINING PROGRAM

## 2024-10-13 PROCEDURE — 82248 BILIRUBIN DIRECT: CPT | Performed by: INTERNAL MEDICINE

## 2024-10-13 PROCEDURE — 94660 CPAP INITIATION&MGMT: CPT

## 2024-10-13 RX ORDER — WARFARIN SODIUM 1 MG/1
1 TABLET ORAL
Status: COMPLETED | OUTPATIENT
Start: 2024-10-13 | End: 2024-10-13

## 2024-10-13 RX ORDER — MEROPENEM 1 G/1
1 INJECTION, POWDER, FOR SOLUTION INTRAVENOUS EVERY 8 HOURS
Status: DISCONTINUED | OUTPATIENT
Start: 2024-10-13 | End: 2024-10-13 | Stop reason: DRUGHIGH

## 2024-10-13 RX ORDER — HEPARIN SODIUM,PORCINE 10 UNIT/ML
5-20 VIAL (ML) INTRAVENOUS
Status: DISCONTINUED | OUTPATIENT
Start: 2024-10-13 | End: 2024-11-01 | Stop reason: HOSPADM

## 2024-10-13 RX ORDER — MEROPENEM 500 MG/1
500 INJECTION, POWDER, FOR SOLUTION INTRAVENOUS EVERY 12 HOURS
Status: DISCONTINUED | OUTPATIENT
Start: 2024-10-13 | End: 2024-10-14

## 2024-10-13 RX ORDER — HYDROMORPHONE HYDROCHLORIDE 1 MG/ML
0.5 INJECTION, SOLUTION INTRAMUSCULAR; INTRAVENOUS; SUBCUTANEOUS EVERY 4 HOURS PRN
Status: DISCONTINUED | OUTPATIENT
Start: 2024-10-13 | End: 2024-10-14

## 2024-10-13 RX ORDER — HEPARIN SODIUM,PORCINE 10 UNIT/ML
5-20 VIAL (ML) INTRAVENOUS EVERY 24 HOURS
Status: DISCONTINUED | OUTPATIENT
Start: 2024-10-13 | End: 2024-11-01 | Stop reason: HOSPADM

## 2024-10-13 RX ADMIN — INSULIN ASPART 1 UNITS: 100 INJECTION, SOLUTION INTRAVENOUS; SUBCUTANEOUS at 15:42

## 2024-10-13 RX ADMIN — CALCIUM CHLORIDE, MAGNESIUM CHLORIDE, SODIUM CHLORIDE, SODIUM BICARBONATE, POTASSIUM CHLORIDE AND SODIUM PHOSPHATE DIBASIC DIHYDRATE 12.5 ML/KG/HR: 3.68; 3.05; 6.34; 3.09; .314; .187 INJECTION INTRAVENOUS at 02:20

## 2024-10-13 RX ADMIN — CALCIUM CHLORIDE, MAGNESIUM CHLORIDE, SODIUM CHLORIDE, SODIUM BICARBONATE, POTASSIUM CHLORIDE AND SODIUM PHOSPHATE DIBASIC DIHYDRATE 12.5 ML/KG/HR: 3.68; 3.05; 6.34; 3.09; .314; .187 INJECTION INTRAVENOUS at 12:33

## 2024-10-13 RX ADMIN — LACTULOSE SOLUTION USP, 10 G/15 ML 20 G: 10 SOLUTION ORAL; RECTAL at 08:04

## 2024-10-13 RX ADMIN — ATORVASTATIN CALCIUM 80 MG: 40 TABLET, FILM COATED ORAL at 20:22

## 2024-10-13 RX ADMIN — SENNOSIDES AND DOCUSATE SODIUM 1 TABLET: 8.6; 5 TABLET ORAL at 08:04

## 2024-10-13 RX ADMIN — BUDESONIDE 1 MG: 0.5 INHALANT ORAL at 11:24

## 2024-10-13 RX ADMIN — HYDROMORPHONE HYDROCHLORIDE 0.5 MG: 1 INJECTION, SOLUTION INTRAMUSCULAR; INTRAVENOUS; SUBCUTANEOUS at 00:26

## 2024-10-13 RX ADMIN — HEPARIN, PORCINE (PF) 10 UNIT/ML INTRAVENOUS SYRINGE 5 ML: at 21:33

## 2024-10-13 RX ADMIN — INSULIN ASPART 2 UNITS: 100 INJECTION, SOLUTION INTRAVENOUS; SUBCUTANEOUS at 20:20

## 2024-10-13 RX ADMIN — AMIODARONE HYDROCHLORIDE 0.5 MG/MIN: 1.8 INJECTION, SOLUTION INTRAVENOUS at 15:35

## 2024-10-13 RX ADMIN — CALCIUM GLUCONATE 1 G: 20 INJECTION, SOLUTION INTRAVENOUS at 04:48

## 2024-10-13 RX ADMIN — HYDROMORPHONE HYDROCHLORIDE 0.5 MG: 1 INJECTION, SOLUTION INTRAMUSCULAR; INTRAVENOUS; SUBCUTANEOUS at 08:30

## 2024-10-13 RX ADMIN — CALCIUM CHLORIDE, MAGNESIUM CHLORIDE, SODIUM CHLORIDE, SODIUM BICARBONATE, POTASSIUM CHLORIDE AND SODIUM PHOSPHATE DIBASIC DIHYDRATE 12.5 ML/KG/HR: 3.68; 3.05; 6.34; 3.09; .314; .187 INJECTION INTRAVENOUS at 08:56

## 2024-10-13 RX ADMIN — INSULIN ASPART 1 UNITS: 100 INJECTION, SOLUTION INTRAVENOUS; SUBCUTANEOUS at 12:02

## 2024-10-13 RX ADMIN — Medication 125 MCG: at 08:04

## 2024-10-13 RX ADMIN — BUDESONIDE 1 MG: 0.5 INHALANT ORAL at 19:36

## 2024-10-13 RX ADMIN — CEFTRIAXONE SODIUM 1 G: 1 INJECTION, POWDER, FOR SOLUTION INTRAMUSCULAR; INTRAVENOUS at 00:55

## 2024-10-13 RX ADMIN — PANTOPRAZOLE SODIUM 40 MG: 40 TABLET, DELAYED RELEASE ORAL at 08:04

## 2024-10-13 RX ADMIN — NOREPINEPHRINE BITARTRATE 0.02 MCG/KG/MIN: 0.02 INJECTION, SOLUTION INTRAVENOUS at 05:13

## 2024-10-13 RX ADMIN — FLUTICASONE FUROATE AND VILANTEROL TRIFENATATE 1 PUFF: 100; 25 POWDER RESPIRATORY (INHALATION) at 20:23

## 2024-10-13 RX ADMIN — MEROPENEM 500 MG: 500 INJECTION, POWDER, FOR SOLUTION INTRAVENOUS at 17:02

## 2024-10-13 RX ADMIN — LEVOTHYROXINE SODIUM 125 MCG: 0.03 TABLET ORAL at 06:40

## 2024-10-13 RX ADMIN — WARFARIN SODIUM 1 MG: 1 TABLET ORAL at 17:02

## 2024-10-13 RX ADMIN — CALCIUM GLUCONATE 2 G: 20 INJECTION, SOLUTION INTRAVENOUS at 12:48

## 2024-10-13 RX ADMIN — HYDROMORPHONE HYDROCHLORIDE 0.5 MG: 1 INJECTION, SOLUTION INTRAMUSCULAR; INTRAVENOUS; SUBCUTANEOUS at 20:50

## 2024-10-13 RX ADMIN — HEPARIN SODIUM 1350 UNITS/HR: 10000 INJECTION, SOLUTION INTRAVENOUS at 15:47

## 2024-10-13 RX ADMIN — AMIODARONE HYDROCHLORIDE 0.5 MG/MIN: 1.8 INJECTION, SOLUTION INTRAVENOUS at 03:39

## 2024-10-13 RX ADMIN — LIDOCAINE 1 PATCH: 4 PATCH TOPICAL at 17:01

## 2024-10-13 RX ADMIN — CALCIUM CHLORIDE, MAGNESIUM CHLORIDE, SODIUM CHLORIDE, SODIUM BICARBONATE, POTASSIUM CHLORIDE AND SODIUM PHOSPHATE DIBASIC DIHYDRATE 12.5 ML/KG/HR: 3.68; 3.05; 6.34; 3.09; .314; .187 INJECTION INTRAVENOUS at 05:21

## 2024-10-13 RX ADMIN — VANCOMYCIN HYDROCHLORIDE 1000 MG: 1 INJECTION, SOLUTION INTRAVENOUS at 10:37

## 2024-10-13 RX ADMIN — FLUDROCORTISONE ACETATE 0.1 MG: 0.1 TABLET ORAL at 08:04

## 2024-10-13 RX ADMIN — Medication 1 TABLET: at 12:02

## 2024-10-13 RX ADMIN — CALCIUM CHLORIDE, MAGNESIUM CHLORIDE, SODIUM CHLORIDE, SODIUM BICARBONATE, POTASSIUM CHLORIDE AND SODIUM PHOSPHATE DIBASIC DIHYDRATE 12.5 ML/KG/HR: 3.68; 3.05; 6.34; 3.09; .314; .187 INJECTION INTRAVENOUS at 05:15

## 2024-10-13 ASSESSMENT — ACTIVITIES OF DAILY LIVING (ADL)
ADLS_ACUITY_SCORE: 37
ADLS_ACUITY_SCORE: 39
ADLS_ACUITY_SCORE: 39
ADLS_ACUITY_SCORE: 37
ADLS_ACUITY_SCORE: 39
ADLS_ACUITY_SCORE: 39
ADLS_ACUITY_SCORE: 37

## 2024-10-13 NOTE — CONSULTS
Consultation - INFECTIOUS DISEASE CONSULTATION  Jory Ambrose,  1975, MRN [id number]      Coronary artery disease involving native coronary artery of native heart with angina pectoris (H) [I25.119]    PCP: Rome Joshua, [phone]   Code status:  Full Code               Chief Complaint: Sepsis     Assessment:  Jory Ambrose is a 49 year old old female with   History of Hodgkin lymphoma status post chemo therapy and mantle radiation.  Status post lung transplant x 2.  Coronary disease status post four-vessel CABG and MVR.  Shock liver with AST and ALT above 7000 at 1 point.  Trending down.  ALEXEI with acute tubular necrosis.  On CRRT.  Sepsis with leukocytosis.  Blood cultures from 10/13/2024 in process.  UA appears consistent with UTI.  UCx in process.  On IV vancomycin and ceftriaxone.      Recommendations:   Discontinue IV vancomycin and ceftriaxone.  Start IV meropenem.  Okay for pharmacy to dose.  Follow-up pending urine culture.  Monitor WBC.    Discussed with the patient, nursing staff.    Thank you for letting us be part of the patient care team. We will follow.    Jamel Bustillo MD,MD  [facility].   [facility]  Office Telephone [phone].  Fax [phone]  [facility] paging    HPI:    Jory Ambrose is a 49 year old old female. History is provided by the patient, chart review.  ID is asked to see this patient for sepsis.  The patient has a history of Hodgkin lymphoma status post chemotherapy and mantle radiation, lung transplant x 2.  The patient underwent four-vessel CABG a few days back.  Has been on IV vancomycin and ceftriaxone for possible sepsis from UTI.  Seen today, the patient is overall doing okay.  She has no new complaint.  Discussed with nursing staff.  No significant cough.  No increasing oxygen need.      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    Seasonal allergies     Shingles     Aug 2010    Sleep apnea         Surgical History  Past Surgical History:   Procedure Laterality Date    CHOLECYSTECTOMY      gallstone pancreatitis Jan 2010    CORONARY ARTERY BYPASS GRAFT, WITH ENDOSCOPIC VESSEL PROCUREMENT N/A 10/8/2024    Procedure: CORONARY ARTERY BYPASS GRAFT TIMES FOUR, LEFT INTERNAL MAMMARY ARTERY HARVEST, RIGHT LEG ENDOSCOPIC VESSEL PROCUREMENT, EPIAORTIC ULTRASOUND,;  Surgeon: David Wade MD;  Location: [facility] OR    CV CORONARY ANGIOGRAM N/A 9/13/2024    Procedure: Coronary Angiogram;  Surgeon: Tarun Johnston MD;  Location: [facility] CATH LAB CV    CV LEFT HEART CATH N/A 9/13/2024    Procedure: Left Heart Catheterization;  Surgeon: Tarun Johnston MD;  Location: [facility] CATH LAB CV    IR CVC NON TUNNEL PLACEMENT > 5 YRS  10/11/2024    Lymphectomy  2/2010    right neck area    OR LIGATION, LEFT ATRIAL APPENDAGE N/A 10/8/2024    Procedure: LEFT ATRIAL APPENDAGE LIGATION,;  Surgeon: David Wade MD;  Location: [facility] OR    REPLACE VALVE MITRAL N/A 10/8/2024    Procedure: MITRAL VALVE REAIR CONVERTED TO MITRAL VALVE REPLACEMENT;  Surgeon: David Wade MD;  Location: [facility] OR    TRANSESOPHAGEAL ECHOCARDIOGRAM INTRAOPERATIVE  10/8/2024    Procedure: ANESTHESIA TRANSESOPHAGEAL ECHOCARDIOGRAM;  Surgeon: David Wade MD;  Location: [facility] OR            Social History  Reviewed, and she  reports that she has never smoked. She has never  used smokeless tobacco. She reports that she does not drink alcohol and does not use drugs.        Family History  Family History   Problem Relation Age of Onset    Cancer Mother         thyroid cancer    Unknown/Adopted Father     Breast Cancer No family hx of     Cancer - colorectal No family hx of     Prostate Cancer No family hx of     Hypertension Mother     C.A.D. Maternal Grandmother     C.A.D. Paternal Grandmother       Psychosocial Needs  Social History     Social History Narrative    Not on file     Additional psychosocial needs reviewed per nursing assessment.       Allergies   Allergen Reactions    Dye [Contrast Dye] Swelling     Pre-medicated with methylprednisolone    Shellfish Allergy Shortness Of Breath, Anaphylaxis and Swelling     Shrimp, crab, lobster    Penicillins Hives     Patient cannot recall if she has tried cephalosporins in the past.     Erythromycin Hives    Morphine Hcl      Causes change in mental status    Sulfa Antibiotics Hives and Rash        Medications Prior to Admission   Medication Sig Dispense Refill Last Dose    acetaminophen (TYLENOL) 500 MG tablet Take 1,000 mg by mouth every 6 hours as needed for mild pain.   Unknown    albuterol (PROAIR HFA) 108 (90 BASE) MCG/ACT inhaler Inhale 2 puffs into the lungs every 6 hours as needed.   Unknown    atorvastatin (LIPITOR) 80 MG tablet Take 1 tablet (80 mg) by mouth daily. 90 tablet 3 10/7/2024 at PM    budesonide-formoterol (SYMBICORT) 80-4.5 MCG/ACT Inhaler Inhale 1 puff into the lungs two times daily.   10/7/2024 at pm    buPROPion (WELLBUTRIN XL) 300 MG 24 hr tablet Take 300 mg by mouth every morning   10/7/2024 at AM    cetirizine (ZYRTEC) 10 MG tablet Take 1 tablet (10 mg) by mouth daily 30 tablet 11 10/7/2024 at AM    cholecalciferol (VITAMIN D3) 125 mcg (5000 units) capsule Take 125 mcg by mouth daily.   10/7/2024 at AM    clopidogrel (PLAVIX) 75 MG tablet Take 1 tablet by mouth daily   10/7/2024 at AM    diphenhydrAMINE  (BENADRYL) 25 MG tablet Take 25 mg by mouth every 6 hours as needed   Unknown    fludrocortisone (FLORINEF) 0.1 MG tablet Take 0.1 mg by mouth daily.   10/7/2024 at AM    fluticasone (FLONASE) 50 MCG/ACT nasal spray Spray 2 sprays into both nostrils daily as needed    Unknown    ipratropium - albuterol 0.5 mg/2.5 mg/3 mL (DUONEB) 0.5-2.5 (3) MG/3ML neb solution Take 1 vial by nebulization every 6 hours as needed   More than a month    isosorbide dinitrate (ISORDIL) 30 MG tablet Take 1 tablet (30 mg) by mouth 2 times daily. 28 tablet 0 10/7/2024 at pm    levothyroxine (SYNTHROID/LEVOTHROID) 125 MCG tablet Take 125 mcg by mouth daily   10/8/2024 at 0500    LORazepam (ATIVAN) 0.5 MG tablet Take 0.5 mg by mouth daily as needed for anxiety   Unknown    Magnesium Oxide 500 MG CAPS Take 2 tablets by mouth at bedtime 30 capsule  10/7/2024 at pm    metFORMIN (GLUCOPHAGE XR) 500 MG 24 hr tablet Take 1,000 mg by mouth daily.   10/7/2024 at AM    methylPREDNISolone (MEDROL) 32 MG tablet Take 1 tablet 12 hours prior to the procedure with IV contrast and 1 tablet 2 hours prior 2 tablet 1 Unknown    Multiple Vitamin (MULTI-VITAMIN PO) Take 1 tablet by mouth daily   10/7/2024    nitroGLYcerin (NITROSTAT) 0.4 MG sublingual tablet For chest pain place 1 tablet under the tongue every 5 minutes for 3 doses. If symptoms persist 5 minutes after 1st dose call 911. 20 tablet 0 Unknown    omeprazole (PRILOSEC) 20 MG capsule Take 1 capsule (20 mg) by mouth daily 30 capsule 12 10/7/2024 at PM    simethicone (MYLICON) 125 MG chewable tablet Take 1 tablet (125 mg) by mouth 4 times daily as needed for intestinal gas (Patient taking differently: Take 125 mg by mouth two times daily.) 40 tablet 0 10/7/2024 at pm    spironolactone (ALDACTONE) 25 MG tablet Take 1 tablet by mouth daily   10/7/2024 at PM    traZODone (DESYREL) 100 MG tablet Take 100 mg by mouth at bedtime   10/7/2024 at PM        Review of Systems:  Negative except for findings in the  HPI Physical Exam:  Temp:  [97.8  F (36.6  C)-98.5  F (36.9  C)] 97.9  F (36.6  C)  Pulse:  [] 82  Resp:  [6-31] 25  SpO2:  [91 %-100 %] 96 %      Gen: Pleasant in no acute distress.  HEENT: NCAT. EOMI. PERRL.  Neck: No bruit, JVD or thyromegaly.  Chest: Incision looks intact.  Lungs: Clear to ascultation bilat with no crackles or wheezes.  Card: RRR. NSR. No RMG. Peripheral pulses present and symmetric. No edema.  Abd: Soft NT ND. No mass. Normal bowel sounds.  Skin: No rash.  Extr: No edema.  Neuro: Alert and oriented to place time and person.        ============================    Pertinent Labs  personally reviewed.   Recent Labs   Lab 10/13/24  1201 10/13/24  0414 10/12/24  2002   WBC 16.3* 16.5* 12.2*   HGB 8.5* 8.6* 8.2*   HCT 25.8* 26.3* 25.1*   PLT 98* 90* 82*       Recent Labs   Lab 10/13/24  1201 10/13/24  0414 10/12/24  2002   * 136 137   CO2 21* 22 22   BUN 22.2* 23.9* 26.7*   ALBUMIN 3.1* 3.2* 3.1*   ALKPHOS 141 142 119   ALT 1,667* 1,946* 1,916*   * 1,027* 1,284*       MICROBIOLOGY DATA:  Personally reviewed      Pertinent Radiology  personally reviewed.   Study Result    Narrative & Impression   [facility]  LOCATION: [facility]  DATE: 10/11/2024     PROCEDURE: NON-TUNNELED DIALYSIS CATHETER PLACEMENT  1.  Insertion of a non-tunneled central venous catheter.  2.  Ultrasound guidance for vascular access. A permanent image was stored.  3.  Fluoroscopic guidance for central venous access device placement.     INTERVENTIONAL RADIOLOGIST: Arnav Romano MD     INDICATION: Patient is a 49-year-old female the history of acute kidney injury needing urgent hemodialysis initiation.     MODERATE SEDATION: Moderate sedation was not utilized. 50 mcg of IV fentanyl was given for pain control. The patient was monitored by an interventional radiology nurse at all times under my direct supervision.      CONTRAST: None.  ANTIBIOTICS: None.  ADDITIONAL MEDICATIONS:  None.     FLUOROSCOPIC TIME: 0.5 minutes.  RADIATION DOSE: Air Kerma: 10 mGy.     COMPLICATIONS: No immediate complications.     STERILE BARRIER TECHNIQUE: Maximum sterile barrier technique was used. Cutaneous antisepsis was performed at the operative site with application of 2% chlorhexidine and large sterile drape. Prior to the procedure, the  and assistant performed   hand hygiene and wore hat, mask, sterile gown, and sterile gloves during the entire procedure.     PROCEDURE:    Using local anesthesia and real-time ultrasound guidance the right external jugular vein was identified. An image of this was saved in the patient's permanent record. A 21-gauge micropuncture needle was used, under ultrasound guidance, the access into   this vessel. Through the needle, a 0.018 wire was placed. The needle was exchanged for 3/5 Bhutanese coaxial dilator. A measurement was made. The inner 3 Bhutanese dilator and wire were removed. A 0.035 Amplatz wire was placed down into the IVC through the 5   Bhutanese dilator. The 5 Bhutanese dilator was then removed and the tract was serially dilated over the Amplatz wire. A 20 cm 14  Bhutanese hemodialysis catheter was then advanced over the Amplatz wire the tip positioned in the mid/low right atrium. The wire was   removed. The lumens were tested and aspirated/flushed appropriately. Both lumens were locked with heparin. The catheter was sutured to the skin with 2-0 nylon sutures. A clean and sterile central line dressing was applied. The patient tolerated the   procedure well.     FINDINGS:  Ultrasound shows an anechoic and compressible right external jugular vein.       Fluoroscopic spot film at completion of study show the nontunneled dialysis catheter tip lies in the mid/low right atrium.                                                                      IMPRESSION:    1.  Successful non-tunneled dialysis catheter placement.  2.  The catheter is ready for use.        Narrative &  Impression   EXAM: XR CHEST PORT 1 VIEW  LOCATION: [facility]  DATE: 10/11/2024     INDICATION: s p CV surg  COMPARISON: 10/20/2024                                                                      IMPRESSION: Similar appearance and location of the left IJ line. Mediastinal/chest tubes are in place. The lungs remain hypoinflated with patchy bibasilar atelectasis. No significant effusions or pneumothorax. Sternotomy wires and valve replacement   changes. Heart size is stable.

## 2024-10-13 NOTE — PROGRESS NOTES
"CVTS Daily Progress Note   POD#5 s/p CABG x4 (LIMA>LAD, rSVG>RPDA, rSVG>LPL, rSVG>D2), RLE EVH, Attempted MVR/r, MVR ( 29 mm [employer] Mechanical Mitral Valve), LAAE  Attending: Mauro  LOS: 5    SUBJECTIVE/INTERVAL EVENTS:    No acute events overnight. CRRT continued and pulling 50ml/hr overnight. LFTs now trending down. Maintaining oxygen saturations on home CPAP./RA. Normotensive on Norepi 0.01. Rate controlled afib. Amio infusion. Aiming for -130 in s/o renal and hepatic dysfunction. Resting in bed, more awake and alert today. Pain well controlled with dilaudid. - BM / + flatus. Tolerating renal diet of w/o nausea. Chest tube output appropriate. Hgb 8.6, Plts 90  INR 1.67. heparin gtt restarted. Patient denies new chest pain, shortness of breath, abdominal pain, calf pain, nausea. Patient has no questions today.     OBJECTIVE:  Temp:  [97.8  F (36.6  C)-98.5  F (36.9  C)] 97.8  F (36.6  C)  Pulse:  [] 83  Resp:  [6-31] 23  SpO2:  [91 %-100 %] 95 %  Vitals:    10/09/24 0530 10/10/24 0600 10/11/24 0454 10/12/24 0400   Weight: 109.1 kg (240 lb 8.4 oz) 111.8 kg (246 lb 7.6 oz) 114.4 kg (252 lb 3.3 oz) 113.3 kg (249 lb 12.5 oz)    10/13/24 0400   Weight: 111.2 kg (245 lb 2.4 oz)       Clinically Significant Risk Factors           # Hypocalcemia: Lowest iCa = 4 mg/dL in last 2 days, will monitor and replace as appropriate     # Hypoalbuminemia: Lowest albumin = 2.8 g/dL at 10/11/2024 11:38 AM, will monitor as appropriate    # Coagulation Defect: INR = 1.67 (Ref range: 0.85 - 1.15) and/or PTT = 83 Seconds (Ref range: 22 - 38 Seconds), will monitor for bleeding  # Thrombocytopenia: Lowest platelets = 47 in last 2 days, will monitor for bleeding   # Hypertension: Noted on problem list           # Severe Obesity: Estimated body mass index is 42.08 kg/m  as calculated from the following:    Height as of 9/25/24: 1.626 m (5' 4\").    Weight as of this encounter: 111.2 kg (245 lb 2.4 oz).       # History of " CABG: noted on surgical history          Current Medications:    Scheduled Meds:  Current Facility-Administered Medications   Medication Dose Route Frequency Provider Last Rate Last Admin    atorvastatin (LIPITOR) tablet 80 mg  80 mg Oral At Bedtime Kay Ann PA-C   80 mg at 10/08/24 2004    budesonide (PULMICORT) neb solution 1 mg  1 mg Nebulization BID Bharat Lu MD   1 mg at 10/12/24 1900    [Held by provider] buPROPion (WELLBUTRIN XL) 24 hr tablet 300 mg  300 mg Oral QAM Catherine Galvez PA-C   300 mg at 10/10/24 0900    cefTRIAXone (ROCEPHIN) 1 g vial to attach to  mL bag for ADULTS or NS 50 mL bag for PEDS  1 g Intravenous Q24H David Wade MD   1 g at 10/13/24 0055    [Held by provider] cetirizine (zyrTEC) tablet 10 mg  10 mg Oral Daily Catherine Galvez PA-C   10 mg at 10/10/24 0900    [Held by provider] clopidogrel (PLAVIX) tablet 75 mg  75 mg Oral Daily David Wade MD   75 mg at 10/11/24 0802    fludrocortisone (FLORINEF) tablet 0.1 mg  0.1 mg Oral Daily Catherine Galvez PA-C   0.1 mg at 10/13/24 0804    fluticasone-vilanterol (BREO ELLIPTA) 100-25 MCG/ACT inhaler 1 puff  1 puff Inhalation At Bedtime Catherine Galvez PA-C   1 puff at 10/12/24 2017    insulin aspart (NovoLOG) injection (RAPID ACTING)  1-7 Units Subcutaneous Q4H Catherine Galvez PA-C   1 Units at 10/11/24 1142    lactulose (CHRONULAC) solution 20 g  20 g Oral TID Felicitas Vegas APRN CNP   20 g at 10/13/24 0804    levothyroxine (SYNTHROID/LEVOTHROID) tablet 125 mcg  125 mcg Oral QAM AC Catherine Galvez PA-C   125 mcg at 10/13/24 0640    Lidocaine (LIDOCARE) 4 % Patch 1-2 patch  1-2 patch Transdermal Q24H Catherine Galvez PA-C   1 patch at 10/12/24 1618    [Held by provider] metFORMIN (GLUCOPHAGE XR) 24 hr tablet 1,000 mg  1,000 mg Oral Daily Catherine Galvez PA-C        multivitamin w/minerals (THERA-VIT-M) tablet 1 tablet  1 tablet Oral Daily  Catherine Galvez PA-C   1 tablet at 10/12/24 1159    pantoprazole (PROTONIX) 2 mg/mL suspension 40 mg  40 mg Oral or NG Tube Daily Catherine Galvez PA-C   40 mg at 10/08/24 1729    Or    pantoprazole (PROTONIX) EC tablet 40 mg  40 mg Oral Daily Catherine Galvez PA-C   40 mg at 10/13/24 0804    senna-docusate (SENOKOT-S/PERICOLACE) 8.6-50 MG per tablet 1 tablet  1 tablet Oral BID Catherine Galvez PA-C   1 tablet at 10/13/24 0804    sodium chloride (PF) 0.9% PF flush 10-40 mL  10-40 mL Intracatheter Q7 Days Catherine Galvez PA-C        [Held by provider] traZODone (DESYREL) tablet 100 mg  100 mg Oral At Bedtime Catherine Gavlez PA-C        vancomycin (VANCOCIN) 1,000 mg in 200 mL dextrose intermittent infusion  1,000 mg Intravenous Q12H Felicitas Vegas APRN  mL/hr at 10/12/24 2254 1,000 mg at 10/12/24 2254    Vitamin D3 (CHOLECALCIFEROL) tablet 125 mcg  125 mcg Oral Daily Catherine Galvez PA-C   125 mcg at 10/13/24 0804     Continuous Infusions:  Current Facility-Administered Medications   Medication Dose Route Frequency Provider Last Rate Last Admin    amiodarone (NEXTERONE) 1.8 mg/mL in dextrose 5% 200 mL ADULT STANDARD infusion  0.5 mg/min Intravenous Continuous David Wade MD 16.7 mL/hr at 10/13/24 0800 0.5 mg/min at 10/13/24 0800    CRRT Pre-Filter replacement solution for CVVHD & CVVHDF (Phoxillum BK4/2.5)  12.5 mL/kg/hr CRRT Continuous Arnav Napoles DO 1,400 mL/hr at 10/13/24 0856 12.5 mL/kg/hr at 10/13/24 0856    CRRT Replacement solution for CVVHD and CVVHDF premixed replacement solution (Phoxillum 4/2.5)  200 mL/hr CRRT Continuous Arnav Napoles  mL/hr at 10/12/24 1836 200 mL/hr at 10/12/24 1836    dialysate for CVVHD & CVVHDF premixed replacement solution (Phoxillum 4/2.5) - total potassium 4 mEq/L  12.5 mL/kg/hr CRRT Continuous Arnav Napoles DO 1,400 mL/hr at 10/13/24 0856 12.5 mL/kg/hr at 10/13/24 0856    DOBUTamine (DOBUTREX) 500 mg in D5W  250 mL infusion (adult std conc)  1-3 mcg/kg/min Intravenous Continuous Catherine Galvez PA-C   Stopped at 10/12/24 1055    [Held by provider] EPINEPHrine (ADRENALIN) 16 mg in sodium chloride 0.9 % 250 mL infusion CENTRAL  0.01-0.3 mcg/kg/min Intravenous Continuous David Wade MD   Stopped at 10/11/24 1140    heparin 25,000 units in 0.45% NaCl 250 mL ANTICOAGULANT infusion  0-5,000 Units/hr Intravenous Continuous David Wade MD 13.5 mL/hr at 10/13/24 0800 1,350 Units/hr at 10/13/24 0800    No heparin required   Does not apply Continuous PRN Arnav Napoles DO        norepinephrine (LEVOPHED) 4 mg in  mL infusion PREMIX  0.01-0.6 mcg/kg/min Intravenous Continuous David Wade MD 6.4 mL/hr at 10/13/24 0848 0.015 mcg/kg/min at 10/13/24 0848    [Held by provider] sodium bicarbonate 150 mEq in D5W 1,000 mL infusion   Intravenous Continuous Doc Dhillon MD   Stopped at 10/10/24 0820     PRN Meds:.  Current Facility-Administered Medications   Medication Dose Route Frequency Provider Last Rate Last Admin    [Held by provider] acetaminophen (TYLENOL) tablet 650 mg  650 mg Oral Q4H PRN Catherine Galvez PA-C        albuterol (PROVENTIL HFA/VENTOLIN HFA) inhaler  2 puff Inhalation Q6H PRN Catherine Galvez PA-C        bisacodyl (DULCOLAX) suppository 10 mg  10 mg Rectal Daily PRN Catherine Galvez PA-C        calcium gluconate 1 g in 50 mL in sodium chloride intermittent infusion  1 g Intravenous Once PRN Catherine Galvez PA-C   1 g at 10/13/24 0448    calcium gluconate 2 g in  mL intermittent infusion  2 g Intravenous Q8H PRN Arnav Napoles DO        calcium gluconate 2 g in  mL intermittent infusion  2 g Intravenous Once PRN Catherine Galvez PA-C        calcium gluconate 3 g in sodium chloride 0.9 % 100 mL intermittent infusion  3 g Intravenous Once PRN Catherine Galvez PA-C        calcium gluconate 4 g in sodium chloride 0.9 % 100  mL intermittent infusion  4 g Intravenous Q8H PRN Arnav Napoles DO        glucose gel 15-30 g  15-30 g Oral Q15 Min PRN Doc Dhillon MD        Or    dextrose 50 % injection 25-50 mL  25-50 mL Intravenous Q15 Min PRN Doc Dhillon MD   50 mL at 10/12/24 0934    Or    glucagon injection 1 mg  1 mg Subcutaneous Q15 Min PRN Doc Dhillon MD        fluticasone (FLONASE) 50 MCG/ACT spray 2 spray  2 spray Both Nostrils Daily PRN Catherine Galvez PA-C        hydrALAZINE (APRESOLINE) injection 10 mg  10 mg Intravenous Q30 Min PRN Catherine Galvez PA-C        HYDROmorphone (PF) (DILAUDID) injection 0.5 mg  0.5 mg Intravenous Q4H PRN Alistair Escudero MD   0.5 mg at 10/13/24 0830    ipratropium - albuterol 0.5 mg/2.5 mg/3 mL (DUONEB) neb solution 3 mL  3 mL Nebulization Q4H PRN Bharat Lu MD        lactated ringers BOLUS 250 mL  250 mL Intravenous Q15 Min PRN Catherine Galvez PA-C   250 mL at 10/09/24 0417    magnesium hydroxide (MILK OF MAGNESIA) suspension 30 mL  30 mL Oral Daily PRN Catherine Galvez PA-C        magnesium sulfate 2 g in 50 mL sterile water intermittent infusion  2 g Intravenous Q8H PRN Arnav Napoles DO        methocarbamol (ROBAXIN) tablet 750 mg  750 mg Oral Q6H PRN Catherine Galvez PA-C   750 mg at 10/12/24 2143    naloxone (NARCAN) injection 0.2 mg  0.2 mg Intravenous Q2 Min PRN David Wade MD        Or    naloxone (NARCAN) injection 0.4 mg  0.4 mg Intravenous Q2 Min PRN David Wade MD        Or    naloxone (NARCAN) injection 0.2 mg  0.2 mg Intramuscular Q2 Min PRN David Wade MD        Or    naloxone (NARCAN) injection 0.4 mg  0.4 mg Intramuscular Q2 Min PRN David Wade MD        No heparin required   Does not apply Continuous PRN Arnav Napoles DO        ondansetron (ZOFRAN ODT) ODT tab 4 mg  4 mg Oral Q6H PRN Catherine Galvez PA-C        Or    ondansetron (ZOFRAN) injection 4 mg  4 mg Intravenous  Q6H PRN Catherine Galvez PA-C   4 mg at 10/10/24 1945    [Held by provider] oxyCODONE IR (ROXICODONE) half-tab 2.5 mg  2.5 mg Oral Q4H PRN Catherine Galvez PA-C        Or    [Held by provider] oxyCODONE (ROXICODONE) tablet 5 mg  5 mg Oral Q4H PRN Catherine Galvez PA-C        potassium chloride 20 mEq in 50 mL intermittent infusion  20 mEq Intravenous Q8H PRN Arnav Napoles DO        prochlorperazine (COMPAZINE) injection 10 mg  10 mg Intravenous Q6H PRN Catherine Galvez PA-C        Or    prochlorperazine (COMPAZINE) tablet 10 mg  10 mg Oral Q6H PRN Catherine Galvez PA-C        simethicone (MYLICON) chewable half-tab 120 mg  120 mg Oral 4x Daily PRN Catherine Galvez PA-C        sodium chloride (PF) 0.9% PF flush 10-20 mL  10-20 mL Intracatheter q1 min prn Catherine Galvez PA-C        sodium chloride (PF) 0.9% PF flush 10-40 mL  10-40 mL Intracatheter Q1H PRN Catherine Galvez PA-C        sodium phosphate 15 mmol in sodium chloride 0.9 % 250 mL intermittent infusion  15 mmol Intravenous Q8H PRN Arnav Napoles DO           Cardiographics:    Telemetry monitoring demonstrates rate controlled afib with rates in the 80s per my personal review.    Imaging:  Results for orders placed or performed during the hospital encounter of 10/08/24   XR Chest Port 1 View    Impression    IMPRESSION: Interval sternotomy, CABG procedure and mitral valve repair. Endotracheal tube proximally 2 cm above the sofia. Nasogastric tube in the stomach. Bilateral chest tubes and midline mediastinal drain in expected position. Epicardial leads are   present. Left IJ line in the brachiocephalic vein.    Low lung volumes. No evidence for CHF or pneumonia. No pleural effusion or pneumothorax.   XR Chest Port 1 View    Impression    IMPRESSION: Poststernotomy changes with prosthetic mitral valve. Patient's been extubated and the NG tube has been removed. Mediastinal and pleural chest tubes are in place.    Left IJ  cordis sheath is in the distal left innominate artery. Catheter has a very subtle kink within it 3.5 centimeters from the tip.    Low lung volumes. No pneumothorax. Likely trace left effusion at the base. No signs of pneumonia or failure. Heart is of normal size.   XR Abdomen Port 1 View    Impression    IMPRESSION: Orogastric tube tip in the proximal stomach and oriented superiorly towards the left diaphragm. Visualized bowel gas pattern unremarkable. Numerous tubes and lines lower chest and upper abdomen as described on chest x-ray report from today.        Labs, personally reviewed.  Hemoglobin   Date Value Ref Range Status   10/13/2024 8.6 (L) 11.7 - 15.7 g/dL Final   10/12/2024 8.2 (L) 11.7 - 15.7 g/dL Final   10/12/2024 8.0 (L) 11.7 - 15.7 g/dL Final   06/09/2015 13.5 11.7 - 15.7 g/dL Final   03/11/2015 12.3 11.7 - 15.7 g/dL Final   01/08/2015 13.6 11.7 - 15.7 g/dL Final     WBC   Date Value Ref Range Status   06/09/2015 7.9 4.0 - 11.0 10e9/L Final   03/11/2015 9.4 4.0 - 11.0 10e9/L Final   01/08/2015 8.1 4.0 - 11.0 10e9/L Final     WBC Count   Date Value Ref Range Status   10/13/2024 16.5 (H) 4.0 - 11.0 10e3/uL Final   10/12/2024 12.2 (H) 4.0 - 11.0 10e3/uL Final   10/12/2024 9.3 4.0 - 11.0 10e3/uL Final     Platelet Count   Date Value Ref Range Status   10/13/2024 90 (L) 150 - 450 10e3/uL Final   10/12/2024 82 (L) 150 - 450 10e3/uL Final   10/12/2024 59 (L) 150 - 450 10e3/uL Final   06/09/2015 219 150 - 450 10e9/L Final   03/11/2015 240 150 - 450 10e9/L Final   01/08/2015 221 150 - 450 10e9/L Final     Creatinine   Date Value Ref Range Status   10/13/2024 1.60 (H) 0.51 - 0.95 mg/dL Final   10/12/2024 1.79 (H) 0.51 - 0.95 mg/dL Final   10/12/2024 2.08 (H) 0.51 - 0.95 mg/dL Final   06/09/2015 0.80 0.52 - 1.04 mg/dL Final   03/11/2015 0.87 0.52 - 1.04 mg/dL Final   01/08/2015 0.79 0.52 - 1.04 mg/dL Final     Potassium   Date Value Ref Range Status   10/13/2024 4.3 3.4 - 5.3 mmol/L Final   10/12/2024 4.3 3.4 -  5.3 mmol/L Final   10/12/2024 4.2 3.4 - 5.3 mmol/L Final   06/09/2015 3.8 3.4 - 5.3 mmol/L Final   03/11/2015 3.9 3.4 - 5.3 mmol/L Final   01/08/2015 4.1 3.4 - 5.3 mmol/L Final     Potassium POCT   Date Value Ref Range Status   10/08/2024 4.4 3.4 - 5.3 mmol/L Final   10/08/2024 4.0 3.4 - 5.3 mmol/L Final   10/08/2024 4.1 3.4 - 5.3 mmol/L Final     Magnesium   Date Value Ref Range Status   10/13/2024 2.7 (H) 1.7 - 2.3 mg/dL Final   10/12/2024 2.4 (H) 1.7 - 2.3 mg/dL Final   10/12/2024 2.6 (H) 1.7 - 2.3 mg/dL Final   06/09/2015 1.8 1.6 - 2.3 mg/dL Final   03/11/2015 1.7 1.6 - 2.3 mg/dL Final   01/08/2015 1.8 1.6 - 2.3 mg/dL Final          I/O:  I/O last 3 completed shifts:  In: 1870.1 [P.O.:470; I.V.:1400.1]  Out: 3354.1 [Urine:210; Other:2964.1; Chest Tube:180]       Physical Exam:    General: Patient seen in bed. Answers appropriately,more awake and alert today.  HEENT: DORITA, no sclera icterus, moist mucosa  CV: RRR on monitor. 2+ peripheral pulses in all extremities. Mild peripheral edema. Incision C/D/I.  Pulm: Non-labored effort on RA. Chest tubes in place, serosanguinous output, no air leak.  Abd: Soft, NT, ND  : Padron with john urine  Ext: Mild pedal edema, SCDs in place, warm, distal pulses intact  Neuro: CNs grossly intact      ASSESSMENT/PLAN:    Jory Ambrose is a 49 year old female with a history of GIA, asthma, GERD,  hypothyroidism, h/o PE on plavix, carotid stenosis, Hodgkin's lymphoma (s/p chemo/radiation and bone marrow transplant x2 after recurrence) currently in remission who is s/p CABG x4, RLE EVH, attempted mitral repair, mechanical MVR, LAAE.    Active Problems:    Mitral regurgitation    Coronary artery disease involving native coronary artery of native heart with angina pectoris (H)  #Severe transaminitis, ongoing.   #Acute Renal Failure d/t ATN, ongoing.  #Probable UTI      NEURO:   - Scheduled lidocaine patches and PRN robaxin/oxycodone/dilaudid for pain  - No toradol given  ARF  - PTA bupropion HELD (somnolence)  - Tylenol HELD (transaminitis)    CV:   - Pre-op EF 55-60%  - Weaning pressors as hemodynamics allow; currently on  . Norepi 0.01  - SBP goal 120-130, CI goal 2.5-3.5 to promote renal and hepatic perfusion  - Beta blocker pending weaning from pressors  - Planning for plavix daily indefinitely d/t unclear h/o anaphylaxis to ASA  - Atorvastatin 80mg daily  - Chest tubes and TPW's removed without immediate complications POD#5  - New baseline echo prior to discharge  - Low intensity heparin gtt bridge to warfarin for mechanical MVR. INR goal 2.5-3.5  - Echo very poor quality yesterday, repeat today w/o dressings etc.  - Rate controlled afib-amiodarone bolus and infusion-ok's per Dr Wade    PULM:   - Extubated on POD#0  - Maintaining oxygen saturations on RA/home CPAP overnight  - Encourage pulmonary toilet  - PTA zyrtec, fludrocortisone, flonase, simethicone, albuterol, pulmicort, breo ellipta, duoneb    FEN/GI:  - Continue electrolyte replacement protocol  - Renal diet  - Bowel regimen, LBM preop  - Severe transaminitis, ALT 1946, [id number] and downtrending. Likely 2/2 ischemic hepatitis. Bili and AP WNL. INR 1.96   - PTA vitamin D3   - Hypermagnesemia, hyperphosphoremia, monitor.  - Replace iCa++ aggressively  - CMP q8  - US RUQ unremarkable, dopper w/ patent portal and hepatic circulation  - Acute hepatitis panel, lipase, and acetaminophen level checked for completeness, unremarkable  - MELD score 30 (26) today    RENAL:  - Nephrology consulted  for ARF w/ decreased UOP, appreciate. Following.  - CRRT started 10/11. Pulling 50ml/hr and tolerating  - Cr 1.60 (2.51) (baseline ~0.9)  - Padron to remain in for close monitoring of I/O and titration of vasopressors (order updated)  - CMP q8  - UA/Urine cx -repeat 10/13.    HEME:  - H/o bone marrow tx. Needs irradiated blood  - Acute blood loss anemia post-op.   - Hgb 8.6, likely dilutional although will tranfuse to improve  perfusion given that she's already received PRBCs postop. Hep SQ, ASA.   - Received 2u PRBCs periop, 1u PRBCs 10/11  - Low intensity heparin gtt bridge to warfarin INR goal 2.5[facility]5 [facility]  - Thrombocytopenia, plts 90(59) . Monitor for now. 4T score 2, consider HIT pending  - INR 1.67    ID:  - Lydia op ppx complete. Afebrile. Leukocytosis improved. WBC 16.5(12.2)  - UA appears dirty although neg leukocyte esterase/nitrites. Cx NGTD-repeat 10/13  - Blood cx NGTD-repeat 10/13  - Sputum culture 10/13  - On vanc and rocephin for presumed urosepsis - narrow abx as able  - Plan for ID consult when Urine cx results to advise abx regimen. Prefer at least 10 days in s/o mechanical prosthetic valve  - Lactate WNL  - ID consult for sepsis    ENDO:   - HbA1c 5.7%  - Ok to switch to SSI w/ q4 BG checks, discussed w/ nephrology  - Holding PTA metformin, plans to resume pending renal function  - PTA synthroid    PPx:   - DVT: SCDs, SQ heparin TID, ambulation   - GI: Protonix 40mg IV/PO daily    DISPO:   - Continue critical care in ICU pending pressors and  CRRT  - Medically Ready for Discharge: Anticipated in 5+ Days         Patient seen with Dr Campoverde and discussed w/ Dr. Wade.      Kay Ann PA-C  [facility] Cardiothoracic Surgery  [facility]

## 2024-10-13 NOTE — PHARMACY-VANCOMYCIN DOSING SERVICE
Pharmacy Vancomycin Note  Date of Service 2024  Patient's  1975   49 year old, female    Indication: Sepsis  Day of Therapy: 4  Current vancomycin regimen:  1000 mg IV q12h  Current vancomycin monitoring method: Renal Replacement Therapy  Current vancomycin therapeutic monitoring goal: 15-20 mg/L    Current estimated CrCl = Estimated Creatinine Clearance: 55 mL/min (A) (based on SCr of 1.51 mg/dL (H)).    Creatinine for last 3 days  10/10/2024:  6:06 PM Creatinine 2.95 mg/dL  10/11/2024: 12:09 AM Creatinine 3.38 mg/dL;  4:40 AM Creatinine 3.62 mg/dL; 11:38 AM Creatinine 3.87 mg/dL;  6:33 PM Creatinine 3.70 mg/dL;  8:13 PM Creatinine 3.32 mg/dL  10/12/2024: 12:16 AM Creatinine 2.93 mg/dL;  4:08 AM Creatinine 2.62 mg/dL;  6:15 AM Creatinine 2.51 mg/dL; 11:53 AM Creatinine 2.08 mg/dL;  8:02 PM Creatinine 1.79 mg/dL  10/13/2024:  4:14 AM Creatinine 1.60 mg/dL; 12:01 PM Creatinine 1.51 mg/dL    Recent Vancomycin Levels (past 3 days)  10/11/2024:  4:40 AM Vancomycin 19.3 ug/mL  10/12/2024:  6:15 AM Vancomycin 21.4 ug/mL    Vancomycin IV Administrations (past 72 hours)                     vancomycin (VANCOCIN) 1,000 mg in 200 mL dextrose intermittent infusion (mg) 1,000 mg New Bag 10/13/24 1037     1,000 mg New Bag 10/12/24 2254     1,000 mg New Bag  1001     1,000 mg New Bag 10/11/24 2217    vancomycin (VANCOCIN) 750 mg in 0.9% NaCl 257.5 mL intermittent infusion (mg) 750 mg New Bag 10/11/24 1118                    Nephrotoxins and other renal medications (From now, onward)      Start     Dose/Rate Route Frequency Ordered Stop    10/13/24 1346  vancomycin place [facility] - receiving intermittent dosing         1 each Intravenous SEE ADMIN INSTRUCTIONS 10/13/24 1347      10/12/24 1100  norepinephrine (LEVOPHED) 4 mg in  mL infusion PREMIX         0.01-0.6 mcg/kg/min × 113.3 kg  4.2-254.9 mL/hr  Intravenous CONTINUOUS 10/12/24 1040                 Contrast Orders - past 72 hours (72h ago, onward)       Start     Dose/Rate Route Frequency Stop    10/11/24 1000  perflutren lipid microsphere (DEFINITY) injection SUSP 2 mL         2 mL Intravenous ONCE 10/11/24 0917              Plan:  Change vancomycin to intermittent dosing now that CRRT has stopped . Evaluate with levels  Vancomycin monitoring method: Trough (Method 2 = manual dose calculation)  Vancomycin therapeutic monitoring goal: 15-20 mg/L  Pharmacy will check vancomycin levels as appropriate in 1-3 Days.  Serum creatinine levels will be ordered daily for the first week of therapy and at least twice weekly for subsequent weeks.    Karol Van, [facility]

## 2024-10-13 NOTE — PROGRESS NOTES
Critical Care Sign Off Note     10/13/2024     Name: Jory Ambrose MRN#: [id number]   Age: 49 year old YOB: 1975        Summary     Past medical history of severe multivessel CAD, mitral regurgitation, HTN, HLD, recurrent Hodgkin lymphoma (s/p multiple rounds chemotherapy & radiation; 2011 autologous bone marrow transplant; 2013 additional bone marrow transplant), obesity, GIA, asthma, GERD, hypothyroidism, mood disorder, polycystic ovarian syndrome     Presented 10/8/24 for scheduled four vessel CABG & mitral valve repair converted to mitral valve replacement. Post-operative course complicated by significant post-cardiopulmonary bypass vasoplegia with multiple sequelae, encephalopathy, systemic inflammation vs sepsis, ischemic hepatitis, oliguric ALEXEI requiring renal replacement therapy, & atrial fibrillation RVR    The patient is off vasopressors. She is breathing comfortably on 1 LPM O2. Her liver injury is improved. She is transitioning to intermittent HD. ID & nephrology were consulted. Critical care service will sign off. Please call for questions or re-engagement.      GENO Kaye MD  Critical Care Medicine

## 2024-10-13 NOTE — CONSULTS
CLINICAL NUTRITION SERVICES - ASSESSMENT NOTE     Nutrition Prescription    RECOMMENDATIONS FOR MDs/PROVIDERS TO ORDER:      Malnutrition Status:    Moderate in context of acute illness    Recommendations already ordered by Registered Dietitian (RD):  Ensure Clear  Gel Plus  Provided Potassium food handout, Heart Healthy Nutrition Therapy Handouts (for pt's mother, at this point)    Future/Additional Recommendations:  Will monitor diet, po, wt, labs, ed needs     REASON FOR ASSESSMENT  Jory Ambrose is a/an 49 year old female assessed by the dietitian for RN Consult - malnutrition post - CABG/MVR    Per chart, pt  with PMHx of GIA, asthma.GERD,  hypothyroidism, Pe on plavix, carotid stenosis,  Hodgkin's lymphoma status post chemotherapy and then mantle radiation and 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  Pt is drinking liquids.     Pt is not alert, sleeping.  Pt's  and mother in room.  Mother brought Trenary drink for pt. Pt likes pomegranate juice with water because it is not too sweet.  Pt's mother also brought mangos.  Mother says she is familiar with diet post heart surgery with kidney issues with another family member.  She asked about caffeine - RD suggested limiting caffeine, although pt is not restricted.  Pt's mom may give her decaf tea.  Pt's  believes pt might like the berry supplements - okay to dilute for taste.  Pt's mom will accept some diet information.    CURRENT NUTRITION ORDERS  Diet: Renal (Dialysis), on CRRT - this will be stopped soon per pt mother  Intake/Tolerance: poor    LABS  Labs reviewed  Na 134 L  BUN 22.2 H  Creat 1.51 H  ALT 1,667 H   H  Ionized Ca 4.2 L  Glucose 132 H  FSBG 143  "H    MEDICATIONS  Medications reviewed  Lipitor  IV abx  Novolog  Levothyroxine  Multivitamin with minerals  Pantoprazole  Senna-docusate  Vit D 3  Warfarin  Cont, IV dobutamine, amiodarone, heparin    ANTHROPOMETRICS  Height:5' 4\"  Most Recent Weight: 111.2 kg (245 lb 2.4 oz)  Fluid up 3 liters per I/O  Admit weight:  100.3 kg (221 lb 3.2 oz) 10/8/24, 10/9/24 109.1 kg (240 lb 8.4 oz)  IBW: 54.5 kg  BMI: Obesity Grade III BMI >40   42.08  Weight History:   Wt Readings from Last 10 Encounters:   10/13/24 111.2 kg (245 lb 2.4 oz)   10/04/24 98 kg (216 lb)   09/25/24 99.3 kg (219 lb)   09/17/24 99.8 kg (220 lb)   09/13/24 99.8 kg (220 lb)   06/21/24 103.6 kg (228 lb 6.4 oz)   06/06/24 104.3 kg (230 lb)   05/24/24 104.3 kg (230 lb)   08/27/15 115.3 kg (254 lb 3.1 oz)   06/09/15 116.8 kg (257 lb 8 oz)      Dosing Weight: 68.7 kg    ASSESSED NUTRITION NEEDS  Estimated Energy Needs: 1720  - 2060 kcals/day (25 - 30 kcals/kg)  Justification: Increased needs and Obese  Estimated Protein Needs: 69 -82 grams protein/day (1 - 1.2 grams of pro/kg)  Justification: Increased needs, even with ALEXEI  Estimated Fluid Needs: 1720 -2060 mL/day (1 mL/kcal), or per provider  Justification: Maintenance    PHYSICAL FINDINGS  Per chart/observation  Facial, hands swelling  Missing teeth  2+ generalized edema  Surgical incisions noted  GI: Pt's mom says she is having some abdominal discomfort from lactulose  Hypoactive BS  Hiccups  Last BM 10/7/24    MALNUTRITION:  % Weight Loss:  None noted  % Intake:  </= 50% for >/= 5 days (severe malnutrition)  Subcutaneous Fat Loss:  None observed  Muscle Loss:  None observed  Fluid Retention:  Mild     Malnutrition Diagnosis: Moderate malnutrition  In Context of:  Acute illness or injury    NUTRITION DIAGNOSIS  Malnutrition related to poor oral intake as evidenced by NPO/Clear, mentation      INTERVENTIONS  Implementation  Nutrition education for recommended modifications - talked to pt's mother, provided " a potassium food handout, and Heart Healthy Nutrition Therapy handout - had to leave handouts on nurse's station.  Pt busy with cares 2nd visit.  Medical food supplement therapy     Goals  Patient to consume % of nutritionally adequate meals three times per day, or the equivalent with supplements/snacks.     Monitoring/Evaluation  Progress toward goals will be monitored and evaluated per protocol.

## 2024-10-13 NOTE — PLAN OF CARE
Problem: Cardiovascular Surgery  Goal: Acceptable Pain Control  10/13/2024 0541 by Darian Wheat, RN  Outcome: Progressing  Intervention: Prevent or Manage Pain    Goal Outcome Evaluation:  [facility] - ICU    RN Progress Note:            Pertinent Assessments:      Please refer to flowsheet rows for full assessment     VSS/Afebrile. Home CPAP(Room Air) at HS  More awake this shift. Productive cough with thick dark brown sputum. Flatus present, no BM yet.            Key Events - This Shift:     -Last evening patient was going in and out of A fib RVR more frequently with -170's. Dr Wade was notified. Amiodarone IV bolus with continuous infusion ordered. Dr Wade aware of elevated LFTs with this patient and asked that Amiodarone be given, patient is tolerating well, Still in A fib but with rate controlled.   -Low intensity Heparin gtt protocol without bolus started as well per Dr Wdae, checking antiXa per protocol to titrate dose.   -Patient with severe uncontrolled pain, moaning and grimacing with care and while at rest. No PRN pain medication available. Dr Escudero ordered PRN dilaudid. Dilaudid given x1 with good effect. Patient slept in between cares.  -Pt tolerating CRRT well, pulling 50 ml/hr throughout this shift. Decreased UOP per page cath noted.   -Decreased CT output.  -Calcium replaced x2 with ordered PRN.  -Levophed continued.           Barriers to Discharge / Downgrade:     CRRT         Point of Contact Update: YES-OR-NO: Yes  If No, reason: n/a  Name:Yasir  Phone Number: See chart  Summary of Conversation: POC

## 2024-10-13 NOTE — PROGRESS NOTES
Working on volume expansion and bronchial hygiene. Doing well with EzPAP and flutter valve. IS to 750. Needs encouragement and coaching. Will continue to follow.    Becka Vences, RT

## 2024-10-13 NOTE — PROGRESS NOTES
'    RENAL ([facility]) progress note  CC: F/U ALEXEI  S: no acute events. CRRT ongoing. Seen/examined on CRRT. Norepi ongoing. Wt down by 2.1kg from yesteday.ROS a challenge given encephalopathy.       Discussed with ICU nurse.     A/P:     Acute kidney injury.  Acute tubular necrosis.  Secondary to CABG and mitral valve replacement followed by cardiogenic shock.  Baseline creatinine 0.95 (10/8/2024).    Renal function closely. Serial labs. Intake and output.   Volume status accepable.  Stop CRRT today. Assess for renal recovery.  May need IHD moving forward...     2. Hyperkalemia.resolved with CRRT. Trend.      3. Metabolic acidosis.  RESOLVED with CRRT. Secondary to acute kidney injury and cardiogenic shock - Hold bicarb at this time.Improved after bicarb overnight.       4. Status post coronary artery bypass grafting x 4 vessels and mitral valve replacement on 10/8.  Diffuse coronary artery disease     5. Shock liver.  Continue supportive care.  PER icu     6. History of Hodgkin's lymphoma.  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    Rohan Polo MD  [facility]  [phone]         No interval changes to past medical history, social history or family history to report.    /69   Pulse 97   Temp 97.8  F (36.6  C) (Oral)   Resp 27   Wt 111.2 kg (245 lb 2.4 oz)   LMP  (LMP Unknown)   SpO2 96%   BMI 42.08 kg/m      I/O last 3 completed shifts:  In: 1870.1 [P.O.:470; I.V.:1400.1]  Out: 3354.1 [Urine:210; Other:2964.1; Chest Tube:180]    Physical Exam:   GENERAL: calm on CRRT  EYES: No scleral icterus, conjunctiva clear  ENT: MMM  RESP: Anterior clear.   CV: RRR, tr leg edema.    GI: ND  SKIN: No rash,      Recent Labs   Lab 10/13/24  0414 10/12/24  2002 10/12/24  1153 10/12/24  0615 10/12/24  0408 10/12/24  0016 10/11/24  2013 10/11/24  1138 10/11/24  0440 10/10/24  1202 10/10/24  0430    137 137 138 137 137 137   < >  138   < > 141   POTASSIUM 4.3 4.3 4.2 4.3 4.3 4.3 4.1   < > 4.6   < > 4.7   CHLORIDE 100 104 105 103 102 102 102   < > 100   < > 104   CO2 22 22 23 24 25 24 24   < > 25   < > 25   BUN 23.9* 26.7* 33.1* 39.2* 41.6* 46.9* 53.8*   < > 56.4*   < > 35.3*   CR 1.60* 1.79* 2.08* 2.51* 2.62* 2.93* 3.32*   < > 3.62*   < > 2.06*   GFRESTIMATED 39* 34* 29* 23* 22* 19* 16*   < > 15*   < > 29*   CINDY 7.4* 7.0* 7.4* 7.8* 7.6* 7.9* 7.6*   < > 8.0*   < > 7.6*   PHOS 4.4 4.1 4.4  --  5.0*  --  5.4*  --  6.3*  --  5.2*   MAG 2.7* 2.4* 2.6*  --  2.6*  --  2.7*  --  2.9*  --  2.7*   ALBUMIN 3.2* 3.1* 3.0* 3.1* 3.0* 3.2* 3.1*   < > 3.1*   < > 3.2*    < > = values in this interval not displayed.       Recent Labs   Lab 10/13/24  0414 10/12/24  2002 10/12/24  0615 10/12/24  0408 10/12/24  0016 10/11/24  1833 10/11/24  1138   WBC 16.5* 12.2* 9.3 9.1 9.3 9.6 10.8   HGB 8.6* 8.2* 8.0* 8.0* 8.1* 8.2* 8.0*   HCT 26.3* 25.1* 24.4* 23.8* 25.0* 24.7* 24.6*   MCV 89 88 87 86 87 86 89   PLT 90* 82* 59* 53* 51* 47* 49*             Current Facility-Administered Medications:     [Held by provider] acetaminophen (TYLENOL) tablet 650 mg, 650 mg, Oral, Q4H PRN, Catherine Galvez PA-C    albuterol (PROVENTIL HFA/VENTOLIN HFA) inhaler, 2 puff, Inhalation, Q6H PRN, Catherine Galvez PA-C    amiodarone (NEXTERONE) 1.8 mg/mL in dextrose 5% 200 mL ADULT STANDARD infusion, 0.5 mg/min, Intravenous, Continuous, David Wade MD, Last Rate: 16.7 mL/hr at 10/13/24 1000, 0.5 mg/min at 10/13/24 1000    atorvastatin (LIPITOR) tablet 80 mg, 80 mg, Oral, At Bedtime, Kay Ann PA-C, 80 mg at 10/08/24 2004    bisacodyl (DULCOLAX) suppository 10 mg, 10 mg, Rectal, Daily PRN, Catherine Galvez PA-C    budesonide (PULMICORT) neb solution 1 mg, 1 mg, Nebulization, BID, Bharat Lu MD, 1 mg at 10/13/24 1124    [Held by provider] buPROPion (WELLBUTRIN XL) 24 hr tablet 300 mg, 300 mg, Oral, QAM, Catherine Galvez PA-C, 300 mg at  10/10/24 0900    calcium gluconate 1 g in 50 mL in sodium chloride intermittent infusion, 1 g, Intravenous, Once PRN, Catherine Galvez PA-KENIA, 1 g at 10/13/24 0448    calcium gluconate 2 g in  mL intermittent infusion, 2 g, Intravenous, Q8H PRN, Arnav Napoles DO    calcium gluconate 2 g in  mL intermittent infusion, 2 g, Intravenous, Once PRN, Catherine Galvez PA-C    calcium gluconate 3 g in sodium chloride 0.9 % 100 mL intermittent infusion, 3 g, Intravenous, Once PRN, Catherine Galvez PA-C    calcium gluconate 4 g in sodium chloride 0.9 % 100 mL intermittent infusion, 4 g, Intravenous, Q8H PRN, Arnav Napoles DO    cefTRIAXone (ROCEPHIN) 1 g vial to attach to  mL bag for ADULTS or NS 50 mL bag for PEDS, 1 g, Intravenous, Q24H, David Waed MD, 1 g at 10/13/24 0055    [Held by provider] cetirizine (zyrTEC) tablet 10 mg, 10 mg, Oral, Daily, Catherine Galvez PA-C, 10 mg at 10/10/24 0900    [Held by provider] clopidogrel (PLAVIX) tablet 75 mg, 75 mg, Oral, Daily, David Wade MD, 75 mg at 10/11/24 0802    CRRT Pre-Filter replacement solution for CVVHD & CVVHDF (Phoxillum BK4/2.5), 12.5 mL/kg/hr, CRRT, Continuous, Arnav Napoles DO, Last Rate: 1,400 mL/hr at 10/13/24 0856, 12.5 mL/kg/hr at 10/13/24 0856    CRRT Replacement solution for CVVHD and CVVHDF premixed replacement solution (Phoxillum 4/2.5), 200 mL/hr, CRRT, Continuous, Arnav Napoles DO, Last Rate: 200 mL/hr at 10/12/24 1836, 200 mL/hr at 10/12/24 1836    glucose gel 15-30 g, 15-30 g, Oral, Q15 Min PRN **OR** dextrose 50 % injection 25-50 mL, 25-50 mL, Intravenous, Q15 Min PRN, 50 mL at 10/12/24 0934 **OR** glucagon injection 1 mg, 1 mg, Subcutaneous, Q15 Min PRN, Doc Dhillon MD    dialysate for CVVHD & CVVHDF premixed replacement solution (Phoxillum 4/2.5) - total potassium 4 mEq/L, 12.5 mL/kg/hr, CRRT, Continuous, Arnav Napoles, , Last Rate: 1,400 mL/hr at 10/13/24 0856, 12.5 mL/kg/hr at  10/13/24 0856    DOBUTamine (DOBUTREX) 500 mg in D5W 250 mL infusion (adult std conc), 1-3 mcg/kg/min, Intravenous, Continuous, Catherine Galvez PA-C, Stopped at 10/12/24 1055    [Held by provider] EPINEPHrine (ADRENALIN) 16 mg in sodium chloride 0.9 % 250 mL infusion CENTRAL, 0.01-0.3 mcg/kg/min, Intravenous, Continuous, David Wade MD, Stopped at 10/11/24 1140    fludrocortisone (FLORINEF) tablet 0.1 mg, 0.1 mg, Oral, Daily, Catherine Galvez PA-C, 0.1 mg at 10/13/24 0804    fluticasone (FLONASE) 50 MCG/ACT spray 2 spray, 2 spray, Both Nostrils, Daily PRN, Catherine Galvez PA-C    fluticasone-vilanterol (BREO ELLIPTA) 100-25 MCG/ACT inhaler 1 puff, 1 puff, Inhalation, At Bedtime, Catherine Galvez PA-C, 1 puff at 10/12/24 2017    heparin 25,000 units in 0.45% NaCl 250 mL ANTICOAGULANT infusion, 0-5,000 Units/hr, Intravenous, Continuous, David Wade MD, Last Rate: 13.5 mL/hr at 10/13/24 1000, 1,350 Units/hr at 10/13/24 1000    hydrALAZINE (APRESOLINE) injection 10 mg, 10 mg, Intravenous, Q30 Min PRN, Catherine Galvez PA-C    HYDROmorphone (PF) (DILAUDID) injection 0.5 mg, 0.5 mg, Intravenous, Q4H PRN, Alistair Escudero MD, 0.5 mg at 10/13/24 0830    insulin aspart (NovoLOG) injection (RAPID ACTING), 1-7 Units, Subcutaneous, Q4H, Catherine Galvez PA-C, 1 Units at 10/13/24 1202    ipratropium - albuterol 0.5 mg/2.5 mg/3 mL (DUONEB) neb solution 3 mL, 3 mL, Nebulization, Q4H PRN, Bharat Lu MD    lactated ringers BOLUS 250 mL, 250 mL, Intravenous, Q15 Min PRN, Catherine Galvez PA-C, 250 mL at 10/09/24 0417    lactulose (CHRONULAC) solution 20 g, 20 g, Oral, TID, Felicitas Vegas APRN CNP, 20 g at 10/13/24 0804    levothyroxine (SYNTHROID/LEVOTHROID) tablet 125 mcg, 125 mcg, Oral, QAM AC, Catherine Galvez PA-C, 125 mcg at 10/13/24 0640    Lidocaine (LIDOCARE) 4 % Patch 1-2 patch, 1-2 patch, Transdermal, Q24H, Catherine Galvez PA-C,  1 patch at 10/12/24 1618    magnesium hydroxide (MILK OF MAGNESIA) suspension 30 mL, 30 mL, Oral, Daily PRN, Catherine Galvez PA-C    magnesium sulfate 2 g in 50 mL sterile water intermittent infusion, 2 g, Intravenous, Q8H PRN, Arnav Napoles DO    [Held by provider] metFORMIN (GLUCOPHAGE XR) 24 hr tablet 1,000 mg, 1,000 mg, Oral, Daily, Catherine Galvez PA-C    methocarbamol (ROBAXIN) tablet 750 mg, 750 mg, Oral, Q6H PRN, Catherine Galvez PA-C, 750 mg at 10/12/24 2143    multivitamin w/minerals (THERA-VIT-M) tablet 1 tablet, 1 tablet, Oral, Daily, Catherine Galvez PA-C, 1 tablet at 10/13/24 1202    naloxone (NARCAN) injection 0.2 mg, 0.2 mg, Intravenous, Q2 Min PRN **OR** naloxone (NARCAN) injection 0.4 mg, 0.4 mg, Intravenous, Q2 Min PRN **OR** naloxone (NARCAN) injection 0.2 mg, 0.2 mg, Intramuscular, Q2 Min PRN **OR** naloxone (NARCAN) injection 0.4 mg, 0.4 mg, Intramuscular, Q2 Min PRN, David Wade MD    No heparin required, , Does not apply, Continuous PRN, Arnav Napoles DO    norepinephrine (LEVOPHED) 4 mg in  mL infusion PREMIX, 0.01-0.6 mcg/kg/min, Intravenous, Continuous, David Wade MD, Last Rate: 4.2 mL/hr at 10/13/24 1134, 0.01 mcg/kg/min at 10/13/24 1134    ondansetron (ZOFRAN ODT) ODT tab 4 mg, 4 mg, Oral, Q6H PRN **OR** ondansetron (ZOFRAN) injection 4 mg, 4 mg, Intravenous, Q6H PRN, Catherine Galvez PA-C, 4 mg at 10/10/24 1945    [Held by provider] oxyCODONE IR (ROXICODONE) half-tab 2.5 mg, 2.5 mg, Oral, Q4H PRN **OR** [Held by provider] oxyCODONE (ROXICODONE) tablet 5 mg, 5 mg, Oral, Q4H PRN, Catherine Galvez PA-C    pantoprazole (PROTONIX) 2 mg/mL suspension 40 mg, 40 mg, Oral or NG Tube, Daily, 40 mg at 10/08/24 1729 **OR** pantoprazole (PROTONIX) EC tablet 40 mg, 40 mg, Oral, Daily, Catherine Galvez PA-C, 40 mg at 10/13/24 0804    potassium chloride 20 mEq in 50 mL intermittent infusion, 20 mEq, Intravenous, Q8H PRN, Dony,  DO Arnav    prochlorperazine (COMPAZINE) injection 10 mg, 10 mg, Intravenous, Q6H PRN **OR** prochlorperazine (COMPAZINE) tablet 10 mg, 10 mg, Oral, Q6H PRN, Catherine Galvez PA-C    senna-docusate (SENOKOT-S/PERICOLACE) 8.6-50 MG per tablet 1 tablet, 1 tablet, Oral, BID, Catherine Galvez PA-C, 1 tablet at 10/13/24 0804    simethicone (MYLICON) chewable half-tab 120 mg, 120 mg, Oral, 4x Daily PRN, Catherine Galvez PA-C    [Held by provider] sodium bicarbonate 150 mEq in D5W 1,000 mL infusion, , Intravenous, Continuous, Doc Dhillon MD, Stopped at 10/10/24 0820    sodium chloride (PF) 0.9% PF flush 10-20 mL, 10-20 mL, Intracatheter, q1 min prn, Catherine Galvez PA-C    sodium chloride (PF) 0.9% PF flush 10-40 mL, 10-40 mL, Intracatheter, Q7 Days, Catherine Galvez PA-C    sodium chloride (PF) 0.9% PF flush 10-40 mL, 10-40 mL, Intracatheter, Q1H PRN, Catherine Galvez PA-C    sodium phosphate 15 mmol in sodium chloride 0.9 % 250 mL intermittent infusion, 15 mmol, Intravenous, Q8H PRN, Arnav Napoles DO    [Held by provider] traZODone (DESYREL) tablet 100 mg, 100 mg, Oral, At Bedtime, Catherine Galvez PA-C    vancomycin (VANCOCIN) 1,000 mg in 200 mL dextrose intermittent infusion, 1,000 mg, Intravenous, Q12H, Felicitas Vegas APRN CNP, Last Rate: 200 mL/hr at 10/13/24 1037, 1,000 mg at 10/13/24 1037    Vitamin D3 (CHOLECALCIFEROL) tablet 125 mcg, 125 mcg, Oral, Daily, Catherine Galvez PA-C, 125 mcg at 10/13/24 0804    warfarin ANTICOAGULANT (COUMADIN) tablet 1 mg, 1 mg, Oral, ONCE at 18:00, Kay Ann PA-C    Warfarin Dose Required Daily - Pharmacist Managed, 1 each, Oral, See Admin Instructions, Kay Ann PA-C      Labs personally reviewed today during this evaluation at 10:07 AM

## 2024-10-13 NOTE — PLAN OF CARE
[facility] - ICU    RN Progress Note:            Pertinent Assessments:      Please refer to flowsheet rows for full assessment     VS/Pain           Key Events - This Shift:     VSS/afebrile. Levo titrated to keep MAP > 65. Pt more alert today and improving with pulmonary toilet. CT's and TPW's dc'd this AM. CRRT discontinued at 1400. Blood cultures and urine sent per orders d/t elevated white count. ID consulted and ABX changed. Pt up to chair with max assist of 2-3 at 1500. Pt had medium, loose BM this evening. Pt converted to NSR with 1st degree AVB at ~1625.            Barriers to Discharge / Downgrade:     ICU levels of care       Point of Contact Update: YES-OR-NO: Yes  Name: Yasir  Phone Number: at bedside  Summary of Conversation: updated on POC   Goal Outcome Evaluation:    Plan of Care Reviewed With: patient, spouse, parent    Overall Patient Progress: improvingOverall Patient Progress: improving    Outcome Evaluation: see shift note

## 2024-10-13 NOTE — PHARMACY-ANTICOAGULATION SERVICE
Clinical Pharmacy - Warfarin Dosing Consult     Pharmacy has been consulted to manage this patient s warfarin therapy.  Indication: Mechanical Mitral Valve Replacement  Therapy Goal: INR 2.5-3.5  Provider/Team: Cardiothoracic surgery  Warfarin Prior to Admission: No  Significant drug interactions: Amiodarone  Recent documented change in oral intake/nutrition: Unknown  Dose Comments: Expect the patient to have a high sensitivity due to a baseline INR 1.67, amiodarone interaction and elevated LFTs prior to starting warfarin. Will give 1mg tonight and monitor    INR   Date Value Ref Range Status   10/13/2024 1.67 (H) 0.85 - 1.15 Final   10/12/2024 1.80 (H) 0.85 - 1.15 Final       Recommend warfarin 1 mg today.  Pharmacy will monitor Jory Ambrose daily and order warfarin doses to achieve specified goal.      Please contact pharmacy as soon as possible if the warfarin needs to be held for a procedure or if the warfarin goals change.

## 2024-10-14 ENCOUNTER — APPOINTMENT (OUTPATIENT)
Dept: OCCUPATIONAL THERAPY | Facility: HOSPITAL | Age: 49
DRG: 219 | End: 2024-10-14
Attending: SURGERY
Payer: COMMERCIAL

## 2024-10-14 ENCOUNTER — APPOINTMENT (OUTPATIENT)
Dept: RADIOLOGY | Facility: HOSPITAL | Age: 49
DRG: 219 | End: 2024-10-14
Payer: COMMERCIAL

## 2024-10-14 LAB
ALBUMIN SERPL BCG-MCNC: 2.7 G/DL (ref 3.5–5.2)
ALBUMIN SERPL BCG-MCNC: 3 G/DL (ref 3.5–5.2)
ALP SERPL-CCNC: 121 U/L (ref 40–150)
ALP SERPL-CCNC: 130 U/L (ref 40–150)
ALT SERPL W P-5'-P-CCNC: 1112 U/L (ref 0–50)
ALT SERPL W P-5'-P-CCNC: 1259 U/L (ref 0–50)
ANION GAP SERPL CALCULATED.3IONS-SCNC: 13 MMOL/L (ref 7–15)
ANION GAP SERPL CALCULATED.3IONS-SCNC: 15 MMOL/L (ref 7–15)
AST SERPL W P-5'-P-CCNC: 272 U/L (ref 0–45)
AST SERPL W P-5'-P-CCNC: 410 U/L (ref 0–45)
ATRIAL RATE - MUSE: 74 BPM
BACTERIA UR CULT: NO GROWTH
BILIRUB SERPL-MCNC: 0.6 MG/DL
BILIRUB SERPL-MCNC: 0.6 MG/DL
BUN SERPL-MCNC: 37.7 MG/DL (ref 6–20)
BUN SERPL-MCNC: 42.3 MG/DL (ref 6–20)
CA-I BLD-MCNC: 4.2 MG/DL (ref 4.4–5.2)
CA-I BLD-MCNC: 4.3 MG/DL (ref 4.4–5.2)
CALCIUM SERPL-MCNC: 7.5 MG/DL (ref 8.8–10.4)
CALCIUM SERPL-MCNC: 7.6 MG/DL (ref 8.8–10.4)
CHLORIDE SERPL-SCNC: 97 MMOL/L (ref 98–107)
CHLORIDE SERPL-SCNC: 99 MMOL/L (ref 98–107)
CORTIS SERPL-MCNC: 18 UG/DL
CREAT SERPL-MCNC: 2.49 MG/DL (ref 0.51–0.95)
CREAT SERPL-MCNC: 2.76 MG/DL (ref 0.51–0.95)
DIASTOLIC BLOOD PRESSURE - MUSE: NORMAL MMHG
EGFRCR SERPLBLD CKD-EPI 2021: 20 ML/MIN/1.73M2
EGFRCR SERPLBLD CKD-EPI 2021: 23 ML/MIN/1.73M2
ERYTHROCYTE [DISTWIDTH] IN BLOOD BY AUTOMATED COUNT: 18.2 % (ref 10–15)
GLUCOSE BLDC GLUCOMTR-MCNC: 102 MG/DL (ref 70–99)
GLUCOSE BLDC GLUCOMTR-MCNC: 110 MG/DL (ref 70–99)
GLUCOSE BLDC GLUCOMTR-MCNC: 110 MG/DL (ref 70–99)
GLUCOSE BLDC GLUCOMTR-MCNC: 118 MG/DL (ref 70–99)
GLUCOSE BLDC GLUCOMTR-MCNC: 124 MG/DL (ref 70–99)
GLUCOSE BLDC GLUCOMTR-MCNC: 126 MG/DL (ref 70–99)
GLUCOSE BLDC GLUCOMTR-MCNC: 171 MG/DL (ref 70–99)
GLUCOSE BLDC GLUCOMTR-MCNC: 172 MG/DL (ref 70–99)
GLUCOSE BLDC GLUCOMTR-MCNC: 21 MG/DL (ref 70–99)
GLUCOSE BLDC GLUCOMTR-MCNC: 85 MG/DL (ref 70–99)
GLUCOSE BLDC GLUCOMTR-MCNC: 86 MG/DL (ref 70–99)
GLUCOSE BLDC GLUCOMTR-MCNC: 89 MG/DL (ref 70–99)
GLUCOSE BLDC GLUCOMTR-MCNC: 92 MG/DL (ref 70–99)
GLUCOSE BLDC GLUCOMTR-MCNC: 93 MG/DL (ref 70–99)
GLUCOSE BLDC GLUCOMTR-MCNC: 94 MG/DL (ref 70–99)
GLUCOSE BLDC GLUCOMTR-MCNC: 94 MG/DL (ref 70–99)
GLUCOSE BLDC GLUCOMTR-MCNC: 98 MG/DL (ref 70–99)
GLUCOSE SERPL-MCNC: 105 MG/DL (ref 70–99)
GLUCOSE SERPL-MCNC: 122 MG/DL (ref 70–99)
HCO3 SERPL-SCNC: 18 MMOL/L (ref 22–29)
HCO3 SERPL-SCNC: 21 MMOL/L (ref 22–29)
HCT VFR BLD AUTO: 24.8 % (ref 35–47)
HGB BLD-MCNC: 8 G/DL (ref 11.7–15.7)
HOLD SPECIMEN: NORMAL
HOLD SPECIMEN: NORMAL
INR PPP: 1.61 (ref 0.85–1.15)
INTERPRETATION ECG - MUSE: NORMAL
LACTATE SERPL-SCNC: 0.9 MMOL/L (ref 0.7–2)
MAGNESIUM SERPL-MCNC: 3 MG/DL (ref 1.7–2.3)
MCH RBC QN AUTO: 29 PG (ref 26.5–33)
MCHC RBC AUTO-ENTMCNC: 32.3 G/DL (ref 31.5–36.5)
MCV RBC AUTO: 90 FL (ref 78–100)
P AXIS - MUSE: -31 DEGREES
PHOSPHATE SERPL-MCNC: 4.7 MG/DL (ref 2.5–4.5)
PLATELET # BLD AUTO: 105 10E3/UL (ref 150–450)
POTASSIUM SERPL-SCNC: 3.8 MMOL/L (ref 3.4–5.3)
POTASSIUM SERPL-SCNC: 4 MMOL/L (ref 3.4–5.3)
PR INTERVAL - MUSE: 280 MS
PROT SERPL-MCNC: 4.6 G/DL (ref 6.4–8.3)
PROT SERPL-MCNC: 4.8 G/DL (ref 6.4–8.3)
QRS DURATION - MUSE: 98 MS
QT - MUSE: 410 MS
QTC - MUSE: 455 MS
R AXIS - MUSE: 52 DEGREES
RBC # BLD AUTO: 2.76 10E6/UL (ref 3.8–5.2)
SODIUM SERPL-SCNC: 130 MMOL/L (ref 135–145)
SODIUM SERPL-SCNC: 133 MMOL/L (ref 135–145)
SYSTOLIC BLOOD PRESSURE - MUSE: NORMAL MMHG
T AXIS - MUSE: -10 DEGREES
UFH PPP CHRO-ACNC: 0.43 IU/ML
VENTRICULAR RATE- MUSE: 74 BPM
WBC # BLD AUTO: 23.8 10E3/UL (ref 4–11)

## 2024-10-14 PROCEDURE — 97110 THERAPEUTIC EXERCISES: CPT | Mod: GO

## 2024-10-14 PROCEDURE — 250N000013 HC RX MED GY IP 250 OP 250 PS 637: Performed by: SURGERY

## 2024-10-14 PROCEDURE — 82330 ASSAY OF CALCIUM: CPT | Performed by: SURGERY

## 2024-10-14 PROCEDURE — 85520 HEPARIN ASSAY: CPT | Performed by: SURGERY

## 2024-10-14 PROCEDURE — 250N000013 HC RX MED GY IP 250 OP 250 PS 637: Performed by: PHYSICIAN ASSISTANT

## 2024-10-14 PROCEDURE — 200N000001 HC R&B ICU

## 2024-10-14 PROCEDURE — 82024 ASSAY OF ACTH: CPT

## 2024-10-14 PROCEDURE — 999N000202 HC STATISTICAL VASC ACCESS NURSE TIME, 1-15 MINUTES

## 2024-10-14 PROCEDURE — 83605 ASSAY OF LACTIC ACID: CPT | Performed by: SURGERY

## 2024-10-14 PROCEDURE — 94799 UNLISTED PULMONARY SVC/PX: CPT

## 2024-10-14 PROCEDURE — 71045 X-RAY EXAM CHEST 1 VIEW: CPT

## 2024-10-14 PROCEDURE — 250N000013 HC RX MED GY IP 250 OP 250 PS 637

## 2024-10-14 PROCEDURE — 250N000011 HC RX IP 250 OP 636: Performed by: PHYSICIAN ASSISTANT

## 2024-10-14 PROCEDURE — 82040 ASSAY OF SERUM ALBUMIN: CPT | Performed by: SURGERY

## 2024-10-14 PROCEDURE — 36415 COLL VENOUS BLD VENIPUNCTURE: CPT

## 2024-10-14 PROCEDURE — 97535 SELF CARE MNGMENT TRAINING: CPT | Mod: GO

## 2024-10-14 PROCEDURE — 999N000248 HC STATISTIC IV INSERT WITH US BY RN

## 2024-10-14 PROCEDURE — 93010 ELECTROCARDIOGRAM REPORT: CPT | Performed by: STUDENT IN AN ORGANIZED HEALTH CARE EDUCATION/TRAINING PROGRAM

## 2024-10-14 PROCEDURE — 999N000156 HC STATISTIC RCP CONSULT EA 30 MIN

## 2024-10-14 PROCEDURE — 93005 ELECTROCARDIOGRAM TRACING: CPT

## 2024-10-14 PROCEDURE — 250N000011 HC RX IP 250 OP 636: Performed by: SURGERY

## 2024-10-14 PROCEDURE — S5010 5% DEXTROSE AND 0.45% SALINE: HCPCS

## 2024-10-14 PROCEDURE — 250N000011 HC RX IP 250 OP 636: Performed by: STUDENT IN AN ORGANIZED HEALTH CARE EDUCATION/TRAINING PROGRAM

## 2024-10-14 PROCEDURE — 85610 PROTHROMBIN TIME: CPT | Performed by: PHYSICIAN ASSISTANT

## 2024-10-14 PROCEDURE — 258N000003 HC RX IP 258 OP 636

## 2024-10-14 PROCEDURE — 93010 ELECTROCARDIOGRAM REPORT: CPT | Mod: RTG | Performed by: STUDENT IN AN ORGANIZED HEALTH CARE EDUCATION/TRAINING PROGRAM

## 2024-10-14 PROCEDURE — 82533 TOTAL CORTISOL: CPT

## 2024-10-14 PROCEDURE — 94640 AIRWAY INHALATION TREATMENT: CPT | Mod: 76

## 2024-10-14 PROCEDURE — 84155 ASSAY OF PROTEIN SERUM: CPT

## 2024-10-14 PROCEDURE — 83735 ASSAY OF MAGNESIUM: CPT | Performed by: SURGERY

## 2024-10-14 PROCEDURE — 250N000009 HC RX 250: Performed by: INTERNAL MEDICINE

## 2024-10-14 PROCEDURE — 99232 SBSQ HOSP IP/OBS MODERATE 35: CPT | Performed by: INTERNAL MEDICINE

## 2024-10-14 PROCEDURE — 258N000001 HC RX 258: Performed by: INTERNAL MEDICINE

## 2024-10-14 PROCEDURE — 84100 ASSAY OF PHOSPHORUS: CPT | Performed by: SURGERY

## 2024-10-14 PROCEDURE — 999N000157 HC STATISTIC RCP TIME EA 10 MIN

## 2024-10-14 PROCEDURE — 97530 THERAPEUTIC ACTIVITIES: CPT | Mod: GO

## 2024-10-14 PROCEDURE — 250N000011 HC RX IP 250 OP 636

## 2024-10-14 PROCEDURE — 85027 COMPLETE CBC AUTOMATED: CPT

## 2024-10-14 RX ORDER — HYDROMORPHONE HCL IN WATER/PF 6 MG/30 ML
0.2 PATIENT CONTROLLED ANALGESIA SYRINGE INTRAVENOUS EVERY 4 HOURS PRN
Status: DISCONTINUED | OUTPATIENT
Start: 2024-10-14 | End: 2024-10-14

## 2024-10-14 RX ORDER — HYDROMORPHONE HCL IN WATER/PF 6 MG/30 ML
0.2 PATIENT CONTROLLED ANALGESIA SYRINGE INTRAVENOUS
Status: DISCONTINUED | OUTPATIENT
Start: 2024-10-14 | End: 2024-10-15

## 2024-10-14 RX ORDER — CALCIUM GLUCONATE 20 MG/ML
1 INJECTION, SOLUTION INTRAVENOUS ONCE
Status: COMPLETED | OUTPATIENT
Start: 2024-10-14 | End: 2024-10-14

## 2024-10-14 RX ORDER — HYDROMORPHONE HCL IN WATER/PF 6 MG/30 ML
0.4 PATIENT CONTROLLED ANALGESIA SYRINGE INTRAVENOUS
Status: DISCONTINUED | OUTPATIENT
Start: 2024-10-14 | End: 2024-10-14

## 2024-10-14 RX ORDER — HYDROMORPHONE HCL IN WATER/PF 6 MG/30 ML
0.4 PATIENT CONTROLLED ANALGESIA SYRINGE INTRAVENOUS EVERY 4 HOURS PRN
Status: DISCONTINUED | OUTPATIENT
Start: 2024-10-14 | End: 2024-10-15

## 2024-10-14 RX ORDER — MEROPENEM 1 G/1
1 INJECTION, POWDER, FOR SOLUTION INTRAVENOUS EVERY 12 HOURS
Status: DISCONTINUED | OUTPATIENT
Start: 2024-10-14 | End: 2024-10-15

## 2024-10-14 RX ORDER — WARFARIN SODIUM 1 MG/1
1 TABLET ORAL
Status: COMPLETED | OUTPATIENT
Start: 2024-10-14 | End: 2024-10-14

## 2024-10-14 RX ORDER — DEXTROSE MONOHYDRATE AND SODIUM CHLORIDE 5; .45 G/100ML; G/100ML
INJECTION, SOLUTION INTRAVENOUS CONTINUOUS
Status: DISCONTINUED | OUTPATIENT
Start: 2024-10-14 | End: 2024-10-16

## 2024-10-14 RX ADMIN — DEXTROSE AND SODIUM CHLORIDE: 5; 450 INJECTION, SOLUTION INTRAVENOUS at 11:54

## 2024-10-14 RX ADMIN — SENNOSIDES AND DOCUSATE SODIUM 1 TABLET: 8.6; 5 TABLET ORAL at 20:09

## 2024-10-14 RX ADMIN — CETIRIZINE HYDROCHLORIDE 10 MG: 10 TABLET, FILM COATED ORAL at 08:56

## 2024-10-14 RX ADMIN — CLOPIDOGREL BISULFATE 75 MG: 75 TABLET ORAL at 08:56

## 2024-10-14 RX ADMIN — AMIODARONE HYDROCHLORIDE 0.5 MG/MIN: 1.8 INJECTION, SOLUTION INTRAVENOUS at 03:35

## 2024-10-14 RX ADMIN — METOPROLOL TARTRATE 12.5 MG: 25 TABLET, FILM COATED ORAL at 20:08

## 2024-10-14 RX ADMIN — BUDESONIDE 1 MG: 0.5 INHALANT ORAL at 19:41

## 2024-10-14 RX ADMIN — CALCIUM GLUCONATE 1 G: 20 INJECTION, SOLUTION INTRAVENOUS at 06:32

## 2024-10-14 RX ADMIN — BUDESONIDE 1 MG: 0.5 INHALANT ORAL at 07:37

## 2024-10-14 RX ADMIN — LEVOTHYROXINE SODIUM 125 MCG: 0.03 TABLET ORAL at 06:32

## 2024-10-14 RX ADMIN — HEPARIN SODIUM 1350 UNITS/HR: 10000 INJECTION, SOLUTION INTRAVENOUS at 11:59

## 2024-10-14 RX ADMIN — CALCIUM GLUCONATE 1 G: 20 INJECTION, SOLUTION INTRAVENOUS at 11:58

## 2024-10-14 RX ADMIN — LIDOCAINE 1 PATCH: 4 PATCH TOPICAL at 17:37

## 2024-10-14 RX ADMIN — Medication 1 TABLET: at 12:44

## 2024-10-14 RX ADMIN — HYDROMORPHONE HYDROCHLORIDE 0.2 MG: 0.2 INJECTION, SOLUTION INTRAMUSCULAR; INTRAVENOUS; SUBCUTANEOUS at 20:44

## 2024-10-14 RX ADMIN — MEROPENEM 500 MG: 500 INJECTION, POWDER, FOR SOLUTION INTRAVENOUS at 04:22

## 2024-10-14 RX ADMIN — WARFARIN SODIUM 1 MG: 1 TABLET ORAL at 17:45

## 2024-10-14 RX ADMIN — DEXTROSE MONOHYDRATE 50 ML: 25 INJECTION, SOLUTION INTRAVENOUS at 08:06

## 2024-10-14 RX ADMIN — Medication 125 MCG: at 08:56

## 2024-10-14 RX ADMIN — FLUDROCORTISONE ACETATE 0.1 MG: 0.1 TABLET ORAL at 08:56

## 2024-10-14 RX ADMIN — PANTOPRAZOLE SODIUM 40 MG: 40 TABLET, DELAYED RELEASE ORAL at 08:56

## 2024-10-14 RX ADMIN — METOPROLOL TARTRATE 12.5 MG: 25 TABLET, FILM COATED ORAL at 12:44

## 2024-10-14 RX ADMIN — MEROPENEM 1 G: 1 INJECTION, POWDER, FOR SOLUTION INTRAVENOUS at 17:37

## 2024-10-14 RX ADMIN — ATORVASTATIN CALCIUM 80 MG: 40 TABLET, FILM COATED ORAL at 20:08

## 2024-10-14 ASSESSMENT — ACTIVITIES OF DAILY LIVING (ADL)
ADLS_ACUITY_SCORE: 35
ADLS_ACUITY_SCORE: 37
ADLS_ACUITY_SCORE: 35
ADLS_ACUITY_SCORE: 37
ADLS_ACUITY_SCORE: 37
ADLS_ACUITY_SCORE: 35
ADLS_ACUITY_SCORE: 37
ADLS_ACUITY_SCORE: 35
ADLS_ACUITY_SCORE: 35

## 2024-10-14 NOTE — PROGRESS NOTES
"INFECTIOUS DISEASE FOLLOW UP NOTE    Date: 10/14/2024   CHIEF COMPLAINT: No chief complaint on file.       ASSESSMENT:  Jory Ambrose is a 49 year old old female with   History of Hodgkin lymphoma status post chemo therapy and mantle radiation.  Status post lung transplant x 2.  Coronary disease status post four-vessel CABG and MVR.  Shock liver with AST and ALT above 7000 at 1 point.  Improving.  ALEXEI with acute tubular necrosis.  required CRR now off.   Sepsis with leukocytosis.  Blood cultures from 10/13/2024 in process.  UA appears consistent with UTI.  UCx finalized as negative-had been on several days of antibiotics prior to collection.     PLAN:  - continue meropenem IV  - remove lines as able  - follow pended cultures  - trend WBC, may need CT imaging.   - will follow    Katherine Krause MD  [facility]   Office Telephone [phone].  Fax [phone]  [employer] paging    ______________________________________________________________________    SUBJECTIVE / INTERVAL HISTORY:  First visit by me. No overnight events.     ROS: All other systems negative except as listed above.    SH/FH/Habits/PMH reviewed and unchanged.    OBJECTIVE:  /69   Pulse 78   Temp 98.5  F (36.9  C) (Oral)   Resp 22   Wt 112.5 kg (248 lb 0.3 oz)   LMP  (LMP Unknown)   SpO2 99%   BMI 42.57 kg/m    FiO2 (%): 40 %  Resp: 22    Vital Signs  Temp: 98.5  F (36.9  C)  Temp src: Oral  Resp: 22  Pulse: 78  Pulse Rate Source: Monitor  BP: 121/69  BP Location: Left leg    Temp (24hrs), Av.1  F (36.7  C), Min:97.8  F (36.6  C), Max:98.5  F (36.9  C)      GEN: No acute distress.    RESPIRATORY:  Normal breathing pattern. Clear to auscultation bilaterally  CARDIOVASCULAR:  Regular rate  ABDOMEN:  Soft, normal bowel sounds, non-tender,   EXTREMITIES: No edema.  SKIN/HAIR/NAILS:  No rashes  IV: central lines, peripheral IV      Antibiotics:  meropenem    Pertinent labs:  No results found for: \"CRP\" "   CBC RESULTS:   Recent Labs   Lab Test 10/14/24  0758   WBC 23.8*   RBC 2.76*   HGB 8.0*   HCT 24.8*   MCV 90   MCH 29.0   MCHC 32.3   RDW 18.2*   *      Last Comprehensive Metabolic Panel:  Sodium   Date Value Ref Range Status   10/14/2024 133 (L) 135 - 145 mmol/L Final   06/09/2015 142 133 - 144 mmol/L Final     Potassium   Date Value Ref Range Status   10/14/2024 4.0 3.4 - 5.3 mmol/L Final   06/09/2015 3.8 3.4 - 5.3 mmol/L Final     Potassium POCT   Date Value Ref Range Status   10/08/2024 4.4 3.4 - 5.3 mmol/L Final     Chloride   Date Value Ref Range Status   10/14/2024 99 98 - 107 mmol/L Final   06/09/2015 108 94 - 109 mmol/L Final     Carbon Dioxide   Date Value Ref Range Status   06/09/2015 27 20 - 32 mmol/L Final     Carbon Dioxide (CO2)   Date Value Ref Range Status   10/14/2024 21 (L) 22 - 29 mmol/L Final     Anion Gap   Date Value Ref Range Status   10/14/2024 13 7 - 15 mmol/L Final   06/09/2015 7 3 - 14 mmol/L Final     Glucose   Date Value Ref Range Status   06/09/2015 101 (H) 70 - 99 mg/dL Final     GLUCOSE BY METER POCT   Date Value Ref Range Status   10/14/2024 110 (H) 70 - 99 mg/dL Final     Urea Nitrogen   Date Value Ref Range Status   10/14/2024 37.7 (H) 6.0 - 20.0 mg/dL Final   06/09/2015 20 7 - 30 mg/dL Final     Creatinine   Date Value Ref Range Status   10/14/2024 2.49 (H) 0.51 - 0.95 mg/dL Final   06/09/2015 0.80 0.52 - 1.04 mg/dL Final     GFR Estimate   Date Value Ref Range Status   10/14/2024 23 (L) >60 mL/min/1.73m2 Final     Comment:     eGFR calculated using 2021 CKD-EPI equation.   06/09/2015 79 >60 mL/min/1.7m2 Final     Comment:     Non  GFR Calc     GFR, ESTIMATED POCT   Date Value Ref Range Status   08/06/2024 >60 >60 mL/min/1.73m2 Final     Calcium   Date Value Ref Range Status   10/14/2024 7.5 (L) 8.8 - 10.4 mg/dL Final     Comment:     Reference intervals for this test were updated on 7/16/2024 to reflect our healthy population more accurately. There may  be differences in the flagging of prior results with similar values performed with this method. Those prior results can be interpreted in the context of the updated reference intervals.   06/09/2015 9.2 8.5 - 10.1 mg/dL Final        MICROBIOLOGY DATA:  Personally reviewed.  7-Day Micro Results       Collected Updated Procedure Result Status      10/13/2024 0938 10/14/2024 0613 Urine Culture [[id number]]   Urine, Padron Catheter    Final result Component Value   Culture No Growth               10/13/2024 0931 10/13/2024 2346 Blood Culture Hand, Left [[id number]]   Blood from Hand, Left    Preliminary result Component Value   Culture No growth after 12 hours  [P]                10/13/2024 0922 10/13/2024 2346 Blood Culture Line, arterial [[id number]]   Blood from Line, arterial    Preliminary result Component Value   Culture No growth after 12 hours  [P]                10/10/2024 0036 10/11/2024 1016 Urine Culture [[id number]]   Urine, Padron Catheter    Final result Component Value   Culture No Growth               10/10/2024 0035 10/14/2024 0316 Blood Culture Line, arterial [[id number]]   Blood from Line, arterial    Preliminary result Component Value   Culture No growth after 4 days  [P]                         RADIOLOGY:  Personally Reviewed.  Recent Results (from the past 24 hour(s))   XR Chest Port 1 View    Narrative    EXAM: XR CHEST PORT 1 VIEW  LOCATION: [facility]  DATE: 10/13/2024    INDICATION: s p ct removal  COMPARISON: 10/11/2024      Impression    IMPRESSION: Interval removal of midline mediastinal drain and bilateral chest tubes. Epicardial leads are no longer present. No pneumothorax. Low lung volumes with persistent mild atelectasis in the lower lungs. Prior sternotomy, mitral valve   replacement, CABG procedure.       Active Problems:    Mitral regurgitation    Coronary artery disease involving native coronary artery of native heart with angina pectoris (H)

## 2024-10-14 NOTE — PROGRESS NOTES
"CVTS Daily Progress Note   POD#6 s/p CABG x4 (LIMA>LAD, rSVG>RPDA, rSVG>LPL, rSVG>D2), RLE EVH, Attempted MVR/r, MVR ( 29 mm [employer] Mechanical Mitral Valve), LAAE  Attending: Mauro  LOS: 6    SUBJECTIVE/INTERVAL EVENTS:    No acute events overnight. Converted from rate controlled afib  to sinus rhythm yesterday PM. CRRT stopped yesterday PM, remains oliguric thus far w/ Cr climbing. LFTs continue to downtrend. Maintaining oxygen saturations on home CPAP/1L NC. Normotensive off pressors this AM. Up in chair this AM, more awake and alert today. Pain well controlled with PRN dilaudid. +BM. Tolerating renal diet. Hgb grossly stable, Plts climbing. INR 1.61 - remains on heparin gtt. Patient denies new chest pain, shortness of breath, abdominal pain, calf pain, nausea. Patient has no questions today.     OBJECTIVE:  Temp:  [97.8  F (36.6  C)-98.5  F (36.9  C)] 98.5  F (36.9  C)  Pulse:  [] 78  Resp:  [20-29] 22  SpO2:  [90 %-100 %] 99 %  Vitals:    10/10/24 0600 10/11/24 0454 10/12/24 0400 10/13/24 0400   Weight: 111.8 kg (246 lb 7.6 oz) 114.4 kg (252 lb 3.3 oz) 113.3 kg (249 lb 12.5 oz) 111.2 kg (245 lb 2.4 oz)    10/14/24 0400   Weight: 112.5 kg (248 lb 0.3 oz)       Clinically Significant Risk Factors         # Hyponatremia: Lowest Na = 133 mmol/L in last 2 days, will monitor as appropriate   # Hypocalcemia: Lowest iCa = 4 mg/dL in last 2 days, will monitor and replace as appropriate     # Hypoalbuminemia: Lowest albumin = 2.8 g/dL at 10/11/2024 11:38 AM, will monitor as appropriate     # Thrombocytopenia: Lowest platelets = 82 in last 2 days, will monitor for bleeding  # Acute Kidney Injury, unspecified: based on a >150% or 0.3 mg/dL increase in last creatinine compared to past 90 day average, will monitor renal function  # Hypertension: Noted on problem list           # Severe Obesity: Estimated body mass index is 42.57 kg/m  as calculated from the following:    Height as of 9/25/24: 1.626 m (5' 4\").    " Weight as of this encounter: 112.5 kg (248 lb 0.3 oz).   # Moderate Malnutrition: based on nutrition assessment     # History of CABG: noted on surgical history          Current Medications:    Scheduled Meds:  Current Facility-Administered Medications   Medication Dose Route Frequency Provider Last Rate Last Admin    atorvastatin (LIPITOR) tablet 80 mg  80 mg Oral At Bedtime Kay Ann PA-C   80 mg at 10/13/24 2022    budesonide (PULMICORT) neb solution 1 mg  1 mg Nebulization BID Bharat Lu MD   1 mg at 10/14/24 0737    cetirizine (zyrTEC) tablet 10 mg  10 mg Oral Daily Catherine Galvez PA-C   10 mg at 10/14/24 0856    clopidogrel (PLAVIX) tablet 75 mg  75 mg Oral Daily Catherine Galvez PA-C   75 mg at 10/14/24 0856    fludrocortisone (FLORINEF) tablet 0.1 mg  0.1 mg Oral Daily Catherine Galvez PA-C   0.1 mg at 10/14/24 0856    fluticasone-vilanterol (BREO ELLIPTA) 100-25 MCG/ACT inhaler 1 puff  1 puff Inhalation At Bedtime Catherine Galvez PA-C   1 puff at 10/13/24 2023    heparin lock flush 10 unit/mL injection 5-20 mL  5-20 mL Intracatheter Q24H Alistair Escudero MD   5 mL at 10/13/24 2133    insulin aspart (NovoLOG) injection (RAPID ACTING)  1-7 Units Subcutaneous Q4H Catherine Galvez PA-C   2 Units at 10/13/24 2020    levothyroxine (SYNTHROID/LEVOTHROID) tablet 125 mcg  125 mcg Oral QAM AC Catherine Galvez PA-C   125 mcg at 10/14/24 0632    Lidocaine (LIDOCARE) 4 % Patch 1-2 patch  1-2 patch Transdermal Q24H Catherine Galvez PA-C   1 patch at 10/13/24 1701    meropenem (MERREM) 1 g vial to attach to  mL bag  1 g Intravenous Q12H Jamel Bustillo MD        multivitamin w/minerals (THERA-VIT-M) tablet 1 tablet  1 tablet Oral Daily Catherine Galvez PA-C   1 tablet at 10/13/24 1202    pantoprazole (PROTONIX) EC tablet 40 mg  40 mg Oral Daily Catherine Galvez PA-C   40 mg at 10/14/24 0856    senna-docusate (SENOKOT-S/PERICOLACE) 8.6-50 MG per  tablet 1 tablet  1 tablet Oral BID Catherine Galvez PA-C   1 tablet at 10/13/24 0804    sodium chloride (PF) 0.9% PF flush 10-40 mL  10-40 mL Intracatheter Q8H Alistair Escudero MD   10 mL at 10/14/24 0631    sodium chloride (PF) 0.9% PF flush 10-40 mL  10-40 mL Intracatheter Q7 Days Catherine Galvez PA-C        Vitamin D3 (CHOLECALCIFEROL) tablet 125 mcg  125 mcg Oral Daily Catherine Galvez PA-C   125 mcg at 10/14/24 0856    warfarin ANTICOAGULANT (COUMADIN) tablet 1 mg  1 mg Oral ONCE at 18:00 David Wade MD        Warfarin Dose Required Daily - Pharmacist Managed  1 each Oral See Admin Instructions Kay Ann PA-C         Continuous Infusions:  Current Facility-Administered Medications   Medication Dose Route Frequency Provider Last Rate Last Admin    heparin 25,000 units in 0.45% NaCl 250 mL ANTICOAGULANT infusion  0-5,000 Units/hr Intravenous Continuous David Wade MD 13.5 mL/hr at 10/14/24 0600 1,350 Units/hr at 10/14/24 0600    norepinephrine (LEVOPHED) 4 mg in  mL infusion PREMIX  0.01-0.6 mcg/kg/min Intravenous Continuous David Wade MD   Stopped at 10/13/24 1800     PRN Meds:.  Current Facility-Administered Medications   Medication Dose Route Frequency Provider Last Rate Last Admin    albuterol (PROVENTIL HFA/VENTOLIN HFA) inhaler  2 puff Inhalation Q6H PRN Catherine Galvez PA-C        bisacodyl (DULCOLAX) suppository 10 mg  10 mg Rectal Daily PRN Catherine Galvez PA-C        calcium gluconate 1 g in 50 mL in sodium chloride intermittent infusion  1 g Intravenous Once PRN Catherine Galvez PA-C   1 g at 10/14/24 0632    calcium gluconate 2 g in  mL intermittent infusion  2 g Intravenous Once PRN Catherine Galvez PA-C        calcium gluconate 3 g in sodium chloride 0.9 % 100 mL intermittent infusion  3 g Intravenous Once PRN Catherine Galvez PA-C        glucose gel 15-30 g  15-30 g Oral Q15 Min PRN Jacquie,  MD Doc        Or    dextrose 50 % injection 25-50 mL  25-50 mL Intravenous Q15 Min PRN Doc Dhillon MD   50 mL at 10/14/24 0806    Or    glucagon injection 1 mg  1 mg Subcutaneous Q15 Min PRN Doc Dhillon MD        fluticasone (FLONASE) 50 MCG/ACT spray 2 spray  2 spray Both Nostrils Daily PRN Catherine Galvez PA-C        heparin lock flush 10 unit/mL injection 5-20 mL  5-20 mL Intracatheter Q1H PRN Alistair Escudero MD        hydrALAZINE (APRESOLINE) injection 10 mg  10 mg Intravenous Q30 Min PRN Catherine Galvez PA-C        HYDROmorphone (PF) (DILAUDID) injection 0.5 mg  0.5 mg Intravenous Q4H PRN Alistair Escudero MD   0.5 mg at 10/13/24 2050    ipratropium - albuterol 0.5 mg/2.5 mg/3 mL (DUONEB) neb solution 3 mL  3 mL Nebulization Q4H PRN Bharat Lu MD        lactated ringers BOLUS 250 mL  250 mL Intravenous Q15 Min PRN Catherine Galvez PA-C   250 mL at 10/09/24 0417    magnesium hydroxide (MILK OF MAGNESIA) suspension 30 mL  30 mL Oral Daily PRN Catherine Galvez PA-C        methocarbamol (ROBAXIN) tablet 750 mg  750 mg Oral Q6H PRN Catherine Galvez PA-C   750 mg at 10/12/24 2143    naloxone (NARCAN) injection 0.2 mg  0.2 mg Intravenous Q2 Min PRN David Wade MD        Or    naloxone (NARCAN) injection 0.4 mg  0.4 mg Intravenous Q2 Min PRN David Wade MD        Or    naloxone (NARCAN) injection 0.2 mg  0.2 mg Intramuscular Q2 Min PRN David Wade MD        Or    naloxone (NARCAN) injection 0.4 mg  0.4 mg Intramuscular Q2 Min PRN David Wade MD        ondansetron (ZOFRAN ODT) ODT tab 4 mg  4 mg Oral Q6H PRN Catherine Galvez PA-C        Or    ondansetron (ZOFRAN) injection 4 mg  4 mg Intravenous Q6H PRN Catherine Galvez PA-C   4 mg at 10/10/24 1945    [Held by provider] oxyCODONE IR (ROXICODONE) half-tab 2.5 mg  2.5 mg Oral Q4H PRN Catherine Galvez PA-C        Or    [Held by provider] oxyCODONE  (ROXICODONE) tablet 5 mg  5 mg Oral Q4H PRN Catherine Galvez PA-C        prochlorperazine (COMPAZINE) injection 10 mg  10 mg Intravenous Q6H PRN Catherine Galvez PA-C        Or    prochlorperazine (COMPAZINE) tablet 10 mg  10 mg Oral Q6H PRN Catherine Galvez PA-C        simethicone (MYLICON) chewable half-tab 120 mg  120 mg Oral 4x Daily PRN Catherine Galvez PA-C        sodium chloride (PF) 0.9% PF flush 10-20 mL  10-20 mL Intracatheter q1 min prn Alistair Escudero MD        sodium chloride (PF) 0.9% PF flush 10-20 mL  10-20 mL Intracatheter q1 min prn Catherine Galvez PA-C        sodium chloride (PF) 0.9% PF flush 10-40 mL  10-40 mL Intracatheter Q1H PRN Catherine Galvez PA-C           Cardiographics:    Telemetry monitoring demonstrates sinus rhythm vs ectopic atrial rhythm w/ rates in the 70s per my personal review.    Imaging:  Results for orders placed or performed during the hospital encounter of 10/08/24   XR Chest Port 1 View    Impression    IMPRESSION: Interval sternotomy, CABG procedure and mitral valve repair. Endotracheal tube proximally 2 cm above the sofia. Nasogastric tube in the stomach. Bilateral chest tubes and midline mediastinal drain in expected position. Epicardial leads are   present. Left IJ line in the brachiocephalic vein.    Low lung volumes. No evidence for CHF or pneumonia. No pleural effusion or pneumothorax.   XR Chest Port 1 View    Impression    IMPRESSION: Poststernotomy changes with prosthetic mitral valve. Patient's been extubated and the NG tube has been removed. Mediastinal and pleural chest tubes are in place.    Left IJ cordis sheath is in the distal left innominate artery. Catheter has a very subtle kink within it 3.5 centimeters from the tip.    Low lung volumes. No pneumothorax. Likely trace left effusion at the base. No signs of pneumonia or failure. Heart is of normal size.   XR Abdomen Port 1 View    Impression    IMPRESSION: Orogastric tube  tip in the proximal stomach and oriented superiorly towards the left diaphragm. Visualized bowel gas pattern unremarkable. Numerous tubes and lines lower chest and upper abdomen as described on chest x-ray report from today.        Labs, personally reviewed.  Hemoglobin   Date Value Ref Range Status   10/14/2024 8.0 (L) 11.7 - 15.7 g/dL Final   10/13/2024 8.5 (L) 11.7 - 15.7 g/dL Final   10/13/2024 8.6 (L) 11.7 - 15.7 g/dL Final   06/09/2015 13.5 11.7 - 15.7 g/dL Final   03/11/2015 12.3 11.7 - 15.7 g/dL Final   01/08/2015 13.6 11.7 - 15.7 g/dL Final     WBC   Date Value Ref Range Status   06/09/2015 7.9 4.0 - 11.0 10e9/L Final   03/11/2015 9.4 4.0 - 11.0 10e9/L Final   01/08/2015 8.1 4.0 - 11.0 10e9/L Final     WBC Count   Date Value Ref Range Status   10/14/2024 23.8 (H) 4.0 - 11.0 10e3/uL Final   10/13/2024 16.3 (H) 4.0 - 11.0 10e3/uL Final   10/13/2024 16.5 (H) 4.0 - 11.0 10e3/uL Final     Platelet Count   Date Value Ref Range Status   10/14/2024 105 (L) 150 - 450 10e3/uL Final   10/13/2024 98 (L) 150 - 450 10e3/uL Final   10/13/2024 90 (L) 150 - 450 10e3/uL Final   06/09/2015 219 150 - 450 10e9/L Final   03/11/2015 240 150 - 450 10e9/L Final   01/08/2015 221 150 - 450 10e9/L Final     Creatinine   Date Value Ref Range Status   10/14/2024 2.49 (H) 0.51 - 0.95 mg/dL Final   10/13/2024 1.85 (H) 0.51 - 0.95 mg/dL Final   10/13/2024 1.51 (H) 0.51 - 0.95 mg/dL Final   06/09/2015 0.80 0.52 - 1.04 mg/dL Final   03/11/2015 0.87 0.52 - 1.04 mg/dL Final   01/08/2015 0.79 0.52 - 1.04 mg/dL Final     Potassium   Date Value Ref Range Status   10/14/2024 4.0 3.4 - 5.3 mmol/L Final   10/13/2024 4.1 3.4 - 5.3 mmol/L Final   10/13/2024 4.1 3.4 - 5.3 mmol/L Final   06/09/2015 3.8 3.4 - 5.3 mmol/L Final   03/11/2015 3.9 3.4 - 5.3 mmol/L Final   01/08/2015 4.1 3.4 - 5.3 mmol/L Final     Potassium POCT   Date Value Ref Range Status   10/08/2024 4.4 3.4 - 5.3 mmol/L Final   10/08/2024 4.0 3.4 - 5.3 mmol/L Final   10/08/2024 4.1 3.4 -  5.3 mmol/L Final     Magnesium   Date Value Ref Range Status   10/14/2024 3.0 (H) 1.7 - 2.3 mg/dL Final   10/13/2024 2.7 (H) 1.7 - 2.3 mg/dL Final   10/13/2024 2.7 (H) 1.7 - 2.3 mg/dL Final   06/09/2015 1.8 1.6 - 2.3 mg/dL Final   03/11/2015 1.7 1.6 - 2.3 mg/dL Final   01/08/2015 1.8 1.6 - 2.3 mg/dL Final          I/O:  I/O last 3 completed shifts:  In: 2108.91 [P.O.:610; I.V.:1248.91; Other:250]  Out: 1029.7 [Urine:145; Other:884.7]       Physical Exam:    General: Patient seen up in chair this AM. Answers appropriately,more awake and alert today although continues to appear fatigued.  HEENT: DORITA, no sclera icterus, moist mucosa  CV: RRR on monitor. 2+ peripheral pulses in all extremities. Mild peripheral edema. Incision C/D/I.  Pulm: Non-labored effort on 1L NC.   Abd: Soft, NT, ND  : Padron with john urine  Ext: Mild pedal edema, SCDs in place, warm, distal pulses intact  Neuro: CNs grossly intact, FAM, A&Ox3      ASSESSMENT/PLAN:    Jory Ambrose is a 49 year old female with a history of GIA, asthma, GERD,  hypothyroidism, h/o PE on plavix, carotid stenosis, Hodgkin's lymphoma (s/p chemo/radiation and bone marrow transplant x2 after recurrence) currently in remission who is s/p CABG x4, RLE EVH, attempted mitral repair, mechanical MVR, LAAE.    Active Problems:    Mitral regurgitation    Coronary artery disease involving native coronary artery of native heart with angina pectoris (H)  #Acute Renal Failure d/t ATN, ongoing.      NEURO:   - Scheduled lidocaine patches and PRN robaxin/oxycodone/dilaudid for pain  - No toradol given ARF  - PTA bupropion DISCONTINUED (somnolence)  - Tylenol DISCONTINUED (transaminitis)    CV:   - Pre-op EF 55-60%  - Chest tubes and TPW's removed without immediate complications POD#5  - Normotensive off pressors this AM  - ECG this AM to r/o AV block.   - Plan to start low dose metop this AM  - Stopping amio gtt w/ starting BB  - Planning for plavix daily indefinitely  d/t unclear h/o anaphylaxis to ASA  - Atorvastatin 80mg daily  - New baseline echo prior to discharge  - Low intensity heparin gtt bridge to warfarin for mechanical MVR. INR goal 2.5-3.5  - Rate controlled afib - s/p amiodarone bolus and infusion (ok per Mauro). Ok to stop given LFTs, in SR, and allergy to PO amio additive    PULM:   - Extubated on POD#0  - Maintaining oxygen saturations on 1L NC/home CPAP overnight  - Encourage pulmonary toilet  - PTA zyrtec, fludrocortisone, flonase, simethicone, albuterol, pulmicort, breo ellipta, duoneb    FEN/GI:  - Continue electrolyte replacement protocol  - Renal diet  - Bowel regimen, LBM 10/13  - Severe transaminitis, ALT 1259, MTE893 and downtrending. Likely 2/2 ischemic hepatitis.  - Hepatic w/u and labs unremarkable  - PTA vitamin D3   - Hypermagnesemia, hyperphosphoremia, monitor.  - CMP BID    RENAL:  - Nephrology consulted  for ARF w/ decreased UOP, appreciate. Following.  - CRRT 10/11 - 10/13  - Cr 2.49 (1.85) (baseline ~0.9)  - Padron to remain in for close monitoring of I/O in s/o ARF  - UA/Urine cx -repeat 10/13 appears ?dirty but urine cx no growth.  - CMP BID as above    HEME:  - H/o bone marrow tx. Needs irradiated blood  - Acute blood loss anemia post-op.   - Hgb 8.0 (8.5), likely dilutional.   - Received 2u PRBCs periop, 1u PRBCs 10/11  - Low intensity heparin gtt bridge to warfarin INR goal 2.5-3.5 [facility]  - Thrombocytopenia, plts 105 (98). HIT screen negative  - INR 1.61 (1.67)    ID:  - Lydia op ppx complete. Afebrile. Leukocytosis -  WBC 23.8 (16.3)  - UA/Urine cx 10/9 and 10/13 NG.  - Blood cx 10/9 and 10/13 NG  - Sputum culture 10/13 - pending  - Lactate WNL  - ID consult for sepsis, appreciate. Following.   - Switch vanc/rocephin to meropenem  - CBC qday    ENDO:   - HbA1c 5.7%  - q4 SSI given low PO intake.  - Very labile BG this AM 21>>171>>89. Start D5 1/2 NS infusion w/ q30 min BG checks  - Send ACTH and serum cortisol given chronic  mineralocorticoid therapy  - PTA Metformin - DISCONTINUED (renal function)  - PTA synthroid    PPx:   - DVT: SCDs, SQ heparin TID, ambulation   - GI: Protonix 40mg IV/PO daily    DISPO:   - Continue critical care in ICU for now given labile BGs and possible need for CRRT  - Medically Ready for Discharge: Anticipated in 5+ Days         Patient seen with Dr. Hanson and discussed w/ Dr. Wade.      Eva Galvez PA-C  [facility] Cardiothoracic Surgery  [employer] [facility] [place]  October 14, 2024

## 2024-10-14 NOTE — PLAN OF CARE
Problem: Adult Inpatient Plan of Care  Goal: Optimal Comfort and Wellbeing  Intervention: Provide Person-Centered Care      Goal Outcome Evaluation:    [facility] - ICU    RN Progress Note:            Pertinent Assessments:      Please refer to flowsheet rows for full assessment     VSS/afebrile           Key Events - This Shift:       Levophed remains off  Pt on Amio and heparin gtt  Uneventful shift.                Barriers to Discharge / Downgrade:     None.

## 2024-10-14 NOTE — PROGRESS NOTES
'    RENAL ([facility]) progress note  CC: F/U ALEXEI  S: Patient too sleepy to provide additional history. Family reports she continues to sleep. They thought her kidneys were recovering, they are depressed to hear Cr continues to climb and UOP is poor. We discussed likely need for HD tomorrow if UOP remains poor and labs worse tomorrow. We discussed that coming off CRRT and pressors is still progress and that renal recovery may take awhile longer. Family worried that she is not taking any PO. She is on cIVF.     A/P:     Acute kidney injury.  Acute tubular necrosis.  Secondary to CABG and mitral valve replacement followed by cardiogenic shock.  Baseline creatinine 0.95 (10/8/2024).    Required CRRT, off 10/13  Off pressors this morning   Patient remains oliguric, labs worsening   Trial HD tomorrow (10/15) unless improvement in UOP or labs   Daily labs   Strict Is/Os  Daily weights   Avoid nephrotoxins  Renally dose meds      2. Hyperkalemia: resolved      3. Metabolic acidosis: slightly worse off CRRT, not currently needing bicarb      4. Status post coronary artery bypass grafting x 4 vessels and mitral valve replacement on 10/8.  Diffuse coronary artery disease     5. Shock liver.  Continue supportive care.  LFTs downtrending.      6. History of Hodgkin's lymphoma.  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    Brea Buchanan MD  [facility]  [phone]    /69   Pulse 78   Temp 98.5  F (36.9  C) (Oral)   Resp 22   Wt 112.5 kg (248 lb 0.3 oz)   LMP  (LMP Unknown)   SpO2 99%   BMI 42.57 kg/m      I/O last 3 completed shifts:  In: 2108.91 [P.O.:610; I.V.:1248.91; Other:250]  Out: 1029.7 [Urine:145; Other:884.7]    Physical Exam:   GENERAL: asleep, eyes flutter open to voice, then falls back asleep again, doesn't answer questions, ill appearing   EYES: closed   ENT: MMM  RESP: no increased WOB   CV: regular rate, tr leg  edema.    GI: ND  SKIN: No rash    Recent Labs   Lab 10/14/24  0345 10/13/24  1536 10/13/24  1201 10/13/24  0414 10/12/24  2002 10/12/24  1153 10/12/24  0615 10/12/24  0408 10/12/24  0016 10/11/24  2013   * 135 134* 136 137 137 138 137   < > 137   POTASSIUM 4.0 4.1 4.1 4.3 4.3 4.2 4.3 4.3   < > 4.1   CHLORIDE 99 100 102 100 104 105 103 102   < > 102   CO2 21* 19* 21* 22 22 23 24 25   < > 24   BUN 37.7* 27.0* 22.2* 23.9* 26.7* 33.1* 39.2* 41.6*   < > 53.8*   CR 2.49* 1.85* 1.51* 1.60* 1.79* 2.08* 2.51* 2.62*   < > 3.32*   GFRESTIMATED 23* 33* 42* 39* 34* 29* 23* 22*   < > 16*   CINDY 7.5* 7.9* 7.3* 7.4* 7.0* 7.4* 7.8* 7.6*   < > 7.6*   PHOS 4.7* 5.1* 4.4 4.4 4.1 4.4  --  5.0*  --  5.4*   MAG 3.0*  --  2.7* 2.7* 2.4* 2.6*  --  2.6*  --  2.7*   ALBUMIN 3.0* 3.3* 3.1* 3.2* 3.1* 3.0* 3.1* 3.0*   < > 3.1*    < > = values in this interval not displayed.     Recent Labs   Lab 10/14/24  0758 10/13/24  1201 10/13/24  0414 10/12/24  2002 10/12/24  0615 10/12/24  0408 10/12/24  0016   WBC 23.8* 16.3* 16.5* 12.2* 9.3 9.1 9.3   HGB 8.0* 8.5* 8.6* 8.2* 8.0* 8.0* 8.1*   HCT 24.8* 25.8* 26.3* 25.1* 24.4* 23.8* 25.0*   MCV 90 89 89 88 87 86 87   * 98* 90* 82* 59* 53* 51*       Current Facility-Administered Medications:     albuterol (PROVENTIL HFA/VENTOLIN HFA) inhaler, 2 puff, Inhalation, Q6H PRN, Catherine Galvez PA-C    atorvastatin (LIPITOR) tablet 80 mg, 80 mg, Oral, At Bedtime, Kay Ann PA-C, 80 mg at 10/13/24 2022    bisacodyl (DULCOLAX) suppository 10 mg, 10 mg, Rectal, Daily PRN, Catherine Galvez PA-C    budesonide (PULMICORT) neb solution 1 mg, 1 mg, Nebulization, BID, Bharat Lu MD, 1 mg at 10/14/24 0737    calcium gluconate 1 g in 50 mL in sodium chloride intermittent infusion, 1 g, Intravenous, Once, Catherine Galvez PA-C    calcium gluconate 1 g in 50 mL in sodium chloride intermittent infusion, 1 g, Intravenous, Once PRN, Catherine Galvez PA-C, 1 g at 10/14/24 0632     calcium gluconate 2 g in  mL intermittent infusion, 2 g, Intravenous, Once PRN, Catherine Galvez PA-KENIA    calcium gluconate 3 g in sodium chloride 0.9 % 100 mL intermittent infusion, 3 g, Intravenous, Once PRN, Catherine Galvez PA-C    cetirizine (zyrTEC) tablet 10 mg, 10 mg, Oral, Daily, Catherine Galvez PA-C, 10 mg at 10/14/24 0856    clopidogrel (PLAVIX) tablet 75 mg, 75 mg, Oral, Daily, Catherine Galvez PA-KENIA, 75 mg at 10/14/24 0856    dextrose 5% and 0.45% NaCl infusion, , Intravenous, Continuous, Catherine Galvez PA-C    glucose gel 15-30 g, 15-30 g, Oral, Q15 Min PRN **OR** dextrose 50 % injection 25-50 mL, 25-50 mL, Intravenous, Q15 Min PRN, 50 mL at 10/14/24 0806 **OR** glucagon injection 1 mg, 1 mg, Subcutaneous, Q15 Min PRN, Doc Dhillon MD    fludrocortisone (FLORINEF) tablet 0.1 mg, 0.1 mg, Oral, Daily, Catherine Galvez PA-C, 0.1 mg at 10/14/24 0856    fluticasone (FLONASE) 50 MCG/ACT spray 2 spray, 2 spray, Both Nostrils, Daily PRN, Catherine Galvez PA-C    fluticasone-vilanterol (BREO ELLIPTA) 100-25 MCG/ACT inhaler 1 puff, 1 puff, Inhalation, At Bedtime, Catherine Galvez PA-C, 1 puff at 10/13/24 2023    heparin 25,000 units in 0.45% NaCl 250 mL ANTICOAGULANT infusion, 0-5,000 Units/hr, Intravenous, Continuous, David Wade MD, Last Rate: 13.5 mL/hr at 10/14/24 0600, 1,350 Units/hr at 10/14/24 0600    heparin lock flush 10 unit/mL injection 5-20 mL, 5-20 mL, Intracatheter, Q24H, Alistair Escudero MD, 5 mL at 10/13/24 2133    heparin lock flush 10 unit/mL injection 5-20 mL, 5-20 mL, Intracatheter, Q1H PRN, Alistair Escudero MD    hydrALAZINE (APRESOLINE) injection 10 mg, 10 mg, Intravenous, Q30 Min PRN, Catherine Galvez PA-C    HYDROmorphone (PF) (DILAUDID) injection 0.5 mg, 0.5 mg, Intravenous, Q4H PRN, Alistair Escudero MD, 0.5 mg at 10/13/24 2050    insulin aspart (NovoLOG) injection (RAPID ACTING), 1-7 Units, Subcutaneous, Q4H, Lenny  Catherine MCKEON PA-C, 2 Units at 10/13/24 2020    ipratropium - albuterol 0.5 mg/2.5 mg/3 mL (DUONEB) neb solution 3 mL, 3 mL, Nebulization, Q4H PRN, Bharat Lu MD    lactated ringers BOLUS 250 mL, 250 mL, Intravenous, Q15 Min PRN, Catherine Galvez PA-C, 250 mL at 10/09/24 0417    levothyroxine (SYNTHROID/LEVOTHROID) tablet 125 mcg, 125 mcg, Oral, QAM AC, Catherine Galvez PA-C, 125 mcg at 10/14/24 0632    Lidocaine (LIDOCARE) 4 % Patch 1-2 patch, 1-2 patch, Transdermal, Q24H, Catherine Galvez PA-C, 1 patch at 10/13/24 1701    magnesium hydroxide (MILK OF MAGNESIA) suspension 30 mL, 30 mL, Oral, Daily PRN, Catherine Galvez PA-C    meropenem (MERREM) 1 g vial to attach to  mL bag, 1 g, Intravenous, Q12H, Jamel Bustillo MD    methocarbamol (ROBAXIN) tablet 750 mg, 750 mg, Oral, Q6H PRN, Catherine Galvez PA-C, 750 mg at 10/12/24 2143    metoprolol tartrate (LOPRESSOR) half-tab 12.5 mg, 12.5 mg, Oral, BID, Catherine Galvez PA-C    multivitamin w/minerals (THERA-VIT-M) tablet 1 tablet, 1 tablet, Oral, Daily, Catherine Galvez PA-C, 1 tablet at 10/13/24 1202    naloxone (NARCAN) injection 0.2 mg, 0.2 mg, Intravenous, Q2 Min PRN **OR** naloxone (NARCAN) injection 0.4 mg, 0.4 mg, Intravenous, Q2 Min PRN **OR** naloxone (NARCAN) injection 0.2 mg, 0.2 mg, Intramuscular, Q2 Min PRN **OR** naloxone (NARCAN) injection 0.4 mg, 0.4 mg, Intramuscular, Q2 Min PRN, David Wade MD    norepinephrine (LEVOPHED) 4 mg in  mL infusion PREMIX, 0.01-0.6 mcg/kg/min, Intravenous, Continuous, Cathreine Galvez PA-C, Stopped at 10/13/24 1800    ondansetron (ZOFRAN ODT) ODT tab 4 mg, 4 mg, Oral, Q6H PRN **OR** ondansetron (ZOFRAN) injection 4 mg, 4 mg, Intravenous, Q6H PRN, Catherine Galvez PA-C, 4 mg at 10/10/24 1945    [DISCONTINUED] pantoprazole (PROTONIX) 2 mg/mL suspension 40 mg, 40 mg, Oral or NG Tube, Daily, 40 mg at 10/08/24 1729 **OR** pantoprazole  (PROTONIX) EC tablet 40 mg, 40 mg, Oral, Daily, Catherine Galvez, PA-C, 40 mg at 10/14/24 0856    prochlorperazine (COMPAZINE) injection 10 mg, 10 mg, Intravenous, Q6H PRN **OR** prochlorperazine (COMPAZINE) tablet 10 mg, 10 mg, Oral, Q6H PRN, Catherine Galvez, PA-C    senna-docusate (SENOKOT-S/PERICOLACE) 8.6-50 MG per tablet 1 tablet, 1 tablet, Oral, BID, Catherine Galvez, PA-C, 1 tablet at 10/13/24 0804    simethicone (MYLICON) chewable half-tab 120 mg, 120 mg, Oral, 4x Daily PRN, Catherine Galvez, PA-C    sodium chloride (PF) 0.9% PF flush 10-20 mL, 10-20 mL, Intracatheter, q1 min prn, Alistair Escudero MD    sodium chloride (PF) 0.9% PF flush 10-20 mL, 10-20 mL, Intracatheter, q1 min prn, Catherine Galvez, PA-C    sodium chloride (PF) 0.9% PF flush 10-40 mL, 10-40 mL, Intracatheter, Q8H, Alistair Escudero MD, 10 mL at 10/14/24 0631    sodium chloride (PF) 0.9% PF flush 10-40 mL, 10-40 mL, Intracatheter, Q7 Days, Catherine Galvez, PA-C    sodium chloride (PF) 0.9% PF flush 10-40 mL, 10-40 mL, Intracatheter, Q1H PRN, Catherine Galvez K, PA-C    Vitamin D3 (CHOLECALCIFEROL) tablet 125 mcg, 125 mcg, Oral, Daily, Catherine Galvez, PA-C, 125 mcg at 10/14/24 0856    warfarin ANTICOAGULANT (COUMADIN) tablet 1 mg, 1 mg, Oral, ONCE at 18:00, David Wade MD    Warfarin Dose Required Daily - Pharmacist Managed, 1 each, Oral, See Admin Instructions, Kay Ann PA-C    Labs personally reviewed today during this evaluation at 11:41am.

## 2024-10-15 ENCOUNTER — APPOINTMENT (OUTPATIENT)
Dept: RADIOLOGY | Facility: HOSPITAL | Age: 49
DRG: 219 | End: 2024-10-15
Payer: COMMERCIAL

## 2024-10-15 ENCOUNTER — APPOINTMENT (OUTPATIENT)
Dept: OCCUPATIONAL THERAPY | Facility: HOSPITAL | Age: 49
DRG: 219 | End: 2024-10-15
Attending: SURGERY
Payer: COMMERCIAL

## 2024-10-15 LAB
ACTH PLAS-MCNC: 21 PG/ML
ALBUMIN SERPL BCG-MCNC: 2.6 G/DL (ref 3.5–5.2)
ALBUMIN SERPL BCG-MCNC: 2.8 G/DL (ref 3.5–5.2)
ALP SERPL-CCNC: 106 U/L (ref 40–150)
ALP SERPL-CCNC: 127 U/L (ref 40–150)
ALT SERPL W P-5'-P-CCNC: 574 U/L (ref 0–50)
ALT SERPL W P-5'-P-CCNC: 835 U/L (ref 0–50)
AMYLASE SERPL-CCNC: 55 U/L (ref 28–100)
ANION GAP SERPL CALCULATED.3IONS-SCNC: 16 MMOL/L (ref 7–15)
ANION GAP SERPL CALCULATED.3IONS-SCNC: 7 MMOL/L (ref 7–15)
AST SERPL W P-5'-P-CCNC: 111 U/L (ref 0–45)
AST SERPL W P-5'-P-CCNC: 182 U/L (ref 0–45)
ATRIAL RATE - MUSE: 119 BPM
BACTERIA BLD CULT: NO GROWTH
BASOPHILS # BLD MANUAL: 0 10E3/UL (ref 0–0.2)
BASOPHILS NFR BLD MANUAL: 0 %
BILIRUB SERPL-MCNC: 0.7 MG/DL
BILIRUB SERPL-MCNC: 1 MG/DL
BUN SERPL-MCNC: 25 MG/DL (ref 6–20)
BUN SERPL-MCNC: 54.1 MG/DL (ref 6–20)
CA-I BLD-MCNC: 4.4 MG/DL (ref 4.4–5.2)
CALCIUM SERPL-MCNC: 7.6 MG/DL (ref 8.8–10.4)
CALCIUM SERPL-MCNC: 7.6 MG/DL (ref 8.8–10.4)
CHLORIDE SERPL-SCNC: 101 MMOL/L (ref 98–107)
CHLORIDE SERPL-SCNC: 95 MMOL/L (ref 98–107)
CREAT SERPL-MCNC: 1.74 MG/DL (ref 0.51–0.95)
CREAT SERPL-MCNC: 3.4 MG/DL (ref 0.51–0.95)
DIASTOLIC BLOOD PRESSURE - MUSE: NORMAL MMHG
EGFRCR SERPLBLD CKD-EPI 2021: 16 ML/MIN/1.73M2
EGFRCR SERPLBLD CKD-EPI 2021: 35 ML/MIN/1.73M2
EOSINOPHIL # BLD MANUAL: 0.6 10E3/UL (ref 0–0.7)
EOSINOPHIL NFR BLD MANUAL: 2 %
ERYTHROCYTE [DISTWIDTH] IN BLOOD BY AUTOMATED COUNT: 19.2 % (ref 10–15)
GLUCOSE BLDC GLUCOMTR-MCNC: 100 MG/DL (ref 70–99)
GLUCOSE BLDC GLUCOMTR-MCNC: 101 MG/DL (ref 70–99)
GLUCOSE BLDC GLUCOMTR-MCNC: 103 MG/DL (ref 70–99)
GLUCOSE BLDC GLUCOMTR-MCNC: 105 MG/DL (ref 70–99)
GLUCOSE BLDC GLUCOMTR-MCNC: 106 MG/DL (ref 70–99)
GLUCOSE BLDC GLUCOMTR-MCNC: 80 MG/DL (ref 70–99)
GLUCOSE BLDC GLUCOMTR-MCNC: 80 MG/DL (ref 70–99)
GLUCOSE BLDC GLUCOMTR-MCNC: 81 MG/DL (ref 70–99)
GLUCOSE BLDC GLUCOMTR-MCNC: 82 MG/DL (ref 70–99)
GLUCOSE BLDC GLUCOMTR-MCNC: 82 MG/DL (ref 70–99)
GLUCOSE BLDC GLUCOMTR-MCNC: 85 MG/DL (ref 70–99)
GLUCOSE BLDC GLUCOMTR-MCNC: 92 MG/DL (ref 70–99)
GLUCOSE BLDC GLUCOMTR-MCNC: 94 MG/DL (ref 70–99)
GLUCOSE BLDC GLUCOMTR-MCNC: 95 MG/DL (ref 70–99)
GLUCOSE BLDC GLUCOMTR-MCNC: 96 MG/DL (ref 70–99)
GLUCOSE BLDC GLUCOMTR-MCNC: 98 MG/DL (ref 70–99)
GLUCOSE BLDC GLUCOMTR-MCNC: 99 MG/DL (ref 70–99)
GLUCOSE SERPL-MCNC: 112 MG/DL (ref 70–99)
GLUCOSE SERPL-MCNC: 82 MG/DL (ref 70–99)
HBV SURFACE AG SERPL QL IA: NONREACTIVE
HCO3 SERPL-SCNC: 18 MMOL/L (ref 22–29)
HCO3 SERPL-SCNC: 26 MMOL/L (ref 22–29)
HCT VFR BLD AUTO: 23.2 % (ref 35–47)
HGB BLD-MCNC: 7.5 G/DL (ref 11.7–15.7)
INR PPP: 1.75 (ref 0.85–1.15)
INTERPRETATION ECG - MUSE: NORMAL
LACTATE SERPL-SCNC: 1.6 MMOL/L (ref 0.7–2)
LACTATE SERPL-SCNC: 2.2 MMOL/L (ref 0.7–2)
LIPASE SERPL-CCNC: 67 U/L (ref 13–60)
LYMPHOCYTES # BLD MANUAL: 4.2 10E3/UL (ref 0.8–5.3)
LYMPHOCYTES NFR BLD MANUAL: 15 %
MAGNESIUM SERPL-MCNC: 3.1 MG/DL (ref 1.7–2.3)
MCH RBC QN AUTO: 29.4 PG (ref 26.5–33)
MCHC RBC AUTO-ENTMCNC: 32.3 G/DL (ref 31.5–36.5)
MCV RBC AUTO: 91 FL (ref 78–100)
METAMYELOCYTES # BLD MANUAL: 0.8 10E3/UL
METAMYELOCYTES NFR BLD MANUAL: 3 %
MONOCYTES # BLD MANUAL: 1.4 10E3/UL (ref 0–1.3)
MONOCYTES NFR BLD MANUAL: 5 %
NEUTROPHILS # BLD MANUAL: 21 10E3/UL (ref 1.6–8.3)
NEUTROPHILS NFR BLD MANUAL: 75 %
NRBC # BLD AUTO: 0.3 10E3/UL
NRBC BLD MANUAL-RTO: 1 %
P AXIS - MUSE: NORMAL DEGREES
PHOSPHATE SERPL-MCNC: 4.7 MG/DL (ref 2.5–4.5)
PLAT MORPH BLD: ABNORMAL
PLATELET # BLD AUTO: 133 10E3/UL (ref 150–450)
POLYCHROMASIA BLD QL SMEAR: SLIGHT
POTASSIUM SERPL-SCNC: 3.2 MMOL/L (ref 3.4–5.3)
POTASSIUM SERPL-SCNC: 4 MMOL/L (ref 3.4–5.3)
PR INTERVAL - MUSE: NORMAL MS
PROT SERPL-MCNC: 4.6 G/DL (ref 6.4–8.3)
PROT SERPL-MCNC: 4.9 G/DL (ref 6.4–8.3)
QRS DURATION - MUSE: 94 MS
QT - MUSE: 364 MS
QTC - MUSE: 487 MS
R AXIS - MUSE: 44 DEGREES
RBC # BLD AUTO: 2.55 10E6/UL (ref 3.8–5.2)
RBC MORPH BLD: ABNORMAL
SODIUM SERPL-SCNC: 129 MMOL/L (ref 135–145)
SODIUM SERPL-SCNC: 134 MMOL/L (ref 135–145)
SYSTOLIC BLOOD PRESSURE - MUSE: NORMAL MMHG
T AXIS - MUSE: -6 DEGREES
TSH SERPL DL<=0.005 MIU/L-ACNC: 2.49 UIU/ML (ref 0.3–4.2)
UFH PPP CHRO-ACNC: 0.48 IU/ML
UFH PPP CHRO-ACNC: 0.49 IU/ML
VENTRICULAR RATE- MUSE: 108 BPM
WBC # BLD AUTO: 27.9 10E3/UL (ref 4–11)
WBC # BLD AUTO: 28 10E3/UL (ref 4–11)

## 2024-10-15 PROCEDURE — 83690 ASSAY OF LIPASE: CPT

## 2024-10-15 PROCEDURE — 85027 COMPLETE CBC AUTOMATED: CPT

## 2024-10-15 PROCEDURE — 82947 ASSAY GLUCOSE BLOOD QUANT: CPT

## 2024-10-15 PROCEDURE — 250N000013 HC RX MED GY IP 250 OP 250 PS 637

## 2024-10-15 PROCEDURE — 99232 SBSQ HOSP IP/OBS MODERATE 35: CPT | Performed by: INTERNAL MEDICINE

## 2024-10-15 PROCEDURE — 84443 ASSAY THYROID STIM HORMONE: CPT

## 2024-10-15 PROCEDURE — 250N000011 HC RX IP 250 OP 636: Performed by: STUDENT IN AN ORGANIZED HEALTH CARE EDUCATION/TRAINING PROGRAM

## 2024-10-15 PROCEDURE — 82330 ASSAY OF CALCIUM: CPT | Performed by: SURGERY

## 2024-10-15 PROCEDURE — 85007 BL SMEAR W/DIFF WBC COUNT: CPT | Performed by: SURGERY

## 2024-10-15 PROCEDURE — 93010 ELECTROCARDIOGRAM REPORT: CPT | Performed by: INTERNAL MEDICINE

## 2024-10-15 PROCEDURE — 93005 ELECTROCARDIOGRAM TRACING: CPT

## 2024-10-15 PROCEDURE — 90935 HEMODIALYSIS ONE EVALUATION: CPT

## 2024-10-15 PROCEDURE — 82150 ASSAY OF AMYLASE: CPT

## 2024-10-15 PROCEDURE — 258N000003 HC RX IP 258 OP 636

## 2024-10-15 PROCEDURE — 250N000009 HC RX 250: Performed by: INTERNAL MEDICINE

## 2024-10-15 PROCEDURE — 999N000065 XR CHEST PORT 1 VIEW

## 2024-10-15 PROCEDURE — 94799 UNLISTED PULMONARY SVC/PX: CPT

## 2024-10-15 PROCEDURE — 250N000009 HC RX 250

## 2024-10-15 PROCEDURE — 250N000013 HC RX MED GY IP 250 OP 250 PS 637: Performed by: SURGERY

## 2024-10-15 PROCEDURE — 83735 ASSAY OF MAGNESIUM: CPT | Performed by: SURGERY

## 2024-10-15 PROCEDURE — S5010 5% DEXTROSE AND 0.45% SALINE: HCPCS

## 2024-10-15 PROCEDURE — 84100 ASSAY OF PHOSPHORUS: CPT | Performed by: SURGERY

## 2024-10-15 PROCEDURE — 83605 ASSAY OF LACTIC ACID: CPT | Performed by: SURGERY

## 2024-10-15 PROCEDURE — 83605 ASSAY OF LACTIC ACID: CPT

## 2024-10-15 PROCEDURE — 85520 HEPARIN ASSAY: CPT | Performed by: SURGERY

## 2024-10-15 PROCEDURE — 93010 ELECTROCARDIOGRAM REPORT: CPT | Mod: RTG | Performed by: INTERNAL MEDICINE

## 2024-10-15 PROCEDURE — 200N000001 HC R&B ICU

## 2024-10-15 PROCEDURE — 84155 ASSAY OF PROTEIN SERUM: CPT

## 2024-10-15 PROCEDURE — 94640 AIRWAY INHALATION TREATMENT: CPT | Mod: 76

## 2024-10-15 PROCEDURE — 85610 PROTHROMBIN TIME: CPT | Performed by: PHYSICIAN ASSISTANT

## 2024-10-15 PROCEDURE — 94660 CPAP INITIATION&MGMT: CPT

## 2024-10-15 PROCEDURE — 87340 HEPATITIS B SURFACE AG IA: CPT | Performed by: STUDENT IN AN ORGANIZED HEALTH CARE EDUCATION/TRAINING PROGRAM

## 2024-10-15 PROCEDURE — 94640 AIRWAY INHALATION TREATMENT: CPT

## 2024-10-15 PROCEDURE — 36415 COLL VENOUS BLD VENIPUNCTURE: CPT

## 2024-10-15 PROCEDURE — 250N000011 HC RX IP 250 OP 636: Mod: JZ

## 2024-10-15 PROCEDURE — 97535 SELF CARE MNGMENT TRAINING: CPT | Mod: GO

## 2024-10-15 PROCEDURE — 97110 THERAPEUTIC EXERCISES: CPT | Mod: GO

## 2024-10-15 PROCEDURE — 250N000013 HC RX MED GY IP 250 OP 250 PS 637: Performed by: PHYSICIAN ASSISTANT

## 2024-10-15 PROCEDURE — 999N000157 HC STATISTIC RCP TIME EA 10 MIN

## 2024-10-15 PROCEDURE — 250N000011 HC RX IP 250 OP 636: Performed by: SURGERY

## 2024-10-15 RX ORDER — LIDOCAINE 40 MG/G
CREAM TOPICAL
Status: DISCONTINUED | OUTPATIENT
Start: 2024-10-15 | End: 2024-10-18

## 2024-10-15 RX ORDER — METOPROLOL TARTRATE 25 MG/1
25 TABLET, FILM COATED ORAL 2 TIMES DAILY
Status: DISCONTINUED | OUTPATIENT
Start: 2024-10-15 | End: 2024-10-16

## 2024-10-15 RX ORDER — MEROPENEM 500 MG/1
500 INJECTION, POWDER, FOR SOLUTION INTRAVENOUS EVERY 12 HOURS
Status: DISCONTINUED | OUTPATIENT
Start: 2024-10-15 | End: 2024-10-16

## 2024-10-15 RX ADMIN — PANTOPRAZOLE SODIUM 40 MG: 40 TABLET, DELAYED RELEASE ORAL at 08:24

## 2024-10-15 RX ADMIN — OXYCODONE HYDROCHLORIDE 5 MG: 5 TABLET ORAL at 11:45

## 2024-10-15 RX ADMIN — WARFARIN SODIUM 1.5 MG: 1 TABLET ORAL at 19:05

## 2024-10-15 RX ADMIN — MEROPENEM 500 MG: 500 INJECTION, POWDER, FOR SOLUTION INTRAVENOUS at 19:00

## 2024-10-15 RX ADMIN — CALCIUM GLUCONATE 1 G: 20 INJECTION, SOLUTION INTRAVENOUS at 16:28

## 2024-10-15 RX ADMIN — BUDESONIDE 1 MG: 0.5 INHALANT ORAL at 20:09

## 2024-10-15 RX ADMIN — ATORVASTATIN CALCIUM 80 MG: 40 TABLET, FILM COATED ORAL at 20:40

## 2024-10-15 RX ADMIN — METOPROLOL TARTRATE 25 MG: 25 TABLET, FILM COATED ORAL at 08:24

## 2024-10-15 RX ADMIN — CALCIUM GLUCONATE 1 G: 20 INJECTION, SOLUTION INTRAVENOUS at 08:31

## 2024-10-15 RX ADMIN — CETIRIZINE HYDROCHLORIDE 10 MG: 10 TABLET, FILM COATED ORAL at 08:25

## 2024-10-15 RX ADMIN — LEVOTHYROXINE SODIUM 125 MCG: 0.03 TABLET ORAL at 08:24

## 2024-10-15 RX ADMIN — MEROPENEM 1 G: 1 INJECTION, POWDER, FOR SOLUTION INTRAVENOUS at 04:49

## 2024-10-15 RX ADMIN — CLOPIDOGREL BISULFATE 75 MG: 75 TABLET ORAL at 08:24

## 2024-10-15 RX ADMIN — SENNOSIDES AND DOCUSATE SODIUM 1 TABLET: 8.6; 5 TABLET ORAL at 08:24

## 2024-10-15 RX ADMIN — CALCIUM GLUCONATE 1 G: 20 INJECTION, SOLUTION INTRAVENOUS at 21:21

## 2024-10-15 RX ADMIN — Medication 125 MCG: at 08:24

## 2024-10-15 RX ADMIN — FLUDROCORTISONE ACETATE 0.1 MG: 0.1 TABLET ORAL at 08:25

## 2024-10-15 RX ADMIN — DEXTROSE AND SODIUM CHLORIDE: 5; 450 INJECTION, SOLUTION INTRAVENOUS at 01:59

## 2024-10-15 RX ADMIN — HEPARIN SODIUM 1350 UNITS/HR: 10000 INJECTION, SOLUTION INTRAVENOUS at 06:52

## 2024-10-15 RX ADMIN — Medication 1 TABLET: at 11:45

## 2024-10-15 RX ADMIN — BUDESONIDE 1 MG: 0.5 INHALANT ORAL at 11:10

## 2024-10-15 RX ADMIN — FLUTICASONE FUROATE AND VILANTEROL TRIFENATATE 1 PUFF: 100; 25 POWDER RESPIRATORY (INHALATION) at 20:38

## 2024-10-15 RX ADMIN — LIDOCAINE HYDROCHLORIDE 1 ML: 10 INJECTION, SOLUTION EPIDURAL; INFILTRATION; INTRACAUDAL; PERINEURAL at 14:15

## 2024-10-15 RX ADMIN — LIDOCAINE 2 PATCH: 4 PATCH TOPICAL at 16:28

## 2024-10-15 RX ADMIN — FLUTICASONE FUROATE AND VILANTEROL TRIFENATATE 1 PUFF: 100; 25 POWDER RESPIRATORY (INHALATION) at 00:54

## 2024-10-15 ASSESSMENT — ACTIVITIES OF DAILY LIVING (ADL)
ADLS_ACUITY_SCORE: 35
ADLS_ACUITY_SCORE: 39
ADLS_ACUITY_SCORE: 35
ADLS_ACUITY_SCORE: 39
ADLS_ACUITY_SCORE: 35
ADLS_ACUITY_SCORE: 35

## 2024-10-15 NOTE — PROGRESS NOTES
INFECTIOUS DISEASE FOLLOW UP NOTE    Date: 10/15/2024   CHIEF COMPLAINT: No chief complaint on file.       ASSESSMENT:  Jory Ambrose is a 49 year old old female with   History of Hodgkin lymphoma status post chemo therapy and mantle radiation.  Status post lung transplant x 2.  Coronary disease status post four-vessel CABG and MVR.  Shock liver with AST and ALT above 7000 at 1 point.  Improving.   ALEXEI with acute tubular necrosis.  required CRR now off.   Sepsis with leukocytosis.  Blood cultures from 10/13/2024 in process.  UA appears consistent with UTI.  UCx finalized as negative-had been on several days of antibiotics prior to collection. Stable.     PLAN:  - continue meropenem IV  - cbc diff  - remove lines as able  - follow pended cultures  - trend WBC, follow clinically may need CT imaging.   - will follow    Katherine Krause MD  [facility]   Office Telephone [phone].  Fax [phone]  [employer] paging    ______________________________________________________________________    SUBJECTIVE / INTERVAL HISTORY:  In chair. Feels ok. Tolerating antibiotics. CXR atelectasis.     ROS: All other systems negative except as listed above.    SH/FH/Habits/PMH reviewed and unchanged.    OBJECTIVE:  /61   Pulse (!) 123   Temp 97.6  F (36.4  C) (Oral)   Resp 21   Wt 114.3 kg (252 lb)   LMP  (LMP Unknown)   SpO2 99%   BMI 43.26 kg/m    FiO2 (%): 40 %  Resp: 21    Vital Signs  Temp: 98.5  F (36.9  C)  Temp src: Oral  Resp: 22  Pulse: 78  Pulse Rate Source: Monitor  BP: 121/69  BP Location: Left leg    Temp (24hrs), Av.1  F (36.7  C), Min:97.8  F (36.6  C), Max:98.5  F (36.9  C)      GEN: No acute distress.  In chair.   RESPIRATORY:  Normal breathing pattern. Clear to auscultation bilaterally  CARDIOVASCULAR:  Regular rate  ABDOMEN:  Soft, normal bowel sounds, non-tender,   EXTREMITIES: No edema.  SKIN/HAIR/NAILS:  No rashes  IV: central lines, peripheral  "IV      Antibiotics:  meropenem    Pertinent labs:  No results found for: \"CRP\"   CBC RESULTS:   Recent Labs   Lab Test 10/14/24  0758   WBC 23.8*   RBC 2.76*   HGB 8.0*   HCT 24.8*   MCV 90   MCH 29.0   MCHC 32.3   RDW 18.2*   *      Last Comprehensive Metabolic Panel:  Sodium   Date Value Ref Range Status   10/15/2024 129 (L) 135 - 145 mmol/L Final   06/09/2015 142 133 - 144 mmol/L Final     Potassium   Date Value Ref Range Status   10/15/2024 4.0 3.4 - 5.3 mmol/L Final   06/09/2015 3.8 3.4 - 5.3 mmol/L Final     Potassium POCT   Date Value Ref Range Status   10/08/2024 4.4 3.4 - 5.3 mmol/L Final     Chloride   Date Value Ref Range Status   10/15/2024 95 (L) 98 - 107 mmol/L Final   06/09/2015 108 94 - 109 mmol/L Final     Carbon Dioxide   Date Value Ref Range Status   06/09/2015 27 20 - 32 mmol/L Final     Carbon Dioxide (CO2)   Date Value Ref Range Status   10/15/2024 18 (L) 22 - 29 mmol/L Final     Anion Gap   Date Value Ref Range Status   10/15/2024 16 (H) 7 - 15 mmol/L Final   06/09/2015 7 3 - 14 mmol/L Final     Glucose   Date Value Ref Range Status   10/15/2024 112 (H) 70 - 99 mg/dL Final   06/09/2015 101 (H) 70 - 99 mg/dL Final     GLUCOSE BY METER POCT   Date Value Ref Range Status   10/15/2024 92 70 - 99 mg/dL Final     Urea Nitrogen   Date Value Ref Range Status   10/15/2024 54.1 (H) 6.0 - 20.0 mg/dL Final   06/09/2015 20 7 - 30 mg/dL Final     Creatinine   Date Value Ref Range Status   10/15/2024 3.40 (H) 0.51 - 0.95 mg/dL Final   06/09/2015 0.80 0.52 - 1.04 mg/dL Final     GFR Estimate   Date Value Ref Range Status   10/15/2024 16 (L) >60 mL/min/1.73m2 Final     Comment:     eGFR calculated using 2021 CKD-EPI equation.   06/09/2015 79 >60 mL/min/1.7m2 Final     Comment:     Non  GFR Calc     GFR, ESTIMATED POCT   Date Value Ref Range Status   08/06/2024 >60 >60 mL/min/1.73m2 Final     Calcium   Date Value Ref Range Status   10/15/2024 7.6 (L) 8.8 - 10.4 mg/dL Final     Comment: "     Reference intervals for this test were updated on 7/16/2024 to reflect our healthy population more accurately. There may be differences in the flagging of prior results with similar values performed with this method. Those prior results can be interpreted in the context of the updated reference intervals.   06/09/2015 9.2 8.5 - 10.1 mg/dL Final        MICROBIOLOGY DATA:  Personally reviewed.  7-Day Micro Results       Collected Updated Procedure Result Status      10/13/2024 0938 10/14/2024 0613 Urine Culture [[id number]]   Urine, Padron Catheter    Final result Component Value   Culture No Growth               10/13/2024 0931 10/14/2024 1146 Blood Culture Hand, Left [[id number]]   Blood from Hand, Left    Preliminary result Component Value   Culture No growth after 1 day  [P]                10/13/2024 0922 10/14/2024 1146 Blood Culture Line, arterial [[id number]]   Blood from Line, arterial    Preliminary result Component Value   Culture No growth after 1 day  [P]                10/10/2024 0036 10/11/2024 1016 Urine Culture [[id number]]   Urine, Padron Catheter    Final result Component Value   Culture No Growth               10/10/2024 0035 10/15/2024 0316 Blood Culture Line, arterial [[id number]]   Blood from Line, arterial    Final result Component Value   Culture No Growth                        RADIOLOGY:  Personally Reviewed.  Recent Results (from the past 24 hour(s))   XR Chest Port 1 View    Narrative    EXAM: XR CHEST PORT 1 VIEW  LOCATION: [facility]  DATE: 10/13/2024    INDICATION: s p ct removal  COMPARISON: 10/11/2024      Impression    IMPRESSION: Interval removal of midline mediastinal drain and bilateral chest tubes. Epicardial leads are no longer present. No pneumothorax. Low lung volumes with persistent mild atelectasis in the lower lungs. Prior sternotomy, mitral valve   replacement, CABG procedure.       Active Problems:    Mitral regurgitation    Coronary  artery disease involving native coronary artery of native heart with angina pectoris (H)

## 2024-10-15 NOTE — PROGRESS NOTES
CLINICAL NUTRITION SERVICES - BRIEF NOTE     Nutrition Prescription    RECOMMENDATIONS FOR MDs/PROVIDERS TO ORDER:  Consider liberalizing diet with ongoing inadequate po intakes <50% x 6 days     Malnutrition Status:    Moderate in context of acute illness     Recommendations already ordered by Registered Dietitian (RD):  Continue ensure clear, discontinue gel plus, add magic cup daily     Future/Additional Recommendations:  Monitor diet, po intake, weight, labs, I/Os.      EVALUATION OF THE PROGRESS TOWARD GOALS   Diet: Low Saturated Fat/2400 mg Sodium and Renal (dialysis)   Nutrition Supplement: Ensure clear daily, Gel Plus daily   Intake/Tolerance: Very Poor     10/8-12 CL   10/12-13 Renal diet   10/14 NPO, Renal Heart healthy diet     10/9-13 pt consuming 0-50% of meals this admit, ordering x1-3 meals/day   Pt consuming 100-630 kcal and 0-31 g protein x 6 days      NEW FINDINGS   Met with pt, pt family at bedside this AM. Pt reports dislike Gelatein plus, tolerating ensure clear. Pt declines additional protein drinks. Pt agreeable trial Magic cup. Family and pt discouraged with facility menu items- limited choices, as menu contains primarily high fat, high sodium, processed foods. Pt wanting to make better diet choices.   Food Allergy: Shellfish     CV Surgery ordering Calorie counts 10/15-17      Dosing Weight: 68.7 kg     ASSESSED NUTRITION NEEDS  Estimated Energy Needs: 1720  - 2060 kcals/day (25 - 30 kcals/kg)  Justification: Increased needs and Obese  Estimated Protein Needs: 69 -82 grams protein/day (1 - 1.2 grams of pro/kg)  Justification: Increased needs, even with ALEXEI  Estimated Fluid Needs: 1720 -2060 mL/day (1 mL/kcal), or per provider  Justification: Maintenance    INTERVENTIONS  Implementation  Medical food supplement therapy- Continue ensure clear, discontinue gel plus, add magic cup daily     Consider liberalizing diet with ongoing inadequate po intakes <50% x 6 days      Monitoring/Evaluation  Progress toward goals will be monitored and evaluated per protocol.

## 2024-10-15 NOTE — PROGRESS NOTES
Care Management Follow Up    Length of Stay (days): 7    Expected Discharge Date: 10/21/2024    Anticipated Discharge Plan:   TBD     Transportation: TBD     PT Recommendations:    OT Recommendations:   eval and recs pending     Barriers to Discharge: medical stability, post procedure care and monitoring, renal status-HD, pulm on NC, infection     Prior Living Situation:  Patient reports she lives in her apartment (has elevator) with spouse.  She is independent with ADLs/IADLs, ambulates without devices, has no services in community, uses cpap and works full time. Spouse is primary family contact and family willing to transport at discharge.        Discussed  Partnership in Safe Discharge Planning  document with patient/family: No      Handoff Completed: No, handoff not indicated or clinically appropriate     Patient/Spokesperson Updated: patient and her her mom 10/15     Additional Information:  Medical:  Patient with PMHx of GIA, asthma.GERD,  hypothyroidism, Pe on plavix, carotid stenosis,  Hodgkin's lymphoma status post chemotherapy and then mantle radiation and 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, currently in remission with severe multivessel and moderate left main coronary artery disease and stable angina who underwent coronary artery bypass grafting and MVR on 10/8.   CT surgery following. Nephrology consulted for ALEXEI. ID consulted for sepsis.           10/15/24:  Patient is not medically stable to initiate discharge planning. Pending progression, may benefit from LTACH. Briefly met with patient and her mom. Patient states her goal is to return home at discharge.  We did not discuss all discharge options for support but will address as she progresses.         Nahomy Cortez RN

## 2024-10-15 NOTE — PROGRESS NOTES
RT continues to follow patient today, receiving EZPAP therapy QID along with IS (obtaining 750 mL) and Flutter valve. BS diminished. Currently on 2 LPM nasal cannula. Receiving Pulmicort nebulizer BID per home routine. Patient has home CPAP at bedside, uses 2 LPM oxygen with home CPAP.     Marta Pruitt, RT

## 2024-10-15 NOTE — PLAN OF CARE
Goal Outcome Evaluation:      Plan of Care Reviewed With: patient, parent    Overall Patient Progress: improvingOverall Patient Progress: improving    Outcome Evaluation: Up in chair for hours today. 1st run of dialysis today.    [facility] - ICU    RN Progress Note:            Pertinent Assessments:      Please refer to flowsheet rows for full assessment     Up in chair, tolerating dialysis. Eating and drinking more today. More awake per family.            Key Events - This Shift:       1st dialysis run                Barriers to Discharge / Downgrade:     Dr. Wade wants 1st run in ICU. May downgrade tomorrow.          Point of Contact Update: YES-OR-NO: Yes, mother updated in room.

## 2024-10-15 NOTE — PROGRESS NOTES
CVTS Daily Progress Note   POD#7 s/p CABG x4 (LIMA>LAD, rSVG>RPDA, rSVG>LPL, rSVG>D2), RLE EVH, Attempted MVR/r, MVR ( 29 mm [employer] Mechanical Mitral Valve), LAAE  Attending: Mauro  LOS: 7    SUBJECTIVE/INTERVAL EVENTS:    Labile blood sugars past 24 hours, on D5 infusion, paused this AM given worsening hyponatremia and hypervolemia. Back in a fib w/ variable ventricular response. Lactate bump to 2.2. Likely will go for HD today, UOP picking up a bit but not proportional to hypervolemia, Cr rising. LFTs continue to downtrend. Maintaining oxygen saturations on home CPAP/2L NC. Normotensive. Up in chair this AM, more awake and alert today. Ambulating w/ therapy to chair thus far. Pain well controlled. +BM. Tolerating renal diet w/ poor intake - calorie count. Hgb drop to 7.5, likely dilutional. INR 1.75 - remains on heparin gtt. Patient denies new chest pain, shortness of breath, abdominal pain, calf pain, nausea. Patient has no questions today.     OBJECTIVE:  Temp:  [97.6  F (36.4  C)-98.6  F (37  C)] 97.6  F (36.4  C)  Pulse:  [] 106  Resp:  [21] 21  BP: (111-134)/(56-81) 120/62  SpO2:  [94 %-100 %] 99 %  Vitals:    10/11/24 0454 10/12/24 0400 10/13/24 0400 10/14/24 0400   Weight: 114.4 kg (252 lb 3.3 oz) 113.3 kg (249 lb 12.5 oz) 111.2 kg (245 lb 2.4 oz) 112.5 kg (248 lb 0.3 oz)    10/15/24 0600   Weight: 114.3 kg (252 lb)       Clinically Significant Risk Factors         # Hyponatremia: Lowest Na = 129 mmol/L in last 2 days, will monitor as appropriate  # Hypochloremia: Lowest Cl = 95 mmol/L in last 2 days, will monitor as appropriate  # Hypocalcemia: Lowest iCa = 4.2 mg/dL in last 2 days, will monitor and replace as appropriate     # Hypoalbuminemia: Lowest albumin = 2.7 g/dL at 10/14/2024  6:21 PM, will monitor as appropriate     # Thrombocytopenia: Lowest platelets = 98 in last 2 days, will monitor for bleeding  # Acute Kidney Injury, unspecified: based on a >150% or 0.3 mg/dL increase in last  "creatinine compared to past 90 day average, will monitor renal function  # Hypertension: Noted on problem list           # Severe Obesity: Estimated body mass index is 43.26 kg/m  as calculated from the following:    Height as of 9/25/24: 1.626 m (5' 4\").    Weight as of this encounter: 114.3 kg (252 lb).   # Moderate Malnutrition: based on nutrition assessment     # History of CABG: noted on surgical history            Current Medications:    Scheduled Meds:  Current Facility-Administered Medications   Medication Dose Route Frequency Provider Last Rate Last Admin    atorvastatin (LIPITOR) tablet 80 mg  80 mg Oral At Bedtime Kay Ann PA-C   80 mg at 10/14/24 2008    budesonide (PULMICORT) neb solution 1 mg  1 mg Nebulization BID Bharat Lu MD   1 mg at 10/14/24 1941    cetirizine (zyrTEC) tablet 10 mg  10 mg Oral Daily Catherine Galvez PA-C   10 mg at 10/14/24 0856    clopidogrel (PLAVIX) tablet 75 mg  75 mg Oral Daily Catherine Galvez PA-C   75 mg at 10/14/24 0856    fludrocortisone (FLORINEF) tablet 0.1 mg  0.1 mg Oral Daily Catherine Galvez PA-C   0.1 mg at 10/14/24 0856    fluticasone-vilanterol (BREO ELLIPTA) 100-25 MCG/ACT inhaler 1 puff  1 puff Inhalation At Bedtime Catherine Galvez PA-C   1 puff at 10/15/24 0054    heparin lock flush 10 unit/mL injection 5-20 mL  5-20 mL Intracatheter Q24H Alistair Escudero MD   5 mL at 10/13/24 2133    [Held by provider] insulin aspart (NovoLOG) injection (RAPID ACTING)  1-7 Units Subcutaneous Q4H Catherine Galvez PA-C   2 Units at 10/13/24 2020    levothyroxine (SYNTHROID/LEVOTHROID) tablet 125 mcg  125 mcg Oral QAM AC Catherine Galvez PA-C   125 mcg at 10/14/24 0632    Lidocaine (LIDOCARE) 4 % Patch 1-2 patch  1-2 patch Transdermal Q24H Catherine Galvez PA-C   1 patch at 10/14/24 1737    meropenem (MERREM) 1 g vial to attach to  mL bag  1 g Intravenous Q12H Jamel Bustillo MD   1 g at 10/15/24 1564    " metoprolol tartrate (LOPRESSOR) tablet 25 mg  25 mg Oral BID Catherine Galvez PA-C        multivitamin w/minerals (THERA-VIT-M) tablet 1 tablet  1 tablet Oral Daily Catherine Galvez PA-C   1 tablet at 10/14/24 1244    pantoprazole (PROTONIX) EC tablet 40 mg  40 mg Oral Daily Catherine Galvez PA-C   40 mg at 10/14/24 0856    senna-docusate (SENOKOT-S/PERICOLACE) 8.6-50 MG per tablet 1 tablet  1 tablet Oral BID Catherine Galvez PA-C   1 tablet at 10/14/24 2009    sodium chloride (PF) 0.9% PF flush 10-40 mL  10-40 mL Intracatheter Q8H Alistair Escudero MD   10 mL at 10/14/24 0631    sodium chloride (PF) 0.9% PF flush 10-40 mL  10-40 mL Intracatheter Q7 Days Catherine Galvez PA-C        Vitamin D3 (CHOLECALCIFEROL) tablet 125 mcg  125 mcg Oral Daily Catherine Galvez PA-C   125 mcg at 10/14/24 0856    Warfarin Dose Required Daily - Pharmacist Managed  1 each Oral See Admin Instructions Kay Ann PA-C         Continuous Infusions:  Current Facility-Administered Medications   Medication Dose Route Frequency Provider Last Rate Last Admin    [Held by provider] dextrose 5% and 0.45% NaCl infusion   Intravenous Continuous Catherine Galvez PA-C 75 mL/hr at 10/15/24 0159 New Bag at 10/15/24 0159    heparin 25,000 units in 0.45% NaCl 250 mL ANTICOAGULANT infusion  0-5,000 Units/hr Intravenous Continuous David Wade MD 13.5 mL/hr at 10/15/24 0652 1,350 Units/hr at 10/15/24 0652    norepinephrine (LEVOPHED) 4 mg in  mL infusion PREMIX  0.01-0.6 mcg/kg/min Intravenous Continuous Catherine Galvez PA-C   Stopped at 10/13/24 1800     PRN Meds:.  Current Facility-Administered Medications   Medication Dose Route Frequency Provider Last Rate Last Admin    albuterol (PROVENTIL HFA/VENTOLIN HFA) inhaler  2 puff Inhalation Q6H PRN Catherine Galvez PA-C        bisacodyl (DULCOLAX) suppository 10 mg  10 mg Rectal Daily PRN Catherine Galvez PA-C        calcium gluconate 1  g in 50 mL in sodium chloride intermittent infusion  1 g Intravenous Once PRN Catherine Galvez PA-C   1 g at 10/14/24 0632    calcium gluconate 2 g in  mL intermittent infusion  2 g Intravenous Once PRN Catherine Galvez PA-C        calcium gluconate 3 g in sodium chloride 0.9 % 100 mL intermittent infusion  3 g Intravenous Once PRN Catherine Galvez PA-C        glucose gel 15-30 g  15-30 g Oral Q15 Min PRN Doc Dhillon MD        Or    dextrose 50 % injection 25-50 mL  25-50 mL Intravenous Q15 Min PRN Doc Dhillon MD   50 mL at 10/14/24 0806    Or    glucagon injection 1 mg  1 mg Subcutaneous Q15 Min PRN Doc Dhillon MD        fluticasone (FLONASE) 50 MCG/ACT spray 2 spray  2 spray Both Nostrils Daily PRN Catherine Galvez PA-C        heparin lock flush 10 unit/mL injection 5-20 mL  5-20 mL Intracatheter Q1H PRN Alistair Escudero MD        hydrALAZINE (APRESOLINE) injection 10 mg  10 mg Intravenous Q30 Min PRN Catherine Galvez PA-C        ipratropium - albuterol 0.5 mg/2.5 mg/3 mL (DUONEB) neb solution 3 mL  3 mL Nebulization Q4H PRN Bharat Lu MD        magnesium hydroxide (MILK OF MAGNESIA) suspension 30 mL  30 mL Oral Daily PRN Catherine Galvez PA-C        methocarbamol (ROBAXIN) tablet 750 mg  750 mg Oral Q6H PRN Catherine Galvez PA-C   750 mg at 10/12/24 2143    naloxone (NARCAN) injection 0.2 mg  0.2 mg Intravenous Q2 Min PRN David Wade MD        Or    naloxone (NARCAN) injection 0.4 mg  0.4 mg Intravenous Q2 Min PRN David Wade MD        Or    naloxone (NARCAN) injection 0.2 mg  0.2 mg Intramuscular Q2 Min PRN David Wade MD        Or    naloxone (NARCAN) injection 0.4 mg  0.4 mg Intramuscular Q2 Min PRN David Wade MD        ondansetron (ZOFRAN ODT) ODT tab 4 mg  4 mg Oral Q6H PRN Catherine Galvez PA-C        Or    ondansetron (ZOFRAN) injection 4 mg  4 mg Intravenous Q6H PRN Lenny,  Catherine MCKEON PA-C   4 mg at 10/10/24 1945    oxyCODONE IR (ROXICODONE) half-tab 2.5-5 mg  2.5-5 mg Oral Q4H PRN Catherine Galvez PA-C        prochlorperazine (COMPAZINE) injection 10 mg  10 mg Intravenous Q6H PRN Catherine Galvez PA-C        Or    prochlorperazine (COMPAZINE) tablet 10 mg  10 mg Oral Q6H PRN Catherine Galvez PA-C        simethicone (MYLICON) chewable half-tab 120 mg  120 mg Oral 4x Daily PRN Catherine Galvez PA-C        sodium chloride (PF) 0.9% PF flush 10-20 mL  10-20 mL Intracatheter q1 min prn Alistair Escudero MD        sodium chloride (PF) 0.9% PF flush 10-20 mL  10-20 mL Intracatheter q1 min prn Catherine Galvez PA-C        sodium chloride (PF) 0.9% PF flush 10-40 mL  10-40 mL Intracatheter Q1H PRN Catherine Galvez PA-C           Cardiographics:    Telemetry monitoring demonstrates a fib w/ rates in the 110s per my personal review.    Imaging:  Results for orders placed or performed during the hospital encounter of 10/08/24   XR Chest Port 1 View    Impression    IMPRESSION: Interval sternotomy, CABG procedure and mitral valve repair. Endotracheal tube proximally 2 cm above the sofia. Nasogastric tube in the stomach. Bilateral chest tubes and midline mediastinal drain in expected position. Epicardial leads are   present. Left IJ line in the brachiocephalic vein.    Low lung volumes. No evidence for CHF or pneumonia. No pleural effusion or pneumothorax.   XR Chest Port 1 View    Impression    IMPRESSION: Poststernotomy changes with prosthetic mitral valve. Patient's been extubated and the NG tube has been removed. Mediastinal and pleural chest tubes are in place.    Left IJ cordis sheath is in the distal left innominate artery. Catheter has a very subtle kink within it 3.5 centimeters from the tip.    Low lung volumes. No pneumothorax. Likely trace left effusion at the base. No signs of pneumonia or failure. Heart is of normal size.   XR Abdomen Port 1 View     Impression    IMPRESSION: Orogastric tube tip in the proximal stomach and oriented superiorly towards the left diaphragm. Visualized bowel gas pattern unremarkable. Numerous tubes and lines lower chest and upper abdomen as described on chest x-ray report from today.        Labs, personally reviewed.  Hemoglobin   Date Value Ref Range Status   10/15/2024 7.5 (L) 11.7 - 15.7 g/dL Final   10/14/2024 8.0 (L) 11.7 - 15.7 g/dL Final   10/13/2024 8.5 (L) 11.7 - 15.7 g/dL Final   06/09/2015 13.5 11.7 - 15.7 g/dL Final   03/11/2015 12.3 11.7 - 15.7 g/dL Final   01/08/2015 13.6 11.7 - 15.7 g/dL Final     WBC   Date Value Ref Range Status   06/09/2015 7.9 4.0 - 11.0 10e9/L Final   03/11/2015 9.4 4.0 - 11.0 10e9/L Final   01/08/2015 8.1 4.0 - 11.0 10e9/L Final     WBC Count   Date Value Ref Range Status   10/15/2024 27.9 (H) 4.0 - 11.0 10e3/uL Final   10/14/2024 23.8 (H) 4.0 - 11.0 10e3/uL Final   10/13/2024 16.3 (H) 4.0 - 11.0 10e3/uL Final     Platelet Count   Date Value Ref Range Status   10/15/2024 133 (L) 150 - 450 10e3/uL Final   10/14/2024 105 (L) 150 - 450 10e3/uL Final   10/13/2024 98 (L) 150 - 450 10e3/uL Final   06/09/2015 219 150 - 450 10e9/L Final   03/11/2015 240 150 - 450 10e9/L Final   01/08/2015 221 150 - 450 10e9/L Final     Creatinine   Date Value Ref Range Status   10/15/2024 3.40 (H) 0.51 - 0.95 mg/dL Final   10/14/2024 2.76 (H) 0.51 - 0.95 mg/dL Final   10/14/2024 2.49 (H) 0.51 - 0.95 mg/dL Final   06/09/2015 0.80 0.52 - 1.04 mg/dL Final   03/11/2015 0.87 0.52 - 1.04 mg/dL Final   01/08/2015 0.79 0.52 - 1.04 mg/dL Final     Potassium   Date Value Ref Range Status   10/15/2024 4.0 3.4 - 5.3 mmol/L Final   10/14/2024 3.8 3.4 - 5.3 mmol/L Final   10/14/2024 4.0 3.4 - 5.3 mmol/L Final   06/09/2015 3.8 3.4 - 5.3 mmol/L Final   03/11/2015 3.9 3.4 - 5.3 mmol/L Final   01/08/2015 4.1 3.4 - 5.3 mmol/L Final     Potassium POCT   Date Value Ref Range Status   10/08/2024 4.4 3.4 - 5.3 mmol/L Final   10/08/2024 4.0 3.4  - 5.3 mmol/L Final   10/08/2024 4.1 3.4 - 5.3 mmol/L Final     Magnesium   Date Value Ref Range Status   10/15/2024 3.1 (H) 1.7 - 2.3 mg/dL Final   10/14/2024 3.0 (H) 1.7 - 2.3 mg/dL Final   10/13/2024 2.7 (H) 1.7 - 2.3 mg/dL Final   06/09/2015 1.8 1.6 - 2.3 mg/dL Final   03/11/2015 1.7 1.6 - 2.3 mg/dL Final   01/08/2015 1.8 1.6 - 2.3 mg/dL Final          I/O:  I/O last 3 completed shifts:  In: 1650.27 [P.O.:200; I.V.:1450.27]  Out: 865 [Urine:865]       Physical Exam:    General: Patient seen up in chair this AM. Answers appropriately,more awake and alert today although continues to appear fatigued.  HEENT: DORITA, no sclera icterus, moist mucosa  CV: RRR on monitor. 2+ peripheral pulses in all extremities. Mild peripheral edema. Incision C/D/I.  Pulm: Non-labored effort on 2L NC.   Abd: Soft, NT, ND  : Padron with john urine  Ext: Mild pedal edema, SCDs in place, warm, distal pulses intact  Neuro: CNs grossly intact, FAM, A&Ox3      ASSESSMENT/PLAN:    Jory Ambrose is a 49 year old female with a history of GIA, asthma, GERD,  hypothyroidism, h/o PE on plavix, carotid stenosis, Hodgkin's lymphoma (s/p chemo/radiation and bone marrow transplant x2 after recurrence) currently in remission who is s/p CABG x4, RLE EVH, attempted mitral repair, mechanical MVR, LAAE.    Active Problems:    Mitral regurgitation    Coronary artery disease involving native coronary artery of native heart with angina pectoris (H)  #Acute Renal Failure d/t ATN, ongoing.      NEURO:   - Scheduled lidocaine patches and PRN robaxin/oxycodone for pain  - No toradol given ARF  - PTA bupropion discontinued (somnolence)  - Tylenol discontinued (transaminitis)    CV:   - Pre-op EF 55-60%  - Chest tubes and TPW's removed without immediate complications POD#5  - Normotensive  - Increase metop to 25 mg BID  - Plavix daily indefinitely d/t unclear h/o anaphylaxis to ASA  - Atorvastatin 80mg daily  - Low intensity heparin gtt bridge to  warfarin for mechanical MVR. INR goal 2.5-3.5  - Rate controlled afib - s/p amiodarone bolus and infusion (ok per Mauro). Ok to stop given LFTs, in SR, and allergy to PO amio additive. Consider amio if ongoing a fib  - New baseline echo prior to discharge    PULM:   - Extubated on POD#0  - Maintaining oxygen saturations on 2L NC/home CPAP overnight - likely d/t hypervolemia  - Encourage pulmonary toilet  - PTA zyrtec, fludrocortisone, flonase, simethicone, albuterol, pulmicort, breo ellipta, duoneb    FEN/GI:  - Continue electrolyte replacement protocol  - Renal diet - encourage PO intake. Supplements ordered between meals. Consider TF if no improvement  - On calorie count  - Bowel regimen, LBM 10/13  - Ischemic hepatitis - // and downtrending  - Hepatic w/u and labs unremarkable  - PTA vitamin D3   - Hypermagnesemia, hyperphosphoremia, monitor.  - CMP BID  - Mild hyponatremia, monitor for now  - Lactate 2.2 this AM, monitor for now.    RENAL:  - Nephrology consulted  for ARF w/ decreased UOP, appreciate. Following.  - CRRT 10/11 - 10/13  - Likely will go for iHD today  - Cr 3.40 (2.76) (baseline ~0.9)  - Padron to remain in for close monitoring of I/O in s/o ARF  - UA/Urine cx 10/10 & 10/13 NG  - CMP BID as above    HEME:  - H/o bone marrow tx. Needs irradiated blood  - Acute blood loss anemia post-op.   - Hgb 7.5 (8.0), likely dilutional.   - Received 2u PRBCs periop, 1u PRBCs 10/11  - Low intensity heparin gtt bridge to warfarin INR [facility]  - HIT screen negative. Plts improving.  - INR 1.75 (1.61)    ID:  - Lydia op ppx complete. Afebrile. Leukocytosis -  WBC 27.9 (23.8)  - UA/Urine cx 10/10 & 10/13 NG.  - Blood cx 10/9 & 10/13 NG  - Sputum culture 10/13 - pending  - ID consult for sepsis, appreciate. Following.   - Switch vanc/rocephin to meropenem   - Consider CT  - CBC qday    ENDO:   - HbA1c 5.7%  - Hypoglycemia requiring D5 infusion past 24 hrs - held this AM given  worsening hyponatremia and hypervolemia - q1 BG checks  - Push PO intake as above  - Serum cortisol WNL, ACTH pending, pancreatitis labs unremarkable  - PTA Metformin discontinued (renal function)  - PTA synthroid    PPx:   - DVT: SCDs, SQ heparin TID, ambulation   - GI: Protonix 40mg IV/PO daily    DISPO:   - Continue critical care in ICU for now given need for q1 hr BG checks, lactic acidosis  - Medically Ready for Discharge: Anticipated in 5+ Days         Patient seen and discussed w/ Dr. Wade.      Eva Galvez PA-C  [facility] Cardiothoracic Surgery  Secure Chat or Vocera  October 15, 2024

## 2024-10-15 NOTE — PROCEDURES
Potassium   Date Value Ref Range Status   10/15/2024 4.0 3.4 - 5.3 mmol/L Final   06/09/2015 3.8 3.4 - 5.3 mmol/L Final     Potassium POCT   Date Value Ref Range Status   10/08/2024 4.4 3.4 - 5.3 mmol/L Final     Hemoglobin   Date Value Ref Range Status   10/15/2024 7.5 (L) 11.7 - 15.7 g/dL Final   06/09/2015 13.5 11.7 - 15.7 g/dL Final     Creatinine   Date Value Ref Range Status   10/15/2024 3.40 (H) 0.51 - 0.95 mg/dL Final   06/09/2015 0.80 0.52 - 1.04 mg/dL Final     Urea Nitrogen   Date Value Ref Range Status   10/15/2024 54.1 (H) 6.0 - 20.0 mg/dL Final   06/09/2015 20 7 - 30 mg/dL Final     Sodium   Date Value Ref Range Status   10/15/2024 129 (L) 135 - 145 mmol/L Final   06/09/2015 142 133 - 144 mmol/L Final     INR   Date Value Ref Range Status   10/15/2024 1.75 (H) 0.85 - 1.15 Final   05/06/2014 0.92 0.86 - 1.14 Final       DIALYSIS PROCEDURE NOTE  Hepatitis status of previous patient on machine log was checked and verified ok to use with this patients hepatitis status.  Patient dialyzed for 3 hrs. on a K3 bath with a net fluid removal of  0.6L.  A BFR of 350 ml/min was obtained via a CVC     The treatment plan was discussed with Dr. Gamez during the treatment.      Line flushed, clamped and capped with heparin     Meds  given: None   Complications: none, pt tolerated tx well      Person educated: patient. Knowledge base none. Barriers to learning: pt drowsy. Educated on procedure via oral mode.      ICEBOAT? Timeout performed pre-treatment  I: Patient was identified using 2 identifiers  C:  Consent Signed Yes  E: Equipment preventative maintenance is current and dialysis delivery system OK to use  B: Hepatitis B Surface Antigen: UNKNOWN IN PROCESS; Draw Date: 10/15/24      Hepatitis B Surface Antibody: UNK; in process Draw Date: 10/15/24  O: Dialysis orders present and complete prior to treatment  A: Vascular access verified and assessed prior to treatment  T: Treatment was performed at a [facility]  appropriate time  ?: Patient was allowed to ask questions and address concerns prior to treatment  See Adult Hemodialysis flowsheet in EPIC for further details and post assessment.  Machine water alarm in place and functioning. Transducer pods intact and checked every 15min.     Chlorine/Chloramine water system checked every 4 hours.      Patient repositioned every 2 hours during the treatment.  Post treatment report given to LY Sandra RN regarding 0.6L of fluid removed, last BP of 98/53, and patient pain rating of 0/10.

## 2024-10-15 NOTE — PROCEDURES
"PICC Line Insertion Procedure Note     Pt. Name:   Jory Ambrose     MRN:          [id number]     Procedure: Insertion of a  Triple Lumen  5 fr  Bard SOLO (valved) Power PICC, Lot number [id number]     Indications: multiple gtts in the ICU     Contraindications : OK to use LUE per Dr Abreu from nephrology     Procedure Details:     Patient identified with 2 identifiers and \"Time Out\" conducted.     Central line insertion bundle followed: hand hygiene performed prior to procedure, site cleansed with Cholraprep (CHG), hat, mask, sterile gloves, sterile gown worn, patient draped with maximum barrier head to toe drape, sterile field maintained.     The vein was assessed and found to be compressible and of adequate size.      Lidocaine 1% one ml administered SQ to the insertion site.      Modified Seldinger Technique (MST) used for insertion, one attempt(s) required to access vein.      A 5 Fr PICC was inserted into the brachial vein of the left upper arm.        Catheter threaded without difficulty. Good blood return noted.     Catheter was flushed with 10 cc normal saline.      Catheter secured with Statlock, Biopatch, and Tegaderm dressing applied.     The sharps that are included in the PICC insertion kit were accounted for and disposed of in the sharps container prior to breakdown of the sterile field.     CLABSI prevention brochure left at bedside.     Patient tolerated procedure well.      Patient's primary RN notified PICC is ready for use.       Findings:     Total catheter length  50 cm, with 0 cm exposed. Mid upper arm circumference is 40 cm.      Tip placement verified in the upper SVC per PCXR.    Comments:  The PICC is OK to use.         Elise Aden, RN BSN  Vascular Access - [facility]   "

## 2024-10-15 NOTE — PLAN OF CARE
[facility] - ICU    RN Progress Note:            Pertinent Assessments:      Please refer to flowsheet rows for full assessment     Vital signs stable, no fever, turned and repositioned every two hours. Denies pain> CHG bath completed.           Key Events - This Shift:       Uneventful                Barriers to Discharge / Downgrade:     ICU needs.         Point of Contact Update: No  If No, reason: Uneventful  Name:  Phone Number:  Summary of Conversation:

## 2024-10-15 NOTE — PROGRESS NOTES
'    [facility] ([facility]) progress note  CC: F/U ALEXEI  S: Patient says she is doing okay. SOB with exertion. Has a little more energy today. Amenable to try HD Today.     A/P:   Acute kidney injury.  Acute tubular necrosis.  Secondary to CABG and mitral valve replacement followed by cardiogenic shock.  Baseline creatinine 0.95 (10/8/2024).    Required CRRT, off 10/13  Off pressors as of 10/14  UOP improving, no longer oliguric, but labs worsening   Trial HD today  Daily labs   Strict Is/Os  Daily weights   Avoid nephrotoxins  Renally dose meds      2. Hyperkalemia: resolved      3. Metabolic acidosis: HD today      4. Status post coronary artery bypass grafting x 4 vessels and mitral valve replacement on 10/8.  Diffuse coronary artery disease     5. Shock liver.  Continue supportive care.  LFTs downtrending.      6. History of Hodgkin's lymphoma.  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    Brea Buchanan MD  [facility]  [phone]    /62 (BP Location: Left arm)   Pulse 106   Temp 97.6  F (36.4  C) (Oral)   Resp 21   Wt 114.3 kg (252 lb)   LMP  (LMP Unknown)   SpO2 99%   BMI 43.26 kg/m      I/O last 3 completed shifts:  In: 1650.27 [P.O.:200; I.V.:1450.27]  Out: 865 [Urine:865]    Physical Exam:   GENERAL: ill appearing, NAD, sitting up in chair  EYES: EOMI   ENT: MMM  RESP: no increased WOB, on O2   CV: tachycardic, + leg edema.    GI: ND  SKIN: No rash, pale     Recent Labs   Lab 10/15/24  0648 10/14/24  1821 10/14/24  0345 10/13/24  1536 10/13/24  1201 10/13/24  0414 10/12/24  2002 10/12/24  1153 10/12/24  0615 10/12/24  0408   * 130* 133* 135 134* 136 137 137   < > 137   POTASSIUM 4.0 3.8 4.0 4.1 4.1 4.3 4.3 4.2   < > 4.3   CHLORIDE 95* 97* 99 100 102 100 104 105   < > 102   CO2 18* 18* 21* 19* 21* 22 22 23   < > 25   BUN 54.1* 42.3* 37.7* 27.0* 22.2* 23.9* 26.7* 33.1*   < > 41.6*   CR 3.40* 2.76* 2.49* 1.85*  1.51* 1.60* 1.79* 2.08*   < > 2.62*   GFRESTIMATED 16* 20* 23* 33* 42* 39* 34* 29*   < > 22*   CINDY 7.6* 7.6* 7.5* 7.9* 7.3* 7.4* 7.0* 7.4*   < > 7.6*   PHOS 4.7*  --  4.7* 5.1* 4.4 4.4 4.1 4.4  --  5.0*   MAG 3.1*  --  3.0*  --  2.7* 2.7* 2.4* 2.6*  --  2.6*   ALBUMIN 2.8* 2.7* 3.0* 3.3* 3.1* 3.2* 3.1* 3.0*   < > 3.0*    < > = values in this interval not displayed.     Recent Labs   Lab 10/15/24  0648 10/14/24  0758 10/13/24  1201 10/13/24  0414 10/12/24  2002 10/12/24  0615 10/12/24  0408   WBC 27.9* 23.8* 16.3* 16.5* 12.2* 9.3 9.1   HGB 7.5* 8.0* 8.5* 8.6* 8.2* 8.0* 8.0*   HCT 23.2* 24.8* 25.8* 26.3* 25.1* 24.4* 23.8*   MCV 91 90 89 89 88 87 86   * 105* 98* 90* 82* 59* 53*       Current Facility-Administered Medications:     albuterol (PROVENTIL HFA/VENTOLIN HFA) inhaler, 2 puff, Inhalation, Q6H PRN, Catherine Galvez PA-C    atorvastatin (LIPITOR) tablet 80 mg, 80 mg, Oral, At Bedtime, Kay Ann PA-C, 80 mg at 10/14/24 2008    bisacodyl (DULCOLAX) suppository 10 mg, 10 mg, Rectal, Daily PRN, Catherine Galvez PA-C    budesonide (PULMICORT) neb solution 1 mg, 1 mg, Nebulization, BID, Bharat Lu MD, 1 mg at 10/14/24 1941    calcium gluconate 1 g in 50 mL in sodium chloride intermittent infusion, 1 g, Intravenous, Once PRN, Catherine Galvez PA-C, 1 g at 10/15/24 0831    calcium gluconate 2 g in  mL intermittent infusion, 2 g, Intravenous, Once PRN, Catherine Galvez PA-C    calcium gluconate 3 g in sodium chloride 0.9 % 100 mL intermittent infusion, 3 g, Intravenous, Once PRN, Catherine Galvez PA-C    cetirizine (zyrTEC) tablet 10 mg, 10 mg, Oral, Daily, Catherine Galvez PA-C, 10 mg at 10/15/24 0825    clopidogrel (PLAVIX) tablet 75 mg, 75 mg, Oral, Daily, Catherine Galvez PA-C, 75 mg at 10/15/24 0824    [Held by provider] dextrose 5% and 0.45% NaCl infusion, , Intravenous, Continuous, Catherine Galvez PA-C, Stopped at 10/15/24 0825    glucose gel  15-30 g, 15-30 g, Oral, Q15 Min PRN **OR** dextrose 50 % injection 25-50 mL, 25-50 mL, Intravenous, Q15 Min PRN, 50 mL at 10/14/24 0806 **OR** glucagon injection 1 mg, 1 mg, Subcutaneous, Q15 Min PRN, Doc Dhillon MD    fludrocortisone (FLORINEF) tablet 0.1 mg, 0.1 mg, Oral, Daily, Catherine Galvez PA-C, 0.1 mg at 10/15/24 0825    fluticasone (FLONASE) 50 MCG/ACT spray 2 spray, 2 spray, Both Nostrils, Daily PRN, Catherine Galvze PA-C    fluticasone-vilanterol (BREO ELLIPTA) 100-25 MCG/ACT inhaler 1 puff, 1 puff, Inhalation, At Bedtime, Catherine Galvez PA-C, 1 puff at 10/15/24 0054    heparin 25,000 units in 0.45% NaCl 250 mL ANTICOAGULANT infusion, 0-5,000 Units/hr, Intravenous, Continuous, KodakDavid MD, Last Rate: 13.5 mL/hr at 10/15/24 0652, 1,350 Units/hr at 10/15/24 0652    heparin lock flush 10 unit/mL injection 5-20 mL, 5-20 mL, Intracatheter, Q24H, Alistair Escudero MD, 5 mL at 10/13/24 2133    heparin lock flush 10 unit/mL injection 5-20 mL, 5-20 mL, Intracatheter, Q1H PRN, Alistair Escudero MD    hydrALAZINE (APRESOLINE) injection 10 mg, 10 mg, Intravenous, Q30 Min PRN, Catherine Galvez PA-C    [Held by provider] insulin aspart (NovoLOG) injection (RAPID ACTING), 1-7 Units, Subcutaneous, Q4H, Catherine Galvez PA-C, 2 Units at 10/13/24 2020    ipratropium - albuterol 0.5 mg/2.5 mg/3 mL (DUONEB) neb solution 3 mL, 3 mL, Nebulization, Q4H PRN, Bharat Lu MD    levothyroxine (SYNTHROID/LEVOTHROID) tablet 125 mcg, 125 mcg, Oral, QAM AC, Catherine Galvez PA-C, 125 mcg at 10/15/24 0824    Lidocaine (LIDOCARE) 4 % Patch 1-2 patch, 1-2 patch, Transdermal, Q24H, Catherine Galvez PA-C, 1 patch at 10/14/24 1737    magnesium hydroxide (MILK OF MAGNESIA) suspension 30 mL, 30 mL, Oral, Daily PRN, Catherine Galvez PA-C    meropenem (MERREM) 500 mg vial to attach to  mL bag for ADULTS or 25 mL bag for PEDS, 500 mg, Intravenous, Q12H, [place],  MD Jamel    methocarbamol (ROBAXIN) tablet 750 mg, 750 mg, Oral, Q6H PRN, Catherine Galvez PA-C, 750 mg at 10/12/24 2143    metoprolol tartrate (LOPRESSOR) tablet 25 mg, 25 mg, Oral, BID, Catherine Galvez PA-C, 25 mg at 10/15/24 0824    multivitamin w/minerals (THERA-VIT-M) tablet 1 tablet, 1 tablet, Oral, Daily, Catherine Galvez PA-C, 1 tablet at 10/14/24 1244    naloxone (NARCAN) injection 0.2 mg, 0.2 mg, Intravenous, Q2 Min PRN **OR** naloxone (NARCAN) injection 0.4 mg, 0.4 mg, Intravenous, Q2 Min PRN **OR** naloxone (NARCAN) injection 0.2 mg, 0.2 mg, Intramuscular, Q2 Min PRN **OR** naloxone (NARCAN) injection 0.4 mg, 0.4 mg, Intramuscular, Q2 Min PRN, David Wade MD    norepinephrine (LEVOPHED) 4 mg in  mL infusion PREMIX, 0.01-0.6 mcg/kg/min, Intravenous, Continuous, Catherine Galvez PA-C, Stopped at 10/13/24 1800    ondansetron (ZOFRAN ODT) ODT tab 4 mg, 4 mg, Oral, Q6H PRN **OR** ondansetron (ZOFRAN) injection 4 mg, 4 mg, Intravenous, Q6H PRN, Catherine Galvez PA-C, 4 mg at 10/10/24 1945    oxyCODONE IR (ROXICODONE) half-tab 2.5-5 mg, 2.5-5 mg, Oral, Q4H PRN, Catherine Galvez PA-C    [DISCONTINUED] pantoprazole (PROTONIX) 2 mg/mL suspension 40 mg, 40 mg, Oral or NG Tube, Daily, 40 mg at 10/08/24 1729 **OR** pantoprazole (PROTONIX) EC tablet 40 mg, 40 mg, Oral, Daily, Catherine Galvez PA-C, 40 mg at 10/15/24 0824    prochlorperazine (COMPAZINE) injection 10 mg, 10 mg, Intravenous, Q6H PRN **OR** prochlorperazine (COMPAZINE) tablet 10 mg, 10 mg, Oral, Q6H PRN, Catherine Galvez PA-C    senna-docusate (SENOKOT-S/PERICOLACE) 8.6-50 MG per tablet 1 tablet, 1 tablet, Oral, BID, Catherine Galvez PA-C, 1 tablet at 10/15/24 0824    simethicone (MYLICON) chewable half-tab 120 mg, 120 mg, Oral, 4x Daily PRN, Catherine Galvez PA-C    sodium chloride (PF) 0.9% PF flush 10-20 mL, 10-20 mL, Intracatheter, q1 min prn, Alistair Escudero MD    sodium  chloride (PF) 0.9% PF flush 10-20 mL, 10-20 mL, Intracatheter, q1 min prn, Catherine Galvez PA-C    sodium chloride (PF) 0.9% PF flush 10-40 mL, 10-40 mL, Intracatheter, Q8H, Alistair Escudero MD, 10 mL at 10/14/24 0631    sodium chloride (PF) 0.9% PF flush 10-40 mL, 10-40 mL, Intracatheter, Q7 Days, Catherine Galvez PA-C    sodium chloride (PF) 0.9% PF flush 10-40 mL, 10-40 mL, Intracatheter, Q1H PRN, Catherine Galvez PA-C    Vitamin D3 (CHOLECALCIFEROL) tablet 125 mcg, 125 mcg, Oral, Daily, Catherine Galvez PA-C, 125 mcg at 10/15/24 0824    Warfarin Dose Required Daily - Pharmacist Managed, 1 each, Oral, See Admin Instructions, Kay Ann PA-C    Labs personally reviewed today during this evaluation at 847am.

## 2024-10-15 NOTE — TREATMENT PLAN
[facility]AT Treatment Plan    Patient Score: 9  Patient Acuity: 4    Clinical Indication for Therapy: atelectasis, CABG x4, MVR     Therapy Ordered: Pulicort BID, FV, IS, EZPAP    Assessment Summary: Patient seen on 2L NC. Neb Pulmicort given , Patient achieved 850 on IS. Will continue to encourage Deep breathing. Does well With EZPAP and FV.     Xena Carreon, RT  10/14/2024

## 2024-10-16 ENCOUNTER — APPOINTMENT (OUTPATIENT)
Dept: RADIOLOGY | Facility: HOSPITAL | Age: 49
DRG: 219 | End: 2024-10-16
Payer: COMMERCIAL

## 2024-10-16 ENCOUNTER — APPOINTMENT (OUTPATIENT)
Dept: OCCUPATIONAL THERAPY | Facility: HOSPITAL | Age: 49
DRG: 219 | End: 2024-10-16
Attending: SURGERY
Payer: COMMERCIAL

## 2024-10-16 LAB
ABO/RH(D): NORMAL
ALBUMIN SERPL BCG-MCNC: 2.6 G/DL (ref 3.5–5.2)
ALBUMIN SERPL BCG-MCNC: 2.8 G/DL (ref 3.5–5.2)
ALP SERPL-CCNC: 104 U/L (ref 40–150)
ALP SERPL-CCNC: 110 U/L (ref 40–150)
ALT SERPL W P-5'-P-CCNC: 438 U/L (ref 0–50)
ALT SERPL W P-5'-P-CCNC: 501 U/L (ref 0–50)
ANION GAP SERPL CALCULATED.3IONS-SCNC: 10 MMOL/L (ref 7–15)
ANION GAP SERPL CALCULATED.3IONS-SCNC: 14 MMOL/L (ref 7–15)
ANTIBODY SCREEN: NEGATIVE
AST SERPL W P-5'-P-CCNC: 86 U/L (ref 0–45)
AST SERPL W P-5'-P-CCNC: 93 U/L (ref 0–45)
BILIRUB SERPL-MCNC: 0.9 MG/DL
BILIRUB SERPL-MCNC: 0.9 MG/DL
BLD PROD TYP BPU: NORMAL
BLOOD COMPONENT TYPE: NORMAL
BUN SERPL-MCNC: 41.1 MG/DL (ref 6–20)
BUN SERPL-MCNC: 49.3 MG/DL (ref 6–20)
CA-I BLD-MCNC: 4.5 MG/DL (ref 4.4–5.2)
CALCIUM SERPL-MCNC: 7.9 MG/DL (ref 8.8–10.4)
CALCIUM SERPL-MCNC: 8 MG/DL (ref 8.8–10.4)
CHLORIDE SERPL-SCNC: 95 MMOL/L (ref 98–107)
CHLORIDE SERPL-SCNC: 97 MMOL/L (ref 98–107)
CODING SYSTEM: NORMAL
CREAT SERPL-MCNC: 2.8 MG/DL (ref 0.51–0.95)
CREAT SERPL-MCNC: 3.24 MG/DL (ref 0.51–0.95)
CROSSMATCH: NORMAL
EGFRCR SERPLBLD CKD-EPI 2021: 17 ML/MIN/1.73M2
EGFRCR SERPLBLD CKD-EPI 2021: 20 ML/MIN/1.73M2
ERYTHROCYTE [DISTWIDTH] IN BLOOD BY AUTOMATED COUNT: 19.3 % (ref 10–15)
GLUCOSE BLDC GLUCOMTR-MCNC: 101 MG/DL (ref 70–99)
GLUCOSE BLDC GLUCOMTR-MCNC: 79 MG/DL (ref 70–99)
GLUCOSE BLDC GLUCOMTR-MCNC: 81 MG/DL (ref 70–99)
GLUCOSE BLDC GLUCOMTR-MCNC: 85 MG/DL (ref 70–99)
GLUCOSE BLDC GLUCOMTR-MCNC: 87 MG/DL (ref 70–99)
GLUCOSE BLDC GLUCOMTR-MCNC: 90 MG/DL (ref 70–99)
GLUCOSE BLDC GLUCOMTR-MCNC: 91 MG/DL (ref 70–99)
GLUCOSE BLDC GLUCOMTR-MCNC: 99 MG/DL (ref 70–99)
GLUCOSE SERPL-MCNC: 151 MG/DL (ref 70–99)
GLUCOSE SERPL-MCNC: 93 MG/DL (ref 70–99)
HCO3 SERPL-SCNC: 22 MMOL/L (ref 22–29)
HCO3 SERPL-SCNC: 24 MMOL/L (ref 22–29)
HCT VFR BLD AUTO: 21 % (ref 35–47)
HGB BLD-MCNC: 6.8 G/DL (ref 11.7–15.7)
HGB BLD-MCNC: 8.8 G/DL (ref 11.7–15.7)
INR PPP: 2.81 (ref 0.85–1.15)
ISSUE DATE AND TIME: NORMAL
LACTATE SERPL-SCNC: 0.9 MMOL/L (ref 0.7–2)
MAGNESIUM SERPL-MCNC: 2.4 MG/DL (ref 1.7–2.3)
MCH RBC QN AUTO: 29.3 PG (ref 26.5–33)
MCHC RBC AUTO-ENTMCNC: 32.4 G/DL (ref 31.5–36.5)
MCV RBC AUTO: 91 FL (ref 78–100)
PATH REPORT.COMMENTS IMP SPEC: NORMAL
PATH REPORT.COMMENTS IMP SPEC: NORMAL
PATH REPORT.FINAL DX SPEC: NORMAL
PATH REPORT.GROSS SPEC: NORMAL
PATH REPORT.MICROSCOPIC SPEC OTHER STN: NORMAL
PATH REPORT.RELEVANT HX SPEC: NORMAL
PHOSPHATE SERPL-MCNC: 3.1 MG/DL (ref 2.5–4.5)
PHOTO IMAGE: NORMAL
PLATELET # BLD AUTO: 123 10E3/UL (ref 150–450)
POTASSIUM SERPL-SCNC: 2.9 MMOL/L (ref 3.4–5.3)
POTASSIUM SERPL-SCNC: 3.2 MMOL/L (ref 3.4–5.3)
PROT SERPL-MCNC: 4.7 G/DL (ref 6.4–8.3)
PROT SERPL-MCNC: 5.2 G/DL (ref 6.4–8.3)
RBC # BLD AUTO: 2.32 10E6/UL (ref 3.8–5.2)
SODIUM SERPL-SCNC: 131 MMOL/L (ref 135–145)
SODIUM SERPL-SCNC: 131 MMOL/L (ref 135–145)
SPECIMEN EXPIRATION DATE: NORMAL
UFH PPP CHRO-ACNC: 0.59 IU/ML
UNIT ABO/RH: NORMAL
UNIT NUMBER: NORMAL
UNIT STATUS: NORMAL
UNIT TYPE ISBT: 6200
WBC # BLD AUTO: 19.5 10E3/UL (ref 4–11)

## 2024-10-16 PROCEDURE — 83605 ASSAY OF LACTIC ACID: CPT | Performed by: SURGERY

## 2024-10-16 PROCEDURE — 94640 AIRWAY INHALATION TREATMENT: CPT

## 2024-10-16 PROCEDURE — 94660 CPAP INITIATION&MGMT: CPT

## 2024-10-16 PROCEDURE — 999N000156 HC STATISTIC RCP CONSULT EA 30 MIN

## 2024-10-16 PROCEDURE — 99232 SBSQ HOSP IP/OBS MODERATE 35: CPT | Performed by: INTERNAL MEDICINE

## 2024-10-16 PROCEDURE — 85610 PROTHROMBIN TIME: CPT | Performed by: PHYSICIAN ASSISTANT

## 2024-10-16 PROCEDURE — 84155 ASSAY OF PROTEIN SERUM: CPT

## 2024-10-16 PROCEDURE — 94799 UNLISTED PULMONARY SVC/PX: CPT

## 2024-10-16 PROCEDURE — 250N000009 HC RX 250: Performed by: INTERNAL MEDICINE

## 2024-10-16 PROCEDURE — 86900 BLOOD TYPING SEROLOGIC ABO: CPT | Performed by: SURGERY

## 2024-10-16 PROCEDURE — 250N000013 HC RX MED GY IP 250 OP 250 PS 637

## 2024-10-16 PROCEDURE — 250N000013 HC RX MED GY IP 250 OP 250 PS 637: Performed by: SURGERY

## 2024-10-16 PROCEDURE — 250N000011 HC RX IP 250 OP 636: Performed by: SURGERY

## 2024-10-16 PROCEDURE — 250N000013 HC RX MED GY IP 250 OP 250 PS 637: Performed by: PHYSICIAN ASSISTANT

## 2024-10-16 PROCEDURE — 250N000011 HC RX IP 250 OP 636: Performed by: STUDENT IN AN ORGANIZED HEALTH CARE EDUCATION/TRAINING PROGRAM

## 2024-10-16 PROCEDURE — 999N000157 HC STATISTIC RCP TIME EA 10 MIN

## 2024-10-16 PROCEDURE — 94640 AIRWAY INHALATION TREATMENT: CPT | Mod: 76

## 2024-10-16 PROCEDURE — 84100 ASSAY OF PHOSPHORUS: CPT | Performed by: SURGERY

## 2024-10-16 PROCEDURE — 85520 HEPARIN ASSAY: CPT | Performed by: SURGERY

## 2024-10-16 PROCEDURE — 83735 ASSAY OF MAGNESIUM: CPT | Performed by: SURGERY

## 2024-10-16 PROCEDURE — 85014 HEMATOCRIT: CPT

## 2024-10-16 PROCEDURE — G0463 HOSPITAL OUTPT CLINIC VISIT: HCPCS

## 2024-10-16 PROCEDURE — 85018 HEMOGLOBIN: CPT

## 2024-10-16 PROCEDURE — 82330 ASSAY OF CALCIUM: CPT | Performed by: SURGERY

## 2024-10-16 PROCEDURE — 97535 SELF CARE MNGMENT TRAINING: CPT | Mod: GO

## 2024-10-16 PROCEDURE — 86901 BLOOD TYPING SEROLOGIC RH(D): CPT | Performed by: SURGERY

## 2024-10-16 PROCEDURE — 250N000013 HC RX MED GY IP 250 OP 250 PS 637: Performed by: INTERNAL MEDICINE

## 2024-10-16 PROCEDURE — 71045 X-RAY EXAM CHEST 1 VIEW: CPT

## 2024-10-16 PROCEDURE — P9040 RBC LEUKOREDUCED IRRADIATED: HCPCS | Performed by: SURGERY

## 2024-10-16 PROCEDURE — 86923 COMPATIBILITY TEST ELECTRIC: CPT | Performed by: SURGERY

## 2024-10-16 PROCEDURE — 120N000013 HC R&B IMCU

## 2024-10-16 RX ORDER — FUROSEMIDE 10 MG/ML
80 INJECTION INTRAMUSCULAR; INTRAVENOUS ONCE
Status: COMPLETED | OUTPATIENT
Start: 2024-10-16 | End: 2024-10-16

## 2024-10-16 RX ORDER — POTASSIUM CHLORIDE 1500 MG/1
40 TABLET, EXTENDED RELEASE ORAL ONCE
Status: COMPLETED | OUTPATIENT
Start: 2024-10-16 | End: 2024-10-16

## 2024-10-16 RX ADMIN — Medication 125 MCG: at 08:27

## 2024-10-16 RX ADMIN — OXYCODONE HYDROCHLORIDE 5 MG: 5 TABLET ORAL at 06:04

## 2024-10-16 RX ADMIN — HEPARIN SODIUM 1350 UNITS/HR: 10000 INJECTION, SOLUTION INTRAVENOUS at 01:33

## 2024-10-16 RX ADMIN — POTASSIUM CHLORIDE 40 MEQ: 1500 TABLET, EXTENDED RELEASE ORAL at 20:34

## 2024-10-16 RX ADMIN — CETIRIZINE HYDROCHLORIDE 10 MG: 10 TABLET, FILM COATED ORAL at 08:27

## 2024-10-16 RX ADMIN — WARFARIN SODIUM 0.5 MG: 1 TABLET ORAL at 17:16

## 2024-10-16 RX ADMIN — FLUTICASONE FUROATE AND VILANTEROL TRIFENATATE 1 PUFF: 100; 25 POWDER RESPIRATORY (INHALATION) at 20:38

## 2024-10-16 RX ADMIN — FUROSEMIDE 80 MG: 10 INJECTION, SOLUTION INTRAVENOUS at 14:15

## 2024-10-16 RX ADMIN — FLUDROCORTISONE ACETATE 0.1 MG: 0.1 TABLET ORAL at 08:28

## 2024-10-16 RX ADMIN — METHOCARBAMOL 750 MG: 750 TABLET ORAL at 12:48

## 2024-10-16 RX ADMIN — CLOPIDOGREL BISULFATE 75 MG: 75 TABLET ORAL at 08:28

## 2024-10-16 RX ADMIN — OXYCODONE HYDROCHLORIDE 5 MG: 5 TABLET ORAL at 20:34

## 2024-10-16 RX ADMIN — ATORVASTATIN CALCIUM 80 MG: 40 TABLET, FILM COATED ORAL at 20:35

## 2024-10-16 RX ADMIN — LEVOTHYROXINE SODIUM 125 MCG: 0.03 TABLET ORAL at 08:28

## 2024-10-16 RX ADMIN — Medication 1 TABLET: at 12:44

## 2024-10-16 RX ADMIN — BUDESONIDE 1 MG: 0.5 INHALANT ORAL at 09:47

## 2024-10-16 RX ADMIN — LIDOCAINE 1 PATCH: 4 PATCH TOPICAL at 17:18

## 2024-10-16 RX ADMIN — OXYCODONE HYDROCHLORIDE 5 MG: 5 TABLET ORAL at 12:43

## 2024-10-16 RX ADMIN — MEROPENEM 500 MG: 500 INJECTION, POWDER, FOR SOLUTION INTRAVENOUS at 04:26

## 2024-10-16 RX ADMIN — SENNOSIDES AND DOCUSATE SODIUM 1 TABLET: 8.6; 5 TABLET ORAL at 08:28

## 2024-10-16 RX ADMIN — METOPROLOL TARTRATE 12.5 MG: 25 TABLET, FILM COATED ORAL at 08:28

## 2024-10-16 RX ADMIN — METOPROLOL TARTRATE 12.5 MG: 25 TABLET, FILM COATED ORAL at 17:16

## 2024-10-16 RX ADMIN — PANTOPRAZOLE SODIUM 40 MG: 40 TABLET, DELAYED RELEASE ORAL at 08:28

## 2024-10-16 RX ADMIN — BUDESONIDE 1 MG: 0.5 INHALANT ORAL at 19:46

## 2024-10-16 ASSESSMENT — ACTIVITIES OF DAILY LIVING (ADL)
ADLS_ACUITY_SCORE: 39
ADLS_ACUITY_SCORE: 38
ADLS_ACUITY_SCORE: 38
ADLS_ACUITY_SCORE: 39
ADLS_ACUITY_SCORE: 38
ADLS_ACUITY_SCORE: 39
ADLS_ACUITY_SCORE: 38
ADLS_ACUITY_SCORE: 39
ADLS_ACUITY_SCORE: 39
ADLS_ACUITY_SCORE: 38
ADLS_ACUITY_SCORE: 39
ADLS_ACUITY_SCORE: 38
ADLS_ACUITY_SCORE: 39
ADLS_ACUITY_SCORE: 38
ADLS_ACUITY_SCORE: 39
ADLS_ACUITY_SCORE: 39

## 2024-10-16 NOTE — PROGRESS NOTES
[facility] - ICU    RN Progress Note:            Pertinent Assessments:      Please refer to flowsheet rows for full assessment     Hgb 8.8 after 1 unit PRBC. Pt page out and has voided. Pt complain of shortness of breath, CV surgery aware. CXR done. Lasix 80mg given at 1415.  750 on IS, pt needs lots of encouragement.            Key Events - This Shift:       Page out, Lasix, up to chair x1.                 Barriers to Discharge / Downgrade:     Cardiac tele         Point of Contact Update: YES-OR-NO: Yes  If No, reason: NA  Name:mom  Phone Number:at bedside  Summary of Conversation: updated of plan of care

## 2024-10-16 NOTE — PROGRESS NOTES
Care Management Follow Up    Length of Stay (days): 8    Expected Discharge Date: 10/21/2024    Anticipated Discharge Plan:   TBD    Transportation: TBD    PT Recommendations:    OT Recommendations:  Transitional Care Facility     Barriers to Discharge: medical stability, post procedure care and monitoring, renal status-HD, pulm on NC, infection-monitoring off antibiotics 10/16     Prior Living Situation:  Patient reports she lives in her apartment (has elevator) with spouse.  She is independent with ADLs/IADLs, ambulates without devices, has no services in community, uses cpap and works full time. Spouse is primary family contact and family willing to transport at discharge.        Discussed  Partnership in Safe Discharge Planning  document with patient/family: No      Handoff Completed: No, handoff not indicated or clinically appropriate     Patient/Spokesperson Updated: patient and her her mom 10/15     Additional Information:  Medical:  Patient with PMHx of GIA, asthma.GERD,  hypothyroidism, Pe on plavix, carotid stenosis,  Hodgkin's lymphoma status post chemotherapy and then mantle radiation and 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, currently in remission with severe multivessel and moderate left main coronary artery disease and stable angina who underwent coronary artery bypass grafting and MVR on 10/8.   CT surgery following. Nephrology consulted for ALEXEI. ID consulted for sepsis.           10/16/24:  Referral sent to [facility] for criteria review.  Final discharge plan pending.        Nahomy Cortez RN

## 2024-10-16 NOTE — PROGRESS NOTES
CVTS Daily Progress Note   POD#9 s/p CABG x4 (LIMA>LAD, rSVG>RPDA, rSVG>LPL, rSVG>D2), RLE EVH, Attempted MVR/r, MVR ( 29 mm [employer] Mechanical Mitral Valve), LAAE  Attending: Mauro  LOS: 9    SUBJECTIVE/INTERVAL EVENTS:    No acute events overnight. Intermittent a fib, largely rate controlled. UOP continues to  but still not proportional to hypervolemia, Cr variable, had HD 10/15, tolerated well. LFTs continue to downtrend. Maintaining oxygen saturations on home CPAP/2L NC. Normotensive. Up to chair this AM. Ambulating w/ therapy. Pain well controlled. +BM. Switched to regular diet given low PO intake. INR 3.61 (2.81), heparin gtt stopped 10/15. Patient denies new chest pain, shortness of breath, abdominal pain, calf pain, nausea. Patient has no questions today.     OBJECTIVE:  Temp:  [96.9  F (36.1  C)-98.1  F (36.7  C)] 98.1  F (36.7  C)  Pulse:  [] 94  Resp:  [18] 18  BP: ()/(52-74) 105/74  SpO2:  [88 %-100 %] 97 %  Vitals:    10/13/24 0400 10/14/24 0400 10/15/24 0600 10/16/24 0500   Weight: 111.2 kg (245 lb 2.4 oz) 112.5 kg (248 lb 0.3 oz) 114.3 kg (252 lb) 112.8 kg (248 lb 9.6 oz)    10/17/24 0600   Weight: 114.2 kg (251 lb 11.2 oz)       Clinically Significant Risk Factors        # Hypokalemia: Lowest K = 2.9 mmol/L in last 2 days, will replace as needed  # Hyponatremia: Lowest Na = 131 mmol/L in last 2 days, will monitor as appropriate  # Hypochloremia: Lowest Cl = 95 mmol/L in last 2 days, will monitor as appropriate      # Hypoalbuminemia: Lowest albumin = 2.6 g/dL at 10/16/2024  4:35 AM, will monitor as appropriate     # Thrombocytopenia: Lowest platelets = 123 in last 2 days, will monitor for bleeding  # Acute Kidney Injury, unspecified: based on a >150% or 0.3 mg/dL increase in last creatinine compared to past 90 day average, will monitor renal function  # Hypertension: Noted on problem list           # Severe Obesity: Estimated body mass index is 43.2 kg/m  as calculated from  "the following:    Height as of 9/25/24: 1.626 m (5' 4\").    Weight as of this encounter: 114.2 kg (251 lb 11.2 oz).   # Moderate Malnutrition: based on nutrition assessment     # History of CABG: noted on surgical history                Current Medications:    Scheduled Meds:  Current Facility-Administered Medications   Medication Dose Route Frequency Provider Last Rate Last Admin    albumin human 5 % injection 250 mL  250 mL Intravenous Once in dialysis/CRRT Catherine Galvez PA-C        atorvastatin (LIPITOR) tablet 80 mg  80 mg Oral At Bedtime Catherine Galvez PA-C   80 mg at 10/16/24 2035    budesonide (PULMICORT) neb solution 1 mg  1 mg Nebulization BID Catherine Galvez PA-C   1 mg at 10/17/24 0716    cetirizine (zyrTEC) tablet 10 mg  10 mg Oral Daily Catherine Galvez PA-C   10 mg at 10/17/24 0819    clopidogrel (PLAVIX) tablet 75 mg  75 mg Oral Daily Catherine Galvez PA-C   75 mg at 10/17/24 0819    fludrocortisone (FLORINEF) tablet 0.1 mg  0.1 mg Oral Daily Catherine Galvez PA-C   0.1 mg at 10/17/24 0819    fluticasone-vilanterol (BREO ELLIPTA) 100-25 MCG/ACT inhaler 1 puff  1 puff Inhalation At Bedtime Catherine Galvez PA-C   1 puff at 10/16/24 2038    heparin lock flush 10 unit/mL injection 5-20 mL  5-20 mL Intracatheter Q24H Catherine Galvez PA-C   5 mL at 10/13/24 2133    levothyroxine (SYNTHROID/LEVOTHROID) tablet 125 mcg  125 mcg Oral QAM AC Catherine Galvez PA-C   125 mcg at 10/17/24 0658    Lidocaine (LIDOCARE) 4 % Patch 1-2 patch  1-2 patch Transdermal Q24H Catherine Galvez PA-C   1 patch at 10/16/24 1718    metoprolol tartrate (LOPRESSOR) half-tab 12.5 mg  12.5 mg Oral BID Catherine Galvez PA-C   12.5 mg at 10/17/24 0819    metoprolol tartrate (LOPRESSOR) half-tab 12.5 mg  12.5 mg Oral TID Catherine Galvez PA-C   12.5 mg at 10/16/24 1716    multivitamin w/minerals (THERA-VIT-M) tablet 1 tablet  1 tablet Oral Daily Catherine Galvez PA-C  "  1 tablet at 10/16/24 1244    No heparin via hemodialysis machine   Does not apply Once Catherine Galvez PA-C        pantoprazole (PROTONIX) EC tablet 40 mg  40 mg Oral Daily Catherine Galvez PA-C   40 mg at 10/17/24 0819    senna-docusate (SENOKOT-S/PERICOLACE) 8.6-50 MG per tablet 1 tablet  1 tablet Oral BID Catherine Galvez PA-C   1 tablet at 10/16/24 0828    sodium chloride (PF) 0.9% PF flush 10-40 mL  10-40 mL Intracatheter Q8H Catherine Galvez PA-C   10 mL at 10/16/24 1244    sodium chloride (PF) 0.9% PF flush 10-40 mL  10-40 mL Intracatheter Q7 Days Catherine Galvez PA-C        sodium chloride 0.9% BOLUS 250 mL  250 mL Intravenous Once in dialysis/CRRT Catherine Galvez PA-C        sodium chloride 0.9% BOLUS 300 mL  300 mL Hemodialysis Machine Once Catherine Galvez PA-C        Vitamin D3 (CHOLECALCIFEROL) tablet 125 mcg  125 mcg Oral Daily Catherine Galvez PA-C   125 mcg at 10/17/24 0819    Warfarin Dose Required Daily - Pharmacist Managed  1 each Oral See Admin Instructions Catherine Galvez PA-C        warfarin-No DOSE today  1 each Does not apply no dose today (warfarin) David Wade MD         Continuous Infusions:  Current Facility-Administered Medications   Medication Dose Route Frequency Provider Last Rate Last Admin     PRN Meds:.  Current Facility-Administered Medications   Medication Dose Route Frequency Provider Last Rate Last Admin    albumin human 5 % injection  mL   mL Intravenous Q1H PRN Catherine Galvez PA-C        albuterol (PROVENTIL HFA/VENTOLIN HFA) inhaler  2 puff Inhalation Q6H PRN Catherine Galvez PA-C        bisacodyl (DULCOLAX) suppository 10 mg  10 mg Rectal Daily PRN Catherine Galvez PA-C        glucose gel 15-30 g  15-30 g Oral Q15 Min PRN Catherine Galvez PA-C        Or    dextrose 50 % injection 25-50 mL  25-50 mL Intravenous Q15 Min PRN Catherine Galvez PA-C   50 mL at 10/14/24 0806     Or    glucagon injection 1 mg  1 mg Subcutaneous Q15 Min PRN Catherine Galvez PA-C        fluticasone (FLONASE) 50 MCG/ACT spray 2 spray  2 spray Both Nostrils Daily PRN Catherine Galvez PA-C        heparin lock flush 10 unit/mL injection 5-20 mL  5-20 mL Intracatheter Q1H PRN Catherine Galvez PA-C        hydrALAZINE (APRESOLINE) injection 10 mg  10 mg Intravenous Q30 Min PRN Catherine Galvez PA-C        ipratropium - albuterol 0.5 mg/2.5 mg/3 mL (DUONEB) neb solution 3 mL  3 mL Nebulization Q4H PRN Catherine Galvez PA-C        lidocaine (LMX4) cream   Topical Q1H PRN Catherine Galvez PA-C        lidocaine 1 % 0.1-5 mL  0.1-5 mL Other Q1H PRN Catherine Galvez PA-C   1 mL at 10/15/24 1415    magnesium hydroxide (MILK OF MAGNESIA) suspension 30 mL  30 mL Oral Daily PRN Catherine Galvez PA-C        methocarbamol (ROBAXIN) tablet 750 mg  750 mg Oral Q6H PRN Catherine Galvez PA-C   750 mg at 10/17/24 0819    naloxone (NARCAN) injection 0.2 mg  0.2 mg Intravenous Q2 Min PRN Catherine Galvez PA-C        Or    naloxone (NARCAN) injection 0.4 mg  0.4 mg Intravenous Q2 Min PRN Catherine Galvez PA-C        Or    naloxone (NARCAN) injection 0.2 mg  0.2 mg Intramuscular Q2 Min PRN Catherine Galvez PA-C        Or    naloxone (NARCAN) injection 0.4 mg  0.4 mg Intramuscular Q2 Min PRN Catherine Galvez PA-C        ondansetron (ZOFRAN ODT) ODT tab 4 mg  4 mg Oral Q6H PRN Catherine Galvez PA-C        Or    ondansetron (ZOFRAN) injection 4 mg  4 mg Intravenous Q6H PRN Catherine Galvez PA-C   4 mg at 10/10/24 1945    oxyCODONE IR (ROXICODONE) half-tab 2.5-5 mg  2.5-5 mg Oral Q4H PRN Catherine Galvez PA-C   5 mg at 10/17/24 0538    prochlorperazine (COMPAZINE) injection 10 mg  10 mg Intravenous Q6H PRN Catherine Galvez PA-C        Or    prochlorperazine (COMPAZINE) tablet 10 mg  10 mg Oral Q6H PRN Catherine Galvez PA-C        simethicone (MYLICON)  chewable half-tab 120 mg  120 mg Oral 4x Daily PRN Henshue, Catherine K, PA-C        sodium chloride (PF) 0.9% PF flush 10-20 mL  10-20 mL Intracatheter q1 min prn Henshue, Catherine K, PA-C        sodium chloride (PF) 0.9% PF flush 10-40 mL  10-40 mL Intracatheter Q1H PRN Henshue, Catherine K, PA-C        sodium chloride 0.9% BOLUS 100-150 mL  100-150 mL Intravenous Q15 Min PRN Henshue, Catherine K, PA-C           Cardiographics:    Telemetry monitoring demonstrates a fib w/ rates in the 90s per my personal review.    Imaging:  Results for orders placed or performed during the hospital encounter of 10/08/24   XR Chest Port 1 View    Impression    IMPRESSION: Interval sternotomy, CABG procedure and mitral valve repair. Endotracheal tube proximally 2 cm above the sofia. Nasogastric tube in the stomach. Bilateral chest tubes and midline mediastinal drain in expected position. Epicardial leads are   present. Left IJ line in the brachiocephalic vein.    Low lung volumes. No evidence for CHF or pneumonia. No pleural effusion or pneumothorax.   XR Chest Port 1 View    Impression    IMPRESSION: Poststernotomy changes with prosthetic mitral valve. Patient's been extubated and the NG tube has been removed. Mediastinal and pleural chest tubes are in place.    Left IJ cordis sheath is in the distal left innominate artery. Catheter has a very subtle kink within it 3.5 centimeters from the tip.    Low lung volumes. No pneumothorax. Likely trace left effusion at the base. No signs of pneumonia or failure. Heart is of normal size.   XR Abdomen Port 1 View    Impression    IMPRESSION: Orogastric tube tip in the proximal stomach and oriented superiorly towards the left diaphragm. Visualized bowel gas pattern unremarkable. Numerous tubes and lines lower chest and upper abdomen as described on chest x-ray report from today.        Labs, personally reviewed.  Hemoglobin   Date Value Ref Range Status   10/17/2024 8.6 (L) 11.7 - 15.7  g/dL Final   10/16/2024 8.8 (L) 11.7 - 15.7 g/dL Final   10/16/2024 6.8 (LL) 11.7 - 15.7 g/dL Final   06/09/2015 13.5 11.7 - 15.7 g/dL Final   03/11/2015 12.3 11.7 - 15.7 g/dL Final   01/08/2015 13.6 11.7 - 15.7 g/dL Final     WBC   Date Value Ref Range Status   06/09/2015 7.9 4.0 - 11.0 10e9/L Final   03/11/2015 9.4 4.0 - 11.0 10e9/L Final   01/08/2015 8.1 4.0 - 11.0 10e9/L Final     WBC Count   Date Value Ref Range Status   10/17/2024 15.3 (H) 4.0 - 11.0 10e3/uL Final   10/16/2024 19.5 (H) 4.0 - 11.0 10e3/uL Final   10/15/2024 27.9 (H) 4.0 - 11.0 10e3/uL Final   10/15/2024 28.0 (H) 4.0 - 11.0 10e3/uL Final     Platelet Count   Date Value Ref Range Status   10/17/2024 157 150 - 450 10e3/uL Final   10/16/2024 123 (L) 150 - 450 10e3/uL Final   10/15/2024 133 (L) 150 - 450 10e3/uL Final   06/09/2015 219 150 - 450 10e9/L Final   03/11/2015 240 150 - 450 10e9/L Final   01/08/2015 221 150 - 450 10e9/L Final     Creatinine   Date Value Ref Range Status   10/17/2024 3.53 (H) 0.51 - 0.95 mg/dL Final   10/16/2024 3.24 (H) 0.51 - 0.95 mg/dL Final   10/16/2024 2.80 (H) 0.51 - 0.95 mg/dL Final   06/09/2015 0.80 0.52 - 1.04 mg/dL Final   03/11/2015 0.87 0.52 - 1.04 mg/dL Final   01/08/2015 0.79 0.52 - 1.04 mg/dL Final     Potassium   Date Value Ref Range Status   10/17/2024 3.0 (L) 3.4 - 5.3 mmol/L Final   10/16/2024 2.9 (L) 3.4 - 5.3 mmol/L Final   10/16/2024 3.2 (L) 3.4 - 5.3 mmol/L Final   06/09/2015 3.8 3.4 - 5.3 mmol/L Final   03/11/2015 3.9 3.4 - 5.3 mmol/L Final   01/08/2015 4.1 3.4 - 5.3 mmol/L Final     Potassium POCT   Date Value Ref Range Status   10/08/2024 4.4 3.4 - 5.3 mmol/L Final   10/08/2024 4.0 3.4 - 5.3 mmol/L Final   10/08/2024 4.1 3.4 - 5.3 mmol/L Final     Magnesium   Date Value Ref Range Status   10/17/2024 2.4 (H) 1.7 - 2.3 mg/dL Final   10/16/2024 2.4 (H) 1.7 - 2.3 mg/dL Final   10/15/2024 3.1 (H) 1.7 - 2.3 mg/dL Final   06/09/2015 1.8 1.6 - 2.3 mg/dL Final   03/11/2015 1.7 1.6 - 2.3 mg/dL Final    01/08/2015 1.8 1.6 - 2.3 mg/dL Final          I/O:  I/O last 3 completed shifts:  In: 766.42 [P.O.:360; I.V.:36.42]  Out: 2000 [Urine:2000]       Physical Exam:    General: Patient seen up in chair. NAD. Pleasant, conversant.  HEENT: DORITA, no sclera icterus, moist mucosa  CV: RRR on monitor. 2+ peripheral pulses in all extremities. Mild peripheral edema. Incision C/D/I.  Pulm: Non-labored effort on 2L NC.   Abd: Soft, NT, ND  : Voiding  Ext: Mild pedal edema, SCDs in place, warm, distal pulses intact  Neuro: CNs grossly intact, FAM, A&Ox3      ASSESSMENT/PLAN:    Jory Ambrose is a 49 year old female with a history of GIA, asthma, GERD,  hypothyroidism, h/o PE on plavix, carotid stenosis, Hodgkin's lymphoma (s/p chemo/radiation and bone marrow transplant x2 after recurrence) currently in remission who is s/p CABG x4, RLE EVH, attempted mitral repair, mechanical MVR, LAAE.    Active Problems:    Mitral regurgitation    Coronary artery disease involving native coronary artery of native heart with angina pectoris (H)  #Acute Renal Failure d/t ATN, ongoing.      NEURO:   - Scheduled lidocaine patches and PRN robaxin/oxycodone for pain  - No toradol given ARF  - PTA bupropion discontinued (somnolence)  - Tylenol discontinued (transaminitis)    CV:  - Pre-op EF 55-60%  - Chest tubes and TPW's removed without immediate complications POD#5  - Normotensive  - Metop 12.5 mg BID and TID PRN given soft pressures w/ 25 mg dose  - Plavix daily indefinitely d/t unclear h/o anaphylaxis to ASA  - Atorvastatin 80mg daily  - Warfarin for mechanical MVR, INR therapeutic, heparin gtt stopped  - Rate controlled afib - s/p amiodarone bolus/gtt 10/13 (ok per Mauro). Ok to stop given LFTs and allergy to PO amio additive. Consider amio if ongoing a fib as hypervolemia improves  - New baseline echo prior to discharge when closer to preop weight    PULM:   - Extubated on POD#0  - Maintaining oxygen saturations on 2L  NC/home CPAP overnight - likely d/t hypervolemia  - Encourage pulmonary toilet  - PTA zyrtec, fludrocortisone, flonase, simethicone, albuterol, pulmicort, breo ellipta, duoneb    FEN/GI:  - Continue electrolyte replacement protocol  - Regular diet - calorie count  - Bowel regimen, LBM 10/16  - Ischemic hepatitis -  // AST 75 and downtrending  - Hepatic w/u and labs unremarkable  - PTA vitamin D3   - CMP daily    RENAL:  - Nephrology consulted for ARF w/ decreased UOP, appreciate. Following.  - CRRT 10/11 - 10/13  - HD 10/15 - 0.6 L total removed, plan for HD again today  - 80 mg IV Lasix once yesterday PM, good response but not proportional to hypervolemia  - Strict I/O  - Cr 3.53 (2.80) (baseline ~0.9)  - UA/Urine cx 10/10 & 10/13 NG  - CMP as above    HEME:  - H/o bone marrow tx. Needs irradiated blood  - Acute blood loss anemia post-op.   - Hgb stable, likely dilutional component  - Warfarin INR goal 2.5-3.5 in s/o mech MVR  - HIT screen negative.  - INR 3.61 (2.81)  - Transfusion hx  - 2u PRBCs periop  - 1u PRBCs 10/11  - 1u PRBCs 10/16    ID:  - Lydia op ppx complete. Afebrile. Leukocytosis -  WBC 15.3 (19.5)  - UA/Urine cx 10/10 & 10/13 NG.  - Blood cx 10/9 & 10/13 NG  - Sputum culture 10/13 - pending  - ID consult for sepsis, appreciate. Following.   - Meropenem - HELD this AM given unrevealing infxn w/u and downtrending WBC   - Consider CT  - CBC qday    ENDO:   - HbA1c 5.7%  - q4 BG checks given borderline BG and hypoglycemia this week  - Push PO intake as above  - Serum cortisol and ACTH WNL, pancreatitis labs unremarkable  - PTA Metformin discontinued (renal function)  - PTA synthroid    PPx:   - DVT: SCDs, SQ heparin TID, ambulation   - GI: Protonix 40mg IV/PO daily    DISPO:   - Continue general tele care (POD#8)  - PT/OT recs at discharge: TCU pending progress  - Medically Ready for Discharge: Anticipated in 5+ Days pending diuresis         Patient discussed w/ Dr. Wade.      Eva Galvez,  EARNESTINE  [facility] Cardiothoracic Surgery  Secure Chat or Vocera  October 17, 2024

## 2024-10-16 NOTE — PROGRESS NOTES
RCAT Treatment Plan    Patient Score: 11  Patient Acuity: 3    Clinical Indication for Therapy: atelectasis    Therapy Ordered: Pulmicort BID per home routine. EzPAP TID, Flutter, IS.     Assessment Summary: Patient obtaining 500-750 mL on IS, shallow breaths. Remains on 2 LPM nasal cannula. Will continue with EzPAP TID for lung expansion per protocol.     Marta Pruitt, RT  10/16/2024

## 2024-10-16 NOTE — DISCHARGE INSTRUCTIONS
[facility] DISCHARGE INSTRUCTIONS:  L buttock wound: Every 3 days   Cleanse the area with NS and pat dry.  Apply No sting film barrier to periwound skin.  Cover wound with Sacral Mepilex (#[id number])Change dressing Q 3 days.  May use aquaphor ointment for comfort - own supply in room

## 2024-10-16 NOTE — PROGRESS NOTES
'    [facility] ([facility]) progress note  CC: F/U ALEXEI  S: Patient says she is very short of breath today. Had a CXR done, neb given, pain meds given. Received blood. Feels very swollen. Peeing, Padron was removed today.     A/P:   Acute kidney injury.  Acute tubular necrosis.  Secondary to CABG and mitral valve replacement followed by cardiogenic shock.  Baseline creatinine 0.95 (10/8/2024).    Required CRRT, off 10/13  Off pressors as of 10/14  HD 10/15   UOP improving, no longer oliguric, but continues to have poor clearance. CTM for renal recovery. Will need outpatient HD on discharge if no renal recovery.   Eval for HD in the AM, patient aware of need for HD if labs worsen  Given worsening SOB and tachypnea, will give lasix 80mg IV this afternoon    Daily labs   Strict Is/Os  Daily weights   Avoid nephrotoxins  Renally dose meds      2. Status post coronary artery bypass grafting x 4 vessels and mitral valve replacement on 10/8.  Diffuse coronary artery disease     3. Shock liver.  Continue supportive care.  LFTs downtrending.      4. History of Hodgkin's lymphoma.  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    Brea Buchanan MD  [facility]  [phone]    /89   Pulse 92   Temp 97.7  F (36.5  C)   Resp 18   Wt 112.8 kg (248 lb 9.6 oz)   LMP  (LMP Unknown)   SpO2 97%   BMI 42.67 kg/m      I/O last 3 completed shifts:  In: 1291.78 [P.O.:600; I.V.:691.78]  Out: 1245 [Urine:1245]    Physical Exam:   GENERAL: ill appearing, sitting up in bed   EYES: EOMI   ENT: MMM  RESP: increased WOB, on O2   CV: regular rate, + leg edema.    GI: ND  SKIN: No rash, pale   Padron removed     Recent Labs   Lab 10/16/24  0435 10/15/24  1859 10/15/24  0648 10/14/24  1821 10/14/24  0345 10/13/24  1536 10/13/24  1201 10/13/24  0414 10/12/24  2002 10/12/24  1153   * 134* 129* 130* 133* 135 134* 136 137 137   POTASSIUM 3.2* 3.2* 4.0 3.8 4.0  4.1 4.1 4.3 4.3 4.2   CHLORIDE 97* 101 95* 97* 99 100 102 100 104 105   CO2 24 26 18* 18* 21* 19* 21* 22 22 23   BUN 41.1* 25.0* 54.1* 42.3* 37.7* 27.0* 22.2* 23.9* 26.7* 33.1*   CR 2.80* 1.74* 3.40* 2.76* 2.49* 1.85* 1.51* 1.60* 1.79* 2.08*   GFRESTIMATED 20* 35* 16* 20* 23* 33* 42* 39* 34* 29*   CINDY 7.9* 7.6* 7.6* 7.6* 7.5* 7.9* 7.3* 7.4* 7.0* 7.4*   PHOS 3.1  --  4.7*  --  4.7* 5.1* 4.4 4.4 4.1 4.4   MAG 2.4*  --  3.1*  --  3.0*  --  2.7* 2.7* 2.4* 2.6*   ALBUMIN 2.6* 2.6* 2.8* 2.7* 3.0* 3.3* 3.1* 3.2* 3.1* 3.0*     Recent Labs   Lab 10/16/24  0435 10/15/24  0648 10/14/24  0758 10/13/24  1201 10/13/24  0414 10/12/24  2002 10/12/24  0615   WBC 19.5* 28.0*  27.9* 23.8* 16.3* 16.5* 12.2* 9.3   HGB 6.8* 7.5* 8.0* 8.5* 8.6* 8.2* 8.0*   HCT 21.0* 23.2* 24.8* 25.8* 26.3* 25.1* 24.4*   MCV 91 91 90 89 89 88 87   * 133* 105* 98* 90* 82* 59*       Current Facility-Administered Medications:     albumin human 5 % injection 250 mL, 250 mL, Intravenous, Once in dialysis/CRRT, Brea Buchanan MD    albumin human 5 % injection  mL,  mL, Intravenous, Q1H PRN, Brea Buchanan MD    albuterol (PROVENTIL HFA/VENTOLIN HFA) inhaler, 2 puff, Inhalation, Q6H PRN, Catherine Galvez PA-C    atorvastatin (LIPITOR) tablet 80 mg, 80 mg, Oral, At Bedtime, Kay Ann PA-C, 80 mg at 10/15/24 2040    bisacodyl (DULCOLAX) suppository 10 mg, 10 mg, Rectal, Daily PRN, Catherine Galvez PA-C    budesonide (PULMICORT) neb solution 1 mg, 1 mg, Nebulization, BID, Bharat Lu MD, 1 mg at 10/16/24 0947    calcium gluconate 1 g in 50 mL in sodium chloride intermittent infusion, 1 g, Intravenous, Once PRN, Catherine Galvez PA-C, 1 g at 10/15/24 2121    calcium gluconate 2 g in  mL intermittent infusion, 2 g, Intravenous, Once PRN, Catherine Galvez PA-C    calcium gluconate 3 g in sodium chloride 0.9 % 100 mL intermittent infusion, 3 g, Intravenous, Once PRN, Catherine Galvez  EARNESTINE    cetirizine (zyrTEC) tablet 10 mg, 10 mg, Oral, Daily, Catherine Galvez PA-C, 10 mg at 10/16/24 0827    clopidogrel (PLAVIX) tablet 75 mg, 75 mg, Oral, Daily, Catherine Galvez PA-C, 75 mg at 10/16/24 0828    glucose gel 15-30 g, 15-30 g, Oral, Q15 Min PRN **OR** dextrose 50 % injection 25-50 mL, 25-50 mL, Intravenous, Q15 Min PRN, 50 mL at 10/14/24 0806 **OR** glucagon injection 1 mg, 1 mg, Subcutaneous, Q15 Min PRN, Doc Dhillon MD    fludrocortisone (FLORINEF) tablet 0.1 mg, 0.1 mg, Oral, Daily, Catherine Galvez PA-C, 0.1 mg at 10/16/24 0828    fluticasone (FLONASE) 50 MCG/ACT spray 2 spray, 2 spray, Both Nostrils, Daily PRN, Catherine Galvez PA-C    fluticasone-vilanterol (BREO ELLIPTA) 100-25 MCG/ACT inhaler 1 puff, 1 puff, Inhalation, At Bedtime, Catherine Galvez PA-C, 1 puff at 10/15/24 2038    heparin lock flush 10 unit/mL injection 5-20 mL, 5-20 mL, Intracatheter, Q24H, Alistair Escudero MD, 5 mL at 10/13/24 2133    heparin lock flush 10 unit/mL injection 5-20 mL, 5-20 mL, Intracatheter, Q1H PRN, Alistair Escudero MD    hydrALAZINE (APRESOLINE) injection 10 mg, 10 mg, Intravenous, Q30 Min PRN, Catherine Galvez PA-C    ipratropium - albuterol 0.5 mg/2.5 mg/3 mL (DUONEB) neb solution 3 mL, 3 mL, Nebulization, Q4H PRN, Bharat Lu MD    levothyroxine (SYNTHROID/LEVOTHROID) tablet 125 mcg, 125 mcg, Oral, QAM AC, Catherine Galvez PA-KENIA, 125 mcg at 10/16/24 0828    Lidocaine (LIDOCARE) 4 % Patch 1-2 patch, 1-2 patch, Transdermal, Q24H, Catherine Galvez PA-C, 2 patch at 10/15/24 1628    lidocaine (LMX4) cream, , Topical, Q1H PRN, Catherine Galvez PA-C    lidocaine 1 % 0.1-5 mL, 0.1-5 mL, Other, Q1H PRN, Catherine Galvez PA-C, 1 mL at 10/15/24 1415    magnesium hydroxide (MILK OF MAGNESIA) suspension 30 mL, 30 mL, Oral, Daily PRN, Catherine Galvez PA-C    [Held by provider] meropenem (MERREM) 500 mg vial to attach to  mL bag for ADULTS or  25 mL bag for PEDS, 500 mg, Intravenous, Q12H, Jamel Bustillo MD, 500 mg at 10/16/24 0426    methocarbamol (ROBAXIN) tablet 750 mg, 750 mg, Oral, Q6H PRN, Catherine Galvez PA-C, 750 mg at 10/12/24 2143    metoprolol tartrate (LOPRESSOR) half-tab 12.5 mg, 12.5 mg, Oral, BID, Catherine Galvez PA-C, 12.5 mg at 10/16/24 0828    metoprolol tartrate (LOPRESSOR) half-tab 12.5 mg, 12.5 mg, Oral, TID, Catherine Galvez PA-C    multivitamin w/minerals (THERA-VIT-M) tablet 1 tablet, 1 tablet, Oral, Daily, Catherine Galvez PA-C, 1 tablet at 10/15/24 1145    naloxone (NARCAN) injection 0.2 mg, 0.2 mg, Intravenous, Q2 Min PRN **OR** naloxone (NARCAN) injection 0.4 mg, 0.4 mg, Intravenous, Q2 Min PRN **OR** naloxone (NARCAN) injection 0.2 mg, 0.2 mg, Intramuscular, Q2 Min PRN **OR** naloxone (NARCAN) injection 0.4 mg, 0.4 mg, Intramuscular, Q2 Min PRN, David Wade MD    No heparin via hemodialysis machine, , Does not apply, Once, Brea Buchanan MD    ondansetron (ZOFRAN ODT) ODT tab 4 mg, 4 mg, Oral, Q6H PRN **OR** ondansetron (ZOFRAN) injection 4 mg, 4 mg, Intravenous, Q6H PRN, Catherine Galvez PA-C, 4 mg at 10/10/24 1945    oxyCODONE IR (ROXICODONE) half-tab 2.5-5 mg, 2.5-5 mg, Oral, Q4H PRN, Catherine Galvez PA-C, 5 mg at 10/16/24 0604    [DISCONTINUED] pantoprazole (PROTONIX) 2 mg/mL suspension 40 mg, 40 mg, Oral or NG Tube, Daily, 40 mg at 10/08/24 1729 **OR** pantoprazole (PROTONIX) EC tablet 40 mg, 40 mg, Oral, Daily, Catherine Galvez PA-C, 40 mg at 10/16/24 0828    prochlorperazine (COMPAZINE) injection 10 mg, 10 mg, Intravenous, Q6H PRN **OR** prochlorperazine (COMPAZINE) tablet 10 mg, 10 mg, Oral, Q6H PRN, Catherine Galvez PA-C    senna-docusate (SENOKOT-S/PERICOLACE) 8.6-50 MG per tablet 1 tablet, 1 tablet, Oral, BID, Catherine Galvez PA-C, 1 tablet at 10/16/24 0828    simethicone (MYLICON) chewable half-tab 120 mg, 120 mg, Oral, 4x Daily PRN,  Catherine Galvez PA-C    sodium chloride (PF) 0.9% PF flush 10-20 mL, 10-20 mL, Intracatheter, q1 min prn, Elise Aden RN    sodium chloride (PF) 0.9% PF flush 10-40 mL, 10-40 mL, Intracatheter, Q8H, Alistair Escudero MD, 20 mL at 10/16/24 0436    sodium chloride (PF) 0.9% PF flush 10-40 mL, 10-40 mL, Intracatheter, Q7 Days, Catherine Galvez PA-C    sodium chloride (PF) 0.9% PF flush 10-40 mL, 10-40 mL, Intracatheter, Q1H PRN, Catherine Galvez PA-C    sodium chloride 0.9% BOLUS 100-150 mL, 100-150 mL, Intravenous, Q15 Min PRN, Brea Buchanan MD    sodium chloride 0.9% BOLUS 250 mL, 250 mL, Intravenous, Once in dialysis/CRRT, Brea Buchanan MD    sodium chloride 0.9% BOLUS 300 mL, 300 mL, Hemodialysis Machine, Once, Brea Buchanan MD    Vitamin D3 (CHOLECALCIFEROL) tablet 125 mcg, 125 mcg, Oral, Daily, Catherine Galvez PA-C, 125 mcg at 10/16/24 0827    Warfarin Dose Required Daily - Pharmacist Managed, 1 each, Oral, See Admin Instructions, Kay Ann PA-C    Labs personally reviewed today during this evaluation at 1130am.

## 2024-10-16 NOTE — PLAN OF CARE
[facility] - ICU    RN Progress Note:            Pertinent Assessments:      Please refer to flowsheet rows for full assessment     Vital signs stable , no fever, denies pain. Turned and repositioned every two hours.           Key Events - This Shift:       Uneventful                Barriers to Discharge / Downgrade:     None         Point of Contact Update: No  If No, reason: Uneventful  Name:  Phone Number:  Summary of Conversation:

## 2024-10-16 NOTE — PLAN OF CARE
Goal Outcome Evaluation:      RN Progress Note:            Pertinent Assessments:      Please refer to flowsheet rows for full assessment     Pt Alert and Oriented. Initially SOB with movement. LS diminished. BM x1, Able to void 700cc in 4hrs. Less SOB and sitting up in the chair for 2hrs.            Key Events - This Shift:       Feels much better this evening. Needs a lot of encouragement to do therapy,                 Barriers to Discharge / Downgrade:     Is cardiac tele. Family argumentative with every treatment suggested.

## 2024-10-16 NOTE — PROGRESS NOTES
CVTS Daily Progress Note   POD#8 s/p CABG x4 (LIMA>LAD, rSVG>RPDA, rSVG>LPL, rSVG>D2), RLE EVH, Attempted MVR/r, MVR ( 29 mm [employer] Mechanical Mitral Valve), LAAE  Attending: Mauro  LOS: 8    SUBJECTIVE/INTERVAL EVENTS:    No acute events overnight. Hgb 6.8 this AM, transfusing 1u PRBCs. Tolerated HD well yesterday PM. Intermittent a fib, largely rate controlled. UOP continues to  but still not proportional to hypervolemia, Cr variable. LFTs continue to downtrend. Maintaining oxygen saturations on home CPAP/2L NC. Normotensive, soft pressures overnight. Up to chair already this AM. Ambulating w/ therapy. Pain well controlled. +BM. Switched to regular diet given low PO intake. INR jumped to 2.81 (1.75) - heparin gtt stopped. Patient denies new chest pain, shortness of breath, abdominal pain, calf pain, nausea. Patient has no questions today.     OBJECTIVE:  Temp:  [97.6  F (36.4  C)-98.7  F (37.1  C)] 97.7  F (36.5  C)  Pulse:  [] 92  Resp:  [16-20] 16  BP: ()/(50-76) 113/67  SpO2:  [94 %-100 %] 96 %  Vitals:    10/12/24 0400 10/13/24 0400 10/14/24 0400 10/15/24 0600   Weight: 113.3 kg (249 lb 12.5 oz) 111.2 kg (245 lb 2.4 oz) 112.5 kg (248 lb 0.3 oz) 114.3 kg (252 lb)    10/16/24 0500   Weight: 112.8 kg (248 lb 9.6 oz)       Clinically Significant Risk Factors        # Hypokalemia: Lowest K = 3.2 mmol/L in last 2 days, will replace as needed  # Hyponatremia: Lowest Na = 129 mmol/L in last 2 days, will monitor as appropriate  # Hypochloremia: Lowest Cl = 95 mmol/L in last 2 days, will monitor as appropriate  # Hypocalcemia: Lowest iCa = 4.3 mg/dL in last 2 days, will monitor and replace as appropriate     # Hypoalbuminemia: Lowest albumin = 2.6 g/dL at 10/16/2024  4:35 AM, will monitor as appropriate     # Thrombocytopenia: Lowest platelets = 123 in last 2 days, will monitor for bleeding  # Acute Kidney Injury, unspecified: based on a >150% or 0.3 mg/dL increase in last creatinine compared  "to past 90 day average, will monitor renal function  # Hypertension: Noted on problem list           # Severe Obesity: Estimated body mass index is 42.67 kg/m  as calculated from the following:    Height as of 9/25/24: 1.626 m (5' 4\").    Weight as of this encounter: 112.8 kg (248 lb 9.6 oz).   # Moderate Malnutrition: based on nutrition assessment     # History of CABG: noted on surgical history              Current Medications:    Scheduled Meds:  Current Facility-Administered Medications   Medication Dose Route Frequency Provider Last Rate Last Admin    albumin human 5 % injection 250 mL  250 mL Intravenous Once in dialysis/CRRT Brea Buchanan MD        atorvastatin (LIPITOR) tablet 80 mg  80 mg Oral At Bedtime Kay Ann PA-C   80 mg at 10/15/24 2040    budesonide (PULMICORT) neb solution 1 mg  1 mg Nebulization BID Bharat Lu MD   1 mg at 10/15/24 2009    cetirizine (zyrTEC) tablet 10 mg  10 mg Oral Daily Catherine Galvez PA-C   10 mg at 10/16/24 0827    clopidogrel (PLAVIX) tablet 75 mg  75 mg Oral Daily Catherine Galvez PA-C   75 mg at 10/16/24 0828    fludrocortisone (FLORINEF) tablet 0.1 mg  0.1 mg Oral Daily Catherine Galvez PA-C   0.1 mg at 10/16/24 0828    fluticasone-vilanterol (BREO ELLIPTA) 100-25 MCG/ACT inhaler 1 puff  1 puff Inhalation At Bedtime Catherine Galvez PA-C   1 puff at 10/15/24 2038    heparin lock flush 10 unit/mL injection 5-20 mL  5-20 mL Intracatheter Q24H Alistair Escudero MD   5 mL at 10/13/24 2133    levothyroxine (SYNTHROID/LEVOTHROID) tablet 125 mcg  125 mcg Oral QAM AC Catherine Galvez PA-C   125 mcg at 10/16/24 0828    Lidocaine (LIDOCARE) 4 % Patch 1-2 patch  1-2 patch Transdermal Q24H Catherine Galvez PA-C   2 patch at 10/15/24 2855    [Held by provider] meropenem (MERREM) 500 mg vial to attach to  mL bag for ADULTS or 25 mL bag for PEDS  500 mg Intravenous Q12H Jamel Bustillo MD   500 mg at 10/16/24 0521    " metoprolol tartrate (LOPRESSOR) half-tab 12.5 mg  12.5 mg Oral BID Catherine Galvez PA-C   12.5 mg at 10/16/24 0828    metoprolol tartrate (LOPRESSOR) half-tab 12.5 mg  12.5 mg Oral TID Catherine Galvez PA-C        multivitamin w/minerals (THERA-VIT-M) tablet 1 tablet  1 tablet Oral Daily Catherine Galvez PA-C   1 tablet at 10/15/24 1145    No heparin via hemodialysis machine   Does not apply Once Brea Buchanan MD        pantoprazole (PROTONIX) EC tablet 40 mg  40 mg Oral Daily Catherine Galvez PA-C   40 mg at 10/16/24 0828    senna-docusate (SENOKOT-S/PERICOLACE) 8.6-50 MG per tablet 1 tablet  1 tablet Oral BID Catherine Galvez PA-C   1 tablet at 10/16/24 0828    sodium chloride (PF) 0.9% PF flush 10-40 mL  10-40 mL Intracatheter Q8H Alistair Escudero MD   20 mL at 10/16/24 0436    sodium chloride (PF) 0.9% PF flush 10-40 mL  10-40 mL Intracatheter Q7 Days Catherine Galvez PA-C        sodium chloride 0.9% BOLUS 250 mL  250 mL Intravenous Once in dialysis/CRRT Brea Buchanan MD        sodium chloride 0.9% BOLUS 300 mL  300 mL Hemodialysis Machine Once Brea Buchanan MD        Vitamin D3 (CHOLECALCIFEROL) tablet 125 mcg  125 mcg Oral Daily Catherine Galvez PA-C   125 mcg at 10/16/24 0827    Warfarin Dose Required Daily - Pharmacist Managed  1 each Oral See Admin Instructions Kay Ann PA-C         Continuous Infusions:  Current Facility-Administered Medications   Medication Dose Route Frequency Provider Last Rate Last Admin     PRN Meds:.  Current Facility-Administered Medications   Medication Dose Route Frequency Provider Last Rate Last Admin    albumin human 5 % injection  mL   mL Intravenous Q1H PRN Brea Buchanan MD        albuterol (PROVENTIL HFA/VENTOLIN HFA) inhaler  2 puff Inhalation Q6H PRN Catherine Galvez PA-C        bisacodyl (DULCOLAX) suppository 10 mg  10 mg Rectal Daily PRN Catherine Galvez PA-C        calcium  gluconate 1 g in 50 mL in sodium chloride intermittent infusion  1 g Intravenous Once PRN Catherine Galvez PA-C   1 g at 10/15/24 2121    calcium gluconate 2 g in  mL intermittent infusion  2 g Intravenous Once PRN Catherine Galvez PA-C        calcium gluconate 3 g in sodium chloride 0.9 % 100 mL intermittent infusion  3 g Intravenous Once PRN Catherine Galvez PA-C        glucose gel 15-30 g  15-30 g Oral Q15 Min PRN Doc Dhillon MD        Or    dextrose 50 % injection 25-50 mL  25-50 mL Intravenous Q15 Min PRN Doc Dhillon MD   50 mL at 10/14/24 0806    Or    glucagon injection 1 mg  1 mg Subcutaneous Q15 Min PRN Doc Dhillon MD        fluticasone (FLONASE) 50 MCG/ACT spray 2 spray  2 spray Both Nostrils Daily PRN Catherine Galvez PA-C        heparin lock flush 10 unit/mL injection 5-20 mL  5-20 mL Intracatheter Q1H PRN Alistair Escudero MD        hydrALAZINE (APRESOLINE) injection 10 mg  10 mg Intravenous Q30 Min PRN Catherine Galvez PA-C        ipratropium - albuterol 0.5 mg/2.5 mg/3 mL (DUONEB) neb solution 3 mL  3 mL Nebulization Q4H PRN Bharat Lu MD        lidocaine (LMX4) cream   Topical Q1H PRN Catherine Galvez PA-C        lidocaine 1 % 0.1-5 mL  0.1-5 mL Other Q1H PRN Catherine Galvez PA-C   1 mL at 10/15/24 1415    magnesium hydroxide (MILK OF MAGNESIA) suspension 30 mL  30 mL Oral Daily PRN Catherine Galvez PA-C        methocarbamol (ROBAXIN) tablet 750 mg  750 mg Oral Q6H PRN Catherine Galvez PA-C   750 mg at 10/12/24 2143    naloxone (NARCAN) injection 0.2 mg  0.2 mg Intravenous Q2 Min PRN David Wade MD        Or    naloxone (NARCAN) injection 0.4 mg  0.4 mg Intravenous Q2 Min PRN David Wade MD        Or    naloxone (NARCAN) injection 0.2 mg  0.2 mg Intramuscular Q2 Min PRN David Wade MD        Or    naloxone (NARCAN) injection 0.4 mg  0.4 mg Intramuscular Q2 Min PRN David Wade  MD Lang        ondansetron (ZOFRAN ODT) ODT tab 4 mg  4 mg Oral Q6H PRN Catherine Galvez PA-C        Or    ondansetron (ZOFRAN) injection 4 mg  4 mg Intravenous Q6H PRN Catherine Galvez PA-C   4 mg at 10/10/24 1945    oxyCODONE IR (ROXICODONE) half-tab 2.5-5 mg  2.5-5 mg Oral Q4H PRN Catherine Galvez PA-C   5 mg at 10/16/24 0604    prochlorperazine (COMPAZINE) injection 10 mg  10 mg Intravenous Q6H PRN Catherine Galvez PA-C        Or    prochlorperazine (COMPAZINE) tablet 10 mg  10 mg Oral Q6H PRN Catherine Galvez PA-C        simethicone (MYLICON) chewable half-tab 120 mg  120 mg Oral 4x Daily PRN Catherine Galvez PA-C        sodium chloride (PF) 0.9% PF flush 10-20 mL  10-20 mL Intracatheter q1 min prn Elise Aden RN        sodium chloride (PF) 0.9% PF flush 10-40 mL  10-40 mL Intracatheter Q1H PRN Catherine Galvez PA-C        sodium chloride 0.9% BOLUS 100-150 mL  100-150 mL Intravenous Q15 Min PRN Brea Buchanan MD           Cardiographics:    Telemetry monitoring demonstrates a fib w/ rates in the 90-100s per my personal review.    Imaging:  Results for orders placed or performed during the hospital encounter of 10/08/24   XR Chest Port 1 View    Impression    IMPRESSION: Interval sternotomy, CABG procedure and mitral valve repair. Endotracheal tube proximally 2 cm above the sofia. Nasogastric tube in the stomach. Bilateral chest tubes and midline mediastinal drain in expected position. Epicardial leads are   present. Left IJ line in the brachiocephalic vein.    Low lung volumes. No evidence for CHF or pneumonia. No pleural effusion or pneumothorax.   XR Chest Port 1 View    Impression    IMPRESSION: Poststernotomy changes with prosthetic mitral valve. Patient's been extubated and the NG tube has been removed. Mediastinal and pleural chest tubes are in place.    Left IJ cordis sheath is in the distal left innominate artery. Catheter has a very subtle kink within  it 3.5 centimeters from the tip.    Low lung volumes. No pneumothorax. Likely trace left effusion at the base. No signs of pneumonia or failure. Heart is of normal size.   XR Abdomen Port 1 View    Impression    IMPRESSION: Orogastric tube tip in the proximal stomach and oriented superiorly towards the left diaphragm. Visualized bowel gas pattern unremarkable. Numerous tubes and lines lower chest and upper abdomen as described on chest x-ray report from today.        Labs, personally reviewed.  Hemoglobin   Date Value Ref Range Status   10/16/2024 6.8 (LL) 11.7 - 15.7 g/dL Final   10/15/2024 7.5 (L) 11.7 - 15.7 g/dL Final   10/14/2024 8.0 (L) 11.7 - 15.7 g/dL Final   06/09/2015 13.5 11.7 - 15.7 g/dL Final   03/11/2015 12.3 11.7 - 15.7 g/dL Final   01/08/2015 13.6 11.7 - 15.7 g/dL Final     WBC   Date Value Ref Range Status   06/09/2015 7.9 4.0 - 11.0 10e9/L Final   03/11/2015 9.4 4.0 - 11.0 10e9/L Final   01/08/2015 8.1 4.0 - 11.0 10e9/L Final     WBC Count   Date Value Ref Range Status   10/16/2024 19.5 (H) 4.0 - 11.0 10e3/uL Final   10/15/2024 27.9 (H) 4.0 - 11.0 10e3/uL Final   10/15/2024 28.0 (H) 4.0 - 11.0 10e3/uL Final     Platelet Count   Date Value Ref Range Status   10/16/2024 123 (L) 150 - 450 10e3/uL Final   10/15/2024 133 (L) 150 - 450 10e3/uL Final   10/14/2024 105 (L) 150 - 450 10e3/uL Final   06/09/2015 219 150 - 450 10e9/L Final   03/11/2015 240 150 - 450 10e9/L Final   01/08/2015 221 150 - 450 10e9/L Final     Creatinine   Date Value Ref Range Status   10/16/2024 2.80 (H) 0.51 - 0.95 mg/dL Final   10/15/2024 1.74 (H) 0.51 - 0.95 mg/dL Final   10/15/2024 3.40 (H) 0.51 - 0.95 mg/dL Final   06/09/2015 0.80 0.52 - 1.04 mg/dL Final   03/11/2015 0.87 0.52 - 1.04 mg/dL Final   01/08/2015 0.79 0.52 - 1.04 mg/dL Final     Potassium   Date Value Ref Range Status   10/16/2024 3.2 (L) 3.4 - 5.3 mmol/L Final   10/15/2024 3.2 (L) 3.4 - 5.3 mmol/L Final   10/15/2024 4.0 3.4 - 5.3 mmol/L Final   06/09/2015 3.8 3.4  - 5.3 mmol/L Final   03/11/2015 3.9 3.4 - 5.3 mmol/L Final   01/08/2015 4.1 3.4 - 5.3 mmol/L Final     Potassium POCT   Date Value Ref Range Status   10/08/2024 4.4 3.4 - 5.3 mmol/L Final   10/08/2024 4.0 3.4 - 5.3 mmol/L Final   10/08/2024 4.1 3.4 - 5.3 mmol/L Final     Magnesium   Date Value Ref Range Status   10/16/2024 2.4 (H) 1.7 - 2.3 mg/dL Final   10/15/2024 3.1 (H) 1.7 - 2.3 mg/dL Final   10/14/2024 3.0 (H) 1.7 - 2.3 mg/dL Final   06/09/2015 1.8 1.6 - 2.3 mg/dL Final   03/11/2015 1.7 1.6 - 2.3 mg/dL Final   01/08/2015 1.8 1.6 - 2.3 mg/dL Final          I/O:  I/O last 3 completed shifts:  In: 1291.78 [P.O.:600; I.V.:691.78]  Out: 1245 [Urine:1245]       Physical Exam:    General: Patient seen in bed this AM. Appears less fatigued this AM. NAD. Pleasant, conversant.  HEENT: DORITA, no sclera icterus, moist mucosa  CV: RRR on monitor. 2+ peripheral pulses in all extremities. Mild peripheral edema. Incision C/D/I.  Pulm: Non-labored effort on 2L NC.   Abd: Soft, NT, ND  : Padron with john urine  Ext: Mild pedal edema, SCDs in place, warm, distal pulses intact  Neuro: CNs grossly intact, FAM, A&Ox3      ASSESSMENT/PLAN:    Jory Ambrose is a 49 year old female with a history of GIA, asthma, GERD,  hypothyroidism, h/o PE on plavix, carotid stenosis, Hodgkin's lymphoma (s/p chemo/radiation and bone marrow transplant x2 after recurrence) currently in remission who is s/p CABG x4, RLE EVH, attempted mitral repair, mechanical MVR, LAAE.    Active Problems:    Mitral regurgitation    Coronary artery disease involving native coronary artery of native heart with angina pectoris (H)  #Acute Renal Failure d/t ATN, ongoing.      NEURO:   - Scheduled lidocaine patches and PRN robaxin/oxycodone for pain  - No toradol given ARF  - PTA bupropion discontinued (somnolence)  - Tylenol discontinued (transaminitis)    CV:   - Pre-op EF 55-60%  - Chest tubes and TPW's removed without immediate complications POD#5  -  Normotensive  - Metop 12.5 mg BID and TID PRN given soft pressures w/ 25 mg dose  - Plavix daily indefinitely d/t unclear h/o anaphylaxis to ASA  - Atorvastatin 80mg daily  - Warfarin for mechanical MVR, INR therapeutic, heparin gtt stopped  - Rate controlled afib - s/p amiodarone bolus/gtt 10/13 (ok per Mather). Ok to stop given LFTs, in SR, and allergy to PO amio additive. Consider amio if ongoing a fib  - New baseline echo prior to discharge when closer to preop weight    PULM:   - Extubated on POD#0  - Maintaining oxygen saturations on 2L NC/home CPAP overnight - likely d/t hypervolemia  - Repeat CXR this AM  - Encourage pulmonary toilet  - PTA zyrtec, fludrocortisone, flonase, simethicone, albuterol, pulmicort, breo ellipta, duoneb    FEN/GI:  - Continue electrolyte replacement protocol  - Switched to regular diet given low PO intake, hypokalemia, and normophosphoremia  - On calorie count  - Bowel regimen, LBM 10/13  - Ischemic hepatitis -  // AST 93 and downtrending  - Hepatic w/u and labs unremarkable  - PTA vitamin D3   - Hypermagnesemia, monitor.  - CMP BID  - Mild hyponatremia, monitor for now    RENAL:  - Nephrology consulted for ARF w/ decreased UOP, appreciate. Following.  - CRRT 10/11 - 10/13  - HD 10/15 - 0.6 L total removed  - Strict I/O  - Cr 2.80 (1.74) (baseline ~0.9)  - Padron to be removed today w/ UOP improving.   - UA/Urine cx 10/10 & 10/13 NG  - CMP BID as above    HEME:  - H/o bone marrow tx. Needs irradiated blood  - Acute blood loss anemia post-op.   - Hgb 6.8 (7.5), likely dilutional component, transfusing, recheck Hgb at 1200  - Warfarin INR goal 2.5-3.5 [facility]  - HIT screen negative.  - INR 2.81 (1.75)  - Transfusion hx  - 2u PRBCs periop  - 1u PRBCs 10/11  - 1u PRBCs 10/16    ID:  - Lydia op ppx complete. Afebrile. Leukocytosis -  WBC 19.5 (27.9)  - UA/Urine cx 10/10 & 10/13 NG.  - Blood cx 10/9 & 10/13 NG  - Sputum culture 10/13 - pending  - ID consult for sepsis,  appreciate. Following.   - Switch vanc/rocephin to meropenem - HELD this AM given unrevealing infxn w/u   - Consider CT  - CBC qday    ENDO:   - HbA1c 5.7%  - q4 BG checks  - Push PO intake as above  - Serum cortisol and ACTH WNL, pancreatitis labs unremarkable  - PTA Metformin discontinued (renal function)  - PTA synthroid    PPx:   - DVT: SCDs, SQ heparin TID, ambulation   - GI: Protonix 40mg IV/PO daily    DISPO:   - Ok to transfer to general tele unit (POD#8)  - Medically Ready for Discharge: Anticipated in 5+ Days         Patient discussed w/ Dr. Wade.      Eva Galvez PA-C  [facility] Cardiothoracic Surgery  [employer] [facility] [place]  October 16, 2024

## 2024-10-16 NOTE — CONSULTS
[facility] Nurse Inpatient Assessment     Consulted for: Abd/Thighs blisters    Patient History (according to provider note(s):    This patient is a 49 year old female who presents with exertional angina characterized by pain in the neck and face with exertion. 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 She has no symptoms of heart failure. She was thinking of starting an exercise program but she noticed symptoms of      Ms. Jory Ambrose is a 48 year old female who comes in for cardiac evaluation.  She had 2 recent visits to emergency room.  The first time she went with the pain around her eye and numbness in her arm.  She underwent imaging of head without evidence of an acute stroke.  She had moderate disease in the carotids.  The second visit to emergency room was because of a tight heavy sensation in the left side of her chest.  This sensation came on at rest.  Her EKG did not show any ischemic changes and troponins were negative.  She did not have recurrence of the discomfort.  She is not known to have coronary artery disease, valvular heart disease, cardiomyopathy.  She has a history of Hodgkin lymphoma she had chemotherapy, radiation, bone marrow transplant x 2.  She is in remission now.  2 months ago she started taking phentermine for weight loss.     Assessment:    Skin Injury Location: Abd/Thighs    Last photo: 10/16/24  Skin injury due to:  Blister (intact and unroofed  Skin history and plan of care:    Affected area:      Abdomen with intact blister (serous fluid filled) measuring approximately 0.5 cm x 2 cm. No drainage noted.     L thigh with unroofed blister with pink, clean wound bed. No drainage noted at this time. Measures approximately 1 cm x 1 cm.     R thigh with bruising and an area which appears to be non-viable skin measuring approximately 1.5 cm x 1.5 cm. No  drainage noted at this time.  Pain: denies   Pain interventions prior to dressing change: patient tolerated well and slow and gentle cares   Treatment goal: Heal   STATUS: initial assessment  Supplies ordered: at bedside    B feet cool. No blisters noted on BLE below knees or feet.    Treatment Plan:   Abd/thighs - Every 5 days  If intact and no drainage, leave open to air.  If open wound:  Cleanse with saline; gently dry.  Cover with Mepilex.    Orders: Written    RECOMMEND PRIMARY TEAM ORDER: None, at this time  Education provided: plan of care  Discussed plan of care with: Patient and Family  [facility] nurse follow-up plan: every other week  Notify WO if wound(s) deteriorate.  Nursing to notify the Provider(s) and re-consult the WO Nurse if new skin concern.    DATA:     Current support surface: Standard  Low air loss (QUYEN pump, Isolibrium, Pulsate)  Containment of urine/stool: Incontinence Protocol  BMI: Body mass index is 42.67 kg/m .   Active diet order: Orders Placed This Encounter      Regular Diet Adult     Output: I/O last 3 completed shifts:  In: 1291.78 [P.O.:600; I.V.:691.78]  Out: 1245 [Urine:1245]     Labs:   Recent Labs   Lab 10/16/24  1156 10/16/24  0435   ALBUMIN  --  2.6*   HGB 8.8* 6.8*   INR  --  2.81*   WBC  --  19.5*     Pressure injury risk assessment:   Sensory Perception: 4-->no impairment  Moisture: 4-->rarely moist  Activity: 3-->walks occasionally  Mobility: 2-->very limited  Nutrition: 2-->probably inadequate  Friction and Shear: 3-->no apparent problem  Bari Score: 18    Rossy Torres, MSN RN CWOCN  Pager no longer is use, please contact through [employer] group: [facility]

## 2024-10-16 NOTE — PROGRESS NOTES
INFECTIOUS DISEASE FOLLOW UP NOTE    Date: 10/16/2024   CHIEF COMPLAINT: No chief complaint on file.       ASSESSMENT:  Jory Ambrose is a 49 year old old female with   History of Hodgkin lymphoma status post chemo therapy and mantle radiation.  Status post lung transplant x 2.  Coronary disease status post four-vessel CABG and MVR.  Shock liver with AST and ALT above 7000 at 1 point.  Improving.   ALEXEI with acute tubular necrosis.  required CRR now off.   Sepsis with leukocytosis.  Blood cultures from 10/13/2024 in process.  UA appears consistent with UTI.  UCx finalized as negative-had been on several days of antibiotics prior to collection. Stable. WBC now improving-diff with many elevated lines.     PLAN:  - at this point has received a week of broad antibiotic therapy without clear source infection. Reasonable to observe off antibiotics.   - follow pended cultures  - will follow    Katherine Krause MD  [facility]   Office Telephone [phone].  Fax [phone]  [employer] paging    ______________________________________________________________________    SUBJECTIVE / INTERVAL HISTORY:  Getting neb. More incisional discomfort today otherwise stable. Appetite ok.     ROS: All other systems negative except as listed above.    SH/FH/Habits/PMH reviewed and unchanged.    OBJECTIVE:  /67   Pulse 93   Temp 97.7  F (36.5  C)   Resp 18   Wt 112.8 kg (248 lb 9.6 oz)   LMP  (LMP Unknown)   SpO2 100%   BMI 42.67 kg/m    FiO2 (%): 40 %  Resp: 18    Vital Signs  Temp: 98.5  F (36.9  C)  Temp src: Oral  Resp: 22  Pulse: 78  Pulse Rate Source: Monitor  BP: 121/69  BP Location: Left leg    Temp (24hrs), Av.1  F (36.7  C), Min:97.8  F (36.6  C), Max:98.5  F (36.9  C)      GEN: No acute distress. Getting neb in bed.    RESPIRATORY:  Normal breathing pattern.   CARDIOVASCULAR:  Regular rate  ABDOMEN:  Soft, normal bowel sounds, non-tender,   EXTREMITIES: No  "edema.  SKIN/HAIR/NAILS:  No rashes  IV: central lines, peripheral IV      Antibiotics:  meropenem    Pertinent labs:  No results found for: \"CRP\"   CBC RESULTS:   Recent Labs   Lab Test 10/14/24  0758   WBC 23.8*   RBC 2.76*   HGB 8.0*   HCT 24.8*   MCV 90   MCH 29.0   MCHC 32.3   RDW 18.2*   *      Last Comprehensive Metabolic Panel:  Sodium   Date Value Ref Range Status   10/16/2024 131 (L) 135 - 145 mmol/L Final   06/09/2015 142 133 - 144 mmol/L Final     Potassium   Date Value Ref Range Status   10/16/2024 3.2 (L) 3.4 - 5.3 mmol/L Final   06/09/2015 3.8 3.4 - 5.3 mmol/L Final     Potassium POCT   Date Value Ref Range Status   10/08/2024 4.4 3.4 - 5.3 mmol/L Final     Chloride   Date Value Ref Range Status   10/16/2024 97 (L) 98 - 107 mmol/L Final   06/09/2015 108 94 - 109 mmol/L Final     Carbon Dioxide   Date Value Ref Range Status   06/09/2015 27 20 - 32 mmol/L Final     Carbon Dioxide (CO2)   Date Value Ref Range Status   10/16/2024 24 22 - 29 mmol/L Final     Anion Gap   Date Value Ref Range Status   10/16/2024 10 7 - 15 mmol/L Final   06/09/2015 7 3 - 14 mmol/L Final     Glucose   Date Value Ref Range Status   10/16/2024 93 70 - 99 mg/dL Final   06/09/2015 101 (H) 70 - 99 mg/dL Final     GLUCOSE BY METER POCT   Date Value Ref Range Status   10/16/2024 87 70 - 99 mg/dL Final     Urea Nitrogen   Date Value Ref Range Status   10/16/2024 41.1 (H) 6.0 - 20.0 mg/dL Final   06/09/2015 20 7 - 30 mg/dL Final     Creatinine   Date Value Ref Range Status   10/16/2024 2.80 (H) 0.51 - 0.95 mg/dL Final   06/09/2015 0.80 0.52 - 1.04 mg/dL Final     GFR Estimate   Date Value Ref Range Status   10/16/2024 20 (L) >60 mL/min/1.73m2 Final     Comment:     eGFR calculated using 2021 CKD-EPI equation.   06/09/2015 79 >60 mL/min/1.7m2 Final     Comment:     Non  GFR Calc     GFR, ESTIMATED POCT   Date Value Ref Range Status   08/06/2024 >60 >60 mL/min/1.73m2 Final     Calcium   Date Value Ref Range Status "   10/16/2024 7.9 (L) 8.8 - 10.4 mg/dL Final     Comment:     Reference intervals for this test were updated on 7/16/2024 to reflect our healthy population more accurately. There may be differences in the flagging of prior results with similar values performed with this method. Those prior results can be interpreted in the context of the updated reference intervals.   06/09/2015 9.2 8.5 - 10.1 mg/dL Final        MICROBIOLOGY DATA:  Personally reviewed.  7-Day Micro Results       Collected Updated Procedure Result Status      10/13/2024 0938 10/14/2024 0613 Urine Culture [[id number]]   Urine, Padron Catheter    Final result Component Value   Culture No Growth               10/13/2024 0931 10/15/2024 1146 Blood Culture Hand, Left [[id number]]   Blood from Hand, Left    Preliminary result Component Value   Culture No growth after 2 days  [P]                10/13/2024 0922 10/15/2024 1146 Blood Culture Line, arterial [[id number]]   Blood from Line, arterial    Preliminary result Component Value   Culture No growth after 2 days  [P]                10/10/2024 0036 10/11/2024 1016 Urine Culture [[id number]]   Urine, Padron Catheter    Final result Component Value   Culture No Growth               10/10/2024 0035 10/15/2024 0316 Blood Culture Line, arterial [[id number]]   Blood from Line, arterial    Final result Component Value   Culture No Growth                        RADIOLOGY:  Personally Reviewed.  Recent Results (from the past 24 hour(s))   XR Chest Port 1 View    Narrative    EXAM: XR CHEST PORT 1 VIEW  LOCATION: [facility]  DATE: 10/13/2024    INDICATION: s p ct removal  COMPARISON: 10/11/2024      Impression    IMPRESSION: Interval removal of midline mediastinal drain and bilateral chest tubes. Epicardial leads are no longer present. No pneumothorax. Low lung volumes with persistent mild atelectasis in the lower lungs. Prior sternotomy, mitral valve   replacement, CABG procedure.        Active Problems:    Mitral regurgitation    Coronary artery disease involving native coronary artery of native heart with angina pectoris (H)

## 2024-10-17 ENCOUNTER — APPOINTMENT (OUTPATIENT)
Dept: OCCUPATIONAL THERAPY | Facility: HOSPITAL | Age: 49
DRG: 219 | End: 2024-10-17
Attending: SURGERY
Payer: COMMERCIAL

## 2024-10-17 LAB
ALBUMIN SERPL BCG-MCNC: 2.9 G/DL (ref 3.5–5.2)
ALP SERPL-CCNC: 109 U/L (ref 40–150)
ALT SERPL W P-5'-P-CCNC: 382 U/L (ref 0–50)
ANION GAP SERPL CALCULATED.3IONS-SCNC: 15 MMOL/L (ref 7–15)
AST SERPL W P-5'-P-CCNC: 75 U/L (ref 0–45)
BILIRUB SERPL-MCNC: 1.1 MG/DL
BUN SERPL-MCNC: 53.2 MG/DL (ref 6–20)
CA-I BLD-MCNC: 4.5 MG/DL (ref 4.4–5.2)
CALCIUM SERPL-MCNC: 8 MG/DL (ref 8.8–10.4)
CHLORIDE SERPL-SCNC: 95 MMOL/L (ref 98–107)
CREAT SERPL-MCNC: 3.53 MG/DL (ref 0.51–0.95)
EGFRCR SERPLBLD CKD-EPI 2021: 15 ML/MIN/1.73M2
ERYTHROCYTE [DISTWIDTH] IN BLOOD BY AUTOMATED COUNT: 19.7 % (ref 10–15)
GLUCOSE BLDC GLUCOMTR-MCNC: 100 MG/DL (ref 70–99)
GLUCOSE BLDC GLUCOMTR-MCNC: 109 MG/DL (ref 70–99)
GLUCOSE BLDC GLUCOMTR-MCNC: 128 MG/DL (ref 70–99)
GLUCOSE BLDC GLUCOMTR-MCNC: 93 MG/DL (ref 70–99)
GLUCOSE SERPL-MCNC: 102 MG/DL (ref 70–99)
HCO3 SERPL-SCNC: 22 MMOL/L (ref 22–29)
HCT VFR BLD AUTO: 26.5 % (ref 35–47)
HGB BLD-MCNC: 8.6 G/DL (ref 11.7–15.7)
INR PPP: 3.61 (ref 0.85–1.15)
MAGNESIUM SERPL-MCNC: 2.4 MG/DL (ref 1.7–2.3)
MCH RBC QN AUTO: 28.2 PG (ref 26.5–33)
MCHC RBC AUTO-ENTMCNC: 32.5 G/DL (ref 31.5–36.5)
MCV RBC AUTO: 87 FL (ref 78–100)
PHOSPHATE SERPL-MCNC: 4 MG/DL (ref 2.5–4.5)
PLATELET # BLD AUTO: 157 10E3/UL (ref 150–450)
POTASSIUM SERPL-SCNC: 3 MMOL/L (ref 3.4–5.3)
PROT SERPL-MCNC: 5.2 G/DL (ref 6.4–8.3)
RBC # BLD AUTO: 3.05 10E6/UL (ref 3.8–5.2)
SODIUM SERPL-SCNC: 132 MMOL/L (ref 135–145)
WBC # BLD AUTO: 15.3 10E3/UL (ref 4–11)

## 2024-10-17 PROCEDURE — 99232 SBSQ HOSP IP/OBS MODERATE 35: CPT | Performed by: INTERNAL MEDICINE

## 2024-10-17 PROCEDURE — 97535 SELF CARE MNGMENT TRAINING: CPT | Mod: GO

## 2024-10-17 PROCEDURE — 94640 AIRWAY INHALATION TREATMENT: CPT

## 2024-10-17 PROCEDURE — 250N000009 HC RX 250

## 2024-10-17 PROCEDURE — 85610 PROTHROMBIN TIME: CPT | Performed by: PHYSICIAN ASSISTANT

## 2024-10-17 PROCEDURE — 250N000013 HC RX MED GY IP 250 OP 250 PS 637

## 2024-10-17 PROCEDURE — 84100 ASSAY OF PHOSPHORUS: CPT | Performed by: SURGERY

## 2024-10-17 PROCEDURE — 258N000003 HC RX IP 258 OP 636

## 2024-10-17 PROCEDURE — 250N000011 HC RX IP 250 OP 636: Performed by: INTERNAL MEDICINE

## 2024-10-17 PROCEDURE — 97110 THERAPEUTIC EXERCISES: CPT | Mod: GO

## 2024-10-17 PROCEDURE — 250N000013 HC RX MED GY IP 250 OP 250 PS 637: Performed by: INTERNAL MEDICINE

## 2024-10-17 PROCEDURE — 120N000013 HC R&B IMCU

## 2024-10-17 PROCEDURE — 94799 UNLISTED PULMONARY SVC/PX: CPT

## 2024-10-17 PROCEDURE — 250N000009 HC RX 250: Performed by: INTERNAL MEDICINE

## 2024-10-17 PROCEDURE — 80053 COMPREHEN METABOLIC PANEL: CPT

## 2024-10-17 PROCEDURE — 82330 ASSAY OF CALCIUM: CPT | Performed by: SURGERY

## 2024-10-17 PROCEDURE — 90935 HEMODIALYSIS ONE EVALUATION: CPT

## 2024-10-17 PROCEDURE — 85027 COMPLETE CBC AUTOMATED: CPT

## 2024-10-17 PROCEDURE — 94660 CPAP INITIATION&MGMT: CPT

## 2024-10-17 PROCEDURE — 94640 AIRWAY INHALATION TREATMENT: CPT | Mod: 76

## 2024-10-17 PROCEDURE — 999N000157 HC STATISTIC RCP TIME EA 10 MIN

## 2024-10-17 PROCEDURE — 83735 ASSAY OF MAGNESIUM: CPT | Performed by: SURGERY

## 2024-10-17 RX ORDER — POTASSIUM CHLORIDE 1500 MG/1
40 TABLET, EXTENDED RELEASE ORAL ONCE
Status: COMPLETED | OUTPATIENT
Start: 2024-10-17 | End: 2024-10-17

## 2024-10-17 RX ADMIN — ATORVASTATIN CALCIUM 80 MG: 40 TABLET, FILM COATED ORAL at 20:00

## 2024-10-17 RX ADMIN — CLOPIDOGREL BISULFATE 75 MG: 75 TABLET ORAL at 08:19

## 2024-10-17 RX ADMIN — OXYCODONE HYDROCHLORIDE 5 MG: 5 TABLET ORAL at 05:38

## 2024-10-17 RX ADMIN — SODIUM CHLORIDE 300 ML: 9 INJECTION, SOLUTION INTRAVENOUS at 15:24

## 2024-10-17 RX ADMIN — CETIRIZINE HYDROCHLORIDE 10 MG: 10 TABLET, FILM COATED ORAL at 08:19

## 2024-10-17 RX ADMIN — OXYCODONE HYDROCHLORIDE 5 MG: 5 TABLET ORAL at 20:53

## 2024-10-17 RX ADMIN — FLUDROCORTISONE ACETATE 0.1 MG: 0.1 TABLET ORAL at 08:19

## 2024-10-17 RX ADMIN — SODIUM CHLORIDE 250 ML: 9 INJECTION, SOLUTION INTRAVENOUS at 15:23

## 2024-10-17 RX ADMIN — LIDOCAINE 2 PATCH: 4 PATCH TOPICAL at 17:09

## 2024-10-17 RX ADMIN — METHOCARBAMOL 750 MG: 750 TABLET ORAL at 08:19

## 2024-10-17 RX ADMIN — BUDESONIDE 1 MG: 0.5 INHALANT ORAL at 07:16

## 2024-10-17 RX ADMIN — SENNOSIDES AND DOCUSATE SODIUM 1 TABLET: 8.6; 5 TABLET ORAL at 20:00

## 2024-10-17 RX ADMIN — METOPROLOL TARTRATE 12.5 MG: 25 TABLET, FILM COATED ORAL at 20:00

## 2024-10-17 RX ADMIN — FLUTICASONE FUROATE AND VILANTEROL TRIFENATATE 1 PUFF: 100; 25 POWDER RESPIRATORY (INHALATION) at 20:00

## 2024-10-17 RX ADMIN — METOPROLOL TARTRATE 12.5 MG: 25 TABLET, FILM COATED ORAL at 08:19

## 2024-10-17 RX ADMIN — BUDESONIDE 1 MG: 0.5 INHALANT ORAL at 20:27

## 2024-10-17 RX ADMIN — POTASSIUM CHLORIDE 40 MEQ: 1500 TABLET, EXTENDED RELEASE ORAL at 08:19

## 2024-10-17 RX ADMIN — LEVOTHYROXINE SODIUM 125 MCG: 0.03 TABLET ORAL at 06:58

## 2024-10-17 RX ADMIN — PANTOPRAZOLE SODIUM 40 MG: 40 TABLET, DELAYED RELEASE ORAL at 08:19

## 2024-10-17 RX ADMIN — Medication 125 MCG: at 08:19

## 2024-10-17 RX ADMIN — HEPARIN SODIUM 1300 UNITS: 1000 INJECTION INTRAVENOUS; SUBCUTANEOUS at 16:28

## 2024-10-17 RX ADMIN — METOPROLOL TARTRATE 12.5 MG: 25 TABLET, FILM COATED ORAL at 17:08

## 2024-10-17 ASSESSMENT — ACTIVITIES OF DAILY LIVING (ADL)
ADLS_ACUITY_SCORE: 38
ADLS_ACUITY_SCORE: 38
ADLS_ACUITY_SCORE: 39
ADLS_ACUITY_SCORE: 38
ADLS_ACUITY_SCORE: 39
ADLS_ACUITY_SCORE: 39
ADLS_ACUITY_SCORE: 35
ADLS_ACUITY_SCORE: 38
ADLS_ACUITY_SCORE: 35
ADLS_ACUITY_SCORE: 38
ADLS_ACUITY_SCORE: 39
ADLS_ACUITY_SCORE: 38
ADLS_ACUITY_SCORE: 39
ADLS_ACUITY_SCORE: 35

## 2024-10-17 NOTE — PROGRESS NOTES
CALORIE COUNT      Approximate Oral Intake for:   10/16/24.  3 meals     Calories: 620   Protein: 24 g  Above includes one Ensure Clear.      Intake from TF/PN:  N/A        Estimated Needs:    Calories: 5787-4388  Protein: 69-82 g      Summary: Po intake of 10/16/24 met ~34% of estimated needs.     Patient drinking the Ensure Clear, has not yet tried the Magic cup.  (Patient does not like Ensure Enlive)  Continue to encourage po intake.

## 2024-10-17 NOTE — PROGRESS NOTES
'    [facility]NAL ([facility]) progress note  CC: F/U ALEXEI  S: no acute events. Bps stable. She reports feeling very swollen. Received blood yesterday.     A/P:   Acute kidney injury.  Acute tubular necrosis.  Secondary to CABG and mitral valve replacement followed by cardiogenic shock.  Baseline creatinine 0.95 (10/8/2024).    Required CRRT, off 10/13  Off pressors as of 10/14  HD 10/15 Plan again for TODAY(10/17).   May need outpatient HD on discharge if no renal recovery.   Daily labs, Strict Is/Os, Daily weights   Avoid nephrotoxins  Renally dose meds  Reeval for HD needs daily      2. Status post coronary artery bypass grafting x 4 vessels and mitral valve replacement on 10/8.  Diffuse coronary artery disease     3. Shock liver.  Continue supportive care.  LFTs downtrending.      4. History of Hodgkin's lymphoma.  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    Rohan Polo MD  [facility]  [phone]      /74   Pulse 94   Temp 98.1  F (36.7  C) (Oral)   Resp 18   Wt 114.2 kg (251 lb 11.2 oz)   LMP  (LMP Unknown)   SpO2 97%   BMI 43.20 kg/m      I/O last 3 completed shifts:  In: 766.42 [P.O.:360; I.V.:36.42]  Out: 2000 [Urine:2000]    Physical Exam:   GENERAL: ill appearing, up in chair  EYES: EOMI   ENT: MMM  RESP: increased WOB, on O2   CV: regular rate, ++ leg/arm edema.    GI: ND  SKIN: No rash, pale   Padron removed     Recent Labs   Lab 10/17/24  0544 10/16/24  1834 10/16/24  0435 10/15/24  1859 10/15/24  0648 10/14/24  1821 10/14/24  0345 10/13/24  1536 10/13/24  1201 10/13/24  0414 10/12/24  2002   * 131* 131* 134* 129* 130* 133* 135 134* 136 137   POTASSIUM 3.0* 2.9* 3.2* 3.2* 4.0 3.8 4.0 4.1 4.1 4.3 4.3   CHLORIDE 95* 95* 97* 101 95* 97* 99 100 102 100 104   CO2 22 22 24 26 18* 18* 21* 19* 21* 22 22   BUN 53.2* 49.3* 41.1* 25.0* 54.1* 42.3* 37.7* 27.0* 22.2* 23.9* 26.7*   CR 3.53* 3.24* 2.80* 1.74* 3.40* 2.76*  2.49* 1.85* 1.51* 1.60* 1.79*   GFRESTIMATED 15* 17* 20* 35* 16* 20* 23* 33* 42* 39* 34*   CINDY 8.0* 8.0* 7.9* 7.6* 7.6* 7.6* 7.5* 7.9* 7.3* 7.4* 7.0*   PHOS 4.0  --  3.1  --  4.7*  --  4.7* 5.1* 4.4 4.4 4.1   MAG 2.4*  --  2.4*  --  3.1*  --  3.0*  --  2.7* 2.7* 2.4*   ALBUMIN 2.9* 2.8* 2.6* 2.6* 2.8* 2.7* 3.0* 3.3* 3.1* 3.2* 3.1*     Recent Labs   Lab 10/17/24  0544 10/16/24  1156 10/16/24  0435 10/15/24  0648 10/14/24  0758 10/13/24  1201 10/13/24  0414 10/12/24  2002   WBC 15.3*  --  19.5* 28.0*  27.9* 23.8* 16.3* 16.5* 12.2*   HGB 8.6* 8.8* 6.8* 7.5* 8.0* 8.5* 8.6* 8.2*   HCT 26.5*  --  21.0* 23.2* 24.8* 25.8* 26.3* 25.1*   MCV 87  --  91 91 90 89 89 88     --  123* 133* 105* 98* 90* 82*       Current Facility-Administered Medications:     albumin human 5 % injection 250 mL, 250 mL, Intravenous, Once in dialysis/CRRT, Catherine Galvez PA-C    albumin human 5 % injection  mL,  mL, Intravenous, Q1H PRN, Catherine Galvez PA-C    albuterol (PROVENTIL HFA/VENTOLIN HFA) inhaler, 2 puff, Inhalation, Q6H PRN, Catherine Galvez PA-C    atorvastatin (LIPITOR) tablet 80 mg, 80 mg, Oral, At Bedtime, Catherine Galvez PA-KENIA, 80 mg at 10/16/24 2035    bisacodyl (DULCOLAX) suppository 10 mg, 10 mg, Rectal, Daily PRN, Catherine Galvez PA-C    budesonide (PULMICORT) neb solution 1 mg, 1 mg, Nebulization, BID, Catherine Galvez PA-KENIA, 1 mg at 10/17/24 0716    cetirizine (zyrTEC) tablet 10 mg, 10 mg, Oral, Daily, Catherine Galvez PA-C, 10 mg at 10/17/24 0819    clopidogrel (PLAVIX) tablet 75 mg, 75 mg, Oral, Daily, Catherine Galvez PA-KENIA, 75 mg at 10/17/24 0819    glucose gel 15-30 g, 15-30 g, Oral, Q15 Min PRN **OR** dextrose 50 % injection 25-50 mL, 25-50 mL, Intravenous, Q15 Min PRN, 50 mL at 10/14/24 0806 **OR** glucagon injection 1 mg, 1 mg, Subcutaneous, Q15 Min PRN, Catherine Galvez PA-C    fludrocortisone (FLORINEF) tablet 0.1 mg, 0.1 mg, Oral, Daily, Lenny  Catherine MCKEON PA-C, 0.1 mg at 10/17/24 0819    fluticasone (FLONASE) 50 MCG/ACT spray 2 spray, 2 spray, Both Nostrils, Daily PRN, Catherine Galvez PA-C    fluticasone-vilanterol (BREO ELLIPTA) 100-25 MCG/ACT inhaler 1 puff, 1 puff, Inhalation, At Bedtime, Catherine Galvez PA-C, 1 puff at 10/16/24 2038    heparin lock flush 10 unit/mL injection 5-20 mL, 5-20 mL, Intracatheter, Q24H, Catherine Galvez PA-C, 5 mL at 10/13/24 2133    heparin lock flush 10 unit/mL injection 5-20 mL, 5-20 mL, Intracatheter, Q1H PRN, Catherine Galvez PA-C    hydrALAZINE (APRESOLINE) injection 10 mg, 10 mg, Intravenous, Q30 Min PRN, Catherine Galvez PA-C    ipratropium - albuterol 0.5 mg/2.5 mg/3 mL (DUONEB) neb solution 3 mL, 3 mL, Nebulization, Q4H PRN, Catherine Galvez PA-C    levothyroxine (SYNTHROID/LEVOTHROID) tablet 125 mcg, 125 mcg, Oral, QAM AC, Catherine Galvez PA-C, 125 mcg at 10/17/24 0658    Lidocaine (LIDOCARE) 4 % Patch 1-2 patch, 1-2 patch, Transdermal, Q24H, Catherine Galvez PA-C, 1 patch at 10/16/24 1718    lidocaine (LMX4) cream, , Topical, Q1H PRN, Catherine Galvez PA-C    lidocaine 1 % 0.1-5 mL, 0.1-5 mL, Other, Q1H PRN, Catherine Galvez PA-C, 1 mL at 10/15/24 1415    magnesium hydroxide (MILK OF MAGNESIA) suspension 30 mL, 30 mL, Oral, Daily PRN, [place], Catherine K, PA-C    methocarbamol (ROBAXIN) tablet 750 mg, 750 mg, Oral, Q6H PRN, Catherine Galvez, PA-C, 750 mg at 10/17/24 0819    metoprolol tartrate (LOPRESSOR) half-tab 12.5 mg, 12.5 mg, Oral, BID, Catherine Galvez K, PA-C, 12.5 mg at 10/17/24 0819    metoprolol tartrate (LOPRESSOR) half-tab 12.5 mg, 12.5 mg, Oral, TID, Catherine Galvez, PA-C, 12.5 mg at 10/16/24 1716    multivitamin w/minerals (THERA-VIT-M) tablet 1 tablet, 1 tablet, Oral, Daily, Catherine Galvez, PA-C, 1 tablet at 10/16/24 1244    naloxone (NARCAN) injection 0.2 mg, 0.2 mg, Intravenous, Q2 Min PRN **OR** naloxone (NARCAN) injection  0.4 mg, 0.4 mg, Intravenous, Q2 Min PRN **OR** naloxone (NARCAN) injection 0.2 mg, 0.2 mg, Intramuscular, Q2 Min PRN **OR** naloxone (NARCAN) injection 0.4 mg, 0.4 mg, Intramuscular, Q2 Min PRN, Catherine Galvez PA-C    No heparin via hemodialysis machine, , Does not apply, Once, Catherine Galvez PA-C    ondansetron (ZOFRAN ODT) ODT tab 4 mg, 4 mg, Oral, Q6H PRN **OR** ondansetron (ZOFRAN) injection 4 mg, 4 mg, Intravenous, Q6H PRN, Catherine Galvez PA-C, 4 mg at 10/10/24 1945    oxyCODONE IR (ROXICODONE) half-tab 2.5-5 mg, 2.5-5 mg, Oral, Q4H PRN, Catherine Galvez PA-C, 5 mg at 10/17/24 0538    [DISCONTINUED] pantoprazole (PROTONIX) 2 mg/mL suspension 40 mg, 40 mg, Oral or NG Tube, Daily, 40 mg at 10/08/24 1729 **OR** pantoprazole (PROTONIX) EC tablet 40 mg, 40 mg, Oral, Daily, Catherine Galvez PA-C, 40 mg at 10/17/24 0819    prochlorperazine (COMPAZINE) injection 10 mg, 10 mg, Intravenous, Q6H PRN **OR** prochlorperazine (COMPAZINE) tablet 10 mg, 10 mg, Oral, Q6H PRN, Catherine Galvez PA-C    senna-docusate (SENOKOT-S/PERICOLACE) 8.6-50 MG per tablet 1 tablet, 1 tablet, Oral, BID, Catherine Galvez PA-C, 1 tablet at 10/16/24 0828    simethicone (MYLICON) chewable half-tab 120 mg, 120 mg, Oral, 4x Daily PRN, Catherine Galvez PA-C    sodium chloride (PF) 0.9% PF flush 10-20 mL, 10-20 mL, Intracatheter, q1 min prn, Morgan Galvezrielle K, PA-C    sodium chloride (PF) 0.9% PF flush 10-40 mL, 10-40 mL, Intracatheter, Q8H, Morgan Galvezrielle K, PA-C, 10 mL at 10/16/24 1244    sodium chloride (PF) 0.9% PF flush 10-40 mL, 10-40 mL, Intracatheter, Q7 Days, Morgan Galvezrielle K, PA-C    sodium chloride (PF) 0.9% PF flush 10-40 mL, 10-40 mL, Intracatheter, Q1H PRN, Morgan Galvezrielle K, PA-C    sodium chloride 0.9% BOLUS 100-150 mL, 100-150 mL, Intravenous, Q15 Min PRN, Morgan Galvezrielle K, PA-C    sodium chloride 0.9% BOLUS 250 mL, 250 mL, Intravenous, Once in dialysis/CRRT, Lenny,  Catherine MCKEON PA-C    sodium chloride 0.9% BOLUS 300 mL, 300 mL, Hemodialysis Machine, Once, Catherine Galvez PA-C    Vitamin D3 (CHOLECALCIFEROL) tablet 125 mcg, 125 mcg, Oral, Daily, Catherine Galvez PA-C, 125 mcg at 10/17/24 0819    Warfarin Dose Required Daily - Pharmacist Managed, 1 each, Oral, See Admin Instructions, Catherine Galvez PA-C    warfarin-No DOSE today, 1 each, Does not apply, no dose today (warfarin), David Wade MD    Labs personally reviewed today during this evaluation at 1130am.

## 2024-10-17 NOTE — PROGRESS NOTES
INFECTIOUS DISEASE FOLLOW UP NOTE    Date: 10/17/2024   CHIEF COMPLAINT: No chief complaint on file.       ASSESSMENT:  Jory Ambrose is a 49 year old old female with   History of Hodgkin lymphoma status post chemo therapy and mantle radiation.  Status post lung transplant x 2.  Coronary disease status post four-vessel CABG and MVR.  Shock liver with AST and ALT above 7000 at 1 point.  Improving.   ALEXEI with acute tubular necrosis.  required CRR now off back on HD.   Sepsis with leukocytosis.  Blood cultures from 10/13/2024 in process.  UA appears consistent with UTI.  UCx finalized as negative-had been on several days of antibiotics prior to collection. Stable. WBC now improving-diff with many elevated lines. WBC improving off dialysis     PLAN:  - at this point has received a week of broad antibiotic therapy without clear source infection. Reasonable to observe off antibiotics. Wbc improving off abx.   - follow pended cultures  - ID will sign off. Please call with additional questions or change in clinical status.     Katherine Krause MD  [facility]   Office Telephone [phone].  Fax [phone]  [facility] paging    ______________________________________________________________________    SUBJECTIVE / INTERVAL HISTORY:  Getting dialysis set up. Yesterday afternoon was better, spent most of time in chair. No fevers or chills.     ROS: All other systems negative except as listed above.    SH/FH/Habits/PMH reviewed and unchanged.    OBJECTIVE:  /64   Pulse 93   Temp 98.1  F (36.7  C) (Oral)   Resp 18   Wt 114.2 kg (251 lb 11.2 oz)   LMP  (LMP Unknown)   SpO2 100%   BMI 43.20 kg/m    FiO2 (%): 40 %  Resp: 18    Vital Signs  Temp: 98.5  F (36.9  C)  Temp src: Oral  Resp: 22  Pulse: 78  Pulse Rate Source: Monitor  BP: 121/69  BP Location: Left leg    Temp (24hrs), Av.1  F (36.7  C), Min:97.8  F (36.6  C), Max:98.5  F (36.9  C)      GEN: No acute distress. Dialysis  "getting set up.   RESPIRATORY:  Normal breathing pattern.   SKIN/HAIR/NAILS:  No rashes  IV: central lines, peripheral IV      Antibiotics:  off    Pertinent labs:  No results found for: \"CRP\"   CBC RESULTS:   Recent Labs   Lab Test 10/14/24  0758   WBC 23.8*   RBC 2.76*   HGB 8.0*   HCT 24.8*   MCV 90   MCH 29.0   MCHC 32.3   RDW 18.2*   *      Last Comprehensive Metabolic Panel:  Sodium   Date Value Ref Range Status   10/17/2024 132 (L) 135 - 145 mmol/L Final   06/09/2015 142 133 - 144 mmol/L Final     Potassium   Date Value Ref Range Status   10/17/2024 3.0 (L) 3.4 - 5.3 mmol/L Final   06/09/2015 3.8 3.4 - 5.3 mmol/L Final     Potassium POCT   Date Value Ref Range Status   10/08/2024 4.4 3.4 - 5.3 mmol/L Final     Chloride   Date Value Ref Range Status   10/17/2024 95 (L) 98 - 107 mmol/L Final   06/09/2015 108 94 - 109 mmol/L Final     Carbon Dioxide   Date Value Ref Range Status   06/09/2015 27 20 - 32 mmol/L Final     Carbon Dioxide (CO2)   Date Value Ref Range Status   10/17/2024 22 22 - 29 mmol/L Final     Anion Gap   Date Value Ref Range Status   10/17/2024 15 7 - 15 mmol/L Final   06/09/2015 7 3 - 14 mmol/L Final     Glucose   Date Value Ref Range Status   06/09/2015 101 (H) 70 - 99 mg/dL Final     GLUCOSE BY METER POCT   Date Value Ref Range Status   10/17/2024 109 (H) 70 - 99 mg/dL Final     Urea Nitrogen   Date Value Ref Range Status   10/17/2024 53.2 (H) 6.0 - 20.0 mg/dL Final   06/09/2015 20 7 - 30 mg/dL Final     Creatinine   Date Value Ref Range Status   10/17/2024 3.53 (H) 0.51 - 0.95 mg/dL Final   06/09/2015 0.80 0.52 - 1.04 mg/dL Final     GFR Estimate   Date Value Ref Range Status   10/17/2024 15 (L) >60 mL/min/1.73m2 Final     Comment:     eGFR calculated using 2021 CKD-EPI equation.   06/09/2015 79 >60 mL/min/1.7m2 Final     Comment:     Non  GFR Calc     GFR, ESTIMATED POCT   Date Value Ref Range Status   08/06/2024 >60 >60 mL/min/1.73m2 Final     Calcium   Date Value " Ref Range Status   10/17/2024 8.0 (L) 8.8 - 10.4 mg/dL Final     Comment:     Reference intervals for this test were updated on 7/16/2024 to reflect our healthy population more accurately. There may be differences in the flagging of prior results with similar values performed with this method. Those prior results can be interpreted in the context of the updated reference intervals.   06/09/2015 9.2 8.5 - 10.1 mg/dL Final        MICROBIOLOGY DATA:  Personally reviewed.  7-Day Micro Results       Collected Updated Procedure Result Status      10/13/2024 0938 10/14/2024 0613 Urine Culture [[id number]]   Urine, Padron Catheter    Final result Component Value   Culture No Growth               10/13/2024 0931 10/17/2024 1146 Blood Culture Hand, Left [[id number]]   Blood from Hand, Left    Preliminary result Component Value   Culture No growth after 4 days  [P]                10/13/2024 0922 10/17/2024 1146 Blood Culture Line, arterial [[id number]]   Blood from Line, arterial    Preliminary result Component Value   Culture No growth after 4 days  [P]                         RADIOLOGY:  Personally Reviewed.  Recent Results (from the past 24 hour(s))   XR Chest Port 1 View    Narrative    EXAM: XR CHEST PORT 1 VIEW  LOCATION: [facility]  DATE: 10/13/2024    INDICATION: s p ct removal  COMPARISON: 10/11/2024      Impression    IMPRESSION: Interval removal of midline mediastinal drain and bilateral chest tubes. Epicardial leads are no longer present. No pneumothorax. Low lung volumes with persistent mild atelectasis in the lower lungs. Prior sternotomy, mitral valve   replacement, CABG procedure.       Active Problems:    Mitral regurgitation    Coronary artery disease involving native coronary artery of native heart with angina pectoris (H)

## 2024-10-17 NOTE — PLAN OF CARE
Goal Outcome Evaluation:      Plan of Care Reviewed With: patient    Overall Patient Progress: improvingOverall Patient Progress: improving    Outcome Evaluation: Up to chiar, dialysis completed.    [facility] - ICU    RN Progress Note:            Pertinent Assessments:      Please refer to flowsheet rows for full assessment     Cardiac monitor showing SR/Afib. Pt reporting tightness in legs and feeling full of fluid. Potassium replaced.            Key Events - This Shift:       Up in chair for breakfast. Attempted to walk with cardiac rehab.                 Barriers to Discharge / Downgrade:     None.          Point of Contact Update: YES-OR-NO: Yes  If No, reason:    Name:   Phone Number:   Summary of Conversation:  Updated family throughout shift.

## 2024-10-17 NOTE — PROCEDURES
Potassium   Date Value Ref Range Status   10/17/2024 3.0 (L) 3.4 - 5.3 mmol/L Final   06/09/2015 3.8 3.4 - 5.3 mmol/L Final     Potassium POCT   Date Value Ref Range Status   10/08/2024 4.4 3.4 - 5.3 mmol/L Final     Hemoglobin   Date Value Ref Range Status   10/17/2024 8.6 (L) 11.7 - 15.7 g/dL Final   06/09/2015 13.5 11.7 - 15.7 g/dL Final     Creatinine   Date Value Ref Range Status   10/17/2024 3.53 (H) 0.51 - 0.95 mg/dL Final   06/09/2015 0.80 0.52 - 1.04 mg/dL Final     Urea Nitrogen   Date Value Ref Range Status   10/17/2024 53.2 (H) 6.0 - 20.0 mg/dL Final   06/09/2015 20 7 - 30 mg/dL Final     Sodium   Date Value Ref Range Status   10/17/2024 132 (L) 135 - 145 mmol/L Final   06/09/2015 142 133 - 144 mmol/L Final     INR   Date Value Ref Range Status   10/17/2024 3.61 (H) 0.85 - 1.15 Final   05/06/2014 0.92 0.86 - 1.14 Final       DIALYSIS PROCEDURE NOTE  Hepatitis status of previous patient on machine log was checked and verified ok to use with this patients hepatitis status.  Patient dialyzed for 3 hrs. on a K4 bath with a net fluid removal of  3L.  A BFR of 400 ml/min was obtained via dialysis cathter.    The treatment plan was discussed with Dr. Polo during the treatment.    Line flushed, clamped and capped with heparin 1:1000 mL /unit(s) (Arterial: 1.3 units, Venous: 1.6 units).  Meds  given: Heparin locks   Complications: Pt. Tolerated treatment well.       Person educated: Patient. Knowledge base minimal. Barriers to learning: none. Educated on fluid removal, central line infection prevention via explanation.      ICEBOAT? Timeout performed pre-treatment  I: Patient was identified using 2 identifiers  C:  Consent Signed Yes  E: Equipment preventative maintenance is current and dialysis delivery system OK to use  B: Hepatitis B Surface Antigen: Negative; Draw Date: 10/15/24      Hepatitis B Surface Antibody: Unknown  O: Dialysis orders present and complete prior to treatment  A: Vascular access verified  "and assessed prior to treatment  T: Treatment was performed at a clinically appropriate time  ?: Patient was allowed to ask questions and address concerns prior to treatment  See Adult Hemodialysis flowsheet in EPIC for further details and post assessment.  Machine water alarm in place and functioning. Transducer pods intact and checked every 15min.   Pt returned treatment at bedside.  Chlorine/Chloramine water system checked every 4 hours.    Patient repositioned every 2 hours during the treatment.  Post treatment report given to EUGENIA Villalta RN regarding 3L of fluid removed, last BP of 175/72, and patient reporting feeling \"much better\".   "

## 2024-10-17 NOTE — PLAN OF CARE
[facility] - ICU    RN Progress Note:          Key Events - This Shift:       - K low @ beginning of shift, CV surg notified, referred to Nephrology, orders received, morning K low, nephrology notified, orders received  - pt ambulated to and from chair at beginning and end of shift                Barriers to Discharge / Downgrade:     none         Problem: Risk for Delirium  Goal: Optimal Coping  Outcome: Progressing  Intervention: Optimize Psychosocial Adjustment to Delirium  Recent Flowsheet Documentation  Taken 10/17/2024 0400 by Perez Lau RN  Supportive Measures:   active listening utilized   decision-making supported   problem-solving facilitated  Taken 10/17/2024 0000 by Perez Lau RN  Supportive Measures:   active listening utilized   decision-making supported   problem-solving facilitated  Taken 10/16/2024 2000 by Perez Lau RN  Supportive Measures:   active listening utilized   decision-making supported   problem-solving facilitated     Problem: Risk for Delirium  Goal: Improved Attention and Thought Clarity  Outcome: Progressing  Intervention: Maximize Cognitive Function  Recent Flowsheet Documentation  Taken 10/17/2024 0400 by Perez Lau RN  Sensory Stimulation Regulation: care clustered  Reorientation Measures: calendar in view  Taken 10/17/2024 0000 by Perez Lau RN  Sensory Stimulation Regulation: care clustered  Reorientation Measures: calendar in view  Taken 10/16/2024 2000 by Perez Lau RN  Sensory Stimulation Regulation: care clustered  Reorientation Measures: calendar in view     Problem: Risk for Delirium  Goal: Improved Sleep  Outcome: Progressing  Intervention: Promote Sleep  Recent Flowsheet Documentation  Taken 10/17/2024 0400 by Perez Lau, RN  Sleep/Rest Enhancement:   awakenings minimized   consistent schedule promoted   family presence promoted   regular sleep/rest pattern promoted   room darkened  Taken  10/17/2024 0000 by Perez Lau, RN  Sleep/Rest Enhancement:   awakenings minimized   consistent schedule promoted   family presence promoted   regular sleep/rest pattern promoted   room darkened  Taken 10/16/2024 2000 by Perez Lau RN  Sleep/Rest Enhancement:   awakenings minimized   consistent schedule promoted   family presence promoted   regular sleep/rest pattern promoted   room darkened     Problem: Cardiovascular Surgery  Goal: Improved Activity Tolerance  Outcome: Progressing  Intervention: Optimize Tolerance for Activity  Recent Flowsheet Documentation  Taken 10/17/2024 0400 by Perez Lau, RN  Environmental Support: calm environment promoted  Taken 10/17/2024 0000 by Perez Lau, RN  Environmental Support: calm environment promoted  Taken 10/16/2024 2000 by Perez Lau, RN  Environmental Support: calm environment promoted

## 2024-10-17 NOTE — PROGRESS NOTES
"Care Management Follow Up    Length of Stay (days): 9    Expected Discharge Date: 10/21/2024    Anticipated Discharge Plan:   TBD , pt still deciding on TCU need     Transportation: TBD    PT Recommendations:    OT Recommendations:  Transitional Care Facility     Barriers to Discharge: medical stability/ POD#9 s/p CABG x4 . Neph following, (may need Op HD).      Prior Living Situation: \"Patient reports she lives in her apartment (has elevator) with spouse.  She is independent with ADLs/IADLs, ambulates without devices, has no services in community, uses cpap and works full time. Spouse is primary family contact and family willing to transport at discharge.\"       Discussed  Partnership in Safe Discharge Planning  document with patient/family: No     Handoff Completed: Not at this time     Patient/Spokesperson Updated: No    Additional Information:    RNCM went and spoke to pt about TCU today. Pt was winded from CR session. CR recs TCU at this time.       Pt stated, \"she wanted to think about it today. \"      Writer will [place] back either this afternoon, or in Am.         Next Steps: Follow up if pt wants TCU. CM will continue to monitor progression of care, review team recommendations and provide discharge planning assist as needed.       Gisela Gibson RN      "

## 2024-10-18 ENCOUNTER — APPOINTMENT (OUTPATIENT)
Dept: OCCUPATIONAL THERAPY | Facility: HOSPITAL | Age: 49
DRG: 219 | End: 2024-10-18
Attending: SURGERY
Payer: COMMERCIAL

## 2024-10-18 ENCOUNTER — APPOINTMENT (OUTPATIENT)
Dept: PHYSICAL THERAPY | Facility: HOSPITAL | Age: 49
DRG: 219 | End: 2024-10-18
Payer: COMMERCIAL

## 2024-10-18 LAB
ALBUMIN SERPL BCG-MCNC: 2.8 G/DL (ref 3.5–5.2)
ANION GAP SERPL CALCULATED.3IONS-SCNC: 11 MMOL/L (ref 7–15)
BACTERIA BLD CULT: NO GROWTH
BACTERIA BLD CULT: NO GROWTH
BUN SERPL-MCNC: 37.6 MG/DL (ref 6–20)
CA-I BLD-MCNC: 4.6 MG/DL (ref 4.4–5.2)
CALCIUM SERPL-MCNC: 8.2 MG/DL (ref 8.8–10.4)
CHLORIDE SERPL-SCNC: 97 MMOL/L (ref 98–107)
CREAT SERPL-MCNC: 2.85 MG/DL (ref 0.51–0.95)
EGFRCR SERPLBLD CKD-EPI 2021: 20 ML/MIN/1.73M2
ERYTHROCYTE [DISTWIDTH] IN BLOOD BY AUTOMATED COUNT: 19.5 % (ref 10–15)
GLUCOSE BLDC GLUCOMTR-MCNC: 127 MG/DL (ref 70–99)
GLUCOSE BLDC GLUCOMTR-MCNC: 82 MG/DL (ref 70–99)
GLUCOSE BLDC GLUCOMTR-MCNC: 82 MG/DL (ref 70–99)
GLUCOSE SERPL-MCNC: 99 MG/DL (ref 70–99)
HCO3 SERPL-SCNC: 24 MMOL/L (ref 22–29)
HCT VFR BLD AUTO: 25.5 % (ref 35–47)
HGB BLD-MCNC: 8.3 G/DL (ref 11.7–15.7)
INR PPP: 2.97 (ref 0.85–1.15)
MAGNESIUM SERPL-MCNC: 2 MG/DL (ref 1.7–2.3)
MCH RBC QN AUTO: 28.6 PG (ref 26.5–33)
MCHC RBC AUTO-ENTMCNC: 32.5 G/DL (ref 31.5–36.5)
MCV RBC AUTO: 88 FL (ref 78–100)
PHOSPHATE SERPL-MCNC: 3.5 MG/DL (ref 2.5–4.5)
PLATELET # BLD AUTO: 182 10E3/UL (ref 150–450)
POTASSIUM SERPL-SCNC: 3.5 MMOL/L (ref 3.4–5.3)
RBC # BLD AUTO: 2.9 10E6/UL (ref 3.8–5.2)
SODIUM SERPL-SCNC: 132 MMOL/L (ref 135–145)
WBC # BLD AUTO: 13.4 10E3/UL (ref 4–11)

## 2024-10-18 PROCEDURE — 210N000001 HC R&B IMCU HEART CARE

## 2024-10-18 PROCEDURE — 250N000013 HC RX MED GY IP 250 OP 250 PS 637

## 2024-10-18 PROCEDURE — 250N000009 HC RX 250

## 2024-10-18 PROCEDURE — 250N000013 HC RX MED GY IP 250 OP 250 PS 637: Performed by: SURGERY

## 2024-10-18 PROCEDURE — 94799 UNLISTED PULMONARY SVC/PX: CPT

## 2024-10-18 PROCEDURE — 97530 THERAPEUTIC ACTIVITIES: CPT | Mod: GP | Performed by: PHYSICAL THERAPIST

## 2024-10-18 PROCEDURE — 93010 ELECTROCARDIOGRAM REPORT: CPT | Performed by: STUDENT IN AN ORGANIZED HEALTH CARE EDUCATION/TRAINING PROGRAM

## 2024-10-18 PROCEDURE — 97116 GAIT TRAINING THERAPY: CPT | Mod: GP | Performed by: PHYSICAL THERAPIST

## 2024-10-18 PROCEDURE — 97535 SELF CARE MNGMENT TRAINING: CPT | Mod: GO

## 2024-10-18 PROCEDURE — 85027 COMPLETE CBC AUTOMATED: CPT

## 2024-10-18 PROCEDURE — 94660 CPAP INITIATION&MGMT: CPT

## 2024-10-18 PROCEDURE — 94640 AIRWAY INHALATION TREATMENT: CPT | Mod: 76

## 2024-10-18 PROCEDURE — 97162 PT EVAL MOD COMPLEX 30 MIN: CPT | Mod: GP | Performed by: PHYSICAL THERAPIST

## 2024-10-18 PROCEDURE — 85610 PROTHROMBIN TIME: CPT

## 2024-10-18 PROCEDURE — 97110 THERAPEUTIC EXERCISES: CPT | Mod: GO

## 2024-10-18 PROCEDURE — 82330 ASSAY OF CALCIUM: CPT

## 2024-10-18 PROCEDURE — 93005 ELECTROCARDIOGRAM TRACING: CPT

## 2024-10-18 PROCEDURE — 999N000157 HC STATISTIC RCP TIME EA 10 MIN

## 2024-10-18 PROCEDURE — 83735 ASSAY OF MAGNESIUM: CPT

## 2024-10-18 PROCEDURE — 80069 RENAL FUNCTION PANEL: CPT | Performed by: INTERNAL MEDICINE

## 2024-10-18 PROCEDURE — 94640 AIRWAY INHALATION TREATMENT: CPT

## 2024-10-18 RX ORDER — ACETAMINOPHEN 500 MG
500 TABLET ORAL EVERY 6 HOURS PRN
Status: DISCONTINUED | OUTPATIENT
Start: 2024-10-18 | End: 2024-10-19

## 2024-10-18 RX ORDER — WARFARIN SODIUM 1 MG/1
1 TABLET ORAL
Status: COMPLETED | OUTPATIENT
Start: 2024-10-18 | End: 2024-10-18

## 2024-10-18 RX ORDER — HYDROXYZINE HYDROCHLORIDE 25 MG/1
25 TABLET, FILM COATED ORAL 3 TIMES DAILY PRN
Status: DISCONTINUED | OUTPATIENT
Start: 2024-10-18 | End: 2024-10-28

## 2024-10-18 RX ADMIN — OXYCODONE HYDROCHLORIDE 5 MG: 5 TABLET ORAL at 09:03

## 2024-10-18 RX ADMIN — Medication 1 TABLET: at 13:55

## 2024-10-18 RX ADMIN — CETIRIZINE HYDROCHLORIDE 10 MG: 10 TABLET, FILM COATED ORAL at 08:57

## 2024-10-18 RX ADMIN — LIDOCAINE 1 PATCH: 4 PATCH TOPICAL at 17:01

## 2024-10-18 RX ADMIN — METOPROLOL TARTRATE 12.5 MG: 25 TABLET, FILM COATED ORAL at 03:47

## 2024-10-18 RX ADMIN — PANTOPRAZOLE SODIUM 40 MG: 40 TABLET, DELAYED RELEASE ORAL at 08:55

## 2024-10-18 RX ADMIN — WARFARIN SODIUM 1 MG: 1 TABLET ORAL at 17:57

## 2024-10-18 RX ADMIN — METOPROLOL TARTRATE 37.5 MG: 25 TABLET, FILM COATED ORAL at 21:22

## 2024-10-18 RX ADMIN — SENNOSIDES AND DOCUSATE SODIUM 1 TABLET: 8.6; 5 TABLET ORAL at 21:21

## 2024-10-18 RX ADMIN — CLOPIDOGREL BISULFATE 75 MG: 75 TABLET ORAL at 08:56

## 2024-10-18 RX ADMIN — OXYCODONE HYDROCHLORIDE 5 MG: 5 TABLET ORAL at 05:16

## 2024-10-18 RX ADMIN — FLUDROCORTISONE ACETATE 0.1 MG: 0.1 TABLET ORAL at 08:57

## 2024-10-18 RX ADMIN — ATORVASTATIN CALCIUM 80 MG: 40 TABLET, FILM COATED ORAL at 21:21

## 2024-10-18 RX ADMIN — LEVOTHYROXINE SODIUM 125 MCG: 0.03 TABLET ORAL at 08:56

## 2024-10-18 RX ADMIN — OXYCODONE HYDROCHLORIDE 2.5 MG: 5 TABLET ORAL at 23:39

## 2024-10-18 RX ADMIN — BUDESONIDE 1 MG: 0.5 INHALANT ORAL at 20:39

## 2024-10-18 RX ADMIN — BUDESONIDE 1 MG: 0.5 INHALANT ORAL at 09:19

## 2024-10-18 RX ADMIN — FLUTICASONE FUROATE AND VILANTEROL TRIFENATATE 1 PUFF: 100; 25 POWDER RESPIRATORY (INHALATION) at 21:22

## 2024-10-18 RX ADMIN — Medication 125 MCG: at 08:56

## 2024-10-18 RX ADMIN — SENNOSIDES AND DOCUSATE SODIUM 1 TABLET: 8.6; 5 TABLET ORAL at 08:56

## 2024-10-18 RX ADMIN — METOPROLOL TARTRATE 12.5 MG: 25 TABLET, FILM COATED ORAL at 13:56

## 2024-10-18 RX ADMIN — METOPROLOL TARTRATE 37.5 MG: 25 TABLET, FILM COATED ORAL at 08:57

## 2024-10-18 RX ADMIN — OXYCODONE HYDROCHLORIDE 5 MG: 5 TABLET ORAL at 21:30

## 2024-10-18 ASSESSMENT — ACTIVITIES OF DAILY LIVING (ADL)
ADLS_ACUITY_SCORE: 39
ADLS_ACUITY_SCORE: 34
ADLS_ACUITY_SCORE: 39
ADLS_ACUITY_SCORE: 39
ADLS_ACUITY_SCORE: 34
ADLS_ACUITY_SCORE: 38
ADLS_ACUITY_SCORE: 34
ADLS_ACUITY_SCORE: 39
ADLS_ACUITY_SCORE: 34
ADLS_ACUITY_SCORE: 38
ADLS_ACUITY_SCORE: 39

## 2024-10-18 NOTE — PROGRESS NOTES
"Care Management Follow Up    Length of Stay (days): 10    Expected Discharge Date: 10/22/2024    Anticipated Discharge Plan:   TBD, pt still deciding on TCU or not     Transportation: TBD    PT Recommendations:    OT Recommendations:  Transitional Care Facility     Barriers to Discharge: medical stability/ POD #10 s/p CABG x4. Neph following ( may need OPHD).       Prior Living Situation: \"Patient reports she lives in her apartment (has elevator) with spouse.  She is independent with ADLs/IADLs, ambulates without devices, has no services in community, uses cpap and works full time. Spouse is primary family contact and family willing to transport at discharge.\"     Discussed  Partnership in Safe Discharge Planning  document with patient/family: No     Handoff Completed: Not at this time     Patient/Spokesperson Updated: No    Additional Information:  OT recs TCU.       RNCM went and spoke with pt today. She plans to talk with friends/ people she knows to see about TCU choices. Pt still deciding if she wants TCU. Plans to keep working with therapy as well.         Next Steps: follow up on pt's discharge plan and TCU vs Home. CM will continue to monitor progression of care, review team recommendations and provide discharge planning assist as needed.        Gisela Gibson RN      "

## 2024-10-18 NOTE — PROGRESS NOTES
"CVTS Daily Progress Note   POD#10 s/p CABG x4 (LIMA>LAD, rSVG>RPDA, rSVG>LPL, rSVG>D2), RLE EVH, Attempted MVR/r, MVR ( 29 mm [place] [employer] Mechanical Mitral Valve), LAAE  Attending: Mauro  LOS: 10    SUBJECTIVE/INTERVAL EVENTS:    No acute events overnight. Intermittent afib, SR past 24 hours. UOP continues to  but still not proportional to hypervolemia, Cr variable. HD 10/17, tolerated well, BP much improved after 3L removed. LFTs downtrending. Maintaining oxygen saturations on home CPAP/1L NC. Normotensive. Ambulating w/ therapy as able - needs lots of encouragement. Pain well controlled. +BM. Tolerating regular diet, PO intake and appetite improving. INR 2.97 (3.61), heparin gtt stopped 10/15. Patient denies new chest pain, shortness of breath, abdominal pain, calf pain, nausea. Still feels very puffy but reports that breathing feels better since dialysis yesterday. Patient has no questions today.     OBJECTIVE:  Temp:  [97.8  F (36.6  C)-98.5  F (36.9  C)] 98.2  F (36.8  C)  Pulse:  [] 96  Resp:  [14-22] 20  BP: (116-175)/() 142/87  SpO2:  [91 %-100 %] 94 %  Vitals:    10/13/24 0400 10/14/24 0400 10/15/24 0600 10/16/24 0500   Weight: 111.2 kg (245 lb 2.4 oz) 112.5 kg (248 lb 0.3 oz) 114.3 kg (252 lb) 112.8 kg (248 lb 9.6 oz)    10/17/24 0600   Weight: 114.2 kg (251 lb 11.2 oz)       Clinically Significant Risk Factors        # Hypokalemia: Lowest K = 2.9 mmol/L in last 2 days, will replace as needed  # Hyponatremia: Lowest Na = 131 mmol/L in last 2 days, will monitor as appropriate  # Hypochloremia: Lowest Cl = 95 mmol/L in last 2 days, will monitor as appropriate      # Hypoalbuminemia: Lowest albumin = 2.6 g/dL at 10/16/2024  4:35 AM, will monitor as appropriate       # Hypertension: Noted on problem list           # Severe Obesity: Estimated body mass index is 43.2 kg/m  as calculated from the following:    Height as of 9/25/24: 1.626 m (5' 4\").    Weight as of this encounter: 114.2 kg " (251 lb 11.2 oz).   # Moderate Malnutrition: based on nutrition assessment     # History of CABG: noted on surgical history                  Current Medications:    Scheduled Meds:  Current Facility-Administered Medications   Medication Dose Route Frequency Provider Last Rate Last Admin    albumin human 5 % injection 250 mL  250 mL Intravenous Once in dialysis/CRRT Catherine Galvez PA-C        atorvastatin (LIPITOR) tablet 80 mg  80 mg Oral At Bedtime Catherine Galvez PA-C   80 mg at 10/17/24 2000    budesonide (PULMICORT) neb solution 1 mg  1 mg Nebulization BID Catherine Galvez PA-C   1 mg at 10/17/24 2027    cetirizine (zyrTEC) tablet 10 mg  10 mg Oral Daily Catherine Galvez PA-C   10 mg at 10/17/24 0819    clopidogrel (PLAVIX) tablet 75 mg  75 mg Oral Daily Catherine Galvez PA-C   75 mg at 10/17/24 0819    fludrocortisone (FLORINEF) tablet 0.1 mg  0.1 mg Oral Daily Catherine Galvez PA-C   0.1 mg at 10/17/24 0819    fluticasone-vilanterol (BREO ELLIPTA) 100-25 MCG/ACT inhaler 1 puff  1 puff Inhalation At Bedtime Catherine Galvez PA-C   1 puff at 10/17/24 2000    heparin lock flush 10 unit/mL injection 5-20 mL  5-20 mL Intracatheter Q24H Catherine Galvez PA-C   5 mL at 10/13/24 2133    levothyroxine (SYNTHROID/LEVOTHROID) tablet 125 mcg  125 mcg Oral QAM AC Catherine Galvez PA-C   125 mcg at 10/17/24 0658    Lidocaine (LIDOCARE) 4 % Patch 1-2 patch  1-2 patch Transdermal Q24H Catherine Galvez PA-C   2 patch at 10/17/24 1709    metoprolol tartrate (LOPRESSOR) half-tab 12.5 mg  12.5 mg Oral TID Catherine Galvez PA-C   12.5 mg at 10/18/24 0347    metoprolol tartrate (LOPRESSOR) half-tab 37.5 mg  37.5 mg Oral BID Catherine Galvez PA-C        multivitamin w/minerals (THERA-VIT-M) tablet 1 tablet  1 tablet Oral Daily Catherine Galvez PA-C   1 tablet at 10/16/24 1244    No heparin via hemodialysis machine   Does not apply Once Catherine Galvez PA-C         pantoprazole (PROTONIX) EC tablet 40 mg  40 mg Oral Daily Catherine Galvez PA-C   40 mg at 10/17/24 0819    senna-docusate (SENOKOT-S/PERICOLACE) 8.6-50 MG per tablet 1 tablet  1 tablet Oral BID Catherine Galvez PA-C   1 tablet at 10/17/24 2000    sodium chloride (PF) 0.9% PF flush 10-40 mL  10-40 mL Intracatheter Q8H Catherine Galvez PA-C   20 mL at 10/18/24 0518    sodium chloride (PF) 0.9% PF flush 10-40 mL  10-40 mL Intracatheter Q7 Days Catherine Galvez PA-C        Vitamin D3 (CHOLECALCIFEROL) tablet 125 mcg  125 mcg Oral Daily Catherine Galvez PA-C   125 mcg at 10/17/24 0819    Warfarin Dose Required Daily - Pharmacist Managed  1 each Oral See Admin Instructions Catherine Galvez PA-C         Continuous Infusions:  Current Facility-Administered Medications   Medication Dose Route Frequency Provider Last Rate Last Admin     PRN Meds:.  Current Facility-Administered Medications   Medication Dose Route Frequency Provider Last Rate Last Admin    albumin human 5 % injection  mL   mL Intravenous Q1H PRN Catherine Galvez PA-C        albuterol (PROVENTIL HFA/VENTOLIN HFA) inhaler  2 puff Inhalation Q6H PRN Catherine Galvez PA-C        bisacodyl (DULCOLAX) suppository 10 mg  10 mg Rectal Daily PRN Catherine Galvez PA-C        glucose gel 15-30 g  15-30 g Oral Q15 Min PRN Catherine Galvez PA-C        Or    dextrose 50 % injection 25-50 mL  25-50 mL Intravenous Q15 Min PRN Catherine Galvez PA-C   50 mL at 10/14/24 0806    Or    glucagon injection 1 mg  1 mg Subcutaneous Q15 Min PRN Catherine Galvez PA-C        fluticasone (FLONASE) 50 MCG/ACT spray 2 spray  2 spray Both Nostrils Daily PRN H[place]ue, Catherine K, PA-C        heparin 1000 unit/mL DIALYSIS Cath LOCK - RED Lumen  1.3-2.6 mL Intracatheter Q1H PRN Rohan Polo MD   1,300 Units at 10/17/24 1628    heparin 1000 unit/mL DIALYSIS Cath LOCK -BLUE Lumen  1.3-2.6 mL Intracatheter Q1H PRN  Rohan Polo MD        heparin lock flush 10 unit/mL injection 5-20 mL  5-20 mL Intracatheter Q1H PRN Catherine Galvez PA-C        hydrALAZINE (APRESOLINE) injection 10 mg  10 mg Intravenous Q30 Min PRN Catherine Galvez PA-C        ipratropium - albuterol 0.5 mg/2.5 mg/3 mL (DUONEB) neb solution 3 mL  3 mL Nebulization Q4H PRN Catherine Galvez PA-C        lidocaine (LMX4) cream   Topical Q1H PRN Catherine Galvez PA-C        lidocaine 1 % 0.1-5 mL  0.1-5 mL Other Q1H PRN Catherine Galvez PA-C   1 mL at 10/15/24 1415    magnesium hydroxide (MILK OF MAGNESIA) suspension 30 mL  30 mL Oral Daily PRN Catherine Galvez PA-C        methocarbamol (ROBAXIN) tablet 750 mg  750 mg Oral Q6H PRN Catherine Galvez PA-C   750 mg at 10/17/24 0819    naloxone (NARCAN) injection 0.2 mg  0.2 mg Intravenous Q2 Min PRN Catherine Galvez PA-C        Or    naloxone (NARCAN) injection 0.4 mg  0.4 mg Intravenous Q2 Min PRN Catherine Galvez PA-C        Or    naloxone (NARCAN) injection 0.2 mg  0.2 mg Intramuscular Q2 Min PRN Catherine Galvez PA-C        Or    naloxone (NARCAN) injection 0.4 mg  0.4 mg Intramuscular Q2 Min PRN Catherine Galvez PA-C        ondansetron (ZOFRAN ODT) ODT tab 4 mg  4 mg Oral Q6H PRN Catherine Galvez PA-C        Or    ondansetron (ZOFRAN) injection 4 mg  4 mg Intravenous Q6H PRN Catherine Galvez PA-C   4 mg at 10/10/24 1945    oxyCODONE IR (ROXICODONE) half-tab 2.5-5 mg  2.5-5 mg Oral Q4H PRN Catherine Galvez PA-C   5 mg at 10/18/24 0516    prochlorperazine (COMPAZINE) injection 10 mg  10 mg Intravenous Q6H PRN Catherine Galvez PA-C        Or    prochlorperazine (COMPAZINE) tablet 10 mg  10 mg Oral Q6H PRN Catherine Galvez PA-C        simethicone (MYLICON) chewable half-tab 120 mg  120 mg Oral 4x Daily PRN Catherine Galvez PA-C        sodium chloride (PF) 0.9% PF flush 10-20 mL  10-20 mL Intracatheter q1 min prn Lenny,  Catherine MCKEON PA-C        sodium chloride (PF) 0.9% PF flush 10-40 mL  10-40 mL Intracatheter Q1H PRN Catherine Galvez PA-C        sodium chloride 0.9% BOLUS 100-150 mL  100-150 mL Intravenous Q15 Min PRN Rohan Polo MD           Cardiographics:    Telemetry monitoring demonstrates sinus rhythm w/ rates in the 90s per my personal review.    Imaging:  Results for orders placed or performed during the hospital encounter of 10/08/24   XR Chest Port 1 View    Impression    IMPRESSION: Interval sternotomy, CABG procedure and mitral valve repair. Endotracheal tube proximally 2 cm above the sofia. Nasogastric tube in the stomach. Bilateral chest tubes and midline mediastinal drain in expected position. Epicardial leads are   present. Left IJ line in the brachiocephalic vein.    Low lung volumes. No evidence for CHF or pneumonia. No pleural effusion or pneumothorax.   XR Chest Port 1 View    Impression    IMPRESSION: Poststernotomy changes with prosthetic mitral valve. Patient's been extubated and the NG tube has been removed. Mediastinal and pleural chest tubes are in place.    Left IJ cordis sheath is in the distal left innominate artery. Catheter has a very subtle kink within it 3.5 centimeters from the tip.    Low lung volumes. No pneumothorax. Likely trace left effusion at the base. No signs of pneumonia or failure. Heart is of normal size.   XR Abdomen Port 1 View    Impression    IMPRESSION: Orogastric tube tip in the proximal stomach and oriented superiorly towards the left diaphragm. Visualized bowel gas pattern unremarkable. Numerous tubes and lines lower chest and upper abdomen as described on chest x-ray report from today.        Labs, personally reviewed.  Hemoglobin   Date Value Ref Range Status   10/18/2024 8.3 (L) 11.7 - 15.7 g/dL Final   10/17/2024 8.6 (L) 11.7 - 15.7 g/dL Final   10/16/2024 8.8 (L) 11.7 - 15.7 g/dL Final   06/09/2015 13.5 11.7 - 15.7 g/dL Final   03/11/2015 12.3 11.7 - 15.7  g/dL Final   01/08/2015 13.6 11.7 - 15.7 g/dL Final     WBC   Date Value Ref Range Status   06/09/2015 7.9 4.0 - 11.0 10e9/L Final   03/11/2015 9.4 4.0 - 11.0 10e9/L Final   01/08/2015 8.1 4.0 - 11.0 10e9/L Final     WBC Count   Date Value Ref Range Status   10/18/2024 13.4 (H) 4.0 - 11.0 10e3/uL Final   10/17/2024 15.3 (H) 4.0 - 11.0 10e3/uL Final   10/16/2024 19.5 (H) 4.0 - 11.0 10e3/uL Final     Platelet Count   Date Value Ref Range Status   10/18/2024 182 150 - 450 10e3/uL Final   10/17/2024 157 150 - 450 10e3/uL Final   10/16/2024 123 (L) 150 - 450 10e3/uL Final   06/09/2015 219 150 - 450 10e9/L Final   03/11/2015 240 150 - 450 10e9/L Final   01/08/2015 221 150 - 450 10e9/L Final     Creatinine   Date Value Ref Range Status   10/18/2024 2.85 (H) 0.51 - 0.95 mg/dL Final   10/17/2024 3.53 (H) 0.51 - 0.95 mg/dL Final   10/16/2024 3.24 (H) 0.51 - 0.95 mg/dL Final   06/09/2015 0.80 0.52 - 1.04 mg/dL Final   03/11/2015 0.87 0.52 - 1.04 mg/dL Final   01/08/2015 0.79 0.52 - 1.04 mg/dL Final     Potassium   Date Value Ref Range Status   10/18/2024 3.5 3.4 - 5.3 mmol/L Final   10/17/2024 3.0 (L) 3.4 - 5.3 mmol/L Final   10/16/2024 2.9 (L) 3.4 - 5.3 mmol/L Final   06/09/2015 3.8 3.4 - 5.3 mmol/L Final   03/11/2015 3.9 3.4 - 5.3 mmol/L Final   01/08/2015 4.1 3.4 - 5.3 mmol/L Final     Potassium POCT   Date Value Ref Range Status   10/08/2024 4.4 3.4 - 5.3 mmol/L Final   10/08/2024 4.0 3.4 - 5.3 mmol/L Final   10/08/2024 4.1 3.4 - 5.3 mmol/L Final     Magnesium   Date Value Ref Range Status   10/18/2024 2.0 1.7 - 2.3 mg/dL Final   10/17/2024 2.4 (H) 1.7 - 2.3 mg/dL Final   10/16/2024 2.4 (H) 1.7 - 2.3 mg/dL Final   06/09/2015 1.8 1.6 - 2.3 mg/dL Final   03/11/2015 1.7 1.6 - 2.3 mg/dL Final   01/08/2015 1.8 1.6 - 2.3 mg/dL Final          I/O:  I/O last 3 completed shifts:  In: 300 [P.O.:300]  Out: 3850 [Urine:850; Other:3000]       Physical Exam:    General: Patient seen in bed this AM. NAD. Pleasant, conversant.   HEENT:  DORITA, no sclera icterus, moist mucosa  CV: RRR on monitor. 2+ peripheral pulses in all extremities. Mild peripheral edema. Incision C/D/I.  Pulm: Non-labored effort on room air during rounds.   Abd: Soft, NT, ND  : Voiding  Ext: Mild pedal edema, SCDs in place, warm, distal pulses intact  Neuro: CNs grossly intact, FAM, A&Ox3      ASSESSMENT/PLAN:    Jory Ambrose is a 49 year old female with a history of GIA, asthma, GERD,  hypothyroidism, h/o PE on plavix, carotid stenosis, Hodgkin's lymphoma (s/p chemo/radiation and bone marrow transplant x2 after recurrence) currently in remission who is s/p CABG x4, RLE EVH, attempted mitral repair, mechanical MVR, LAAE.    Active Problems:    Mitral regurgitation    Coronary artery disease involving native coronary artery of native heart with angina pectoris (H)  #Acute Renal Failure d/t ATN, ongoing.      NEURO:   - Scheduled lidocaine patches and PRN robaxin/oxycodone for pain  - No toradol given ARF  - PTA bupropion discontinued (somnolence)  - Tylenol discontinued (transaminitis)    CV:  - Pre-op EF 55-60%  - Chest tubes and TPW's removed without immediate complications POD#5  - Normotensive  - Metop 37.5 mg BID and 12.5 mg TID PRN w/ improved BP since HD  - Plavix daily indefinitely d/t unclear h/o anaphylaxis to ASA  - Atorvastatin 80mg daily  - Warfarin for mechanical MVR, INR therapeutic, heparin gtt stopped 10/15  - Paroxysmal rate controlled afib - s/p amiodarone bolus/gtt 10/13 (ok per [place]). Ok to stop given LFTs and allergy to PO amio additive. Consider amio if ongoing a fib as hypervolemia improves  - New baseline echo prior to discharge when closer to preop weight for new baseline s/p MVR and to eval for pericardial effusion on warfarin/plavix    PULM:   - Extubated on POD#0  - Maintaining oxygen saturations on room air/home CPAP overnight  - Encourage pulmonary toilet  - PTA zyrtec, fludrocortisone, flonase, simethicone, albuterol,  pulmicort, breo ellipta, duoneb    FEN/GI:  - Continue electrolyte replacement protocol  - Regular diet - calorie count  - Bowel regimen, LBM 10/16  - Ischemic hepatitis - LFTs downtrending, check q48 while inpatient  - Hepatic w/u and labs unremarkable  - PTA vitamin D3     RENAL:  - Nephrology consulted for ARF w/ decreased UOP, appreciate. Following.  - CRRT 10/11 - 10/13  - HD 10/17, 3L removed  - Strict I/O  - Daily renal panel  - Plan to discuss possibility of HD again today w/ renal given improvement in hemodynamics  - Cr 2.85 (3.53) (baseline ~0.9)  - UA/Urine cx 10/10 & 10/13 NG    HEME:  - H/o bone marrow tx. Needs irradiated blood  - Acute blood loss anemia post-op.   - Hgb stable, likely dilutional component  - Warfarin INR goal 2.5-3.5 in s/o mech MVR  - HIT screen negative.  - INR 2.97 (3.61)  - Transfusion hx  - 2u PRBCs periop  - 1u PRBCs 10/11  - 1u PRBCs 10/16    ID:  - Lydia op ppx complete. Afebrile. Leukocytosis -  WBC 13.4 (15.3)  - UA/Urine cx 10/10 & 10/13 NG.  - Blood cx 10/9 & 10/13 NG  - Sputum culture 10/13 - pending  - ID consult for sepsis, appreciate. Signed off   - Hold abx given improving clinical picture and improving leukocytosis off abx   - Monitor pended cx  - CBC qday    ENDO:   - HbA1c 5.7%  - Ok to discontinue BG checks w/ stable glucose and improving PO intake  - Adrenal, thyroid, and pancreatitis labs unremarkable  - PTA Metformin discontinued (renal function)  - PTA synthroid    PPx:   - DVT: SCDs, SQ heparin TID, ambulation   - GI: Protonix 40mg IV/PO daily    DISPO:   - Continue general tele care (POD#8)  - PT/OT recs at discharge: TCU pending progress  - Medically Ready for Discharge: Anticipated in 2-4 Days pending diuresis and rehab progress         Patient seen w/ Dr. Golden and discussed w/ Dr. Wade.      Eva Galvez PA-C  [facility] Cardiothoracic Surgery  Secure Chat or Vocera  October 18, 2024

## 2024-10-18 NOTE — PROGRESS NOTES
CLINICAL NUTRITION SERVICES - REASSESSMENT NOTE     Nutrition Prescription    RECOMMENDATIONS FOR MDs/PROVIDERS TO ORDER:      Malnutrition Status:    Moderate in acute    Recommendations already ordered by Registered Dietitian (RD):  Adjust supplements to Ensure Clear and Special K bar daily  Continue calorie counts  Future/Additional Recommendations:       EVALUATION OF THE PROGRESS TOWARD GOALS   Diet: Regular  Supplements:  Ensure Clear and Magic cup daily.    Calorie counts:  10/17/24:  2 meals and supplements recorded.  750 kcals, 15 g protein     10/18/24:  breakfast and Ensure Clear recorded  640 kcals, 14 g protein    PO intake improving daily.       Food allergy:  Shellfish, fish    NEW FINDINGS   Patient's mom recording patient's po intake and bringing in food such as fruit, vegetables and hummus for patient.  Patient prefers real food over supplements but willing to drink Ensure Clear and try Special K bar.    Patient had CRRT 10/13/24, had HD 10/15 and 10/17 for UF.      ANTHROPOMETRICS  Height: 0 cm (Data Unavailable)  Most Recent Weight: 114.2 kg (251 lb 11.2 oz)    Admit weight: 100.3 kg (221 lb 3.2 oz) 10/8/24, 10/9/24 109.1 kg (240 lb 8.4 oz)   Fluid wt up after surgery.    Dosing Weight: 68.7 kg     ASSESSED NUTRITION NEEDS  Estimated Energy Needs: 1720  - 2060 kcals/day (25 - 30 kcals/kg)  Justification: Increased needs and Obese  Estimated Protein Needs: 69 -82 grams protein/day (1 - 1.2 grams of pro/kg)  Justification: Increased needs, even with ALEXEI  Estimated Fluid Needs: 1720 -2060 mL/day (1 mL/kcal), or per provider  Justification: Maintenance    PHYSICAL FINDINGS  See malnutrition section below.  Per chart:  Last BM 10/18  Missing teeth  Edema 3+ generalized.  Per [facility] RN note-patient with blister on abdomen and L thigh, rt thigh with bruising.      LABS  CMP  Recent Labs   Lab 10/18/24  1227 10/18/24  0800 10/18/24  0508 10/17/24  2202 10/17/24  0725 10/17/24  0544 10/16/24  2026  10/16/24  1834 10/16/24  0700 10/16/24  0435 10/15/24  1958 10/15/24  1859 10/15/24  0656 10/15/24  0648   NA  --   --  132*  --   --  132*  --  131*  --  131*  --  134*  --  129*   POTASSIUM  --   --  3.5  --   --  3.0*  --  2.9*  --  3.2*  --  3.2*  --  4.0   GLC 82 82 99 100*   < > 102*   < > 151*   < > 93   < > 82   < > 112*   BUN  --   --  37.6*  --   --  53.2*  --  49.3*  --  41.1*  --  25.0*  --  54.1*   CR  --   --  2.85*  --   --  3.53*  --  3.24*  --  2.80*  --  1.74*  --  3.40*   CINDY  --   --  8.2*  --   --  8.0*  --  8.0*  --  7.9*  --  7.6*  --  7.6*   MAG  --   --  2.0  --   --  2.4*  --   --   --  2.4*  --   --   --  3.1*   PHOS  --   --  3.5  --   --  4.0  --   --   --  3.1  --   --   --  4.7*   ALBUMIN  --   --  2.8*  --   --  2.9*  --  2.8*  --  2.6*  --  2.6*  --  2.8*   BILITOTAL  --   --   --   --   --  1.1  --  0.9  --  0.9  --  1.0  --  0.7   ALKPHOS  --   --   --   --   --  109  --  110  --  104  --  106  --  127   AST  --   --   --   --   --  75*  --  86*  --  93*  --  111*  --  182*   ALT  --   --   --   --   --  382*  --  438*  --  501*  --  574*  --  835*    < > = values in this interval not displayed.     Reviewed    MEDICATIONS  Current Facility-Administered Medications   Medication Dose Route Frequency Provider Last Rate Last Admin    atorvastatin (LIPITOR) tablet 80 mg  80 mg Oral At Bedtime Catherine Galvez PA-C   80 mg at 10/17/24 2000    budesonide (PULMICORT) neb solution 1 mg  1 mg Nebulization BID Catherine Galvez PA-C   1 mg at 10/18/24 0919    cetirizine (zyrTEC) tablet 10 mg  10 mg Oral Daily Catherine Galvez PA-C   10 mg at 10/18/24 0857    clopidogrel (PLAVIX) tablet 75 mg  75 mg Oral Daily Catherine Galvez PA-C   75 mg at 10/18/24 0856    fludrocortisone (FLORINEF) tablet 0.1 mg  0.1 mg Oral Daily Catherine Galvez PA-C   0.1 mg at 10/18/24 0857    fluticasone-vilanterol (BREO ELLIPTA) 100-25 MCG/ACT inhaler 1 puff  1 puff Inhalation At Bedtime  Catherine Galvez PA-C   1 puff at 10/17/24 2000    heparin lock flush 10 unit/mL injection 5-20 mL  5-20 mL Intracatheter Q24H Catherine Galvez PA-C   5 mL at 10/13/24 2133    levothyroxine (SYNTHROID/LEVOTHROID) tablet 125 mcg  125 mcg Oral QAM AC Catherine Galvez PA-C   125 mcg at 10/18/24 0856    Lidocaine (LIDOCARE) 4 % Patch 1-2 patch  1-2 patch Transdermal Q24H Catherine Galvez PA-C   2 patch at 10/17/24 1709    metoprolol tartrate (LOPRESSOR) half-tab 12.5 mg  12.5 mg Oral TID Catherine Galvez PA-C   12.5 mg at 10/18/24 0347    metoprolol tartrate (LOPRESSOR) half-tab 37.5 mg  37.5 mg Oral BID Catherine Galvez PA-C   37.5 mg at 10/18/24 0857    multivitamin w/minerals (THERA-VIT-M) tablet 1 tablet  1 tablet Oral Daily Catherine Galvez PA-C   1 tablet at 10/16/24 1244    pantoprazole (PROTONIX) EC tablet 40 mg  40 mg Oral Daily Catherine Galvez PA-C   40 mg at 10/18/24 0855    senna-docusate (SENOKOT-S/PERICOLACE) 8.6-50 MG per tablet 1 tablet  1 tablet Oral BID Catherine Galvez PA-C   1 tablet at 10/18/24 0856    sodium chloride (PF) 0.9% PF flush 10-40 mL  10-40 mL Intracatheter Q8H Catherine Galvez PA-C   20 mL at 10/18/24 0518    sodium chloride (PF) 0.9% PF flush 10-40 mL  10-40 mL Intracatheter Q7 Days Catherine Galvez PA-C        Vitamin D3 (CHOLECALCIFEROL) tablet 125 mcg  125 mcg Oral Daily Catherine Galvez PA-C   125 mcg at 10/18/24 0856    warfarin ANTICOAGULANT (COUMADIN) tablet 1 mg  1 mg Oral ONCE at 18:00 David Wade MD        Warfarin Dose Required Daily - Pharmacist Managed  1 each Oral See Admin Instructions Catherine Galvez, PAKenrickC          Current Facility-Administered Medications   Medication Dose Route Frequency Provider Last Rate Last Admin      Current Facility-Administered Medications   Medication Dose Route Frequency Provider Last Rate Last Admin    albuterol (PROVENTIL HFA/VENTOLIN HFA) inhaler  2 puff  Inhalation Q6H PRN Catherine Galvez PA-C        bisacodyl (DULCOLAX) suppository 10 mg  10 mg Rectal Daily PRN Catherine Galvez PA-C        glucose gel 15-30 g  15-30 g Oral Q15 Min PRN Catherine Galvez PA-C        Or    dextrose 50 % injection 25-50 mL  25-50 mL Intravenous Q15 Min PRN Catherine Galvez PA-C   50 mL at 10/14/24 0806    Or    glucagon injection 1 mg  1 mg Subcutaneous Q15 Min PRN Catherine Galvez PA-C        fluticasone (FLONASE) 50 MCG/ACT spray 2 spray  2 spray Both Nostrils Daily PRN Catherine Galvez PA-C        heparin 1000 unit/mL DIALYSIS Cath LOCK - RED Lumen  1.3-2.6 mL Intracatheter Q1H PRN Rohan Polo MD   1,300 Units at 10/17/24 1628    heparin 1000 unit/mL DIALYSIS Cath LOCK -BLUE Lumen  1.3-2.6 mL Intracatheter Q1H PRN Rohan Polo MD        heparin lock flush 10 unit/mL injection 5-20 mL  5-20 mL Intracatheter Q1H PRN Catherine Galvez PA-C        hydrALAZINE (APRESOLINE) injection 10 mg  10 mg Intravenous Q30 Min PRN Catherine Galvez PA-C        hydrOXYzine HCl (ATARAX) tablet 25 mg  25 mg Oral TID PRN Catherine Galvez PA-C        ipratropium - albuterol 0.5 mg/2.5 mg/3 mL (DUONEB) neb solution 3 mL  3 mL Nebulization Q4H PRN Catherine Galvez PA-C        methocarbamol (ROBAXIN) tablet 750 mg  750 mg Oral Q6H PRN Catherine Galvez PA-C   750 mg at 10/17/24 0819    naloxone (NARCAN) injection 0.2 mg  0.2 mg Intravenous Q2 Min PRN Catherine Galvez PA-C        Or    naloxone (NARCAN) injection 0.4 mg  0.4 mg Intravenous Q2 Min PRN Catherine Galvez PA-C        Or    naloxone (NARCAN) injection 0.2 mg  0.2 mg Intramuscular Q2 Min PRN Catherine Galvez PA-C        Or    naloxone (NARCAN) injection 0.4 mg  0.4 mg Intramuscular Q2 Min PRN Catherine Galvez PA-C        ondansetron (ZOFRAN ODT) ODT tab 4 mg  4 mg Oral Q6H PRN Catherine Galvez PA-C        Or    ondansetron (ZOFRAN) injection 4 mg  4 mg  Intravenous Q6H PRN Morgan Galvezrielle K, PA-C   4 mg at 10/10/24 1945    oxyCODONE IR (ROXICODONE) half-tab 2.5-5 mg  2.5-5 mg Oral Q4H PRN Lenny, Catherine K, PA-C   5 mg at 10/18/24 0903    prochlorperazine (COMPAZINE) injection 10 mg  10 mg Intravenous Q6H PRN [place], Catherine K, PA-C        Or    prochlorperazine (COMPAZINE) tablet 10 mg  10 mg Oral Q6H PRN Lenny, Catherine K, PA-C        simethicone (MYLICON) chewable half-tab 120 mg  120 mg Oral 4x Daily PRN Shue, Catherine K, PA-C        sodium chloride (PF) 0.9% PF flush 10-20 mL  10-20 mL Intracatheter q1 min prn Shue, Catherine K, PA-C        sodium chloride (PF) 0.9% PF flush 10-40 mL  10-40 mL Intracatheter Q1H PRN Lenny, Catherine K, PA-C        sodium chloride 0.9% BOLUS 100-150 mL  100-150 mL Intravenous Q15 Min PRN Rohan Polo MD          Reviewed    Malnutrition Diagnosis: Moderate malnutrition  In Context of:  Acute illness or injury-previously noted    Goals   Patient to consume % of nutritionally adequate meals three times per day, or the equivalent with supplements/snacks. -progressing.      CURRENT NUTRITION DIAGNOSIS  Malnutrition related to poor oral intake as evidenced by NPO/Clear, mentation  -improving with improving po intake on Regular diet.    INTERVENTIONS  Implementation  Medical food supplement therapy-patient declines the Magic cup but willing to try the Special K bar.  Removed magic cup from supplements and ordered Special K bar      Monitoring/Evaluation  Progress toward goals will be monitored and evaluated per protocol.

## 2024-10-18 NOTE — PLAN OF CARE
Problem: Adult Inpatient Plan of Care  Goal: Plan of Care Review  Description: The Plan of Care Review/Shift note should be completed every shift.  The Outcome Evaluation is a brief statement about your assessment that the patient is improving, declining, or no change.  This information will be displayed automatically on your shift  note.  Outcome: Progressing   Goal Outcome Evaluation:    Vitals stable, BG stable. PRN oxycodone given at bedtime for surgical pain. Assist-2 up to commode and chair. Completed hemodialysis, 3L fluids removed.     Odalys Villalta RN

## 2024-10-18 NOTE — PROGRESS NOTES
'    [facility] ([facility]) progress note  CC: F/U ALEEXI  S: She last had HD yesterday with UF of 3-Stable run. Bps stable. K normal. She feels better today. Less edema. No cp, sob    A/P:   Acute kidney injury.  Acute tubular necrosis.  Secondary to CABG and mitral valve replacement followed by cardiogenic shock.  Baseline creatinine 0.95 (10/8/2024).    Required CRRT, off 10/13  Off pressors as of 10/14  HD 10/15, 10/17, plan again for tomorrow  May need outpatient HD on discharge if no renal recovery.   Daily labs, Strict Is/Os, Daily weights   Avoid nephrotoxins  Renally dose meds  Reassess for HD needs daily      2. Status post coronary artery bypass grafting x 4 vessels and mitral valve replacement on 10/8.  Diffuse coronary artery disease     3. Shock liver.  Continue supportive care.  LFTs downtrending.      4. History of Hodgkin's lymphoma.  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    Rohan Polo MD  [facility]  [phone]      /55   Pulse 74   Temp 98.3  F (36.8  C) (Oral)   Resp 20   Wt 114.2 kg (251 lb 11.2 oz)   LMP  (LMP Unknown)   SpO2 100%   BMI 43.20 kg/m      I/O last 3 completed shifts:  In: 300 [P.O.:300]  Out: 3850 [Urine:850; Other:3000]    Physical Exam:   GENERAL: NAD  EYES: EOMI   ENT: MMM  RESP: increased WOB, on O2   CV: regular rate, + leg/arm edema.    GI: ND  SKIN: No rash, pale       Recent Labs   Lab 10/18/24  0508 10/17/24  0544 10/16/24  1834 10/16/24  0435 10/15/24  1859 10/15/24  0648 10/14/24  1821 10/14/24  0345 10/13/24  1536 10/13/24  1201 10/13/24  0414   * 132* 131* 131* 134* 129* 130* 133* 135 134* 136   POTASSIUM 3.5 3.0* 2.9* 3.2* 3.2* 4.0 3.8 4.0 4.1 4.1 4.3   CHLORIDE 97* 95* 95* 97* 101 95* 97* 99 100 102 100   CO2 24 22 22 24 26 18* 18* 21* 19* 21* 22   BUN 37.6* 53.2* 49.3* 41.1* 25.0* 54.1* 42.3* 37.7* 27.0* 22.2* 23.9*   CR 2.85* 3.53* 3.24* 2.80* 1.74* 3.40* 2.76*  2.49* 1.85* 1.51* 1.60*   GFRESTIMATED 20* 15* 17* 20* 35* 16* 20* 23* 33* 42* 39*   CINDY 8.2* 8.0* 8.0* 7.9* 7.6* 7.6* 7.6* 7.5* 7.9* 7.3* 7.4*   PHOS 3.5 4.0  --  3.1  --  4.7*  --  4.7* 5.1* 4.4 4.4   MAG 2.0 2.4*  --  2.4*  --  3.1*  --  3.0*  --  2.7* 2.7*   ALBUMIN 2.8* 2.9* 2.8* 2.6* 2.6* 2.8* 2.7* 3.0* 3.3* 3.1* 3.2*     Recent Labs   Lab 10/18/24  0508 10/17/24  0544 10/16/24  1156 10/16/24  0435 10/15/24  0648 10/14/24  0758 10/13/24  1201 10/13/24  0414   WBC 13.4* 15.3*  --  19.5* 28.0*  27.9* 23.8* 16.3* 16.5*   HGB 8.3* 8.6* 8.8* 6.8* 7.5* 8.0* 8.5* 8.6*   HCT 25.5* 26.5*  --  21.0* 23.2* 24.8* 25.8* 26.3*   MCV 88 87  --  91 91 90 89 89    157  --  123* 133* 105* 98* 90*       Current Facility-Administered Medications:     albuterol (PROVENTIL HFA/VENTOLIN HFA) inhaler, 2 puff, Inhalation, Q6H PRN, Catherine Galvez PA-C    atorvastatin (LIPITOR) tablet 80 mg, 80 mg, Oral, At Bedtime, Catherine Galvez PA-C, 80 mg at 10/17/24 2000    bisacodyl (DULCOLAX) suppository 10 mg, 10 mg, Rectal, Daily PRN, Catherine Galvez PA-C    budesonide (PULMICORT) neb solution 1 mg, 1 mg, Nebulization, BID, Catherine Galvez PA-C, 1 mg at 10/18/24 0919    cetirizine (zyrTEC) tablet 10 mg, 10 mg, Oral, Daily, Catherine Galvez PA-C, 10 mg at 10/18/24 0857    clopidogrel (PLAVIX) tablet 75 mg, 75 mg, Oral, Daily, Catherine Galvez PA-C, 75 mg at 10/18/24 0856    glucose gel 15-30 g, 15-30 g, Oral, Q15 Min PRN **OR** dextrose 50 % injection 25-50 mL, 25-50 mL, Intravenous, Q15 Min PRN, 50 mL at 10/14/24 0806 **OR** glucagon injection 1 mg, 1 mg, Subcutaneous, Q15 Min PRN, Catherine Galvez PA-C    fludrocortisone (FLORINEF) tablet 0.1 mg, 0.1 mg, Oral, Daily, Catherine Galvez PA-C, 0.1 mg at 10/18/24 0857    fluticasone (FLONASE) 50 MCG/ACT spray 2 spray, 2 spray, Both Nostrils, Daily PRN, Catherine Galvez PA-C    fluticasone-vilanterol (BREO ELLIPTA) 100-25 MCG/ACT inhaler 1  puff, 1 puff, Inhalation, At Bedtime, Catherine Galvez PA-C, 1 puff at 10/17/24 2000    [COMPLETED] sodium chloride 0.9% DIALYSIS Cath LOCK - RED Lumen, 10 mL, Intracatheter, Once in dialysis/CRRT, 10 mL at 10/17/24 1527 **FOLLOWED BY** heparin 1000 unit/mL DIALYSIS Cath LOCK - RED Lumen, 1.3-2.6 mL, Intracatheter, Q1H PRN, Rohan Polo MD, 1,300 Units at 10/17/24 1628    [COMPLETED] sodium chloride 0.9% DIALYSIS Cath LOCK - BLUE Lumen, 10 mL, Intracatheter, Once in dialysis/CRRT, 10 mL at 10/17/24 1526 **FOLLOWED BY** heparin 1000 unit/mL DIALYSIS Cath LOCK -BLUE Lumen, 1.3-2.6 mL, Intracatheter, Q1H PRN, Rohan Polo MD    heparin lock flush 10 unit/mL injection 5-20 mL, 5-20 mL, Intracatheter, Q24H, Catherine Galvez PA-C, 5 mL at 10/13/24 2133    heparin lock flush 10 unit/mL injection 5-20 mL, 5-20 mL, Intracatheter, Q1H PRN, Catherine Galvez PA-C    hydrALAZINE (APRESOLINE) injection 10 mg, 10 mg, Intravenous, Q30 Min PRN, Catherine Galvez PA-C    ipratropium - albuterol 0.5 mg/2.5 mg/3 mL (DUONEB) neb solution 3 mL, 3 mL, Nebulization, Q4H PRN, Catherine Galvez PA-C    levothyroxine (SYNTHROID/LEVOTHROID) tablet 125 mcg, 125 mcg, Oral, QAM AC, Catherine Galvez PA-C, 125 mcg at 10/18/24 0856    Lidocaine (LIDOCARE) 4 % Patch 1-2 patch, 1-2 patch, Transdermal, Q24H, Catherine Galvez PA-C, 2 patch at 10/17/24 1709    magnesium hydroxide (MILK OF MAGNESIA) suspension 30 mL, 30 mL, Oral, Daily PRN, Catherine Galvez, PA-C    methocarbamol (ROBAXIN) tablet 750 mg, 750 mg, Oral, Q6H PRN, Catherine Galvez K, PA-C, 750 mg at 10/17/24 0819    metoprolol tartrate (LOPRESSOR) half-tab 12.5 mg, 12.5 mg, Oral, TID, Lenny Catherine K, PA-C, 12.5 mg at 10/18/24 0347    metoprolol tartrate (LOPRESSOR) half-tab 37.5 mg, 37.5 mg, Oral, BID, Morgan Galvezrielle K, PA-C, 37.5 mg at 10/18/24 0857    multivitamin w/minerals (THERA-VIT-M) tablet 1 tablet, 1 tablet, Oral,  Daily, Catherine Galvez PA-C, 1 tablet at 10/16/24 1244    naloxone (NARCAN) injection 0.2 mg, 0.2 mg, Intravenous, Q2 Min PRN **OR** naloxone (NARCAN) injection 0.4 mg, 0.4 mg, Intravenous, Q2 Min PRN **OR** naloxone (NARCAN) injection 0.2 mg, 0.2 mg, Intramuscular, Q2 Min PRN **OR** naloxone (NARCAN) injection 0.4 mg, 0.4 mg, Intramuscular, Q2 Min PRN, Ctaherine Galvez PA-C    ondansetron (ZOFRAN ODT) ODT tab 4 mg, 4 mg, Oral, Q6H PRN **OR** ondansetron (ZOFRAN) injection 4 mg, 4 mg, Intravenous, Q6H PRN, Catherine Galvez PA-C, 4 mg at 10/10/24 1945    oxyCODONE IR (ROXICODONE) half-tab 2.5-5 mg, 2.5-5 mg, Oral, Q4H PRN, Catherine Galvez PA-C, 5 mg at 10/18/24 0903    [DISCONTINUED] pantoprazole (PROTONIX) 2 mg/mL suspension 40 mg, 40 mg, Oral or NG Tube, Daily, 40 mg at 10/08/24 1729 **OR** pantoprazole (PROTONIX) EC tablet 40 mg, 40 mg, Oral, Daily, Catherine Galvez PA-C, 40 mg at 10/18/24 0855    prochlorperazine (COMPAZINE) injection 10 mg, 10 mg, Intravenous, Q6H PRN **OR** prochlorperazine (COMPAZINE) tablet 10 mg, 10 mg, Oral, Q6H PRN, Catherine Galvez PA-C    senna-docusate (SENOKOT-S/PERICOLACE) 8.6-50 MG per tablet 1 tablet, 1 tablet, Oral, BID, Catherine Galvez PA-C, 1 tablet at 10/18/24 0856    simethicone (MYLICON) chewable half-tab 120 mg, 120 mg, Oral, 4x Daily PRN, [place]ue, Catherine K, PA-C    sodium chloride (PF) 0.9% PF flush 10-20 mL, 10-20 mL, Intracatheter, q1 min prn, [place]ue, Catherine K, PA-C    sodium chloride (PF) 0.9% PF flush 10-40 mL, 10-40 mL, Intracatheter, Q8H, [place]ue, Catherine K, PA-C, 20 mL at 10/18/24 0518    sodium chloride (PF) 0.9% PF flush 10-40 mL, 10-40 mL, Intracatheter, Q7 Days, [place]ue, Catherine K, PA-C    sodium chloride (PF) 0.9% PF flush 10-40 mL, 10-40 mL, Intracatheter, Q1H PRN, [place]ue, Catherine K, PA-C    sodium chloride 0.9% BOLUS 100-150 mL, 100-150 mL, Intravenous, Q15 Min PRN, Rohan Polo MD    Vitamin D3  (CHOLECALCIFEROL) tablet 125 mcg, 125 mcg, Oral, Daily, Catherine Galvez PA-C, 125 mcg at 10/18/24 0856    warfarin ANTICOAGULANT (COUMADIN) tablet 1 mg, 1 mg, Oral, ONCE at 18:00, David Wade MD    Warfarin Dose Required Daily - Pharmacist Managed, 1 each, Oral, See Admin Instructions, Catherine Galvez PA-C    Labs personally reviewed today during this evaluation at 1130am.

## 2024-10-18 NOTE — PROGRESS NOTES
Jory is active in her [employer] joanne. Her  from [place] has checked in. Her brother is a  in [place]. She also has connections with the [employer] in [place]. Her spiritual needs are attended to.    I visited with Jory and her mother. Joanne is an important source of strength and comfort. They expressed appreciation for the visit. I offered a brief prayer.    Spiritual Care is available as needed or requested.    LY Owusu.

## 2024-10-18 NOTE — PLAN OF CARE
Problem: Adult Inpatient Plan of Care  Goal: Plan of Care Review  Description: The Plan of Care Review/Shift note should be completed every shift.  The Outcome Evaluation is a brief statement about your assessment that the patient is improving, declining, or no change.  This information will be displayed automatically on your shift  note.  Outcome: Progressing     Problem: Adult Inpatient Plan of Care  Goal: Optimal Comfort and Wellbeing  Outcome: Progressing     Problem: Risk for Delirium  Goal: Improved Sleep  Outcome: Progressing     Problem: Cardiovascular Surgery  Goal: Effective Cardiac Function  Outcome: Progressing   Goal Outcome Evaluation:       VSS. Pt has minimal pain, PRN meds given this AM for back pain. Clustered cares to promote sleep. Remains edematous

## 2024-10-18 NOTE — PLAN OF CARE
Goal Outcome Evaluation:       Pt Aox4. Denies pain this shift. Vital sign within acceptable parameters. Voids ok. Up with assist of one and gait belt. Handoff report given to receiving RN on P3 and pt transferred to p3 room 330 as ordered.

## 2024-10-18 NOTE — PROGRESS NOTES
"   10/18/24 1400   Appointment Info   Signing Clinician's Name / Credentials (PT) January Burt, PT, DPT   Living Environment   People in Home spouse   Current Living Arrangements apartment   Home Accessibility no concerns  (elevator available)   Self-Care   Equipment Currently Used at Home none   Fall history within last six months yes   Activity/Exercise/Self-Care Comment Patient independent with ADLs, reports that it takes increased time to complete at times due to fatigue.   General Information   Onset of Illness/Injury or Date of Surgery 10/08/24   Referring Physician Catherine Galvez PA-C   Patient/Family Therapy Goals Statement (PT) None stated.   Pertinent History of Current Problem (include personal factors and/or comorbidities that impact the POC) Per CV surgery: \"POD#10 s/p CABG x4 (LIMA>LAD, rSVG>RPDA, rSVG>LPL, rSVG>D2), RLE EVH, Attempted MVR/r, MVR ( 29 mm [place] Mechanical Mitral Valve), LAAE\"   Existing Precautions/Restrictions fall;cardiac;sternal   Pain Assessment   Patient Currently in Pain Yes, see Vital Sign flowsheet   Range of Motion (ROM)   Range of Motion ROM deficits secondary to weakness   Strength (Manual Muscle Testing)   Strength (Manual Muscle Testing) Deficits observed during functional mobility   Bed Mobility   Bed Mobility supine-sit   Supine-Sit [place] (Bed Mobility) minimum assist (75% patient effort);2 person assist;verbal cues   Bed Mobility Limitations decreased ability to use arms for pushing/pulling   Impairments Contributing to Impaired Bed Mobility decreased strength   Transfers   Transfers sit-stand transfer;bed-chair transfer   Transfer Safety Concerns Noted decreased weight-shifting ability   Impairments Contributing to Impaired Transfers impaired balance;decreased strength   Bed-Chair Transfer   Bed-Chair [place] (Transfers) minimum assist (75% patient effort);verbal cues   Sit-Stand Transfer   Sit-Stand [place] (Transfers) minimum assist (75% " patient effort);verbal cues   Assistive Device (Sit-Stand Transfers) walker, 4-wheeled  (with and without 4WW)   Gait/Stairs (Locomotion)   [place] Level (Gait) contact guard   Assistive Device (Gait) walker, 4-wheeled   Distance in Feet (Gait) 3' bed > chair   Pattern (Gait) step-to   Deviations/Abnormal Patterns (Gait) nichole decreased;gait speed decreased   Clinical Impression   Criteria for Skilled Therapeutic Intervention Yes, treatment indicated   PT Diagnosis (PT) impaired functional mobility   Influenced by the following impairments decreased strength, impaired balance, sternal precautions, decreased activity tolerance   Functional limitations due to impairments transfers, ambulation   Clinical Presentation (PT Evaluation Complexity) evolving   Clinical Presentation Rationale Clinical judgment.   Clinical Decision Making (Complexity) moderate complexity   Planned Therapy Interventions (PT) balance training;bed mobility training;gait training;home exercise program;neuromuscular re-education;patient/family education;strengthening;transfer training   Risk & Benefits of therapy have been explained evaluation/treatment results reviewed;participants voiced agreement with care plan;patient;participants included   PT Total Evaluation Time   PT Eval, Moderate Complexity Minutes (25829) 10   Physical Therapy Goals   PT Frequency 5x/week   PT Predicted Duration/Target Date for Goal Attainment 10/25/24   PT Goals PT Goal 1;Gait   PT: Gait Supervision/stand-by assist;Assistive device;Rolling walker;100 feet   PT: Goal 1 Patient will demonstrate independence with HEP to improve LE strength.   Interventions   Interventions Quick Adds Gait Training;Therapeutic Activity   Therapeutic Activity   Therapeutic Activities: dynamic activities to improve functional performance Minutes (94137) 8   Symptoms Noted During/After Treatment Fatigue   Treatment Detail/Skilled Intervention Multiple sit<> stands throughout session,  some with and without 4WW. Verbal cues for hugging cardiac pillow to maintain sternal precautions and correct use of 4WW brakes.   Gait Training   Gait Training Minutes (46139) 10   Symptoms Noted During/After Treatment (Gait Training) fatigue;shortness of breath   Treatment Detail/Skilled Intervention Education and cues for pursed lip breathing during recovery and timing breath with steps to improve activity tolerance. Pt demonstrates short inhales, verbal cues to extend inhales slightly. Patient fatigues quickly with ambulation.   Distance in Feet 12', 10', 10', 5'   [place] Level (Gait Training) contact guard   Physical Assistance Level (Gait Training) verbal cues;1 person assist   Assistive Device (Gait Training) rolling walker  (4WW)   Gait Analysis Deviations decreased nichole;decreased step length   Impairments (Gait Analysis/Training) balance impaired;strength decreased   PT Discharge Planning   PT Plan gait with 4WW and chair follow, LE strengthening   PT Discharge Recommendation (DC Rec) Transitional Care Facility   PT Rationale for DC Rec Pt fatigues quickly with mobility and is only tolerating very short distances of ambulation. Recommend TCU at discharge.   PT Brief overview of current status Supine > sit, SBA. Sit <> stand, CGA. Ambulates max 12' with 4WW, CGA.   Total Session Time   Timed Code Treatment Minutes 18   Total Session Time (sum of timed and untimed services) 28

## 2024-10-18 NOTE — PROGRESS NOTES
"CVTS Daily Progress Note  POD#11 s/p CABG x4 (LIMA>LAD, rSVG>RPDA, rSVG>LPL, rSVG>D2), RLE EVH, Attempted MVR/r, MVR ( 29 mm [employer]. Brian Mechanical Mitral Valve), LAAE  Attending: Mauro  LOS: 11    SUBJECTIVE/INTERVAL EVENTS:    No acute events overnight. Paroxysmal a fib, largely rate controlled.. UOP continues to  but still not proportional to hypervolemia, Cr variable. Tolerating HD well (10/15 & 17), plan for more HD today. LFTs downtrending. Maintaining oxygen saturations on home CPAP/1L NC. Normotensive. Ambulating w/ therapy as able - needs lots of encouragement. Pain well controlled. +BM. Tolerating regular diet, PO intake and appetite improving. INR 2.63 (2.97), heparin gtt stopped 10/15. Patient denies new chest pain, shortness of breath, abdominal pain, calf pain, nausea. Still feels very puffy. Patient has no questions today.    OBJECTIVE:  Temp:  [97  F (36.1  C)-98.4  F (36.9  C)] 97  F (36.1  C)  Pulse:  [72-96] 80  Resp:  [16-33] 17  BP: ()/(52-76) 110/56  SpO2:  [97 %-100 %] 100 %  Vitals:    10/15/24 0600 10/16/24 0500 10/17/24 0600 10/18/24 1100   Weight: 114.3 kg (252 lb) 112.8 kg (248 lb 9.6 oz) 114.2 kg (251 lb 11.2 oz) 111.6 kg (246 lb)    10/19/24 0700   Weight: 112 kg (246 lb 14.4 oz)       Clinically Significant Risk Factors        # Hypokalemia: Lowest K = 3.2 mmol/L in last 2 days, will replace as needed  # Hyponatremia: Lowest Na = 131 mmol/L in last 2 days, will monitor as appropriate  # Hypochloremia: Lowest Cl = 95 mmol/L in last 2 days, will monitor as appropriate      # Hypoalbuminemia: Lowest albumin = 2.6 g/dL at 10/16/2024  4:35 AM, will monitor as appropriate       # Hypertension: Noted on problem list           # Severe Obesity: Estimated body mass index is 42.38 kg/m  as calculated from the following:    Height as of 9/25/24: 1.626 m (5' 4\").    Weight as of this encounter: 112 kg (246 lb 14.4 oz).   # Moderate Malnutrition: based on nutrition assessment     # " History of CABG: noted on surgical history                    Current Medications:    Scheduled Meds:  Current Facility-Administered Medications   Medication Dose Route Frequency Provider Last Rate Last Admin    atorvastatin (LIPITOR) tablet 80 mg  80 mg Oral At Bedtime Catherine Galvez PA-C   80 mg at 10/18/24 2121    budesonide (PULMICORT) neb solution 1 mg  1 mg Nebulization BID Catherine Galvez PA-C   1 mg at 10/18/24 2039    cetirizine (zyrTEC) tablet 10 mg  10 mg Oral Daily Catherine Galvez PA-C   10 mg at 10/18/24 0857    clopidogrel (PLAVIX) tablet 75 mg  75 mg Oral Daily Catherine Galvez PA-C   75 mg at 10/18/24 0856    fludrocortisone (FLORINEF) tablet 0.1 mg  0.1 mg Oral Daily Catherine Galvez PA-C   0.1 mg at 10/18/24 0857    fluticasone-vilanterol (BREO ELLIPTA) 100-25 MCG/ACT inhaler 1 puff  1 puff Inhalation At Bedtime Catherine Galvez PA-C   1 puff at 10/18/24 2122    heparin lock flush 10 unit/mL injection 5-20 mL  5-20 mL Intracatheter Q24H Catherine Galvez PA-C   5 mL at 10/13/24 2133    levothyroxine (SYNTHROID/LEVOTHROID) tablet 125 mcg  125 mcg Oral QAM AC Catherine Galvez PA-C   125 mcg at 10/19/24 0650    Lidocaine (LIDOCARE) 4 % Patch 1-2 patch  1-2 patch Transdermal Q24H Catherine Galvez PA-C   1 patch at 10/18/24 1701    metoprolol tartrate (LOPRESSOR) half-tab 37.5 mg  37.5 mg Oral BID Catherine Galvez PA-C   37.5 mg at 10/18/24 2122    multivitamin w/minerals (THERA-VIT-M) tablet 1 tablet  1 tablet Oral Daily Catherine Galvez PA-C   1 tablet at 10/18/24 1355    No heparin via hemodialysis machine   Does not apply Once Rohan Polo MD        pantoprazole (PROTONIX) EC tablet 40 mg  40 mg Oral Daily Catherine Galvez PA-C   40 mg at 10/19/24 0902    senna-docusate (SENOKOT-S/PERICOLACE) 8.6-50 MG per tablet 1 tablet  1 tablet Oral BID Catherine Galvez PA-C   1 tablet at 10/18/24 2121    sodium chloride (PF) 0.9% PF  flush 10-40 mL  10-40 mL Intracatheter Q8H Catherine Galvez PA-C   30 mL at 10/19/24 0445    sodium chloride (PF) 0.9% PF flush 10-40 mL  10-40 mL Intracatheter Q7 Days Catherine Galvez PA-C   10 mL at 10/18/24 1703    Vitamin D3 (CHOLECALCIFEROL) tablet 125 mcg  125 mcg Oral Daily Catherine Galvez PA-C   125 mcg at 10/18/24 0856    Warfarin Dose Required Daily - Pharmacist Managed  1 each Oral See Admin Instructions Catherine Galvez PA-C         Continuous Infusions:  Current Facility-Administered Medications   Medication Dose Route Frequency Provider Last Rate Last Admin     PRN Meds:.  Current Facility-Administered Medications   Medication Dose Route Frequency Provider Last Rate Last Admin    acetaminophen (TYLENOL) tablet 500 mg  500 mg Oral Q6H PRN Catherine Galvez PA-C   500 mg at 10/19/24 0621    albuterol (PROVENTIL HFA/VENTOLIN HFA) inhaler  2 puff Inhalation Q6H PRN Catherine Galvez PA-C        bisacodyl (DULCOLAX) suppository 10 mg  10 mg Rectal Daily PRN Catherine Galvez PA-C        glucose gel 15-30 g  15-30 g Oral Q15 Min PRN Catherine Galvez PA-C        Or    dextrose 50 % injection 25-50 mL  25-50 mL Intravenous Q15 Min PRN Catherine Galvez PA-C   50 mL at 10/14/24 0806    Or    glucagon injection 1 mg  1 mg Subcutaneous Q15 Min PRN Catherine Galvez PA-C        fluticasone (FLONASE) 50 MCG/ACT spray 2 spray  2 spray Both Nostrils Daily PRN Catherine Galvez PA-C        heparin 1000 unit/mL DIALYSIS Cath LOCK - RED Lumen  1.3-2.6 mL Intracatheter Q1H PRN Rohan Polo MD   1,300 Units at 10/17/24 1628    heparin 1000 unit/mL DIALYSIS Cath LOCK -BLUE Lumen  1.3-2.6 mL Intracatheter Q1H PRN oRhan Polo MD        heparin lock flush 10 unit/mL injection 5-20 mL  5-20 mL Intracatheter Q1H PRN Catherine Galvez PA-C        hydrALAZINE (APRESOLINE) injection 10 mg  10 mg Intravenous Q30 Min PRN Catherine Galvez PA-C         hydrOXYzine HCl (ATARAX) tablet 25 mg  25 mg Oral TID PRN Catherine Galvez PA-C        ipratropium - albuterol 0.5 mg/2.5 mg/3 mL (DUONEB) neb solution 3 mL  3 mL Nebulization Q4H PRN Catherine Galvez PA-C        naloxone (NARCAN) injection 0.2 mg  0.2 mg Intravenous Q2 Min PRN Catherine Galvez PA-C        Or    naloxone (NARCAN) injection 0.4 mg  0.4 mg Intravenous Q2 Min PRN Catherine Galvez PA-C        Or    naloxone (NARCAN) injection 0.2 mg  0.2 mg Intramuscular Q2 Min PRN Catherine Galvez PA-C        Or    naloxone (NARCAN) injection 0.4 mg  0.4 mg Intramuscular Q2 Min PRN Catherine Galvez PA-C        ondansetron (ZOFRAN ODT) ODT tab 4 mg  4 mg Oral Q6H PRN Catherine Galvez PA-C        Or    ondansetron (ZOFRAN) injection 4 mg  4 mg Intravenous Q6H PRN Catherine Galvez PA-C   4 mg at 10/10/24 1945    oxyCODONE IR (ROXICODONE) half-tab 2.5 mg  2.5 mg Oral Q4H PRN David Wade MD        Or    oxyCODONE (ROXICODONE) tablet 5 mg  5 mg Oral Q4H PRN David Wade MD   5 mg at 10/19/24 0902    prochlorperazine (COMPAZINE) injection 10 mg  10 mg Intravenous Q6H PRN Catherine Galvez PA-C        Or    prochlorperazine (COMPAZINE) tablet 10 mg  10 mg Oral Q6H PRN Catherine Galvez PA-C        simethicone (MYLICON) chewable tablet 80 mg  80 mg Oral 4x Daily PRN David Wade MD        sodium chloride (PF) 0.9% PF flush 10-20 mL  10-20 mL Intracatheter q1 min prn [place], Catherine K, PA-C        sodium chloride (PF) 0.9% PF flush 10-40 mL  10-40 mL Intracatheter Q1H PRN Catherine Galvez PA-C        sodium chloride 0.9% BOLUS 100-150 mL  100-150 mL Intravenous Q15 Min PRN Rohan Polo MD        sodium chloride 0.9% BOLUS 100-150 mL  100-150 mL Intravenous Q15 Min PRN Rohan Polo MD           Cardiographics:    Telemetry monitoring demonstrates sinus rhythm w/ rates in the 80s per my personal  review.    Imaging:  Results for orders placed or performed during the hospital encounter of 10/08/24   XR Chest Port 1 View    Impression    IMPRESSION: Interval sternotomy, CABG procedure and mitral valve repair. Endotracheal tube proximally 2 cm above the sofia. Nasogastric tube in the stomach. Bilateral chest tubes and midline mediastinal drain in expected position. Epicardial leads are   present. Left IJ line in the brachiocephalic vein.    Low lung volumes. No evidence for CHF or pneumonia. No pleural effusion or pneumothorax.   XR Chest Port 1 View    Impression    IMPRESSION: Poststernotomy changes with prosthetic mitral valve. Patient's been extubated and the NG tube has been removed. Mediastinal and pleural chest tubes are in place.    Left IJ cordis sheath is in the distal left innominate artery. Catheter has a very subtle kink within it 3.5 centimeters from the tip.    Low lung volumes. No pneumothorax. Likely trace left effusion at the base. No signs of pneumonia or failure. Heart is of normal size.   XR Abdomen Port 1 View    Impression    IMPRESSION: Orogastric tube tip in the proximal stomach and oriented superiorly towards the left diaphragm. Visualized bowel gas pattern unremarkable. Numerous tubes and lines lower chest and upper abdomen as described on chest x-ray report from today.        Labs, personally reviewed.  Hemoglobin   Date Value Ref Range Status   10/19/2024 8.1 (L) 11.7 - 15.7 g/dL Final   10/18/2024 8.3 (L) 11.7 - 15.7 g/dL Final   10/17/2024 8.6 (L) 11.7 - 15.7 g/dL Final   06/09/2015 13.5 11.7 - 15.7 g/dL Final   03/11/2015 12.3 11.7 - 15.7 g/dL Final   01/08/2015 13.6 11.7 - 15.7 g/dL Final     WBC   Date Value Ref Range Status   06/09/2015 7.9 4.0 - 11.0 10e9/L Final   03/11/2015 9.4 4.0 - 11.0 10e9/L Final   01/08/2015 8.1 4.0 - 11.0 10e9/L Final     WBC Count   Date Value Ref Range Status   10/19/2024 13.6 (H) 4.0 - 11.0 10e3/uL Final   10/18/2024 13.4 (H) 4.0 - 11.0 10e3/uL  Final   10/17/2024 15.3 (H) 4.0 - 11.0 10e3/uL Final     Platelet Count   Date Value Ref Range Status   10/19/2024 228 150 - 450 10e3/uL Final   10/18/2024 182 150 - 450 10e3/uL Final   10/17/2024 157 150 - 450 10e3/uL Final   06/09/2015 219 150 - 450 10e9/L Final   03/11/2015 240 150 - 450 10e9/L Final   01/08/2015 221 150 - 450 10e9/L Final     Creatinine   Date Value Ref Range Status   10/19/2024 3.35 (H) 0.51 - 0.95 mg/dL Final   10/18/2024 2.85 (H) 0.51 - 0.95 mg/dL Final   10/17/2024 3.53 (H) 0.51 - 0.95 mg/dL Final   06/09/2015 0.80 0.52 - 1.04 mg/dL Final   03/11/2015 0.87 0.52 - 1.04 mg/dL Final   01/08/2015 0.79 0.52 - 1.04 mg/dL Final     Potassium   Date Value Ref Range Status   10/19/2024 3.2 (L) 3.4 - 5.3 mmol/L Final   10/18/2024 3.5 3.4 - 5.3 mmol/L Final   10/17/2024 3.0 (L) 3.4 - 5.3 mmol/L Final   06/09/2015 3.8 3.4 - 5.3 mmol/L Final   03/11/2015 3.9 3.4 - 5.3 mmol/L Final   01/08/2015 4.1 3.4 - 5.3 mmol/L Final     Potassium POCT   Date Value Ref Range Status   10/08/2024 4.4 3.4 - 5.3 mmol/L Final   10/08/2024 4.0 3.4 - 5.3 mmol/L Final   10/08/2024 4.1 3.4 - 5.3 mmol/L Final     Magnesium   Date Value Ref Range Status   10/19/2024 2.0 1.7 - 2.3 mg/dL Final   10/18/2024 2.0 1.7 - 2.3 mg/dL Final   10/17/2024 2.4 (H) 1.7 - 2.3 mg/dL Final   06/09/2015 1.8 1.6 - 2.3 mg/dL Final   03/11/2015 1.7 1.6 - 2.3 mg/dL Final   01/08/2015 1.8 1.6 - 2.3 mg/dL Final          I/O:  I/O last 3 completed shifts:  In: 860 [P.O.:830; I.V.:30]  Out: 600 [Urine:600]       Physical Exam:    General: Patient seen in dialysis this AM. NAD. Pleasant, conversant.   HEENT: DORITA, no sclera icterus, moist mucosa  CV: RRR on monitor. 2+ peripheral pulses in all extremities. Moderate peripheral edema. Incision C/D/I.  Pulm: Satting 100% w/ nonlabored breathing on 2L NC  Abd: Soft, NT, ND  : Voiding  Ext: Moderate pedal edema, SCDs in place, warm, distal pulses intact  Neuro: CNs grossly intact, FAM,  A&Ox3      ASSESSMENT/PLAN:    Jory Ambrose is a 49 year old female with a history of GIA, asthma, GERD,  hypothyroidism, h/o PE on plavix, carotid stenosis, Hodgkin's lymphoma (s/p chemo/radiation and bone marrow transplant x2 after recurrence) currently in remission who is s/p CABG x4, RLE EVH, attempted mitral repair, mechanical MVR, LAAE.    Active Problems:    Mitral regurgitation    Coronary artery disease involving native coronary artery of native heart with angina pectoris (H)  #Acute Renal Failure d/t ATN, ongoing.      NEURO:   - Scheduled lidocaine patches and PRN robaxin/oxycodone for pain  - No toradol given ARF  - PTA bupropion discontinued (somnolence)  - Tylenol 500 mg q6hrs (max 2g per day given recent hepatic dysfunction)    CV:  - Pre-op EF 55-60%  - Chest tubes and TPW's removed without immediate complications POD#5  - Normotensive  - [place] 37.5 mg BID  - Plavix daily indefinitely d/t unclear h/o anaphylaxis to ASA  - Atorvastatin 80mg daily  - Warfarin for mechanical MVR, INR therapeutic, heparin gtt stopped 10/15  - Paroxysmal rate controlled afib - s/p amiodarone bolus/gtt 10/13 (ok per Mauro). Holding given LFTs and allergy to PO amio additive. Consider amio if ongoing a fib as hypervolemia improves  - New baseline echo prior to discharge when closer to preop weight for new baseline s/p MVR and to eval for pericardial effusion on warfarin/plavix    PULM:   - Extubated on POD#0  - Maintaining oxygen saturations on 2L NC w/ home CPAP qHS, wean as able  - Encourage pulmonary toilet  - PTA zyrtec, fludrocortisone, flonase, simethicone, albuterol, pulmicort, breo ellipta, duoneb    FEN/GI:  - Continue electrolyte replacement protocol  - Regular diet  - Bowel regimen, LBM 10/16  - Ischemic hepatitis - LFTs downtrending, check q48 while inpatient  - Hepatic w/u and labs unremarkable  - PTA vitamin D3     RENAL:  - Nephrology consulted for ARF w/ decreased UOP, appreciate.  Following.  - CRRT 10/11 - 10/13  - HD 10/15 & 17. Plan for HD today  - Strict I/O  - Daily renal panel  - Cr 3.35 (2.85) (baseline ~0.9)  - UA/Urine cx 10/10 & 10/13 NG    HEME:  - H/o bone marrow tx. Needs irradiated blood  - Acute blood loss anemia post-op.   - Hgb stable, likely dilutional component  - Warfarin INR goal 2.5-3.5 in s/o mech MVR  - HIT screen negative.  - INR 2.63 (2.97)  - Transfusion hx  - 2u PRBCs periop  - 1u PRBCs 10/11  - 1u PRBCs 10/16    ID:  - Lydia op ppx complete. Afebrile. Leukocytosis -  WBC 13.6 (13.4)  - UA/Urine cx 10/10 & 10/13 NG.  - Blood cx 10/9 & 10/13 NG  - Sputum culture 10/13 - pending  - ID consult for sepsis, appreciate. Signed off   - Hold abx given improving clinical picture and improving leukocytosis off abx   - Monitor pended cx  - CBC qday    ENDO:   - HbA1c 5.7%  - Adrenal, thyroid, and pancreatitis labs unremarkable  - PTA Metformin discontinued (renal function)  - PTA synthroid    PPx:   - DVT: SCDs, SQ heparin TID, ambulation   - GI: Protonix 40mg IV/PO daily    DISPO:   - Continue general tele care (POD#8)  - PT/OT recs at discharge: TCU pending progress  - Medically Ready for Discharge: Anticipated in 2-4 Days pending volume status and rehab progress         Patient discussed w/ Dr. Wade.      Eva Galvez PA-C  [facility] Cardiothoracic Surgery  Secure Chat or Vocera  October 19, 2024

## 2024-10-18 NOTE — PLAN OF CARE
"sc  Patient Name: Jory Ambrsoe   MRN: [id number]   Date of Admission: 10/8/2024    Procedure: Procedure(s):  CORONARY ARTERY BYPASS GRAFT TIMES FOUR, LEFT INTERNAL MAMMARY ARTERY HARVEST, RIGHT LEG ENDOSCOPIC VESSEL PROCUREMENT, EPIAORTIC ULTRASOUND,  MITRAL VALVE REAIR CONVERTED TO MITRAL VALVE REPLACEMENT  LEFT ATRIAL APPENDAGE LIGATION,  ANESTHESIA TRANSESOPHAGEAL ECHOCARDIOGRAM    Post Op day #:10    Subjective (Patient focus/Primary Problem for shift): Refusal to get out of bed, C/O dizziness when up and refuses to change position unless 2 staff are present.           Pain Goal0 Pain Rating5           Pain Medication/ Regime effective to reduce patient pain Oxycodone once, ice packs, offered hydroxyzine and patient refused.      Objective (Physical assessment):           Rhythm: atrial fibrillation            Bowel Activity: yes if Yes indicate when: Last 24           Bowel Medications: no                    Patient Activity:           Up to chair for meals: no          Ambulation with RN x2 (Not including CR): no            Is patient in home clothes:no               Preventative WOC consult (need MD order): no       Assessment (Nursing primary shift focus): Patient appears to be very anxious and hesitant to do physical activity. C/O SOB (shows no outwards signs of dyspnea, sats % 2L NC), C/O dizziness when up for commode commode, vitals stable. Offered hydroxyzine for pain, anxiety and help improve activity, patient refused and said, \"I don't want it to interfere with my cardiac rehab.\"     Plan (Patient Care Plan/focus): Continue to encourage activity, incentive spirometer use, patient only used once for writer.       Saray Martinez RN   10/18/2024   2:08 PM          "

## 2024-10-19 ENCOUNTER — APPOINTMENT (OUTPATIENT)
Dept: PHYSICAL THERAPY | Facility: HOSPITAL | Age: 49
DRG: 219 | End: 2024-10-19
Attending: SURGERY
Payer: COMMERCIAL

## 2024-10-19 ENCOUNTER — APPOINTMENT (OUTPATIENT)
Dept: RADIOLOGY | Facility: HOSPITAL | Age: 49
DRG: 219 | End: 2024-10-19
Payer: COMMERCIAL

## 2024-10-19 ENCOUNTER — APPOINTMENT (OUTPATIENT)
Dept: CARDIOLOGY | Facility: HOSPITAL | Age: 49
DRG: 219 | End: 2024-10-19
Payer: COMMERCIAL

## 2024-10-19 LAB
ALBUMIN SERPL BCG-MCNC: 2.9 G/DL (ref 3.5–5.2)
ALP SERPL-CCNC: 109 U/L (ref 40–150)
ALT SERPL W P-5'-P-CCNC: 243 U/L (ref 0–50)
ANION GAP SERPL CALCULATED.3IONS-SCNC: 13 MMOL/L (ref 7–15)
AST SERPL W P-5'-P-CCNC: 72 U/L (ref 0–45)
ATRIAL RATE - MUSE: 288 BPM
BILIRUB SERPL-MCNC: 1.1 MG/DL
BUN SERPL-MCNC: 50.1 MG/DL (ref 6–20)
CA-I BLD-MCNC: 4.4 MG/DL (ref 4.4–5.2)
CALCIUM SERPL-MCNC: 8.1 MG/DL (ref 8.8–10.4)
CHLORIDE SERPL-SCNC: 95 MMOL/L (ref 98–107)
CREAT SERPL-MCNC: 3.35 MG/DL (ref 0.51–0.95)
DIASTOLIC BLOOD PRESSURE - MUSE: NORMAL MMHG
EGFRCR SERPLBLD CKD-EPI 2021: 16 ML/MIN/1.73M2
ERYTHROCYTE [DISTWIDTH] IN BLOOD BY AUTOMATED COUNT: 18.5 % (ref 10–15)
GLUCOSE BLDC GLUCOMTR-MCNC: 113 MG/DL (ref 70–99)
GLUCOSE SERPL-MCNC: 97 MG/DL (ref 70–99)
HCO3 SERPL-SCNC: 23 MMOL/L (ref 22–29)
HCT VFR BLD AUTO: 25.6 % (ref 35–47)
HGB BLD-MCNC: 8.1 G/DL (ref 11.7–15.7)
INR PPP: 2.63 (ref 0.85–1.15)
INTERPRETATION ECG - MUSE: NORMAL
LACTATE SERPL-SCNC: 0.9 MMOL/L (ref 0.7–2)
LVEF ECHO: NORMAL
MAGNESIUM SERPL-MCNC: 2 MG/DL (ref 1.7–2.3)
MCH RBC QN AUTO: 28.1 PG (ref 26.5–33)
MCHC RBC AUTO-ENTMCNC: 31.6 G/DL (ref 31.5–36.5)
MCV RBC AUTO: 89 FL (ref 78–100)
P AXIS - MUSE: NORMAL DEGREES
PLATELET # BLD AUTO: 228 10E3/UL (ref 150–450)
POTASSIUM SERPL-SCNC: 3.2 MMOL/L (ref 3.4–5.3)
PR INTERVAL - MUSE: NORMAL MS
PROT SERPL-MCNC: 5.4 G/DL (ref 6.4–8.3)
QRS DURATION - MUSE: 92 MS
QT - MUSE: 416 MS
QTC - MUSE: 468 MS
R AXIS - MUSE: 45 DEGREES
RBC # BLD AUTO: 2.88 10E6/UL (ref 3.8–5.2)
SODIUM SERPL-SCNC: 131 MMOL/L (ref 135–145)
SYSTOLIC BLOOD PRESSURE - MUSE: NORMAL MMHG
T AXIS - MUSE: 11 DEGREES
VENTRICULAR RATE- MUSE: 76 BPM
WBC # BLD AUTO: 13.6 10E3/UL (ref 4–11)

## 2024-10-19 PROCEDURE — 94640 AIRWAY INHALATION TREATMENT: CPT | Mod: 76

## 2024-10-19 PROCEDURE — 255N000002 HC RX 255 OP 636

## 2024-10-19 PROCEDURE — 94799 UNLISTED PULMONARY SVC/PX: CPT

## 2024-10-19 PROCEDURE — 93325 DOPPLER ECHO COLOR FLOW MAPG: CPT | Mod: 26 | Performed by: STUDENT IN AN ORGANIZED HEALTH CARE EDUCATION/TRAINING PROGRAM

## 2024-10-19 PROCEDURE — 999N000157 HC STATISTIC RCP TIME EA 10 MIN

## 2024-10-19 PROCEDURE — 82330 ASSAY OF CALCIUM: CPT

## 2024-10-19 PROCEDURE — 93321 DOPPLER ECHO F-UP/LMTD STD: CPT

## 2024-10-19 PROCEDURE — 250N000013 HC RX MED GY IP 250 OP 250 PS 637: Performed by: INTERNAL MEDICINE

## 2024-10-19 PROCEDURE — 250N000009 HC RX 250

## 2024-10-19 PROCEDURE — 85014 HEMATOCRIT: CPT

## 2024-10-19 PROCEDURE — 90935 HEMODIALYSIS ONE EVALUATION: CPT

## 2024-10-19 PROCEDURE — 250N000013 HC RX MED GY IP 250 OP 250 PS 637

## 2024-10-19 PROCEDURE — 93308 TTE F-UP OR LMTD: CPT | Mod: 26 | Performed by: STUDENT IN AN ORGANIZED HEALTH CARE EDUCATION/TRAINING PROGRAM

## 2024-10-19 PROCEDURE — 250N000013 HC RX MED GY IP 250 OP 250 PS 637: Performed by: SURGERY

## 2024-10-19 PROCEDURE — 93321 DOPPLER ECHO F-UP/LMTD STD: CPT | Mod: 26 | Performed by: STUDENT IN AN ORGANIZED HEALTH CARE EDUCATION/TRAINING PROGRAM

## 2024-10-19 PROCEDURE — 97530 THERAPEUTIC ACTIVITIES: CPT | Mod: GP | Performed by: PHYSICAL THERAPIST

## 2024-10-19 PROCEDURE — 85610 PROTHROMBIN TIME: CPT

## 2024-10-19 PROCEDURE — 210N000001 HC R&B IMCU HEART CARE

## 2024-10-19 PROCEDURE — 71046 X-RAY EXAM CHEST 2 VIEWS: CPT

## 2024-10-19 PROCEDURE — 80053 COMPREHEN METABOLIC PANEL: CPT

## 2024-10-19 PROCEDURE — 94660 CPAP INITIATION&MGMT: CPT

## 2024-10-19 PROCEDURE — 83605 ASSAY OF LACTIC ACID: CPT | Performed by: INTERNAL MEDICINE

## 2024-10-19 PROCEDURE — 83735 ASSAY OF MAGNESIUM: CPT

## 2024-10-19 RX ORDER — SIMETHICONE 80 MG
80 TABLET,CHEWABLE ORAL 4 TIMES DAILY PRN
Status: DISCONTINUED | OUTPATIENT
Start: 2024-10-19 | End: 2024-11-01 | Stop reason: HOSPADM

## 2024-10-19 RX ORDER — POTASSIUM CHLORIDE 1500 MG/1
20 TABLET, EXTENDED RELEASE ORAL ONCE
Status: COMPLETED | OUTPATIENT
Start: 2024-10-19 | End: 2024-10-19

## 2024-10-19 RX ORDER — POLYETHYLENE GLYCOL 3350 17 G/17G
17 POWDER, FOR SOLUTION ORAL DAILY
Status: DISCONTINUED | OUTPATIENT
Start: 2024-10-19 | End: 2024-11-01 | Stop reason: HOSPADM

## 2024-10-19 RX ORDER — OXYCODONE HYDROCHLORIDE 5 MG/1
5 TABLET ORAL EVERY 4 HOURS PRN
Status: DISCONTINUED | OUTPATIENT
Start: 2024-10-19 | End: 2024-10-19

## 2024-10-19 RX ORDER — ACETAMINOPHEN 500 MG
500 TABLET ORAL EVERY 6 HOURS
Status: DISCONTINUED | OUTPATIENT
Start: 2024-10-19 | End: 2024-10-28

## 2024-10-19 RX ADMIN — ACETAMINOPHEN 500 MG: 500 TABLET ORAL at 11:51

## 2024-10-19 RX ADMIN — SIMETHICONE 80 MG: 80 TABLET, CHEWABLE ORAL at 11:51

## 2024-10-19 RX ADMIN — POTASSIUM CHLORIDE 20 MEQ: 1500 TABLET, EXTENDED RELEASE ORAL at 15:33

## 2024-10-19 RX ADMIN — OXYCODONE HYDROCHLORIDE 2.5 MG: 5 TABLET ORAL at 03:55

## 2024-10-19 RX ADMIN — ATORVASTATIN CALCIUM 80 MG: 40 TABLET, FILM COATED ORAL at 20:23

## 2024-10-19 RX ADMIN — BUDESONIDE 1 MG: 0.5 INHALANT ORAL at 21:36

## 2024-10-19 RX ADMIN — OXYCODONE HYDROCHLORIDE 5 MG: 5 TABLET ORAL at 09:02

## 2024-10-19 RX ADMIN — Medication 1.5 MG: at 18:40

## 2024-10-19 RX ADMIN — LEVOTHYROXINE SODIUM 125 MCG: 0.03 TABLET ORAL at 06:50

## 2024-10-19 RX ADMIN — METOPROLOL TARTRATE 12.5 MG: 25 TABLET, FILM COATED ORAL at 03:55

## 2024-10-19 RX ADMIN — CLOPIDOGREL BISULFATE 75 MG: 75 TABLET ORAL at 11:47

## 2024-10-19 RX ADMIN — PERFLUTREN 2 ML: 6.52 INJECTION, SUSPENSION INTRAVENOUS at 11:26

## 2024-10-19 RX ADMIN — ACETAMINOPHEN 500 MG: 500 TABLET ORAL at 06:21

## 2024-10-19 RX ADMIN — Medication 125 MCG: at 11:49

## 2024-10-19 RX ADMIN — ACETAMINOPHEN 500 MG: 500 TABLET ORAL at 17:47

## 2024-10-19 RX ADMIN — Medication 1 TABLET: at 11:47

## 2024-10-19 RX ADMIN — IPRATROPIUM BROMIDE AND ALBUTEROL SULFATE 3 ML: .5; 3 SOLUTION RESPIRATORY (INHALATION) at 13:10

## 2024-10-19 RX ADMIN — PANTOPRAZOLE SODIUM 40 MG: 40 TABLET, DELAYED RELEASE ORAL at 09:02

## 2024-10-19 RX ADMIN — FLUDROCORTISONE ACETATE 0.1 MG: 0.1 TABLET ORAL at 11:50

## 2024-10-19 RX ADMIN — CETIRIZINE HYDROCHLORIDE 10 MG: 10 TABLET, FILM COATED ORAL at 11:49

## 2024-10-19 RX ADMIN — FLUTICASONE FUROATE AND VILANTEROL TRIFENATATE 1 PUFF: 100; 25 POWDER RESPIRATORY (INHALATION) at 21:54

## 2024-10-19 RX ADMIN — METOPROLOL TARTRATE 37.5 MG: 25 TABLET, FILM COATED ORAL at 20:23

## 2024-10-19 RX ADMIN — LIDOCAINE 2 PATCH: 4 PATCH TOPICAL at 17:48

## 2024-10-19 ASSESSMENT — ACTIVITIES OF DAILY LIVING (ADL)
ADLS_ACUITY_SCORE: 38
ADLS_ACUITY_SCORE: 38
ADLS_ACUITY_SCORE: 35
ADLS_ACUITY_SCORE: 38
ADLS_ACUITY_SCORE: 34
ADLS_ACUITY_SCORE: 38
ADLS_ACUITY_SCORE: 38
ADLS_ACUITY_SCORE: 35
ADLS_ACUITY_SCORE: 38
ADLS_ACUITY_SCORE: 34
ADLS_ACUITY_SCORE: 35
ADLS_ACUITY_SCORE: 38
ADLS_ACUITY_SCORE: 35
ADLS_ACUITY_SCORE: 35
ADLS_ACUITY_SCORE: 34
ADLS_ACUITY_SCORE: 35
ADLS_ACUITY_SCORE: 35
ADLS_ACUITY_SCORE: 34
ADLS_ACUITY_SCORE: 38
ADLS_ACUITY_SCORE: 35

## 2024-10-19 NOTE — PLAN OF CARE
Problem: Pain Acute  Goal: Optimal Pain Control and Function  Intervention: Develop Pain Management Plan  Recent Flowsheet Documentation  Taken 10/19/2024 1537 by Felicia Lam RN  Pain Management Interventions: declines  Taken 10/19/2024 0949 by Felicia Lam RN  Pain Management Interventions: declines  Taken 10/19/2024 0900 by Felicia Lam RN  Pain Management Interventions: medication (see MAR)  Intervention: Prevent or Manage Pain  Recent Flowsheet Documentation  Taken 10/19/2024 1540 by Felicia Lam RN  Sensory Stimulation Regulation: care clustered  Bowel Elimination Promotion:   adequate fluid intake promoted   ambulation promoted  Taken 10/19/2024 1136 by Felicia Lam RN  Sensory Stimulation Regulation: care clustered  Bowel Elimination Promotion:   adequate fluid intake promoted   ambulation promoted  Medication Review/Management: medications reviewed  Intervention: Optimize Psychosocial Wellbeing  Recent Flowsheet Documentation  Taken 10/19/2024 1540 by Felicia Lam RN  Supportive Measures: active listening utilized  Taken 10/19/2024 1136 by Felicia Lam RN  Supportive Measures: active listening utilized   Goal Outcome Evaluation:     Patient rating pain 5-6/10 with movement. Taking scheduled tylenol. Oxycodone this am x1.

## 2024-10-19 NOTE — PLAN OF CARE
Goal Outcome Evaluation     sc  Patient Name: Jory Ambrose   MRN: [id number]   Date of Admission: 10/8/2024    Procedure: Procedure(s):  CORONARY ARTERY BYPASS GRAFT TIMES FOUR, LEFT INTERNAL MAMMARY ARTERY HARVEST, RIGHT LEG ENDOSCOPIC VESSEL PROCUREMENT, EPIAORTIC ULTRASOUND,  MITRAL VALVE REAIR CONVERTED TO MITRAL VALVE REPLACEMENT  LEFT ATRIAL APPENDAGE LIGATION,  ANESTHESIA TRANSESOPHAGEAL ECHOCARDIOGRAM    Post Op day #: 10    Subjective (Patient focus/Primary Problem for shift): Manage pt's SOB and pain          Pain Goal 0 Pain Rating 7           Pain Medication/ Regime effective to reduce patient pain Yes    Objective (Physical assessment):           Rhythm: premature atrial contractions (PAC)            Bowel Activity: no if Yes indicate when:           Bowel Medications: yes            Incision: healing well          Incentive Spirometry Q 1-2 hour when awake:  yes           Epicardial Pacing Wires:  no            Patient Activity:           Up to chair for meals: yes          Ambulation with RN x2 (Not including CR): no            Is patient in home clothes:no             Chest Tubes   Pleural: no Draining: no               Suction: not applicable              Mediastinal: no Draining: not applicable               Suction: not applicable   Dressing Change Daily:not applicable If No, why? N/A                     Urinary Catheter: no           Preventative WOC consult (need MD order): yes, previously ordered for abdominal blisters      Assessment (Nursing primary shift focus): Manage patient pain and promote healing through ambulation    Plan (Patient Care Plan/focus): Continue to encourage ambulation and begin to wean patient off oxygen, while also managing SOB    Patient arrived to P3 at 1730 as an ICU transfer. A/Ox4, on 2L NC during day and CPAP @ night. Patient is assist x1 with gait belt. Given PRN oxycodone for pt complaints of incisional soreness. Incision cares performed and patient  cleaned this shift      Pt receives dialysis Tuesdays, Thursdays, and Saturdays.    See [facility] RN

## 2024-10-19 NOTE — SIGNIFICANT EVENT
Significant Event Note    Time of event: 11:07 PM October 19, 2024    Description of event:  Paged regarding chest pain    In short, patient is POD10 from multivessel CABG, MVR, left atrial appendage excision. Was requiring ICU bed until earlier today. CT surgery has been following daily.     Upon arrival, patient in significant distress. Complaining of left sided chest pain, that feels like it is squeezing in nature. She feels this is similar to the pains that she was having on a daily basis up on the ICU floor, which has been treated with oxycodone 5mg Q4h PRN. States the CV surgery team is aware of her chest pain for the last few days. However, this time it is associated with the feeling of inability to take a full breath/shortness of breath. Vitals WNL and reassuring. On exam, irregularly irregular rhythm. Has known atrial fibrillation. Lungs clear to auscultation. No increased O2 requirements, though of note is requiring baseline low O2 nasal cannula.       Assessment/Plan:  Complex patient/presentation given her recent multivessel CABG, valve replacement and appendage excision. I think this is likely continued post-op pains, especially since the CV surgery team has been aware of this pain and not felt the need for urgent management based on my discussion with the patient and chart review. However, considering the fact that these symptoms are now associated with shortness of breath, plan to get EKG and give additional 1 time dose 2.5mg Oxycodone as she has tolerated this medication throughout hospitalization, as well as STAT page CV surgery to inform them of the situation/ask for recs before making any further interventions. No troponin ordered as it will likely be elevated simply from her procedure, therefore unable to use clinically.    Addendum: Nursing relayed message from CV surgery, they are aware of the chest pains. Ok with EKG and oxycodone for management of the pain, which needs to be waited out.  Oxycodone 2.5mg did relieve pain from 10/10 to 4/10, patient feels improved. EKG with atrial fibrillation, no ST changes. No additional management planned overnight. Continue to monitor.     Discussed with: bedside nurse    John Pollard MD, PGY1  [facility]

## 2024-10-19 NOTE — PROGRESS NOTES
PRN neb requested before transport. Diminished BS bilaterally. Neb given. No adverse reactions noted. RT will follow.    Josh Cordero, RT

## 2024-10-19 NOTE — PROGRESS NOTES
'    [facility] ([facility]) progress note  CC: F/U ALEXEI  S: Hd earlier today. Tolerated alright. Creatinine trending up between HD runs. Noted 600 ml urine output yesterday. Up in chair. Fatigued after HD. Breathing up and down. Off oxygen Discussed with family     A/P:   Acute kidney injury.  Acute tubular necrosis.  Secondary to CABG and mitral valve replacement followed by cardiogenic shock.  Baseline creatinine 0.95 (10/8/2024).    Required CRRT, off 10/13  Off pressors as of 10/14  HD 10/15, 10/17, 10/19  May need outpatient HD on discharge if no renal recovery.   Daily labs, Strict Is/Os, Daily weights   Avoid nephrotoxins  Renally dose meds  Reassess for HD needs daily      2. Status post coronary artery bypass grafting x 4 vessels and mitral valve replacement on 10/8.  Diffuse coronary artery disease     3. Shock liver.  Continue supportive care.  LFTs downtrending.      4. History of Hodgkin's lymphoma.  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    Arnav Napoles DO  [facility]  [phone]      BP 91/61   Pulse 94   Temp 97  F (36.1  C) (Temporal)   Resp 21   Wt 112 kg (246 lb 14.4 oz)   LMP  (LMP Unknown)   SpO2 99%   BMI 42.38 kg/m      I/O last 3 completed shifts:  In: 860 [P.O.:830; I.V.:30]  Out: 600 [Urine:600]    Physical Exam:   GENERAL: NAD  EYES: EOMI   ENT: MMM  RESP: normal effort, RA  CV: regular rate, + leg/arm edema.    GI: ND  SKIN: No rash, pale       Recent Labs   Lab 10/19/24  0451 10/18/24  0508 10/17/24  0544 10/16/24  1834 10/16/24  0435 10/15/24  1859 10/15/24  0648 10/14/24  1821 10/14/24  0345 10/13/24  1536 10/13/24  1201   * 132* 132* 131* 131* 134* 129*   < > 133* 135 134*   POTASSIUM 3.2* 3.5 3.0* 2.9* 3.2* 3.2* 4.0   < > 4.0 4.1 4.1   CHLORIDE 95* 97* 95* 95* 97* 101 95*   < > 99 100 102   CO2 23 24 22 22 24 26 18*   < > 21* 19* 21*   BUN 50.1* 37.6* 53.2* 49.3* 41.1* 25.0* 54.1*   < >  37.7* 27.0* 22.2*   CR 3.35* 2.85* 3.53* 3.24* 2.80* 1.74* 3.40*   < > 2.49* 1.85* 1.51*   GFRESTIMATED 16* 20* 15* 17* 20* 35* 16*   < > 23* 33* 42*   CINDY 8.1* 8.2* 8.0* 8.0* 7.9* 7.6* 7.6*   < > 7.5* 7.9* 7.3*   PHOS  --  3.5 4.0  --  3.1  --  4.7*  --  4.7* 5.1* 4.4   MAG 2.0 2.0 2.4*  --  2.4*  --  3.1*  --  3.0*  --  2.7*   ALBUMIN 2.9* 2.8* 2.9* 2.8* 2.6* 2.6* 2.8*   < > 3.0* 3.3* 3.1*    < > = values in this interval not displayed.     Recent Labs   Lab 10/19/24  0451 10/18/24  0508 10/17/24  0544 10/16/24  1156 10/16/24  0435 10/15/24  0648 10/14/24  0758 10/13/24  1201   WBC 13.6* 13.4* 15.3*  --  19.5* 28.0*  27.9* 23.8* 16.3*   HGB 8.1* 8.3* 8.6* 8.8* 6.8* 7.5* 8.0* 8.5*   HCT 25.6* 25.5* 26.5*  --  21.0* 23.2* 24.8* 25.8*   MCV 89 88 87  --  91 91 90 89    182 157  --  123* 133* 105* 98*       Current Facility-Administered Medications:     acetaminophen (TYLENOL) tablet 500 mg, 500 mg, Oral, Q6H, Catherine Galvez PA-C, 500 mg at 10/19/24 1151    albuterol (PROVENTIL HFA/VENTOLIN HFA) inhaler, 2 puff, Inhalation, Q6H PRN, Catherine Galvez PA-C    atorvastatin (LIPITOR) tablet 80 mg, 80 mg, Oral, At Bedtime, Catherine Galvez PA-KENIA, 80 mg at 10/18/24 2121    bisacodyl (DULCOLAX) suppository 10 mg, 10 mg, Rectal, Daily PRN, Catherine Galvez PA-C    budesonide (PULMICORT) neb solution 1 mg, 1 mg, Nebulization, BID, Catherine Galvez PA-C, 1 mg at 10/18/24 2039    cetirizine (zyrTEC) tablet 10 mg, 10 mg, Oral, Daily, Catherine Galvez PA-C, 10 mg at 10/19/24 1149    clopidogrel (PLAVIX) tablet 75 mg, 75 mg, Oral, Daily, Catherine Galvez PA-C, 75 mg at 10/19/24 1147    glucose gel 15-30 g, 15-30 g, Oral, Q15 Min PRN **OR** dextrose 50 % injection 25-50 mL, 25-50 mL, Intravenous, Q15 Min PRN, 50 mL at 10/14/24 0806 **OR** glucagon injection 1 mg, 1 mg, Subcutaneous, Q15 Min PRN, Catherine Galvez PA-C    fludrocortisone (FLORINEF) tablet 0.1 mg, 0.1 mg, Oral, Daily,  Catherine Galvez PA-C, 0.1 mg at 10/19/24 1150    fluticasone (FLONASE) 50 MCG/ACT spray 2 spray, 2 spray, Both Nostrils, Daily PRN, Catherine Galvez PA-C    fluticasone-vilanterol (BREO ELLIPTA) 100-25 MCG/ACT inhaler 1 puff, 1 puff, Inhalation, At Bedtime, Catherine Galvez PA-C, 1 puff at 10/18/24 2122    [COMPLETED] sodium chloride 0.9% DIALYSIS Cath LOCK - RED Lumen, 10 mL, Intracatheter, Once in dialysis/CRRT, 10 mL at 10/17/24 1527 **FOLLOWED BY** heparin 1000 unit/mL DIALYSIS Cath LOCK - RED Lumen, 1.3-2.6 mL, Intracatheter, Q1H PRN, Rohan Polo MD, 1,300 Units at 10/17/24 1628    [COMPLETED] sodium chloride 0.9% DIALYSIS Cath LOCK - BLUE Lumen, 10 mL, Intracatheter, Once in dialysis/CRRT, 10 mL at 10/17/24 1526 **FOLLOWED BY** heparin 1000 unit/mL DIALYSIS Cath LOCK -BLUE Lumen, 1.3-2.6 mL, Intracatheter, Q1H PRN, Rohan Polo MD    heparin lock flush 10 unit/mL injection 5-20 mL, 5-20 mL, Intracatheter, Q24H, Catherine Galvez PA-C, 5 mL at 10/13/24 2133    heparin lock flush 10 unit/mL injection 5-20 mL, 5-20 mL, Intracatheter, Q1H PRN, Catherine Galvez PA-C    hydrALAZINE (APRESOLINE) injection 10 mg, 10 mg, Intravenous, Q30 Min PRN, Catherine Galvez PA-C    hydrOXYzine HCl (ATARAX) tablet 25 mg, 25 mg, Oral, TID PRN, Catherine Galvez PA-C    ipratropium - albuterol 0.5 mg/2.5 mg/3 mL (DUONEB) neb solution 3 mL, 3 mL, Nebulization, Q4H PRN, Catherine Galvez PA-C, 3 mL at 10/19/24 1310    levothyroxine (SYNTHROID/LEVOTHROID) tablet 125 mcg, 125 mcg, Oral, QAM AC, Catherine Galvez PA-C, 125 mcg at 10/19/24 0650    Lidocaine (LIDOCARE) 4 % Patch 1-2 patch, 1-2 patch, Transdermal, Q24H, Catherine Galvez PA-C, 1 patch at 10/18/24 1701    metoprolol tartrate (LOPRESSOR) half-tab 37.5 mg, 37.5 mg, Oral, BID, Catherine Galvez PA-C, 37.5 mg at 10/18/24 2122    multivitamin w/minerals (THERA-VIT-M) tablet 1 tablet, 1 tablet, Oral, Daily,  Catherine Galvez PA-C, 1 tablet at 10/19/24 1147    naloxone (NARCAN) injection 0.2 mg, 0.2 mg, Intravenous, Q2 Min PRN **OR** naloxone (NARCAN) injection 0.4 mg, 0.4 mg, Intravenous, Q2 Min PRN **OR** naloxone (NARCAN) injection 0.2 mg, 0.2 mg, Intramuscular, Q2 Min PRN **OR** naloxone (NARCAN) injection 0.4 mg, 0.4 mg, Intramuscular, Q2 Min PRN, Catherine Galvez PA-C    No heparin via hemodialysis machine, , Does not apply, Once, Rohan Polo MD    ondansetron (ZOFRAN ODT) ODT tab 4 mg, 4 mg, Oral, Q6H PRN **OR** ondansetron (ZOFRAN) injection 4 mg, 4 mg, Intravenous, Q6H PRN, Catherine Galvez PA-C, 4 mg at 10/10/24 1945    oxyCODONE IR (ROXICODONE) half-tab 2.5 mg, 2.5 mg, Oral, Q4H PRN **OR** [DISCONTINUED] oxyCODONE (ROXICODONE) tablet 5 mg, 5 mg, Oral, Q4H PRN, David Wade MD, 5 mg at 10/19/24 0902    [DISCONTINUED] pantoprazole (PROTONIX) 2 mg/mL suspension 40 mg, 40 mg, Oral or NG Tube, Daily, 40 mg at 10/08/24 1729 **OR** pantoprazole (PROTONIX) EC tablet 40 mg, 40 mg, Oral, Daily, Catherine Galvez PA-C, 40 mg at 10/19/24 0902    polyethylene glycol (MIRALAX) Packet 17 g, 17 g, Oral, Daily, Catherine Galvez PA-C    prochlorperazine (COMPAZINE) injection 10 mg, 10 mg, Intravenous, Q6H PRN **OR** prochlorperazine (COMPAZINE) tablet 10 mg, 10 mg, Oral, Q6H PRN, Catherine Galvez PA-C    senna-docusate (SENOKOT-S/PERICOLACE) 8.6-50 MG per tablet 1 tablet, 1 tablet, Oral, BID, Catherine Galvez PA-C, 1 tablet at 10/18/24 2121    simethicone (MYLICON) chewable tablet 80 mg, 80 mg, Oral, 4x Daily PRN, David Wade MD, 80 mg at 10/19/24 1151    sodium chloride (PF) 0.9% PF flush 10-20 mL, 10-20 mL, Intracatheter, q1 min prn, Catherine Galvez PA-C    sodium chloride (PF) 0.9% PF flush 10-40 mL, 10-40 mL, Intracatheter, Q8H, Catherine Galvez PA-C, 30 mL at 10/19/24 0445    sodium chloride (PF) 0.9% PF flush 10-40 mL, 10-40 mL,  Intracatheter, Q7 Days, Catherine Galvez PA-C, 10 mL at 10/18/24 1703    sodium chloride (PF) 0.9% PF flush 10-40 mL, 10-40 mL, Intracatheter, Q1H PRN, Catherine Galvez PA-C    sodium chloride 0.9% BOLUS 100-150 mL, 100-150 mL, Intravenous, Q15 Min PRN, Rohan Polo MD    sodium chloride 0.9% BOLUS 100-150 mL, 100-150 mL, Intravenous, Q15 Min PRN, Rohan Polo MD    Vitamin D3 (CHOLECALCIFEROL) tablet 125 mcg, 125 mcg, Oral, Daily, Catherine Galvez PA-C, 125 mcg at 10/19/24 1149    warfarin ANTICOAGULANT (COUMADIN) half-tab 1.5 mg, 1.5 mg, Oral, ONCE at 18:00, David Wade MD    Warfarin Dose Required Daily - Pharmacist Managed, 1 each, Oral, See Admin Instructions, Catherine Galvez PA-C    Labs personally reviewed today during this evaluation at 1130am.

## 2024-10-19 NOTE — PROGRESS NOTES
Care Management Follow Up    Length of Stay (days): 11    Expected Discharge Date: 10/22/2024    Anticipated Discharge Plan:  TBD     Transportation: TBD    PT Recommendations: Transitional Care Facility  OT Recommendations:  Transitional Care Facility     Barriers to Discharge: medical stability    Prior Living Situation: apartment with spouse    Advanced Directive on File:  (has forms, needs to complete)     Patient/Spokesperson Updated: Yes. Who? Pt    Additional Information:  SW met with Pt at [facility] to follow up on TCU recommendation. Pt reports she needs more time to think about it given the pain she was experiencing yesterday. SW explained TCU and home care and answered Pt's questions. SW provided TCU list to Pt. SW to follow up with Pt regarding discharge plan.    KAELYN HackettW

## 2024-10-19 NOTE — PLAN OF CARE
Goal Outcome Evaluation:         sc  Patient Name: Jory Ambrose   MRN: [id number]   Date of Admission: 10/8/2024    Procedure: Procedure(s):  CORONARY ARTERY BYPASS GRAFT TIMES FOUR, LEFT INTERNAL MAMMARY ARTERY HARVEST, RIGHT LEG ENDOSCOPIC VESSEL PROCUREMENT, EPIAORTIC ULTRASOUND,  MITRAL VALVE REAIR CONVERTED TO MITRAL VALVE REPLACEMENT  LEFT ATRIAL APPENDAGE LIGATION,  ANESTHESIA TRANSESOPHAGEAL ECHOCARDIOGRAM    Post Op day #:11    Subjective (Patient focus/Primary Problem for shift): chest pain          Pain Goal 5 Pain [id number]           Pain Medication/ Regime effective to reduce patient pain prn oxycodone, idocaine patches    Objective (Physical assessment):           Rhythm: atrial fibrillation            Bowel Activity: yes if Yes indicate when: 10/18          Bowel Medications: yes            Incision: healing well          Incentive Spirometry Q 1-2 hour when awake:  yes Volume: 1000          Epicardial Pacing Wires:  no            Patient Activity:           Up to chair for meals: yes          Ambulation with RN x2 (Not including CR): not applicable            Is patient in home clothes:no             Chest Tubes   Pleural: no Draining: not applicable               Suction: not applicable              Mediastinal: no Draining: not applicable               Suction: not applicable   Dressing Change Daily:yes If No, why?n/a                     Urinary Catheter: no           Preventative WOC consult (need MD order): yes WOC nurse already following      Assessment (Nursing primary shift focus): Patient is alert and oriented.  She woke with severe hest pain at 2300, house officer came to assess, ordered EKG, and one time dose of oxycodone, then instructed staff to update CT surgery, as they were following patient.  CT surgery was in agreement with house officer plan, no further orders were necessary, told staff to update house officer about EKG results when they returned and they could speak to  house officer over night regarding issues with patient.  The one time dose of oxycodone was effective in decreasing pain to baseline, and chest pain remained at baseline for rest of shift.  Staff offered prn atarax for anxiety, but patient said she is already on so many medications she is hesitant to add more unless they are really necessary.  Anxiety was relieved when chest pain decreased.  Staff grouped cares to promote rest.  Labs drawn from PICC, sepsis protocol triggered, possibly due to respiratory rate at midnight when in pain as well as past WBC level, and lactic drawn, results within normal limits.  Vital signs stable.  SCDs on and patient reported incentive spirometer up to 1000, though it was not witnessed by nurse.        Plan (Patient Care Plan/focus): Get patient up in recliner and get weight at 0700.  Plan for shower today, if possible do shower before dialysis.  Encourage activity, and patient doing incentive spirometer.      Jennifer Nelson RN   10/19/2024   5:36 AM

## 2024-10-19 NOTE — PLAN OF CARE
.sc  Patient Name: Jory Ambrose   MRN: [id number]   Date of Admission: 10/8/2024    Procedure: Procedure(s):  CORONARY ARTERY BYPASS GRAFT TIMES FOUR, LEFT INTERNAL MAMMARY ARTERY HARVEST, RIGHT LEG ENDOSCOPIC VESSEL PROCUREMENT, EPIAORTIC ULTRASOUND,  MITRAL VALVE REAIR CONVERTED TO MITRAL VALVE REPLACEMENT  LEFT ATRIAL APPENDAGE LIGATION,  ANESTHESIA TRANSESOPHAGEAL ECHOCARDIOGRAM    Post Op day #:12    Subjective (Patient focus/Primary Problem for shift): Increase patients activity. Wean off 02. Control pain with less narcotics.          Pain Goal 3 Pain Rating 6           Pain Medication/ Regime effective to reduce patient pain Tylenol Scheduled, Lidocaine patches.     Objective (Physical assessment):           Rhythm: atrial fibrillation            Bowel Activity: no if Yes indicate when: patient state 10/17 last documented 10/16 patient refused stool softners explained why we give them. Patient still declined will attempt again this zehra          Bowel Medications: patient refusal            Incision: healing well          Incentive Spirometry Q 1-2 hour when awake:  yes Volume: [id number]          Epicardial Pacing Wires:  no            Patient Activity:           Up to chair for meals: yes          Ambulation with RN x2 (Not including CR): no. Attempted to walk patient from chair to bathroom. Patient complained of dizziness and shortness of breath could not walk to bathroom from chair O2 was 97 % on RA patient did appear SOB transferring from chair to bed. Was rolled in on shower chair to shower.           Is patient in home clothes:no             Chest Tubes   Pleural: not applicable Draining: not applicable               Suction: not applicable              Mediastinal: not applicable Draining: not applicable               Suction: not applicable   Dressing Change Daily:yes If No, why?  Removed dressing after shower ROBERT                     Urinary Catheter: no           Preventative WOC consult  (need MD order): no       Assessment (Nursing primary shift focus): Patient very fatigued, unable to complete much activity. Did become SOB with shower 02 did drop to 83-86 % on RA after shower but rebounded after resting. Rating pain a 5/10 but ok with tylenol.     Plan (Patient Care Plan/focus): Increase activity tolerance. Keep off 02       Felicia Lam RN   10/19/2024   3:56 PM

## 2024-10-19 NOTE — PROGRESS NOTES
Potassium   Date Value Ref Range Status   10/19/2024 3.2 (L) 3.4 - 5.3 mmol/L Final   06/09/2015 3.8 3.4 - 5.3 mmol/L Final     Potassium POCT   Date Value Ref Range Status   10/08/2024 4.4 3.4 - 5.3 mmol/L Final     Hemoglobin   Date Value Ref Range Status   10/19/2024 8.1 (L) 11.7 - 15.7 g/dL Final   06/09/2015 13.5 11.7 - 15.7 g/dL Final     Creatinine   Date Value Ref Range Status   10/19/2024 3.35 (H) 0.51 - 0.95 mg/dL Final   06/09/2015 0.80 0.52 - 1.04 mg/dL Final     Urea Nitrogen   Date Value Ref Range Status   10/19/2024 50.1 (H) 6.0 - 20.0 mg/dL Final   06/09/2015 20 7 - 30 mg/dL Final     Sodium   Date Value Ref Range Status   10/19/2024 131 (L) 135 - 145 mmol/L Final   06/09/2015 142 133 - 144 mmol/L Final     INR   Date Value Ref Range Status   10/19/2024 2.63 (H) 0.85 - 1.15 Final   05/06/2014 0.92 0.86 - 1.14 Final       DIALYSIS PROCEDURE NOTE  Hepatitis status of previous patient on machine log was checked and verified ok to use with this patients hepatitis status.  Patient dialyzed for 3 hrs. on a K4 bath with a net fluid removal of  2.8L.  A BFR of 350 ml/min was obtained via a CVC       The treatment plan was discussed with Dr. Napoles during the treatment.    Total heparin received during the treatment: 0 units.      Line flushed, clamped and capped with heparin 1:1000    Meds  given: none   Complications: cramping at the end of her run.  Off 5 minutes early      Person educated: patient. Knowledge base fair. Barriers to learning: none. Educated on procedure via verbal mode.      ICEBOAT? Timeout performed pre-treatment  I: Patient was identified using 2 identifiers  C:  Consent Signed Yes  E: Equipment preventative maintenance is current and dialysis delivery system OK to use  B: Hepatitis B Surface Antigen: negative; Draw Date: 10/15/24      Hepatitis B Surface Antibody: UNK;   O: Dialysis orders present and complete prior to treatment  A: Vascular access verified and assessed prior to  treatment  T: Treatment was performed at a clinically appropriate time  ?: Patient was allowed to ask questions and address concerns prior to treatment  See Adult Hemodialysis flowsheet in EPIC for further details and post assessment.  Machine water alarm in place and functioning. Transducer pods intact and checked every 15min.   Pt returned via bed.  Chlorine/Chloramine water system checked every 4 hours.  Outpatient Dialysis at [facility]    Patient repositioned every 2 hours during the treatment.  Post treatment report given to Felicia Lam RN regarding 2.8L of fluid removed, last BP of 126/58

## 2024-10-20 ENCOUNTER — APPOINTMENT (OUTPATIENT)
Dept: PHYSICAL THERAPY | Facility: HOSPITAL | Age: 49
DRG: 219 | End: 2024-10-20
Attending: SURGERY
Payer: COMMERCIAL

## 2024-10-20 ENCOUNTER — APPOINTMENT (OUTPATIENT)
Dept: OCCUPATIONAL THERAPY | Facility: HOSPITAL | Age: 49
DRG: 219 | End: 2024-10-20
Attending: SURGERY
Payer: COMMERCIAL

## 2024-10-20 LAB
ANION GAP SERPL CALCULATED.3IONS-SCNC: 13 MMOL/L (ref 7–15)
BUN SERPL-MCNC: 42 MG/DL (ref 6–20)
CA-I BLD-MCNC: 4.4 MG/DL (ref 4.4–5.2)
CALCIUM SERPL-MCNC: 8.1 MG/DL (ref 8.8–10.4)
CHLORIDE SERPL-SCNC: 99 MMOL/L (ref 98–107)
CREAT SERPL-MCNC: 2.77 MG/DL (ref 0.51–0.95)
EGFRCR SERPLBLD CKD-EPI 2021: 20 ML/MIN/1.73M2
ERYTHROCYTE [DISTWIDTH] IN BLOOD BY AUTOMATED COUNT: 18.5 % (ref 10–15)
GLUCOSE BLDC GLUCOMTR-MCNC: 72 MG/DL (ref 70–99)
GLUCOSE BLDC GLUCOMTR-MCNC: 89 MG/DL (ref 70–99)
GLUCOSE SERPL-MCNC: 100 MG/DL (ref 70–99)
HCO3 SERPL-SCNC: 22 MMOL/L (ref 22–29)
HCT VFR BLD AUTO: 24.3 % (ref 35–47)
HGB BLD-MCNC: 7.7 G/DL (ref 11.7–15.7)
INR PPP: 2.8 (ref 0.85–1.15)
MAGNESIUM SERPL-MCNC: 1.8 MG/DL (ref 1.7–2.3)
MCH RBC QN AUTO: 28.1 PG (ref 26.5–33)
MCHC RBC AUTO-ENTMCNC: 31.7 G/DL (ref 31.5–36.5)
MCV RBC AUTO: 89 FL (ref 78–100)
PLATELET # BLD AUTO: 246 10E3/UL (ref 150–450)
POTASSIUM SERPL-SCNC: 3.4 MMOL/L (ref 3.4–5.3)
RBC # BLD AUTO: 2.74 10E6/UL (ref 3.8–5.2)
SODIUM SERPL-SCNC: 134 MMOL/L (ref 135–145)
WBC # BLD AUTO: 13.1 10E3/UL (ref 4–11)

## 2024-10-20 PROCEDURE — 250N000013 HC RX MED GY IP 250 OP 250 PS 637: Performed by: SURGERY

## 2024-10-20 PROCEDURE — 80048 BASIC METABOLIC PNL TOTAL CA: CPT | Performed by: INTERNAL MEDICINE

## 2024-10-20 PROCEDURE — 250N000013 HC RX MED GY IP 250 OP 250 PS 637

## 2024-10-20 PROCEDURE — 97530 THERAPEUTIC ACTIVITIES: CPT | Mod: GP | Performed by: PHYSICAL THERAPIST

## 2024-10-20 PROCEDURE — 85027 COMPLETE CBC AUTOMATED: CPT

## 2024-10-20 PROCEDURE — 83735 ASSAY OF MAGNESIUM: CPT

## 2024-10-20 PROCEDURE — 250N000011 HC RX IP 250 OP 636

## 2024-10-20 PROCEDURE — 999N000157 HC STATISTIC RCP TIME EA 10 MIN

## 2024-10-20 PROCEDURE — 94640 AIRWAY INHALATION TREATMENT: CPT | Mod: 76

## 2024-10-20 PROCEDURE — 94660 CPAP INITIATION&MGMT: CPT

## 2024-10-20 PROCEDURE — 94799 UNLISTED PULMONARY SVC/PX: CPT

## 2024-10-20 PROCEDURE — 97110 THERAPEUTIC EXERCISES: CPT | Mod: GO

## 2024-10-20 PROCEDURE — 97110 THERAPEUTIC EXERCISES: CPT | Mod: GP | Performed by: PHYSICAL THERAPIST

## 2024-10-20 PROCEDURE — 250N000009 HC RX 250

## 2024-10-20 PROCEDURE — 999N000156 HC STATISTIC RCP CONSULT EA 30 MIN

## 2024-10-20 PROCEDURE — 210N000001 HC R&B IMCU HEART CARE

## 2024-10-20 PROCEDURE — 82330 ASSAY OF CALCIUM: CPT

## 2024-10-20 PROCEDURE — 94640 AIRWAY INHALATION TREATMENT: CPT

## 2024-10-20 PROCEDURE — 85610 PROTHROMBIN TIME: CPT

## 2024-10-20 RX ORDER — WARFARIN SODIUM 1 MG/1
1 TABLET ORAL
Status: COMPLETED | OUTPATIENT
Start: 2024-10-20 | End: 2024-10-20

## 2024-10-20 RX ADMIN — WARFARIN SODIUM 1 MG: 1 TABLET ORAL at 17:40

## 2024-10-20 RX ADMIN — LIDOCAINE 2 PATCH: 4 PATCH TOPICAL at 16:42

## 2024-10-20 RX ADMIN — HYDROXYZINE HYDROCHLORIDE 25 MG: 25 TABLET, FILM COATED ORAL at 16:42

## 2024-10-20 RX ADMIN — ATORVASTATIN CALCIUM 80 MG: 40 TABLET, FILM COATED ORAL at 19:56

## 2024-10-20 RX ADMIN — LEVOTHYROXINE SODIUM 125 MCG: 0.03 TABLET ORAL at 06:32

## 2024-10-20 RX ADMIN — FLUDROCORTISONE ACETATE 0.1 MG: 0.1 TABLET ORAL at 08:21

## 2024-10-20 RX ADMIN — Medication 1 TABLET: at 13:13

## 2024-10-20 RX ADMIN — BUDESONIDE 1 MG: 0.5 INHALANT ORAL at 07:47

## 2024-10-20 RX ADMIN — ACETAMINOPHEN 500 MG: 500 TABLET ORAL at 06:32

## 2024-10-20 RX ADMIN — METOPROLOL TARTRATE 37.5 MG: 25 TABLET, FILM COATED ORAL at 19:56

## 2024-10-20 RX ADMIN — Medication 125 MCG: at 08:20

## 2024-10-20 RX ADMIN — ACETAMINOPHEN 500 MG: 500 TABLET ORAL at 12:04

## 2024-10-20 RX ADMIN — BUDESONIDE 1 MG: 0.5 INHALANT ORAL at 18:54

## 2024-10-20 RX ADMIN — CLOPIDOGREL BISULFATE 75 MG: 75 TABLET ORAL at 08:19

## 2024-10-20 RX ADMIN — PANTOPRAZOLE SODIUM 40 MG: 40 TABLET, DELAYED RELEASE ORAL at 08:19

## 2024-10-20 RX ADMIN — CETIRIZINE HYDROCHLORIDE 10 MG: 10 TABLET, FILM COATED ORAL at 08:20

## 2024-10-20 RX ADMIN — METOPROLOL TARTRATE 37.5 MG: 25 TABLET, FILM COATED ORAL at 09:34

## 2024-10-20 RX ADMIN — SIMETHICONE 80 MG: 80 TABLET, CHEWABLE ORAL at 10:46

## 2024-10-20 RX ADMIN — ACETAMINOPHEN 500 MG: 500 TABLET ORAL at 17:40

## 2024-10-20 RX ADMIN — HEPARIN, PORCINE (PF) 10 UNIT/ML INTRAVENOUS SYRINGE 15 ML: at 19:57

## 2024-10-20 ASSESSMENT — ACTIVITIES OF DAILY LIVING (ADL)
ADLS_ACUITY_SCORE: 30
ADLS_ACUITY_SCORE: 34
ADLS_ACUITY_SCORE: 30
ADLS_ACUITY_SCORE: 30
ADLS_ACUITY_SCORE: 38
ADLS_ACUITY_SCORE: 30
ADLS_ACUITY_SCORE: 36
ADLS_ACUITY_SCORE: 30
ADLS_ACUITY_SCORE: 30
ADLS_ACUITY_SCORE: 38
ADLS_ACUITY_SCORE: 36
ADLS_ACUITY_SCORE: 36
ADLS_ACUITY_SCORE: 30
ADLS_ACUITY_SCORE: 30
ADLS_ACUITY_SCORE: 36
ADLS_ACUITY_SCORE: 38
ADLS_ACUITY_SCORE: 38
ADLS_ACUITY_SCORE: 36
ADLS_ACUITY_SCORE: 38
ADLS_ACUITY_SCORE: 38

## 2024-10-20 NOTE — PLAN OF CARE
Problem: Cardiovascular Surgery  Goal: Improved Activity Tolerance  Outcome: Progressing  Intervention: Optimize Tolerance for Activity  Recent Flowsheet Documentation  Taken 10/20/2024 1508 by Felicia Lam RN  Environmental Support: calm environment promoted  Taken 10/20/2024 1256 by Felicia Lam RN  Environmental Support: calm environment promoted  Taken 10/20/2024 0814 by Felicia Lam RN  Environmental Support: calm environment promoted  Goal: Optimal Coping with Heart Surgery  Outcome: Progressing  Intervention: Support Psychosocial Response to Surgery  Recent Flowsheet Documentation  Taken 10/20/2024 1508 by Felicia Lam RN  Supportive Measures: active listening utilized  Taken 10/20/2024 1256 by Felicia Lam RN  Supportive Measures: active listening utilized  Taken 10/20/2024 0814 by Felicia Lam RN  Supportive Measures: active listening utilized  Goal: Absence of Bleeding  Outcome: Progressing  Goal: Effective Bowel Elimination  Outcome: Progressing  Goal: Effective Cardiac Function  Outcome: Progressing  Goal: Optimal Cerebral Tissue Perfusion  Outcome: Progressing  Intervention: Protect and Optimize Cerebral Perfusion  Recent Flowsheet Documentation  Taken 10/20/2024 1508 by Felicia Lam RN  Sensory Stimulation Regulation: care clustered  Taken 10/20/2024 1256 by Felicia Lam RN  Sensory Stimulation Regulation: care clustered  Taken 10/20/2024 1110 by Felicia Lam RN  Head of Bed (HOB) Positioning: HOB at 30-45 degrees  Taken 10/20/2024 0923 by Felicia Lam RN  Head of Bed (HOB) Positioning: HOB at 45 degrees  Taken 10/20/2024 0814 by Felicia Lam, RN  Sensory Stimulation Regulation: care clustered  Goal: Fluid and Electrolyte Balance  Outcome: Progressing  Goal: Blood Glucose Level Within Targeted Range  Outcome: Progressing  Goal: Anesthesia/Sedation Recovery  Outcome: Progressing  Intervention: Optimize Anesthesia Recovery  Recent Flowsheet  Documentation  Taken 10/20/2024 1508 by Felicia Lam, RN  Safety Promotion/Fall Prevention:   activity supervised   assistive device/personal items within reach   clutter free environment maintained   nonskid shoes/slippers when out of bed   patient and family education   mobility aid in reach   room organization consistent   safety round/check completed   supervised activity  Reorientation Measures: clock in view  Taken 10/20/2024 1256 by Felicia Lam, RN  Safety Promotion/Fall Prevention:   activity supervised   assistive device/personal items within reach   clutter free environment maintained   nonskid shoes/slippers when out of bed   patient and family education   mobility aid in reach   room organization consistent   safety round/check completed   supervised activity  Reorientation Measures: clock in view  Taken 10/20/2024 0814 by Felicia Lam, RN  Safety Promotion/Fall Prevention:   activity supervised   assistive device/personal items within reach   clutter free environment maintained   nonskid shoes/slippers when out of bed   patient and family education   mobility aid in reach   room organization consistent   safety round/check completed   supervised activity  Reorientation Measures: clock in view  Goal: Acceptable Pain Control  Outcome: Progressing  Intervention: Prevent or Manage Pain  Recent Flowsheet Documentation  Taken 10/20/2024 1740 by Felicia Lam, RN  Pain Management Interventions: medication (see MAR)  Taken 10/20/2024 1301 by Felicia Lam, RN  Pain Management Interventions: distraction  Goal: Nausea and Vomiting Relief  Outcome: Progressing  Goal: Effective Urinary Elimination  Outcome: Progressing   Goal Outcome Evaluation:    Patient overall pain better controlled today, took scheduled Tylenol and hydroxyzine x1 for chest pain 6/10 after ambulation. Also complained of calf pain Right >left. Slightly firmer on right but overall edematous patient on warfarin. Patient still  quite SOB after activity does get a bit cyanotic around lips and finger tips but 02 > 93%. Bm x1 today. Walked on hallway x1. Walked to bathroom today vs commode transfer yesterday.

## 2024-10-20 NOTE — TREATMENT PLAN
RCAT Treatment Plan    Patient Score: 7   Patient Acuity: 4    Clinical Indication for Therapy: home regimen, prevent atelectasis, and unable to deep breath    Therapy Ordered: albuterol inhaler PRN, pulmicort neb BID, breo ellipta at bedtime, duoneb Q4 PRN     Assessment Summary: Patient s/p CABGx4 on 10/8/24. Patient is shallow breathing with normal respiratory rate at rest. No supplemental oxygen needed this morning. BBS diminished. Cough is effective and dry. Patient is receiving her pulmicort neb BID per home regimen. Due to low lung volumes on CXR, will continue Ezpap BID for lung volume expansion and change flutter valve to as needed. RT to re-evaluate within 72 hours.    Adrienne Guevara, RT  10/20/2024

## 2024-10-20 NOTE — PROGRESS NOTES
"CVTS Daily Progress Note  POD#12 s/p CABG x4 (LIMA>LAD, rSVG>RPDA, rSVG>LPL, rSVG>D2), RLE EVH, Attempted MVR/r, MVR ( 29 mm [employer] Mechanical Mitral Valve), LAAE  Attending: Mauro  LOS: 12    SUBJECTIVE/INTERVAL EVENTS:    No acute events overnight. Paroxysmal a fib, largely rate controlled. UOP continues to  but still not proportional to hypervolemia, Cr variable. Tolerating HD well (10/15,17, 19). LFTs downtrending. Maintaining oxygen saturations on home CPAP/RA. Normotensive. Ambulating w/ therapy as able - improved motivation past 24 hours, showered yesterday. Pain well controlled. +BM. Tolerating regular diet, PO intake and appetite improving. INR therapeutic, heparin gtt stopped 10/15. Patient denies new chest pain, shortness of breath, abdominal pain, calf pain, nausea. Still feels puffy but reports improvement. Patient has no questions today.    OBJECTIVE:  Temp:  [98  F (36.7  C)-98.7  F (37.1  C)] 98.7  F (37.1  C)  Pulse:  [] 93  Resp:  [18-20] 18  BP: ()/(51-71) 110/66  SpO2:  [86 %-100 %] 98 %  Vitals:    10/16/24 0500 10/17/24 0600 10/18/24 1100 10/19/24 0700   Weight: 112.8 kg (248 lb 9.6 oz) 114.2 kg (251 lb 11.2 oz) 111.6 kg (246 lb) 112 kg (246 lb 14.4 oz)    10/20/24 0632   Weight: 108.5 kg (239 lb 4.8 oz)       Clinically Significant Risk Factors        # Hypokalemia: Lowest K = 3.2 mmol/L in last 2 days, will replace as needed  # Hyponatremia: Lowest Na = 131 mmol/L in last 2 days, will monitor as appropriate  # Hypochloremia: Lowest Cl = 95 mmol/L in last 2 days, will monitor as appropriate      # Hypoalbuminemia: Lowest albumin = 2.6 g/dL at 10/16/2024  4:35 AM, will monitor as appropriate       # Hypertension: Noted on problem list           # Severe Obesity: Estimated body mass index is 41.08 kg/m  as calculated from the following:    Height as of 9/25/24: 1.626 m (5' 4\").    Weight as of this encounter: 108.5 kg (239 lb 4.8 oz).   # Moderate Malnutrition: based on " nutrition assessment     # History of CABG: noted on surgical history                      Current Medications:    Scheduled Meds:  Current Facility-Administered Medications   Medication Dose Route Frequency Provider Last Rate Last Admin    acetaminophen (TYLENOL) tablet 500 mg  500 mg Oral Q6H Catherine Galvez PA-C   500 mg at 10/20/24 0632    atorvastatin (LIPITOR) tablet 80 mg  80 mg Oral At Bedtime Catherine Galvez PA-C   80 mg at 10/19/24 2023    budesonide (PULMICORT) neb solution 1 mg  1 mg Nebulization BID Catherine Galvez PA-C   1 mg at 10/20/24 0747    cetirizine (zyrTEC) tablet 10 mg  10 mg Oral Daily Catherine Galvez PA-C   10 mg at 10/20/24 0820    clopidogrel (PLAVIX) tablet 75 mg  75 mg Oral Daily Catherine Galvez PA-C   75 mg at 10/20/24 0819    fludrocortisone (FLORINEF) tablet 0.1 mg  0.1 mg Oral Daily Catherine Galvez PA-C   0.1 mg at 10/20/24 0821    fluticasone-vilanterol (BREO ELLIPTA) 100-25 MCG/ACT inhaler 1 puff  1 puff Inhalation At Bedtime Catherine Galevz PA-C   1 puff at 10/19/24 2154    heparin lock flush 10 unit/mL injection 5-20 mL  5-20 mL Intracatheter Q24H Catherine Galvez PA-C   5 mL at 10/13/24 2133    levothyroxine (SYNTHROID/LEVOTHROID) tablet 125 mcg  125 mcg Oral QAM AC Catherine Galvez PA-C   125 mcg at 10/20/24 0632    Lidocaine (LIDOCARE) 4 % Patch 1-2 patch  1-2 patch Transdermal Q24H Catherine Galvez PA-C   2 patch at 10/19/24 1748    metoprolol tartrate (LOPRESSOR) half-tab 37.5 mg  37.5 mg Oral BID Catherine Galvez PA-C   37.5 mg at 10/20/24 0934    multivitamin w/minerals (THERA-VIT-M) tablet 1 tablet  1 tablet Oral Daily Catherine Galvez PA-C   1 tablet at 10/19/24 1147    No heparin via hemodialysis machine   Does not apply Once Rohan Polo MD        pantoprazole (PROTONIX) EC tablet 40 mg  40 mg Oral Daily Catherine Galvez PA-C   40 mg at 10/20/24 0819    polyethylene glycol (MIRALAX) Packet  17 g  17 g Oral Daily Catherine Galvez PA-C        senna-docusate (SENOKOT-S/PERICOLACE) 8.6-50 MG per tablet 1 tablet  1 tablet Oral BID Catherine Galvez PA-C   1 tablet at 10/18/24 2121    sodium chloride (PF) 0.9% PF flush 10-40 mL  10-40 mL Intracatheter Q8H Catherine Galvez PA-C   10 mL at 10/20/24 0500    sodium chloride (PF) 0.9% PF flush 10-40 mL  10-40 mL Intracatheter Q7 Days Catherine Galvez PA-C   10 mL at 10/18/24 1703    Vitamin D3 (CHOLECALCIFEROL) tablet 125 mcg  125 mcg Oral Daily Catherine Galvez PA-C   125 mcg at 10/20/24 0820    warfarin ANTICOAGULANT (COUMADIN) tablet 1 mg  1 mg Oral ONCE at 18:00 David Wade MD        Warfarin Dose Required Daily - Pharmacist Managed  1 each Oral See Admin Instructions Catherine Galvez PA-C         Continuous Infusions:  Current Facility-Administered Medications   Medication Dose Route Frequency Provider Last Rate Last Admin     PRN Meds:.  Current Facility-Administered Medications   Medication Dose Route Frequency Provider Last Rate Last Admin    albuterol (PROVENTIL HFA/VENTOLIN HFA) inhaler  2 puff Inhalation Q6H PRN Catherine Galvez PA-C        bisacodyl (DULCOLAX) suppository 10 mg  10 mg Rectal Daily PRN Catherine Galvez PA-C        glucose gel 15-30 g  15-30 g Oral Q15 Min PRN Catherine Galvez PA-C        Or    dextrose 50 % injection 25-50 mL  25-50 mL Intravenous Q15 Min PRN Catherine Galvez PA-C   50 mL at 10/14/24 0806    Or    glucagon injection 1 mg  1 mg Subcutaneous Q15 Min PRN Catherine Galvez PA-C        fluticasone (FLONASE) 50 MCG/ACT spray 2 spray  2 spray Both Nostrils Daily PRN Catherine Galvez PA-C        heparin 1000 unit/mL DIALYSIS Cath LOCK - RED Lumen  1.3-2.6 mL Intracatheter Q1H PRN Rohan Polo MD   1,300 Units at 10/17/24 1628    heparin 1000 unit/mL DIALYSIS Cath LOCK -BLUE Lumen  1.3-2.6 mL Intracatheter Q1H PRRohan Zavala MD         heparin lock flush 10 unit/mL injection 5-20 mL  5-20 mL Intracatheter Q1H PRN Catherine Galvez PA-C        hydrALAZINE (APRESOLINE) injection 10 mg  10 mg Intravenous Q30 Min PRN Catherine Galvez PA-C        hydrOXYzine HCl (ATARAX) tablet 25 mg  25 mg Oral TID PRN Catherine Galvez PA-C        ipratropium - albuterol 0.5 mg/2.5 mg/3 mL (DUONEB) neb solution 3 mL  3 mL Nebulization Q4H PRN Catherine Galvez PA-C   3 mL at 10/19/24 1310    naloxone (NARCAN) injection 0.2 mg  0.2 mg Intravenous Q2 Min PRN Catherine Galvez PA-C        Or    naloxone (NARCAN) injection 0.4 mg  0.4 mg Intravenous Q2 Min PRN Catherine Galvez PA-C        Or    naloxone (NARCAN) injection 0.2 mg  0.2 mg Intramuscular Q2 Min PRN Catherine Galvez PA-C        Or    naloxone (NARCAN) injection 0.4 mg  0.4 mg Intramuscular Q2 Min PRN Catherine Galvez PA-C        ondansetron (ZOFRAN ODT) ODT tab 4 mg  4 mg Oral Q6H PRN Catherine Galvez PA-C        Or    ondansetron (ZOFRAN) injection 4 mg  4 mg Intravenous Q6H PRN Catherine Galvez PA-C   4 mg at 10/10/24 1945    oxyCODONE IR (ROXICODONE) half-tab 2.5 mg  2.5 mg Oral Q4H PRN Catherine Galvez PA-C        prochlorperazine (COMPAZINE) injection 10 mg  10 mg Intravenous Q6H PRN Catherine Galvez PA-C        Or    prochlorperazine (COMPAZINE) tablet 10 mg  10 mg Oral Q6H PRN Catherine Galvez PA-C        simethicone (MYLICON) chewable tablet 80 mg  80 mg Oral 4x Daily PRN David Wade MD   80 mg at 10/19/24 1151    sodium chloride (PF) 0.9% PF flush 10-20 mL  10-20 mL Intracatheter q1 min prn Catherine Galvez PA-C        sodium chloride (PF) 0.9% PF flush 10-40 mL  10-40 mL Intracatheter Q1H PRN Catherine Galvez PA-C        sodium chloride 0.9% BOLUS 100-150 mL  100-150 mL Intravenous Q15 Min PRN Rohan Polo MD        sodium chloride 0.9% BOLUS 100-150 mL  100-150 mL Intravenous Q15 Min PRN Rohan Polo  MD Bethanie           Cardiographics:    Telemetry monitoring demonstrates a fib w/ rates in the 90s per my personal review.    Imaging:  Results for orders placed or performed during the hospital encounter of 10/08/24   XR Chest Port 1 View    Impression    IMPRESSION: Interval sternotomy, CABG procedure and mitral valve repair. Endotracheal tube proximally 2 cm above the sofia. Nasogastric tube in the stomach. Bilateral chest tubes and midline mediastinal drain in expected position. Epicardial leads are   present. Left IJ line in the brachiocephalic vein.    Low lung volumes. No evidence for CHF or pneumonia. No pleural effusion or pneumothorax.   XR Chest Port 1 View    Impression    IMPRESSION: Poststernotomy changes with prosthetic mitral valve. Patient's been extubated and the NG tube has been removed. Mediastinal and pleural chest tubes are in place.    Left IJ cordis sheath is in the distal left innominate artery. Catheter has a very subtle kink within it 3.5 centimeters from the tip.    Low lung volumes. No pneumothorax. Likely trace left effusion at the base. No signs of pneumonia or failure. Heart is of normal size.   XR Abdomen Port 1 View    Impression    IMPRESSION: Orogastric tube tip in the proximal stomach and oriented superiorly towards the left diaphragm. Visualized bowel gas pattern unremarkable. Numerous tubes and lines lower chest and upper abdomen as described on chest x-ray report from today.        Labs, personally reviewed.  Hemoglobin   Date Value Ref Range Status   10/20/2024 7.7 (L) 11.7 - 15.7 g/dL Final   10/19/2024 8.1 (L) 11.7 - 15.7 g/dL Final   10/18/2024 8.3 (L) 11.7 - 15.7 g/dL Final   06/09/2015 13.5 11.7 - 15.7 g/dL Final   03/11/2015 12.3 11.7 - 15.7 g/dL Final   01/08/2015 13.6 11.7 - 15.7 g/dL Final     WBC   Date Value Ref Range Status   06/09/2015 7.9 4.0 - 11.0 10e9/L Final   03/11/2015 9.4 4.0 - 11.0 10e9/L Final   01/08/2015 8.1 4.0 - 11.0 10e9/L Final     WBC Count    Date Value Ref Range Status   10/20/2024 13.1 (H) 4.0 - 11.0 10e3/uL Final   10/19/2024 13.6 (H) 4.0 - 11.0 10e3/uL Final   10/18/2024 13.4 (H) 4.0 - 11.0 10e3/uL Final     Platelet Count   Date Value Ref Range Status   10/20/2024 246 150 - 450 10e3/uL Final   10/19/2024 228 150 - 450 10e3/uL Final   10/18/2024 182 150 - 450 10e3/uL Final   06/09/2015 219 150 - 450 10e9/L Final   03/11/2015 240 150 - 450 10e9/L Final   01/08/2015 221 150 - 450 10e9/L Final     Creatinine   Date Value Ref Range Status   10/20/2024 2.77 (H) 0.51 - 0.95 mg/dL Final   10/19/2024 3.35 (H) 0.51 - 0.95 mg/dL Final   10/18/2024 2.85 (H) 0.51 - 0.95 mg/dL Final   06/09/2015 0.80 0.52 - 1.04 mg/dL Final   03/11/2015 0.87 0.52 - 1.04 mg/dL Final   01/08/2015 0.79 0.52 - 1.04 mg/dL Final     Potassium   Date Value Ref Range Status   10/20/2024 3.4 3.4 - 5.3 mmol/L Final   10/19/2024 3.2 (L) 3.4 - 5.3 mmol/L Final   10/18/2024 3.5 3.4 - 5.3 mmol/L Final   06/09/2015 3.8 3.4 - 5.3 mmol/L Final   03/11/2015 3.9 3.4 - 5.3 mmol/L Final   01/08/2015 4.1 3.4 - 5.3 mmol/L Final     Potassium POCT   Date Value Ref Range Status   10/08/2024 4.4 3.4 - 5.3 mmol/L Final   10/08/2024 4.0 3.4 - 5.3 mmol/L Final   10/08/2024 4.1 3.4 - 5.3 mmol/L Final     Magnesium   Date Value Ref Range Status   10/20/2024 1.8 1.7 - 2.3 mg/dL Final   10/19/2024 2.0 1.7 - 2.3 mg/dL Final   10/18/2024 2.0 1.7 - 2.3 mg/dL Final   06/09/2015 1.8 1.6 - 2.3 mg/dL Final   03/11/2015 1.7 1.6 - 2.3 mg/dL Final   01/08/2015 1.8 1.6 - 2.3 mg/dL Final          I/O:  I/O last 3 completed shifts:  In: 1336 [P.O.:936; Other:400]  Out: 4824 [Urine:1550; Other:3274]       Physical Exam:    General: Patient seen in dialysis this AM. NAD. Pleasant, conversant.   HEENT: DORITA, no sclera icterus, moist mucosa  CV: RRR on monitor. 2+ peripheral pulses in all extremities. Moderate peripheral edema. Incision C/D/I.  Pulm: Satting 100% w/ nonlabored breathing on 2L NC  Abd: Soft, NT, ND  :  Voiding  Ext: Moderate pedal edema, SCDs in place, warm, distal pulses intact  Neuro: CNs grossly intact, FAM, A&Ox3      ASSESSMENT/PLAN:    Jory Ambrose is a 49 year old female with a history of GIA, asthma, GERD,  hypothyroidism, h/o PE on plavix, carotid stenosis, Hodgkin's lymphoma (s/p chemo/radiation and bone marrow transplant x2 after recurrence) currently in remission who is s/p CABG x4, RLE EVH, attempted mitral repair, mechanical MVR, LAAE.    Active Problems:    Mitral regurgitation    Coronary artery disease involving native coronary artery of native heart with angina pectoris (H)  #Acute Renal Failure d/t ATN, ongoing.      NEURO:   - Scheduled lidocaine patches and PRN robaxin/oxycodone for pain  - PTA bupropion discontinued (somnolence)  - Tylenol 500 mg q6hrs (max 2g per day given recent hepatic dysfunction)    CV:  - Pre-op EF 55-60%  - Chest tubes and TPW's removed POD#5  - Normotensive  - Metop 37.5 mg BID  - Plavix daily indefinitely d/t unclear h/o anaphylaxis to ASA  - Atorvastatin 80mg daily  - Warfarin for mechanical MVR, INR therapeutic, heparin gtt stopped 10/15  - Paroxysmal rate controlled afib - s/p amiodarone bolus/gtt 10/13 (ok per [place]). Holding given LFTs and allergy to PO amio additive. Consider amio if ongoing a fib as hypervolemia improves  - New baseline echo prior to discharge when closer to preop weight for new baseline s/p MVR and to eval for pericardial effusion on warfarin/plavix    PULM:   - Extubated on POD#0  - Maintaining O2 sats on room air w/ home CPAP qHS  - Encourage pulmonary toilet  - PTA zyrtec, fludrocortisone, flonase, simethicone, albuterol, pulmicort, breo ellipta, duoneb    FEN/GI:  - Continue electrolyte replacement protocol  - Regular diet  - Bowel regimen, LBM 10/19  - Ischemic hepatitis - LFTs downtrending, check q48 while inpatient  - Hepatic w/u and labs unremarkable  - PTA vitamin D3     RENAL:  - Nephrology consulted for ARF w/  decreased UOP, appreciate. Following.  - CRRT 10/11 - 10/13  - HD 10/15, 17, & 19  - Strict I/O  - Daily renal panel  - Cr 2.77 (3.35) (baseline ~0.9)  - UA/Urine cx 10/10 & 10/13 NG    HEME:  - H/o bone marrow tx. Needs irradiated blood  - Acute blood loss anemia post-op.   - Hgb continues to drift, likely dilutional component  - War[facility]in [facility]  - HIT screen negative.  - INR 2.80 (2.63)  - Transfusion hx  - 2u PRBCs periop  - 1u PRBCs 10/11  - 1u PRBCs 10/16    ID:  - Lydia op ppx complete. Afebrile. Leukocytosis -  WBC 13.1 (13.6)  - UA/Urine cx 10/10 & 10/13 NG.  - Blood cx 10/9 & 10/13 NG  - ID consult for ?sepsis, appreciate. Signed off  - CBC qday    ENDO:   - HbA1c 5.7%  - Adrenal, thyroid, and pancreatitis labs unremarkable  - PTA Metformin discontinued (renal function)  - PTA synthroid    PPx:   - DVT: SCDs, SQ heparin TID, ambulation   - GI: Protonix 40mg IV/PO daily    DISPO:   - Continue general tele care (POD#8)  - PT/OT recs at discharge: TCU pending progress  - Medically Ready for Discharge: Anticipated in 2-4 Days pending volume status and rehab progress         Patient discussed w/ Dr. Wade.      Eva Galvez PA-C  [facility] Cardiothoracic Surgery  Secure Chat or Vocera  October 20, 2024

## 2024-10-20 NOTE — PROGRESS NOTES
Patient placed on home CPAP unit w 2L bleed in. No obstructions noted. RT will follow.      Josh Cordero, RT

## 2024-10-20 NOTE — PLAN OF CARE
Goal Outcome Evaluation:      Plan of Care Reviewed With: patient    Overall Patient Progress: improvingOverall Patient Progress: improving         sc  Patient Name: Jory Ambrose   MRN: [id number]   Date of Admission: 10/8/2024    Procedure: Procedure(s):  CORONARY ARTERY BYPASS GRAFT TIMES FOUR, LEFT INTERNAL MAMMARY ARTERY HARVEST, RIGHT LEG ENDOSCOPIC VESSEL PROCUREMENT, EPIAORTIC ULTRASOUND,  MITRAL VALVE REAIR CONVERTED TO MITRAL VALVE REPLACEMENT  LEFT ATRIAL APPENDAGE LIGATION,  ANESTHESIA TRANSESOPHAGEAL ECHOCARDIOGRAM    Post Op day #:12    Subjective (Patient focus/Primary Problem for shift): rest          Pain Goal 0 Pain Rating 0           Pain Medication/ Regime effective to reduce patient pain yes    Objective (Physical assessment):           Rhythm: atrial fibrillation            Bowel Activity: yes if Yes indicate when: 10/19          Bowel Medications: held on this shift per pt request            Incision: healing well          Incentive Spirometry Q 1-2 hour when awake:  yes Volume: 1200          Epicardial Pacing Wires:  not applicable            Patient Activity:           Up to chair for meals: yes          Ambulation with RN x2 (Not including CR): not applicable            Is patient in home clothes:no             Chest Tubes   Pleural: not applicable Draining: not applicable               Suction: not applicable              Mediastinal: not applicable Draining: not applicable               Suction: not applicable   Dressing Change Daily:not applicable If No, why?                     Urinary Catheter: not applicable           Preventative WOC consult (need MD order): not applicable       Assessment (Nursing primary shift focus): A & O. VSS. Uses her CPAP at night with 2L of O2 bled in. Denies pain and refused midnight scheduled tylenol. Endorses pain of 5/10 this morning with transfers. Scheduled tylenol given. Afib on tele, HR in 90s. Make needs known appropriately.     Plan  (Patient Care Plan/focus): Encourage activity and IS use.      Arelis Ramirez RN   10/20/2024   5:25 AM

## 2024-10-20 NOTE — PROGRESS NOTES
CALORIE COUNT      Approximate Oral Intake for:    Saturday 10/19-2 meals + 1 supplement  Calories: 865  Protein: 31+ (outside food, kcals info provided, protein not)      Intake from TF/PN:           Estimated Needs:    Calories: 1720  Protein: 69      Summary:  Met 50% of kcal and 45%+ of estimated protein needs from 2 meals. (No breakfast-at dialysis)

## 2024-10-20 NOTE — PROGRESS NOTES
"RN called and reported SOB after walking pt and cyanosis of lips while having adequate SPO2. I arrived and auscultated wheezing bilaterally and patient was purse lip breathing. Neb given. Post neb patient exhibits little to no wheezing and saturation is at 100% on RA. Patient appears less in distress pre-neb. Patient states feeling \"little lung volumes\". RT will follow.    Josh Cordero, RT    "

## 2024-10-20 NOTE — PROGRESS NOTES
'    [facility] ([facility]) progress note  CC: F/U ALEXEI  S: Reports breathing improved but still up and down. Swelling improved. Eating alright.  900 ml of urine out yesterday and 650 ml out today.     A/P:   Acute kidney injury.  Acute tubular necrosis.  Secondary to CABG and mitral valve replacement followed by cardiogenic shock.  Baseline creatinine 0.95 (10/8/2024).    Required CRRT, off 10/13  Off pressors as of 10/14  HD 10/15, 10/17, 10/19 - next likey 10/20 based on labs and urine output. Check on in am.   May need outpatient HD on discharge if no renal recovery.   Daily labs, Strict Is/Os, Daily weights   Avoid nephrotoxins  Renally dose meds  Reassess for HD needs daily      2. Status post coronary artery bypass grafting x 4 vessels and mitral valve replacement on 10/8.  Diffuse coronary artery disease     3. Shock liver.  Continue supportive care.  LFTs downtrending.      4. History of Hodgkin's lymphoma.  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    Arnav Napoles,   [facility]  [phone]      /56 (BP Location: Right arm)   Pulse 92   Temp 98.1  F (36.7  C) (Oral)   Resp 20   Wt 108.5 kg (239 lb 4.8 oz)   LMP  (LMP Unknown)   SpO2 99%   BMI 41.08 kg/m      I/O last 3 completed shifts:  In: 1336 [P.O.:936; Other:400]  Out: 4824 [Urine:1550; Other:3274]    Physical Exam:   GENERAL: NAD  EYES: EOMI   ENT: MMM  RESP: normal effort, RA  CV: regular rate, + leg/arm edema.    GI: ND  SKIN: No rash, pale       Recent Labs   Lab 10/20/24  0452 10/19/24  0451 10/18/24  0508 10/17/24  0544 10/16/24  1834 10/16/24  0435 10/15/24  1859 10/15/24  0648 10/14/24  1821 10/14/24  0345 10/13/24  1536 10/13/24  1201   * 131* 132* 132* 131* 131* 134* 129*   < > 133* 135 134*   POTASSIUM 3.4 3.2* 3.5 3.0* 2.9* 3.2* 3.2* 4.0   < > 4.0 4.1 4.1   CHLORIDE 99 95* 97* 95* 95* 97* 101 95*   < > 99 100 102   CO2 22 23 24 22 22 24 26 18*    < > 21* 19* 21*   BUN 42.0* 50.1* 37.6* 53.2* 49.3* 41.1* 25.0* 54.1*   < > 37.7* 27.0* 22.2*   CR 2.77* 3.35* 2.85* 3.53* 3.24* 2.80* 1.74* 3.40*   < > 2.49* 1.85* 1.51*   GFRESTIMATED 20* 16* 20* 15* 17* 20* 35* 16*   < > 23* 33* 42*   CINDY 8.1* 8.1* 8.2* 8.0* 8.0* 7.9* 7.6* 7.6*   < > 7.5* 7.9* 7.3*   PHOS  --   --  3.5 4.0  --  3.1  --  4.7*  --  4.7* 5.1* 4.4   MAG 1.8 2.0 2.0 2.4*  --  2.4*  --  3.1*  --  3.0*  --  2.7*   ALBUMIN  --  2.9* 2.8* 2.9* 2.8* 2.6* 2.6* 2.8*   < > 3.0* 3.3* 3.1*    < > = values in this interval not displayed.     Recent Labs   Lab 10/20/24  0452 10/19/24  0451 10/18/24  0508 10/17/24  0544 10/16/24  1156 10/16/24  0435 10/15/24  0648 10/14/24  0758   WBC 13.1* 13.6* 13.4* 15.3*  --  19.5* 28.0*  27.9* 23.8*   HGB 7.7* 8.1* 8.3* 8.6* 8.8* 6.8* 7.5* 8.0*   HCT 24.3* 25.6* 25.5* 26.5*  --  21.0* 23.2* 24.8*   MCV 89 89 88 87  --  91 91 90    228 182 157  --  123* 133* 105*       Current Facility-Administered Medications:     acetaminophen (TYLENOL) tablet 500 mg, 500 mg, Oral, Q6H, Catherine Galvez PA-C, 500 mg at 10/20/24 0632    albuterol (PROVENTIL HFA/VENTOLIN HFA) inhaler, 2 puff, Inhalation, Q6H PRN, Catherine Galvez PA-C    atorvastatin (LIPITOR) tablet 80 mg, 80 mg, Oral, At Bedtime, Catherine Galvez PA-C, 80 mg at 10/19/24 2023    bisacodyl (DULCOLAX) suppository 10 mg, 10 mg, Rectal, Daily PRN, Catherine Gavlez PA-C    budesonide (PULMICORT) neb solution 1 mg, 1 mg, Nebulization, BID, Catherine Galvez PA-C, 1 mg at 10/20/24 0747    cetirizine (zyrTEC) tablet 10 mg, 10 mg, Oral, Daily, Catherine Galvez PA-C, 10 mg at 10/20/24 0820    clopidogrel (PLAVIX) tablet 75 mg, 75 mg, Oral, Daily, Catherine Galvez, PA-C, 75 mg at 10/20/24 0819    glucose gel 15-30 g, 15-30 g, Oral, Q15 Min PRN **OR** dextrose 50 % injection 25-50 mL, 25-50 mL, Intravenous, Q15 Min PRN, 50 mL at 10/14/24 0806 **OR** glucagon injection 1 mg, 1 mg, Subcutaneous,  Q15 Min PRN, Catherine Galvez PA-C    fludrocortisone (FLORINEF) tablet 0.1 mg, 0.1 mg, Oral, Daily, Catherine Galvez PA-KENIA, 0.1 mg at 10/20/24 0821    fluticasone (FLONASE) 50 MCG/ACT spray 2 spray, 2 spray, Both Nostrils, Daily PRN, Catherine Galvez PA-KENIA    fluticasone-vilanterol (BREO ELLIPTA) 100-25 MCG/ACT inhaler 1 puff, 1 puff, Inhalation, At Bedtime, Catherine Galvez PA-KENIA, 1 puff at 10/19/24 2154    [COMPLETED] sodium chloride 0.9% DIALYSIS Cath LOCK - RED Lumen, 10 mL, Intracatheter, Once in dialysis/CRRT, 10 mL at 10/17/24 1527 **FOLLOWED BY** heparin 1000 unit/mL DIALYSIS Cath LOCK - RED Lumen, 1.3-2.6 mL, Intracatheter, Q1H PRN, Rohan Polo MD, 1,300 Units at 10/17/24 1628    [COMPLETED] sodium chloride 0.9% DIALYSIS Cath LOCK - BLUE Lumen, 10 mL, Intracatheter, Once in dialysis/CRRT, 10 mL at 10/17/24 1526 **FOLLOWED BY** heparin 1000 unit/mL DIALYSIS Cath LOCK -BLUE Lumen, 1.3-2.6 mL, Intracatheter, Q1H PRN, Rohan Polo MD    heparin lock flush 10 unit/mL injection 5-20 mL, 5-20 mL, Intracatheter, Q24H, Catherine Galvez PA-C, 5 mL at 10/13/24 2133    heparin lock flush 10 unit/mL injection 5-20 mL, 5-20 mL, Intracatheter, Q1H PRN, Catherine Galvez PA-C    hydrALAZINE (APRESOLINE) injection 10 mg, 10 mg, Intravenous, Q30 Min PRN, Catherine Galvez PA-C    hydrOXYzine HCl (ATARAX) tablet 25 mg, 25 mg, Oral, TID PRN, Catherine Galvez PA-C    ipratropium - albuterol 0.5 mg/2.5 mg/3 mL (DUONEB) neb solution 3 mL, 3 mL, Nebulization, Q4H PRN, Catherine Galvez PA-C, 3 mL at 10/19/24 1310    levothyroxine (SYNTHROID/LEVOTHROID) tablet 125 mcg, 125 mcg, Oral, QAM AC, Catherine Galvez PA-C, 125 mcg at 10/20/24 0632    Lidocaine (LIDOCARE) 4 % Patch 1-2 patch, 1-2 patch, Transdermal, Q24H, Catherine Galvez PA-C, 2 patch at 10/19/24 1748    metoprolol tartrate (LOPRESSOR) half-tab 37.5 mg, 37.5 mg, Oral, BID, Catherine Galvez PA-C, 37.5  mg at 10/20/24 0934    multivitamin w/minerals (THERA-VIT-M) tablet 1 tablet, 1 tablet, Oral, Daily, Catherine Galvez PA-C, 1 tablet at 10/19/24 1147    naloxone (NARCAN) injection 0.2 mg, 0.2 mg, Intravenous, Q2 Min PRN **OR** naloxone (NARCAN) injection 0.4 mg, 0.4 mg, Intravenous, Q2 Min PRN **OR** naloxone (NARCAN) injection 0.2 mg, 0.2 mg, Intramuscular, Q2 Min PRN **OR** naloxone (NARCAN) injection 0.4 mg, 0.4 mg, Intramuscular, Q2 Min PRN, Catherine Galvez PA-C    ondansetron (ZOFRAN ODT) ODT tab 4 mg, 4 mg, Oral, Q6H PRN **OR** ondansetron (ZOFRAN) injection 4 mg, 4 mg, Intravenous, Q6H PRN, Catherine Galvez PA-C, 4 mg at 10/10/24 1945    oxyCODONE IR (ROXICODONE) half-tab 2.5 mg, 2.5 mg, Oral, Q4H PRN **OR** [DISCONTINUED] oxyCODONE (ROXICODONE) tablet 5 mg, 5 mg, Oral, Q4H PRN, David Wade MD, 5 mg at 10/19/24 0902    [DISCONTINUED] pantoprazole (PROTONIX) 2 mg/mL suspension 40 mg, 40 mg, Oral or NG Tube, Daily, 40 mg at 10/08/24 1729 **OR** pantoprazole (PROTONIX) EC tablet 40 mg, 40 mg, Oral, Daily, Catherine Galvez PA-C, 40 mg at 10/20/24 0819    polyethylene glycol (MIRALAX) Packet 17 g, 17 g, Oral, Daily, Cathreine Galvez PA-C    prochlorperazine (COMPAZINE) injection 10 mg, 10 mg, Intravenous, Q6H PRN **OR** prochlorperazine (COMPAZINE) tablet 10 mg, 10 mg, Oral, Q6H PRN, Catherine Galvez PA-C    senna-docusate (SENOKOT-S/PERICOLACE) 8.6-50 MG per tablet 1 tablet, 1 tablet, Oral, BID, Catherine Galvez PA-C, 1 tablet at 10/18/24 2121    simethicone (MYLICON) chewable tablet 80 mg, 80 mg, Oral, 4x Daily PRN, David Wade MD, 80 mg at 10/20/24 1046    sodium chloride (PF) 0.9% PF flush 10-20 mL, 10-20 mL, Intracatheter, q1 min prn, Catherine Galvez PA-C    sodium chloride (PF) 0.9% PF flush 10-40 mL, 10-40 mL, Intracatheter, Q8H, Catherine Galvez PA-C, 10 mL at 10/20/24 0500    sodium chloride (PF) 0.9% PF flush 10-40 mL, 10-40  mL, Intracatheter, Q7 Days, Catherine Galvez PA-C, 10 mL at 10/18/24 1703    sodium chloride (PF) 0.9% PF flush 10-40 mL, 10-40 mL, Intracatheter, Q1H PRN, Catherine Galvez PA-C    sodium chloride 0.9% BOLUS 100-150 mL, 100-150 mL, Intravenous, Q15 Min PRN, Rohan Polo MD    sodium chloride 0.9% BOLUS 100-150 mL, 100-150 mL, Intravenous, Q15 Min PRN, Rohan Polo MD    Vitamin D3 (CHOLECALCIFEROL) tablet 125 mcg, 125 mcg, Oral, Daily, Catherine Galvez PA-C, 125 mcg at 10/20/24 0820    warfarin ANTICOAGULANT (COUMADIN) tablet 1 mg, 1 mg, Oral, ONCE at 18:00, David Wade MD    Warfarin Dose Required Daily - Pharmacist Managed, 1 each, Oral, See Admin Instructions, Catherine Galvez PA-C    Labs personally reviewed today during this evaluation at 1130am.

## 2024-10-21 ENCOUNTER — APPOINTMENT (OUTPATIENT)
Dept: OCCUPATIONAL THERAPY | Facility: HOSPITAL | Age: 49
DRG: 219 | End: 2024-10-21
Attending: SURGERY
Payer: COMMERCIAL

## 2024-10-21 ENCOUNTER — APPOINTMENT (OUTPATIENT)
Dept: PHYSICAL THERAPY | Facility: HOSPITAL | Age: 49
DRG: 219 | End: 2024-10-21
Attending: SURGERY
Payer: COMMERCIAL

## 2024-10-21 LAB
ALBUMIN SERPL BCG-MCNC: 3 G/DL (ref 3.5–5.2)
ALP SERPL-CCNC: 110 U/L (ref 40–150)
ALT SERPL W P-5'-P-CCNC: 167 U/L (ref 0–50)
ANION GAP SERPL CALCULATED.3IONS-SCNC: 14 MMOL/L (ref 7–15)
AST SERPL W P-5'-P-CCNC: 46 U/L (ref 0–45)
BILIRUB DIRECT SERPL-MCNC: 0.37 MG/DL (ref 0–0.3)
BILIRUB SERPL-MCNC: 1 MG/DL
BUN SERPL-MCNC: 49.9 MG/DL (ref 6–20)
CA-I BLD-MCNC: 4.4 MG/DL (ref 4.4–5.2)
CALCIUM SERPL-MCNC: 8.4 MG/DL (ref 8.8–10.4)
CHLORIDE SERPL-SCNC: 97 MMOL/L (ref 98–107)
CREAT SERPL-MCNC: 3.13 MG/DL (ref 0.51–0.95)
EGFRCR SERPLBLD CKD-EPI 2021: 17 ML/MIN/1.73M2
ERYTHROCYTE [DISTWIDTH] IN BLOOD BY AUTOMATED COUNT: 18 % (ref 10–15)
GLUCOSE SERPL-MCNC: 100 MG/DL (ref 70–99)
HCO3 SERPL-SCNC: 22 MMOL/L (ref 22–29)
HCT VFR BLD AUTO: 23.7 % (ref 35–47)
HGB BLD-MCNC: 7.6 G/DL (ref 11.7–15.7)
INR PPP: 2.86 (ref 0.85–1.15)
MAGNESIUM SERPL-MCNC: 1.8 MG/DL (ref 1.7–2.3)
MCH RBC QN AUTO: 28.5 PG (ref 26.5–33)
MCHC RBC AUTO-ENTMCNC: 32.1 G/DL (ref 31.5–36.5)
MCV RBC AUTO: 89 FL (ref 78–100)
PLATELET # BLD AUTO: 305 10E3/UL (ref 150–450)
POTASSIUM SERPL-SCNC: 3.4 MMOL/L (ref 3.4–5.3)
PROT SERPL-MCNC: 5.6 G/DL (ref 6.4–8.3)
RBC # BLD AUTO: 2.67 10E6/UL (ref 3.8–5.2)
SODIUM SERPL-SCNC: 133 MMOL/L (ref 135–145)
WBC # BLD AUTO: 13.2 10E3/UL (ref 4–11)

## 2024-10-21 PROCEDURE — 250N000013 HC RX MED GY IP 250 OP 250 PS 637

## 2024-10-21 PROCEDURE — 94660 CPAP INITIATION&MGMT: CPT

## 2024-10-21 PROCEDURE — 85027 COMPLETE CBC AUTOMATED: CPT

## 2024-10-21 PROCEDURE — 94799 UNLISTED PULMONARY SVC/PX: CPT

## 2024-10-21 PROCEDURE — 97110 THERAPEUTIC EXERCISES: CPT | Mod: GO

## 2024-10-21 PROCEDURE — 82248 BILIRUBIN DIRECT: CPT

## 2024-10-21 PROCEDURE — 210N000001 HC R&B IMCU HEART CARE

## 2024-10-21 PROCEDURE — 250N000013 HC RX MED GY IP 250 OP 250 PS 637: Performed by: INTERNAL MEDICINE

## 2024-10-21 PROCEDURE — 250N000013 HC RX MED GY IP 250 OP 250 PS 637: Performed by: SURGERY

## 2024-10-21 PROCEDURE — 85610 PROTHROMBIN TIME: CPT

## 2024-10-21 PROCEDURE — 250N000009 HC RX 250

## 2024-10-21 PROCEDURE — 82330 ASSAY OF CALCIUM: CPT

## 2024-10-21 PROCEDURE — 83735 ASSAY OF MAGNESIUM: CPT

## 2024-10-21 PROCEDURE — 999N000157 HC STATISTIC RCP TIME EA 10 MIN

## 2024-10-21 PROCEDURE — 94640 AIRWAY INHALATION TREATMENT: CPT | Mod: 76

## 2024-10-21 PROCEDURE — 97535 SELF CARE MNGMENT TRAINING: CPT | Mod: GO

## 2024-10-21 PROCEDURE — 97530 THERAPEUTIC ACTIVITIES: CPT | Mod: GP | Performed by: PHYSICAL THERAPIST

## 2024-10-21 RX ORDER — METOPROLOL TARTRATE 50 MG
50 TABLET ORAL 2 TIMES DAILY
Status: DISCONTINUED | OUTPATIENT
Start: 2024-10-21 | End: 2024-10-22

## 2024-10-21 RX ORDER — BUMETANIDE 2 MG/1
2 TABLET ORAL ONCE
Status: COMPLETED | OUTPATIENT
Start: 2024-10-21 | End: 2024-10-21

## 2024-10-21 RX ADMIN — OXYCODONE HYDROCHLORIDE 2.5 MG: 5 TABLET ORAL at 05:05

## 2024-10-21 RX ADMIN — ACETAMINOPHEN 500 MG: 500 TABLET ORAL at 23:54

## 2024-10-21 RX ADMIN — FLUDROCORTISONE ACETATE 0.1 MG: 0.1 TABLET ORAL at 09:28

## 2024-10-21 RX ADMIN — PANTOPRAZOLE SODIUM 40 MG: 40 TABLET, DELAYED RELEASE ORAL at 09:28

## 2024-10-21 RX ADMIN — Medication 1 TABLET: at 12:29

## 2024-10-21 RX ADMIN — OXYCODONE HYDROCHLORIDE 2.5 MG: 5 TABLET ORAL at 09:48

## 2024-10-21 RX ADMIN — BUDESONIDE 1 MG: 0.5 INHALANT ORAL at 08:47

## 2024-10-21 RX ADMIN — CETIRIZINE HYDROCHLORIDE 10 MG: 10 TABLET, FILM COATED ORAL at 09:29

## 2024-10-21 RX ADMIN — Medication 125 MCG: at 09:28

## 2024-10-21 RX ADMIN — ACETAMINOPHEN 500 MG: 500 TABLET ORAL at 12:29

## 2024-10-21 RX ADMIN — METOPROLOL TARTRATE 50 MG: 50 TABLET, FILM COATED ORAL at 20:20

## 2024-10-21 RX ADMIN — LIDOCAINE 2 PATCH: 4 PATCH TOPICAL at 16:58

## 2024-10-21 RX ADMIN — WARFARIN SODIUM 1.5 MG: 1 TABLET ORAL at 18:12

## 2024-10-21 RX ADMIN — LEVOTHYROXINE SODIUM 125 MCG: 0.03 TABLET ORAL at 06:34

## 2024-10-21 RX ADMIN — FLUTICASONE FUROATE AND VILANTEROL TRIFENATATE 1 PUFF: 100; 25 POWDER RESPIRATORY (INHALATION) at 20:21

## 2024-10-21 RX ADMIN — ATORVASTATIN CALCIUM 80 MG: 40 TABLET, FILM COATED ORAL at 20:20

## 2024-10-21 RX ADMIN — BUMETANIDE 2 MG: 2 TABLET ORAL at 12:29

## 2024-10-21 RX ADMIN — BUDESONIDE 1 MG: 0.5 INHALANT ORAL at 21:05

## 2024-10-21 RX ADMIN — ACETAMINOPHEN 500 MG: 500 TABLET ORAL at 05:05

## 2024-10-21 RX ADMIN — METOPROLOL TARTRATE 50 MG: 50 TABLET, FILM COATED ORAL at 13:56

## 2024-10-21 RX ADMIN — CLOPIDOGREL BISULFATE 75 MG: 75 TABLET ORAL at 09:29

## 2024-10-21 RX ADMIN — SIMETHICONE 80 MG: 80 TABLET, CHEWABLE ORAL at 20:20

## 2024-10-21 RX ADMIN — OXYCODONE HYDROCHLORIDE 2.5 MG: 5 TABLET ORAL at 16:59

## 2024-10-21 ASSESSMENT — ACTIVITIES OF DAILY LIVING (ADL)
ADLS_ACUITY_SCORE: 33
ADLS_ACUITY_SCORE: 34
ADLS_ACUITY_SCORE: 33
ADLS_ACUITY_SCORE: 33
ADLS_ACUITY_SCORE: 34
ADLS_ACUITY_SCORE: 34
ADLS_ACUITY_SCORE: 33
ADLS_ACUITY_SCORE: 34
ADLS_ACUITY_SCORE: 33
ADLS_ACUITY_SCORE: 34
ADLS_ACUITY_SCORE: 34
ADLS_ACUITY_SCORE: 33
ADLS_ACUITY_SCORE: 33
ADLS_ACUITY_SCORE: 34
ADLS_ACUITY_SCORE: 33
ADLS_ACUITY_SCORE: 34
ADLS_ACUITY_SCORE: 33
ADLS_ACUITY_SCORE: 34
ADLS_ACUITY_SCORE: 34

## 2024-10-21 NOTE — PLAN OF CARE
Goal Outcome Evaluation:      Plan of Care Reviewed With: patient, family          Outcome Evaluation: pt has been up moving in room with assist, goes short distances without increased in shortness of breath. did not want to take her bowel meds this am, does not want diarrhea. pain has been relieved with morning oxycodone & scheduled tylenol. skin is cool and pale but pt just states she is fatigued.

## 2024-10-21 NOTE — PROGRESS NOTES
CALORIE COUNT      Approximate Oral Intake for:      Calories:526  Protein: 26      Intake from TF/PN:           Estimated Needs:    Calories: 1720  Protein: 69      Summary:  Pt did not take the ensure clear or the special K bar yesterday-she feels like she's drinking pure sure ( ensure clear) and the special K bar was too dry.  She would like a lower sugar supplement-discussed ensure Max which she would like to try.  Pt met ~ 31% of energy needs and ~ 38% protein needs yesterday

## 2024-10-21 NOTE — PROGRESS NOTES
Care Management Follow Up    Length of Stay (days): 13    Expected Discharge Date: 10/22/2024    Anticipated Discharge Plan:       Transportation: TBD    PT Recommendations: Transitional Care Facility  OT Recommendations:  Transitional Care Facility     Barriers to Discharge: medical stability, placement    Prior Living Situation: apartment with spouse    Discussed  Partnership in Safe Discharge Planning  document with patient/family: No     Handoff Completed: No, handoff not indicated or clinically appropriate    Patient/Spokesperson Updated: Yes. Who? Patient & Mother    Additional Information:  Therapy is recommending TCU at this time. CM discussed recommendations with Pt on Saturday (10/19). Pt had requested for more time to think about recommendations but was given a TCU list at the time. CM will meet with Pt today to revisit discharge planning discussion.     12:25 PM  SW met with Pt and her mother Mia. Pat asked for more education regarding TCU and also help walk through some of the options on the list. Mia asked for CM to follow up with them in the morning as they didn't know where to start with reviewing yesterday but now feel better prepared to review together this evening.     Next Steps: Follow up with Pt and family for preferred TCU facilities.       ZORAIDA Mortensen

## 2024-10-21 NOTE — PROGRESS NOTES
'    [facility] ([facility]) progress note  CC: F/U ALEXEI  S: Urine output of 1.2L yesterday and 800 today. Patient reports breathing stable. Eating alright. Swelling stable. Discussed below plan.      A/P:   Acute kidney injury.  Acute tubular necrosis.  Secondary to CABG and mitral valve replacement followed by cardiogenic shock.  Baseline creatinine 0.95 (10/8/2024).    Required CRRT, off 10/13  Off pressors as of 10/14  HD 10/15, 10/17, 10/19 - NO hd today. Monitor for ongoing recovery. Check on tomorrow for HD needs.   Bumex 2 mg po today.   May need outpatient HD on discharge if no renal recovery.   Daily labs, Strict Is/Os, Daily weights   Avoid nephrotoxins  Renally dose meds  Reassess for HD needs daily      2. Status post coronary artery bypass grafting x 4 vessels and mitral valve replacement on 10/8.  Diffuse coronary artery disease     3. Shock liver. Resolved.      4. History of Hodgkin's lymphoma.  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    Arnav Napoles,   [facility]  [phone]      /65 (BP Location: Right arm)   Pulse 93   Temp 98.1  F (36.7  C) (Oral)   Resp 18   Wt 109 kg (240 lb 6.4 oz)   LMP  (LMP Unknown)   SpO2 100%   BMI 41.26 kg/m      I/O last 3 completed shifts:  In: 1120 [P.O.:1120]  Out: 1350 [Urine:1350]    Physical Exam:   GENERAL: NAD  EYES: EOMI   ENT: MMM  RESP: normal effort, RA  CV: regular rate, + leg/arm edema.    GI: ND  SKIN: No rash, pale       Recent Labs   Lab 10/21/24  0452 10/20/24  0452 10/19/24  0451 10/18/24  0508 10/17/24  0544 10/16/24  1834 10/16/24  0435 10/15/24  1859 10/15/24  0648   * 134* 131* 132* 132* 131* 131* 134* 129*   POTASSIUM 3.4 3.4 3.2* 3.5 3.0* 2.9* 3.2* 3.2* 4.0   CHLORIDE 97* 99 95* 97* 95* 95* 97* 101 95*   CO2 22 22 23 24 22 22 24 26 18*   BUN 49.9* 42.0* 50.1* 37.6* 53.2* 49.3* 41.1* 25.0* 54.1*   CR 3.13* 2.77* 3.35* 2.85* 3.53* 3.24* 2.80* 1.74*  3.40*   GFRESTIMATED 17* 20* 16* 20* 15* 17* 20* 35* 16*   CINDY 8.4* 8.1* 8.1* 8.2* 8.0* 8.0* 7.9* 7.6* 7.6*   PHOS  --   --   --  3.5 4.0  --  3.1  --  4.7*   MAG 1.8 1.8 2.0 2.0 2.4*  --  2.4*  --  3.1*   ALBUMIN 3.0*  --  2.9* 2.8* 2.9* 2.8* 2.6* 2.6* 2.8*     Recent Labs   Lab 10/21/24  0452 10/20/24  0452 10/19/24  0451 10/18/24  0508 10/17/24  0544 10/16/24  1156 10/16/24  0435 10/15/24  0648   WBC 13.2* 13.1* 13.6* 13.4* 15.3*  --  19.5* 28.0*  27.9*   HGB 7.6* 7.7* 8.1* 8.3* 8.6* 8.8* 6.8* 7.5*   HCT 23.7* 24.3* 25.6* 25.5* 26.5*  --  21.0* 23.2*   MCV 89 89 89 88 87  --  91 91    246 228 182 157  --  123* 133*       Current Facility-Administered Medications:     acetaminophen (TYLENOL) tablet 500 mg, 500 mg, Oral, Q6H, Catherine Galvez PA-C, 500 mg at 10/21/24 0505    albuterol (PROVENTIL HFA/VENTOLIN HFA) inhaler, 2 puff, Inhalation, Q6H PRN, Catherine Galvez PA-C    atorvastatin (LIPITOR) tablet 80 mg, 80 mg, Oral, At Bedtime, Catherine Galvez PA-C, 80 mg at 10/20/24 1956    bisacodyl (DULCOLAX) suppository 10 mg, 10 mg, Rectal, Daily PRN, Catherine Galvez PA-C    budesonide (PULMICORT) neb solution 1 mg, 1 mg, Nebulization, BID, Catherine Galvez PA-C, 1 mg at 10/21/24 0847    cetirizine (zyrTEC) tablet 10 mg, 10 mg, Oral, Daily, Catherine Galvez PA-C, 10 mg at 10/21/24 0929    clopidogrel (PLAVIX) tablet 75 mg, 75 mg, Oral, Daily, Catherine Galvez PA-C, 75 mg at 10/21/24 0929    glucose gel 15-30 g, 15-30 g, Oral, Q15 Min PRN **OR** dextrose 50 % injection 25-50 mL, 25-50 mL, Intravenous, Q15 Min PRN, 50 mL at 10/14/24 0806 **OR** glucagon injection 1 mg, 1 mg, Subcutaneous, Q15 Min PRN, Catherine Galvez PA-C    fludrocortisone (FLORINEF) tablet 0.1 mg, 0.1 mg, Oral, Daily, Catherine Galvez PA-C, 0.1 mg at 10/21/24 0928    fluticasone (FLONASE) 50 MCG/ACT spray 2 spray, 2 spray, Both Nostrils, Daily PRN, Catherine Galvez PA-C     fluticasone-vilanterol (BREO ELLIPTA) 100-25 MCG/ACT inhaler 1 puff, 1 puff, Inhalation, At Bedtime, Catherine Galvez PA-C, 1 puff at 10/19/24 2154    [COMPLETED] sodium chloride 0.9% DIALYSIS Cath LOCK - RED Lumen, 10 mL, Intracatheter, Once in dialysis/CRRT, 10 mL at 10/17/24 1527 **FOLLOWED BY** heparin 1000 unit/mL DIALYSIS Cath LOCK - RED Lumen, 1.3-2.6 mL, Intracatheter, Q1H PRN, Rohan Polo MD, 1,300 Units at 10/17/24 1628    [COMPLETED] sodium chloride 0.9% DIALYSIS Cath LOCK - BLUE Lumen, 10 mL, Intracatheter, Once in dialysis/CRRT, 10 mL at 10/17/24 1526 **FOLLOWED BY** heparin 1000 unit/mL DIALYSIS Cath LOCK -BLUE Lumen, 1.3-2.6 mL, Intracatheter, Q1H PRN, Rohan Polo MD    heparin lock flush 10 unit/mL injection 5-20 mL, 5-20 mL, Intracatheter, Q24H, Catherine Galvez PA-C, 15 mL at 10/20/24 1957    heparin lock flush 10 unit/mL injection 5-20 mL, 5-20 mL, Intracatheter, Q1H PRN, Catherine Galvez PA-C    hydrALAZINE (APRESOLINE) injection 10 mg, 10 mg, Intravenous, Q30 Min PRN, Catherine Galvez PA-C    hydrOXYzine HCl (ATARAX) tablet 25 mg, 25 mg, Oral, TID PRN, Catherine Galvez PA-C, 25 mg at 10/20/24 1642    ipratropium - albuterol 0.5 mg/2.5 mg/3 mL (DUONEB) neb solution 3 mL, 3 mL, Nebulization, Q4H PRN, Catherine Galvez PA-C, 3 mL at 10/19/24 1310    levothyroxine (SYNTHROID/LEVOTHROID) tablet 125 mcg, 125 mcg, Oral, QAM AC, Catherine Galvez PA-C, 125 mcg at 10/21/24 0634    Lidocaine (LIDOCARE) 4 % Patch 1-2 patch, 1-2 patch, Transdermal, Q24H, Catherine Galvez PA-C, 2 patch at 10/20/24 1642    metoprolol tartrate (LOPRESSOR) tablet 50 mg, 50 mg, Oral, BID, Catherine Galvez PA-C    multivitamin w/minerals (THERA-VIT-M) tablet 1 tablet, 1 tablet, Oral, Daily, Catherine Galvez PA-C, 1 tablet at 10/20/24 1313    naloxone (NARCAN) injection 0.2 mg, 0.2 mg, Intravenous, Q2 Min PRN **OR** naloxone (NARCAN) injection 0.4 mg, 0.4 mg,  Intravenous, Q2 Min PRN **OR** naloxone (NARCAN) injection 0.2 mg, 0.2 mg, Intramuscular, Q2 Min PRN **OR** naloxone (NARCAN) injection 0.4 mg, 0.4 mg, Intramuscular, Q2 Min PRN, Catherine Galvez PA-C    ondansetron (ZOFRAN ODT) ODT tab 4 mg, 4 mg, Oral, Q6H PRN **OR** ondansetron (ZOFRAN) injection 4 mg, 4 mg, Intravenous, Q6H PRN, Catherine Galvez PA-C, 4 mg at 10/10/24 1945    oxyCODONE IR (ROXICODONE) half-tab 2.5 mg, 2.5 mg, Oral, Q4H PRN, 2.5 mg at 10/21/24 0948 **OR** [DISCONTINUED] oxyCODONE (ROXICODONE) tablet 5 mg, 5 mg, Oral, Q4H PRN, David Wade MD, 5 mg at 10/19/24 0902    [DISCONTINUED] pantoprazole (PROTONIX) 2 mg/mL suspension 40 mg, 40 mg, Oral or NG Tube, Daily, 40 mg at 10/08/24 1729 **OR** pantoprazole (PROTONIX) EC tablet 40 mg, 40 mg, Oral, Daily, Catherine Galvez PA-C, 40 mg at 10/21/24 0928    polyethylene glycol (MIRALAX) Packet 17 g, 17 g, Oral, Daily, Catherine Galvez PA-C    prochlorperazine (COMPAZINE) injection 10 mg, 10 mg, Intravenous, Q6H PRN **OR** prochlorperazine (COMPAZINE) tablet 10 mg, 10 mg, Oral, Q6H PRN, Catherine Galvez PA-C    senna-docusate (SENOKOT-S/PERICOLACE) 8.6-50 MG per tablet 1 tablet, 1 tablet, Oral, BID, Catherine Galvez PA-C, 1 tablet at 10/18/24 2121    simethicone (MYLICON) chewable tablet 80 mg, 80 mg, Oral, 4x Daily PRN, David Wade MD, 80 mg at 10/20/24 1046    sodium chloride (PF) 0.9% PF flush 10-20 mL, 10-20 mL, Intracatheter, q1 min prn, Morgan Galvezrielle K, PA-C    sodium chloride (PF) 0.9% PF flush 10-40 mL, 10-40 mL, Intracatheter, Q8H, Herson Galvezelle K, PA-C, 20 mL at 10/21/24 0452    sodium chloride (PF) 0.9% PF flush 10-40 mL, 10-40 mL, Intracatheter, Q7 Days, Lenny Catherine K, PA-C, 10 mL at 10/18/24 1703    sodium chloride (PF) 0.9% PF flush 10-40 mL, 10-40 mL, Intracatheter, Q1H PRN, Morgan Galvezrielle K, PA-C    sodium chloride 0.9% BOLUS 100-150 mL, 100-150 mL, Intravenous,  Q15 Min PRN, Rohan Polo MD    sodium chloride 0.9% BOLUS 100-150 mL, 100-150 mL, Intravenous, Q15 Min PRN, Rohan Polo MD    Vitamin D3 (CHOLECALCIFEROL) tablet 125 mcg, 125 mcg, Oral, Daily, Catherine Galvez PA-C, 125 mcg at 10/21/24 0928    Warfarin Dose Required Daily - Pharmacist Managed, 1 each, Oral, See Admin Instructions, Catherine Galvez PA-C    Labs personally reviewed today during this evaluation at 1130am.

## 2024-10-21 NOTE — PROGRESS NOTES
"CVTS Daily Progress Note  POD#13 s/p CABG x4 (LIMA>LAD, rSVG>RPDA, rSVG>LPL, rSVG>D2), RLE EVH, Attempted MVR/r, MVR ( 29 mm [employer] Mechanical Mitral Valve), LAAE  Attending: Mauro  LOS: 13    SUBJECTIVE/INTERVAL EVENTS:    No acute events overnight. Paroxysmal a fib, rate controlled. UOP still not proportional to hypervolemia, Cr variable. Tolerating HD well (10/15,17, 19). LFTs continue to downtrend. Maintaining oxygen saturations on home CPAP/RA. Normotensive. Ambulating w/ therapy as able, improved effort. Pain well controlled. +BM. Tolerating regular diet, PO intake and appetite improving. INR therapeutic, heparin gtt stopped 10/15. Patient denies new chest pain, shortness of breath, abdominal pain, calf pain, nausea. Still feels puffy but reports improvement. Patient has no questions today.    OBJECTIVE:  Temp:  [97.5  F (36.4  C)-98.7  F (37.1  C)] 97.8  F (36.6  C)  Pulse:  [68-95] 93  Resp:  [18-22] 18  BP: (105-124)/(56-89) 124/89  SpO2:  [98 %-100 %] 100 %  Vitals:    10/17/24 0600 10/18/24 1100 10/19/24 0700 10/20/24 0632   Weight: 114.2 kg (251 lb 11.2 oz) 111.6 kg (246 lb) 112 kg (246 lb 14.4 oz) 108.5 kg (239 lb 4.8 oz)    10/21/24 0624   Weight: 109 kg (240 lb 6.4 oz)       Clinically Significant Risk Factors         # Hyponatremia: Lowest Na = 133 mmol/L in last 2 days, will monitor as appropriate  # Hypochloremia: Lowest Cl = 97 mmol/L in last 2 days, will monitor as appropriate      # Hypoalbuminemia: Lowest albumin = 2.6 g/dL at 10/16/2024  4:35 AM, will monitor as appropriate       # Hypertension: Noted on problem list           # Severe Obesity: Estimated body mass index is 41.26 kg/m  as calculated from the following:    Height as of 9/25/24: 1.626 m (5' 4\").    Weight as of this encounter: 109 kg (240 lb 6.4 oz).   # Moderate Malnutrition: based on nutrition assessment     # History of CABG: noted on surgical history                        Current Medications:    Scheduled " Meds:  Current Facility-Administered Medications   Medication Dose Route Frequency Provider Last Rate Last Admin    acetaminophen (TYLENOL) tablet 500 mg  500 mg Oral Q6H Catherine Galvez PA-C   500 mg at 10/21/24 0505    atorvastatin (LIPITOR) tablet 80 mg  80 mg Oral At Bedtime Catherine Galvez PA-C   80 mg at 10/20/24 1956    budesonide (PULMICORT) neb solution 1 mg  1 mg Nebulization BID Catherine Galvez PA-C   1 mg at 10/20/24 1854    cetirizine (zyrTEC) tablet 10 mg  10 mg Oral Daily Catherine Galvez PA-C   10 mg at 10/20/24 0820    clopidogrel (PLAVIX) tablet 75 mg  75 mg Oral Daily Catherine Galvez PA-C   75 mg at 10/20/24 0819    fludrocortisone (FLORINEF) tablet 0.1 mg  0.1 mg Oral Daily Catherine Galvez PA-C   0.1 mg at 10/20/24 0821    fluticasone-vilanterol (BREO ELLIPTA) 100-25 MCG/ACT inhaler 1 puff  1 puff Inhalation At Bedtime Catherine Galvez PA-C   1 puff at 10/19/24 2154    heparin lock flush 10 unit/mL injection 5-20 mL  5-20 mL Intracatheter Q24H Catherine Galvez PA-C   15 mL at 10/20/24 1957    levothyroxine (SYNTHROID/LEVOTHROID) tablet 125 mcg  125 mcg Oral QAM AC Catherine Galvez PA-C   125 mcg at 10/21/24 0634    Lidocaine (LIDOCARE) 4 % Patch 1-2 patch  1-2 patch Transdermal Q24H Catherine Galvez PA-C   2 patch at 10/20/24 1642    metoprolol tartrate (LOPRESSOR) tablet 50 mg  50 mg Oral BID Catherine Galvez PA-C        multivitamin w/minerals (THERA-VIT-M) tablet 1 tablet  1 tablet Oral Daily Catherine Galvez PA-C   1 tablet at 10/20/24 1313    pantoprazole (PROTONIX) EC tablet 40 mg  40 mg Oral Daily Catherine Galvez PA-C   40 mg at 10/20/24 0819    polyethylene glycol (MIRALAX) Packet 17 g  17 g Oral Daily Catherine Galvez PA-C        senna-docusate (SENOKOT-S/PERICOLACE) 8.6-50 MG per tablet 1 tablet  1 tablet Oral BID Catherine Galvez PA-C   1 tablet at 10/18/24 2121    sodium chloride (PF) 0.9% PF flush 10-40  mL  10-40 mL Intracatheter Q8H Catherine Galvez PA-C   20 mL at 10/21/24 0452    sodium chloride (PF) 0.9% PF flush 10-40 mL  10-40 mL Intracatheter Q7 Days Catherine Galvez PA-C   10 mL at 10/18/24 1703    Vitamin D3 (CHOLECALCIFEROL) tablet 125 mcg  125 mcg Oral Daily Catherine Galvez PA-C   125 mcg at 10/20/24 0820    Warfarin Dose Required Daily - Pharmacist Managed  1 each Oral See Admin Instructions Catherine Galvez PA-C         Continuous Infusions:  Current Facility-Administered Medications   Medication Dose Route Frequency Provider Last Rate Last Admin     PRN Meds:.  Current Facility-Administered Medications   Medication Dose Route Frequency Provider Last Rate Last Admin    albuterol (PROVENTIL HFA/VENTOLIN HFA) inhaler  2 puff Inhalation Q6H PRN Catherine Galvez PA-C        bisacodyl (DULCOLAX) suppository 10 mg  10 mg Rectal Daily PRN Catherine Galvez PA-C        glucose gel 15-30 g  15-30 g Oral Q15 Min PRN Catherine Galvez PA-C        Or    dextrose 50 % injection 25-50 mL  25-50 mL Intravenous Q15 Min PRN Catherine Galvez PA-C   50 mL at 10/14/24 0806    Or    glucagon injection 1 mg  1 mg Subcutaneous Q15 Min PRN Catherine Galvez PA-C        fluticasone (FLONASE) 50 MCG/ACT spray 2 spray  2 spray Both Nostrils Daily PRN Catherine Galvez PA-C        heparin 1000 unit/mL DIALYSIS Cath LOCK - RED Lumen  1.3-2.6 mL Intracatheter Q1H PRN Rohan Polo MD   1,300 Units at 10/17/24 1628    heparin 1000 unit/mL DIALYSIS Cath LOCK -BLUE Lumen  1.3-2.6 mL Intracatheter Q1H PRN Rohan Polo MD        heparin lock flush 10 unit/mL injection 5-20 mL  5-20 mL Intracatheter Q1H PRN Catherine Galvez PA-C        hydrALAZINE (APRESOLINE) injection 10 mg  10 mg Intravenous Q30 Min PRN Catherine Galvez PA-C        hydrOXYzine HCl (ATARAX) tablet 25 mg  25 mg Oral TID PRN Catherine Galvez PA-C   25 mg at 10/20/24 1642    ipratropium -  albuterol 0.5 mg/2.5 mg/3 mL (DUONEB) neb solution 3 mL  3 mL Nebulization Q4H PRN Catherine Galvez PA-C   3 mL at 10/19/24 1310    naloxone (NARCAN) injection 0.2 mg  0.2 mg Intravenous Q2 Min PRN Catherine Galvez PA-C        Or    naloxone (NARCAN) injection 0.4 mg  0.4 mg Intravenous Q2 Min PRN Catherine Galvez PA-C        Or    naloxone (NARCAN) injection 0.2 mg  0.2 mg Intramuscular Q2 Min PRN Catherine Galvez PA-C        Or    naloxone (NARCAN) injection 0.4 mg  0.4 mg Intramuscular Q2 Min PRN Catherine Galvez PA-C        ondansetron (ZOFRAN ODT) ODT tab 4 mg  4 mg Oral Q6H PRN Catherine Galvez PA-C        Or    ondansetron (ZOFRAN) injection 4 mg  4 mg Intravenous Q6H PRN Catherine Galvez PA-C   4 mg at 10/10/24 1945    oxyCODONE IR (ROXICODONE) half-tab 2.5 mg  2.5 mg Oral Q4H PRN Catherine Galvez PA-C   2.5 mg at 10/21/24 0505    prochlorperazine (COMPAZINE) injection 10 mg  10 mg Intravenous Q6H PRN Catherine Galvez PA-C        Or    prochlorperazine (COMPAZINE) tablet 10 mg  10 mg Oral Q6H PRN Catherine Galvez PA-C        simethicone (MYLICON) chewable tablet 80 mg  80 mg Oral 4x Daily PRN David Wade MD   80 mg at 10/20/24 1046    sodium chloride (PF) 0.9% PF flush 10-20 mL  10-20 mL Intracatheter q1 min prn Catherine Galvez PA-C        sodium chloride (PF) 0.9% PF flush 10-40 mL  10-40 mL Intracatheter Q1H PRN Catherine Galvez PA-C        sodium chloride 0.9% BOLUS 100-150 mL  100-150 mL Intravenous Q15 Min PRN Rohan Polo MD        sodium chloride 0.9% BOLUS 100-150 mL  100-150 mL Intravenous Q15 Min PRN Rohan Polo MD           Cardiographics:    Telemetry monitoring demonstrates a fib w/ rates in the 90s per my personal review.    Imaging:  Results for orders placed or performed during the hospital encounter of 10/08/24   XR Chest Port 1 View    Impression    IMPRESSION: Interval sternotomy, CABG procedure  and mitral valve repair. Endotracheal tube proximally 2 cm above the sofia. Nasogastric tube in the stomach. Bilateral chest tubes and midline mediastinal drain in expected position. Epicardial leads are   present. Left IJ line in the brachiocephalic vein.    Low lung volumes. No evidence for CHF or pneumonia. No pleural effusion or pneumothorax.   XR Chest Port 1 View    Impression    IMPRESSION: Poststernotomy changes with prosthetic mitral valve. Patient's been extubated and the NG tube has been removed. Mediastinal and pleural chest tubes are in place.    Left IJ cordis sheath is in the distal left innominate artery. Catheter has a very subtle kink within it 3.5 centimeters from the tip.    Low lung volumes. No pneumothorax. Likely trace left effusion at the base. No signs of pneumonia or failure. Heart is of normal size.   XR Abdomen Port 1 View    Impression    IMPRESSION: Orogastric tube tip in the proximal stomach and oriented superiorly towards the left diaphragm. Visualized bowel gas pattern unremarkable. Numerous tubes and lines lower chest and upper abdomen as described on chest x-ray report from today.        Labs, personally reviewed.  Hemoglobin   Date Value Ref Range Status   10/21/2024 7.6 (L) 11.7 - 15.7 g/dL Final   10/20/2024 7.7 (L) 11.7 - 15.7 g/dL Final   10/19/2024 8.1 (L) 11.7 - 15.7 g/dL Final   06/09/2015 13.5 11.7 - 15.7 g/dL Final   03/11/2015 12.3 11.7 - 15.7 g/dL Final   01/08/2015 13.6 11.7 - 15.7 g/dL Final     WBC   Date Value Ref Range Status   06/09/2015 7.9 4.0 - 11.0 10e9/L Final   03/11/2015 9.4 4.0 - 11.0 10e9/L Final   01/08/2015 8.1 4.0 - 11.0 10e9/L Final     WBC Count   Date Value Ref Range Status   10/21/2024 13.2 (H) 4.0 - 11.0 10e3/uL Final   10/20/2024 13.1 (H) 4.0 - 11.0 10e3/uL Final   10/19/2024 13.6 (H) 4.0 - 11.0 10e3/uL Final     Platelet Count   Date Value Ref Range Status   10/21/2024 305 150 - 450 10e3/uL Final   10/20/2024 246 150 - 450 10e3/uL Final    10/19/2024 228 150 - 450 10e3/uL Final   06/09/2015 219 150 - 450 10e9/L Final   03/11/2015 240 150 - 450 10e9/L Final   01/08/2015 221 150 - 450 10e9/L Final     Creatinine   Date Value Ref Range Status   10/21/2024 3.13 (H) 0.51 - 0.95 mg/dL Final   10/20/2024 2.77 (H) 0.51 - 0.95 mg/dL Final   10/19/2024 3.35 (H) 0.51 - 0.95 mg/dL Final   06/09/2015 0.80 0.52 - 1.04 mg/dL Final   03/11/2015 0.87 0.52 - 1.04 mg/dL Final   01/08/2015 0.79 0.52 - 1.04 mg/dL Final     Potassium   Date Value Ref Range Status   10/21/2024 3.4 3.4 - 5.3 mmol/L Final   10/20/2024 3.4 3.4 - 5.3 mmol/L Final   10/19/2024 3.2 (L) 3.4 - 5.3 mmol/L Final   06/09/2015 3.8 3.4 - 5.3 mmol/L Final   03/11/2015 3.9 3.4 - 5.3 mmol/L Final   01/08/2015 4.1 3.4 - 5.3 mmol/L Final     Potassium POCT   Date Value Ref Range Status   10/08/2024 4.4 3.4 - 5.3 mmol/L Final   10/08/2024 4.0 3.4 - 5.3 mmol/L Final   10/08/2024 4.1 3.4 - 5.3 mmol/L Final     Magnesium   Date Value Ref Range Status   10/21/2024 1.8 1.7 - 2.3 mg/dL Final   10/20/2024 1.8 1.7 - 2.3 mg/dL Final   10/19/2024 2.0 1.7 - 2.3 mg/dL Final   06/09/2015 1.8 1.6 - 2.3 mg/dL Final   03/11/2015 1.7 1.6 - 2.3 mg/dL Final   01/08/2015 1.8 1.6 - 2.3 mg/dL Final          I/O:  I/O last 3 completed shifts:  In: 1120 [P.O.:1120]  Out: 1350 [Urine:1350]       Physical Exam:    General: Patient seen in chair this AM. NAD. Pleasant, conversant.   HEENT: DORITA, no sclera icterus, moist mucosa  CV: RRR on monitor. 2+ peripheral pulses in all extremities. Moderate peripheral edema. Incision C/D/I.  Pulm: Satting 100% w/ nonlabored breathing on RA  Abd: Soft, NT, ND  : Voiding  Ext: Moderate pedal edema, SCDs in place, warm, distal pulses intact  Neuro: CNs grossly intact, FAM, A&Ox3      ASSESSMENT/PLAN:    Jory Dillardez Halie is a 49 year old female with a history of GIA, asthma, GERD,  hypothyroidism, h/o PE on plavix, carotid stenosis, Hodgkin's lymphoma (s/p chemo/radiation and bone marrow  transplant x2 after recurrence) currently in remission who is s/p CABG x4, RLE EVH, attempted mitral repair, mechanical MVR, LAAE.    Active Problems:    Mitral regurgitation    Coronary artery disease involving native coronary artery of native heart with angina pectoris (H)  #Acute Renal Failure d/t ATN, ongoing.      NEURO:   - Scheduled lidocaine patches and PRN oxycodone for pain  - PTA bupropion discontinued (somnolence)  - Tylenol 500 mg q6hrs (max 2g per day given recent hepatic dysfunction)    CV:  - Pre-op EF 55-60%  - Chest tubes and TPW's removed POD#5  - Normotensive  - Metop 50 mg BID  - Plavix daily indefinitely d/t unclear h/o anaphylaxis to ASA  - Atorvastatin 80mg daily  - Warfarin for mechanical MVR, INR therapeutic, heparin gtt stopped 10/15  - Paroxysmal rate controlled afib - s/p amiodarone bolus/gtt 10/13 (ok per Mauro). Holding given LFTs and allergy to PO amio additive. Consider amio if ongoing a fib as hypervolemia improves  - New baseline echo prior to discharge when closer to preop weight for new baseline s/p MVR and to eval for pericardial effusion on warfarin/plavix    PULM:   - Extubated on POD#0  - Maintaining O2 sats on room air w/ home CPAP qHS  - Encourage pulmonary toilet  - PTA zyrtec, fludrocortisone, flonase, simethicone, albuterol, pulmicort, breo ellipta, duoneb    FEN/GI:  - Continue electrolyte replacement protocol  - Regular diet  - Bowel regimen, LBM 10/19  - Ischemic hepatitis - LFTs downtrending, check q48 while inpatient  - Hepatic w/u and labs unremarkable  - PTA vitamin D3     RENAL:  - Nephrology consulted for ARF w/ decreased UOP, appreciate. Following.  - CRRT 10/11 - 10/13  - HD 10/15, 17, & 19  - Strict I/O  - Daily renal panel  - Cr elevated 2.7-3.1 (baseline ~0.9)  - UA/Urine cx 10/10 & 10/13 NG    HEME:  - H/o bone marrow tx. Needs irradiated blood  - Acute blood loss anemia post-op.   - Hgb continues to drift, likely dilutional component  - Warfarin INR  [facility]  - HIT screen negative.  - INR 2.86 (2.80)  - Transfusion hx  - 2u PRBCs periop  - 1u PRBCs 10/11  - 1u PRBCs 10/16    ID:  - Lydia op ppx complete. Afebrile. Leukocytosis -  WBC 13.2 (13.1)  - UA/Urine cx 10/10 & 10/13 NG.  - Blood cx 10/9 & 10/13 NG  - ID consult for ?sepsis, appreciate. Signed off  - CBC qday    ENDO:   - HbA1c 5.7%  - Adrenal, thyroid, and pancreatitis labs unremarkable  - PTA Metformin discontinued (renal function)  - PTA synthroid    PPx:   - DVT: SCDs, SQ heparin TID, ambulation   - GI: Protonix 40mg IV/PO daily    DISPO:   - Continue general tele care (POD#8)  - PT/OT recs at discharge: TCU pending progress  - Medically Ready for Discharge: Anticipated in 2-4 Days pending volume status and rehab progress         Patient discussed w/ Dr. Hanson. Plan to touch base w/ nephrology today.      Eva Galvez PA-C  [facility] Cardiothoracic Surgery  Secure Chat or Vocera  October 21, 2024

## 2024-10-21 NOTE — PLAN OF CARE
Goal Outcome Evaluation:    Problem: Pain Acute  Goal: Optimal Pain Control and Function  Outcome: Progressing     Problem: Gas Exchange Impaired  Goal: Optimal Gas Exchange  Outcome: Progressing    Pt endorsed 6/10 sternal pain. Treated with scheduled Tylenol and PRN Oxycodone 2.5mg x1.  Still experiencing shortness of breath. Shallow respirations noted. Satting well on RA while awake, CPAP w/ 2L bled in overnight.   Incisions are open to air. No drainage or sign of infection noted.   Tele: alternating between a-fib and a-flutter.       Naeem Davenport RN

## 2024-10-22 ENCOUNTER — APPOINTMENT (OUTPATIENT)
Dept: OCCUPATIONAL THERAPY | Facility: HOSPITAL | Age: 49
DRG: 219 | End: 2024-10-22
Attending: SURGERY
Payer: COMMERCIAL

## 2024-10-22 ENCOUNTER — APPOINTMENT (OUTPATIENT)
Dept: PHYSICAL THERAPY | Facility: HOSPITAL | Age: 49
DRG: 219 | End: 2024-10-22
Attending: SURGERY
Payer: COMMERCIAL

## 2024-10-22 LAB
ANION GAP SERPL CALCULATED.3IONS-SCNC: 16 MMOL/L (ref 7–15)
BUN SERPL-MCNC: 56.3 MG/DL (ref 6–20)
CA-I BLD-MCNC: 4.5 MG/DL (ref 4.4–5.2)
CALCIUM SERPL-MCNC: 8.5 MG/DL (ref 8.8–10.4)
CHLORIDE SERPL-SCNC: 96 MMOL/L (ref 98–107)
CREAT SERPL-MCNC: 3.54 MG/DL (ref 0.51–0.95)
EGFRCR SERPLBLD CKD-EPI 2021: 15 ML/MIN/1.73M2
ERYTHROCYTE [DISTWIDTH] IN BLOOD BY AUTOMATED COUNT: 18 % (ref 10–15)
GLUCOSE SERPL-MCNC: 106 MG/DL (ref 70–99)
HCO3 SERPL-SCNC: 22 MMOL/L (ref 22–29)
HCT VFR BLD AUTO: 24.6 % (ref 35–47)
HGB BLD-MCNC: 7.8 G/DL (ref 11.7–15.7)
INR PPP: 2.76 (ref 0.85–1.15)
MAGNESIUM SERPL-MCNC: 1.7 MG/DL (ref 1.7–2.3)
MCH RBC QN AUTO: 28.4 PG (ref 26.5–33)
MCHC RBC AUTO-ENTMCNC: 31.7 G/DL (ref 31.5–36.5)
MCV RBC AUTO: 90 FL (ref 78–100)
PLATELET # BLD AUTO: 412 10E3/UL (ref 150–450)
POTASSIUM SERPL-SCNC: 3.4 MMOL/L (ref 3.4–5.3)
RBC # BLD AUTO: 2.75 10E6/UL (ref 3.8–5.2)
SODIUM SERPL-SCNC: 134 MMOL/L (ref 135–145)
WBC # BLD AUTO: 13.8 10E3/UL (ref 4–11)

## 2024-10-22 PROCEDURE — 250N000013 HC RX MED GY IP 250 OP 250 PS 637

## 2024-10-22 PROCEDURE — 97116 GAIT TRAINING THERAPY: CPT | Mod: GP

## 2024-10-22 PROCEDURE — 85014 HEMATOCRIT: CPT

## 2024-10-22 PROCEDURE — 94640 AIRWAY INHALATION TREATMENT: CPT

## 2024-10-22 PROCEDURE — 210N000001 HC R&B IMCU HEART CARE

## 2024-10-22 PROCEDURE — 94660 CPAP INITIATION&MGMT: CPT

## 2024-10-22 PROCEDURE — 94640 AIRWAY INHALATION TREATMENT: CPT | Mod: 76

## 2024-10-22 PROCEDURE — 80048 BASIC METABOLIC PNL TOTAL CA: CPT | Performed by: INTERNAL MEDICINE

## 2024-10-22 PROCEDURE — 97110 THERAPEUTIC EXERCISES: CPT | Mod: GO

## 2024-10-22 PROCEDURE — 250N000011 HC RX IP 250 OP 636

## 2024-10-22 PROCEDURE — 999N000157 HC STATISTIC RCP TIME EA 10 MIN

## 2024-10-22 PROCEDURE — 85610 PROTHROMBIN TIME: CPT

## 2024-10-22 PROCEDURE — 250N000011 HC RX IP 250 OP 636: Performed by: INTERNAL MEDICINE

## 2024-10-22 PROCEDURE — 82330 ASSAY OF CALCIUM: CPT

## 2024-10-22 PROCEDURE — 97530 THERAPEUTIC ACTIVITIES: CPT | Mod: GP

## 2024-10-22 PROCEDURE — 94799 UNLISTED PULMONARY SVC/PX: CPT

## 2024-10-22 PROCEDURE — 83735 ASSAY OF MAGNESIUM: CPT

## 2024-10-22 PROCEDURE — 97110 THERAPEUTIC EXERCISES: CPT | Mod: GP

## 2024-10-22 PROCEDURE — 250N000013 HC RX MED GY IP 250 OP 250 PS 637: Performed by: SURGERY

## 2024-10-22 PROCEDURE — 250N000009 HC RX 250

## 2024-10-22 RX ORDER — BUMETANIDE 0.25 MG/ML
2 INJECTION, SOLUTION INTRAMUSCULAR; INTRAVENOUS DAILY
Status: DISCONTINUED | OUTPATIENT
Start: 2024-10-22 | End: 2024-10-25

## 2024-10-22 RX ORDER — POTASSIUM CHLORIDE 1500 MG/1
20 TABLET, EXTENDED RELEASE ORAL ONCE
Status: COMPLETED | OUTPATIENT
Start: 2024-10-22 | End: 2024-10-22

## 2024-10-22 RX ORDER — METOPROLOL TARTRATE 25 MG/1
75 TABLET, FILM COATED ORAL 2 TIMES DAILY
Status: DISCONTINUED | OUTPATIENT
Start: 2024-10-22 | End: 2024-10-27

## 2024-10-22 RX ORDER — MAGNESIUM SULFATE HEPTAHYDRATE 40 MG/ML
2 INJECTION, SOLUTION INTRAVENOUS ONCE
Status: COMPLETED | OUTPATIENT
Start: 2024-10-22 | End: 2024-10-22

## 2024-10-22 RX ORDER — WARFARIN SODIUM 1 MG/1
1 TABLET ORAL
Status: COMPLETED | OUTPATIENT
Start: 2024-10-22 | End: 2024-10-22

## 2024-10-22 RX ADMIN — CETIRIZINE HYDROCHLORIDE 10 MG: 10 TABLET, FILM COATED ORAL at 08:27

## 2024-10-22 RX ADMIN — FLUDROCORTISONE ACETATE 0.1 MG: 0.1 TABLET ORAL at 08:28

## 2024-10-22 RX ADMIN — PANTOPRAZOLE SODIUM 40 MG: 40 TABLET, DELAYED RELEASE ORAL at 08:27

## 2024-10-22 RX ADMIN — ACETAMINOPHEN 500 MG: 500 TABLET ORAL at 06:26

## 2024-10-22 RX ADMIN — SENNOSIDES AND DOCUSATE SODIUM 1 TABLET: 8.6; 5 TABLET ORAL at 14:18

## 2024-10-22 RX ADMIN — LEVOTHYROXINE SODIUM 125 MCG: 0.03 TABLET ORAL at 08:27

## 2024-10-22 RX ADMIN — MAGNESIUM SULFATE HEPTAHYDRATE 2 G: 40 INJECTION, SOLUTION INTRAVENOUS at 08:30

## 2024-10-22 RX ADMIN — BUDESONIDE 1 MG: 0.5 INHALANT ORAL at 19:33

## 2024-10-22 RX ADMIN — OXYCODONE HYDROCHLORIDE 2.5 MG: 5 TABLET ORAL at 19:29

## 2024-10-22 RX ADMIN — WARFARIN SODIUM 1 MG: 1 TABLET ORAL at 18:20

## 2024-10-22 RX ADMIN — Medication 125 MCG: at 08:27

## 2024-10-22 RX ADMIN — POTASSIUM CHLORIDE 20 MEQ: 1500 TABLET, EXTENDED RELEASE ORAL at 08:34

## 2024-10-22 RX ADMIN — LIDOCAINE 1 PATCH: 4 PATCH TOPICAL at 18:21

## 2024-10-22 RX ADMIN — METOPROLOL TARTRATE 75 MG: 25 TABLET, FILM COATED ORAL at 20:46

## 2024-10-22 RX ADMIN — BUDESONIDE 1 MG: 0.5 INHALANT ORAL at 08:01

## 2024-10-22 RX ADMIN — ATORVASTATIN CALCIUM 80 MG: 40 TABLET, FILM COATED ORAL at 20:45

## 2024-10-22 RX ADMIN — METOPROLOL TARTRATE 75 MG: 25 TABLET, FILM COATED ORAL at 08:27

## 2024-10-22 RX ADMIN — CLOPIDOGREL BISULFATE 75 MG: 75 TABLET ORAL at 08:27

## 2024-10-22 RX ADMIN — OXYCODONE HYDROCHLORIDE 2.5 MG: 5 TABLET ORAL at 08:25

## 2024-10-22 RX ADMIN — ACETAMINOPHEN 500 MG: 500 TABLET ORAL at 23:32

## 2024-10-22 RX ADMIN — ACETAMINOPHEN 500 MG: 500 TABLET ORAL at 18:19

## 2024-10-22 RX ADMIN — Medication 1 TABLET: at 12:29

## 2024-10-22 RX ADMIN — BUMETANIDE 2 MG: 0.25 INJECTION INTRAMUSCULAR; INTRAVENOUS at 14:08

## 2024-10-22 RX ADMIN — ACETAMINOPHEN 500 MG: 500 TABLET ORAL at 12:29

## 2024-10-22 ASSESSMENT — ACTIVITIES OF DAILY LIVING (ADL)
ADLS_ACUITY_SCORE: 33
ADLS_ACUITY_SCORE: 33
ADLS_ACUITY_SCORE: 34
ADLS_ACUITY_SCORE: 33
ADLS_ACUITY_SCORE: 34
ADLS_ACUITY_SCORE: 33
ADLS_ACUITY_SCORE: 34
ADLS_ACUITY_SCORE: 34
ADLS_ACUITY_SCORE: 33
ADLS_ACUITY_SCORE: 33
ADLS_ACUITY_SCORE: 34
ADLS_ACUITY_SCORE: 33
ADLS_ACUITY_SCORE: 34
ADLS_ACUITY_SCORE: 33

## 2024-10-22 NOTE — PLAN OF CARE
Problem: Adult Inpatient Plan of Care  Goal: Absence of Hospital-Acquired Illness or Injury  Intervention: Identify and Manage Fall Risk  Recent Flowsheet Documentation  Taken 10/21/2024 2330 by Desiree Guevara, RN  Safety Promotion/Fall Prevention:   activity supervised   clutter free environment maintained   lighting adjusted   mobility aid in reach   nonskid shoes/slippers when out of bed   patient and family education   room organization consistent   safety round/check completed   supervised activity   toileting scheduled     Problem: Adult Inpatient Plan of Care  Goal: Absence of Hospital-Acquired Illness or Injury  Intervention: Prevent Skin Injury  Recent Flowsheet Documentation  Taken 10/21/2024 2330 by Desiree Guevara, RN  Body Position:   supine, legs elevated   supine, head elevated     Problem: Adult Inpatient Plan of Care  Goal: Optimal Comfort and Wellbeing  Outcome: Progressing  Intervention: Monitor Pain and Promote Comfort  Recent Flowsheet Documentation  Taken 10/21/2024 2330 by Desiree Guevara, RN  Pain Management Interventions: medication (see MAR)   Goal Outcome Evaluation:    A  & O x 4, VSS, on RA and cpap at bedtime, afib/aflutter rates controlled. Scheduled tylenol administered for incisional pain. Pt slept between cares, pleasant and cooperative.

## 2024-10-22 NOTE — PROGRESS NOTES
"CVTS Daily Progress Note  POD#14 s/p CABG x4 (LIMA>LAD, rSVG>RPDA, rSVG>LPL, rSVG>D2), RLE EVH, Attempted MVR/r, MVR ( 29 mm [employer] Mechanical Mitral Valve), LAAE  Attending: Mauro  LOS: 14    SUBJECTIVE/INTERVAL EVENTS:    No acute events overnight. Paroxysmal a fib, rate controlled. UOP still not proportional to hypervolemia, Cr variable. Tolerating HD well (10/15,17, 19). LFTs continue to downtrend. Maintaining oxygen saturations on home CPAP/RA. Normotensive. Ambulating w/ therapy as able, improved effort. Pain well controlled. +BM. Tolerating regular diet, PO intake and appetite improving. INR therapeutic, heparin gtt stopped 10/15. Patient denies new chest pain, shortness of breath, abdominal pain, calf pain, nausea. Still feels puffy but reports improvement. Patient has no questions today.    OBJECTIVE:  Temp:  [97.6  F (36.4  C)-98.4  F (36.9  C)] 98.4  F (36.9  C)  Pulse:  [68-92] 78  Resp:  [18-20] 20  BP: (110-134)/(58-76) 123/62  SpO2:  [96 %-100 %] 100 %  Vitals:    10/17/24 0600 10/18/24 1100 10/19/24 0700 10/20/24 0632   Weight: 114.2 kg (251 lb 11.2 oz) 111.6 kg (246 lb) 112 kg (246 lb 14.4 oz) 108.5 kg (239 lb 4.8 oz)    10/21/24 0624   Weight: 109 kg (240 lb 6.4 oz)       Clinically Significant Risk Factors         # Hyponatremia: Lowest Na = 133 mmol/L in last 2 days, will monitor as appropriate  # Hypochloremia: Lowest Cl = 96 mmol/L in last 2 days, will monitor as appropriate      # Hypoalbuminemia: Lowest albumin = 2.6 g/dL at 10/16/2024  4:35 AM, will monitor as appropriate       # Hypertension: Noted on problem list           # Severe Obesity: Estimated body mass index is 41.26 kg/m  as calculated from the following:    Height as of 9/25/24: 1.626 m (5' 4\").    Weight as of this encounter: 109 kg (240 lb 6.4 oz).   # Moderate Malnutrition: based on nutrition assessment     # History of CABG: noted on surgical history                          Current Medications:    Scheduled " Meds:  Current Facility-Administered Medications   Medication Dose Route Frequency Provider Last Rate Last Admin    acetaminophen (TYLENOL) tablet 500 mg  500 mg Oral Q6H Catherine Galvez PA-C   500 mg at 10/22/24 0626    atorvastatin (LIPITOR) tablet 80 mg  80 mg Oral At Bedtime Catherine Galvez PA-C   80 mg at 10/21/24 2020    budesonide (PULMICORT) neb solution 1 mg  1 mg Nebulization BID Catherine Galvez PA-C   1 mg at 10/22/24 0801    cetirizine (zyrTEC) tablet 10 mg  10 mg Oral Daily Catherine Galvez PA-C   10 mg at 10/21/24 0929    clopidogrel (PLAVIX) tablet 75 mg  75 mg Oral Daily Catherine Galvez PA-C   75 mg at 10/21/24 0929    fludrocortisone (FLORINEF) tablet 0.1 mg  0.1 mg Oral Daily Catherine Galvez PA-C   0.1 mg at 10/21/24 0928    fluticasone-vilanterol (BREO ELLIPTA) 100-25 MCG/ACT inhaler 1 puff  1 puff Inhalation At Bedtime Catherine Galvez PA-C   1 puff at 10/21/24 2021    heparin lock flush 10 unit/mL injection 5-20 mL  5-20 mL Intracatheter Q24H Catherine Galvez PA-C   15 mL at 10/20/24 1957    levothyroxine (SYNTHROID/LEVOTHROID) tablet 125 mcg  125 mcg Oral QAM AC Catherine Galvez PA-C   125 mcg at 10/21/24 0634    Lidocaine (LIDOCARE) 4 % Patch 1-2 patch  1-2 patch Transdermal Q24H Catherine Galvez PA-C   2 patch at 10/21/24 1658    metoprolol tartrate (LOPRESSOR) tablet 50 mg  50 mg Oral BID Catherine Galvez PA-C   50 mg at 10/21/24 2020    multivitamin w/minerals (THERA-VIT-M) tablet 1 tablet  1 tablet Oral Daily Catherine Galvez PA-C   1 tablet at 10/21/24 1229    pantoprazole (PROTONIX) EC tablet 40 mg  40 mg Oral Daily Catherine Galvez PA-C   40 mg at 10/21/24 0928    polyethylene glycol (MIRALAX) Packet 17 g  17 g Oral Daily Catherine Galvez PA-C        senna-docusate (SENOKOT-S/PERICOLACE) 8.6-50 MG per tablet 1 tablet  1 tablet Oral BID Catherine Galvez PA-C   1 tablet at 10/18/24 1851    sodium chloride (PF)  0.9% PF flush 10-40 mL  10-40 mL Intracatheter Q8H Catherine Galvez PA-C   30 mL at 10/22/24 0623    sodium chloride (PF) 0.9% PF flush 10-40 mL  10-40 mL Intracatheter Q7 Days Catherine Galvez PA-C   10 mL at 10/18/24 1703    Vitamin D3 (CHOLECALCIFEROL) tablet 125 mcg  125 mcg Oral Daily Catherine Galvez PA-C   125 mcg at 10/21/24 0928    warfarin ANTICOAGULANT (COUMADIN) tablet 1 mg  1 mg Oral ONCE at 18:00 David Wade MD        Warfarin Dose Required Daily - Pharmacist Managed  1 each Oral See Admin Instructions Catherine Galvez PA-C         Continuous Infusions:  Current Facility-Administered Medications   Medication Dose Route Frequency Provider Last Rate Last Admin     PRN Meds:.  Current Facility-Administered Medications   Medication Dose Route Frequency Provider Last Rate Last Admin    albuterol (PROVENTIL HFA/VENTOLIN HFA) inhaler  2 puff Inhalation Q6H PRN Catherine Galvez PA-C        bisacodyl (DULCOLAX) suppository 10 mg  10 mg Rectal Daily PRN Catherine Galvez PA-C        glucose gel 15-30 g  15-30 g Oral Q15 Min PRN Catherine Galvez PA-C        Or    dextrose 50 % injection 25-50 mL  25-50 mL Intravenous Q15 Min PRN Catherine Galvez PA-C   50 mL at 10/14/24 0806    Or    glucagon injection 1 mg  1 mg Subcutaneous Q15 Min PRN Catherine Galvez PA-C        fluticasone (FLONASE) 50 MCG/ACT spray 2 spray  2 spray Both Nostrils Daily PRN Catherine Galvez PA-C        heparin 1000 unit/mL DIALYSIS Cath LOCK - RED Lumen  1.3-2.6 mL Intracatheter Q1H PRN Rohan Polo MD   1,300 Units at 10/17/24 1628    heparin 1000 unit/mL DIALYSIS Cath LOCK -BLUE Lumen  1.3-2.6 mL Intracatheter Q1H PRN Rohan Polo MD        heparin lock flush 10 unit/mL injection 5-20 mL  5-20 mL Intracatheter Q1H PRN Catherine Galvez PA-C        hydrALAZINE (APRESOLINE) injection 10 mg  10 mg Intravenous Q30 Min PRN Catherine Galvez PA-C         hydrOXYzine HCl (ATARAX) tablet 25 mg  25 mg Oral TID PRN Catherine Galvez PA-C   25 mg at 10/20/24 1642    ipratropium - albuterol 0.5 mg/2.5 mg/3 mL (DUONEB) neb solution 3 mL  3 mL Nebulization Q4H PRN Catherine Galvez PA-C   3 mL at 10/19/24 1310    naloxone (NARCAN) injection 0.2 mg  0.2 mg Intravenous Q2 Min PRN Catherine Galvez PA-C        Or    naloxone (NARCAN) injection 0.4 mg  0.4 mg Intravenous Q2 Min PRN Catherine Galvez PA-C        Or    naloxone (NARCAN) injection 0.2 mg  0.2 mg Intramuscular Q2 Min PRN Catherine Galvez PA-C        Or    naloxone (NARCAN) injection 0.4 mg  0.4 mg Intramuscular Q2 Min PRN Catherine Galvez PA-C        ondansetron (ZOFRAN ODT) ODT tab 4 mg  4 mg Oral Q6H PRN Catherine Galvez PA-C        Or    ondansetron (ZOFRAN) injection 4 mg  4 mg Intravenous Q6H PRN Catherine Galvez PA-C   4 mg at 10/10/24 1945    oxyCODONE IR (ROXICODONE) half-tab 2.5 mg  2.5 mg Oral Q4H PRN Catherine Galvez PA-C   2.5 mg at 10/21/24 1659    prochlorperazine (COMPAZINE) injection 10 mg  10 mg Intravenous Q6H PRN Catherine Galvez PA-C        Or    prochlorperazine (COMPAZINE) tablet 10 mg  10 mg Oral Q6H PRN Catherine Galvez PA-C        simethicone (MYLICON) chewable tablet 80 mg  80 mg Oral 4x Daily PRN David Wade MD   80 mg at 10/21/24 2020    sodium chloride (PF) 0.9% PF flush 10-20 mL  10-20 mL Intracatheter q1 min prn Catherine Galvez PA-C        sodium chloride (PF) 0.9% PF flush 10-40 mL  10-40 mL Intracatheter Q1H PRN Catherine Galvez PA-C        sodium chloride 0.9% BOLUS 100-150 mL  100-150 mL Intravenous Q15 Min PRN Rohan Polo MD        sodium chloride 0.9% BOLUS 100-150 mL  100-150 mL Intravenous Q15 Min PRN Rohan Polo MD           Cardiographics:    Telemetry monitoring demonstrates a fib w/ rates in the 90s per my personal review.    Imaging:  Results for orders placed or performed  during the hospital encounter of 10/08/24   XR Chest Port 1 View    Impression    IMPRESSION: Interval sternotomy, CABG procedure and mitral valve repair. Endotracheal tube proximally 2 cm above the sofia. Nasogastric tube in the stomach. Bilateral chest tubes and midline mediastinal drain in expected position. Epicardial leads are   present. Left IJ line in the brachiocephalic vein.    Low lung volumes. No evidence for CHF or pneumonia. No pleural effusion or pneumothorax.   XR Chest Port 1 View    Impression    IMPRESSION: Poststernotomy changes with prosthetic mitral valve. Patient's been extubated and the NG tube has been removed. Mediastinal and pleural chest tubes are in place.    Left IJ cordis sheath is in the distal left innominate artery. Catheter has a very subtle kink within it 3.5 centimeters from the tip.    Low lung volumes. No pneumothorax. Likely trace left effusion at the base. No signs of pneumonia or failure. Heart is of normal size.   XR Abdomen Port 1 View    Impression    IMPRESSION: Orogastric tube tip in the proximal stomach and oriented superiorly towards the left diaphragm. Visualized bowel gas pattern unremarkable. Numerous tubes and lines lower chest and upper abdomen as described on chest x-ray report from today.        Labs, personally reviewed.  Hemoglobin   Date Value Ref Range Status   10/22/2024 7.8 (L) 11.7 - 15.7 g/dL Final   10/21/2024 7.6 (L) 11.7 - 15.7 g/dL Final   10/20/2024 7.7 (L) 11.7 - 15.7 g/dL Final   06/09/2015 13.5 11.7 - 15.7 g/dL Final   03/11/2015 12.3 11.7 - 15.7 g/dL Final   01/08/2015 13.6 11.7 - 15.7 g/dL Final     WBC   Date Value Ref Range Status   06/09/2015 7.9 4.0 - 11.0 10e9/L Final   03/11/2015 9.4 4.0 - 11.0 10e9/L Final   01/08/2015 8.1 4.0 - 11.0 10e9/L Final     WBC Count   Date Value Ref Range Status   10/22/2024 13.8 (H) 4.0 - 11.0 10e3/uL Final   10/21/2024 13.2 (H) 4.0 - 11.0 10e3/uL Final   10/20/2024 13.1 (H) 4.0 - 11.0 10e3/uL Final      Platelet Count   Date Value Ref Range Status   10/22/2024 412 150 - 450 10e3/uL Final   10/21/2024 305 150 - 450 10e3/uL Final   10/20/2024 246 150 - 450 10e3/uL Final   06/09/2015 219 150 - 450 10e9/L Final   03/11/2015 240 150 - 450 10e9/L Final   01/08/2015 221 150 - 450 10e9/L Final     Creatinine   Date Value Ref Range Status   10/22/2024 3.54 (H) 0.51 - 0.95 mg/dL Final   10/21/2024 3.13 (H) 0.51 - 0.95 mg/dL Final   10/20/2024 2.77 (H) 0.51 - 0.95 mg/dL Final   06/09/2015 0.80 0.52 - 1.04 mg/dL Final   03/11/2015 0.87 0.52 - 1.04 mg/dL Final   01/08/2015 0.79 0.52 - 1.04 mg/dL Final     Potassium   Date Value Ref Range Status   10/22/2024 3.4 3.4 - 5.3 mmol/L Final   10/21/2024 3.4 3.4 - 5.3 mmol/L Final   10/20/2024 3.4 3.4 - 5.3 mmol/L Final   06/09/2015 3.8 3.4 - 5.3 mmol/L Final   03/11/2015 3.9 3.4 - 5.3 mmol/L Final   01/08/2015 4.1 3.4 - 5.3 mmol/L Final     Potassium POCT   Date Value Ref Range Status   10/08/2024 4.4 3.4 - 5.3 mmol/L Final   10/08/2024 4.0 3.4 - 5.3 mmol/L Final   10/08/2024 4.1 3.4 - 5.3 mmol/L Final     Magnesium   Date Value Ref Range Status   10/22/2024 1.7 1.7 - 2.3 mg/dL Final   10/21/2024 1.8 1.7 - 2.3 mg/dL Final   10/20/2024 1.8 1.7 - 2.3 mg/dL Final   06/09/2015 1.8 1.6 - 2.3 mg/dL Final   03/11/2015 1.7 1.6 - 2.3 mg/dL Final   01/08/2015 1.8 1.6 - 2.3 mg/dL Final          I/O:  I/O last 3 completed shifts:  In: 640 [P.O.:640]  Out: 1600 [Urine:1600]       Physical Exam:    General: Patient seen in chair this AM. NAD. Pleasant, conversant.   HEENT: DORITA, no sclera icterus, moist mucosa  CV: RRR on monitor. 2+ peripheral pulses in all extremities. Moderate peripheral edema. Incision C/D/I.  Pulm: Satting 100% w/ nonlabored breathing on RA  Abd: Soft, NT, ND  : Voiding  Ext: Moderate pedal edema, SCDs in place, warm, distal pulses intact  Neuro: CNs grossly intact, FAM, A&Ox3      ASSESSMENT/PLAN:    Jory RIYA Grant Halie is a 49 year old female with a history of  GIA, asthma, GERD,  hypothyroidism, h/o PE on plavix, carotid stenosis, Hodgkin's lymphoma (s/p chemo/radiation and bone marrow transplant x2 after recurrence) currently in remission who is s/p CABG x4, RLE EVH, attempted mitral repair, mechanical MVR, LAAE.    Active Problems:    Mitral regurgitation    Coronary artery disease involving native coronary artery of native heart with angina pectoris (H)  #Acute Renal Failure d/t ATN, ongoing.      NEURO:   - Scheduled lidocaine patches and PRN oxycodone for pain  - PTA bupropion discontinued (somnolence)  - Tylenol 500 mg q6hrs (max 2g per day given recent hepatic dysfunction)    CV:  - Pre-op EF 55-60%  - Chest tubes and TPW's removed POD#5  - Normotensive  - Metop 50 mg BID  - Plavix daily indefinitely d/t unclear h/o anaphylaxis to ASA  - Atorvastatin 80mg daily  - Warfarin for mechanical MVR, INR therapeutic, heparin gtt stopped 10/15  - Paroxysmal rate controlled afib - s/p amiodarone bolus/gtt 10/13 (ok per [place]). Holding given LFTs and allergy to PO amio additive. Consider amio if ongoing a fib as hypervolemia improves  - New baseline echo prior to discharge when closer to preop weight for new baseline s/p MVR and to eval for pericardial effusion on warfarin/plavix    PULM:   - Extubated on POD#0  - Maintaining O2 sats on room air w/ home CPAP qHS  - Encourage pulmonary toilet  - PTA zyrtec, fludrocortisone, flonase, simethicone, albuterol, pulmicort, breo ellipta, duoneb    FEN/GI:  - Continue electrolyte replacement protocol  - Regular diet  - Bowel regimen, LBM 10/19  - Ischemic hepatitis - LFTs downtrending, check q48 while inpatient  - Hepatic w/u and labs unremarkable  - PTA vitamin D3     RENAL:  - Nephrology consulted for ARF w/ decreased UOP, appreciate. Following.  - CRRT 10/11 - 10/13  - HD 10/15, 17, & 19 - trialing off dialysis  - Strict I/O  - Daily renal panel  - Cr elevated 2.7-3.1 (baseline ~0.9)  - UA/Urine cx 10/10 & 10/13 NG    HEME:  -  H/o bone marrow tx. Needs irradiated blood  - Acute blood loss anemia post-op.   - Hgb stable, likely dilutional component  - Warfarin INR goal 2.5-3.5 in s/o mech MVR  - HIT screen negative.  - INR therapeutic  - Transfusion hx  - 2u PRBCs periop  - 1u PRBCs 10/11  - 1u PRBCs 10/16    ID:  - Lydia op ppx complete. Afebrile. Leukocytosis -  WBC 13.2 (13.1)  - UA/Urine cx 10/10 & 10/13 NG.  - Blood cx 10/9 & 10/13 NG  - ID consult for ?sepsis, appreciate. Signed off  - CBC qday    ENDO:   - HbA1c 5.7%  - Adrenal, thyroid, and pancreatitis labs unremarkable  - PTA Metformin discontinued (renal function)  - PTA synthroid    PPx:   - DVT: SCDs, SQ heparin TID, ambulation   - GI: Protonix 40mg IV/PO daily    DISPO:   - Continue general tele care (POD#8)  - PT/OT recs at discharge: TCU pending progress  - Medically Ready for Discharge: Anticipated in 2-4 Days pending volume status and rehab progress         Patient discussed w/ Dr. Wade.    Eva Galvez PA-C  [facility] Cardiothoracic Surgery  Secure Chat or Vocera  October 22, 2024

## 2024-10-22 NOTE — PROGRESS NOTES
Care Management Follow Up    Length of Stay (days): 14    Expected Discharge Date: 10/23/2024     Concerns to be Addressed:     Care progression - discharge planning  Patient plan of care discussed at interdisciplinary rounds: Yes    Anticipated Discharge Disposition:  Transitional care    Anticipated Discharge Services:  Transitional care  Anticipated Discharge DME:  NA    Patient/family educated on Medicare website which has current facility and service quality ratings:  NA  Education Provided on the Discharge Plan:  Yes per team  Patient/Family in Agreement with the Plan:  No    Referrals Placed by CM/SW:  No  Private pay costs discussed: Not applicable    Discussed  Partnership in Safe Discharge Planning  document with patient/family: Yes: patient's mother,  Mia    Handoff Completed: No, handoff not indicated or clinically appropriate    Additional Information:  [id number] rec'd a phone call from patient's mother, Mia. Pat stated patient does not want transitional care placement. When patient is ready, patient would like to return home.  Offered home care services and Pat states they will think about it, when it's closer to patient's discharge.     Message sent to update Eva with CV surgery     Social Hx:  Assessment: Follow. Live w/spouse in an apt. Independent. No DME or services. Spouse is primary contact.  Plan: Rehab rec TCU, but patient declined. Will think about home care services.  Needs: medical stability (nephrology/kidney recovery). OP dialysis?   Transport: Family    Next Steps: RNCM to follow for medical progression, recommendations, and final discharge plan.     Adrienne Stevenson, RN     [phone] rec'd a message from Eva, maybe discharge end of week, Friday, pending nephrology and kidney recovery.

## 2024-10-22 NOTE — PROGRESS NOTES
CALORIE COUNT      Approximate Oral Intake for: 10/21/24   Calories: 853   Protein: 57 g       Estimated Needs:    Calories: 1720 - 2060   Protein: 69 - 82 g       Summary:    X3 meal slips saved. Pt family providing some outside food- documented.   Ensure Max scheduled daily, pt has not tried supplement yet, planning to trial today.   Pt family with questions r/t diet and warfarin, providing Vitamin K and Warfarin handout.     Pt meeting 49.5% estimated kcal needs and 82.6% estimated protein needs 10/21/24.

## 2024-10-22 NOTE — PLAN OF CARE
"  Problem: Adult Inpatient Plan of Care  Goal: Plan of Care Review  Description: The Plan of Care Review/Shift note should be completed every shift.  The Outcome Evaluation is a brief statement about your assessment that the patient is improving, declining, or no change.  This information will be displayed automatically on your shift  note.  Outcome: Progressing  Goal: Patient-Specific Goal (Individualized)  Description: You can add care plan individualizations to a care plan. Examples of Individualization might be:  \"Parent requests to be called daily at 9am for status\", \"I have a hard time hearing out of my right ear\", or \"Do not touch me to wake me up as it startles  me\".  Outcome: Progressing  Goal: Absence of Hospital-Acquired Illness or Injury  Outcome: Progressing  Intervention: Identify and Manage Fall Risk  Recent Flowsheet Documentation  Taken 10/22/2024 1336 by Wanda Arteaga RN  Safety Promotion/Fall Prevention: activity supervised  Taken 10/22/2024 0830 by aWnda Arteaga RN  Safety Promotion/Fall Prevention: activity supervised  Intervention: Prevent Skin Injury  Recent Flowsheet Documentation  Taken 10/22/2024 1336 by Wanda Arteaga RN  Skin Protection: adhesive use limited  Device Skin Pressure Protection:   skin-to-skin areas padded   tubing/devices free from skin contact  Taken 10/22/2024 0830 by Wanda Arteaga RN  Skin Protection: adhesive use limited  Device Skin Pressure Protection:   skin-to-skin areas padded   tubing/devices free from skin contact  Intervention: Prevent and Manage VTE (Venous Thromboembolism) Risk  Recent Flowsheet Documentation  Taken 10/22/2024 1336 by Wanda Arteaga RN  VTE Prevention/Management: SCDs off (sequential compression devices)  Taken 10/22/2024 0830 by Wanda Arteaga RN  VTE Prevention/Management: SCDs off (sequential compression devices)  Intervention: Prevent Infection  Recent Flowsheet Documentation  Taken 10/22/2024 1336 by " Wanda Arteaga RN  Infection Prevention: hand hygiene promoted  Taken 10/22/2024 0830 by Wanda Arteaga RN  Infection Prevention: hand hygiene promoted  Goal: Optimal Comfort and Wellbeing  Outcome: Progressing  Intervention: Provide Person-Centered Care  Recent Flowsheet Documentation  Taken 10/22/2024 1336 by Wanda Arteaga RN  Trust Relationship/Rapport: care explained  Taken 10/22/2024 0830 by Wanda Arteaga RN  Trust Relationship/Rapport: care explained  Goal: Readiness for Transition of Care  Outcome: Progressing     Problem: Risk for Delirium  Goal: Optimal Coping  Outcome: Progressing  Intervention: Optimize Psychosocial Adjustment to Delirium  Recent Flowsheet Documentation  Taken 10/22/2024 1336 by Wanda Arteaga RN  Supportive Measures: active listening utilized  Taken 10/22/2024 0830 by Wanda Arteaga RN  Supportive Measures: active listening utilized  Goal: Improved Behavioral Control  Outcome: Progressing  Intervention: Prevent and Manage Agitation  Recent Flowsheet Documentation  Taken 10/22/2024 1336 by Wanda Arteaga RN  Environment Familiarity/Consistency: daily routine followed  Taken 10/22/2024 0830 by Wanda Arteaga RN  Environment Familiarity/Consistency: daily routine followed  Intervention: Minimize Safety Risk  Recent Flowsheet Documentation  Taken 10/22/2024 1336 by Wanda Arteaga RN  Communication Enhancement Strategies: call light answered in person  Enhanced Safety Measures: pain management  Trust Relationship/Rapport: care explained  Taken 10/22/2024 0830 by Wanda Arteaga RN  Communication Enhancement Strategies: call light answered in person  Enhanced Safety Measures: pain management  Trust Relationship/Rapport: care explained  Goal: Improved Attention and Thought Clarity  Outcome: Progressing  Intervention: Maximize Cognitive Function  Recent Flowsheet Documentation  Taken 10/22/2024 1336 by Wanda Arteaga RN  Sensory  Stimulation Regulation: care clustered  Reorientation Measures: clock in view  Taken 10/22/2024 0830 by Wanda Arteaga RN  Sensory Stimulation Regulation: care clustered  Reorientation Measures: clock in view  Goal: Improved Sleep  Outcome: Progressing     Problem: Comorbidity Management  Goal: Maintenance of Asthma Control  Outcome: Progressing  Intervention: Maintain Asthma Symptom Control  Recent Flowsheet Documentation  Taken 10/22/2024 1336 by Wanda Arteaga RN  Medication Review/Management: medications reviewed  Taken 10/22/2024 0830 by Wanda Arteaga RN  Medication Review/Management: medications reviewed     Problem: Cardiovascular Surgery  Goal: Improved Activity Tolerance  Outcome: Progressing  Intervention: Optimize Tolerance for Activity  Recent Flowsheet Documentation  Taken 10/22/2024 1336 by Wanda Arteaga RN  Environmental Support: calm environment promoted  Taken 10/22/2024 0830 by Wanda Arteaga RN  Environmental Support: calm environment promoted  Goal: Optimal Coping with Heart Surgery  Outcome: Progressing  Intervention: Support Psychosocial Response to Surgery  Recent Flowsheet Documentation  Taken 10/22/2024 1336 by Wanda Arteaga RN  Supportive Measures: active listening utilized  Taken 10/22/2024 0830 by Wanda Arteaga RN  Supportive Measures: active listening utilized  Goal: Absence of Bleeding  Outcome: Progressing  Intervention: Monitor and Manage Bleeding  Recent Flowsheet Documentation  Taken 10/22/2024 1336 by Wanda Arteaga RN  Bleeding Management: other (see comments)  Taken 10/22/2024 0830 by Wanda Arteaga RN  Bleeding Management: other (see comments)  Goal: Effective Bowel Elimination  Outcome: Progressing  Goal: Effective Cardiac Function  Outcome: Progressing  Goal: Optimal Cerebral Tissue Perfusion  Outcome: Progressing  Intervention: Protect and Optimize Cerebral Perfusion  Recent Flowsheet Documentation  Taken 10/22/2024  1336 by Wanda Arteaga RN  Sensory Stimulation Regulation: care clustered  Taken 10/22/2024 0830 by Wanda Arteaga RN  Sensory Stimulation Regulation: care clustered  Goal: Fluid and Electrolyte Balance  Outcome: Progressing  Intervention: Monitor and Manage Fluid and Electrolyte Balance  Recent Flowsheet Documentation  Taken 10/22/2024 1336 by Wanda Arteaga RN  Fluid/Electrolyte Management: fluids provided  Taken 10/22/2024 0830 by Wanda Arteaga RN  Fluid/Electrolyte Management: fluids provided  Goal: Blood Glucose Level Within Targeted Range  Outcome: Progressing  Goal: Absence of Infection Signs and Symptoms  Outcome: Progressing  Intervention: Prevent or Manage Infection  Recent Flowsheet Documentation  Taken 10/22/2024 1336 by Wanda Arteaga RN  Infection Prevention: hand hygiene promoted  Taken 10/22/2024 0830 by Wanda Arteaga RN  Infection Prevention: hand hygiene promoted  Goal: Anesthesia/Sedation Recovery  Outcome: Progressing  Intervention: Optimize Anesthesia Recovery  Recent Flowsheet Documentation  Taken 10/22/2024 1336 by Wanda Arteaga RN  Safety Promotion/Fall Prevention: activity supervised  Administration (IS): self-administered  Reorientation Measures: clock in view  Patient Tolerance (IS): good  Taken 10/22/2024 0830 by Wanda Arteaga RN  Safety Promotion/Fall Prevention: activity supervised  Administration (IS): self-administered  Reorientation Measures: clock in view  Level Incentive Spirometer (mL): 1000  Incentive Spirometer Predicted Level (mL): 1500  Patient Tolerance (IS): good  Goal: Acceptable Pain Control  Outcome: Progressing  Goal: Nausea and Vomiting Relief  Outcome: Progressing  Goal: Effective Urinary Elimination  Outcome: Progressing  Goal: Effective Oxygenation and Ventilation  Outcome: Progressing  Intervention: Promote Airway Secretion Clearance  Recent Flowsheet Documentation  Taken 10/22/2024 1336 by Wanda Arteaga  RN  Administration (IS): self-administered  Patient Tolerance (IS): good  Taken 10/22/2024 0830 by Wanda Arteaga RN  Administration (IS): self-administered  Level Incentive Spirometer (mL): 1000  Incentive Spirometer Predicted Level (mL): 1500  Patient Tolerance (IS): good  Intervention: Optimize Oxygenation and Ventilation  Recent Flowsheet Documentation  Taken 10/22/2024 1336 by Wanda Arteaga RN  Chest Tube Safety: all connections secured  Taken 10/22/2024 0830 by Wanda Arteaga RN  Chest Tube Safety: all connections secured     Problem: Pain Acute  Goal: Optimal Pain Control and Function  Outcome: Progressing  Intervention: Prevent or Manage Pain  Recent Flowsheet Documentation  Taken 10/22/2024 1336 by Wanda Arteaga RN  Sensory Stimulation Regulation: care clustered  Bowel Elimination Promotion: adequate fluid intake promoted  Medication Review/Management: medications reviewed  Taken 10/22/2024 0830 by Wanda Arteaga RN  Sensory Stimulation Regulation: care clustered  Bowel Elimination Promotion: adequate fluid intake promoted  Medication Review/Management: medications reviewed  Intervention: Optimize Psychosocial Wellbeing  Recent Flowsheet Documentation  Taken 10/22/2024 1336 by Wanda Arteaga RN  Supportive Measures: active listening utilized  Taken 10/22/2024 0830 by Wanda Arteaga RN  Supportive Measures: active listening utilized     Problem: Infection  Goal: Absence of Infection Signs and Symptoms  Outcome: Progressing     Problem: Fall Injury Risk  Goal: Absence of Fall and Fall-Related Injury  Outcome: Progressing  Intervention: Identify and Manage Contributors  Recent Flowsheet Documentation  Taken 10/22/2024 1336 by Wanda Arteaga RN  Medication Review/Management: medications reviewed  Taken 10/22/2024 0830 by Wanda Arteaga RN  Medication Review/Management: medications reviewed  Intervention: Promote Injury-Free Environment  Recent Flowsheet  Documentation  Taken 10/22/2024 1336 by Wanda Arteaga RN  Safety Promotion/Fall Prevention: activity supervised  Taken 10/22/2024 0830 by Wanda Arteaga RN  Safety Promotion/Fall Prevention: activity supervised     Problem: Skin Injury Risk Increased  Goal: Skin Health and Integrity  Outcome: Progressing  Intervention: Plan: Nurse Driven Intervention: Positioning  Recent Flowsheet Documentation  Taken 10/22/2024 1336 by Ovante, Wanda R, RN  Plan: Positioning Interventions: REPOSITION Left/Right (No supine) q2h  Taken 10/22/2024 0830 by Wanda Arteaga RN  Plan: Positioning Interventions: REPOSITION Left/Right (No supine) q2h  Intervention: Plan: Nurse Driven Intervention: Moisture Management  Recent Flowsheet Documentation  Taken 10/22/2024 1336 by Wanda Arteaga RN  Moisture Interventions: Encourage regular toileting  Taken 10/22/2024 0830 by Wanda Arteaga RN  Moisture Interventions: Encourage regular toileting  Intervention: Plan: Nurse Driven Intervention: Friction and Shear  Recent Flowsheet Documentation  Taken 10/22/2024 1336 by Wanda Arteaga RN  Friction/Shear Interventions: HOB 30 degrees or less  Taken 10/22/2024 0830 by Wanda Arteaga RN  Friction/Shear Interventions: HOB 30 degrees or less  Intervention: Optimize Skin Protection  Recent Flowsheet Documentation  Taken 10/22/2024 1336 by Wanda Arteaga RN  Pressure Reduction Techniques:   frequent weight shift encouraged   heels elevated off bed   weight shift assistance provided  Pressure Reduction Devices: other (see comments)  Skin Protection: adhesive use limited  Activity Management:   up in chair   up ad jane  Taken 10/22/2024 0830 by Wanda Arteaga RN  Pressure Reduction Techniques:   frequent weight shift encouraged   heels elevated off bed   weight shift assistance provided  Pressure Reduction Devices: other (see comments)  Skin Protection: adhesive use limited  Activity Management:   up in  chair   up ad jane     Problem: UTI (Urinary Tract Infection)  Goal: Improved Infection Symptoms  Outcome: Progressing     Problem: Gas Exchange Impaired  Goal: Optimal Gas Exchange  Outcome: Progressing   Goal Outcome Evaluation:       Patient is alert and able to let her needs be known. CABGx4 POD 14. Rates pain 8/10, PRN oxycodone x1, this was effective for her. Scheduled tylenol given as well. Nephrology following, no dialysis needed today and IV bumex ordered and given. Reports some dizziness with ambulation to bathroom. Urine occurrence x1. Sternal incision and graft sites clean dry intact. Afib on the monitor, HR controlled. No BM this shift. Mother at bedside, questions answered. Continue plan of care.

## 2024-10-22 NOTE — PROGRESS NOTES
"  '    [facility] ([facility]) progress note  CC: F/U ALEXEI  S: feels sob after walking back from bathroom. Leg edema better, \"they don't feel like they are going to explode.\"  No nausea.    A/P:   Acute kidney injury.  Acute tubular necrosis.  Secondary to CABG and mitral valve replacement followed by cardiogenic shock.  Baseline creatinine 0.95 (10/8/2024).    Required CRRT, off 10/13  Off pressors as of 10/14  HD 10/15, 10/17, 10/19   Good urine output, hold off on dialysis today  Daily bmp, if creat not trending down over next 1-2 days, will need tunneled catheter placed and outpt dialysis set up   Hypevolemic, give bumex 2 mg iv every day   Re-eval need for dialysis tomorrow based on labs, urine output, volume status     2. Status post coronary artery bypass grafting x 4 vessels and mitral valve replacement on 10/8.  Diffuse coronary artery disease     3. Shock liver. Resolved.      4. History of Hodgkin's lymphoma.  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    5. Anasarca - severe, weight down 1.3 kg since yest but still 7 kg above admit weight  -resume bumex 2 mg iv qd    Jaclyn Murillo MD  [facility]  [phone]     BP 93/59 (BP Location: Right arm)   Pulse 89   Temp 98.4  F (36.9  C) (Oral)   Resp 22   Wt 107.7 kg (237 lb 6.4 oz)   LMP  (LMP Unknown)   SpO2 100%   BMI 40.75 kg/m      I/O last 3 completed shifts:  In: 640 [P.O.:640]  Out: 1600 [Urine:1600]    Physical Exam:   GENERAL: NAD in bed  EYES: EOMI   ENT: MMM  RESP: normal effort, cta ant   CV: regular rate, 2 + leg edema  GI: NT/ND  SKIN: No rash, pale       Recent Labs   Lab 10/22/24  0624 10/21/24  0452 10/20/24  0452 10/19/24  0451 10/18/24  0508 10/17/24  0544 10/16/24  1834 10/16/24  0435 10/15/24  1859   * 133* 134* 131* 132* 132* 131* 131* 134*   POTASSIUM 3.4 3.4 3.4 3.2* 3.5 3.0* 2.9* 3.2* 3.2*   CHLORIDE 96* 97* 99 95* 97* 95* 95* 97* 101   CO2 22 " 22 22 23 24 22 22 24 26   BUN 56.3* 49.9* 42.0* 50.1* 37.6* 53.2* 49.3* 41.1* 25.0*   CR 3.54* 3.13* 2.77* 3.35* 2.85* 3.53* 3.24* 2.80* 1.74*   GFRESTIMATED 15* 17* 20* 16* 20* 15* 17* 20* 35*   CINDY 8.5* 8.4* 8.1* 8.1* 8.2* 8.0* 8.0* 7.9* 7.6*   PHOS  --   --   --   --  3.5 4.0  --  3.1  --    MAG 1.7 1.8 1.8 2.0 2.0 2.4*  --  2.4*  --    ALBUMIN  --  3.0*  --  2.9* 2.8* 2.9* 2.8* 2.6* 2.6*     Recent Labs   Lab 10/22/24  0624 10/21/24  0452 10/20/24  0452 10/19/24  0451 10/18/24  0508 10/17/24  0544 10/16/24  1156 10/16/24  0435   WBC 13.8* 13.2* 13.1* 13.6* 13.4* 15.3*  --  19.5*   HGB 7.8* 7.6* 7.7* 8.1* 8.3* 8.6* 8.8* 6.8*   HCT 24.6* 23.7* 24.3* 25.6* 25.5* 26.5*  --  21.0*   MCV 90 89 89 89 88 87  --  91    305 246 228 182 157  --  123*       Current Facility-Administered Medications:     acetaminophen (TYLENOL) tablet 500 mg, 500 mg, Oral, Q6H, Catherine Galvez PA-C, 500 mg at 10/22/24 1229    albuterol (PROVENTIL HFA/VENTOLIN HFA) inhaler, 2 puff, Inhalation, Q6H PRN, Catherine Galvez PA-C    atorvastatin (LIPITOR) tablet 80 mg, 80 mg, Oral, At Bedtime, Catherine Galvez PA-C, 80 mg at 10/21/24 2020    bisacodyl (DULCOLAX) suppository 10 mg, 10 mg, Rectal, Daily PRN, Catherine Galvez PA-C    budesonide (PULMICORT) neb solution 1 mg, 1 mg, Nebulization, BID, Catherine Galvez, PA-C, 1 mg at 10/22/24 0801    cetirizine (zyrTEC) tablet 10 mg, 10 mg, Oral, Daily, Catherine Galvez PA-C, 10 mg at 10/22/24 0827    clopidogrel (PLAVIX) tablet 75 mg, 75 mg, Oral, Daily, Catherine Galvez, PA-C, 75 mg at 10/22/24 0827    glucose gel 15-30 g, 15-30 g, Oral, Q15 Min PRN **OR** dextrose 50 % injection 25-50 mL, 25-50 mL, Intravenous, Q15 Min PRN, 50 mL at 10/14/24 0806 **OR** glucagon injection 1 mg, 1 mg, Subcutaneous, Q15 Min PRN, Catherine Galvez PA-C    fludrocortisone (FLORINEF) tablet 0.1 mg, 0.1 mg, Oral, Daily, Catherine Galvez PA-C, 0.1 mg at 10/22/24 0828     fluticasone (FLONASE) 50 MCG/ACT spray 2 spray, 2 spray, Both Nostrils, Daily PRN, Catherine Galvez PA-C    fluticasone-vilanterol (BREO ELLIPTA) 100-25 MCG/ACT inhaler 1 puff, 1 puff, Inhalation, At Bedtime, Catherine Galvez PA-C, 1 puff at 10/21/24 2021    [COMPLETED] sodium chloride 0.9% DIALYSIS Cath LOCK - RED Lumen, 10 mL, Intracatheter, Once in dialysis/CRRT, 10 mL at 10/17/24 1527 **FOLLOWED BY** heparin 1000 unit/mL DIALYSIS Cath LOCK - RED Lumen, 1.3-2.6 mL, Intracatheter, Q1H PRN, Rohan Polo MD, 1,300 Units at 10/17/24 1628    [COMPLETED] sodium chloride 0.9% DIALYSIS Cath LOCK - BLUE Lumen, 10 mL, Intracatheter, Once in dialysis/CRRT, 10 mL at 10/17/24 1526 **FOLLOWED BY** heparin 1000 unit/mL DIALYSIS Cath LOCK -BLUE Lumen, 1.3-2.6 mL, Intracatheter, Q1H PRN, Rohan Polo MD    heparin lock flush 10 unit/mL injection 5-20 mL, 5-20 mL, Intracatheter, Q24H, Catherine Galvez PA-C, 15 mL at 10/20/24 1957    heparin lock flush 10 unit/mL injection 5-20 mL, 5-20 mL, Intracatheter, Q1H PRN, Catherine Galvez PA-C    hydrALAZINE (APRESOLINE) injection 10 mg, 10 mg, Intravenous, Q30 Min PRN, Catherine Galvez PA-C    hydrOXYzine HCl (ATARAX) tablet 25 mg, 25 mg, Oral, TID PRN, Catherine Galvez PA-C, 25 mg at 10/20/24 1642    ipratropium - albuterol 0.5 mg/2.5 mg/3 mL (DUONEB) neb solution 3 mL, 3 mL, Nebulization, Q4H PRN, Catherine Galvez PA-C, 3 mL at 10/19/24 1310    levothyroxine (SYNTHROID/LEVOTHROID) tablet 125 mcg, 125 mcg, Oral, QAM AC, Catherine Galvez, PA-C, 125 mcg at 10/22/24 0827    Lidocaine (LIDOCARE) 4 % Patch 1-2 patch, 1-2 patch, Transdermal, Q24H, Catherine Galvez PA-C, 2 patch at 10/21/24 1658    metoprolol tartrate (LOPRESSOR) tablet 75 mg, 75 mg, Oral, BID, Catherine Galvez, PA-C, 75 mg at 10/22/24 0827    multivitamin w/minerals (THERA-VIT-M) tablet 1 tablet, 1 tablet, Oral, Daily, Catherine Galvez PA-C, 1 tablet at  10/22/24 1229    naloxone (NARCAN) injection 0.2 mg, 0.2 mg, Intravenous, Q2 Min PRN **OR** naloxone (NARCAN) injection 0.4 mg, 0.4 mg, Intravenous, Q2 Min PRN **OR** naloxone (NARCAN) injection 0.2 mg, 0.2 mg, Intramuscular, Q2 Min PRN **OR** naloxone (NARCAN) injection 0.4 mg, 0.4 mg, Intramuscular, Q2 Min PRN, Catherine Galvez PA-C    ondansetron (ZOFRAN ODT) ODT tab 4 mg, 4 mg, Oral, Q6H PRN **OR** ondansetron (ZOFRAN) injection 4 mg, 4 mg, Intravenous, Q6H PRN, Catherine Galvez PA-C, 4 mg at 10/10/24 1945    oxyCODONE IR (ROXICODONE) half-tab 2.5 mg, 2.5 mg, Oral, Q6H PRN **OR** [DISCONTINUED] oxyCODONE (ROXICODONE) tablet 5 mg, 5 mg, Oral, Q4H PRN, David Wade MD, 5 mg at 10/19/24 0902    [DISCONTINUED] pantoprazole (PROTONIX) 2 mg/mL suspension 40 mg, 40 mg, Oral or NG Tube, Daily, 40 mg at 10/08/24 1729 **OR** pantoprazole (PROTONIX) EC tablet 40 mg, 40 mg, Oral, Daily, Catherine Galvez PA-C, 40 mg at 10/22/24 0827    polyethylene glycol (MIRALAX) Packet 17 g, 17 g, Oral, Daily, Catherine Galvez PA-C    prochlorperazine (COMPAZINE) injection 10 mg, 10 mg, Intravenous, Q6H PRN **OR** prochlorperazine (COMPAZINE) tablet 10 mg, 10 mg, Oral, Q6H PRN, Catherine Galvez PA-C    senna-docusate (SENOKOT-S/PERICOLACE) 8.6-50 MG per tablet 1 tablet, 1 tablet, Oral, BID, Catherine Galvez, PA-C, 1 tablet at 10/18/24 2121    simethicone (MYLICON) chewable tablet 80 mg, 80 mg, Oral, 4x Daily PRN, David Wade MD, 80 mg at 10/21/24 2020    sodium chloride (PF) 0.9% PF flush 10-20 mL, 10-20 mL, Intracatheter, q1 min prn, Catherine Galvez PA-C    sodium chloride (PF) 0.9% PF flush 10-40 mL, 10-40 mL, Intracatheter, Q8H, Catherine Galvez K, PA-C, 30 mL at 10/22/24 0623    sodium chloride (PF) 0.9% PF flush 10-40 mL, 10-40 mL, Intracatheter, Q7 Days, Catherine Galvez K, PA-C, 10 mL at 10/18/24 1703    sodium chloride (PF) 0.9% PF flush 10-40 mL, 10-40 mL,  Intracatheter, Q1H PRN, Catherine Galvez PA-C    sodium chloride 0.9% BOLUS 100-150 mL, 100-150 mL, Intravenous, Q15 Min PRN, Rohan Polo MD    sodium chloride 0.9% BOLUS 100-150 mL, 100-150 mL, Intravenous, Q15 Min PRN, Rohan Polo MD    Vitamin D3 (CHOLECALCIFEROL) tablet 125 mcg, 125 mcg, Oral, Daily, Cathernie Galvez PA-C, 125 mcg at 10/22/24 0827    warfarin ANTICOAGULANT (COUMADIN) tablet 1 mg, 1 mg, Oral, ONCE at 18:00, David Wade MD    Warfarin Dose Required Daily - Pharmacist Managed, 1 each, Oral, See Admin Instructions, Catherine Galvez PA-C    Labs personally reviewed today during this evaluation at 1130am.

## 2024-10-23 ENCOUNTER — APPOINTMENT (OUTPATIENT)
Dept: OCCUPATIONAL THERAPY | Facility: HOSPITAL | Age: 49
DRG: 219 | End: 2024-10-23
Attending: SURGERY
Payer: COMMERCIAL

## 2024-10-23 LAB
ALBUMIN SERPL BCG-MCNC: 3.2 G/DL (ref 3.5–5.2)
ALP SERPL-CCNC: 120 U/L (ref 40–150)
ALT SERPL W P-5'-P-CCNC: 131 U/L (ref 0–50)
ANION GAP SERPL CALCULATED.3IONS-SCNC: 16 MMOL/L (ref 7–15)
AST SERPL W P-5'-P-CCNC: 36 U/L (ref 0–45)
ATRIAL RATE - MUSE: 268 BPM
BILIRUB DIRECT SERPL-MCNC: 0.31 MG/DL (ref 0–0.3)
BILIRUB SERPL-MCNC: 0.9 MG/DL
BUN SERPL-MCNC: 63.4 MG/DL (ref 6–20)
CA-I BLD-MCNC: 4.4 MG/DL (ref 4.4–5.2)
CALCIUM SERPL-MCNC: 8.6 MG/DL (ref 8.8–10.4)
CHLORIDE SERPL-SCNC: 95 MMOL/L (ref 98–107)
CREAT SERPL-MCNC: 3.86 MG/DL (ref 0.51–0.95)
DIASTOLIC BLOOD PRESSURE - MUSE: NORMAL MMHG
EGFRCR SERPLBLD CKD-EPI 2021: 14 ML/MIN/1.73M2
ERYTHROCYTE [DISTWIDTH] IN BLOOD BY AUTOMATED COUNT: 17.8 % (ref 10–15)
GLUCOSE SERPL-MCNC: 106 MG/DL (ref 70–99)
HBV CORE AB SERPL QL IA: NONREACTIVE
HBV SURFACE AB SERPL IA-ACNC: >1000 M[IU]/ML
HBV SURFACE AB SERPL IA-ACNC: REACTIVE M[IU]/ML
HBV SURFACE AG SERPL QL IA: NONREACTIVE
HCO3 SERPL-SCNC: 23 MMOL/L (ref 22–29)
HCT VFR BLD AUTO: 23.4 % (ref 35–47)
HGB BLD-MCNC: 7.6 G/DL (ref 11.7–15.7)
INR PPP: 3.23 (ref 0.85–1.15)
INTERPRETATION ECG - MUSE: NORMAL
MAGNESIUM SERPL-MCNC: 2 MG/DL (ref 1.7–2.3)
MCH RBC QN AUTO: 28.9 PG (ref 26.5–33)
MCHC RBC AUTO-ENTMCNC: 32.5 G/DL (ref 31.5–36.5)
MCV RBC AUTO: 89 FL (ref 78–100)
P AXIS - MUSE: NORMAL DEGREES
PLATELET # BLD AUTO: 423 10E3/UL (ref 150–450)
POTASSIUM SERPL-SCNC: 3.2 MMOL/L (ref 3.4–5.3)
PR INTERVAL - MUSE: NORMAL MS
PROT SERPL-MCNC: 6.2 G/DL (ref 6.4–8.3)
QRS DURATION - MUSE: 90 MS
QT - MUSE: 448 MS
QTC - MUSE: 473 MS
R AXIS - MUSE: 75 DEGREES
RBC # BLD AUTO: 2.63 10E6/UL (ref 3.8–5.2)
SODIUM SERPL-SCNC: 134 MMOL/L (ref 135–145)
SYSTOLIC BLOOD PRESSURE - MUSE: NORMAL MMHG
T AXIS - MUSE: 33 DEGREES
VENTRICULAR RATE- MUSE: 67 BPM
WBC # BLD AUTO: 13.1 10E3/UL (ref 4–11)

## 2024-10-23 PROCEDURE — 97535 SELF CARE MNGMENT TRAINING: CPT | Mod: GO

## 2024-10-23 PROCEDURE — 94660 CPAP INITIATION&MGMT: CPT

## 2024-10-23 PROCEDURE — 86706 HEP B SURFACE ANTIBODY: CPT | Performed by: INTERNAL MEDICINE

## 2024-10-23 PROCEDURE — 250N000013 HC RX MED GY IP 250 OP 250 PS 637

## 2024-10-23 PROCEDURE — 250N000013 HC RX MED GY IP 250 OP 250 PS 637: Performed by: SURGERY

## 2024-10-23 PROCEDURE — 82330 ASSAY OF CALCIUM: CPT

## 2024-10-23 PROCEDURE — 80053 COMPREHEN METABOLIC PANEL: CPT | Performed by: INTERNAL MEDICINE

## 2024-10-23 PROCEDURE — 83735 ASSAY OF MAGNESIUM: CPT

## 2024-10-23 PROCEDURE — 250N000009 HC RX 250

## 2024-10-23 PROCEDURE — 94640 AIRWAY INHALATION TREATMENT: CPT | Mod: 76

## 2024-10-23 PROCEDURE — 85014 HEMATOCRIT: CPT

## 2024-10-23 PROCEDURE — 250N000011 HC RX IP 250 OP 636

## 2024-10-23 PROCEDURE — 999N000157 HC STATISTIC RCP TIME EA 10 MIN

## 2024-10-23 PROCEDURE — 250N000011 HC RX IP 250 OP 636: Performed by: INTERNAL MEDICINE

## 2024-10-23 PROCEDURE — 93010 ELECTROCARDIOGRAM REPORT: CPT | Performed by: STUDENT IN AN ORGANIZED HEALTH CARE EDUCATION/TRAINING PROGRAM

## 2024-10-23 PROCEDURE — 94799 UNLISTED PULMONARY SVC/PX: CPT

## 2024-10-23 PROCEDURE — 210N000001 HC R&B IMCU HEART CARE

## 2024-10-23 PROCEDURE — 82248 BILIRUBIN DIRECT: CPT

## 2024-10-23 PROCEDURE — 93005 ELECTROCARDIOGRAM TRACING: CPT

## 2024-10-23 PROCEDURE — 90937 HEMODIALYSIS REPEATED EVAL: CPT

## 2024-10-23 PROCEDURE — 85610 PROTHROMBIN TIME: CPT

## 2024-10-23 PROCEDURE — 97110 THERAPEUTIC EXERCISES: CPT | Mod: GO

## 2024-10-23 PROCEDURE — 86704 HEP B CORE ANTIBODY TOTAL: CPT | Performed by: INTERNAL MEDICINE

## 2024-10-23 PROCEDURE — 87340 HEPATITIS B SURFACE AG IA: CPT | Performed by: INTERNAL MEDICINE

## 2024-10-23 RX ORDER — MAGNESIUM SULFATE HEPTAHYDRATE 40 MG/ML
2 INJECTION, SOLUTION INTRAVENOUS ONCE
Status: COMPLETED | OUTPATIENT
Start: 2024-10-23 | End: 2024-10-23

## 2024-10-23 RX ORDER — CALCIUM GLUCONATE 20 MG/ML
1 INJECTION, SOLUTION INTRAVENOUS ONCE
Status: COMPLETED | OUTPATIENT
Start: 2024-10-23 | End: 2024-10-23

## 2024-10-23 RX ORDER — POTASSIUM CHLORIDE 1500 MG/1
40 TABLET, EXTENDED RELEASE ORAL ONCE
Status: COMPLETED | OUTPATIENT
Start: 2024-10-23 | End: 2024-10-23

## 2024-10-23 RX ADMIN — FLUDROCORTISONE ACETATE 0.1 MG: 0.1 TABLET ORAL at 13:28

## 2024-10-23 RX ADMIN — SIMETHICONE 80 MG: 80 TABLET, CHEWABLE ORAL at 13:19

## 2024-10-23 RX ADMIN — BUDESONIDE 0.5 MG: 0.5 INHALANT ORAL at 08:19

## 2024-10-23 RX ADMIN — LIDOCAINE 1 PATCH: 4 PATCH TOPICAL at 17:17

## 2024-10-23 RX ADMIN — BUDESONIDE 1 MG: 0.5 INHALANT ORAL at 19:14

## 2024-10-23 RX ADMIN — PANTOPRAZOLE SODIUM 40 MG: 40 TABLET, DELAYED RELEASE ORAL at 13:27

## 2024-10-23 RX ADMIN — METOPROLOL TARTRATE 75 MG: 25 TABLET, FILM COATED ORAL at 20:23

## 2024-10-23 RX ADMIN — CETIRIZINE HYDROCHLORIDE 10 MG: 10 TABLET, FILM COATED ORAL at 13:19

## 2024-10-23 RX ADMIN — CALCIUM GLUCONATE 1 G: 20 INJECTION, SOLUTION INTRAVENOUS at 13:12

## 2024-10-23 RX ADMIN — LEVOTHYROXINE SODIUM 125 MCG: 0.03 TABLET ORAL at 06:39

## 2024-10-23 RX ADMIN — Medication 125 MCG: at 13:21

## 2024-10-23 RX ADMIN — ACETAMINOPHEN 500 MG: 500 TABLET ORAL at 05:07

## 2024-10-23 RX ADMIN — CLOPIDOGREL BISULFATE 75 MG: 75 TABLET ORAL at 13:22

## 2024-10-23 RX ADMIN — SENNOSIDES AND DOCUSATE SODIUM 1 TABLET: 8.6; 5 TABLET ORAL at 20:23

## 2024-10-23 RX ADMIN — MAGNESIUM SULFATE HEPTAHYDRATE 2 G: 40 INJECTION, SOLUTION INTRAVENOUS at 13:12

## 2024-10-23 RX ADMIN — SIMETHICONE 80 MG: 80 TABLET, CHEWABLE ORAL at 09:55

## 2024-10-23 RX ADMIN — POTASSIUM CHLORIDE 40 MEQ: 1500 TABLET, EXTENDED RELEASE ORAL at 13:22

## 2024-10-23 RX ADMIN — IPRATROPIUM BROMIDE AND ALBUTEROL SULFATE 3 ML: .5; 3 SOLUTION RESPIRATORY (INHALATION) at 08:19

## 2024-10-23 RX ADMIN — ATORVASTATIN CALCIUM 80 MG: 40 TABLET, FILM COATED ORAL at 20:23

## 2024-10-23 RX ADMIN — BUMETANIDE 2 MG: 0.25 INJECTION INTRAMUSCULAR; INTRAVENOUS at 13:22

## 2024-10-23 RX ADMIN — SENNOSIDES AND DOCUSATE SODIUM 1 TABLET: 8.6; 5 TABLET ORAL at 13:20

## 2024-10-23 RX ADMIN — OXYCODONE HYDROCHLORIDE 2.5 MG: 5 TABLET ORAL at 05:07

## 2024-10-23 RX ADMIN — OXYCODONE HYDROCHLORIDE 2.5 MG: 5 TABLET ORAL at 20:26

## 2024-10-23 RX ADMIN — METOPROLOL TARTRATE 75 MG: 25 TABLET, FILM COATED ORAL at 13:22

## 2024-10-23 RX ADMIN — ACETAMINOPHEN 500 MG: 500 TABLET ORAL at 17:17

## 2024-10-23 RX ADMIN — FLUTICASONE FUROATE AND VILANTEROL TRIFENATATE 1 PUFF: 100; 25 POWDER RESPIRATORY (INHALATION) at 20:24

## 2024-10-23 ASSESSMENT — ACTIVITIES OF DAILY LIVING (ADL)
ADLS_ACUITY_SCORE: 0
ADLS_ACUITY_SCORE: 33
ADLS_ACUITY_SCORE: 0

## 2024-10-23 NOTE — PROGRESS NOTES
Care Management Follow Up    Length of Stay (days): 15    Expected Discharge Date: 10/26/2024     Concerns to be Addressed:       Patient plan of care discussed at interdisciplinary rounds: Yes    Anticipated Discharge Disposition:  TBD       Patient/family educated on Medicare website which has current facility and service quality ratings:  yes          Discussed  Partnership in Safe Discharge Planning  document with patient/family: No     Handoff Completed: No, handoff not indicated or clinically appropriate    Additional Information:  Received a message from Catherine Galvez PA-C regarding the patient and OT recommendations to go TCU. She asked that care management discuss these recommendations with patient.     Next Steps: follow up with patient    Nichole Alvarez RN

## 2024-10-23 NOTE — PROGRESS NOTES
"CVTS Daily Progress Note  POD#15 s/p CABG x4 (LIMA>LAD, rSVG>RPDA, rSVG>LPL, rSVG>D2), RLE EVH, Attempted MVR/r, MVR ( 29 mm [employer] Mechanical Mitral Valve), LAAE  Attending: Mauro  LOS: 15    SUBJECTIVE/INTERVAL EVENTS:    No acute events overnight. A flutter w/ 4:1 AV conduction w/ rates in the 60-70s. UOP continues to improve on IV bumex w/ weight slowly downtrending, however, Cr continues to rise. Tolerated HD well (10/15,17, 19). LFTs continue to downtrend. Maintaining oxygen saturations on home CPAP/RA. Normotensive. Ambulating w/ therapy as able, improved effort. Pain well controlled. +BM. Tolerating regular diet. INR therapeutic, heparin gtt stopped 10/15. Patient denies new chest pain, shortness of breath, abdominal pain, calf pain, nausea. Patient has no questions today.    OBJECTIVE:  Temp:  [97.7  F (36.5  C)-98.7  F (37.1  C)] 97.7  F (36.5  C)  Pulse:  [67-90] 67  Resp:  [18-22] 20  BP: ()/(54-68) 120/57  SpO2:  [97 %-100 %] 100 %  Vitals:    10/19/24 0700 10/20/24 0632 10/21/24 0624 10/22/24 0814   Weight: 112 kg (246 lb 14.4 oz) 108.5 kg (239 lb 4.8 oz) 109 kg (240 lb 6.4 oz) 107.7 kg (237 lb 6.4 oz)    10/23/24 0442   Weight: 107.2 kg (236 lb 4.8 oz)       Clinically Significant Risk Factors        # Hypokalemia: Lowest K = 3.2 mmol/L in last 2 days, will replace as needed  # Hyponatremia: Lowest Na = 134 mmol/L in last 2 days, will monitor as appropriate  # Hypochloremia: Lowest Cl = 95 mmol/L in last 2 days, will monitor as appropriate  # Hypocalcemia: Lowest Ca = 8.5 mg/dL in last 2 days, will monitor and replace as appropriate     # Hypoalbuminemia: Lowest albumin = 2.6 g/dL at 10/16/2024  4:35 AM, will monitor as appropriate       # Hypertension: Noted on problem list           # Severe Obesity: Estimated body mass index is 40.56 kg/m  as calculated from the following:    Height as of 9/25/24: 1.626 m (5' 4\").    Weight as of this encounter: 107.2 kg (236 lb 4.8 oz).   # Moderate " Malnutrition: based on nutrition assessment     # History of CABG: noted on surgical history                            Current Medications:    Scheduled Meds:  Current Facility-Administered Medications   Medication Dose Route Frequency Provider Last Rate Last Admin    acetaminophen (TYLENOL) tablet 500 mg  500 mg Oral Q6H Catherine Galvez PA-C   500 mg at 10/23/24 0507    atorvastatin (LIPITOR) tablet 80 mg  80 mg Oral At Bedtime Catherine Galvez PA-C   80 mg at 10/22/24 2045    budesonide (PULMICORT) neb solution 1 mg  1 mg Nebulization BID Catherine Galvez PA-C   0.5 mg at 10/23/24 0819    bumetanide (BUMEX) injection 2 mg  2 mg Intravenous Daily Jaclyn Murillo MD   2 mg at 10/22/24 1408    calcium gluconate 1 g in 50 mL in sodium chloride intermittent infusion  1 g Intravenous Once Catherine Galvez PA-C        cetirizine (zyrTEC) tablet 10 mg  10 mg Oral Daily Catherine Galvez PA-C   10 mg at 10/22/24 0827    clopidogrel (PLAVIX) tablet 75 mg  75 mg Oral Daily Catherine Galvez PA-C   75 mg at 10/22/24 0827    fludrocortisone (FLORINEF) tablet 0.1 mg  0.1 mg Oral Daily Catherine Galvez PA-C   0.1 mg at 10/22/24 0828    fluticasone-vilanterol (BREO ELLIPTA) 100-25 MCG/ACT inhaler 1 puff  1 puff Inhalation At Bedtime Catherine Galvez PA-C   1 puff at 10/21/24 2021    heparin lock flush 10 unit/mL injection 5-20 mL  5-20 mL Intracatheter Q24H Catherine Galvez PA-C   15 mL at 10/20/24 1957    levothyroxine (SYNTHROID/LEVOTHROID) tablet 125 mcg  125 mcg Oral QAM AC Catherine Galvez PA-C   125 mcg at 10/23/24 0639    Lidocaine (LIDOCARE) 4 % Patch 1-2 patch  1-2 patch Transdermal Q24H Catherine Galvez PA-C   1 patch at 10/22/24 1821    magnesium sulfate 2 g in 50 mL sterile water intermittent infusion  2 g Intravenous Once Catherine Galevz PA-C        metoprolol tartrate (LOPRESSOR) tablet 75 mg  75 mg Oral BID Catherine Galvez PA-C   75 mg at  10/22/24 2046    multivitamin w/minerals (THERA-VIT-M) tablet 1 tablet  1 tablet Oral Daily Catherine Galvez PA-C   1 tablet at 10/22/24 1229    No heparin via hemodialysis machine   Does not apply Once Jaclyn Murillo MD        pantoprazole (PROTONIX) EC tablet 40 mg  40 mg Oral Daily Catherine Galvez PA-C   40 mg at 10/22/24 0827    polyethylene glycol (MIRALAX) Packet 17 g  17 g Oral Daily Catherine Galvez PA-C        potassium chloride maryann ER (KLOR-CON M20) CR tablet 40 mEq  40 mEq Oral Once Catherine Galvez PA-C        senna-docusate (SENOKOT-S/PERICOLACE) 8.6-50 MG per tablet 1 tablet  1 tablet Oral BID Catherine Galvez PA-C   1 tablet at 10/22/24 1418    sodium chloride (PF) 0.9% PF flush 10-40 mL  10-40 mL Intracatheter Q8H Catherine Galvez PA-C   20 mL at 10/23/24 0501    sodium chloride (PF) 0.9% PF flush 10-40 mL  10-40 mL Intracatheter Q7 Days Catherine Galvez PA-C   10 mL at 10/18/24 1703    Vitamin D3 (CHOLECALCIFEROL) tablet 125 mcg  125 mcg Oral Daily Catherine Galvez PA-C   125 mcg at 10/22/24 0827    Warfarin Dose Required Daily - Pharmacist Managed  1 each Oral See Admin Instructions Catherine Galvez PA-C         Continuous Infusions:  Current Facility-Administered Medications   Medication Dose Route Frequency Provider Last Rate Last Admin     PRN Meds:.  Current Facility-Administered Medications   Medication Dose Route Frequency Provider Last Rate Last Admin    albuterol (PROVENTIL HFA/VENTOLIN HFA) inhaler  2 puff Inhalation Q6H PRN Catherine Galvez PA-C        bisacodyl (DULCOLAX) suppository 10 mg  10 mg Rectal Daily PRN Catherine Galvez PA-C        glucose gel 15-30 g  15-30 g Oral Q15 Min PRN Catherine Galvez PA-C        Or    dextrose 50 % injection 25-50 mL  25-50 mL Intravenous Q15 Min PRN Catherine Galvez PA-C   50 mL at 10/14/24 0806    Or    glucagon injection 1 mg  1 mg Subcutaneous Q15 Min PRN Catherine Galvez  EARNESTINE MCKEON        fluticasone (FLONASE) 50 MCG/ACT spray 2 spray  2 spray Both Nostrils Daily PRN Catherine Galvez PA-C        heparin 1000 unit/mL DIALYSIS Cath LOCK - RED Lumen  1.3-2.6 mL Intracatheter Q1H PRN Rohan Polo MD   1,300 Units at 10/17/24 1628    heparin 1000 unit/mL DIALYSIS Cath LOCK -BLUE Lumen  1.3-2.6 mL Intracatheter Q1H PRN Rohan Polo MD        heparin lock flush 10 unit/mL injection 5-20 mL  5-20 mL Intracatheter Q1H PRN Catherine Galvez PA-C        hydrALAZINE (APRESOLINE) injection 10 mg  10 mg Intravenous Q30 Min PRN Catherine Galvez PA-C        hydrOXYzine HCl (ATARAX) tablet 25 mg  25 mg Oral TID PRN Catherine Galvez PA-C   25 mg at 10/20/24 1642    ipratropium - albuterol 0.5 mg/2.5 mg/3 mL (DUONEB) neb solution 3 mL  3 mL Nebulization Q4H PRN Catherine Galvez PA-C   3 mL at 10/23/24 0819    naloxone (NARCAN) injection 0.2 mg  0.2 mg Intravenous Q2 Min PRN Catherine Galvez PA-C        Or    naloxone (NARCAN) injection 0.4 mg  0.4 mg Intravenous Q2 Min PRN Catherine Galvez PA-C        Or    naloxone (NARCAN) injection 0.2 mg  0.2 mg Intramuscular Q2 Min PRN Catherine Galvez PA-C        Or    naloxone (NARCAN) injection 0.4 mg  0.4 mg Intramuscular Q2 Min PRN Catherine Galvez PA-C        ondansetron (ZOFRAN ODT) ODT tab 4 mg  4 mg Oral Q6H PRN Catherine Galvez PA-C        Or    ondansetron (ZOFRAN) injection 4 mg  4 mg Intravenous Q6H PRN Catherine Galvez PA-C   4 mg at 10/10/24 1945    oxyCODONE IR (ROXICODONE) half-tab 2.5 mg  2.5 mg Oral Q6H PRN Catherine Galvez PA-C   2.5 mg at 10/23/24 0507    prochlorperazine (COMPAZINE) injection 10 mg  10 mg Intravenous Q6H PRN Catherine Galvez PA-C        Or    prochlorperazine (COMPAZINE) tablet 10 mg  10 mg Oral Q6H PRN Catherine Galvez PA-C        simethicone (MYLICON) chewable tablet 80 mg  80 mg Oral 4x Daily PRN David Wade MD   80 mg at  10/21/24 2020    sodium chloride (PF) 0.9% PF flush 10-20 mL  10-20 mL Intracatheter q1 min prn Catherine Galvez PA-C        sodium chloride (PF) 0.9% PF flush 10-40 mL  10-40 mL Intracatheter Q1H PRN Catherine Galvez PA-C        sodium chloride 0.9% BOLUS 100-150 mL  100-150 mL Intravenous Q15 Min PRN Rohan Polo MD        sodium chloride 0.9% BOLUS 100-150 mL  100-150 mL Intravenous Q15 Min PRN Rohan Polo MD           Cardiographics:    Telemetry monitoring demonstrates a flutter w/ rates in the 60-70s per my personal review.    Imaging:  Results for orders placed or performed during the hospital encounter of 10/08/24   XR Chest Port 1 View    Impression    IMPRESSION: Interval sternotomy, CABG procedure and mitral valve repair. Endotracheal tube proximally 2 cm above the sofia. Nasogastric tube in the stomach. Bilateral chest tubes and midline mediastinal drain in expected position. Epicardial leads are   present. Left IJ line in the brachiocephalic vein.    Low lung volumes. No evidence for CHF or pneumonia. No pleural effusion or pneumothorax.   XR Chest Port 1 View    Impression    IMPRESSION: Poststernotomy changes with prosthetic mitral valve. Patient's been extubated and the NG tube has been removed. Mediastinal and pleural chest tubes are in place.    Left IJ cordis sheath is in the distal left innominate artery. Catheter has a very subtle kink within it 3.5 centimeters from the tip.    Low lung volumes. No pneumothorax. Likely trace left effusion at the base. No signs of pneumonia or failure. Heart is of normal size.   XR Abdomen Port 1 View    Impression    IMPRESSION: Orogastric tube tip in the proximal stomach and oriented superiorly towards the left diaphragm. Visualized bowel gas pattern unremarkable. Numerous tubes and lines lower chest and upper abdomen as described on chest x-ray report from today.        Labs, personally reviewed.  Hemoglobin   Date Value Ref Range  Status   10/23/2024 7.6 (L) 11.7 - 15.7 g/dL Final   10/22/2024 7.8 (L) 11.7 - 15.7 g/dL Final   10/21/2024 7.6 (L) 11.7 - 15.7 g/dL Final   06/09/2015 13.5 11.7 - 15.7 g/dL Final   03/11/2015 12.3 11.7 - 15.7 g/dL Final   01/08/2015 13.6 11.7 - 15.7 g/dL Final     WBC   Date Value Ref Range Status   06/09/2015 7.9 4.0 - 11.0 10e9/L Final   03/11/2015 9.4 4.0 - 11.0 10e9/L Final   01/08/2015 8.1 4.0 - 11.0 10e9/L Final     WBC Count   Date Value Ref Range Status   10/23/2024 13.1 (H) 4.0 - 11.0 10e3/uL Final   10/22/2024 13.8 (H) 4.0 - 11.0 10e3/uL Final   10/21/2024 13.2 (H) 4.0 - 11.0 10e3/uL Final     Platelet Count   Date Value Ref Range Status   10/23/2024 423 150 - 450 10e3/uL Final   10/22/2024 412 150 - 450 10e3/uL Final   10/21/2024 305 150 - 450 10e3/uL Final   06/09/2015 219 150 - 450 10e9/L Final   03/11/2015 240 150 - 450 10e9/L Final   01/08/2015 221 150 - 450 10e9/L Final     Creatinine   Date Value Ref Range Status   10/23/2024 3.86 (H) 0.51 - 0.95 mg/dL Final   10/22/2024 3.54 (H) 0.51 - 0.95 mg/dL Final   10/21/2024 3.13 (H) 0.51 - 0.95 mg/dL Final   06/09/2015 0.80 0.52 - 1.04 mg/dL Final   03/11/2015 0.87 0.52 - 1.04 mg/dL Final   01/08/2015 0.79 0.52 - 1.04 mg/dL Final     Potassium   Date Value Ref Range Status   10/23/2024 3.2 (L) 3.4 - 5.3 mmol/L Final   10/22/2024 3.4 3.4 - 5.3 mmol/L Final   10/21/2024 3.4 3.4 - 5.3 mmol/L Final   06/09/2015 3.8 3.4 - 5.3 mmol/L Final   03/11/2015 3.9 3.4 - 5.3 mmol/L Final   01/08/2015 4.1 3.4 - 5.3 mmol/L Final     Potassium POCT   Date Value Ref Range Status   10/08/2024 4.4 3.4 - 5.3 mmol/L Final   10/08/2024 4.0 3.4 - 5.3 mmol/L Final   10/08/2024 4.1 3.4 - 5.3 mmol/L Final     Magnesium   Date Value Ref Range Status   10/23/2024 2.0 1.7 - 2.3 mg/dL Final   10/22/2024 1.7 1.7 - 2.3 mg/dL Final   10/21/2024 1.8 1.7 - 2.3 mg/dL Final   06/09/2015 1.8 1.6 - 2.3 mg/dL Final   03/11/2015 1.7 1.6 - 2.3 mg/dL Final   01/08/2015 1.8 1.6 - 2.3 mg/dL Final           I/O:  I/O last 3 completed shifts:  In: 890 [P.O.:890]  Out: 1950 [Urine:1950]       Physical Exam:    General: Patient seen in chair this AM. NAD. Pleasant, conversant.   HEENT: DORITA, no sclera icterus, moist mucosa  CV: RRR on monitor. 2+ peripheral pulses in all extremities. Moderate peripheral edema. Incision C/D/I.  Pulm: Satting 100% w/ nonlabored breathing on RA  Abd: Soft, NT, ND  : Voiding  Ext: Moderate pedal edema, SCDs in place, warm, distal pulses intact  Neuro: CNs grossly intact, FAM, A&Ox3      ASSESSMENT/PLAN:    Jory Ambrose is a 49 year old female with a history of GIA, asthma, GERD,  hypothyroidism, h/o PE on plavix, carotid stenosis, Hodgkin's lymphoma (s/p chemo/radiation and bone marrow transplant x2 after recurrence) currently in remission who is s/p CABG x4, RLE EVH, attempted mitral repair, mechanical MVR, LAAE.    Active Problems:    Mitral regurgitation    Coronary artery disease involving native coronary artery of native heart with angina pectoris (H)  #Acute Renal Failure d/t ATN, ongoing.      NEURO:   - Scheduled lidocaine patches and PRN oxycodone for pain  - PTA bupropion discontinued (somnolence)  - Tylenol 500 mg q6hrs (max 2g per day given recent hepatic dysfunction)    CV:  - Pre-op EF 55-60%  - Chest tubes and TPW's removed POD#5  - Normotensive  - Metop 75 mg BID  - Plavix daily indefinitely d/t unclear h/o anaphylaxis to ASA  - Atorvastatin 80mg daily  - Warfarin for mechanical MVR, INR therapeutic, heparin gtt stopped 10/15  - Paroxysmal rate controlled afib - s/p amiodarone bolus/gtt 10/13 (ok per Section). Holding given LFTs and allergy to PO amio additive. Consider EP consult if ongoing a fib as hypervolemia improves  - New baseline echo prior to discharge when closer to preop weight for new baseline s/p MVR and to eval for pericardial effusion on warfarin/plavix    PULM:   - Extubated on POD#0  - Maintaining O2 sats on room air w/ home CPAP qHS  -  Encourage pulmonary toilet  - PTA zyrtec, fludrocortisone, flonase, simethicone, albuterol, pulmicort, breo ellipta, duoneb    FEN/GI:  - Continue electrolyte replacement protocol  - Regular diet  - Bowel regimen, LBM 10/19  - Ischemic hepatitis - LFTs essentially normal at this point  - Hepatic w/u and labs unremarkable  - PTA vitamin D3     RENAL:  - Nephrology consulted for ARF w/ decreased UOP, appreciate. Following.  - CRRT 10/11 - 10/13  - HD 10/15, 17, & 19 - trialing off dialysis  - Strict I/O  - Daily renal panel  - Cr elevated 2.7-3.1 (baseline ~0.9)  - UA/Urine cx 10/10 & 10/13 NG    HEME:  - H/o bone marrow tx. Needs irradiated blood  - Acute blood loss anemia post-op.   - Hgb stable, likely dilutional component  - Warfarin INR goal 2.5-3.5 in s/o mech MVR  - HIT screen negative.  - INR therapeutic  - Transfusion hx  - 2u PRBCs periop  - 1u PRBCs 10/11  - 1u PRBCs 10/16    ID:  - Lydia op ppx complete. Afebrile. Mild stable leukocytosis, felt to be reactive.  - UA/Urine cx 10/10 & 10/13 NG.  - Blood cx 10/9 & 10/13 NG  - ID consult for ?sepsis, appreciate. Signed off  - CBC qday    ENDO:   - HbA1c 5.7%  - Adrenal, thyroid, and pancreatitis labs unremarkable  - PTA Metformin discontinued (renal function)  - PTA synthroid    PPx:   - DVT: SCDs, SQ heparin TID, ambulation   - GI: Protonix 40mg IV/PO daily    DISPO:   - Continue general tele care (POD#8)  - PT/OT recs at discharge: Home (? w/ home care)   - Medically Ready for Discharge: Anticipated in 2-4 Days pending renal function and nephrology decision on need for ongoing outpt iHD         Patient discussed w/ Dr. Hanson and Dr. Wade.    Eva Galvez PA-C  [facility] Cardiothoracic Surgery  Secure Chat or Vocera  October 23, 2024

## 2024-10-23 NOTE — DISCHARGE SUMMARY
Cardiovascular Surgery Discharge Summary    Primary Care Physician:  Rome Joshua  Discharge Provider: Catherine Galvez PA-C   Admission Date: 10/8/2024   Admission Diagnoses: Coronary artery disease involving native coronary artery of native heart with angina pectoris (H) [I25.119]   Discharge Date: 11/01/2024   Disposition: TCU   Condition at Discharge: Improving  Code Status: Full Code     Principal Diagnosis:   Coronary artery disease involving native coronary artery of native heart with angina pectoris (H) and mitral regurg s/p CABG x 4, attempted MVR/r, mechanical MVR, left atrial appendage excision    Discharge Diagnoses:    Active Problems:  Patient Active Problem List   Diagnosis    Hodgkin disease (H)    Nodular sclerosing Hodgkin's lymphoma, unspecified body region (H)    Hypothyroid    Pulmonary function studies abnormal    Obesity    Hodgkin's disease (H)    Vitamin D deficiency    Hypomagnesemia    History of autologous stem cell transplant (H)    Hypertension secondary to drug    Coronary artery disease involving native coronary artery of native heart with angina pectoris (H)    Paroxysmal atrial fibrillation (H)    Paroxysmal atrial flutter (H)    Typical atrial flutter (H)    Adjustment disorder with mixed anxiety and depressed mood    Allergic rhinitis    Anxiety with somatization    Asthma    Depression    GVHD (graft versus host disease) (H)    Hepatic steatosis    Hirsutism    Hyperlipidemia    MDD (major depressive disorder), recurrent, in partial remission (H)    Metabolic syndrome    Mild cognitive disorder    GIA (obstructive sleep apnea)    Polycystic ovaries    Prediabetes    S/P cholecystectomy    SI (sacroiliac) pain    Sleep difficulties          Consult/s: Dietary, critical care medicine, cardiology, infectious disease, nephrology, WOC      Surgery:   10/8/2024 with Dr. Wade    PROCEDURE PERFORMED:   1.  Mitral valve replacement (29 mm [employer] Epic mitral  bioprosthetic valve).   2.  Coronary artery bypass grafting x 4 (reversed saphenous vein graft to the right posterior descending coronary artery, reversed saphenous vein graft  to the posterolateral branch of the left circumflex coronary artery, reversed saphenous vein graft to the second diagonal branch of the left anterior descending coronary artery, and pedicled left internal mammary artery to left anterior descending coronary artery).  3.  Endoscopic vein harvest of the greater saphenous vein from the left lower extremity.  4.  Left atrial appendage excision.    OPERATIVE FINDINGS:  The left internal mammary artery was 2 mm in diameter and had excellent flow.  The greater saphenous vein from the left lower extremity was 4 mm in diameter and suitable for conduit.The ascending aorta was free of calcified plaque. The right posterior descending coronary artery was 1.75 mm in diameter and free of disease at the site of anastomosis. The obtuse marginal branch of the left circumflex coronary artery was 2 mm in diameter and free of disease at the site of anastomosis. The diagonal branch of the left anterior descending coronary artery was 1.75 mm in diameter and free of disease at the site anastomosis. The left anterior descending coronary artery 2 mm in diameter and free of disease at the site of anastomosis.  The anterior mitral valve leaflet was mildly thickened but the anterior chordae were severely thickened. The posterior mitral leaflet was more normal but the posterior chordae were mildly thickened. After an unsuccessful attempt at mitral valve repair with a 30 mm Sanchez Physio II mitral annuloplasty ring, mitral valve replacement was performed with a 29 mm [employer] Epic mechanical valve, after which there was no perivalvular leak and the gradient was normal and low (1-3 mmHg). After reperfusion and defibrillation, junctional rhythm resumed, and ventricular pacing was commenced.  Left ventricular function was  normal preoperatively and unchanged after bypass on moderate dose inotropic support.        Discharge Medications:        Review of your medicines        START taking        Dose / Directions   budesonide 0.5 MG/2ML neb solution  Commonly known as: PULMICORT  Indication: Asthma      Dose: 1 mg  Take 4 mLs (1 mg) by nebulization 2 times daily.  Refills: 0     Metoprolol Tartrate 75 MG Tabs  Indication: Atrial Fibrillation, Atrial Flutter, High Blood Pressure      Dose: 75 mg  Take 75 mg by mouth 2 times daily.  Refills: 0     miconazole 2 % external powder  Commonly known as: MICATIN  Indication: Ringworm of Groin Area  Used for: Impaired skin integrity [R23.9]      Apply topically 2 times daily.  Refills: 0     sodium chloride 0.65 % nasal spray  Commonly known as: OCEAN  Indication: dry nose  Used for: Dry nose      Dose: 1 spray  Spray 1 spray into both nostrils every hour as needed for congestion or other (dry nose).  Quantity: 15 mL  Refills: 0     warfarin ANTICOAGULANT 1 MG tablet  Commonly known as: COUMADIN  Indication: Atrial Fibrillation, mechanical mitral valve  Used for: H/O mitral valve replacement with mechanical valve      Take 1.5 mg today (11/1/2024), then as directed by TCU provider for INR goal 2.5-3.5  Refills: 0            CONTINUE these medicines which may have CHANGED, or have new prescriptions. If we are uncertain of the size of tablets/capsules you have at home, strength may be listed as something that might have changed.        Dose / Directions   simethicone 125 MG chewable tablet  Commonly known as: MYLICON  Indication: Gas  This may have changed: when to take this      Dose: 125 mg  Take 1 tablet (125 mg) by mouth 4 times daily as needed for intestinal gas  Quantity: 40 tablet  Refills: 0            CONTINUE these medicines which have NOT CHANGED        Dose / Directions   acetaminophen 500 MG tablet  Commonly known as: TYLENOL  Indication: Pain      Dose: 1,000 mg  Take 1,000 mg by  mouth every 6 hours as needed for mild pain.  Refills: 0     atorvastatin 80 MG tablet  Commonly known as: LIPITOR  Indication: High Amount of Fats in the Blood  Used for: Abnormal computed tomography angiography of heart, Chest pain on exertion, Hypertension secondary to drug      Dose: 80 mg  Take 1 tablet (80 mg) by mouth daily.  Quantity: 90 tablet  Refills: 3     budesonide-formoterol 80-4.5 MCG/ACT Inhaler  Commonly known as: SYMBICORT  Indication: Asthma      Dose: 1 puff  Inhale 1 puff into the lungs two times daily.  Refills: 0     buPROPion 300 MG 24 hr tablet  Commonly known as: WELLBUTRIN XL  Indication: Depression, Major Depressive Disorder      Dose: 300 mg  Take 300 mg by mouth every morning  Refills: 0     cetirizine 10 MG tablet  Commonly known as: zyrTEC  Indication: Upper Respiratory Tract Allergy  Used for: Hodgkin's disease, unspecified(201.90)      Dose: 10 mg  Take 1 tablet (10 mg) by mouth daily  Quantity: 30 tablet  Refills: 11     cholecalciferol 125 mcg (5000 units) capsule  Commonly known as: VITAMIN D3  Indication: Vitamin D Deficiency      Dose: 125 mcg  Take 125 mcg by mouth daily.  Refills: 0     clopidogrel 75 MG tablet  Commonly known as: PLAVIX  Indication: Coronary Bypass Surgery      Dose: 1 tablet  Take 1 tablet by mouth daily  Refills: 0     fludrocortisone 0.1 MG tablet  Commonly known as: FLORINEF  Indication: Secondary Adrenocortical Insufficiency      Dose: 0.1 mg  Take 0.1 mg by mouth daily.  Refills: 0     fluticasone 50 MCG/ACT nasal spray  Commonly known as: FLONASE  Indication: Allergic Rhinitis      Dose: 2 spray  Spray 2 sprays into both nostrils daily as needed  Refills: 0     ipratropium - albuterol 0.5 mg/2.5 mg/3 mL 0.5-2.5 (3) MG/3ML neb solution  Commonly known as: DUONEB  Indication: Asthma Exacerbation      Dose: 1 vial  Take 1 vial by nebulization every 6 hours as needed  Refills: 0     levothyroxine 125 MCG tablet  Commonly known as:  SYNTHROID/LEVOTHROID  Indication: Hypothyroidism not due to Hormone Abnormality      Dose: 125 mcg  Take 125 mcg by mouth daily  Refills: 0     MULTI-VITAMIN PO  Indication: Nutrition      Dose: 1 tablet  Take 1 tablet by mouth daily  Refills: 0     omeprazole 20 MG DR capsule  Commonly known as: PriLOSEC  Indication: Gastroesophageal Reflux Disease  Used for: Hodgkin's disease (H), Long term (current) use of anticoagulants, Subclinical hypothyroidism, Nausea alone, Dyspepsia and other specified disorders of function of stomach      Dose: 20 mg  Take 1 capsule (20 mg) by mouth daily  Quantity: 30 capsule  Refills: 12     ProAir  (90 Base) MCG/ACT inhaler  Indication: Asthma  Generic drug: albuterol      Dose: 2 puff  Inhale 2 puffs into the lungs every 6 hours as needed.  Refills: 0     traZODone 100 MG tablet  Commonly known as: DESYREL  Indication: Trouble Sleeping      Dose: 100 mg  Take 100 mg by mouth at bedtime  Refills: 0            STOP taking      Benadryl 25 MG tablet  Generic drug: diphenhydrAMINE        isosorbide dinitrate 30 MG tablet  Commonly known as: ISORDIL        LORazepam 0.5 MG tablet  Commonly known as: ATIVAN        Magnesium Oxide -Mg Supplement 500 MG Caps        metFORMIN 500 MG 24 hr tablet  Commonly known as: GLUCOPHAGE XR        methylPREDNISolone 32 MG tablet  Commonly known as: Medrol        nitroGLYcerin 0.4 MG sublingual tablet  Commonly known as: NITROSTAT        spironolactone 25 MG tablet  Commonly known as: ALDACTONE                   Discharge Instructions:    Follow up appointment with Primary Care Physician: Rome Joshua within 7 days of discharge from TCU.  Follow up appointment with Specialist:    Follow with CV Surgery as scheduled.   Follow up with atrial fibrillation clinic as scheduled.    Follow-up with cardiology as scheduled    Diet: Cardiac    Activity/Restrictions: As tolerated with sternal precautions in mind (see below). No driving for 4 weeks or while  "on pain medication.     - Shower and wash your incisions daily with soap and water. No tub baths/hot tubs for 4 weeks. An antibacterial soap such as Dial or Safeguard is recommended.    - Check your incisions every day. If you notice any redness, drainage, or anything unusual, please call the surgeons office.    - No driving for 4 weeks after surgery or while on pain medication     - If you have had a valve repair or replacement, check with your cardiologist regarding the need for antibiotics before dental visits or other medical procedures.    - Do not lift anything more than 10 pounds for 8 weeks after surgery. After 8 weeks, advance lifting gradually as tolerated.    - You may have watery drainage from your chest tube site for 2-3 weeks after surgery. Your may cover with a Band-Aid to protect your clothing. Remove the Band-Aid every day and wash the site.    - If you have a leg lesion, you may have swelling for 2-3 months. Elevate your leg any time you are not walking.    - If you feel any \"popping\" or \"clicking\" sensations in your chest, your arms are out too far or you are putting too much weight into arm movements. Do not reach over your head or out to the side to pull something. Do not do any arm exercises or use any exercise equipment that involves arm movement. If you feel your sternum moving, call the surgeon's office.    - Increase your daily activity as explained by Cardiac Rehab. You are encouraged to enroll in an Outpatient Cardiac Rehab Program.    - No active sports using your upper arms for 3 months. This includes fishing, hunting, bowling, swimming, tennis or golf.    - No physical activity such as cutting the grass, raking, vacuuming, changing sheets on your bed, snow shoveling, or using a  for 3 months.    - Use incentive spirometer 6-8 times per day for 2 weeks.       Hospital Summary:   Jory Ambrose is a 49 year old female who was admitted to [facility] on " 10/8/2024 for planned elective surgery. She was referred to CV surgery for evaluation for possible surgical revascularization and mitral valve repair/replacement.     Patient was deemed an appropriate candidate for CABG and MVR/r vs MVR, and was taken to the operating room on 10/8/2024 where patient underwent quadruple vessel bypass grafting and mechanical mitral valve replacement. Surgery was uneventful and patient was brought to the ICU post-operatively. She was extubated on POD#1 and weaned from pressors. Patient was awake and alert, afebrile, and with stable vitals. Insulin drip was discontinued and she was transitioned to a sliding scale. She was transferred to general telemetry status on POD#8 where patient has had return of bowel function, is maintaining oxygen saturations on room air, had their chest tubes removed, and has no complaints of chest pain or shortness of breath. On 11/1/24 (POD#24) patient was stable enough to be discharged to TCU.    Of note:  - Postop ischemic hepatitis. Hepatitis w/u and labs unremarkable. LFTs normalized prior to discharge.  - Acute renal failure. [facility] followed patient. Discussed discharge recs w/ Dr. Dhillon.   - CRRT 10/11 - 10/13   - Received inpt HD 10/15, 10/17, 10/19, and 10/23   - Tunneled dialysis catheter placed per IR on 10/24 - removed prior to discharge   - Sending w/o diuretics. Ok for bumex if weight increasing or increased edema   - Cr on day of discharge is 2.54 and grossly stable  - Concern for sepsis postop. Received a short course of vanc/rocephin and meropenem for poss UTI. ID was consulted and signed off w/o identified etiology of sepsis and improving leukocytosis/clinical picture off abx.  - Required multiple transfusions postop.   - Patient on plavix qday s/p CABG d/t unclear history of allergy to ASA.   - Warfarin s/p mechanical MVR indefinitely. INR goal 2.5-3.5.INR on day of discharge was 2.91.  - New baseline echo performed prior  to discharge. EF 60-65%, mitral valve well seated and w/ normal gradiens, trivial pericardial effusion  - Her metformin was stopped d/t ARF, but BG consistently ~100 so no additional medication was added.  - Patient w/ paroxysmal a fib and flutter postop. A fib rates in the 90s, flutter rates 60-70s. S/p amio bolus/gtt 10/13 (ok per surgeon in s/o LFTs) but was not placed on PO d/t LFTs and allergy to PO amio additive. Metoprolol was increased as tolerated. Work toward euvolemia as able over time. EP was consulted and stated DCCV not necessary at this time. Patient has folow-up scheduled with them.     Vital signs:  /77   Pulse 95   Temp 97.7  F (36.5  C) (Oral)   Resp 18   Wt 106.1 kg (233 lb 12.8 oz)   LMP  (LMP Unknown)   SpO2 96%   BMI 40.13 kg/m     233 lbs 12.8 oz   Body mass index is 40.13 kg/m .       Physical Exam:    Pertinent exam findings on day of discharge include:  Gen: Seen up in chair. NAD. Pleasant and conversant.   CV: RRR on monitor. Moderate edema.  Pulm: Non-labored breathing on room air.  Abd: Soft, non-tender  Neuro: CNs grossly intact  Inc: C/D/I      Eva Galvez PA-C  [facility] Cardiothoracic Surgery  Vocera or Secure Chat  November 1, 2024

## 2024-10-23 NOTE — CONSULTS
"  Interventional Radiology - Pre-Procedure Evaluation:  Inpatient - [facility]  10/23/2024     Procedure Requested: Tunneled dialysis catheter placement  Requested by: Jaclyn Murillo MD     HPI: Jory Ambrose is a 49 year old female with a PMH of asthma, PE on warfarin, sleep apnea, Hodgkin lymphoma, lung transplant x2, and CAD who was admitted to [facility] on 10/08/2024 for a planned CABG x4, MVR, and CARRIE ligation. Hospital course c/b ALEXEI, Nephrology following. Has been getting HD via nontunneled catheter. Now requesting tunneled HD catheter for long term HD.    WBC today 13.1, has been trending in this range. Per CV Surgery note: \"Mild stable leukocytosis, felt to be reactive\". ID previously consulted for sepsis with leukocytosis - completed abx, BC and UC negative, signed off. Afebrile.     Noted contrast allergy. Contrast is not typically used for tunneled HD catheter placements, however, patient may have to be premedicated if it is required.    IMAGING:  EXAM: XR CHEST 2 VIEWS  LOCATION: [facility]  DATE: 10/19/2024     INDICATION: shortness of breath, s p cv surgery.  COMPARISON: Chest radiograph 10/16/2024.                                                                      IMPRESSION: Stable size of cardiomediastinal silhouette status post median sternotomy and valve replacement. Stable position of right central venous catheter. Low lung volumes without significant pleural effusion, pneumothorax or airspace consolidation.   Bones are unchanged.    NPO: Ordered for midnight 10/24  ANTICOAGULANTS/ANTIPLATELETS:  Warfarin, recommend INR <3.0 for procedure, INR today 3.23, previously ordered for tomorrow AM  Plavix, no hold required  ANTIBIOTICS: Ancef 2 g signed and held    ALLERGIES:  Allergies   Allergen Reactions    Dye [Contrast Dye] Swelling     Pre-medicated with methylprednisolone    Shellfish Allergy Shortness Of Breath, Anaphylaxis and " Swelling     Shrimp, crab, lobster    Penicillins Hives     Patient cannot recall if she has tried cephalosporins in the past.     Erythromycin Hives    Morphine Hcl      Causes change in mental status    Sulfa Antibiotics Hives and Rash         LABS:  INR   Date Value Ref Range Status   10/23/2024 3.23 (H) 0.85 - 1.15 Final   05/06/2014 0.92 0.86 - 1.14 Final      Hemoglobin   Date Value Ref Range Status   10/23/2024 7.6 (L) 11.7 - 15.7 g/dL Final   06/09/2015 13.5 11.7 - 15.7 g/dL Final     Platelet Count   Date Value Ref Range Status   10/23/2024 423 150 - 450 10e3/uL Final   06/09/2015 219 150 - 450 10e9/L Final     Creatinine   Date Value Ref Range Status   10/23/2024 3.86 (H) 0.51 - 0.95 mg/dL Final   06/09/2015 0.80 0.52 - 1.04 mg/dL Final     Potassium   Date Value Ref Range Status   10/23/2024 3.2 (L) 3.4 - 5.3 mmol/L Final   06/09/2015 3.8 3.4 - 5.3 mmol/L Final     Potassium POCT   Date Value Ref Range Status   10/08/2024 4.4 3.4 - 5.3 mmol/L Final         EXAM:  /57   Pulse 95   Temp 97.3  F (36.3  C) (Tympanic)   Resp 21   Wt 107.2 kg (236 lb 4.8 oz)   LMP  (LMP Unknown)   SpO2 100%   BMI 40.56 kg/m    General: Stable. In no acute distress.    Neuro: Alert and oriented x 3. No focal deficits.  Psych: Appropriate mood and affect. Linear/coherent thought process.   Resp: Normal respirations. Lungs clear to auscultation bilaterally.  Cardio: S1S2, regular rate and rhythm, without murmur, clicks or rubs.  Skin: Warm and dry. Without excoriations, ecchymosis, erythema, lesions or open sores on chest.      PRE-SEDATION ASSESSMENT:  Mallampati Airway Classification:  II - Faucial pillars and soft palate may be seen, but uvula is masked by the base of the tongue  Previous reaction to anesthesia/sedation:  No  Sedation plan based on assessment: Moderate (conscious) sedation  ASA Classification: Class 3 - SEVERE SYSTEMIC DISEASE, DEFINITE FUNCTIONAL LIMITATIONS.   Code Status: FULL  CODE      ASSESSMENT/PLAN:   Tunneled dialysis catheter placement with sedation, laterality per proceduralist TOMORROW 10/24. No contraindications to RIGHT sided placement.    - NPO at midnight  - INR ordered for tomorrow AM  - Patient will need ORQUIDEA chart check prior to procedure    Procedural education reviewed with patient in detail including, but not limited to risks, benefits and alternatives with understanding verbalized by patient.    Total time spent on the date of the encounter: 30 minutes.      RAFY HARDY CNP  Interventional Radiology

## 2024-10-23 NOTE — PROCEDURES
DIALYSIS PROCEDURE NOTE  Hepatitis status of previous patient on machine log was checked and verified ok to use with this patients hepatitis status.  Patient dialyzed for 3 hrs. on a K4 bath with a net fluid removal of  2.6L.  A BFR of 350 ml/min was obtained via a CVC       The treatment plan was discussed with Dr. Murillo during the treatment.    Total heparin received during the treatment: 0 units.      Line flushed, clamped and capped with heparin 1:1000     Meds  given: none   Complications: cramping at the end of her run.  Off 5 minutes early       Person educated: patient. Knowledge base fair. Barriers to learning: none. Educated on procedure via verbal mode.       ICEBOAT? Timeout performed pre-treatment  I: Patient was identified using 2 identifiers  C:  Consent Signed Yes  E: Equipment preventative maintenance is current and dialysis delivery system OK to use  B: Hepatitis B Surface Antigen: negative; Draw Date: 10/15/24      Hepatitis B Surface Antibody: UNK;   O: Dialysis orders present and complete prior to treatment  A: Vascular access verified and assessed prior to treatment  T: Treatment was performed at a clinically appropriate time  ?: Patient was allowed to ask questions and address concerns prior to treatment  See Adult Hemodialysis flowsheet in [facility] for further details and post assessment.  Machine water alarm in place and functioning. Transducer pods intact and checked every 15min.   Pt returned via bed.  Chlorine/Chloramine water system checked every 4 hours.  Outpatient Dialysis at [facility]     Patient repositioned every 2 hours during the treatment.  Post treatment report given to TOMY Arteaga RN regarding 2.6L of fluid removed

## 2024-10-23 NOTE — PROGRESS NOTES
"Care Management Follow Up    Length of Stay (days): 15    Expected Discharge Date: 10/26/2024     Concerns to be Addressed: Care progression - discharge planning   Patient plan of care discussed at interdisciplinary rounds: Yes    Anticipated Discharge Disposition:  Transitional Care     Anticipated Discharge Services:  Transitional Care  Anticipated Discharge DME:  NA    Patient/family educated on Medicare website which has current facility and service quality ratings:  Yes  Education Provided on the Discharge Plan:  Yes per team  Patient/Family in Agreement with the Plan:  No    Referrals Placed by CM/SW:  NA  Private pay costs discussed: Not applicable    Discussed  Partnership in Safe Discharge Planning  document with patient/family: Yes: patient's motherMia     Handoff Completed: No, handoff not indicated or clinically appropriate    Additional Information:  Met with patient and her mother, Mia, at bedside to discuss rehab discharge rec for transitional care and explained reasons why TCU would benefit patient.   CV surgery is also concern and rec TCU for her safety.   Per rehab, patient is progressing, but continues to be easily fatigued, weak and require further therapy.    Patient declined TCU placement. She is frustrated.    Informed patient hospital therapy will continue to work with her, but she's got to show improvement and see how therapy may change their rec.     Per patient's mother, Mia, they have the TCU list. Pat prefers patient to return home, \"So, we don't have to be guessing who's caring for her and how.\"    Instructed patient and Pat to reconsider TCU placement and will follow up.   Patient and Pat stated they are not sure, because they don't even know the discharge planning.  Reminded patient and Pat, per CV surgery the discharge plan was by end of week pending her recovery.    Social Hx:  Assessment: Follow. Live w/spouse in an apt. Independent. No DME or services. Spouse is primary " contact.  Plan: Rehab rec TCU, but patient declined. Will think about home care services.  Needs: medical stability (nephrology/kidney recovery). [facility] setting up OP dialysis. Transport: Family    Next Steps: RNCM to follow for medical progression, recommendations, and final discharge plan.     Adrienne Stevenson RN

## 2024-10-23 NOTE — PLAN OF CARE
Goal Outcome Evaluation:       sc  Patient Name: Jory Ambrose   MRN: [id number]   Date of Admission: 10/8/2024    Procedure: Procedure(s):  CORONARY ARTERY BYPASS GRAFT TIMES FOUR, LEFT INTERNAL MAMMARY ARTERY HARVEST, RIGHT LEG ENDOSCOPIC VESSEL PROCUREMENT, EPIAORTIC ULTRASOUND,  MITRAL VALVE REAIR CONVERTED TO MITRAL VALVE REPLACEMENT  LEFT ATRIAL APPENDAGE LIGATION,  ANESTHESIA TRANSESOPHAGEAL ECHOCARDIOGRAM    Post Op day #: 14    Subjective (Patient focus/Primary Problem for shift): Continue promote patient ambulation and manage pain/SOB          Pain Goal 0 Pain Rating 3          Pain Medication/ Regime effective to reduce patient pain Yes    Objective (Physical assessment):           Rhythm: atrial fibrillation            Bowel Activity: yes if Yes indicate when: 10/22/2024          Bowel Medications: yes            Incision: healing well          Incentive Spirometry Q 1-2 hour when awake:  yes Volume:           Epicardial Pacing Wires:  no            Patient Activity:           Up to chair for meals: yes          Ambulation with RN x2 (Not including CR): no            Is patient in home clothes:no             Chest Tubes   Pleural: no Draining: not applicable               Suction: not applicable              Mediastinal: no Draining: not applicable               Suction: not applicable   Dressing Change Daily:no If No, why? N/A                     Urinary Catheter: no           Preventative WOC consult (need MD order): no       Assessment (Nursing primary shift focus): Manage patient pain and continue to encourage ambulation toward baseline ability  Plan (Patient Care Plan/focus): Manage patient pain     Patient is A/OX4, on RA, and assist x1. Given PRN oxycodone for incisional pain.    Replaced PICC dressing this shift, next due 10/29.    See Sharp RN   10/22/2024   10:30 PM

## 2024-10-23 NOTE — PRE-PROCEDURE
GENERAL PRE-PROCEDURE:   Procedure:  Tunneled HD catheter placement  Date/Time:  10/23/2024 1:00 PM    Written consent obtained?: Yes    Risks and benefits: Risks, benefits and alternatives were discussed    Consent given by:  Patient  Patient states understanding of procedure being performed: Yes    Patient's understanding of procedure matches consent: Yes    Procedure consent matches procedure scheduled: Yes    Expected level of sedation:  Moderate  Appropriately NPO:  Yes  ASA Class:  3  Mallampati  :  Grade 2- soft palate, base of uvula, tonsillar pillars, and portion of posterior pharyngeal wall visible  Lungs:  Lungs clear with good breath sounds bilaterally  Heart:  Normal heart sounds and rate  History & Physical reviewed:  History and physical reviewed and no updates needed  Statement of review:  I have reviewed the lab findings, diagnostic data, medications, and the plan for sedation

## 2024-10-23 NOTE — PROGRESS NOTES
'    [facility] ([facility]) progress note  CC: F/U ALEXEI  S: feels ok, still carvajal but thinks leg edema is better, weight is down 0.5 kg today.   Creatinine continues to rise, we discussed need to resume dialysis and will plan on tunneled catheter placement and outpt dialysis set up.    A/P:   Acute kidney injury.  Acute tubular necrosis.  Secondary to CABG and mitral valve replacement followed by cardiogenic shock.  Baseline creatinine 0.95 (10/8/2024).    Required CRRT, off 10/13  Off pressors as of 10/14  HD 10/15, 10/17, 10/19 - dialysis resumed again today due to rising creatinine  With lack of renal recovery, will set up outpt dialysis for ALEXEI, my office working on this  Will need tunneled dialysis catheter placed, IR consulted, on warfarin for mechanical mitral valve, please hold tonight in anticipation of dialysis catheter placement  Remains hypevolemic, cont bumex 2 mg iv every day   Will cont dialysis on MWF schedule      2. Status post coronary artery bypass grafting x 4 vessels and mechanical mitral valve replacement on 10/8.  Diffuse coronary artery disease   -complicated post op course with cardiogenic shock, severe shock liver, dialysis dependent ALEXEI    3. Shock liver. Resolved.      4. History of Hodgkin's lymphoma.  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    5. Anasarca - ongoing, slow improvement  -dialysis resumed today, plan 3 liter UF  -cont bumex 2 mg iv qd    6. Hypokalemia - due to bumex  -will correct with dialysis against 4k bath today    I discussed dialysis prescription with dialysis nurse    Jaclyn Murillo MD  [facility]  [phone]     /65   Pulse 95   Temp 97.3  F (36.3  C) (Tympanic)   Resp (!) 31   Wt 107.2 kg (236 lb 4.8 oz)   LMP  (LMP Unknown)   SpO2 100%   BMI 40.56 kg/m      I/O last 3 completed shifts:  In: 890 [P.O.:890]  Out: 1950 [Urine:1950]    Physical Exam:   GENERAL: NAD in  bed  EYES: EOMI   ENT: MMM  RESP: normal effort, cta ant, tachypnea with talking  CV: regular rate, 2 + leg edema  GI: NT/ND  SKIN: No rash, pale       Recent Labs   Lab 10/23/24  0501 10/22/24  0624 10/21/24  0452 10/20/24  0452 10/19/24  0451 10/18/24  0508 10/17/24  0544 10/16/24  1834   * 134* 133* 134* 131* 132* 132* 131*   POTASSIUM 3.2* 3.4 3.4 3.4 3.2* 3.5 3.0* 2.9*   CHLORIDE 95* 96* 97* 99 95* 97* 95* 95*   CO2 23 22 22 22 23 24 22 22   BUN 63.4* 56.3* 49.9* 42.0* 50.1* 37.6* 53.2* 49.3*   CR 3.86* 3.54* 3.13* 2.77* 3.35* 2.85* 3.53* 3.24*   GFRESTIMATED 14* 15* 17* 20* 16* 20* 15* 17*   CINDY 8.6* 8.5* 8.4* 8.1* 8.1* 8.2* 8.0* 8.0*   PHOS  --   --   --   --   --  3.5 4.0  --    MAG 2.0 1.7 1.8 1.8 2.0 2.0 2.4*  --    ALBUMIN 3.2*  --  3.0*  --  2.9* 2.8* 2.9* 2.8*     Recent Labs   Lab 10/23/24  0501 10/22/24  0624 10/21/24  0452 10/20/24  0452 10/19/24  0451 10/18/24  0508 10/17/24  0544   WBC 13.1* 13.8* 13.2* 13.1* 13.6* 13.4* 15.3*   HGB 7.6* 7.8* 7.6* 7.7* 8.1* 8.3* 8.6*   HCT 23.4* 24.6* 23.7* 24.3* 25.6* 25.5* 26.5*   MCV 89 90 89 89 89 88 87    412 305 246 228 182 157       Current Facility-Administered Medications:     acetaminophen (TYLENOL) tablet 500 mg, 500 mg, Oral, Q6H, Catherine Galvez PA-C, 500 mg at 10/23/24 0507    albuterol (PROVENTIL HFA/VENTOLIN HFA) inhaler, 2 puff, Inhalation, Q6H PRN, Catherine Galvez PA-C    atorvastatin (LIPITOR) tablet 80 mg, 80 mg, Oral, At Bedtime, Catherine Galvez PA-C, 80 mg at 10/22/24 2045    bisacodyl (DULCOLAX) suppository 10 mg, 10 mg, Rectal, Daily PRN, Catherine Galvez PA-C    budesonide (PULMICORT) neb solution 1 mg, 1 mg, Nebulization, BID, Catherine Galvez PA-C, 0.5 mg at 10/23/24 0819    bumetanide (BUMEX) injection 2 mg, 2 mg, Intravenous, Daily, Jaclyn Murillo MD, 2 mg at 10/22/24 1408    calcium gluconate 1 g in 50 mL in sodium chloride intermittent infusion, 1 g, Intravenous, Once, Lenyn  Catherine MCKEON PA-C    cetirizine (zyrTEC) tablet 10 mg, 10 mg, Oral, Daily, Catherine Galvez PA-C, 10 mg at 10/22/24 0827    clopidogrel (PLAVIX) tablet 75 mg, 75 mg, Oral, Daily, Catherine Galvez PA-KENIA, 75 mg at 10/22/24 0827    glucose gel 15-30 g, 15-30 g, Oral, Q15 Min PRN **OR** dextrose 50 % injection 25-50 mL, 25-50 mL, Intravenous, Q15 Min PRN, 50 mL at 10/14/24 0806 **OR** glucagon injection 1 mg, 1 mg, Subcutaneous, Q15 Min PRN, Catherine Galvez PA-C    fludrocortisone (FLORINEF) tablet 0.1 mg, 0.1 mg, Oral, Daily, Catherine Galvez PA-C, 0.1 mg at 10/22/24 0828    fluticasone (FLONASE) 50 MCG/ACT spray 2 spray, 2 spray, Both Nostrils, Daily PRN, Catherine Galvez PA-C    fluticasone-vilanterol (BREO ELLIPTA) 100-25 MCG/ACT inhaler 1 puff, 1 puff, Inhalation, At Bedtime, Catherine Galvez PA-C, 1 puff at 10/21/24 2021    [COMPLETED] sodium chloride 0.9% DIALYSIS Cath LOCK - RED Lumen, 10 mL, Intracatheter, Once in dialysis/CRRT, 10 mL at 10/17/24 1527 **FOLLOWED BY** heparin 1000 unit/mL DIALYSIS Cath LOCK - RED Lumen, 1.3-2.6 mL, Intracatheter, Q1H PRN, Rohan Polo MD, 1,300 Units at 10/17/24 1628    [COMPLETED] sodium chloride 0.9% DIALYSIS Cath LOCK - BLUE Lumen, 10 mL, Intracatheter, Once in dialysis/CRRT, 10 mL at 10/17/24 1526 **FOLLOWED BY** heparin 1000 unit/mL DIALYSIS Cath LOCK -BLUE Lumen, 1.3-2.6 mL, Intracatheter, Q1H PRN, Rohan Polo MD    heparin lock flush 10 unit/mL injection 5-20 mL, 5-20 mL, Intracatheter, Q24H, Catherine Galvez PA-C, 15 mL at 10/20/24 1957    heparin lock flush 10 unit/mL injection 5-20 mL, 5-20 mL, Intracatheter, Q1H PRN, Catherine Galvez PA-C    hydrALAZINE (APRESOLINE) injection 10 mg, 10 mg, Intravenous, Q30 Min PRN, Catherine Galvez PA-C    hydrOXYzine HCl (ATARAX) tablet 25 mg, 25 mg, Oral, TID PRN, Catherine Galvez PA-C, 25 mg at 10/20/24 1642    ipratropium - albuterol 0.5 mg/2.5 mg/3 mL (DUONEB)  neb solution 3 mL, 3 mL, Nebulization, Q4H PRN, Catherine Galvez PA-C, 3 mL at 10/23/24 0819    levothyroxine (SYNTHROID/LEVOTHROID) tablet 125 mcg, 125 mcg, Oral, QAM AC, Catherine Galvez PA-C, 125 mcg at 10/23/24 0639    Lidocaine (LIDOCARE) 4 % Patch 1-2 patch, 1-2 patch, Transdermal, Q24H, Catherine Galvez PA-C, 1 patch at 10/22/24 1821    magnesium sulfate 2 g in 50 mL sterile water intermittent infusion, 2 g, Intravenous, Once, Catherine Galvez PA-C    metoprolol tartrate (LOPRESSOR) tablet 75 mg, 75 mg, Oral, BID, Catherine Galvez PA-C, 75 mg at 10/22/24 2046    multivitamin w/minerals (THERA-VIT-M) tablet 1 tablet, 1 tablet, Oral, Daily, Catherine Galvez PA-C, 1 tablet at 10/22/24 1229    naloxone (NARCAN) injection 0.2 mg, 0.2 mg, Intravenous, Q2 Min PRN **OR** naloxone (NARCAN) injection 0.4 mg, 0.4 mg, Intravenous, Q2 Min PRN **OR** naloxone (NARCAN) injection 0.2 mg, 0.2 mg, Intramuscular, Q2 Min PRN **OR** naloxone (NARCAN) injection 0.4 mg, 0.4 mg, Intramuscular, Q2 Min PRN, Catherine Galvez PA-C    No heparin via hemodialysis machine, , Does not apply, Once, Jaclyn Murillo MD    ondansetron (ZOFRAN ODT) ODT tab 4 mg, 4 mg, Oral, Q6H PRN **OR** ondansetron (ZOFRAN) injection 4 mg, 4 mg, Intravenous, Q6H PRN, Catherine Galvez PA-C, 4 mg at 10/10/24 1945    oxyCODONE IR (ROXICODONE) half-tab 2.5 mg, 2.5 mg, Oral, Q6H PRN, 2.5 mg at 10/23/24 0507 **OR** [DISCONTINUED] oxyCODONE (ROXICODONE) tablet 5 mg, 5 mg, Oral, Q4H PRN, David Wade MD, 5 mg at 10/19/24 0902    [DISCONTINUED] pantoprazole (PROTONIX) 2 mg/mL suspension 40 mg, 40 mg, Oral or NG Tube, Daily, 40 mg at 10/08/24 1729 **OR** pantoprazole (PROTONIX) EC tablet 40 mg, 40 mg, Oral, Daily, Catherine Galvez PA-C, 40 mg at 10/22/24 0827    polyethylene glycol (MIRALAX) Packet 17 g, 17 g, Oral, Daily, Catherine Galvez PA-C    potassium chloride maryann ER (KLOR-CON M20) CR tablet  40 mEq, 40 mEq, Oral, Once, Herson Galvezelle K, PA-C    prochlorperazine (COMPAZINE) injection 10 mg, 10 mg, Intravenous, Q6H PRN **OR** prochlorperazine (COMPAZINE) tablet 10 mg, 10 mg, Oral, Q6H PRN, Herson Galvezelle K, PA-C    senna-docusate (SENOKOT-S/PERICOLACE) 8.6-50 MG per tablet 1 tablet, 1 tablet, Oral, BID, Herson Galvezelle K, PA-C, 1 tablet at 10/22/24 1418    simethicone (MYLICON) chewable tablet 80 mg, 80 mg, Oral, 4x Daily PRN, David Wade MD, 80 mg at 10/23/24 0955    sodium chloride (PF) 0.9% PF flush 10-20 mL, 10-20 mL, Intracatheter, q1 min prn, Catherine Galvez K, PA-C    sodium chloride (PF) 0.9% PF flush 10-40 mL, 10-40 mL, Intracatheter, Q8H, Lenny Catherine K, PA-C, 20 mL at 10/23/24 0501    sodium chloride (PF) 0.9% PF flush 10-40 mL, 10-40 mL, Intracatheter, Q7 Days, Morgan Galvezrielle K, PA-C, 10 mL at 10/18/24 1703    sodium chloride (PF) 0.9% PF flush 10-40 mL, 10-40 mL, Intracatheter, Q1H PRN, Morgan Galvezrielle K, PA-C    sodium chloride 0.9% BOLUS 100-150 mL, 100-150 mL, Intravenous, Q15 Min PRN, Rohan Polo MD    sodium chloride 0.9% BOLUS 100-150 mL, 100-150 mL, Intravenous, Q15 Min PRN, Rohan Polo MD    Vitamin D3 (CHOLECALCIFEROL) tablet 125 mcg, 125 mcg, Oral, Daily, Morgan Galvezrielle K, PA-C, 125 mcg at 10/22/24 0827    warfarin ANTICOAGULANT (COUMADIN) half-tab 0.5 mg, 0.5 mg, Oral, ONCE at 18:00, David Wade MD    Warfarin Dose Required Daily - Pharmacist Managed, 1 each, Oral, See Admin Instructions, Jaclyn Murillo MD    Labs personally reviewed today during this evaluation at 1130am.

## 2024-10-23 NOTE — PLAN OF CARE
Problem: Cardiovascular Surgery  Goal: Improved Activity Tolerance  Outcome: Progressing     Problem: Cardiovascular Surgery  Goal: Acceptable Pain Control  Outcome: Progressing   Goal Outcome Evaluation:       Up to commode AX1. PRN oxycodone given x1 with some relief. SCD's on overnight while sleeping.

## 2024-10-23 NOTE — CARE PLAN
sc  Patient Name: Jory Ambrose   MRN: [id number]   Date of Admission: 10/8/2024    Procedure: Procedure(s):  CORONARY ARTERY BYPASS GRAFT TIMES FOUR, LEFT INTERNAL MAMMARY ARTERY HARVEST, RIGHT LEG ENDOSCOPIC VESSEL PROCUREMENT, EPIAORTIC ULTRASOUND,  MITRAL VALVE REAIR CONVERTED TO MITRAL VALVE REPLACEMENT  LEFT ATRIAL APPENDAGE LIGATION,  ANESTHESIA TRANSESOPHAGEAL ECHOCARDIOGRAM    Post Op day #:15    Subjective (Patient focus/Primary Problem for shift): sleep and pain contorl          Pain Goal 0 Pain Rating5           Pain Medication/ Regime effective to reduce patient pain PRN oxycodone and scheduled tylenol    Objective (Physical assessment):           Rhythm: atrial fibrillation            Bowel Activity: no if Yes indicate when:           Bowel Medications: yes            Incision: healing well          Incentive Spirometry Q 1-2 hour when awake:  yes Volume: 1000          Epicardial Pacing Wires:  not applicable            Patient Activity:           Up to chair for meals: no          Ambulation with RN x2 (Not including CR): not applicable            Is patient in home clothes:not applicable             Chest Tubes   Pleural: not applicable Draining: not applicable               Suction: not applicable              Mediastinal: not applicable Draining: not applicable               Suction: not applicable   Dressing Change Daily:not applicable If No, why?open to air                     Urinary Catheter: no           Preventative WOC consult (need MD order): no       Assessment (Nursing primary shift focus): Seemed to be sleeping well. Reporting incisional chest pain with some relief with PRN pain medications.     Plan (Patient Care Plan/focus): Encourage activity.       Marybeth Szymanski RN   10/23/2024   6:28 AM

## 2024-10-23 NOTE — PLAN OF CARE
"  Problem: Adult Inpatient Plan of Care  Goal: Plan of Care Review  Description: The Plan of Care Review/Shift note should be completed every shift.  The Outcome Evaluation is a brief statement about your assessment that the patient is improving, declining, or no change.  This information will be displayed automatically on your shift  note.  Outcome: Progressing  Goal: Patient-Specific Goal (Individualized)  Description: You can add care plan individualizations to a care plan. Examples of Individualization might be:  \"Parent requests to be called daily at 9am for status\", \"I have a hard time hearing out of my right ear\", or \"Do not touch me to wake me up as it startles  me\".  Outcome: Progressing  Goal: Absence of Hospital-Acquired Illness or Injury  Outcome: Progressing  Intervention: Identify and Manage Fall Risk  Recent Flowsheet Documentation  Taken 10/23/2024 1352 by Wanda Arteaga RN  Safety Promotion/Fall Prevention:   activity supervised   assistive device/personal items within reach  Taken 10/23/2024 0800 by Wanda Arteaga RN  Safety Promotion/Fall Prevention:   activity supervised   assistive device/personal items within reach  Intervention: Prevent and Manage VTE (Venous Thromboembolism) Risk  Recent Flowsheet Documentation  Taken 10/23/2024 1352 by Wanda Arteaga RN  VTE Prevention/Management: SCDs off (sequential compression devices)  Taken 10/23/2024 0800 by Wanda Arteaga RN  VTE Prevention/Management: SCDs off (sequential compression devices)  Intervention: Prevent Infection  Recent Flowsheet Documentation  Taken 10/23/2024 1352 by Wanda Arteaga RN  Infection Prevention: hand hygiene promoted  Taken 10/23/2024 0800 by Wanda Arteaga RN  Infection Prevention: hand hygiene promoted  Goal: Optimal Comfort and Wellbeing  Outcome: Progressing  Intervention: Provide Person-Centered Care  Recent Flowsheet Documentation  Taken 10/23/2024 1352 by Wanda Arteaga" RN  Trust Relationship/Rapport: care explained  Taken 10/23/2024 0800 by Wanda Arteaga RN  Trust Relationship/Rapport: care explained  Goal: Readiness for Transition of Care  Outcome: Progressing     Problem: Risk for Delirium  Goal: Optimal Coping  Outcome: Progressing  Intervention: Optimize Psychosocial Adjustment to Delirium  Recent Flowsheet Documentation  Taken 10/23/2024 1352 by Wanda Arteaga RN  Supportive Measures: active listening utilized  Taken 10/23/2024 0800 by Wanda Arteaga RN  Supportive Measures: active listening utilized  Goal: Improved Behavioral Control  Outcome: Progressing  Intervention: Prevent and Manage Agitation  Recent Flowsheet Documentation  Taken 10/23/2024 1352 by Wanda Arteaga RN  Environment Familiarity/Consistency: daily routine followed  Taken 10/23/2024 0800 by Wanda Arteaga RN  Environment Familiarity/Consistency: daily routine followed  Intervention: Minimize Safety Risk  Recent Flowsheet Documentation  Taken 10/23/2024 1352 by Wanda Arteaga RN  Enhanced Safety Measures: pain management  Trust Relationship/Rapport: care explained  Taken 10/23/2024 0800 by Wanda Arteaga RN  Enhanced Safety Measures: pain management  Trust Relationship/Rapport: care explained  Goal: Improved Attention and Thought Clarity  Outcome: Progressing  Intervention: Maximize Cognitive Function  Recent Flowsheet Documentation  Taken 10/23/2024 1352 by Wanda Arteaga RN  Sensory Stimulation Regulation: care clustered  Reorientation Measures: clock in view  Taken 10/23/2024 0800 by Wanda Arteaga RN  Sensory Stimulation Regulation: care clustered  Reorientation Measures: clock in view  Goal: Improved Sleep  Outcome: Progressing     Problem: Comorbidity Management  Goal: Maintenance of Asthma Control  Outcome: Progressing  Intervention: Maintain Asthma Symptom Control  Recent Flowsheet Documentation  Taken 10/23/2024 1352 by Wanda Arteaga  RN  Medication Review/Management: medications reviewed  Taken 10/23/2024 0800 by Wanda Arteaga RN  Medication Review/Management: medications reviewed     Problem: Cardiovascular Surgery  Goal: Improved Activity Tolerance  Outcome: Progressing  Intervention: Optimize Tolerance for Activity  Recent Flowsheet Documentation  Taken 10/23/2024 1352 by Wanda Arteaga RN  Environmental Support: calm environment promoted  Taken 10/23/2024 0800 by Wanda Arteaga RN  Environmental Support: calm environment promoted  Goal: Optimal Coping with Heart Surgery  Outcome: Progressing  Intervention: Support Psychosocial Response to Surgery  Recent Flowsheet Documentation  Taken 10/23/2024 1352 by Wanda Arteaga RN  Supportive Measures: active listening utilized  Taken 10/23/2024 0800 by Wanda Arteaga RN  Supportive Measures: active listening utilized  Goal: Absence of Bleeding  Outcome: Progressing  Intervention: Monitor and Manage Bleeding  Recent Flowsheet Documentation  Taken 10/23/2024 1352 by Wanda Arteaga RN  Bleeding Management: other (see comments)  Taken 10/23/2024 0800 by Wanda Arteaga RN  Bleeding Management: other (see comments)  Goal: Effective Bowel Elimination  Outcome: Progressing  Intervention: Enhance Bowel Motility and Elimination  Recent Flowsheet Documentation  Taken 10/23/2024 1352 by Wanda Arteaga RN  Bowel Elimination Management: relaxation techniques promoted  Taken 10/23/2024 0800 by Wanda Arteaga RN  Bowel Elimination Management: relaxation techniques promoted  Goal: Effective Cardiac Function  Outcome: Progressing  Goal: Optimal Cerebral Tissue Perfusion  Outcome: Progressing  Intervention: Protect and Optimize Cerebral Perfusion  Recent Flowsheet Documentation  Taken 10/23/2024 1352 by Wanda Arteaga RN  Sensory Stimulation Regulation: care clustered  Taken 10/23/2024 0800 by Wanda Arteaga RN  Sensory Stimulation Regulation: care  clustered  Goal: Fluid and Electrolyte Balance  Outcome: Progressing  Goal: Blood Glucose Level Within Targeted Range  Outcome: Progressing  Goal: Absence of Infection Signs and Symptoms  Outcome: Progressing  Intervention: Prevent or Manage Infection  Recent Flowsheet Documentation  Taken 10/23/2024 1352 by Wanda Arteaga RN  Infection Prevention: hand hygiene promoted  Taken 10/23/2024 0800 by Wanda Arteaga RN  Infection Prevention: hand hygiene promoted  Goal: Anesthesia/Sedation Recovery  Outcome: Progressing  Intervention: Optimize Anesthesia Recovery  Recent Flowsheet Documentation  Taken 10/23/2024 1352 by Wanda Arteaga RN  Safety Promotion/Fall Prevention:   activity supervised   assistive device/personal items within reach  Administration (IS): self-administered  Reorientation Measures: clock in view  Patient Tolerance (IS): good  Taken 10/23/2024 0800 by Wanda Arteaga RN  Safety Promotion/Fall Prevention:   activity supervised   assistive device/personal items within reach  Administration (IS): self-administered  Reorientation Measures: clock in view  Level Incentive Spirometer (mL): 1000  Incentive Spirometer Predicted Level (mL): 1500  Number of Repetitions (IS): 10  Patient Tolerance (IS): good  Goal: Acceptable Pain Control  Outcome: Progressing  Goal: Nausea and Vomiting Relief  Outcome: Progressing  Goal: Effective Urinary Elimination  Outcome: Progressing  Goal: Effective Oxygenation and Ventilation  Outcome: Progressing  Intervention: Promote Airway Secretion Clearance  Recent Flowsheet Documentation  Taken 10/23/2024 1352 by Wanda Arteaga RN  Administration (IS): self-administered  Cough And Deep Breathing: done with encouragement  Patient Tolerance (IS): good  Taken 10/23/2024 0800 by Wanda Arteaga RN  Administration (IS): self-administered  Level Incentive Spirometer (mL): 1000  Cough And Deep Breathing: done with encouragement  Incentive Spirometer  Predicted Level (mL): 1500  Number of Repetitions (IS): 10  Patient Tolerance (IS): good  Intervention: Optimize Oxygenation and Ventilation  Recent Flowsheet Documentation  Taken 10/23/2024 1352 by Wanda Arteaga RN  Chest Tube Safety: all connections secured  Taken 10/23/2024 0800 by Wanda Arteaga RN  Chest Tube Safety: all connections secured     Problem: Pain Acute  Goal: Optimal Pain Control and Function  Outcome: Progressing  Intervention: Prevent or Manage Pain  Recent Flowsheet Documentation  Taken 10/23/2024 1352 by Wanda Arteaga RN  Sensory Stimulation Regulation: care clustered  Bowel Elimination Promotion: adequate fluid intake promoted  Medication Review/Management: medications reviewed  Taken 10/23/2024 0800 by Wanda Arteaga RN  Sensory Stimulation Regulation: care clustered  Bowel Elimination Promotion: adequate fluid intake promoted  Medication Review/Management: medications reviewed  Intervention: Optimize Psychosocial Wellbeing  Recent Flowsheet Documentation  Taken 10/23/2024 1352 by Wanda Arteaga RN  Supportive Measures: active listening utilized  Taken 10/23/2024 0800 by Wanda Arteaga RN  Supportive Measures: active listening utilized     Problem: Infection  Goal: Absence of Infection Signs and Symptoms  Outcome: Progressing     Problem: Fall Injury Risk  Goal: Absence of Fall and Fall-Related Injury  Outcome: Progressing  Intervention: Identify and Manage Contributors  Recent Flowsheet Documentation  Taken 10/23/2024 1352 by Wanda Arteaga RN  Medication Review/Management: medications reviewed  Taken 10/23/2024 0800 by Wanda Arteaga RN  Medication Review/Management: medications reviewed  Intervention: Promote Injury-Free Environment  Recent Flowsheet Documentation  Taken 10/23/2024 1352 by Wanda Arteaga RN  Safety Promotion/Fall Prevention:   activity supervised   assistive device/personal items within reach  Taken 10/23/2024 0800 by  [place], Wanda R, RN  Safety Promotion/Fall Prevention:   activity supervised   assistive device/personal items within reach     Problem: Skin Injury Risk Increased  Goal: Skin Health and Integrity  Outcome: Progressing  Intervention: Plan: Nurse Driven Intervention: Moisture Management  Recent Flowsheet Documentation  Taken 10/23/2024 1352 by Wanda Arteaga RN  Moisture Interventions: Encourage regular toileting  Taken 10/23/2024 0800 by Wanda Arteaga RN  Moisture Interventions: Encourage regular toileting  Intervention: Plan: Nurse Driven Intervention: Friction and Shear  Recent Flowsheet Documentation  Taken 10/23/2024 1352 by Wanda Arteaga RN  Friction/Shear Interventions: HOB 30 degrees or less  Taken 10/23/2024 0800 by Wanda Arteaga RN  Friction/Shear Interventions: HOB 30 degrees or less  Intervention: Optimize Skin Protection  Recent Flowsheet Documentation  Taken 10/23/2024 1352 by Wanda Arteaga RN  Activity Management: activity adjusted per tolerance  Taken 10/23/2024 0800 by Wanda Arteaga RN  Activity Management: activity adjusted per tolerance     Problem: UTI (Urinary Tract Infection)  Goal: Improved Infection Symptoms  Outcome: Progressing     Problem: Gas Exchange Impaired  Goal: Optimal Gas Exchange  Outcome: Progressing   Goal Outcome Evaluation:       CABGx4 POD#15. Afib on the monitor and VSS. Had dialysis run today, 2.6 L. Tolerated this well. Denies pain when arrived back to unit. Declined scheduled Tylenol. No BM. Urine occurrence x1. Mother at bedside, questions answered. Continue plan of care.

## 2024-10-24 ENCOUNTER — APPOINTMENT (OUTPATIENT)
Dept: OCCUPATIONAL THERAPY | Facility: HOSPITAL | Age: 49
DRG: 219 | End: 2024-10-24
Attending: SURGERY
Payer: COMMERCIAL

## 2024-10-24 ENCOUNTER — APPOINTMENT (OUTPATIENT)
Dept: INTERVENTIONAL RADIOLOGY/VASCULAR | Facility: HOSPITAL | Age: 49
DRG: 219 | End: 2024-10-24
Attending: NURSE PRACTITIONER
Payer: COMMERCIAL

## 2024-10-24 LAB
ANION GAP SERPL CALCULATED.3IONS-SCNC: 13 MMOL/L (ref 7–15)
BUN SERPL-MCNC: 41.5 MG/DL (ref 6–20)
CA-I BLD-MCNC: 4.6 MG/DL (ref 4.4–5.2)
CALCIUM SERPL-MCNC: 8.9 MG/DL (ref 8.8–10.4)
CHLORIDE SERPL-SCNC: 99 MMOL/L (ref 98–107)
CREAT SERPL-MCNC: 2.81 MG/DL (ref 0.51–0.95)
EGFRCR SERPLBLD CKD-EPI 2021: 20 ML/MIN/1.73M2
ERYTHROCYTE [DISTWIDTH] IN BLOOD BY AUTOMATED COUNT: 18 % (ref 10–15)
GLUCOSE SERPL-MCNC: 102 MG/DL (ref 70–99)
HCO3 SERPL-SCNC: 24 MMOL/L (ref 22–29)
HCT VFR BLD AUTO: 22.8 % (ref 35–47)
HGB BLD-MCNC: 7.2 G/DL (ref 11.7–15.7)
INR PPP: 2.45 (ref 0.85–1.15)
MAGNESIUM SERPL-MCNC: 1.9 MG/DL (ref 1.7–2.3)
MCH RBC QN AUTO: 28.5 PG (ref 26.5–33)
MCHC RBC AUTO-ENTMCNC: 31.6 G/DL (ref 31.5–36.5)
MCV RBC AUTO: 90 FL (ref 78–100)
PLATELET # BLD AUTO: 404 10E3/UL (ref 150–450)
POTASSIUM SERPL-SCNC: 3.4 MMOL/L (ref 3.4–5.3)
RBC # BLD AUTO: 2.53 10E6/UL (ref 3.8–5.2)
SODIUM SERPL-SCNC: 136 MMOL/L (ref 135–145)
WBC # BLD AUTO: 12.4 10E3/UL (ref 4–11)

## 2024-10-24 PROCEDURE — 250N000013 HC RX MED GY IP 250 OP 250 PS 637: Performed by: SURGERY

## 2024-10-24 PROCEDURE — 99222 1ST HOSP IP/OBS MODERATE 55: CPT | Mod: FS | Performed by: INTERNAL MEDICINE

## 2024-10-24 PROCEDURE — 94640 AIRWAY INHALATION TREATMENT: CPT | Mod: 76

## 2024-10-24 PROCEDURE — 94640 AIRWAY INHALATION TREATMENT: CPT

## 2024-10-24 PROCEDURE — 94660 CPAP INITIATION&MGMT: CPT

## 2024-10-24 PROCEDURE — 250N000013 HC RX MED GY IP 250 OP 250 PS 637

## 2024-10-24 PROCEDURE — C1750 CATH, HEMODIALYSIS,LONG-TERM: HCPCS

## 2024-10-24 PROCEDURE — 85610 PROTHROMBIN TIME: CPT

## 2024-10-24 PROCEDURE — 83735 ASSAY OF MAGNESIUM: CPT

## 2024-10-24 PROCEDURE — 0JH63XZ INSERTION OF TUNNELED VASCULAR ACCESS DEVICE INTO CHEST SUBCUTANEOUS TISSUE AND FASCIA, PERCUTANEOUS APPROACH: ICD-10-PCS | Performed by: RADIOLOGY

## 2024-10-24 PROCEDURE — 80048 BASIC METABOLIC PNL TOTAL CA: CPT | Performed by: INTERNAL MEDICINE

## 2024-10-24 PROCEDURE — 272N000196 HC ACCESSORY CR5

## 2024-10-24 PROCEDURE — 272N000504 HC NEEDLE CR4

## 2024-10-24 PROCEDURE — 250N000011 HC RX IP 250 OP 636

## 2024-10-24 PROCEDURE — 250N000009 HC RX 250

## 2024-10-24 PROCEDURE — 250N000011 HC RX IP 250 OP 636: Performed by: INTERNAL MEDICINE

## 2024-10-24 PROCEDURE — 97110 THERAPEUTIC EXERCISES: CPT | Mod: GO

## 2024-10-24 PROCEDURE — 999N000157 HC STATISTIC RCP TIME EA 10 MIN

## 2024-10-24 PROCEDURE — 76937 US GUIDE VASCULAR ACCESS: CPT

## 2024-10-24 PROCEDURE — 94799 UNLISTED PULMONARY SVC/PX: CPT

## 2024-10-24 PROCEDURE — 82330 ASSAY OF CALCIUM: CPT

## 2024-10-24 PROCEDURE — 99222 1ST HOSP IP/OBS MODERATE 55: CPT

## 2024-10-24 PROCEDURE — 99152 MOD SED SAME PHYS/QHP 5/>YRS: CPT

## 2024-10-24 PROCEDURE — C1769 GUIDE WIRE: HCPCS

## 2024-10-24 PROCEDURE — 85027 COMPLETE CBC AUTOMATED: CPT

## 2024-10-24 PROCEDURE — 210N000001 HC R&B IMCU HEART CARE

## 2024-10-24 PROCEDURE — 97535 SELF CARE MNGMENT TRAINING: CPT | Mod: GO

## 2024-10-24 PROCEDURE — 02H633Z INSERTION OF INFUSION DEVICE INTO RIGHT ATRIUM, PERCUTANEOUS APPROACH: ICD-10-PCS | Performed by: RADIOLOGY

## 2024-10-24 RX ORDER — NALOXONE HYDROCHLORIDE 0.4 MG/ML
0.4 INJECTION, SOLUTION INTRAMUSCULAR; INTRAVENOUS; SUBCUTANEOUS
Status: DISCONTINUED | OUTPATIENT
Start: 2024-10-24 | End: 2024-10-24 | Stop reason: HOSPADM

## 2024-10-24 RX ORDER — FLUMAZENIL 0.1 MG/ML
0.2 INJECTION, SOLUTION INTRAVENOUS
Status: DISCONTINUED | OUTPATIENT
Start: 2024-10-24 | End: 2024-10-24 | Stop reason: HOSPADM

## 2024-10-24 RX ORDER — FENTANYL CITRATE 50 UG/ML
25-50 INJECTION, SOLUTION INTRAMUSCULAR; INTRAVENOUS EVERY 5 MIN PRN
Status: DISCONTINUED | OUTPATIENT
Start: 2024-10-24 | End: 2024-10-24 | Stop reason: HOSPADM

## 2024-10-24 RX ORDER — ONDANSETRON 2 MG/ML
4 INJECTION INTRAMUSCULAR; INTRAVENOUS
Status: DISCONTINUED | OUTPATIENT
Start: 2024-10-24 | End: 2024-10-24 | Stop reason: HOSPADM

## 2024-10-24 RX ORDER — CEFAZOLIN SODIUM/WATER 2 G/20 ML
2 SYRINGE (ML) INTRAVENOUS ONCE
Status: COMPLETED | OUTPATIENT
Start: 2024-10-24 | End: 2024-10-24

## 2024-10-24 RX ORDER — NALOXONE HYDROCHLORIDE 0.4 MG/ML
0.2 INJECTION, SOLUTION INTRAMUSCULAR; INTRAVENOUS; SUBCUTANEOUS
Status: DISCONTINUED | OUTPATIENT
Start: 2024-10-24 | End: 2024-10-24 | Stop reason: HOSPADM

## 2024-10-24 RX ADMIN — Medication 2 G: at 09:25

## 2024-10-24 RX ADMIN — ACETAMINOPHEN 500 MG: 500 TABLET ORAL at 11:04

## 2024-10-24 RX ADMIN — BUDESONIDE 1 MG: 0.5 INHALANT ORAL at 18:08

## 2024-10-24 RX ADMIN — Medication 125 MCG: at 11:06

## 2024-10-24 RX ADMIN — BUDESONIDE 1 MG: 0.5 INHALANT ORAL at 07:32

## 2024-10-24 RX ADMIN — MIDAZOLAM HYDROCHLORIDE 1 MG: 1 INJECTION, SOLUTION INTRAMUSCULAR; INTRAVENOUS at 09:32

## 2024-10-24 RX ADMIN — IPRATROPIUM BROMIDE AND ALBUTEROL SULFATE 3 ML: .5; 3 SOLUTION RESPIRATORY (INHALATION) at 10:32

## 2024-10-24 RX ADMIN — OXYCODONE HYDROCHLORIDE 2.5 MG: 5 TABLET ORAL at 03:34

## 2024-10-24 RX ADMIN — ACETAMINOPHEN 500 MG: 500 TABLET ORAL at 17:39

## 2024-10-24 RX ADMIN — FENTANYL CITRATE 50 MCG: 50 INJECTION, SOLUTION INTRAMUSCULAR; INTRAVENOUS at 09:34

## 2024-10-24 RX ADMIN — WARFARIN SODIUM 1.5 MG: 1 TABLET ORAL at 18:28

## 2024-10-24 RX ADMIN — OXYCODONE HYDROCHLORIDE 2.5 MG: 5 TABLET ORAL at 16:42

## 2024-10-24 RX ADMIN — SENNOSIDES AND DOCUSATE SODIUM 1 TABLET: 8.6; 5 TABLET ORAL at 11:07

## 2024-10-24 RX ADMIN — ACETAMINOPHEN 500 MG: 500 TABLET ORAL at 00:13

## 2024-10-24 RX ADMIN — BUMETANIDE 2 MG: 0.25 INJECTION INTRAMUSCULAR; INTRAVENOUS at 11:09

## 2024-10-24 RX ADMIN — PANTOPRAZOLE SODIUM 40 MG: 40 TABLET, DELAYED RELEASE ORAL at 11:05

## 2024-10-24 RX ADMIN — SIMETHICONE 80 MG: 80 TABLET, CHEWABLE ORAL at 03:21

## 2024-10-24 RX ADMIN — CETIRIZINE HYDROCHLORIDE 10 MG: 10 TABLET, FILM COATED ORAL at 11:07

## 2024-10-24 RX ADMIN — LEVOTHYROXINE SODIUM 125 MCG: 0.03 TABLET ORAL at 07:49

## 2024-10-24 RX ADMIN — ACETAMINOPHEN 500 MG: 500 TABLET ORAL at 23:46

## 2024-10-24 RX ADMIN — CLOPIDOGREL BISULFATE 75 MG: 75 TABLET ORAL at 11:08

## 2024-10-24 RX ADMIN — IPRATROPIUM BROMIDE AND ALBUTEROL SULFATE 3 ML: .5; 3 SOLUTION RESPIRATORY (INHALATION) at 23:53

## 2024-10-24 RX ADMIN — ALBUTEROL SULFATE 2 PUFF: 90 AEROSOL, METERED RESPIRATORY (INHALATION) at 17:57

## 2024-10-24 RX ADMIN — MIDAZOLAM HYDROCHLORIDE 1 MG: 1 INJECTION, SOLUTION INTRAMUSCULAR; INTRAVENOUS at 09:27

## 2024-10-24 RX ADMIN — Medication 1 TABLET: at 11:03

## 2024-10-24 RX ADMIN — ACETAMINOPHEN 500 MG: 500 TABLET ORAL at 06:07

## 2024-10-24 RX ADMIN — FLUDROCORTISONE ACETATE 0.1 MG: 0.1 TABLET ORAL at 11:08

## 2024-10-24 RX ADMIN — FENTANYL CITRATE 50 MCG: 50 INJECTION, SOLUTION INTRAMUSCULAR; INTRAVENOUS at 09:29

## 2024-10-24 RX ADMIN — METOPROLOL TARTRATE 75 MG: 25 TABLET, FILM COATED ORAL at 11:02

## 2024-10-24 RX ADMIN — IPRATROPIUM BROMIDE AND ALBUTEROL SULFATE 3 ML: .5; 3 SOLUTION RESPIRATORY (INHALATION) at 18:01

## 2024-10-24 RX ADMIN — LIDOCAINE 2 PATCH: 4 PATCH TOPICAL at 17:39

## 2024-10-24 RX ADMIN — FLUTICASONE FUROATE AND VILANTEROL TRIFENATATE 1 PUFF: 100; 25 POWDER RESPIRATORY (INHALATION) at 20:38

## 2024-10-24 RX ADMIN — HEPARIN SODIUM 3200 UNITS: 1000 INJECTION INTRAVENOUS; SUBCUTANEOUS at 09:34

## 2024-10-24 RX ADMIN — ATORVASTATIN CALCIUM 80 MG: 40 TABLET, FILM COATED ORAL at 20:44

## 2024-10-24 RX ADMIN — METOPROLOL TARTRATE 75 MG: 25 TABLET, FILM COATED ORAL at 20:44

## 2024-10-24 NOTE — PROGRESS NOTES
"CVTS Daily Progress Note  POD#16 s/p CABG x4 (LIMA>LAD, rSVG>RPDA, rSVG>LPL, rSVG>D2), RLE EVH, Attempted MVR/r, MVR ( 29 mm [employer] Mechanical Mitral Valve), LAAE  Attending: Mauro  LOS: 16    SUBJECTIVE/INTERVAL EVENTS:    No acute events overnight. Continues in rate controlled AFlu. Continues to have some UOP. Tolerated HD well. LFTs continue to downtrend. Maintaining oxygen saturations on home CPAP/RA. Normotensive. Ambulating w/ therapy as able. Pain well controlled. +BM. Tolerating regular diet. INR therapeutic, heparin gtt stopped 10/15. Patient denies new chest pain, shortness of breath, abdominal pain, calf pain, nausea. Patient has no questions today.    OBJECTIVE:  Temp:  [97.4  F (36.3  C)-97.6  F (36.4  C)] 97.6  F (36.4  C)  Pulse:  [] 69  Resp:  [13-34] 13  BP: ()/(55-87) 130/77  SpO2:  [78 %-100 %] 92 %  Vitals:    10/20/24 0632 10/21/24 0624 10/22/24 0814 10/23/24 0442   Weight: 108.5 kg (239 lb 4.8 oz) 109 kg (240 lb 6.4 oz) 107.7 kg (237 lb 6.4 oz) 107.2 kg (236 lb 4.8 oz)    10/24/24 0645   Weight: 105.5 kg (232 lb 9.6 oz)       Clinically Significant Risk Factors        # Hypokalemia: Lowest K = 3.2 mmol/L in last 2 days, will replace as needed  # Hyponatremia: Lowest Na = 134 mmol/L in last 2 days, will monitor as appropriate  # Hypochloremia: Lowest Cl = 95 mmol/L in last 2 days, will monitor as appropriate  # Hypocalcemia: Lowest Ca = 8.6 mg/dL in last 2 days, will monitor and replace as appropriate     # Hypoalbuminemia: Lowest albumin = 2.6 g/dL at 10/16/2024  4:35 AM, will monitor as appropriate       # Hypertension: Noted on problem list           # Obesity: Estimated body mass index is 39.93 kg/m  as calculated from the following:    Height as of 9/25/24: 1.626 m (5' 4\").    Weight as of this encounter: 105.5 kg (232 lb 9.6 oz).   # Moderate Malnutrition: based on nutrition assessment     # History of CABG: noted on surgical history          Current " Medications:    Scheduled Meds:  Current Facility-Administered Medications   Medication Dose Route Frequency Provider Last Rate Last Admin    acetaminophen (TYLENOL) tablet 500 mg  500 mg Oral Q6H Catherine Galvez PA-C   500 mg at 10/24/24 0607    atorvastatin (LIPITOR) tablet 80 mg  80 mg Oral At Bedtime Catherine Galvez PA-C   80 mg at 10/23/24 2023    budesonide (PULMICORT) neb solution 1 mg  1 mg Nebulization BID Catherine Galvez PA-C   1 mg at 10/24/24 0732    bumetanide (BUMEX) injection 2 mg  2 mg Intravenous Daily Jaclyn Murillo MD   2 mg at 10/23/24 1322    cetirizine (zyrTEC) tablet 10 mg  10 mg Oral Daily Catherine Galvez PA-C   10 mg at 10/23/24 1319    clopidogrel (PLAVIX) tablet 75 mg  75 mg Oral Daily Catherine Galvez PA-C   75 mg at 10/23/24 1322    fludrocortisone (FLORINEF) tablet 0.1 mg  0.1 mg Oral Daily Catherine Galvez PA-C   0.1 mg at 10/23/24 1328    fluticasone-vilanterol (BREO ELLIPTA) 100-25 MCG/ACT inhaler 1 puff  1 puff Inhalation At Bedtime Catherine Galvez PA-C   1 puff at 10/23/24 2024    heparin lock flush 10 unit/mL injection 5-20 mL  5-20 mL Intracatheter Q24H Catherine Galvez PA-C   15 mL at 10/20/24 1957    levothyroxine (SYNTHROID/LEVOTHROID) tablet 125 mcg  125 mcg Oral QAM AC Catherine Galvez PA-C   125 mcg at 10/24/24 0749    Lidocaine (LIDOCARE) 4 % Patch 1-2 patch  1-2 patch Transdermal Q24H Catherine Galvez PA-C   1 patch at 10/23/24 1717    metoprolol tartrate (LOPRESSOR) tablet 75 mg  75 mg Oral BID Catherine Galvez PA-C   75 mg at 10/23/24 2023    multivitamin w/minerals (THERA-VIT-M) tablet 1 tablet  1 tablet Oral Daily Catherine Galvez PA-C   1 tablet at 10/22/24 1229    No heparin via hemodialysis machine   Does not apply Once Jaclyn Murillo MD        pantoprazole (PROTONIX) EC tablet 40 mg  40 mg Oral Daily Catherine Galvez PA-C   40 mg at 10/23/24 1327    polyethylene glycol (MIRALAX)  Packet 17 g  17 g Oral Daily Catherine Galvez PA-C        senna-docusate (SENOKOT-S/PERICOLACE) 8.6-50 MG per tablet 1 tablet  1 tablet Oral BID Catherine Galvez PA-C   1 tablet at 10/23/24 2023    sodium chloride (PF) 0.9% PF flush 10-40 mL  10-40 mL Intracatheter Q8H Catherine Galvez PA-C   30 mL at 10/24/24 0606    sodium chloride (PF) 0.9% PF flush 10-40 mL  10-40 mL Intracatheter Q7 Days Catherine Galvez PA-C   10 mL at 10/18/24 1703    Vitamin D3 (CHOLECALCIFEROL) tablet 125 mcg  125 mcg Oral Daily Catherine Galvez PA-C   125 mcg at 10/23/24 1321    warfarin ANTICOAGULANT (COUMADIN) half-tab 1.5 mg  1.5 mg Oral ONCE at 18:00 David Wade MD        [Held by provider] Warfarin Dose Required Daily - Pharmacist Managed  1 each Oral See Admin Instructions Jaclyn Murillo MD         Continuous Infusions:  Current Facility-Administered Medications   Medication Dose Route Frequency Provider Last Rate Last Admin     PRN Meds:.  Current Facility-Administered Medications   Medication Dose Route Frequency Provider Last Rate Last Admin    albuterol (PROVENTIL HFA/VENTOLIN HFA) inhaler  2 puff Inhalation Q6H PRN Catherine Galvez PA-C        bisacodyl (DULCOLAX) suppository 10 mg  10 mg Rectal Daily PRN Catherine Galvez PA-C        glucose gel 15-30 g  15-30 g Oral Q15 Min PRN Catherine Galvez PA-C        Or    dextrose 50 % injection 25-50 mL  25-50 mL Intravenous Q15 Min PRN Catherine Galvez PA-C   50 mL at 10/14/24 0806    Or    glucagon injection 1 mg  1 mg Subcutaneous Q15 Min PRN Catherine Galvez PA-C        fentaNYL (PF) (SUBLIMAZE) injection 25-50 mcg  25-50 mcg Intravenous Q5 Min PRN Laura King APRN CNP   50 mcg at 10/24/24 0934    flumazenil (ROMAZICON) injection 0.2 mg  0.2 mg Intravenous q1 min prn Laura King APRN CNP        fluticasone (FLONASE) 50 MCG/ACT spray 2 spray  2 spray Both Nostrils Daily PRN Catherine Galvez PA-C         heparin 1000 unit/mL DIALYSIS Cath LOCK - RED Lumen  1.3-2.6 mL Intracatheter Q1H PRN Rohan Polo MD   3,200 Units at 10/24/24 0934    heparin 1000 unit/mL DIALYSIS Cath LOCK -BLUE Lumen  1.3-2.6 mL Intracatheter Q1H PRN Rohan Polo MD        heparin lock flush 10 unit/mL injection 5-20 mL  5-20 mL Intracatheter Q1H PRN Catherine Galvez PA-C        hydrALAZINE (APRESOLINE) injection 10 mg  10 mg Intravenous Q30 Min PRN Catherine Galvez PA-C        hydrOXYzine HCl (ATARAX) tablet 25 mg  25 mg Oral TID PRN Catherine Galvez PA-C   25 mg at 10/20/24 1642    ipratropium - albuterol 0.5 mg/2.5 mg/3 mL (DUONEB) neb solution 3 mL  3 mL Nebulization Q4H PRN Catherine Galvez PA-C   3 mL at 10/23/24 0819    midazolam (VERSED) injection 0.5-2 mg  0.5-2 mg Intravenous Q4 Min PRN Laura King APRN CNP   1 mg at 10/24/24 0932    naloxone (NARCAN) injection 0.2 mg  0.2 mg Intravenous Q2 Min PRN Laura King, APRN CNP        Or    naloxone (NARCAN) injection 0.4 mg  0.4 mg Intravenous Q2 Min PRN Laura King, APRN CNP        Or    naloxone (NARCAN) injection 0.2 mg  0.2 mg Intramuscular Q2 Min PRN Laura King, APRN CNP        Or    naloxone (NARCAN) injection 0.4 mg  0.4 mg Intramuscular Q2 Min PRN Laura King, APRN CNP        naloxone (NARCAN) injection 0.2 mg  0.2 mg Intravenous Q2 Min PRN Catherine Galvez PA-KENIA        Or    naloxone (NARCAN) injection 0.4 mg  0.4 mg Intravenous Q2 Min PRN Cahterine Galvez PA-C        Or    naloxone (NARCAN) injection 0.2 mg  0.2 mg Intramuscular Q2 Min PRN Catherine Galvez PA-C        Or    naloxone (NARCAN) injection 0.4 mg  0.4 mg Intramuscular Q2 Min PRN Catherine Galvez PA-C        ondansetron (ZOFRAN ODT) ODT tab 4 mg  4 mg Oral Q6H PRN Catherine Galvez PA-C        Or    ondansetron (ZOFRAN) injection 4 mg  4 mg Intravenous Q6H PRN Catherine Galvez PA-C   4 mg at 10/10/24 1945    ondansetron  (ZOFRAN) injection 4 mg  4 mg Intravenous Once PRN Laura King, APRN CNP        oxyCODONE IR (ROXICODONE) half-tab 2.5 mg  2.5 mg Oral Q6H PRN Catherine Galvez PA-C   2.5 mg at 10/24/24 0334    prochlorperazine (COMPAZINE) injection 10 mg  10 mg Intravenous Q6H PRN Morgan Galvezrielle K, PA-C        Or    prochlorperazine (COMPAZINE) tablet 10 mg  10 mg Oral Q6H PRN Lenny Catherine K, PA-C        simethicone (MYLICON) chewable tablet 80 mg  80 mg Oral 4x Daily PRN David Wade MD   80 mg at 10/24/24 0321    sodium chloride (PF) 0.9% PF flush 10-20 mL  10-20 mL Intracatheter q1 min prn Catherine Galvez K, PA-C        sodium chloride (PF) 0.9% PF flush 10-40 mL  10-40 mL Intracatheter Q1H PRN Catherine Galvez, PA-C        sodium chloride 0.9% BOLUS 100-150 mL  100-150 mL Intravenous Q15 Min PRN Rohan Polo MD        sodium chloride 0.9% BOLUS 100-150 mL  100-150 mL Intravenous Q15 Min PRN Rohan Polo MD           Cardiographics:    Telemetry monitoring demonstrates a flutter w/ rates in the 60-70s per my personal review.    Imaging:  Results for orders placed or performed during the hospital encounter of 10/08/24   XR Chest Port 1 View    Impression    IMPRESSION: Interval sternotomy, CABG procedure and mitral valve repair. Endotracheal tube proximally 2 cm above the sofia. Nasogastric tube in the stomach. Bilateral chest tubes and midline mediastinal drain in expected position. Epicardial leads are   present. Left IJ line in the brachiocephalic vein.    Low lung volumes. No evidence for CHF or pneumonia. No pleural effusion or pneumothorax.   XR Chest Port 1 View    Impression    IMPRESSION: Poststernotomy changes with prosthetic mitral valve. Patient's been extubated and the NG tube has been removed. Mediastinal and pleural chest tubes are in place.    Left IJ cordis sheath is in the distal left innominate artery. Catheter has a very subtle kink within it 3.5  centimeters from the tip.    Low lung volumes. No pneumothorax. Likely trace left effusion at the base. No signs of pneumonia or failure. Heart is of normal size.   XR Abdomen Port 1 View    Impression    IMPRESSION: Orogastric tube tip in the proximal stomach and oriented superiorly towards the left diaphragm. Visualized bowel gas pattern unremarkable. Numerous tubes and lines lower chest and upper abdomen as described on chest x-ray report from today.        Labs, personally reviewed.  Hemoglobin   Date Value Ref Range Status   10/24/2024 7.2 (L) 11.7 - 15.7 g/dL Final   10/23/2024 7.6 (L) 11.7 - 15.7 g/dL Final   10/22/2024 7.8 (L) 11.7 - 15.7 g/dL Final   06/09/2015 13.5 11.7 - 15.7 g/dL Final   03/11/2015 12.3 11.7 - 15.7 g/dL Final   01/08/2015 13.6 11.7 - 15.7 g/dL Final     WBC   Date Value Ref Range Status   06/09/2015 7.9 4.0 - 11.0 10e9/L Final   03/11/2015 9.4 4.0 - 11.0 10e9/L Final   01/08/2015 8.1 4.0 - 11.0 10e9/L Final     WBC Count   Date Value Ref Range Status   10/24/2024 12.4 (H) 4.0 - 11.0 10e3/uL Final   10/23/2024 13.1 (H) 4.0 - 11.0 10e3/uL Final   10/22/2024 13.8 (H) 4.0 - 11.0 10e3/uL Final     Platelet Count   Date Value Ref Range Status   10/24/2024 404 150 - 450 10e3/uL Final   10/23/2024 423 150 - 450 10e3/uL Final   10/22/2024 412 150 - 450 10e3/uL Final   06/09/2015 219 150 - 450 10e9/L Final   03/11/2015 240 150 - 450 10e9/L Final   01/08/2015 221 150 - 450 10e9/L Final     Creatinine   Date Value Ref Range Status   10/24/2024 2.81 (H) 0.51 - 0.95 mg/dL Final   10/23/2024 3.86 (H) 0.51 - 0.95 mg/dL Final   10/22/2024 3.54 (H) 0.51 - 0.95 mg/dL Final   06/09/2015 0.80 0.52 - 1.04 mg/dL Final   03/11/2015 0.87 0.52 - 1.04 mg/dL Final   01/08/2015 0.79 0.52 - 1.04 mg/dL Final     Potassium   Date Value Ref Range Status   10/24/2024 3.4 3.4 - 5.3 mmol/L Final   10/23/2024 3.2 (L) 3.4 - 5.3 mmol/L Final   10/22/2024 3.4 3.4 - 5.3 mmol/L Final   06/09/2015 3.8 3.4 - 5.3 mmol/L Final    03/11/2015 3.9 3.4 - 5.3 mmol/L Final   01/08/2015 4.1 3.4 - 5.3 mmol/L Final     Potassium POCT   Date Value Ref Range Status   10/08/2024 4.4 3.4 - 5.3 mmol/L Final   10/08/2024 4.0 3.4 - 5.3 mmol/L Final   10/08/2024 4.1 3.4 - 5.3 mmol/L Final     Magnesium   Date Value Ref Range Status   10/24/2024 1.9 1.7 - 2.3 mg/dL Final   10/23/2024 2.0 1.7 - 2.3 mg/dL Final   10/22/2024 1.7 1.7 - 2.3 mg/dL Final   06/09/2015 1.8 1.6 - 2.3 mg/dL Final   03/11/2015 1.7 1.6 - 2.3 mg/dL Final   01/08/2015 1.8 1.6 - 2.3 mg/dL Final          I/O:  I/O last 3 completed shifts:  In: 1390 [P.O.:990; Other:400]  Out: 3632 [Urine:600; Other:3032]       Physical Exam:    General: Patient seen in chair this AM. NAD. Pleasant, conversant.   HEENT: DORITA, no sclera icterus, moist mucosa  CV: RRR on monitor. 2+ peripheral pulses in all extremities. Moderate peripheral edema. Incision C/D/I.  Pulm: Satting 100% w/ nonlabored breathing on RA  Abd: Soft, NT, ND  : Voiding  Ext: Moderate pedal edema, SCDs in place, warm, distal pulses intact  Neuro: CNs grossly intact, FAM, A&Ox3      ASSESSMENT/PLAN:    Jory Ambrose is a 49 year old female with a history of GIA, asthma, GERD,  hypothyroidism, h/o PE on plavix, carotid stenosis, Hodgkin's lymphoma (s/p chemo/radiation and bone marrow transplant x2 after recurrence) currently in remission who is s/p CABG x4, RLE EVH, attempted mitral repair, mechanical MVR, LAAE.    Principal Problem:    Coronary artery disease involving native coronary artery of native heart with angina pectoris (H)  Active Problems:    Mitral regurgitation  #Acute Renal Failure d/t ATN, ongoing.      NEURO:   - Scheduled lidocaine patches and PRN oxycodone for pain  - PTA bupropion discontinued (somnolence)  - Tylenol 500 mg q6hrs (max 2g per day given recent hepatic dysfunction)    CV:  - Pre-op EF 55-60%  - Chest tubes and TPW's removed POD#5  - Normotensive  - Metop 75 mg BID  - Plavix daily indefinitely  d/t unclear h/o anaphylaxis to ASA  - Atorvastatin 80mg daily  - Warfarin for mechanical MVR, INR therapeutic, heparin gtt stopped 10/15  - Paroxysmal rate controlled afib - s/p amiodarone bolus/gtt 10/13 (ok per [place]). Holding given LFTs and allergy to PO amio additive. Consider EP consult if ongoing a fib as hypervolemia improves  - New baseline echo prior to discharge when closer to preop weight for new baseline s/p MVR and to eval for pericardial effusion on warfarin/plavix    PULM:   - Extubated on POD#0  - Maintaining O2 sats on room air w/ home CPAP qHS  - Encourage pulmonary toilet  - PTA zyrtec, fludrocortisone, flonase, simethicone, albuterol, pulmicort, breo ellipta, duoneb    FEN/GI:  - Continue electrolyte replacement protocol  - Regular diet  - Bowel regimen, LBM 10/19  - Ischemic hepatitis - LFTs essentially normal at this point  - Hepatic w/u and labs unremarkable  - PTA vitamin D3     RENAL:  - Nephrology consulted for ARF w/ decreased UOP, appreciate. Following.  - CRRT 10/11 - 10/13  - HD 10/15, 17, & 19 - trialing off dialysis  - Strict I/O  - Daily renal panel  - Cr elevated 2.7-3.1 (baseline ~0.9)  - UA/Urine cx 10/10 & 10/13 NG    HEME:  - H/o bone marrow tx. Needs irradiated blood  - Acute blood loss anemia post-op.   - Hgb stable, likely dilutional component  - Warfarin INR goal 2.5-3.5 in s/o mech MVR  - HIT screen negative.  - INR therapeutic  - Transfusion hx  - 2u PRBCs periop  - 1u PRBCs 10/11  - 1u PRBCs 10/16    ID:  - Lydia op ppx complete. Afebrile. Mild stable leukocytosis, felt to be reactive.  - UA/Urine cx 10/10 & 10/13 NG.  - Blood cx 10/9 & 10/13 NG  - ID consult for ?sepsis, appreciate. Signed off  - CBC qday    ENDO:   - HbA1c 5.7%  - Adrenal, thyroid, and pancreatitis labs unremarkable  - PTA Metformin discontinued (renal function)  - PTA synthroid    PPx:   - DVT: SCDs, SQ heparin TID, ambulation   - GI: Protonix 40mg IV/PO daily    DISPO:   - Continue general tele  care (POD#8)  - PT/OT recs at discharge: TCU  - Medically Ready for Discharge: Anticipated Today after stable from tunneled line placement. Waiting for outpatient HD to be arranged and TCU acceptance.       Kerri Dolan PA-C   Cardiothoracic Surgery   October 24, 2024 at 9:57 AM  Please reach me on zuuka! or secure chat

## 2024-10-24 NOTE — PLAN OF CARE
sc  Patient Name: Jory Ambrose   MRN: [id number]   Date of Admission: 10/8/2024    Procedure: Procedure(s):  CORONARY ARTERY BYPASS GRAFT TIMES FOUR, LEFT INTERNAL MAMMARY ARTERY HARVEST, RIGHT LEG ENDOSCOPIC VESSEL PROCUREMENT, EPIAORTIC ULTRASOUND,  MITRAL VALVE REAIR CONVERTED TO MITRAL VALVE REPLACEMENT  LEFT ATRIAL APPENDAGE LIGATION,  ANESTHESIA TRANSESOPHAGEAL ECHOCARDIOGRAM    Post Op day #:16    Subjective (Patient focus/Primary Problem for shift):           Pain Goal0 Pain Rating0           Pain Medication/ Regime effective to reduce patient painscheduled tylenol, prn oxy, and lidocaine patches.    Objective (Physical assessment):           Rhythm: atrial flutter/fib            Bowel Activity: yes if Yes indicate when: 10/24          Bowel Medications: yes            Incision: healing well          Incentive Spirometry Q 1-2 hour when awake:  yes Volume: 1000          Epicardial Pacing Wires:  not applicable            Patient Activity:           Up to chair for meals: yes          Ambulation with RN x2 (Not including CR): yes            Is patient in home clothes:no             Chest Tubes   Pleural: not applicable Draining: not applicable               Suction: not applicable              Mediastinal: not applicable Draining: not applicable               Suction: not applicable   Dressing Change Daily:not applicable If No, why?open to air                     Urinary Catheter: no           Preventative WOC consult (need MD order): no       Assessment (Nursing primary shift focus):     Pt A/Ox4. Pain controlled with PRN oxy and scheduled lidocaine patches/tylenol per pt. PRN neb given x2 per RT this shift for wheezing/SOB. Albuterol inhaler given x1 for wheezing, intervention effective with clear lung sounds. Tunneled cath placed this shift, oozing at the site noted. Manual pressure applied for 5 minutes per orders. IR notified and to the bedside, pressure dressing applied per IR. Tunneled cath  site WNL. Pt refused shower, incisional care/bed bath completed.    Plan (Patient Care Plan/focus): Continue working with therapy.      Iona Rene RN   10/24/2024   6:55 PM

## 2024-10-24 NOTE — IR NOTE
Patient Name: Jory Ambrose  Medical Record Number: [id number]  Today's Date: 10/24/2024    Procedure: CVC  Proceduralist: Vida    Procedure Start: 0925  Procedure end: 0935  Sedation medications administered: 2 mg midazolam and 100 mcg fentanyl   Sedation time: 10 minutes    Report given to: Iona PANDA    Bedside verbal report given to P3 nurse. Patient alert at this time. Site clean, dry, intact.

## 2024-10-24 NOTE — PROGRESS NOTES
"CLINICAL NUTRITION SERVICES - REASSESSMENT NOTE     Nutrition Prescription    RECOMMENDATIONS FOR MDs/PROVIDERS TO ORDER:    Malnutrition Status:    Moderate in acute     Recommendations already ordered by Registered Dietitian (RD):  Changed 2 pm Ensure Max to [employer] ONS    Future/Additional Recommendations:  Monitor diet advancement, intake, weight, labs, supplement tolerance     EVALUATION OF THE PROGRESS TOWARD GOALS   Diet: NPO (was on a regular diet)  Supplement: Ensure Max 2 pm snack  Intake: 100% of 2 meals yesterday per flowsheets      Food allergy: Shellfish, fish      NEW FINDINGS   Met with pt and pt's mom. Appetite better, not as \"put off\" by food. Does not like the Ensure Max. Discussed other options, has tried several and has not liked, agrees to try a chocolate [employer] ONS. Pt wanting to know what her nutrition goals are, wanting calorie and protein goals. Told pt her goals are to eat 3 meals per day including protein with every meal and not to worry about the numbers. Pt's mom asking for handouts for \"glycemic index\" of foods for pt being on dialysis. Also wondering about other dietary restrictions. Pt understands heart healthy/low sodium diet, knows what foods contain carbs, and knows what protein is. Explained she needed to eat plenty of protein now that on dialysis, limit her sodium intake, did not need to worry about glycemic index of foods, and did not need to worry about other restrictions related to dialysis diet (K, phos) unless labs indicate otherwise. Told them an RD would be at her dialysis unit and would provide information on diet/restrictions if needed. Told pt would provide handout from [employer] if diet advanced to anything besides regular. Provided handout for fat and sodium for menu items. Did discuss sending food items as snacks but pt has not liked several of the menu items, mom bringing in some food (Kefir, hummus).     NPO today for tunneled dialysis catheter " placement -advanced to clear liquid    ANTHROPOMETRICS  Height: 5'4  Most Recent Weight: 105.5 kg (232 lb 9.6 oz)    BMI: Obesity Grade II BMI 35-39.9  Weight History:   Date/Time Weight Weight Method   10/24/24 0645 105.5 kg (232 lb 9.6 oz) Standing scale   10/23/24 0442 107.2 kg (236 lb 4.8 oz) Standing scale   10/22/24 0814 107.7 kg (237 lb 6.4 oz) --   10/21/24 0624 109 kg (240 lb 6.4 oz) --   10/20/24 0632 108.5 kg (239 lb 4.8 oz) Standing scale   10/19/24 0700 112 kg (246 lb 14.4 oz) Standing scale   10/18/24 1100 111.6 kg (246 lb) Standing scale   10/17/24 0600 114.2 kg (251 lb 11.2 oz) Standing scale   10/16/24 0500 112.8 kg (248 lb 9.6 oz) Standing scale   10/15/24 0600 114.3 kg (252 lb) Standing scale   10/14/24 0400 112.5 kg (248 lb 0.3 oz) Bed scale   10/13/24 0400 111.2 kg (245 lb 2.4 oz) Bed scale   10/12/24 0400 113.3 kg (249 lb 12.5 oz) Bed scale   10/11/24 0454 114.4 kg (252 lb 3.3 oz) Bed scale   10/10/24 0600 111.8 kg (246 lb 7.6 oz) --   10/09/24 0530 109.1 kg (240 lb 8.4 oz) Bed scale   10/08/24 0527 100.3 kg (221 lb 3.2 oz) --     PHYSICAL FINDINGS  See malnutrition section below.  Missing teeth  Incisional wounds  Per WOC RN note-patient with blister on abdomen and L thigh, rt thigh with bruising.   -13.7 L since 10/10/24    GI CONCERNS  BM 10/23    LABS  Reviewed    MEDICATIONS  Reviewed    CURRENT NUTRITION DIAGNOSIS  Malnutrition related to poor oral intake as evidenced by NPO/Clear, mentation  -improving with po intake on Regular diet.     INTERVENTIONS  Implementation  Change Ensure Jin to [employer] ONS    Goals  Patient to consume % of nutritionally adequate meals three times per day, or the equivalent with supplements/snacks.-progressing    Monitoring/Evaluation  Progress toward goals will be monitored and evaluated per protocol.

## 2024-10-24 NOTE — PROCEDURES
[facility]    Procedure: IR Procedure Note    Date/Time: 10/24/2024 9:32 AM    Performed by: Lisa Mcpherson MD  Authorized by: Lsia Mcpherson MD  IR Fellow Physician:    Pre Procedure Diagnosis: ALEXEI  Post Procedure Diagnosis: ALEXEI    UNIVERSAL PROTOCOL   Site Marked: Yes  Prior Images Obtained and Reviewed:  Yes  Required items: Required blood products, implants, devices and special equipment available    Patient identity confirmed:  Arm band, provided demographic data, hospital-assigned identification number and verbally with patient  Patient was reevaluated immediately before administering moderate or deep sedation or anesthesia  Confirmation Checklist:  Correct equipment/implants were available, procedure was appropriate and matched the consent or emergent situation, relevant allergies and patient's identity using two indicators  Time out: Immediately prior to the procedure a time out was called    Universal Protocol: the Joint Commission Universal Protocol was followed    Preparation: Patient was prepped and draped in usual sterile fashion       ANESTHESIA    Anesthesia:  Local infiltration  Local Anesthetic:  Lidocaine 1% without epinephrine      SEDATION    Patient Sedated: No    See dictated procedure note for full details.  Findings: Successful right TDC placement, OK to use    Specimens: none    Procedural Complications: None    Condition: Stable      PROCEDURE    Patient Tolerance:  Patient tolerated the procedure well with no immediate complications  Length of time physician/provider present for 1:1 monitoring during sedation:  0-22 min

## 2024-10-24 NOTE — PROGRESS NOTES
'    RENAL ([facility]) progress note  CC: F/U ALEXEI  S:  Mu first visit with patient today. Mom present with many questions. Mother is frustrated by multiple complications, communication re: discharge planning. We discussed worsened labs yesterday (daily worsening) necessitating HD yesterday and proceeding with tunneled HD catheter today. She is likely to recover at some point I suspect but may need dialysis as outpt for some time before this happens. Discussed lab monitoring, dialysis symptoms/side effects and how we monitor for recovery when someone is receiving dialysis.      TT = 55 minutes. Care d/w RN as well.     A/P:   Acute kidney injury.  Acute tubular necrosis.  Secondary to CABG and mitral valve replacement followed by cardiogenic shock.  Baseline creatinine 0.95 (10/8/2024).  Dialysis initiated 10/11/24.  Required CRRT 10/11 - 10/13/24    HD 10/15, 10/17, 10/19 (Held) but dialysis resumed again 10/23 due to rising creatinine (2.7 (10/20)-->-->3.8 mg/dl)  With lack of renal recovery, will set up outpt dialysis for ALEXEI, my office working on this ([facility] are being considered per  notes)  Check daily labs. If Cr stable/improved tomorrow, would be prudent to observe over weekend with possible recovery but I suspect she will have slow rise still and need ongoing HD for hopefully a short time as an outpt. (If Cr higher tomorrow will do short HD tomorrow or Sat pending discharge timing/decisions)  Goal currently is to dialyze her as little as needed for earliest detection of recovery  PCAD placed 10/24.   Change Bumex to 2 mg PO daily on 10/25.    Will check labs in AM before scheduling dialysis.      2. Status post coronary artery bypass grafting x 4 vessels and mechanical mitral valve replacement on 10/8.  Diffuse coronary artery disease   -complicated post op course with cardiogenic shock, severe shock liver, dialysis dependent ALEXEI    3. Shock liver. Resolved.      4. History of Hodgkin's  lymphoma.  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    5. Anasarca - Improved on Bumex. Dizziness/cramping with UF on dialysis 10/23 so may need to be patient with this.    - Change Bumex to 2 mg PO daily 10/25 in anticipation of possible discharge soon.     6. Hypokalemia - due to bumex/poor PO intake. . Stable today. Replace K+ as needed.        Danish Rhoades MD  [facility]  [phone] (off)         /55   Pulse 66   Temp 97.4  F (36.3  C) (Oral)   Resp 18   Wt 105.5 kg (232 lb 9.6 oz)   LMP  (LMP Unknown)   SpO2 100%   BMI 39.93 kg/m      I/O last 3 completed shifts:  In: 1390 [P.O.:990; Other:400]  Out: 3632 [Urine:600; Other:3032]    Physical Exam:   GENERAL: Alert, NAD in chair  EYES: EOMI   ENT: MMM, hearing intact  RESP: normal effort, cta ant  CV: RRR, 1+ leg edema  GI: NT/ND  SKIN: Pale, right leg incisions clean, sternotomy site healing  RIJ PCAD      Recent Labs   Lab 10/24/24  0620 10/23/24  0501 10/22/24  0624 10/21/24  0452 10/20/24  0452 10/19/24  0451 10/18/24  0508    134* 134* 133* 134* 131* 132*   POTASSIUM 3.4 3.2* 3.4 3.4 3.4 3.2* 3.5   CHLORIDE 99 95* 96* 97* 99 95* 97*   CO2 24 23 22 22 22 23 24   BUN 41.5* 63.4* 56.3* 49.9* 42.0* 50.1* 37.6*   CR 2.81* 3.86* 3.54* 3.13* 2.77* 3.35* 2.85*   GFRESTIMATED 20* 14* 15* 17* 20* 16* 20*   CINDY 8.9 8.6* 8.5* 8.4* 8.1* 8.1* 8.2*   PHOS  --   --   --   --   --   --  3.5   MAG 1.9 2.0 1.7 1.8 1.8 2.0 2.0   ALBUMIN  --  3.2*  --  3.0*  --  2.9* 2.8*     Recent Labs   Lab 10/24/24  0620 10/23/24  0501 10/22/24  0624 10/21/24  0452 10/20/24  0452 10/19/24  0451 10/18/24  0508   WBC 12.4* 13.1* 13.8* 13.2* 13.1* 13.6* 13.4*   HGB 7.2* 7.6* 7.8* 7.6* 7.7* 8.1* 8.3*   HCT 22.8* 23.4* 24.6* 23.7* 24.3* 25.6* 25.5*   MCV 90 89 90 89 89 89 88    423 412 305 246 228 182       Current Facility-Administered Medications:     acetaminophen  (TYLENOL) tablet 500 mg, 500 mg, Oral, Q6H, Catherine Galvez PA-C, 500 mg at 10/24/24 1104    albuterol (PROVENTIL HFA/VENTOLIN HFA) inhaler, 2 puff, Inhalation, Q6H PRN, Catherine Galvez PA-C    atorvastatin (LIPITOR) tablet 80 mg, 80 mg, Oral, At Bedtime, Catherine Galvez PA-C, 80 mg at 10/23/24 2023    bisacodyl (DULCOLAX) suppository 10 mg, 10 mg, Rectal, Daily PRN, Catherine Galvez PA-C    budesonide (PULMICORT) neb solution 1 mg, 1 mg, Nebulization, BID, Catherine Galvez PA-C, 1 mg at 10/24/24 0732    bumetanide (BUMEX) injection 2 mg, 2 mg, Intravenous, Daily, Jaclyn Murillo MD, 2 mg at 10/24/24 1109    cetirizine (zyrTEC) tablet 10 mg, 10 mg, Oral, Daily, Catherine Galvez PA-C, 10 mg at 10/24/24 1107    clopidogrel (PLAVIX) tablet 75 mg, 75 mg, Oral, Daily, Catherine Galvez PA-C, 75 mg at 10/24/24 1108    glucose gel 15-30 g, 15-30 g, Oral, Q15 Min PRN **OR** dextrose 50 % injection 25-50 mL, 25-50 mL, Intravenous, Q15 Min PRN, 50 mL at 10/14/24 0806 **OR** glucagon injection 1 mg, 1 mg, Subcutaneous, Q15 Min PRN, Catherine Galvez PA-C    fludrocortisone (FLORINEF) tablet 0.1 mg, 0.1 mg, Oral, Daily, Catherine Galvez PA-C, 0.1 mg at 10/24/24 1108    fluticasone (FLONASE) 50 MCG/ACT spray 2 spray, 2 spray, Both Nostrils, Daily PRN, Catherine Galvez PA-C    fluticasone-vilanterol (BREO ELLIPTA) 100-25 MCG/ACT inhaler 1 puff, 1 puff, Inhalation, At Bedtime, Catherine Galvez PA-C, 1 puff at 10/23/24 2024    [COMPLETED] sodium chloride 0.9% DIALYSIS Cath LOCK - RED Lumen, 10 mL, Intracatheter, Once in dialysis/CRRT, 10 mL at 10/17/24 1527 **FOLLOWED BY** heparin 1000 unit/mL DIALYSIS Cath LOCK - RED Lumen, 1.3-2.6 mL, Intracatheter, Q1H PRN, Rohan Polo MD, 3,200 Units at 10/24/24 0934    [COMPLETED] sodium chloride 0.9% DIALYSIS Cath LOCK - BLUE Lumen, 10 mL, Intracatheter, Once in dialysis/CRRT, 10 mL at 10/17/24 1526 **FOLLOWED BY**  heparin 1000 unit/mL DIALYSIS Cath LOCK -BLUE Lumen, 1.3-2.6 mL, Intracatheter, Q1H PRN, Rohan Polo MD    heparin lock flush 10 unit/mL injection 5-20 mL, 5-20 mL, Intracatheter, Q24H, Catherine Galvez PA-C, 15 mL at 10/20/24 1957    heparin lock flush 10 unit/mL injection 5-20 mL, 5-20 mL, Intracatheter, Q1H PRN, Catherine Galvez PA-C    hydrALAZINE (APRESOLINE) injection 10 mg, 10 mg, Intravenous, Q30 Min PRN, Catherine Galvez PA-C    hydrOXYzine HCl (ATARAX) tablet 25 mg, 25 mg, Oral, TID PRN, Catherine Galvez, PA-C, 25 mg at 10/20/24 1642    ipratropium - albuterol 0.5 mg/2.5 mg/3 mL (DUONEB) neb solution 3 mL, 3 mL, Nebulization, Q4H PRN, Catherine Galvez PA-C, 3 mL at 10/24/24 1032    levothyroxine (SYNTHROID/LEVOTHROID) tablet 125 mcg, 125 mcg, Oral, QAM AC, Catherine Galvez PA-C, 125 mcg at 10/24/24 0749    Lidocaine (LIDOCARE) 4 % Patch 1-2 patch, 1-2 patch, Transdermal, Q24H, Catherine Galvez PA-C, 1 patch at 10/23/24 1717    metoprolol tartrate (LOPRESSOR) tablet 75 mg, 75 mg, Oral, BID, Catherine Galvez, PA-C, 75 mg at 10/24/24 1102    multivitamin w/minerals (THERA-VIT-M) tablet 1 tablet, 1 tablet, Oral, Daily, Catherine Galvez PA-C, 1 tablet at 10/24/24 1103    naloxone (NARCAN) injection 0.2 mg, 0.2 mg, Intravenous, Q2 Min PRN **OR** naloxone (NARCAN) injection 0.4 mg, 0.4 mg, Intravenous, Q2 Min PRN **OR** naloxone (NARCAN) injection 0.2 mg, 0.2 mg, Intramuscular, Q2 Min PRN **OR** naloxone (NARCAN) injection 0.4 mg, 0.4 mg, Intramuscular, Q2 Min PRN, Catherine Galvez PA-C    No heparin via hemodialysis machine, , Does not apply, Once, Jaclyn Murillo MD    ondansetron (ZOFRAN ODT) ODT tab 4 mg, 4 mg, Oral, Q6H PRN **OR** ondansetron (ZOFRAN) injection 4 mg, 4 mg, Intravenous, Q6H PRN, Catherine Galvez PA-C, 4 mg at 10/10/24 1945    oxyCODONE IR (ROXICODONE) half-tab 2.5 mg, 2.5 mg, Oral, Q6H PRN, 2.5 mg at 10/24/24 0334 **OR**  [DISCONTINUED] oxyCODONE (ROXICODONE) tablet 5 mg, 5 mg, Oral, Q4H PRN, David Wade MD, 5 mg at 10/19/24 0902    [DISCONTINUED] pantoprazole (PROTONIX) 2 mg/mL suspension 40 mg, 40 mg, Oral or NG Tube, Daily, 40 mg at 10/08/24 1729 **OR** pantoprazole (PROTONIX) EC tablet 40 mg, 40 mg, Oral, Daily, Catherine Galvez PA-C, 40 mg at 10/24/24 1105    polyethylene glycol (MIRALAX) Packet 17 g, 17 g, Oral, Daily, Catherine Galvez PA-KENIA    prochlorperazine (COMPAZINE) injection 10 mg, 10 mg, Intravenous, Q6H PRN **OR** prochlorperazine (COMPAZINE) tablet 10 mg, 10 mg, Oral, Q6H PRN, Catherine Galvez PA-C    senna-docusate (SENOKOT-S/PERICOLACE) 8.6-50 MG per tablet 1 tablet, 1 tablet, Oral, BID, Catherine Galvez PA-C, 1 tablet at 10/24/24 1107    simethicone (MYLICON) chewable tablet 80 mg, 80 mg, Oral, 4x Daily PRN, David Wade MD, 80 mg at 10/24/24 0321    sodium chloride (PF) 0.9% PF flush 10-20 mL, 10-20 mL, Intracatheter, q1 min prn, Catherine Galvez PA-C    sodium chloride (PF) 0.9% PF flush 10-40 mL, 10-40 mL, Intracatheter, Q8H, Catherine Galvez PA-C, 10 mL at 10/24/24 1243    sodium chloride (PF) 0.9% PF flush 10-40 mL, 10-40 mL, Intracatheter, Q7 Days, Catherine Galvez PA-C, 10 mL at 10/18/24 1703    sodium chloride (PF) 0.9% PF flush 10-40 mL, 10-40 mL, Intracatheter, Q1H PRN, Catherine Galvez PA-C    sodium chloride 0.9% BOLUS 100-150 mL, 100-150 mL, Intravenous, Q15 Min PRN, Rohan Polo MD    sodium chloride 0.9% BOLUS 100-150 mL, 100-150 mL, Intravenous, Q15 Min PRN, Rohan Polo MD    Vitamin D3 (CHOLECALCIFEROL) tablet 125 mcg, 125 mcg, Oral, Daily, Catherine Galvez PA-C, 125 mcg at 10/24/24 1106    [Held by provider] warfarin ANTICOAGULANT (COUMADIN) half-tab 1.5 mg, 1.5 mg, Oral, ONCE at 18:00, David Wade MD    [Held by provider] Warfarin Dose Required Daily - Pharmacist Managed, 1 each, Oral, See  Admin Instructions, Jaclyn Murillo MD    Labs personally reviewed today during this evaluation at 1130am.

## 2024-10-24 NOTE — PROGRESS NOTES
"  Interventional Radiology - Pre Procedure Chart Review  [facility]  10/24/2024       Procedure Requested:  Tunneled dialysis catheter placement  Requested by: Jaclyn Murillo MD       HPI: Jory Ambrose is a 49 year old female with a PMH of asthma, PE on warfarin, sleep apnea, Hodgkin lymphoma, lung transplant x2, and CAD who was admitted to [facility] on 10/08/2024 for a planned CABG x4, MVR, and CARRIE ligation. Hospital course c/b ALEXEI, Nephrology following. Has been getting HD via nontunneled catheter. Now requesting tunneled HD catheter for long term HD.     WBC today 13.1, has been trending in this range. Per CV Surgery note: \"Mild stable leukocytosis, felt to be reactive\". ID previously consulted for sepsis with leukocytosis - completed abx, BC and UC negative, signed off. Afebrile.     Noted contrast allergy. Contrast is not typically used for tunneled HD catheter placements, however, patient may have to be premedicated if it is required.    NPO status reviewed: since [place]    Clinical notes reviewed    Pertinent medications reviewed: warfarin. INR 2.45 today    Allergies   Allergen Reactions    Dye [Contrast Dye] Swelling     Pre-medicated with methylprednisolone    Shellfish Allergy Shortness Of Breath, Anaphylaxis and Swelling     Shrimp, crab, lobster    Penicillins Hives     Patient cannot recall if she has tried cephalosporins in the past.     Erythromycin Hives    Morphine Hcl      Causes change in mental status    Sulfa Antibiotics Hives and Rash       /62 (BP Location: Right arm, Patient Position: Semi-Mayo's, Cuff Size: Adult Large)   Pulse 68   Temp 97.6  F (36.4  C) (Oral)   Resp 18   Wt 105.5 kg (232 lb 9.6 oz)   LMP  (LMP Unknown)   SpO2 100%   BMI 39.93 kg/m       LABS:  INR (no units)   Date Value   10/24/2024 2.45 (H)   05/06/2014 0.92       Lab Results   Component Value Date    WBC 12.4 (H) 10/24/2024    HGB 7.2 (L) 10/24/2024    " " 10/24/2024       No results found for: \"CREATININE\"    No components found for: \"K\"      PLAN:  Planning for placement of tunneled CVC HD cath in IR. Radiologist to further review procedure with patient.    EMR reviewed. No formal assessment completed.     EMERSON RUBY, APRN CNP  Interventional Radiology  [phone]   "

## 2024-10-24 NOTE — CONSULTS
[facility]  Cardiac Electrophysiology  [address]  [place] [address]   Office: [phone]  Fax: [phone]     Cardiac Electrophysiology Consultation    Patient: Jory Ambrose   : 1975     Referring Provider: Eva Galvez PA-C    CHIEF COMPLAINT/REASON FOR CONSULTATION  Post-op atrial fibrillation/flutter    Assessment/Recommendations     # Paroxysmal atrial fibrillation  # Paroxysmal atrial flutter, appears typical  - new diagnosis, post operatively   - per chart review, went into atrial fibrillation with RVR 10/13, intermittent afib and would spontaneously convert to sinus.   - [id number] score: at least 2 for vascular disease and gender, could also consider HFpEF with active diuresis  - on warfarin for previous PE, INR therapeutic 2.45, dosing per pharmacy (held prior to surgery, was not therapeutic.)  - IV amio bolus and infusion was started 10/12, but stopped 10/14 due to elevated LFTs.  - noted to have allergy to additive in PO amiodarone  - continue lopressor 75 mg BID  - telemetry now shows atrial flutter, rate controlled in the 60's  - We reviewed the pathophysiology of atrial fibrillation/flutter and management considerations including stroke risk and anticoagulation, rate control, cardioversion, antiarrhythmic drug therapy, and briefly discussed catheter ablation.  - no plans for rhythm control/cardioversion at this time  - will have her follow up in EP clinic as outpatient     Physician Attestation    I, personally discussed Jory Ambrose on 10/24/2024 as part of a shared ORQUIDEA/PA visit.  I have reviewed and discussed with the advanced practice provider the patient's history, physical, and plan.    I personally reviewed the patient's vital signs, medications, and pertinent tests/laboratories.    I personally provided a substantial portion of the care for this patient.  I personally performed the entire medical decision  making and I agree with the assessment and plan as written by Stephen Person PA-C.    Reddy management decisions made by me:  Patient with postoperative atrial fibrillation and flutter.  Currently successful rate control strategy and will plan to continue using beta-blocker over the short-term during the patient's recovery.  Patient will be seen as an outpatient to discuss rhythm management options including ablation as an outpatient.    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 Wade on 10/8/24. She required vasopressor support following surgery, and was hypervolemic. Hospital course was complicated by acute kidney injury and shock liver, nephrology consulted 10/10/24. CRRT started 10/11. ID consulted for sepsis 10/13 and placed on meropenem. HD started 10/14.     Has had intermittent atrial fibrillation during the hospital course, but largely rate controlled. Appears to be in atrial flutter today.     States she is feeling well, all things considered. Denies chest pain, SOB, dizziness/lightheadedness, palpitations/racing heart, syncope/pre-syncope.         Data Review    ECGs (all tracings independently reviewed)  10/23/2024: Atrial flutter with 4:1 AV conduction 67 bpm  10/18/2024: Atrial flutter with 4:1 AV conduction at 76 bpm  10/15/2024: Atrial fibrillation 108 bpm  10/14/2024: Sinus rhythm 74 with first-degree AV delay,  ms, QT/QTc 410/455 ms  10/10/2024: Appears to be sinus tach 114 bpm prolonged WV interval  10/9/2024: Sinus rhythm 87 first-degree AV delay QT/QTc 368/442 ms  9/25/2024: Sinus rhythm 81 bpm normal WV interval, QT/QTc 384/446 ms    Telemetry  shows 4:1 atrial flutter 67    Echo done 10/19/24:  Limited views were obtained. Technically difficult study.  The left ventricle is normal in size.  Left ventricular systolic function is normal. The visual ejection fraction is  60-65%. Regional wall motion abnormalities cannot be excluded due to limited  visualization.  There is a 29 mm [facility] bioprosthesis in the mitral position  with a mean gradient of 3-4 mmHg at HR 96 bpm.  There is a new fluid collection along the R sided structures. This appears to  be pleural vs. ascites. Less likely pericardial effusion.         Physical Examination  Review of Systems   VITALS: /55   Pulse 66   Temp 97.4  F (36.3  C) (Oral)   Resp 18   Wt 105.5 kg (232 lb 9.6 oz)   LMP  (LMP Unknown)   SpO2 100%   BMI 39.93 kg/m    Wt Readings from Last 3 Encounters:   10/24/24 105.5 kg (232 lb 9.6 oz)   10/04/24 98 kg (216 lb)   09/25/24 99.3 kg (219 lb)       Intake/Output Summary (Last 24 hours) at 10/24/2024 1054  Last data filed at 10/24/2024 0700  Gross per 24 hour   Intake 1150 ml   Output 3632 ml   Net -2482 ml     CONSTITUTIONAL: no distress  RESPIRATORY:  Respiratory effort is normal  CARDIOVASCULAR:  normal S1 and S2  GASTROINTESTINAL:  Abdomen without masses or tenderness  EXTREMITIES:  No clubbing or cyanosis.    SKIN:  Overall, skin warm and dry, no lesions.  NEURO/PSYCH:  Oriented x 3 with normal affect.    ROS: 10 point ROS neg other than the symptoms noted above in the HPI.       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    brother    Seasonal allergies     Shingles     Aug 2010    Sleep apnea     Past Surgical History:   Procedure Laterality Date    CHOLECYSTECTOMY      gallstone pancreatitis Jan 2010    CORONARY ARTERY BYPASS GRAFT, WITH ENDOSCOPIC VESSEL PROCUREMENT N/A 10/8/2024    Procedure: CORONARY ARTERY BYPASS GRAFT TIMES FOUR, LEFT INTERNAL MAMMARY ARTERY HARVEST, RIGHT LEG ENDOSCOPIC VESSEL PROCUREMENT, EPIAORTIC ULTRASOUND,;  Surgeon: David Wade MD;  [place] [facility] [place]    CV CORONARY ANGIOGRAM N/A 9/13/2024    Procedure: Coronary Angiogram;  Surgeon: Tarun Johnston MD;  Location: [facility] CATH LAB CV    CV LEFT HEART CATH N/A 9/13/2024    Procedure: Left Heart Catheterization;  Surgeon: Tarun Johnston MD;  Location: [facility] CATH LAB CV    IR CVC NON TUNNEL PLACEMENT > 5 YRS  10/11/2024    IR CVC TUNNEL PLACEMENT > 5 YRS OF AGE  10/24/2024    Lymphectomy  2/2010    right neck area    OR LIGATION, LEFT ATRIAL APPENDAGE N/A 10/8/2024    Procedure: LEFT ATRIAL APPENDAGE LIGATION,;  Surgeon: David Wade MD;  [place] [facility]    PICC TRIPLE LUMEN PLACEMENT  10/15/2024    REPLACE VALVE MITRAL N/A 10/8/2024    Procedure: MITRAL VALVE REAIR CONVERTED TO MITRAL VALVE REPLACEMENT;  Surgeon: David Wade MD;  [place] [facility] [place]    TRANSESOPHAGEAL ECHOCARDIOGRAM INTRAOPERATIVE  10/8/2024    Procedure: ANESTHESIA TRANSESOPHAGEAL ECHOCARDIOGRAM;  Surgeon: David Wade MD;  [place] [facility] [place]         Family History Social History   Family History   Problem Relation Age of Onset    Cancer Mother         thyroid cancer    Unknown/Adopted Father     Breast Cancer No family hx of     Cancer - colorectal No family hx of     Prostate Cancer No family hx of     Hypertension Mother     C.A.D. Maternal Grandmother     C.A.D. Paternal Grandmother         Social History     Tobacco Use    Smoking status: Never    Smokeless tobacco:  Never    Tobacco comments:     denies tabacco use   Vaping Use    Vaping status: Never Used   Substance Use Topics    Alcohol use: No     Comment: minimal alcohol use, 1 glass of wine in 6 months    Drug use: No         Medications  Allergies     Current Facility-Administered Medications:     acetaminophen (TYLENOL) tablet 500 mg, 500 mg, Oral, Q6H, Catherine Galvez PA-C, 500 mg at 10/24/24 1104    albuterol (PROVENTIL HFA/VENTOLIN HFA) inhaler, 2 puff, Inhalation, Q6H PRN, Catherine Galvez PA-C    atorvastatin (LIPITOR) tablet 80 mg, 80 mg, Oral, At Bedtime, Catherine Galvez PA-C, 80 mg at 10/23/24 2023    bisacodyl (DULCOLAX) suppository 10 mg, 10 mg, Rectal, Daily PRN, Catherine Galvez PA-C    budesonide (PULMICORT) neb solution 1 mg, 1 mg, Nebulization, BID, Catherine Galvez PA-C, 1 mg at 10/24/24 0732    bumetanide (BUMEX) injection 2 mg, 2 mg, Intravenous, Daily, Jaclyn Murillo MD, 2 mg at 10/24/24 1109    cetirizine (zyrTEC) tablet 10 mg, 10 mg, Oral, Daily, Catherine Galvez PA-C, 10 mg at 10/24/24 1107    clopidogrel (PLAVIX) tablet 75 mg, 75 mg, Oral, Daily, Catherine Galvez PA-C, 75 mg at 10/24/24 1108    glucose gel 15-30 g, 15-30 g, Oral, Q15 Min PRN **OR** dextrose 50 % injection 25-50 mL, 25-50 mL, Intravenous, Q15 Min PRN, 50 mL at 10/14/24 0806 **OR** glucagon injection 1 mg, 1 mg, Subcutaneous, Q15 Min PRN, Catherine Galvez PA-C    fludrocortisone (FLORINEF) tablet 0.1 mg, 0.1 mg, Oral, Daily, Catherine Galvez PA-C, 0.1 mg at 10/24/24 1108    fluticasone (FLONASE) 50 MCG/ACT spray 2 spray, 2 spray, Both Nostrils, Daily PRN, Catherine Galvez PA-C    fluticasone-vilanterol (BREO ELLIPTA) 100-25 MCG/ACT inhaler 1 puff, 1 puff, Inhalation, At Bedtime, Catherine Galvez PA-C, 1 puff at 10/23/24 2024    [COMPLETED] sodium chloride 0.9% DIALYSIS Cath LOCK - RED Lumen, 10 mL, Intracatheter, Once in dialysis/CRRT, 10 mL at 10/17/24 1527 **FOLLOWED  BY** heparin 1000 unit/mL DIALYSIS Cath LOCK - RED Lumen, 1.3-2.6 mL, Intracatheter, Q1H PRN, Rohan Polo MD, 3,200 Units at 10/24/24 0934    [COMPLETED] sodium chloride 0.9% DIALYSIS Cath LOCK - BLUE Lumen, 10 mL, Intracatheter, Once in dialysis/CRRT, 10 mL at 10/17/24 1526 **FOLLOWED BY** heparin 1000 unit/mL DIALYSIS Cath LOCK -BLUE Lumen, 1.3-2.6 mL, Intracatheter, Q1H PRN, Rohan Polo MD    heparin lock flush 10 unit/mL injection 5-20 mL, 5-20 mL, Intracatheter, Q24H, Catherine Galvez PA-C, 15 mL at 10/20/24 1957    heparin lock flush 10 unit/mL injection 5-20 mL, 5-20 mL, Intracatheter, Q1H PRN, Catherine Galvez PA-C    hydrALAZINE (APRESOLINE) injection 10 mg, 10 mg, Intravenous, Q30 Min PRN, Catherine Galvez PA-C    hydrOXYzine HCl (ATARAX) tablet 25 mg, 25 mg, Oral, TID PRN, Catherine Galvez PA-C, 25 mg at 10/20/24 1642    ipratropium - albuterol 0.5 mg/2.5 mg/3 mL (DUONEB) neb solution 3 mL, 3 mL, Nebulization, Q4H PRN, Catherine Galvez PA-C, 3 mL at 10/24/24 1032    levothyroxine (SYNTHROID/LEVOTHROID) tablet 125 mcg, 125 mcg, Oral, QAM AC, Catherine Galvez PA-C, 125 mcg at 10/24/24 0749    Lidocaine (LIDOCARE) 4 % Patch 1-2 patch, 1-2 patch, Transdermal, Q24H, Catherine Galvez PA-C, 1 patch at 10/23/24 1717    metoprolol tartrate (LOPRESSOR) tablet 75 mg, 75 mg, Oral, BID, Catherine Galvez PA-C, 75 mg at 10/24/24 1102    multivitamin w/minerals (THERA-VIT-M) tablet 1 tablet, 1 tablet, Oral, Daily, Catherine Galvez PA-C, 1 tablet at 10/24/24 1103    naloxone (NARCAN) injection 0.2 mg, 0.2 mg, Intravenous, Q2 Min PRN **OR** naloxone (NARCAN) injection 0.4 mg, 0.4 mg, Intravenous, Q2 Min PRN **OR** naloxone (NARCAN) injection 0.2 mg, 0.2 mg, Intramuscular, Q2 Min PRN **OR** naloxone (NARCAN) injection 0.4 mg, 0.4 mg, Intramuscular, Q2 Min PRN, Catherine Galvez PA-C    No heparin via hemodialysis machine, , Does not apply, Once, Straight,  Jaclyn GARCIA MD    ondansetron (ZOFRAN ODT) ODT tab 4 mg, 4 mg, Oral, Q6H PRN **OR** ondansetron (ZOFRAN) injection 4 mg, 4 mg, Intravenous, Q6H PRN, Catherine Galvez PA-C, 4 mg at 10/10/24 1945    oxyCODONE IR (ROXICODONE) half-tab 2.5 mg, 2.5 mg, Oral, Q6H PRN, 2.5 mg at 10/24/24 0334 **OR** [DISCONTINUED] oxyCODONE (ROXICODONE) tablet 5 mg, 5 mg, Oral, Q4H PRN, David Wade MD, 5 mg at 10/19/24 0902    [DISCONTINUED] pantoprazole (PROTONIX) 2 mg/mL suspension 40 mg, 40 mg, Oral or NG Tube, Daily, 40 mg at 10/08/24 1729 **OR** pantoprazole (PROTONIX) EC tablet 40 mg, 40 mg, Oral, Daily, Catherine Galvez PA-C, 40 mg at 10/24/24 1105    polyethylene glycol (MIRALAX) Packet 17 g, 17 g, Oral, Daily, Catherine Galvez PA-C    prochlorperazine (COMPAZINE) injection 10 mg, 10 mg, Intravenous, Q6H PRN **OR** prochlorperazine (COMPAZINE) tablet 10 mg, 10 mg, Oral, Q6H PRN, Catherine Galvez PA-C    senna-docusate (SENOKOT-S/PERICOLACE) 8.6-50 MG per tablet 1 tablet, 1 tablet, Oral, BID, Catherine Galvez PA-C, 1 tablet at 10/24/24 1107    simethicone (MYLICON) chewable tablet 80 mg, 80 mg, Oral, 4x Daily PRN, David Wade MD, 80 mg at 10/24/24 0321    sodium chloride (PF) 0.9% PF flush 10-20 mL, 10-20 mL, Intracatheter, q1 min prn, [place], Catherine K, PA-C    sodium chloride (PF) 0.9% PF flush 10-40 mL, 10-40 mL, Intracatheter, Q8H, Catherine Galvez, PA-C, 10 mL at 10/24/24 1243    sodium chloride (PF) 0.9% PF flush 10-40 mL, 10-40 mL, Intracatheter, Q7 Days, Catherine Galvez PA-C, 10 mL at 10/18/24 1703    sodium chloride (PF) 0.9% PF flush 10-40 mL, 10-40 mL, Intracatheter, Q1H PRN, Catherine Galvez PA-C    sodium chloride 0.9% BOLUS 100-150 mL, 100-150 mL, Intravenous, Q15 Min PRN, Rohan Polo MD    sodium chloride 0.9% BOLUS 100-150 mL, 100-150 mL, Intravenous, Q15 Min PRN, Rohan Polo MD    Vitamin D3 (CHOLECALCIFEROL) tablet 125  "mcg, 125 mcg, Oral, Daily, Catherine Galvez PA-C, 125 mcg at 10/24/24 1106    [Held by provider] warfarin ANTICOAGULANT (COUMADIN) half-tab 1.5 mg, 1.5 mg, Oral, ONCE at 18:00, David Wade MD    [Held by provider] Warfarin Dose Required Daily - Pharmacist Managed, 1 each, Oral, See Admin Instructions, Jaclyn Murillo MD     Allergies   Allergen Reactions    Dye [Contrast Dye] Swelling     Pre-medicated with methylprednisolone    Shellfish Allergy Shortness Of Breath, Anaphylaxis and Swelling     Shrimp, crab, lobster    Penicillins Hives     Patient cannot recall if she has tried cephalosporins in the past.     Erythromycin Hives    Morphine Hcl      Causes change in mental status    Sulfa Antibiotics Hives and Rash          Lab Results    Chemistry CBC Cardiac Enzymes/BNP/TSH/INR   Recent Labs   Lab Test 10/24/24  0620      POTASSIUM 3.4   CHLORIDE 99   CO2 24   *   BUN 41.5*   CR 2.81*   GFRESTIMATED 20*   CINDY 8.9     Recent Labs   Lab Test 10/24/24  0620 10/23/24  0501 10/22/24  0624   CR 2.81* 3.86* 3.54*          Recent Labs   Lab Test 10/24/24  0620   WBC 12.4*   HGB 7.2*   HCT 22.8*   MCV 90        Recent Labs   Lab Test 10/24/24  0620 10/23/24  0501 10/22/24  0624   HGB 7.2* 7.6* 7.8*    No results for input(s): \"TROPONINI\" in the last 65951 hours.  Recent Labs   Lab Test 06/06/24  1302   NTBNPI 101     Recent Labs   Lab Test 10/15/24  0648   TSH 2.49     Recent Labs   Lab Test 10/24/24  0620 10/23/24  0501 10/22/24  0624   INR 2.45* 3.23* 2.76*              "

## 2024-10-24 NOTE — PLAN OF CARE
sc  Patient Name: Jory Ambrose   MRN: [id number]   Date of Admission: 10/8/2024    Procedure: Procedure(s):  CORONARY ARTERY BYPASS GRAFT TIMES FOUR, LEFT INTERNAL MAMMARY ARTERY HARVEST, RIGHT LEG ENDOSCOPIC VESSEL PROCUREMENT, EPIAORTIC ULTRASOUND,  MITRAL VALVE REAIR CONVERTED TO MITRAL VALVE REPLACEMENT  LEFT ATRIAL APPENDAGE LIGATION,  ANESTHESIA TRANSESOPHAGEAL ECHOCARDIOGRAM    Post Op day #: 15    Subjective (Patient focus/Primary Problem for shift): Manage patient pain and continue to encourage ambulation          Pain Goal 0 Pain Rating 4           Pain Medication/ Regime effective to reduce patient pain Yes    Objective (Physical assessment):           Rhythm: atrial fibrillation            Bowel Activity: yes if Yes indicate when: 10/23/2024          Bowel Medications: yes            Incision: healing well          Incentive Spirometry Q 1-2 hour when awake:  yes Volume:           Epicardial Pacing Wires:  no            Patient Activity:           Up to chair for meals: yes          Ambulation with RN x2 (Not including CR): no            Is patient in home clothes:no             Chest Tubes   Pleural: no Draining: not applicable               Suction: not applicable              Mediastinal: no Draining: not applicable               Suction: not applicable   Dressing Change Daily:no If No, why? N/A                     Urinary Catheter: no           Preventative WOC consult (need MD order): no       Assessment (Nursing primary shift focus): Manage patient pain and promote ambulation. Also manage patient SOB    Plan (Patient Care Plan/focus): Prep patient for placement of tunneled catheter tomorrow.    Patient is A/Ox4, on RA, and assist x1. Given PRN oxycodone for incisional chest pain. Patient to go NPO at midnight for placement of a tunneled catheter tomorrow, 10/24. Pt had one large bm this shift.    See Sharp RN   10/23/2024   10:11 PM

## 2024-10-24 NOTE — PROGRESS NOTES
Care Management Follow Up    Length of Stay (days): 16    Expected Discharge Date: 10/26/2024     Concerns to be Addressed:       Patient plan of care discussed at interdisciplinary rounds: Yes    Anticipated Discharge Disposition:  TBD        Anticipated Discharge Services:  TBD  Anticipated Discharge DME:  na    Patient/family educated on Medicare website which has current facility and service quality ratings:    Education Provided on the Discharge Plan:    Patient/Family in Agreement with the Plan:      Referrals Placed by CM/SW:    Private pay costs discussed: transportation costs and insurance costs Mother asking if we can check into cost of transport and TCU with her insurance    Discussed  Partnership in Safe Discharge Planning  document with patient/family: No     Handoff Completed: No, handoff not indicated or clinically appropriate    Additional Information:  9:02 AM  Received a call from patients mom, Mia, asking to speak with someone regarding patients discharge. She had multiple questions; will patients insurance cover TCU cost? Will she do dialysis at the TCU? If not, how would she get there? How much will transport cost? Frustrated not knowing exactly when patient will discharge. Validated Pats concerns and informed her that care management will check into what we can regarding her questions and get back to her. Shared that the team does its best to have a discharge date but when issues arise; sometimes the date can change. Pat did say that a referral can be sent to [facility]. Asking to speak with the nutritionist. Message left for nutrition to return call to .   9:42 AM  Spoke with Pat, patients mother and she asked that [facility] be [facility] Informed her that these facilities do not have onsite dialysis and patient would need someone to transport her. She expressed understanding. Gave Pat information on [facility] and [facility] as  they have [facility]. She will check into these sites.     Next Steps: sent referrals    Nichole Alvarez RN

## 2024-10-24 NOTE — PLAN OF CARE
Goal Outcome Evaluation:                  sc  Patient Name: Jory Ambrose   MRN: [id number]   Date of Admission: 10/8/2024    Procedure: Procedure(s):  CORONARY ARTERY BYPASS GRAFT TIMES FOUR, LEFT INTERNAL MAMMARY ARTERY HARVEST, RIGHT LEG ENDOSCOPIC VESSEL PROCUREMENT, EPIAORTIC ULTRASOUND,  MITRAL VALVE REAIR CONVERTED TO MITRAL VALVE REPLACEMENT  LEFT ATRIAL APPENDAGE LIGATION,  ANESTHESIA TRANSESOPHAGEAL ECHOCARDIOGRAM    Post Op day #:16    Subjective (Patient focus/Primary Problem for shift): sleep          Pain Goal 2 Pain Rating 4-2           Pain Medication/ Regime effective to reduce patient pain scheduled tylenol, prn oxycodone    Objective (Physical assessment):           Rhythm: atrial flutter 4:1 conduction            Bowel Activity: yes if Yes indicate when: 10/23          Bowel Medications: yes            Incision: healing well          Incentive Spirometry Q 1-2 hour when awake:  yes Volume: 1000          Epicardial Pacing Wires:  no            Patient Activity:           Up to chair for meals: yes          Ambulation with RN x2 (Not including CR): not applicable            Is patient in home clothes:no             Chest Tubes   Pleural: no Draining: not applicable               Suction: not applicable              Mediastinal: no Draining: not applicable               Suction: not applicable   Dressing Change Daily:no If No, why?open to air                     Urinary Catheter: no           Preventative WOC consult (need MD order): no       Assessment (Nursing primary shift focus): Patient is alert and oriented.  Pain better controlled.  Rates pain at worst at 4/10, best at 2/10.  Pivot transfers when out of bed.  Denied feeling dizzy when pivot transferring, though reports dizziness occurs when standing up for longer.    Plan (Patient Care Plan/focus): Keep NPO for procedure.  Weigh patient on scale, get up to recliner.      Jennifer Nelson RN   10/24/2024   5:33 AM

## 2024-10-25 ENCOUNTER — APPOINTMENT (OUTPATIENT)
Dept: CARDIOLOGY | Facility: HOSPITAL | Age: 49
DRG: 219 | End: 2024-10-25
Payer: COMMERCIAL

## 2024-10-25 ENCOUNTER — APPOINTMENT (OUTPATIENT)
Dept: OCCUPATIONAL THERAPY | Facility: HOSPITAL | Age: 49
DRG: 219 | End: 2024-10-25
Attending: SURGERY
Payer: COMMERCIAL

## 2024-10-25 ENCOUNTER — APPOINTMENT (OUTPATIENT)
Dept: PHYSICAL THERAPY | Facility: HOSPITAL | Age: 49
DRG: 219 | End: 2024-10-25
Payer: COMMERCIAL

## 2024-10-25 PROBLEM — I48.0 PAROXYSMAL ATRIAL FIBRILLATION (H): Status: ACTIVE | Noted: 2024-10-25

## 2024-10-25 PROBLEM — I48.3 TYPICAL ATRIAL FLUTTER (H): Status: ACTIVE | Noted: 2024-10-25

## 2024-10-25 PROBLEM — I48.92 PAROXYSMAL ATRIAL FLUTTER (H): Status: ACTIVE | Noted: 2024-10-25

## 2024-10-25 LAB
ALBUMIN SERPL BCG-MCNC: 3.1 G/DL (ref 3.5–5.2)
ALP SERPL-CCNC: 125 U/L (ref 40–150)
ALT SERPL W P-5'-P-CCNC: 80 U/L (ref 0–50)
ANION GAP SERPL CALCULATED.3IONS-SCNC: 13 MMOL/L (ref 7–15)
AST SERPL W P-5'-P-CCNC: 34 U/L (ref 0–45)
BILIRUB DIRECT SERPL-MCNC: 0.26 MG/DL (ref 0–0.3)
BILIRUB SERPL-MCNC: 0.7 MG/DL
BUN SERPL-MCNC: 46.4 MG/DL (ref 6–20)
CA-I BLD-MCNC: 4.5 MG/DL (ref 4.4–5.2)
CALCIUM SERPL-MCNC: 8.6 MG/DL (ref 8.8–10.4)
CHLORIDE SERPL-SCNC: 98 MMOL/L (ref 98–107)
CREAT SERPL-MCNC: 2.96 MG/DL (ref 0.51–0.95)
EGFRCR SERPLBLD CKD-EPI 2021: 19 ML/MIN/1.73M2
ERYTHROCYTE [DISTWIDTH] IN BLOOD BY AUTOMATED COUNT: 18.4 % (ref 10–15)
FERRITIN SERPL-MCNC: 723 NG/ML (ref 6–175)
GLUCOSE SERPL-MCNC: 102 MG/DL (ref 70–99)
HCO3 SERPL-SCNC: 25 MMOL/L (ref 22–29)
HCT VFR BLD AUTO: 22 % (ref 35–47)
HGB BLD-MCNC: 6.9 G/DL (ref 11.7–15.7)
INR PPP: 2.38 (ref 0.85–1.15)
IRON BINDING CAPACITY (ROCHE): 231 UG/DL (ref 240–430)
IRON SATN MFR SERPL: 12 % (ref 15–46)
IRON SERPL-MCNC: 28 UG/DL (ref 37–145)
LVEF ECHO: NORMAL
MAGNESIUM SERPL-MCNC: 1.7 MG/DL (ref 1.7–2.3)
MCH RBC QN AUTO: 28.2 PG (ref 26.5–33)
MCHC RBC AUTO-ENTMCNC: 31.4 G/DL (ref 31.5–36.5)
MCV RBC AUTO: 90 FL (ref 78–100)
PLATELET # BLD AUTO: 369 10E3/UL (ref 150–450)
POTASSIUM SERPL-SCNC: 3.1 MMOL/L (ref 3.4–5.3)
PROT SERPL-MCNC: 5.7 G/DL (ref 6.4–8.3)
RBC # BLD AUTO: 2.45 10E6/UL (ref 3.8–5.2)
SODIUM SERPL-SCNC: 136 MMOL/L (ref 135–145)
WBC # BLD AUTO: 10.1 10E3/UL (ref 4–11)

## 2024-10-25 PROCEDURE — 250N000013 HC RX MED GY IP 250 OP 250 PS 637: Performed by: INTERNAL MEDICINE

## 2024-10-25 PROCEDURE — 255N000002 HC RX 255 OP 636

## 2024-10-25 PROCEDURE — C8924 2D TTE W OR W/O FOL W/CON,FU: HCPCS

## 2024-10-25 PROCEDURE — 93321 DOPPLER ECHO F-UP/LMTD STD: CPT | Mod: 26 | Performed by: INTERNAL MEDICINE

## 2024-10-25 PROCEDURE — 250N000009 HC RX 250

## 2024-10-25 PROCEDURE — 82330 ASSAY OF CALCIUM: CPT

## 2024-10-25 PROCEDURE — 250N000013 HC RX MED GY IP 250 OP 250 PS 637: Performed by: SURGERY

## 2024-10-25 PROCEDURE — 97116 GAIT TRAINING THERAPY: CPT | Mod: GP | Performed by: PHYSICAL THERAPIST

## 2024-10-25 PROCEDURE — 82728 ASSAY OF FERRITIN: CPT | Performed by: INTERNAL MEDICINE

## 2024-10-25 PROCEDURE — 250N000011 HC RX IP 250 OP 636: Performed by: INTERNAL MEDICINE

## 2024-10-25 PROCEDURE — 83735 ASSAY OF MAGNESIUM: CPT

## 2024-10-25 PROCEDURE — 97530 THERAPEUTIC ACTIVITIES: CPT | Mod: GP | Performed by: PHYSICAL THERAPIST

## 2024-10-25 PROCEDURE — 85027 COMPLETE CBC AUTOMATED: CPT

## 2024-10-25 PROCEDURE — 93308 TTE F-UP OR LMTD: CPT | Mod: 26 | Performed by: INTERNAL MEDICINE

## 2024-10-25 PROCEDURE — 83550 IRON BINDING TEST: CPT | Performed by: INTERNAL MEDICINE

## 2024-10-25 PROCEDURE — 250N000013 HC RX MED GY IP 250 OP 250 PS 637

## 2024-10-25 PROCEDURE — 82248 BILIRUBIN DIRECT: CPT

## 2024-10-25 PROCEDURE — 94660 CPAP INITIATION&MGMT: CPT

## 2024-10-25 PROCEDURE — 210N000001 HC R&B IMCU HEART CARE

## 2024-10-25 PROCEDURE — 999N000157 HC STATISTIC RCP TIME EA 10 MIN

## 2024-10-25 PROCEDURE — 94640 AIRWAY INHALATION TREATMENT: CPT

## 2024-10-25 PROCEDURE — 97110 THERAPEUTIC EXERCISES: CPT | Mod: GO

## 2024-10-25 PROCEDURE — 97110 THERAPEUTIC EXERCISES: CPT | Mod: GP | Performed by: PHYSICAL THERAPIST

## 2024-10-25 PROCEDURE — 93325 DOPPLER ECHO COLOR FLOW MAPG: CPT | Mod: 26 | Performed by: INTERNAL MEDICINE

## 2024-10-25 PROCEDURE — 94640 AIRWAY INHALATION TREATMENT: CPT | Mod: 76

## 2024-10-25 PROCEDURE — 85610 PROTHROMBIN TIME: CPT

## 2024-10-25 RX ORDER — BUMETANIDE 2 MG/1
2 TABLET ORAL DAILY
Status: DISCONTINUED | OUTPATIENT
Start: 2024-10-26 | End: 2024-10-31

## 2024-10-25 RX ORDER — BUPROPION HYDROCHLORIDE 150 MG/1
300 TABLET ORAL DAILY
Status: DISCONTINUED | OUTPATIENT
Start: 2024-10-25 | End: 2024-10-25

## 2024-10-25 RX ORDER — BUPROPION HYDROCHLORIDE 150 MG/1
300 TABLET ORAL DAILY
Status: DISCONTINUED | OUTPATIENT
Start: 2024-10-28 | End: 2024-11-01 | Stop reason: HOSPADM

## 2024-10-25 RX ORDER — POTASSIUM CHLORIDE 750 MG/1
30 TABLET, EXTENDED RELEASE ORAL EVERY 4 HOURS
Status: COMPLETED | OUTPATIENT
Start: 2024-10-25 | End: 2024-10-25

## 2024-10-25 RX ORDER — BUPROPION HYDROCHLORIDE 150 MG/1
150 TABLET ORAL DAILY
Status: COMPLETED | OUTPATIENT
Start: 2024-10-25 | End: 2024-10-27

## 2024-10-25 RX ORDER — WARFARIN SODIUM 1 MG/1
1 TABLET ORAL
Status: COMPLETED | OUTPATIENT
Start: 2024-10-25 | End: 2024-10-25

## 2024-10-25 RX ADMIN — CETIRIZINE HYDROCHLORIDE 10 MG: 10 TABLET, FILM COATED ORAL at 08:50

## 2024-10-25 RX ADMIN — ACETAMINOPHEN 500 MG: 500 TABLET ORAL at 12:08

## 2024-10-25 RX ADMIN — LEVOTHYROXINE SODIUM 125 MCG: 0.03 TABLET ORAL at 06:56

## 2024-10-25 RX ADMIN — PANTOPRAZOLE SODIUM 40 MG: 40 TABLET, DELAYED RELEASE ORAL at 08:50

## 2024-10-25 RX ADMIN — ACETAMINOPHEN 500 MG: 500 TABLET ORAL at 06:07

## 2024-10-25 RX ADMIN — LIDOCAINE 2 PATCH: 4 PATCH TOPICAL at 21:00

## 2024-10-25 RX ADMIN — CLOPIDOGREL BISULFATE 75 MG: 75 TABLET ORAL at 08:50

## 2024-10-25 RX ADMIN — FLUDROCORTISONE ACETATE 0.1 MG: 0.1 TABLET ORAL at 08:50

## 2024-10-25 RX ADMIN — POTASSIUM CHLORIDE 30 MEQ: 750 TABLET, EXTENDED RELEASE ORAL at 22:02

## 2024-10-25 RX ADMIN — OXYCODONE HYDROCHLORIDE 2.5 MG: 5 TABLET ORAL at 18:53

## 2024-10-25 RX ADMIN — SENNOSIDES AND DOCUSATE SODIUM 1 TABLET: 8.6; 5 TABLET ORAL at 08:50

## 2024-10-25 RX ADMIN — Medication 1 TABLET: at 12:08

## 2024-10-25 RX ADMIN — SIMETHICONE 80 MG: 80 TABLET, CHEWABLE ORAL at 22:21

## 2024-10-25 RX ADMIN — PERFLUTREN 2 ML: 6.52 INJECTION, SUSPENSION INTRAVENOUS at 14:30

## 2024-10-25 RX ADMIN — WARFARIN SODIUM 1 MG: 1 TABLET ORAL at 17:17

## 2024-10-25 RX ADMIN — BUDESONIDE 1 MG: 0.5 INHALANT ORAL at 08:25

## 2024-10-25 RX ADMIN — FLUTICASONE FUROATE AND VILANTEROL TRIFENATATE 1 PUFF: 100; 25 POWDER RESPIRATORY (INHALATION) at 21:00

## 2024-10-25 RX ADMIN — SENNOSIDES AND DOCUSATE SODIUM 1 TABLET: 8.6; 5 TABLET ORAL at 18:54

## 2024-10-25 RX ADMIN — OXYCODONE HYDROCHLORIDE 2.5 MG: 5 TABLET ORAL at 08:47

## 2024-10-25 RX ADMIN — ACETAMINOPHEN 500 MG: 500 TABLET ORAL at 17:17

## 2024-10-25 RX ADMIN — ATORVASTATIN CALCIUM 80 MG: 40 TABLET, FILM COATED ORAL at 21:00

## 2024-10-25 RX ADMIN — Medication 125 MCG: at 08:50

## 2024-10-25 RX ADMIN — BUDESONIDE 1 MG: 0.5 INHALANT ORAL at 20:35

## 2024-10-25 RX ADMIN — BUMETANIDE 2 MG: 0.25 INJECTION INTRAMUSCULAR; INTRAVENOUS at 08:48

## 2024-10-25 RX ADMIN — ALBUTEROL SULFATE 2 PUFF: 90 AEROSOL, METERED RESPIRATORY (INHALATION) at 23:07

## 2024-10-25 RX ADMIN — METOPROLOL TARTRATE 75 MG: 25 TABLET, FILM COATED ORAL at 20:59

## 2024-10-25 RX ADMIN — ACETAMINOPHEN 500 MG: 500 TABLET ORAL at 23:59

## 2024-10-25 RX ADMIN — METOPROLOL TARTRATE 75 MG: 25 TABLET, FILM COATED ORAL at 08:49

## 2024-10-25 RX ADMIN — POTASSIUM CHLORIDE 30 MEQ: 750 TABLET, EXTENDED RELEASE ORAL at 17:51

## 2024-10-25 NOTE — PLAN OF CARE
Problem: Adult Inpatient Plan of Care  Goal: Optimal Comfort and Wellbeing  10/25/2024 1523 by Sree Santamaria, RN  Outcome: Progressing  10/25/2024 1523 by Sree Santamaria, RN  Outcome: Progressing  Intervention: Provide Person-Centered Care  Recent Flowsheet Documentation  Taken 10/25/2024 1218 by Sree Santamaria, RN  Trust Relationship/Rapport:   care explained   choices provided     Problem: Cardiovascular Surgery  Goal: Improved Activity Tolerance  10/25/2024 1523 by Sree Santamaria, RN  Outcome: Progressing  10/25/2024 1523 by Sree Santamaria, RN  Outcome: Progressing  Intervention: Optimize Tolerance for Activity  Recent Flowsheet Documentation  Taken 10/25/2024 1218 by Sree Santamaria, RN  Environmental Support: calm environment promoted   Goal Outcome Evaluation:       Pt is now able to walk to the bathroom to eliminate rather than using the commode. Pt's hemoglobin was 6.9 g/dL. Providers notified. No intervention ordered at the moment. Dyspnea noted upon exertion. Pt declined to take Wellbutrin as she has not taken the medication since being admitted on 10/8. Pain managed with scheduled medications. Pt is alert and oriented x4. No other concerns noted.  Sree Santamaria RN  10/25/2024  3:28 PM

## 2024-10-25 NOTE — PLAN OF CARE
Goal Outcome Evaluation:         sc  Patient Name: Jory Ambrose   MRN: [id number]   Date of Admission: 10/8/2024    Procedure: Procedure(s):  CORONARY ARTERY BYPASS GRAFT TIMES FOUR, LEFT INTERNAL MAMMARY ARTERY HARVEST, RIGHT LEG ENDOSCOPIC VESSEL PROCUREMENT, EPIAORTIC ULTRASOUND,  MITRAL VALVE REAIR CONVERTED TO MITRAL VALVE REPLACEMENT  LEFT ATRIAL APPENDAGE LIGATION,  ANESTHESIA TRANSESOPHAGEAL ECHOCARDIOGRAM    Post Op day #:17    Subjective (Patient focus/Primary Problem for shift): Improved work of breathing.          Pain Goal0 Pain Rating0           Pain Medication/ Regime effective to reduce patient pain Scheduled Tylenol.     Objective (Physical assessment):           Rhythm: atrial flutter            Bowel Activity: yes if Yes indicate when: 10/24          Bowel Medications: no            Incision: healing well          Incentive Spirometry Q 1-2 hour when awake:  yes Volume: 1000          Epicardial Pacing Wires:  no            Patient Activity:           Up to chair for meals: yes          Ambulation with RN x2 (Not including CR): yes            Is patient in home clothes:no             Chest Tubes   Pleural: not applicable Draining: not applicable               Suction: not applicable              Mediastinal: not applicable Draining: not applicable               Suction: not applicable   Dressing Change Daily:not applicable If No, why?N/A                     Urinary Catheter: no           Preventative WOC consult (need MD order): no   A&O x 4 pleasant and cooperative with cares. Aflutter rate controlled denies chest pain. On room air endorses SOB with exertion. Lung sounds with inspiratory wheezes treated with PRN neb. Lung sounds clear bilaterally after neb. Up with assist of one gait belt and walker.      Morro Rivero RN   10/25/2024   3:30 AM

## 2024-10-25 NOTE — PROGRESS NOTES
"CVTS Daily Progress Note  POD#17 s/p CABG x4 (LIMA>LAD, rSVG>RPDA, rSVG>LPL, rSVG>D2), RLE EVH, Attempted MVR/r, MVR ( 29 mm [place] Mechanical Mitral Valve), LAAE  Attending: Mauro  LOS: 17    SUBJECTIVE/INTERVAL EVENTS:    No acute events overnight. Continues in rate controlled AFlu. Continues to have some UOP. Tolerating HD well. Maintaining oxygen saturations on home CPAP/RA. Normotensive. Working w/ therapy as able. Pain well controlled. +BM. Tolerating regular diet. INR therapeutic, heparin gtt stopped 10/15. Patient denies new chest pain, shortness of breath, abdominal pain, calf pain, nausea. Patient has no questions today.    OBJECTIVE:  Temp:  [96.3  F (35.7  C)-97.7  F (36.5  C)] 97.5  F (36.4  C)  Pulse:  [66-92] 67  Resp:  [12-22] 20  BP: (110-144)/(53-77) 110/60  SpO2:  [78 %-100 %] 98 %  Vitals:    10/21/24 0624 10/22/24 0814 10/23/24 0442 10/24/24 0645   Weight: 109 kg (240 lb 6.4 oz) 107.7 kg (237 lb 6.4 oz) 107.2 kg (236 lb 4.8 oz) 105.5 kg (232 lb 9.6 oz)    10/25/24 0625   Weight: 105.8 kg (233 lb 4.8 oz)       Clinically Significant Risk Factors        # Hypokalemia: Lowest K = 3.1 mmol/L in last 2 days, will replace as needed        # Hypoalbuminemia: Lowest albumin = 2.6 g/dL at 10/16/2024  4:35 AM, will monitor as appropriate       # Hypertension: Noted on problem list           # Severe Obesity: Estimated body mass index is 40.05 kg/m  as calculated from the following:    Height as of 9/25/24: 1.626 m (5' 4\").    Weight as of this encounter: 105.8 kg (233 lb 4.8 oz).   # Moderate Malnutrition: based on nutrition assessment     # History of CABG: noted on surgical history            Current Medications:    Scheduled Meds:  Current Facility-Administered Medications   Medication Dose Route Frequency Provider Last Rate Last Admin    acetaminophen (TYLENOL) tablet 500 mg  500 mg Oral Q6H Catherine Galvez PA-C   500 mg at 10/25/24 0607    atorvastatin (LIPITOR) tablet 80 mg  80 mg Oral " At Bedtime Catherine Galvez PA-C   80 mg at 10/24/24 2044    budesonide (PULMICORT) neb solution 1 mg  1 mg Nebulization BID Catherine Galvez PA-C   1 mg at 10/24/24 1808    bumetanide (BUMEX) injection 2 mg  2 mg Intravenous Daily Jaclyn Murillo MD   2 mg at 10/24/24 1109    cetirizine (zyrTEC) tablet 10 mg  10 mg Oral Daily Catherine Galvez PA-C   10 mg at 10/24/24 1107    clopidogrel (PLAVIX) tablet 75 mg  75 mg Oral Daily Catherine Galvez PA-C   75 mg at 10/24/24 1108    fludrocortisone (FLORINEF) tablet 0.1 mg  0.1 mg Oral Daily Catherine Galvez PA-C   0.1 mg at 10/24/24 1108    fluticasone-vilanterol (BREO ELLIPTA) 100-25 MCG/ACT inhaler 1 puff  1 puff Inhalation At Bedtime Catherine Galvez PA-C   1 puff at 10/24/24 2038    heparin lock flush 10 unit/mL injection 5-20 mL  5-20 mL Intracatheter Q24H Catherine Galvez PA-C   15 mL at 10/20/24 1957    levothyroxine (SYNTHROID/LEVOTHROID) tablet 125 mcg  125 mcg Oral QAM AC Catherine Galvez PA-C   125 mcg at 10/25/24 0656    Lidocaine (LIDOCARE) 4 % Patch 1-2 patch  1-2 patch Transdermal Q24H Catherine Galvez PA-C   2 patch at 10/24/24 1739    metoprolol tartrate (LOPRESSOR) tablet 75 mg  75 mg Oral BID Catherine Galvez PA-C   75 mg at 10/24/24 2044    multivitamin w/minerals (THERA-VIT-M) tablet 1 tablet  1 tablet Oral Daily Catherine Galvez PA-C   1 tablet at 10/24/24 1103    No heparin via hemodialysis machine   Does not apply Once Jaclyn Murillo MD        pantoprazole (PROTONIX) EC tablet 40 mg  40 mg Oral Daily Catherine Galvez PA-C   40 mg at 10/24/24 1105    polyethylene glycol (MIRALAX) Packet 17 g  17 g Oral Daily Catherine Galvez PA-C        senna-docusate (SENOKOT-S/PERICOLACE) 8.6-50 MG per tablet 1 tablet  1 tablet Oral BID Catherine Galvez PA-C   1 tablet at 10/24/24 1107    sodium chloride (PF) 0.9% PF flush 10-40 mL  10-40 mL Intracatheter Q8H Catherine Galvez  EARNESTINE   10 mL at 10/25/24 0444    sodium chloride (PF) 0.9% PF flush 10-40 mL  10-40 mL Intracatheter Q7 Days Catherine Galvez PA-C   10 mL at 10/18/24 1703    Vitamin D3 (CHOLECALCIFEROL) tablet 125 mcg  125 mcg Oral Daily Catherine Galvez PA-C   125 mcg at 10/24/24 1106    Warfarin Dose Required Daily - Pharmacist Managed  1 each Oral See Admin Instructions Danish Rhoades MD         Continuous Infusions:  Current Facility-Administered Medications   Medication Dose Route Frequency Provider Last Rate Last Admin     PRN Meds:.  Current Facility-Administered Medications   Medication Dose Route Frequency Provider Last Rate Last Admin    albuterol (PROVENTIL HFA/VENTOLIN HFA) inhaler  2 puff Inhalation Q6H PRN Catherine Galvez PA-C   2 puff at 10/24/24 1757    bisacodyl (DULCOLAX) suppository 10 mg  10 mg Rectal Daily PRN Catherine Galvez PA-C        glucose gel 15-30 g  15-30 g Oral Q15 Min PRN Catherine Galvez PA-C        Or    dextrose 50 % injection 25-50 mL  25-50 mL Intravenous Q15 Min PRN Catherine Galvez PA-C   50 mL at 10/14/24 0806    Or    glucagon injection 1 mg  1 mg Subcutaneous Q15 Min PRN Catherine Galvez PA-C        fluticasone (FLONASE) 50 MCG/ACT spray 2 spray  2 spray Both Nostrils Daily PRN Catherine Galvez PA-C        heparin 1000 unit/mL DIALYSIS Cath LOCK - RED Lumen  1.3-2.6 mL Intracatheter Q1H PRN Rohan Polo MD   3,200 Units at 10/24/24 0934    heparin 1000 unit/mL DIALYSIS Cath LOCK -BLUE Lumen  1.3-2.6 mL Intracatheter Q1H PRN Rohan Polo MD        heparin lock flush 10 unit/mL injection 5-20 mL  5-20 mL Intracatheter Q1H PRN Catherine Galvez PA-C        hydrALAZINE (APRESOLINE) injection 10 mg  10 mg Intravenous Q30 Min PRN Catherine Galvez PA-C        hydrOXYzine HCl (ATARAX) tablet 25 mg  25 mg Oral TID PRN Catherine Galvez PA-C   25 mg at 10/20/24 1642    ipratropium - albuterol 0.5 mg/2.5 mg/3 mL (DUONEB) neb  solution 3 mL  3 mL Nebulization Q4H PRN Catherine Galvez PA-C   3 mL at 10/24/24 2353    naloxone (NARCAN) injection 0.2 mg  0.2 mg Intravenous Q2 Min PRN Catherine Galvez PA-C        Or    naloxone (NARCAN) injection 0.4 mg  0.4 mg Intravenous Q2 Min PRN Catherine Galvez PA-C        Or    naloxone (NARCAN) injection 0.2 mg  0.2 mg Intramuscular Q2 Min PRN Catherine Galvez PA-C        Or    naloxone (NARCAN) injection 0.4 mg  0.4 mg Intramuscular Q2 Min PRN Catherine Galvez PA-C        ondansetron (ZOFRAN ODT) ODT tab 4 mg  4 mg Oral Q6H PRN Catherine Galvez PA-C        Or    ondansetron (ZOFRAN) injection 4 mg  4 mg Intravenous Q6H PRN Catherine Galvez PA-C   4 mg at 10/10/24 1945    oxyCODONE IR (ROXICODONE) half-tab 2.5 mg  2.5 mg Oral Q6H PRN Catherine Galvez PA-C   2.5 mg at 10/24/24 1642    prochlorperazine (COMPAZINE) injection 10 mg  10 mg Intravenous Q6H PRN Catherine Galvez PA-C        Or    prochlorperazine (COMPAZINE) tablet 10 mg  10 mg Oral Q6H PRN Catherine Galvez PA-C        simethicone (MYLICON) chewable tablet 80 mg  80 mg Oral 4x Daily PRN David Wade MD   80 mg at 10/24/24 0321    sodium chloride (PF) 0.9% PF flush 10-20 mL  10-20 mL Intracatheter q1 min prn Catherine Galvez PA-C        sodium chloride (PF) 0.9% PF flush 10-40 mL  10-40 mL Intracatheter Q1H PRN Catherine Galvez PA-C        sodium chloride 0.9% BOLUS 100-150 mL  100-150 mL Intravenous Q15 Min PRN Rohan Polo MD        sodium chloride 0.9% BOLUS 100-150 mL  100-150 mL Intravenous Q15 Min PRN Rohan Polo MD           Cardiographics:    Telemetry monitoring demonstrates a flutter w/ rates in the 60-70s per my personal review.    Imaging:  Results for orders placed or performed during the hospital encounter of 10/08/24   XR Chest Port 1 View    Impression    IMPRESSION: Interval sternotomy, CABG procedure and mitral valve repair.  Endotracheal tube proximally 2 cm above the sofia. Nasogastric tube in the stomach. Bilateral chest tubes and midline mediastinal drain in expected position. Epicardial leads are   present. Left IJ line in the brachiocephalic vein.    Low lung volumes. No evidence for CHF or pneumonia. No pleural effusion or pneumothorax.   XR Chest Port 1 View    Impression    IMPRESSION: Poststernotomy changes with prosthetic mitral valve. Patient's been extubated and the NG tube has been removed. Mediastinal and pleural chest tubes are in place.    Left IJ cordis sheath is in the distal left innominate artery. Catheter has a very subtle kink within it 3.5 centimeters from the tip.    Low lung volumes. No pneumothorax. Likely trace left effusion at the base. No signs of pneumonia or failure. Heart is of normal size.   XR Abdomen Port 1 View    Impression    IMPRESSION: Orogastric tube tip in the proximal stomach and oriented superiorly towards the left diaphragm. Visualized bowel gas pattern unremarkable. Numerous tubes and lines lower chest and upper abdomen as described on chest x-ray report from today.        Labs, personally reviewed.  Hemoglobin   Date Value Ref Range Status   10/25/2024 6.9 (LL) 11.7 - 15.7 g/dL Final   10/24/2024 7.2 (L) 11.7 - 15.7 g/dL Final   10/23/2024 7.6 (L) 11.7 - 15.7 g/dL Final   06/09/2015 13.5 11.7 - 15.7 g/dL Final   03/11/2015 12.3 11.7 - 15.7 g/dL Final   01/08/2015 13.6 11.7 - 15.7 g/dL Final     WBC   Date Value Ref Range Status   06/09/2015 7.9 4.0 - 11.0 10e9/L Final   03/11/2015 9.4 4.0 - 11.0 10e9/L Final   01/08/2015 8.1 4.0 - 11.0 10e9/L Final     WBC Count   Date Value Ref Range Status   10/25/2024 10.1 4.0 - 11.0 10e3/uL Final   10/24/2024 12.4 (H) 4.0 - 11.0 10e3/uL Final   10/23/2024 13.1 (H) 4.0 - 11.0 10e3/uL Final     Platelet Count   Date Value Ref Range Status   10/25/2024 369 150 - 450 10e3/uL Final   10/24/2024 404 150 - 450 10e3/uL Final   10/23/2024 423 150 - 450 10e3/uL  Final   06/09/2015 219 150 - 450 10e9/L Final   03/11/2015 240 150 - 450 10e9/L Final   01/08/2015 221 150 - 450 10e9/L Final     Creatinine   Date Value Ref Range Status   10/25/2024 2.96 (H) 0.51 - 0.95 mg/dL Final   10/24/2024 2.81 (H) 0.51 - 0.95 mg/dL Final   10/23/2024 3.86 (H) 0.51 - 0.95 mg/dL Final   06/09/2015 0.80 0.52 - 1.04 mg/dL Final   03/11/2015 0.87 0.52 - 1.04 mg/dL Final   01/08/2015 0.79 0.52 - 1.04 mg/dL Final     Potassium   Date Value Ref Range Status   10/25/2024 3.1 (L) 3.4 - 5.3 mmol/L Final   10/24/2024 3.4 3.4 - 5.3 mmol/L Final   10/23/2024 3.2 (L) 3.4 - 5.3 mmol/L Final   06/09/2015 3.8 3.4 - 5.3 mmol/L Final   03/11/2015 3.9 3.4 - 5.3 mmol/L Final   01/08/2015 4.1 3.4 - 5.3 mmol/L Final     Potassium POCT   Date Value Ref Range Status   10/08/2024 4.4 3.4 - 5.3 mmol/L Final   10/08/2024 4.0 3.4 - 5.3 mmol/L Final   10/08/2024 4.1 3.4 - 5.3 mmol/L Final     Magnesium   Date Value Ref Range Status   10/25/2024 1.7 1.7 - 2.3 mg/dL Final   10/24/2024 1.9 1.7 - 2.3 mg/dL Final   10/23/2024 2.0 1.7 - 2.3 mg/dL Final   06/09/2015 1.8 1.6 - 2.3 mg/dL Final   03/11/2015 1.7 1.6 - 2.3 mg/dL Final   01/08/2015 1.8 1.6 - 2.3 mg/dL Final          I/O:  I/O last 3 completed shifts:  In: 1240 [P.O.:1240]  Out: 1700 [Urine:1700]       Physical Exam:    General: Patient seen in chair this AM. NAD. Pleasant, conversant.   HEENT: DORITA, no sclera icterus, moist mucosa  CV: RRR on monitor. 2+ peripheral pulses in all extremities. Moderate peripheral edema. Incision C/D/I.  Pulm: Satting well w/ nonlabored breathing on RA  Abd: Soft, NT, ND  : Voiding  Ext: Moderate pedal edema, SCDs in place, warm, distal pulses intact  Neuro: CNs grossly intact, FAM, A&Ox3      ASSESSMENT/PLAN:    Jory Ambrose is a 49 year old female with a history of GIA, asthma, GERD,  hypothyroidism, h/o PE on plavix, carotid stenosis, Hodgkin's lymphoma (s/p chemo/radiation and bone marrow transplant x2 after  recurrence) currently in remission who is s/p CABG x4, RLE EVH, attempted mitral repair, mechanical MVR, LAAE.    Principal Problem:    Coronary artery disease involving native coronary artery of native heart with angina pectoris (H)  Active Problems:    Mitral regurgitation  Acute Renal Failure d/t ATN, ongoing.      NEURO:   - Scheduled lidocaine patches and PRN oxycodone for pain  - PTA bupropion discontinued (somnolence). Restarted 10/25 at lower dose due to renal dysfunction. Plan for 150 mg x3 days then increase to 300 mg if tolerating ok  - Tylenol 500 mg q6hrs (max 2g per day given recent hepatic dysfunction)    CV:  - Pre-op EF 55-60%  - Chest tubes and TPW's removed POD#5  - Normotensive  - Metop 75 mg BID  - Plavix daily indefinitely d/t unclear h/o anaphylaxis to ASA  - Atorvastatin 80mg daily  - Warfarin for mechanical MVR, INR therapeutic, heparin gtt stopped 10/15  - Paroxysmal rate controlled afib - s/p amiodarone bolus/gtt 10/13 (ok per Mazon). Holding given LFTs and allergy to PO amio additive. EP consulted- recommend follow up as outpatient  - Post-op TTE ordered 10/23, awaiting results    PULM:   - Extubated on POD#0  - Maintaining O2 sats on room air w/ home CPAP qHS  - Encourage pulmonary toilet  - PTA zyrtec, fludrocortisone, flonase, simethicone, albuterol, pulmicort, breo ellipta, duoneb    FEN/GI:  - Continue electrolyte replacement protocol  - Regular diet  - Bowel regimen, LBM 10/19  - Ischemic hepatitis - LFTs normalized  - Hepatic w/u and labs unremarkable  - PTA vitamin D3     RENAL:  - Nephrology consulted for ARF w/ decreased UOP, appreciate. Following.  - CRRT 10/11 - 10/13  - HD 10/15, 17, & 19 - trialing off dialysis  - Strict I/O  - Daily renal panel  - Cr elevated 2.7-3.1 (baseline ~0.9)  - UA/Urine cx 10/10 & 10/13 NG    HEME:  - H/o bone marrow tx. Needs irradiated blood  - Acute blood loss anemia post-op, resolved  - Hgb stable, likely dilutional component. Hgb 6.9 (7.2)  stable. No need for transfusion  - Warfarin INR goal 2.5-3.5 in s/o mech MVR  - HIT screen negative.  - INR therapeutic  - Transfusion hx  - 2u PRBCs periop  - 1u PRBCs 10/11  - 1u PRBCs 10/16    ID:  - Lydia op ppx complete. Afebrile. Mild stable leukocytosis, felt to be reactive.  - UA/Urine cx 10/10 & 10/13 NG.  - Blood cx 10/9 & 10/13 NG  - ID consult for ?sepsis, appreciate. Signed off  - CBC qday    ENDO:   - HbA1c 5.7%  - Adrenal, thyroid, and pancreatitis labs unremarkable  - PTA Metformin discontinued (renal function)  - PTA synthroid    PPx:   - DVT: SCDs, SQ heparin TID, ambulation   - GI: Protonix 40mg IV/PO daily    DISPO:   - Continue general tele care (POD#8)  - PT/OT recs at discharge: TCU  - Medically Ready for Discharge: Ready since 10/24. Awaiting outpatient HD to be arranged and TCU acceptance.       Kerri Dolan PA-C   Cardiothoracic Surgery   October 25, 2024 at 11:21 AM  Please reach me on [employer] or secure chat

## 2024-10-25 NOTE — PROGRESS NOTES
Care Management Follow Up    Length of Stay (days): 17    Expected Discharge Date: 10/26/2024     Concerns to be Addressed: Care progression - discharge planning   Patient plan of care discussed at interdisciplinary rounds: Yes    Anticipated Discharge Disposition:  Transitional Care     Anticipated Discharge Services:  Transitional Care  Anticipated Discharge DME:  NA    Patient/family educated on Medicare website which has current facility and service quality ratings:  Yes  Education Provided on the Discharge Plan:  Yes per team  Patient/Family in Agreement with the Plan:  No    Referrals Placed by CM/SW:  NA  Private pay costs discussed: Not applicable    Discussed  Partnership in Safe Discharge Planning  document with patient/family: Yes: patient's motherMia     Handoff Completed: No, handoff not indicated or clinically appropriate    Additional Information:  [id number] patient's mother, Mia, came and requested for RNCM to send referrals to the two on-site dialysis TCUs. Pat's been talking with one of the provider here and felt, patient would benefit from being at an on-site TCU, instead of transporting her to/from dialysis from a TCU.    Referrals sent to [facility] and [facility].    Social Hx:  Assessment: Follow. Live w/spouse in an apt. Independent. No DME or services. Spouse is primary contact.  Plan: Rehab rec TCU (prefer on-site dialysis). Worth considering, but needs rehab updated notes (PT).  Needs: medical stability (nephrology/kidney recovery). [facility] setting up OP dialysis. Transport: Family    Next Steps: RNCM to follow for medical progression, recommendations, and final discharge plan.     Adrienne Stevenson RN     1045 rec'd a call from Uday at [facility], considering, but wants updated rehab notes. The last notes she is able to see is from 10/18/24.  Request updated rehab notes fax to [phone]    Faxed updated OT notes  Paged and left a  message for PT regarding the above    9185 met with patient and her mother, Mia, at bedside to update. [facility] called and is considering, but waiting for update rehab (PT/OT) notes to determine TCU needs.     Pat stated Dr. Rhoades said will monitor patient's lab over the weekend and see if patient will need HD and make appropriate outpatient HD on Monday.    Paged Dr. Rhoades to update, [facility] is considering, but will wait for nephrology final plan on Monday.    1410 called to update Uday at [facility]

## 2024-10-25 NOTE — PLAN OF CARE
"  Problem: Adult Inpatient Plan of Care  Goal: Plan of Care Review  Description: The Plan of Care Review/Shift note should be completed every shift.  The Outcome Evaluation is a brief statement about your assessment that the patient is improving, declining, or no change.  This information will be displayed automatically on your shift  note.  Outcome: Progressing  Flowsheets (Taken 10/25/2024 1019)  Plan of Care Reviewed With: patient  Overall Patient Progress: improving  Goal: Patient-Specific Goal (Individualized)  Description: You can add care plan individualizations to a care plan. Examples of Individualization might be:  \"Parent requests to be called daily at 9am for status\", \"I have a hard time hearing out of my right ear\", or \"Do not touch me to wake me up as it startles  me\".  Outcome: Progressing  Goal: Absence of Hospital-Acquired Illness or Injury  Outcome: Progressing  Intervention: Identify and Manage Fall Risk  Recent Flowsheet Documentation  Taken 10/25/2024 0847 by Francine Tomlinson RN  Safety Promotion/Fall Prevention:   activity supervised   assistive device/personal items within reach   nonskid shoes/slippers when out of bed   clutter free environment maintained   patient and family education   room organization consistent   safety round/check completed   supervised activity   treat underlying cause   treat reversible contributory factors   lighting adjusted  Intervention: Prevent Infection  Recent Flowsheet Documentation  Taken 10/25/2024 0847 by Francine Tomlinson RN  Infection Prevention:   rest/sleep promoted   personal protective equipment utilized  Goal: Optimal Comfort and Wellbeing  Outcome: Progressing  Intervention: Monitor Pain and Promote Comfort  Recent Flowsheet Documentation  Taken 10/25/2024 0929 by Francine Tomlinson RN  Pain Management Interventions:   cold applied   pillow support provided   quiet environment facilitated   rest  Taken 10/25/2024 0847 by Francine Tomlinson RN  Pain Management " Interventions: medication (see MAR)  Goal: Readiness for Transition of Care  Outcome: Progressing     Goal Outcome Evaluation:      Plan of Care Reviewed With: patient    Overall Patient Progress: improving    sc  Patient Name: Jory Ambrose   MRN: [id number]   Date of Admission: 10/8/2024    Procedure: Procedure(s):  CORONARY ARTERY BYPASS GRAFT TIMES FOUR, LEFT INTERNAL MAMMARY ARTERY HARVEST, RIGHT LEG ENDOSCOPIC VESSEL PROCUREMENT, EPIAORTIC ULTRASOUND,  MITRAL VALVE REAIR CONVERTED TO MITRAL VALVE REPLACEMENT  LEFT ATRIAL APPENDAGE LIGATION,  ANESTHESIA TRANSESOPHAGEAL ECHOCARDIOGRAM    Post Op day #:17    Subjective (Patient focus/Primary Problem for shift): Pain mangement           Pain Goal3 Pain Rating7           Pain Medication/ Regime effective to reduce patient pain PRN Oxycodone, PRN Tylenol and Ice pack     Objective (Physical assessment):           Rhythm:  A Flutter controlled rates             Bowel Activity: yes if Yes indicate when: 10/23          Bowel Medications: yes            Incision: healing well          Incentive Spirometry Q 1-2 hour when awake:  yes Volume: 1000          Epicardial Pacing Wires:  not applicable            Patient Activity:           Up to chair for meals: yes          Ambulation with RN x2 (Not including CR): yes            Is patient in home clothes:no             Chest Tubes   Pleural: not applicable Draining: not applicable               Suction: not applicable              Mediastinal: not applicable Draining: not applicable               Suction: not applicable   Dressing Change Daily:not applicable If No, why?                     Urinary Catheter: no           Preventative WOC consult (need MD order): no       Assessment (Nursing primary shift focus): Pt. A&O x4 and on room air. Pt. Has tunneled catheter pain of 7. Gave PRN Oxycodone (see MAR). Has numbness in lower extremities. Tele is A Fluttered w/ controlled rates. Call light within reach. Mom at  bedside. Able to make needs known.     Notified CV surgery team of 6.9 hemoglobin. Provider's aware.     Plan (Patient Care Plan/focus): Manage pain. Ambulate. Increase IS use       Francine Tomlinson RN   10/25/2024   10:20 AM

## 2024-10-25 NOTE — PROGRESS NOTES
'    RENAL (KS) progress note  CC: F/U ALEXEI  S:  Since last seen, Jory had asthma issues with increased wheezing overnight - better with nebs. No dizziness today. Pain controlled. Good UO still noted. Discussed outpt TCU/dialysis options with Care manager and mother at bedside. Awaiting decision from [place] and [place]. Have tentative outpt dialysis set up at [facility] in case she does not get accepted to one of those facilities with HD on site.          A/P:   Acute kidney injury.  Acute tubular necrosis.  Secondary to CABG and mitral valve replacement followed by cardiogenic shock.  Baseline creatinine 0.95 (10/8/2024).  Dialysis initiated 10/11/24.  Required CRRT 10/11 - 10/13/24    HD 10/15, 10/17, 10/19 (Held) but dialysis resumed again 10/23 due to rising creatinine (2.7 (10/20)-->-->3.8 mg/dl)  With lack of renal recovery, will set up outpt dialysis for ALEXEI, my office working on this ([place] vs [facility] are being considered per  notes)  I suspect she is close to recovering but not quite....Creatinine increased slightly overnight. Hold dialysis today and will likely monitor over weekend without HD.  If function progressively worsens over weekend, anticipate HD again Monday and move forward with outpt HD (Hopefully [facility] or [place]).  Check daily labs and will review possible HD needs daily.    Goal currently is to dialyze her as little as needed for earliest detection of recovery  PCAD placed 10/24.   Change Bumex to 2 mg PO daily on 10/26.         2. Status post coronary artery bypass grafting x 4 vessels and mechanical mitral valve replacement on 10/8.  Diffuse coronary artery disease   -complicated post op course with cardiogenic shock, severe shock liver, dialysis dependent ALEXEI    3. Shock liver. Resolved.      4. History of Hodgkin's lymphoma.  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    5.  Anasarca - Improved on Bumex. Dizziness/cramping with UF on dialysis 10/23 so may need to be patient with this.    - Change Bumex to 2 mg PO daily 10/25 in anticipation of possible discharge soon.     6. Hypokalemia - due to bumex/poor PO intake. Stable today. Replace K+ as needed.      7. Anemia due to acute blood loss: Hgb dropped overnight to 6.9 g/dl. Transfuse PRN per Hosp med/surgery. OK to dialyze outside potential HD treatments.    - Check iron stores 10/25    Danish Rhoades MD  [facility]  [phone] (off)         /65 (BP Location: Right arm)   Pulse 102   Temp 97.6  F (36.4  C) (Oral)   Resp 20   Wt 105.8 kg (233 lb 4.8 oz)   LMP  (LMP Unknown)   SpO2 98%   BMI 40.05 kg/m      I/O last 3 completed shifts:  In: 1240 [P.O.:1240]  Out: 1700 [Urine:1700]    Physical Exam:   GENERAL: Alert, NAD in chair  EYES: EOMI   ENT: MMM, hearing intact  RESP: normal effort, cta ant, no wheezing  CV: RRR, 1+ leg edema (R>L)  GI: NT/ND  SKIN: Pale, right leg incisions clean/ bruising noted, sternotomy site healing  RIJ PCAD site clean      Recent Labs   Lab 10/25/24  0431 10/24/24  0620 10/23/24  0501 10/22/24  0624 10/21/24  0452 10/20/24  0452 10/19/24  0451    136 134* 134* 133* 134* 131*   POTASSIUM 3.1* 3.4 3.2* 3.4 3.4 3.4 3.2*   CHLORIDE 98 99 95* 96* 97* 99 95*   CO2 25 24 23 22 22 22 23   BUN 46.4* 41.5* 63.4* 56.3* 49.9* 42.0* 50.1*   CR 2.96* 2.81* 3.86* 3.54* 3.13* 2.77* 3.35*   GFRESTIMATED 19* 20* 14* 15* 17* 20* 16*   CINDY 8.6* 8.9 8.6* 8.5* 8.4* 8.1* 8.1*   MAG 1.7 1.9 2.0 1.7 1.8 1.8 2.0   ALBUMIN 3.1*  --  3.2*  --  3.0*  --  2.9*     Recent Labs   Lab 10/25/24  0431 10/24/24  0620 10/23/24  0501 10/22/24  0624 10/21/24  0452 10/20/24  0452 10/19/24  0451   WBC 10.1 12.4* 13.1* 13.8* 13.2* 13.1* 13.6*   HGB 6.9* 7.2* 7.6* 7.8* 7.6* 7.7* 8.1*   HCT 22.0* 22.8* 23.4* 24.6* 23.7* 24.3* 25.6*   MCV 90 90 89 90 89 89 89    404 423 412 305 246 228        Current Facility-Administered Medications:     acetaminophen (TYLENOL) tablet 500 mg, 500 mg, Oral, Q6H, Catherine Galvez PA-KENIA, 500 mg at 10/25/24 1208    albuterol (PROVENTIL HFA/VENTOLIN HFA) inhaler, 2 puff, Inhalation, Q6H PRN, Catherine Galvez PA-C, 2 puff at 10/24/24 1757    atorvastatin (LIPITOR) tablet 80 mg, 80 mg, Oral, At Bedtime, Catherine Galvez PA-C, 80 mg at 10/24/24 2044    bisacodyl (DULCOLAX) suppository 10 mg, 10 mg, Rectal, Daily PRN, Catherine Galvez PA-C    budesonide (PULMICORT) neb solution 1 mg, 1 mg, Nebulization, BID, Catherine Galvez PA-KENIA, 1 mg at 10/25/24 0825    [START ON 10/26/2024] bumetanide (BUMEX) tablet 2 mg, 2 mg, Oral, Daily, Danish Rhoades MD    buPROPion (WELLBUTRIN XL) 24 hr tablet 150 mg, 150 mg, Oral, Daily **FOLLOWED BY** [START ON 10/28/2024] buPROPion (WELLBUTRIN XL) 24 hr tablet 300 mg, 300 mg, Oral, Daily, Kerri Dolan PA-C    cetirizine (zyrTEC) tablet 10 mg, 10 mg, Oral, Daily, Catherine Galvez PA-C, 10 mg at 10/25/24 0850    clopidogrel (PLAVIX) tablet 75 mg, 75 mg, Oral, Daily, Catherine Galvez PA-KENIA, 75 mg at 10/25/24 0850    glucose gel 15-30 g, 15-30 g, Oral, Q15 Min PRN **OR** dextrose 50 % injection 25-50 mL, 25-50 mL, Intravenous, Q15 Min PRN, 50 mL at 10/14/24 0806 **OR** glucagon injection 1 mg, 1 mg, Subcutaneous, Q15 Min PRN, Catherine Galvez PA-C    fludrocortisone (FLORINEF) tablet 0.1 mg, 0.1 mg, Oral, Daily, Catherine Galvez PA-C, 0.1 mg at 10/25/24 0850    fluticasone (FLONASE) 50 MCG/ACT spray 2 spray, 2 spray, Both Nostrils, Daily PRN, Catherine Galvez PA-C    fluticasone-vilanterol (BREO ELLIPTA) 100-25 MCG/ACT inhaler 1 puff, 1 puff, Inhalation, At Bedtime, Catherine Galvez PA-C, 1 puff at 10/24/24 2038    [COMPLETED] sodium chloride 0.9% DIALYSIS Cath LOCK - RED Lumen, 10 mL, Intracatheter, Once in dialysis/CRRT, 10 mL at 10/17/24 1527 **FOLLOWED BY** heparin 1000 unit/mL DIALYSIS  Cath LOCK - RED Lumen, 1.3-2.6 mL, Intracatheter, Q1H PRN, Rohan Polo MD, 3,200 Units at 10/24/24 0934    [COMPLETED] sodium chloride 0.9% DIALYSIS Cath LOCK - BLUE Lumen, 10 mL, Intracatheter, Once in dialysis/CRRT, 10 mL at 10/17/24 1526 **FOLLOWED BY** heparin 1000 unit/mL DIALYSIS Cath LOCK -BLUE Lumen, 1.3-2.6 mL, Intracatheter, Q1H PRN, Rohan Polo MD    heparin lock flush 10 unit/mL injection 5-20 mL, 5-20 mL, Intracatheter, Q24H, Catherine Galvez PA-C, 15 mL at 10/20/24 1957    heparin lock flush 10 unit/mL injection 5-20 mL, 5-20 mL, Intracatheter, Q1H PRN, Catherine Galvez PA-C    hydrALAZINE (APRESOLINE) injection 10 mg, 10 mg, Intravenous, Q30 Min PRN, Catherine Galvez PA-C    hydrOXYzine HCl (ATARAX) tablet 25 mg, 25 mg, Oral, TID PRN, Catherine Galvez PA-C, 25 mg at 10/20/24 1642    ipratropium - albuterol 0.5 mg/2.5 mg/3 mL (DUONEB) neb solution 3 mL, 3 mL, Nebulization, Q4H PRN, Catherine Galvez PA-C, 3 mL at 10/24/24 2353    levothyroxine (SYNTHROID/LEVOTHROID) tablet 125 mcg, 125 mcg, Oral, QAM AC, Catherine aGlvez PA-C, 125 mcg at 10/25/24 0656    Lidocaine (LIDOCARE) 4 % Patch 1-2 patch, 1-2 patch, Transdermal, Q24H, Catherine Galvez PA-C, 2 patch at 10/24/24 1739    metoprolol tartrate (LOPRESSOR) tablet 75 mg, 75 mg, Oral, BID, Catherine Galvez PA-C, 75 mg at 10/25/24 0849    multivitamin w/minerals (THERA-VIT-M) tablet 1 tablet, 1 tablet, Oral, Daily, Catherine Galvez PA-C, 1 tablet at 10/25/24 1208    naloxone (NARCAN) injection 0.2 mg, 0.2 mg, Intravenous, Q2 Min PRN **OR** naloxone (NARCAN) injection 0.4 mg, 0.4 mg, Intravenous, Q2 Min PRN **OR** naloxone (NARCAN) injection 0.2 mg, 0.2 mg, Intramuscular, Q2 Min PRN **OR** naloxone (NARCAN) injection 0.4 mg, 0.4 mg, Intramuscular, Q2 Min PRN, Catherine Galvez PA-C    No heparin via hemodialysis machine, , Does not apply, Once, Jaclyn Murillo MD    ondansetron  (ZOFRAN ODT) ODT tab 4 mg, 4 mg, Oral, Q6H PRN **OR** ondansetron (ZOFRAN) injection 4 mg, 4 mg, Intravenous, Q6H PRN, Catherine Galvez PA-C, 4 mg at 10/10/24 1945    oxyCODONE IR (ROXICODONE) half-tab 2.5 mg, 2.5 mg, Oral, Q6H PRN, 2.5 mg at 10/25/24 0847 **OR** [DISCONTINUED] oxyCODONE (ROXICODONE) tablet 5 mg, 5 mg, Oral, Q4H PRN, David Wade MD, 5 mg at 10/19/24 0902    [DISCONTINUED] pantoprazole (PROTONIX) 2 mg/mL suspension 40 mg, 40 mg, Oral or NG Tube, Daily, 40 mg at 10/08/24 1729 **OR** pantoprazole (PROTONIX) EC tablet 40 mg, 40 mg, Oral, Daily, Catherine Galvez PA-C, 40 mg at 10/25/24 0850    polyethylene glycol (MIRALAX) Packet 17 g, 17 g, Oral, Daily, Catherine Galvez PA-C    prochlorperazine (COMPAZINE) injection 10 mg, 10 mg, Intravenous, Q6H PRN **OR** prochlorperazine (COMPAZINE) tablet 10 mg, 10 mg, Oral, Q6H PRN, Catherine Galvez PA-C    senna-docusate (SENOKOT-S/PERICOLACE) 8.6-50 MG per tablet 1 tablet, 1 tablet, Oral, BID, Catherine Galvez PA-C, 1 tablet at 10/25/24 0850    simethicone (MYLICON) chewable tablet 80 mg, 80 mg, Oral, 4x Daily PRN, David Wade MD, 80 mg at 10/24/24 0321    sodium chloride (PF) 0.9% PF flush 10-20 mL, 10-20 mL, Intracatheter, q1 min prn, Catherine Galvez PA-C    sodium chloride (PF) 0.9% PF flush 10-40 mL, 10-40 mL, Intracatheter, Q8H, Herson Galvezelle K, PA-C, 30 mL at 10/25/24 1208    sodium chloride (PF) 0.9% PF flush 10-40 mL, 10-40 mL, Intracatheter, Q7 Days, Morgan Galvezrielle K, PA-C, 10 mL at 10/18/24 1703    sodium chloride (PF) 0.9% PF flush 10-40 mL, 10-40 mL, Intracatheter, Q1H PRN, Catherine Galvez K, PA-C    sodium chloride 0.9% BOLUS 100-150 mL, 100-150 mL, Intravenous, Q15 Min PRN, Rohan Polo MD    sodium chloride 0.9% BOLUS 100-150 mL, 100-150 mL, Intravenous, Q15 Min PRN, Rohan Polo MD    Vitamin D3 (CHOLECALCIFEROL) tablet 125 mcg, 125 mcg, Oral, Daily,  Catherine Galvez PA-C, 125 mcg at 10/25/24 0850    warfarin ANTICOAGULANT (COUMADIN) tablet 1 mg, 1 mg, Oral, ONCE at 18:00, David Wade MD    Warfarin Dose Required Daily - Pharmacist Managed, 1 each, Oral, See Admin Instructions, Danish Rhoades MD    Labs personally reviewed today during this evaluation at 1130am.

## 2024-10-26 ENCOUNTER — APPOINTMENT (OUTPATIENT)
Dept: RADIOLOGY | Facility: HOSPITAL | Age: 49
DRG: 219 | End: 2024-10-26
Attending: PHYSICIAN ASSISTANT
Payer: COMMERCIAL

## 2024-10-26 ENCOUNTER — APPOINTMENT (OUTPATIENT)
Dept: OCCUPATIONAL THERAPY | Facility: HOSPITAL | Age: 49
DRG: 219 | End: 2024-10-26
Attending: SURGERY
Payer: COMMERCIAL

## 2024-10-26 LAB
ANION GAP SERPL CALCULATED.3IONS-SCNC: 16 MMOL/L (ref 7–15)
BUN SERPL-MCNC: 52.8 MG/DL (ref 6–20)
CA-I BLD-MCNC: 4.5 MG/DL (ref 4.4–5.2)
CALCIUM SERPL-MCNC: 8.4 MG/DL (ref 8.8–10.4)
CHLORIDE SERPL-SCNC: 98 MMOL/L (ref 98–107)
CREAT SERPL-MCNC: 3.05 MG/DL (ref 0.51–0.95)
EGFRCR SERPLBLD CKD-EPI 2021: 18 ML/MIN/1.73M2
ERYTHROCYTE [DISTWIDTH] IN BLOOD BY AUTOMATED COUNT: 18.6 % (ref 10–15)
GLUCOSE SERPL-MCNC: 91 MG/DL (ref 70–99)
HCO3 SERPL-SCNC: 23 MMOL/L (ref 22–29)
HCT VFR BLD AUTO: 22.6 % (ref 35–47)
HGB BLD-MCNC: 7.1 G/DL (ref 11.7–15.7)
INR PPP: 2.3 (ref 0.85–1.15)
MAGNESIUM SERPL-MCNC: 1.6 MG/DL (ref 1.7–2.3)
MCH RBC QN AUTO: 28.5 PG (ref 26.5–33)
MCHC RBC AUTO-ENTMCNC: 31.4 G/DL (ref 31.5–36.5)
MCV RBC AUTO: 91 FL (ref 78–100)
PLATELET # BLD AUTO: 374 10E3/UL (ref 150–450)
POTASSIUM SERPL-SCNC: 3.2 MMOL/L (ref 3.4–5.3)
POTASSIUM SERPL-SCNC: 3.2 MMOL/L (ref 3.4–5.3)
POTASSIUM SERPL-SCNC: 3.3 MMOL/L (ref 3.4–5.3)
RBC # BLD AUTO: 2.49 10E6/UL (ref 3.8–5.2)
SODIUM SERPL-SCNC: 137 MMOL/L (ref 135–145)
WBC # BLD AUTO: 9 10E3/UL (ref 4–11)

## 2024-10-26 PROCEDURE — 250N000013 HC RX MED GY IP 250 OP 250 PS 637: Performed by: SURGERY

## 2024-10-26 PROCEDURE — 97110 THERAPEUTIC EXERCISES: CPT | Mod: GO

## 2024-10-26 PROCEDURE — 71046 X-RAY EXAM CHEST 2 VIEWS: CPT

## 2024-10-26 PROCEDURE — 250N000013 HC RX MED GY IP 250 OP 250 PS 637: Performed by: PHYSICIAN ASSISTANT

## 2024-10-26 PROCEDURE — 250N000011 HC RX IP 250 OP 636

## 2024-10-26 PROCEDURE — 82330 ASSAY OF CALCIUM: CPT

## 2024-10-26 PROCEDURE — 83735 ASSAY OF MAGNESIUM: CPT

## 2024-10-26 PROCEDURE — 210N000001 HC R&B IMCU HEART CARE

## 2024-10-26 PROCEDURE — 94799 UNLISTED PULMONARY SVC/PX: CPT

## 2024-10-26 PROCEDURE — 250N000013 HC RX MED GY IP 250 OP 250 PS 637

## 2024-10-26 PROCEDURE — 85018 HEMOGLOBIN: CPT

## 2024-10-26 PROCEDURE — 97535 SELF CARE MNGMENT TRAINING: CPT | Mod: GO

## 2024-10-26 PROCEDURE — 80048 BASIC METABOLIC PNL TOTAL CA: CPT | Performed by: INTERNAL MEDICINE

## 2024-10-26 PROCEDURE — 94640 AIRWAY INHALATION TREATMENT: CPT

## 2024-10-26 PROCEDURE — 250N000009 HC RX 250

## 2024-10-26 PROCEDURE — 999N000157 HC STATISTIC RCP TIME EA 10 MIN

## 2024-10-26 PROCEDURE — 94660 CPAP INITIATION&MGMT: CPT

## 2024-10-26 PROCEDURE — 250N000013 HC RX MED GY IP 250 OP 250 PS 637: Performed by: INTERNAL MEDICINE

## 2024-10-26 PROCEDURE — 999N000156 HC STATISTIC RCP CONSULT EA 30 MIN

## 2024-10-26 PROCEDURE — 94640 AIRWAY INHALATION TREATMENT: CPT | Mod: 76

## 2024-10-26 PROCEDURE — 85610 PROTHROMBIN TIME: CPT

## 2024-10-26 PROCEDURE — 84132 ASSAY OF SERUM POTASSIUM: CPT | Performed by: PHYSICIAN ASSISTANT

## 2024-10-26 RX ORDER — POTASSIUM CHLORIDE 1500 MG/1
20 TABLET, EXTENDED RELEASE ORAL ONCE
Status: COMPLETED | OUTPATIENT
Start: 2024-10-26 | End: 2024-10-26

## 2024-10-26 RX ORDER — MAGNESIUM OXIDE 400 MG/1
400 TABLET ORAL EVERY 4 HOURS
Status: COMPLETED | OUTPATIENT
Start: 2024-10-26 | End: 2024-10-26

## 2024-10-26 RX ORDER — WARFARIN SODIUM 2 MG/1
2 TABLET ORAL
Status: COMPLETED | OUTPATIENT
Start: 2024-10-26 | End: 2024-10-26

## 2024-10-26 RX ADMIN — ACETAMINOPHEN 500 MG: 500 TABLET ORAL at 11:27

## 2024-10-26 RX ADMIN — WARFARIN SODIUM 2 MG: 2 TABLET ORAL at 17:13

## 2024-10-26 RX ADMIN — CETIRIZINE HYDROCHLORIDE 10 MG: 10 TABLET, FILM COATED ORAL at 09:00

## 2024-10-26 RX ADMIN — POTASSIUM CHLORIDE 20 MEQ: 1500 TABLET, EXTENDED RELEASE ORAL at 16:04

## 2024-10-26 RX ADMIN — FLUDROCORTISONE ACETATE 0.1 MG: 0.1 TABLET ORAL at 09:01

## 2024-10-26 RX ADMIN — HEPARIN, PORCINE (PF) 10 UNIT/ML INTRAVENOUS SYRINGE 5 ML: at 21:08

## 2024-10-26 RX ADMIN — CLOPIDOGREL BISULFATE 75 MG: 75 TABLET ORAL at 09:00

## 2024-10-26 RX ADMIN — METOPROLOL TARTRATE 75 MG: 25 TABLET, FILM COATED ORAL at 09:00

## 2024-10-26 RX ADMIN — Medication 1 TABLET: at 11:27

## 2024-10-26 RX ADMIN — OXYCODONE HYDROCHLORIDE 2.5 MG: 5 TABLET ORAL at 11:27

## 2024-10-26 RX ADMIN — IPRATROPIUM BROMIDE AND ALBUTEROL SULFATE 3 ML: .5; 3 SOLUTION RESPIRATORY (INHALATION) at 15:07

## 2024-10-26 RX ADMIN — ALBUTEROL SULFATE 2 PUFF: 90 AEROSOL, METERED RESPIRATORY (INHALATION) at 14:55

## 2024-10-26 RX ADMIN — MAGNESIUM OXIDE TAB 400 MG (241.3 MG ELEMENTAL MG) 400 MG: 400 (241.3 MG) TAB at 13:23

## 2024-10-26 RX ADMIN — MAGNESIUM OXIDE TAB 400 MG (241.3 MG ELEMENTAL MG) 400 MG: 400 (241.3 MG) TAB at 09:25

## 2024-10-26 RX ADMIN — POTASSIUM CHLORIDE 20 MEQ: 1500 TABLET, EXTENDED RELEASE ORAL at 22:04

## 2024-10-26 RX ADMIN — BUDESONIDE 1 MG: 0.5 INHALANT ORAL at 08:01

## 2024-10-26 RX ADMIN — Medication 125 MCG: at 09:00

## 2024-10-26 RX ADMIN — ACETAMINOPHEN 500 MG: 500 TABLET ORAL at 07:07

## 2024-10-26 RX ADMIN — LEVOTHYROXINE SODIUM 125 MCG: 0.03 TABLET ORAL at 07:07

## 2024-10-26 RX ADMIN — POTASSIUM CHLORIDE 20 MEQ: 1500 TABLET, EXTENDED RELEASE ORAL at 09:25

## 2024-10-26 RX ADMIN — BUDESONIDE 1 MG: 0.5 INHALANT ORAL at 20:14

## 2024-10-26 RX ADMIN — BUMETANIDE 2 MG: 2 TABLET ORAL at 09:00

## 2024-10-26 RX ADMIN — ACETAMINOPHEN 500 MG: 500 TABLET ORAL at 17:13

## 2024-10-26 RX ADMIN — METOPROLOL TARTRATE 75 MG: 25 TABLET, FILM COATED ORAL at 21:06

## 2024-10-26 RX ADMIN — PANTOPRAZOLE SODIUM 40 MG: 40 TABLET, DELAYED RELEASE ORAL at 09:01

## 2024-10-26 RX ADMIN — OXYCODONE HYDROCHLORIDE 2.5 MG: 5 TABLET ORAL at 17:55

## 2024-10-26 RX ADMIN — ATORVASTATIN CALCIUM 80 MG: 40 TABLET, FILM COATED ORAL at 21:07

## 2024-10-26 RX ADMIN — LIDOCAINE 1 PATCH: 4 PATCH TOPICAL at 21:06

## 2024-10-26 NOTE — PLAN OF CARE
Goal Outcome Evaluation:  sc  Patient Name: Jory Ambrose   MRN: [id number]   Date of Admission: 10/8/2024    Procedure: Procedure(s):  CORONARY ARTERY BYPASS GRAFT TIMES FOUR, LEFT INTERNAL MAMMARY ARTERY HARVEST, RIGHT LEG ENDOSCOPIC VESSEL PROCUREMENT, EPIAORTIC ULTRASOUND,  MITRAL VALVE REAIR CONVERTED TO MITRAL VALVE REPLACEMENT  LEFT ATRIAL APPENDAGE LIGATION,  ANESTHESIA TRANSESOPHAGEAL ECHOCARDIOGRAM    Post Op day #:17    Subjective (Patient focus/Primary Problem for shift): pain           Pain Goal4 Pain Rating4           Pain Medication/ Regime effective to reduce patient painoxycodone and scheduled apap and lidocaine patches    Objective (Physical assessment):           Rhythm: atrial fibrillation            Bowel Activity: yes if Yes indicate when: 10/24          Bowel Medications: yes            Incision: healing well          Incentive Spirometry Q 1-2 hour when awake:  yesVolume:            Epicardial Pacing Wires:  no            Patient Activity:           Up to chair for meals: yes          Ambulation with RN x2 (Not including CR): yes            Is patient in home clothes:no             Chest Tubes   Pleural: no Draining: no               Suction: no              Mediastinal: no Draining: no               Suction: no   Dressing Change Daily:yes If No, why?                      Urinary Catheter: no           Preventative WOC consult (need MD order): no       Assessment (Nursing primary shift focus): PT K+ was 3.1 this a.m. writer paged and updated PA along with Hgb of 6.9.  Writer spoke with nephrology who ordered potassium replacement.    PT has ongoing incisional pain that is treated with prn oxycodone and scheduled apap and lidocaine patches.     Plan (Patient Care Plan/focus): PT remain in afib.  Pt is on RA durin day and home cpap at HS.  PT states she is resting comfortably at HS.       Khalif Canela RN   10/25/2024   10:07 PM

## 2024-10-26 NOTE — PLAN OF CARE
Problem: Cardiovascular Surgery  Goal: Effective Oxygenation and Ventilation  Outcome: Progressing  Intervention: Promote Airway Secretion Clearance  Recent Flowsheet Documentation  Taken 10/26/2024 1200 by Monika Jacobsen RN  Administration (IS): self-administered  Level Incentive Spirometer (mL): 1000  Number of Repetitions (IS): 8  Patient Tolerance (IS): good  Taken 10/26/2024 0800 by Monika Jacobsen RN  Administration (IS): self-administered  Level Incentive Spirometer (mL): 1000  Number of Repetitions (IS): 8  Patient Tolerance (IS): good   Goal Outcome Evaluation:     The patient said she is SOB. O2 sat 94 % on 3 liters per n/c. Inhaler given. The mom is here and wants to see the on call doctor.  Patient had pain meds a couple hours ago. Will update the heart team.

## 2024-10-26 NOTE — PROGRESS NOTES
Patient Name: Jory Ambrose   MRN: [id number]   Date of Admission: 10/8/2024    Procedure: Procedure(s):  CORONARY ARTERY BYPASS GRAFT TIMES FOUR, LEFT INTERNAL MAMMARY ARTERY HARVEST, RIGHT LEG ENDOSCOPIC VESSEL PROCUREMENT, EPIAORTIC ULTRASOUND,  MITRAL VALVE REAIR CONVERTED TO MITRAL VALVE REPLACEMENT  LEFT ATRIAL APPENDAGE LIGATION,  ANESTHESIA TRANSESOPHAGEAL ECHOCARDIOGRAM    Post Op day #:18    Subjective (Patient focus/Primary Problem for shift): SOB            Pain Goal 0 Pain Rating 0           Pain Medication/ Regime effective to reduce patient pain schedule tylenol    Objective (Physical assessment):           Rhythm: atrial fibrillation            Bowel Activity: yes if Yes indicate when: yesterday          Bowel Medications: yes            Incision: healing well          Incentive Spirometry Q 1-2 hour when awake:  yes Volume: 1000 -750          Epicardial Pacing Wires:  not applicable            Patient Activity:           Up to chair for meals: yes          Ambulation with RN x2 (Not including CR): yes            Is patient in home clothes:no             Chest Tubes   Pleural: no Draining: no               Suction: no              Mediastinal: no Draining: no               Suction: no   Dressing Change Daily:no If No, why?ROBERT                     Urinary Catheter: not applicable           Preventative WOC consult (need MD order): not applicable       Assessment (Nursing primary shift focus): Patient alert and oriented x 4. The patient seemed SOB since she had her call light on and said she could not breath well. O2 sat 98 % RA yet she wanted O2 on. Resp therp called and did a neb treatment. Patient lungs sound clear diminished. Updated PA and CXR ordered. Patient's Mom bedside and asked many questions. Does the doctor know patient is in atrial fib? Does the doctor want to try and change the medication for Atrial fibrillation. The mom seemed anxious as the patient was trying to relax.  Patient going for a chest xray soon. Call light in reach. Patent up in the chair.     Plan (Patient Care Plan/focus): Patient to go for chest xray in a w/c with one staff. Patient agreeable as well as the mom. Monitor SOB, manage pain, Tele and document.       Monika Jacobsen RN   10/26/2024   4:15 PM

## 2024-10-26 NOTE — PROGRESS NOTES
Care Management Follow Up    Length of Stay (days): 18    Expected Discharge Date: 10/29/2024     Concerns to be Addressed: discharge planning     Patient plan of care discussed at interdisciplinary rounds: No    Anticipated Discharge Disposition: Dialysis Services, Transitional Care              Anticipated Discharge Services: None  Anticipated Discharge DME: None    Patient/family educated on Medicare website which has current facility and service quality ratings: no  Education Provided on the Discharge Plan:    Patient/Family in Agreement with the Plan: yes    Referrals Placed by CM/SW:    Private pay costs discussed: Not applicable    Discussed  Partnership in Safe Discharge Planning  document with patient/family: No     Handoff Completed: No, handoff not indicated or clinically appropriate    Additional Information:  Received VM from [facility] Admissions Liaison requesting updated PT/OT notes.  Per chart review noted [facility] sent updated clinical requested however per on call liaison updated notes received were not most recent PT/OT notes.  Faxed PT/OT flowsheet documentation to [phone].      Next Steps:   Medical stability  Confirmation of [facility]'s ability to accept patient.      \Marybeth Geronimo RN BSN, PHN, ACM-RN  Nurse Coordinator   Covering P3 730 til Noon

## 2024-10-26 NOTE — PROGRESS NOTES
CVTS Daily Progress Note  POD#18 s/p CABG x4 (LIMA>LAD, rSVG>RPDA, rSVG>LPL, rSVG>D2), RLE EVH, Attempted MVR/r, MVR ( 29 mm [employer] Mechanical Mitral Valve), LAAE  Attending: Mauro  LOS: 18    SUBJECTIVE/INTERVAL EVENTS:    No acute events overnight. Continues in rate controlled a-fib/a-flutter. Continues to have some UOP. Maintaining oxygen saturations on home CPAP at night and room air during the day. Normotensive. Working w/ therapy as able. Pain well controlled. +BM. Tolerating regular diet. INR therapeutic (2.30). Patient denies new chest pain, shortness of breath, abdominal pain, calf pain, nausea. Patient has no questions today.    OBJECTIVE:  Temp:  [97.6  F (36.4  C)-98.2  F (36.8  C)] 98.2  F (36.8  C)  Pulse:  [] 69  Resp:  [20-24] 20  BP: (106-132)/(56-78) 110/78  FiO2 (%):  [21 %-40 %] 21 %  SpO2:  [94 %-100 %] 100 %  Vitals:    10/22/24 0814 10/23/24 0442 10/24/24 0645 10/25/24 0625   Weight: 107.7 kg (237 lb 6.4 oz) 107.2 kg (236 lb 4.8 oz) 105.5 kg (232 lb 9.6 oz) 105.8 kg (233 lb 4.8 oz)    10/26/24 0640   Weight: 105.6 kg (232 lb 14.4 oz)       Clinically Significant Risk Factors        # Hypokalemia: Lowest K = 3.1 mmol/L in last 2 days, will replace as needed      # Hypomagnesemia: Lowest Mg = 1.6 mg/dL in last 2 days, will replace as needed   # Hypoalbuminemia: Lowest albumin = 2.6 g/dL at 10/16/2024  4:35 AM, will monitor as appropriate       # Hypertension: Noted on problem list            # Moderate Malnutrition: based on nutrition assessment     # History of CABG: noted on surgical history              Current Medications:    Scheduled Meds:  Current Facility-Administered Medications   Medication Dose Route Frequency Provider Last Rate Last Admin    acetaminophen (TYLENOL) tablet 500 mg  500 mg Oral Q6H Catherine Galvez PA-C   500 mg at 10/26/24 0707    atorvastatin (LIPITOR) tablet 80 mg  80 mg Oral At Bedtime Catherine Galvez PA-C   80 mg at 10/25/24 2100     budesonide (PULMICORT) neb solution 1 mg  1 mg Nebulization BID Catherine Galvez PA-C   1 mg at 10/26/24 0801    bumetanide (BUMEX) tablet 2 mg  2 mg Oral Daily Danish Rhoades MD   2 mg at 10/26/24 0900    buPROPion (WELLBUTRIN XL) 24 hr tablet 150 mg  150 mg Oral Daily Kerri Dolan PA-C        Followed by    [START ON 10/28/2024] buPROPion (WELLBUTRIN XL) 24 hr tablet 300 mg  300 mg Oral Daily Kerri Dolan PA-C        cetirizine (zyrTEC) tablet 10 mg  10 mg Oral Daily Catherine Galvez PA-C   10 mg at 10/26/24 0900    clopidogrel (PLAVIX) tablet 75 mg  75 mg Oral Daily Catherine Galvez PA-C   75 mg at 10/26/24 0900    fludrocortisone (FLORINEF) tablet 0.1 mg  0.1 mg Oral Daily Catherine Galvez PA-C   0.1 mg at 10/26/24 0901    fluticasone-vilanterol (BREO ELLIPTA) 100-25 MCG/ACT inhaler 1 puff  1 puff Inhalation At Bedtime Catherine Galvez PA-C   1 puff at 10/25/24 2100    heparin lock flush 10 unit/mL injection 5-20 mL  5-20 mL Intracatheter Q24H Catherine Galvez PA-C   15 mL at 10/20/24 1957    levothyroxine (SYNTHROID/LEVOTHROID) tablet 125 mcg  125 mcg Oral QAM AC Catherine Galvez PA-C   125 mcg at 10/26/24 0707    Lidocaine (LIDOCARE) 4 % Patch 1-2 patch  1-2 patch Transdermal Q24H Catherine Galvez PA-C   2 patch at 10/25/24 2100    metoprolol tartrate (LOPRESSOR) tablet 75 mg  75 mg Oral BID Catherine Galvez PA-C   75 mg at 10/26/24 0900    multivitamin w/minerals (THERA-VIT-M) tablet 1 tablet  1 tablet Oral Daily Catherine Galvez PA-C   1 tablet at 10/25/24 1208    No heparin via hemodialysis machine   Does not apply Once Jaclyn Murillo MD        pantoprazole (PROTONIX) EC tablet 40 mg  40 mg Oral Daily Catherine Galvez PA-C   40 mg at 10/26/24 0901    polyethylene glycol (MIRALAX) Packet 17 g  17 g Oral Daily Catherine Galvez PA-C        senna-docusate (SENOKOT-S/PERICOLACE) 8.6-50 MG per tablet 1 tablet  1 tablet Oral BID Lenny  Catherine MCKEON PA-C   1 tablet at 10/25/24 1854    sodium chloride (PF) 0.9% PF flush 10-40 mL  10-40 mL Intracatheter Q8H Catherine Galvez PA-C   10 mL at 10/26/24 0901    sodium chloride (PF) 0.9% PF flush 10-40 mL  10-40 mL Intracatheter Q7 Days Catherine Galvez PA-C   30 mL at 10/25/24 1746    Vitamin D3 (CHOLECALCIFEROL) tablet 125 mcg  125 mcg Oral Daily Catherine Galvez PA-C   125 mcg at 10/26/24 0900    warfarin ANTICOAGULANT (COUMADIN) tablet 2 mg  2 mg Oral ONCE at 18:00 David Wade MD        Warfarin Dose Required Daily - Pharmacist Managed  1 each Oral See Admin Instructions Danish Rhoades MD         Continuous Infusions:  Current Facility-Administered Medications   Medication Dose Route Frequency Provider Last Rate Last Admin     PRN Meds:.  Current Facility-Administered Medications   Medication Dose Route Frequency Provider Last Rate Last Admin    albuterol (PROVENTIL HFA/VENTOLIN HFA) inhaler  2 puff Inhalation Q6H PRN Catherine Galvez PA-C   2 puff at 10/25/24 2307    bisacodyl (DULCOLAX) suppository 10 mg  10 mg Rectal Daily PRN Catherine Galvez PA-C        glucose gel 15-30 g  15-30 g Oral Q15 Min PRN Catherine Galvez PA-C        Or    dextrose 50 % injection 25-50 mL  25-50 mL Intravenous Q15 Min PRN Catherine Galvez PA-C   50 mL at 10/14/24 0806    Or    glucagon injection 1 mg  1 mg Subcutaneous Q15 Min PRN Catherine Galvez PA-C        fluticasone (FLONASE) 50 MCG/ACT spray 2 spray  2 spray Both Nostrils Daily PRN Catherine Galvez PA-C        heparin 1000 unit/mL DIALYSIS Cath LOCK - RED Lumen  1.3-2.6 mL Intracatheter Q1H PRN Rohan Polo MD   3,200 Units at 10/24/24 0934    heparin 1000 unit/mL DIALYSIS Cath LOCK -BLUE Lumen  1.3-2.6 mL Intracatheter Q1H PRN Rohan Polo MD        heparin lock flush 10 unit/mL injection 5-20 mL  5-20 mL Intracatheter Q1H PRN Catherine Galvez PA-C        hydrALAZINE (APRESOLINE)  injection 10 mg  10 mg Intravenous Q30 Min PRN Catherine Galvez PA-C        hydrOXYzine HCl (ATARAX) tablet 25 mg  25 mg Oral TID PRN Catherine Galvez PA-C   25 mg at 10/20/24 1642    ipratropium - albuterol 0.5 mg/2.5 mg/3 mL (DUONEB) neb solution 3 mL  3 mL Nebulization Q4H PRN Catherine Galvez PA-C   3 mL at 10/24/24 2353    naloxone (NARCAN) injection 0.2 mg  0.2 mg Intravenous Q2 Min PRN Catherine Galvez PA-C        Or    naloxone (NARCAN) injection 0.4 mg  0.4 mg Intravenous Q2 Min PRN Catherine Galvez PA-C        Or    naloxone (NARCAN) injection 0.2 mg  0.2 mg Intramuscular Q2 Min PRN Catherine Galvez PA-C        Or    naloxone (NARCAN) injection 0.4 mg  0.4 mg Intramuscular Q2 Min PRN Catherine Galvez PA-C        ondansetron (ZOFRAN ODT) ODT tab 4 mg  4 mg Oral Q6H PRN Catherine Galvez PA-C        Or    ondansetron (ZOFRAN) injection 4 mg  4 mg Intravenous Q6H PRN Catherine Galvez PA-C   4 mg at 10/10/24 1945    oxyCODONE IR (ROXICODONE) half-tab 2.5 mg  2.5 mg Oral Q6H PRN Catherine Galvez PA-C   2.5 mg at 10/25/24 1853    prochlorperazine (COMPAZINE) injection 10 mg  10 mg Intravenous Q6H PRN Catherine Galvez PA-C        Or    prochlorperazine (COMPAZINE) tablet 10 mg  10 mg Oral Q6H PRN Catherine Galvez PA-C        simethicone (MYLICON) chewable tablet 80 mg  80 mg Oral 4x Daily PRN David Wade MD   80 mg at 10/25/24 2221    sodium chloride (PF) 0.9% PF flush 10-20 mL  10-20 mL Intracatheter q1 min prn Catherine Glavez PA-KENIA        sodium chloride (PF) 0.9% PF flush 10-40 mL  10-40 mL Intracatheter Q1H PRN Catherine Galvez PA-KENIA        sodium chloride 0.9% BOLUS 100-150 mL  100-150 mL Intravenous Q15 Min PRN Rohan Polo MD        sodium chloride 0.9% BOLUS 100-150 mL  100-150 mL Intravenous Q15 Min PRN Rohan Polo MD           Cardiographics:    Telemetry monitoring demonstrates a flutter w/ rates in  the 60-70s per my personal review.    Imaging:  Results for orders placed or performed during the hospital encounter of 10/08/24   XR Chest Port 1 View    Impression    IMPRESSION: Interval sternotomy, CABG procedure and mitral valve repair. Endotracheal tube proximally 2 cm above the sofia. Nasogastric tube in the stomach. Bilateral chest tubes and midline mediastinal drain in expected position. Epicardial leads are   present. Left IJ line in the brachiocephalic vein.    Low lung volumes. No evidence for CHF or pneumonia. No pleural effusion or pneumothorax.   XR Chest Port 1 View    Impression    IMPRESSION: Poststernotomy changes with prosthetic mitral valve. Patient's been extubated and the NG tube has been removed. Mediastinal and pleural chest tubes are in place.    Left IJ cordis sheath is in the distal left innominate artery. Catheter has a very subtle kink within it 3.5 centimeters from the tip.    Low lung volumes. No pneumothorax. Likely trace left effusion at the base. No signs of pneumonia or failure. Heart is of normal size.   XR Abdomen Port 1 View    Impression    IMPRESSION: Orogastric tube tip in the proximal stomach and oriented superiorly towards the left diaphragm. Visualized bowel gas pattern unremarkable. Numerous tubes and lines lower chest and upper abdomen as described on chest x-ray report from today.        Labs, personally reviewed.  Hemoglobin   Date Value Ref Range Status   10/26/2024 7.1 (L) 11.7 - 15.7 g/dL Final   10/25/2024 6.9 (LL) 11.7 - 15.7 g/dL Final   10/24/2024 7.2 (L) 11.7 - 15.7 g/dL Final   06/09/2015 13.5 11.7 - 15.7 g/dL Final   03/11/2015 12.3 11.7 - 15.7 g/dL Final   01/08/2015 13.6 11.7 - 15.7 g/dL Final     WBC   Date Value Ref Range Status   06/09/2015 7.9 4.0 - 11.0 10e9/L Final   03/11/2015 9.4 4.0 - 11.0 10e9/L Final   01/08/2015 8.1 4.0 - 11.0 10e9/L Final     WBC Count   Date Value Ref Range Status   10/26/2024 9.0 4.0 - 11.0 10e3/uL Final   10/25/2024 10.1  4.0 - 11.0 10e3/uL Final   10/24/2024 12.4 (H) 4.0 - 11.0 10e3/uL Final     Platelet Count   Date Value Ref Range Status   10/26/2024 374 150 - 450 10e3/uL Final   10/25/2024 369 150 - 450 10e3/uL Final   10/24/2024 404 150 - 450 10e3/uL Final   06/09/2015 219 150 - 450 10e9/L Final   03/11/2015 240 150 - 450 10e9/L Final   01/08/2015 221 150 - 450 10e9/L Final     Creatinine   Date Value Ref Range Status   10/26/2024 3.05 (H) 0.51 - 0.95 mg/dL Final   10/25/2024 2.96 (H) 0.51 - 0.95 mg/dL Final   10/24/2024 2.81 (H) 0.51 - 0.95 mg/dL Final   06/09/2015 0.80 0.52 - 1.04 mg/dL Final   03/11/2015 0.87 0.52 - 1.04 mg/dL Final   01/08/2015 0.79 0.52 - 1.04 mg/dL Final     Potassium   Date Value Ref Range Status   10/26/2024 3.2 (L) 3.4 - 5.3 mmol/L Final   10/25/2024 3.1 (L) 3.4 - 5.3 mmol/L Final   10/24/2024 3.4 3.4 - 5.3 mmol/L Final   06/09/2015 3.8 3.4 - 5.3 mmol/L Final   03/11/2015 3.9 3.4 - 5.3 mmol/L Final   01/08/2015 4.1 3.4 - 5.3 mmol/L Final     Potassium POCT   Date Value Ref Range Status   10/08/2024 4.4 3.4 - 5.3 mmol/L Final   10/08/2024 4.0 3.4 - 5.3 mmol/L Final   10/08/2024 4.1 3.4 - 5.3 mmol/L Final     Magnesium   Date Value Ref Range Status   10/26/2024 1.6 (L) 1.7 - 2.3 mg/dL Final   10/25/2024 1.7 1.7 - 2.3 mg/dL Final   10/24/2024 1.9 1.7 - 2.3 mg/dL Final   06/09/2015 1.8 1.6 - 2.3 mg/dL Final   03/11/2015 1.7 1.6 - 2.3 mg/dL Final   01/08/2015 1.8 1.6 - 2.3 mg/dL Final          I/O:  I/O last 3 completed shifts:  In: 620 [P.O.:590; I.V.:30]  Out: 1425 [Urine:1425]       Physical Exam:    General: Patient seen in chair this AM. NAD. Pleasant, conversant.   HEENT: DORITA, no sclera icterus, moist mucosa  CV: A-fib/a-flutter on monitor. 2+ peripheral pulses in all extremities. Moderate peripheral edema. Incision C/D/I.  Pulm: Satting well w/ nonlabored breathing on RA  Abd: Soft, NT, ND  Ext: Moderate pedal edema, SCDs in place, warm, distal pulses intact  Neuro: CNs grossly intact, FAM,  A&Ox3      ASSESSMENT/PLAN:    Jory Ambrose is a 49 year old female with a history of GIA, asthma, GERD,  hypothyroidism, h/o PE on plavix, carotid stenosis, Hodgkin's lymphoma (s/p chemo/radiation and bone marrow transplant x2 after recurrence) currently in remission who is s/p CABG x4, RLE EVH, attempted mitral repair, mechanical MVR, LAAE.    Principal Problem:    Coronary artery disease involving native coronary artery of native heart with angina pectoris (H)  Active Problems:    Mitral regurgitation    Paroxysmal atrial fibrillation (H)    Paroxysmal atrial flutter (H)    Typical atrial flutter (H)  Acute Renal Failure d/t ATN, ongoing.      NEURO:   - Scheduled lidocaine patches and PRN oxycodone for pain  - PTA bupropion restarted 10/25 at lower dose due to renal dysfunction. Plan for 150mg x3 days then increase to 300mg if tolerating ok  - Tylenol 500 mg q6hrs (max 2g per day given recent hepatic dysfunction)    CV:  - Pre-op EF 55-60%  - Chest tubes and TPWs removed POD#5  - Normotensive  - Metop 75mg BID  - Plavix daily indefinitely d/t unclear h/o anaphylaxis to ASA  - Atorvastatin 80mg daily  - Warfarin for mechanical MVR, INR therapeutic, heparin gtt stopped 10/15  - Paroxysmal rate controlled afib - s/p amiodarone bolus/gtt 10/13 (ok per [place]). Holding given LFTs and allergy to PO amio additive. EP consulted- recommend follow up as outpatient (scheduled)  - New baseline echo completed 10/25    PULM:   - Extubated on POD#0  - Maintaining O2 sats on room air w/ home CPAP qHS  - Encourage pulmonary toilet  - PTA zyrtec, fludrocortisone, flonase, simethicone, albuterol, pulmicort, breo ellipta, duoneb    FEN/GI:  - Continue electrolyte replacement protocol  - Regular diet  - Bowel regimen  - Ischemic hepatitis - LFTs normalized  - Hepatic w/u and labs unremarkable  - PTA vitamin D3     RENAL:  - Nephrology consulted for ARF w/ decreased UOP, appreciate. Following.  - CRRT 10/11 - 10/13  -  HD 10/15, 17, 19, 23  - Strict I/O  - Daily renal panel  - Cr elevated 2.7-3.1 (baseline ~0.9)  - UA/Urine cx 10/10 & 10/13 NG    HEME:  - H/o bone marrow tx. Needs irradiated blood  - Acute blood loss anemia post-op, resolved  - Hgb stable, likely dilutional component. Hgb 7.1 (6.9). No need for transfusion  - Warfarin INR goal 2.5-3.5 in s/o mech MVR  - HIT screen negative.  - INR therapeutic  - Transfusion hx  - 2u PRBCs periop  - 1u PRBCs 10/11  - 1u PRBCs 10/16    ID:  - Lydia op ppx complete. Afebrile. Mild stable leukocytosis, felt to be reactive.  - UA/Urine cx 10/10 & 10/13 NG.  - Blood cx 10/9 & 10/13 NG  - CBC qday    ENDO:   - HbA1c 5.7%  - Adrenal, thyroid, and pancreatitis labs unremarkable  - PTA Metformin discontinued (renal function)  - PTA synthroid    PPx:   - DVT: SCDs, SQ heparin TID, ambulation   - GI: Protonix 40mg IV/PO daily    DISPO:   - Continue general tele care (POD#8)  - PT/OT recs at discharge: TCU  - Medically Ready for Discharge: Ready since 10/24. Awaiting outpatient HD to be arranged and TCU acceptance.       _______  Nikki Thurston PA-C  Cardiothoracic Surgery  [phone]

## 2024-10-26 NOTE — PROGRESS NOTES
"/61 (BP Location: Right arm)   Pulse 72   Temp 97.6  F (36.4  C) (Oral)   Resp 20   Wt 105.6 kg (232 lb 14.4 oz)   LMP  (LMP Unknown)   SpO2 100%   BMI 39.98 kg/m          Allergies   Allergen Reactions    Dye [Contrast Dye] Swelling     Pre-medicated with methylprednisolone    Shellfish Allergy Shortness Of Breath, Anaphylaxis and Swelling     Shrimp, crab, lobster    Penicillins Hives     Patient cannot recall if she has tried cephalosporins in the past.     Erythromycin Hives    Morphine Hcl      Causes change in mental status    Sulfa Antibiotics Hives and Rash        Intake/Output Summary (Last 24 hours) at 10/26/2024 1549  Last data filed at 10/26/2024 1400  Gross per 24 hour   Intake 990 ml   Output 1500 ml   Net -510 ml          Called for pt feeling she is unable \"to take a deep breath in.\"  Pt anxious. Pt alert and answers questions with complete sentences. No accessory muscle use. No nasal flaring. No retractions. Good voicing. Breath sounds decreased but clear pre and post Douneb tx.  O2 2 lpm via NC. NPC.  Pt reports feeling better post tx.     Pt reports this feeling of being unable to take a deep breath occurs prior to hospitalization.   Pt may benefit from CXR. Pt may benefit for something for anxiety.  Will return if needed further.     RCAT Treatment Plan    Patient Score: 8  Patient Acuity: 4    Clinical Indication for Therapy: s/p CACG    Therapy Ordered: Douneb per home regimen PRN. Continue QID use of Aerobika/IS - pt does no own. Wean O2. Ezpap BID/ Pulmicort tx.     Assessment Summary: See above.     Joselito Mancuso, RT  10/26/2024   "

## 2024-10-26 NOTE — PROGRESS NOTES
'    [facility] ([facility]) progress note  CC: F/U ALEXEI  S:  Patient says she is doing okay, is about to eat dinner. She is understanding that labs continue to worsen slightly, but no acute indication for HD this weekend. Will continue to watch. May need dialysis early next week, but she is hopefully on the verge of recovering. She says we just need to take it day by day.     A/P:   Acute kidney injury.  Acute tubular necrosis.  Secondary to CABG and mitral valve replacement followed by cardiogenic shock.  Baseline creatinine 0.95 (10/8/2024).  Dialysis initiated 10/11/24.  Required CRRT 10/11 - 10/13/24    HD 10/15, 10/17, 10/19 (Held) but dialysis resumed again 10/23 due to rising creatinine (2.7 (10/20)-->-->3.8 mg/dl)  With lack of renal recovery, will set up outpt dialysis for ALEXEI, my office working on this ([place] vs [facility] are being considered per  notes)  I suspect she is close to recovering but not quite....Creatinine increased slightly. Hold dialysis today and will likely monitor over weekend without HD.  If function progressively worsens over weekend, anticipate HD again Monday and move forward with outpt HD ([place]fully [place] or [place]).  Check daily labs and will review possible HD needs daily.    Goal currently is to dialyze her as little as needed for earliest detection of recovery  PCAD placed 10/24.   Change Bumex to 2 mg PO daily on 10/26.         2. Status post coronary artery bypass grafting x 4 vessels and mechanical mitral valve replacement on 10/8.  Diffuse coronary artery disease   -complicated post op course with cardiogenic shock, severe shock liver, dialysis dependent ALEXEI    3. Shock liver. Resolved.      4. History of Hodgkin's lymphoma.  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    5. Anasarca - Improved on Bumex. Dizziness/cramping with UF on dialysis 10/23 so may need to be patient with this.    -  Change Bumex to 2 mg PO daily 10/25 in anticipation of possible discharge soon.     6. Hypokalemia - due to bumex/poor PO intake. Stable today. Replace K+ as needed.      7. Anemia due to acute blood loss: Transfuse PRN per Hosp med/surgery.    - Check iron stores     Brea Buchanan  [facility]  [phone] (off)         /61 (BP Location: Right arm)   Pulse 72   Temp 97.6  F (36.4  C) (Oral)   Resp 20   Wt 105.6 kg (232 lb 14.4 oz)   LMP  (LMP Unknown)   SpO2 100%   BMI 39.98 kg/m      I/O last 3 completed shifts:  In: 990 [P.O.:990]  Out: 1500 [Urine:1500]    Physical Exam:   GENERAL: Alert, NAD in chair  EYES: EOMI   ENT: MMM, hearing intact  RESP: mildly tachypneic, but satting well on RA   CV: regular rate, + leg edema (R>L)  GI: NT/ND  SKIN: Pale, right leg incisions clean/ bruising noted, sternotomy site healing  RIJ PCAD site clean      Recent Labs   Lab 10/26/24  1325 10/26/24  0637 10/25/24  0431 10/24/24  0620 10/23/24  0501 10/22/24  0624 10/21/24  0452 10/20/24  0452   NA  --  137 136 136 134* 134* 133* 134*   POTASSIUM 3.2* 3.2* 3.1* 3.4 3.2* 3.4 3.4 3.4   CHLORIDE  --  98 98 99 95* 96* 97* 99   CO2  --  23 25 24 23 22 22 22   BUN  --  52.8* 46.4* 41.5* 63.4* 56.3* 49.9* 42.0*   CR  --  3.05* 2.96* 2.81* 3.86* 3.54* 3.13* 2.77*   GFRESTIMATED  --  18* 19* 20* 14* 15* 17* 20*   CINDY  --  8.4* 8.6* 8.9 8.6* 8.5* 8.4* 8.1*   MAG  --  1.6* 1.7 1.9 2.0 1.7 1.8 1.8   ALBUMIN  --   --  3.1*  --  3.2*  --  3.0*  --      Recent Labs   Lab 10/26/24  0637 10/25/24  0431 10/24/24  0620 10/23/24  0501 10/22/24  0624 10/21/24  0452 10/20/24  0452   WBC 9.0 10.1 12.4* 13.1* 13.8* 13.2* 13.1*   HGB 7.1* 6.9* 7.2* 7.6* 7.8* 7.6* 7.7*   HCT 22.6* 22.0* 22.8* 23.4* 24.6* 23.7* 24.3*   MCV 91 90 90 89 90 89 89    369 404 423 412 305 246       Current Facility-Administered Medications:     acetaminophen (TYLENOL) tablet 500 mg, 500 mg, Oral, Q6H, Catherine Galvez PA-C,  500 mg at 10/26/24 1127    albuterol (PROVENTIL HFA/VENTOLIN HFA) inhaler, 2 puff, Inhalation, Q6H PRN, Catherine Galvez PA-C, 2 puff at 10/26/24 1455    atorvastatin (LIPITOR) tablet 80 mg, 80 mg, Oral, At Bedtime, Catherine Galvez PA-C, 80 mg at 10/25/24 2100    bisacodyl (DULCOLAX) suppository 10 mg, 10 mg, Rectal, Daily PRN, Catherine Galvez PA-C    budesonide (PULMICORT) neb solution 1 mg, 1 mg, Nebulization, BID, Catherine Galvez PA-C, 1 mg at 10/26/24 0801    bumetanide (BUMEX) tablet 2 mg, 2 mg, Oral, Daily, Danish Rhoades MD, 2 mg at 10/26/24 0900    buPROPion (WELLBUTRIN XL) 24 hr tablet 150 mg, 150 mg, Oral, Daily **FOLLOWED BY** [START ON 10/28/2024] buPROPion (WELLBUTRIN XL) 24 hr tablet 300 mg, 300 mg, Oral, Daily, Kerri Dolan PA-C    cetirizine (zyrTEC) tablet 10 mg, 10 mg, Oral, Daily, Catherine Galvez PA-C, 10 mg at 10/26/24 0900    clopidogrel (PLAVIX) tablet 75 mg, 75 mg, Oral, Daily, Catherine Galvez PA-C, 75 mg at 10/26/24 0900    glucose gel 15-30 g, 15-30 g, Oral, Q15 Min PRN **OR** dextrose 50 % injection 25-50 mL, 25-50 mL, Intravenous, Q15 Min PRN, 50 mL at 10/14/24 0806 **OR** glucagon injection 1 mg, 1 mg, Subcutaneous, Q15 Min PRN, Catherine Galvez PA-C    fludrocortisone (FLORINEF) tablet 0.1 mg, 0.1 mg, Oral, Daily, Catherine Galvez PA-C, 0.1 mg at 10/26/24 0901    fluticasone (FLONASE) 50 MCG/ACT spray 2 spray, 2 spray, Both Nostrils, Daily PRN, Catherine Galvez PA-C    fluticasone-vilanterol (BREO ELLIPTA) 100-25 MCG/ACT inhaler 1 puff, 1 puff, Inhalation, At Bedtime, Catherine Galvez PA-C, 1 puff at 10/25/24 2100    [COMPLETED] sodium chloride 0.9% DIALYSIS Cath LOCK - RED Lumen, 10 mL, Intracatheter, Once in dialysis/CRRT, 10 mL at 10/17/24 1527 **FOLLOWED BY** heparin 1000 unit/mL DIALYSIS Cath LOCK - RED Lumen, 1.3-2.6 mL, Intracatheter, Q1H PRN, Rohan Polo MD, 3,200 Units at 10/24/24 0934    [COMPLETED] sodium  chloride 0.9% DIALYSIS Cath LOCK - BLUE Lumen, 10 mL, Intracatheter, Once in dialysis/CRRT, 10 mL at 10/17/24 1526 **FOLLOWED BY** heparin 1000 unit/mL DIALYSIS Cath LOCK -BLUE Lumen, 1.3-2.6 mL, Intracatheter, Q1H PRN, Rohan Polo MD    heparin lock flush 10 unit/mL injection 5-20 mL, 5-20 mL, Intracatheter, Q24H, Catherine Galvez PA-C, 15 mL at 10/20/24 1957    heparin lock flush 10 unit/mL injection 5-20 mL, 5-20 mL, Intracatheter, Q1H PRN, Catherine Galvez PA-C    hydrALAZINE (APRESOLINE) injection 10 mg, 10 mg, Intravenous, Q30 Min PRN, Catherine Gavlez PA-C    hydrOXYzine HCl (ATARAX) tablet 25 mg, 25 mg, Oral, TID PRN, Catherine Galvez PA-C, 25 mg at 10/20/24 1642    ipratropium - albuterol 0.5 mg/2.5 mg/3 mL (DUONEB) neb solution 3 mL, 3 mL, Nebulization, Q4H PRN, Catherine Galvez PA-C, 3 mL at 10/26/24 1507    levothyroxine (SYNTHROID/LEVOTHROID) tablet 125 mcg, 125 mcg, Oral, QAM AC, Catherine Galvez PA-C, 125 mcg at 10/26/24 0707    Lidocaine (LIDOCARE) 4 % Patch 1-2 patch, 1-2 patch, Transdermal, Q24H, Catherine Galvez PA-C, 2 patch at 10/25/24 2100    metoprolol tartrate (LOPRESSOR) tablet 75 mg, 75 mg, Oral, BID, Catherine Galvez PA-C, 75 mg at 10/26/24 0900    multivitamin w/minerals (THERA-VIT-M) tablet 1 tablet, 1 tablet, Oral, Daily, Catherine Galvez PA-C, 1 tablet at 10/26/24 1127    naloxone (NARCAN) injection 0.2 mg, 0.2 mg, Intravenous, Q2 Min PRN **OR** naloxone (NARCAN) injection 0.4 mg, 0.4 mg, Intravenous, Q2 Min PRN **OR** naloxone (NARCAN) injection 0.2 mg, 0.2 mg, Intramuscular, Q2 Min PRN **OR** naloxone (NARCAN) injection 0.4 mg, 0.4 mg, Intramuscular, Q2 Min PRN, Catherine Galvez, PA-C    No heparin via hemodialysis machine, , Does not apply, Once, Jaclyn Murillo MD    ondansetron (ZOFRAN ODT) ODT tab 4 mg, 4 mg, Oral, Q6H PRN **OR** ondansetron (ZOFRAN) injection 4 mg, 4 mg, Intravenous, Q6H PRN, Catherine Galvez,  EARNESTINE, 4 mg at 10/10/24 1945    oxyCODONE IR (ROXICODONE) half-tab 2.5 mg, 2.5 mg, Oral, Q6H PRN, 2.5 mg at 10/26/24 1127 **OR** [DISCONTINUED] oxyCODONE (ROXICODONE) tablet 5 mg, 5 mg, Oral, Q4H PRN, David Wade MD, 5 mg at 10/19/24 0902    [DISCONTINUED] pantoprazole (PROTONIX) 2 mg/mL suspension 40 mg, 40 mg, Oral or NG Tube, Daily, 40 mg at 10/08/24 1729 **OR** pantoprazole (PROTONIX) EC tablet 40 mg, 40 mg, Oral, Daily, Catherine Galvez PA-C, 40 mg at 10/26/24 0901    polyethylene glycol (MIRALAX) Packet 17 g, 17 g, Oral, Daily, Catherine Galvez PA-C    prochlorperazine (COMPAZINE) injection 10 mg, 10 mg, Intravenous, Q6H PRN **OR** prochlorperazine (COMPAZINE) tablet 10 mg, 10 mg, Oral, Q6H PRN, Catherine Galvez PA-C    senna-docusate (SENOKOT-S/PERICOLACE) 8.6-50 MG per tablet 1 tablet, 1 tablet, Oral, BID, Catherine Galvez PA-C, 1 tablet at 10/25/24 1854    simethicone (MYLICON) chewable tablet 80 mg, 80 mg, Oral, 4x Daily PRN, David Wade MD, 80 mg at 10/25/24 2221    sodium chloride (PF) 0.9% PF flush 10-20 mL, 10-20 mL, Intracatheter, q1 min prn, Catherine Galvez PA-C    sodium chloride (PF) 0.9% PF flush 10-40 mL, 10-40 mL, Intracatheter, Q8H, Catherine Galvez PA-C, 10 mL at 10/26/24 0901    sodium chloride (PF) 0.9% PF flush 10-40 mL, 10-40 mL, Intracatheter, Q7 Days, Catherine Galvez PA-C, 30 mL at 10/25/24 1746    sodium chloride (PF) 0.9% PF flush 10-40 mL, 10-40 mL, Intracatheter, Q1H PRN, Catherine Galvez PA-C    sodium chloride 0.9% BOLUS 100-150 mL, 100-150 mL, Intravenous, Q15 Min PRN, Rohan Polo MD    sodium chloride 0.9% BOLUS 100-150 mL, 100-150 mL, Intravenous, Q15 Min PRN, Rohan Polo MD    Vitamin D3 (CHOLECALCIFEROL) tablet 125 mcg, 125 mcg, Oral, Daily, Catherine Galvez PA-C, 125 mcg at 10/26/24 0900    warfarin ANTICOAGULANT (COUMADIN) tablet 2 mg, 2 mg, Oral, ONCE at 18:00, Mauro  David Croft MD    Warfarin Dose Required Daily - Pharmacist Managed, 1 each, Oral, See Admin Instructions, Danish Rhoades MD    Labs personally reviewed today during this evaluation

## 2024-10-27 ENCOUNTER — APPOINTMENT (OUTPATIENT)
Dept: OCCUPATIONAL THERAPY | Facility: HOSPITAL | Age: 49
DRG: 219 | End: 2024-10-27
Attending: SURGERY
Payer: COMMERCIAL

## 2024-10-27 LAB
ANION GAP SERPL CALCULATED.3IONS-SCNC: 14 MMOL/L (ref 7–15)
BUN SERPL-MCNC: 60.6 MG/DL (ref 6–20)
CA-I BLD-MCNC: 4.5 MG/DL (ref 4.4–5.2)
CALCIUM SERPL-MCNC: 8.4 MG/DL (ref 8.8–10.4)
CHLORIDE SERPL-SCNC: 99 MMOL/L (ref 98–107)
CREAT SERPL-MCNC: 3.09 MG/DL (ref 0.51–0.95)
EGFRCR SERPLBLD CKD-EPI 2021: 18 ML/MIN/1.73M2
ERYTHROCYTE [DISTWIDTH] IN BLOOD BY AUTOMATED COUNT: 18.6 % (ref 10–15)
GLUCOSE SERPL-MCNC: 100 MG/DL (ref 70–99)
HCO3 SERPL-SCNC: 23 MMOL/L (ref 22–29)
HCT VFR BLD AUTO: 22.1 % (ref 35–47)
HGB BLD-MCNC: 7 G/DL (ref 11.7–15.7)
INR PPP: 2.23 (ref 0.85–1.15)
MAGNESIUM SERPL-MCNC: 1.4 MG/DL (ref 1.7–2.3)
MAGNESIUM SERPL-MCNC: 2 MG/DL (ref 1.7–2.3)
MCH RBC QN AUTO: 28.6 PG (ref 26.5–33)
MCHC RBC AUTO-ENTMCNC: 31.7 G/DL (ref 31.5–36.5)
MCV RBC AUTO: 90 FL (ref 78–100)
PLATELET # BLD AUTO: 374 10E3/UL (ref 150–450)
POTASSIUM SERPL-SCNC: 3.3 MMOL/L (ref 3.4–5.3)
POTASSIUM SERPL-SCNC: 3.4 MMOL/L (ref 3.4–5.3)
POTASSIUM SERPL-SCNC: 3.6 MMOL/L (ref 3.4–5.3)
RBC # BLD AUTO: 2.45 10E6/UL (ref 3.8–5.2)
SODIUM SERPL-SCNC: 136 MMOL/L (ref 135–145)
WBC # BLD AUTO: 9.8 10E3/UL (ref 4–11)

## 2024-10-27 PROCEDURE — 999N000157 HC STATISTIC RCP TIME EA 10 MIN

## 2024-10-27 PROCEDURE — 84132 ASSAY OF SERUM POTASSIUM: CPT | Performed by: SURGERY

## 2024-10-27 PROCEDURE — 84132 ASSAY OF SERUM POTASSIUM: CPT | Performed by: INTERNAL MEDICINE

## 2024-10-27 PROCEDURE — 94660 CPAP INITIATION&MGMT: CPT

## 2024-10-27 PROCEDURE — 84132 ASSAY OF SERUM POTASSIUM: CPT | Performed by: PHYSICIAN ASSISTANT

## 2024-10-27 PROCEDURE — 94640 AIRWAY INHALATION TREATMENT: CPT

## 2024-10-27 PROCEDURE — 250N000013 HC RX MED GY IP 250 OP 250 PS 637

## 2024-10-27 PROCEDURE — 250N000009 HC RX 250

## 2024-10-27 PROCEDURE — 83735 ASSAY OF MAGNESIUM: CPT

## 2024-10-27 PROCEDURE — 250N000013 HC RX MED GY IP 250 OP 250 PS 637: Performed by: PHYSICIAN ASSISTANT

## 2024-10-27 PROCEDURE — 85027 COMPLETE CBC AUTOMATED: CPT

## 2024-10-27 PROCEDURE — 80048 BASIC METABOLIC PNL TOTAL CA: CPT | Performed by: SURGERY

## 2024-10-27 PROCEDURE — 82330 ASSAY OF CALCIUM: CPT

## 2024-10-27 PROCEDURE — 94640 AIRWAY INHALATION TREATMENT: CPT | Mod: 76

## 2024-10-27 PROCEDURE — 97535 SELF CARE MNGMENT TRAINING: CPT | Mod: GO

## 2024-10-27 PROCEDURE — 120N000004 HC R&B MS OVERFLOW

## 2024-10-27 PROCEDURE — 250N000013 HC RX MED GY IP 250 OP 250 PS 637: Performed by: INTERNAL MEDICINE

## 2024-10-27 PROCEDURE — 85610 PROTHROMBIN TIME: CPT

## 2024-10-27 PROCEDURE — 250N000011 HC RX IP 250 OP 636: Performed by: SURGERY

## 2024-10-27 PROCEDURE — 83735 ASSAY OF MAGNESIUM: CPT | Performed by: SURGERY

## 2024-10-27 PROCEDURE — 250N000013 HC RX MED GY IP 250 OP 250 PS 637: Performed by: SURGERY

## 2024-10-27 RX ORDER — MAGNESIUM SULFATE HEPTAHYDRATE 40 MG/ML
2 INJECTION, SOLUTION INTRAVENOUS ONCE
Status: DISCONTINUED | OUTPATIENT
Start: 2024-10-27 | End: 2024-10-27

## 2024-10-27 RX ORDER — MAGNESIUM OXIDE 400 MG/1
400 TABLET ORAL EVERY 4 HOURS
Status: COMPLETED | OUTPATIENT
Start: 2024-10-27 | End: 2024-10-27

## 2024-10-27 RX ORDER — POTASSIUM CHLORIDE 1500 MG/1
20 TABLET, EXTENDED RELEASE ORAL ONCE
Status: COMPLETED | OUTPATIENT
Start: 2024-10-27 | End: 2024-10-27

## 2024-10-27 RX ORDER — METOPROLOL TARTRATE 25 MG/1
50 TABLET, FILM COATED ORAL 2 TIMES DAILY
Status: DISCONTINUED | OUTPATIENT
Start: 2024-10-27 | End: 2024-10-29

## 2024-10-27 RX ORDER — WARFARIN SODIUM 2 MG/1
4 TABLET ORAL
Status: COMPLETED | OUTPATIENT
Start: 2024-10-27 | End: 2024-10-27

## 2024-10-27 RX ORDER — MAGNESIUM SULFATE HEPTAHYDRATE 40 MG/ML
2 INJECTION, SOLUTION INTRAVENOUS ONCE
Status: COMPLETED | OUTPATIENT
Start: 2024-10-27 | End: 2024-10-27

## 2024-10-27 RX ADMIN — Medication 125 MCG: at 08:58

## 2024-10-27 RX ADMIN — PANTOPRAZOLE SODIUM 40 MG: 40 TABLET, DELAYED RELEASE ORAL at 08:58

## 2024-10-27 RX ADMIN — SIMETHICONE 80 MG: 80 TABLET, CHEWABLE ORAL at 14:39

## 2024-10-27 RX ADMIN — ACETAMINOPHEN 500 MG: 500 TABLET ORAL at 23:31

## 2024-10-27 RX ADMIN — METOPROLOL TARTRATE 50 MG: 25 TABLET, FILM COATED ORAL at 20:20

## 2024-10-27 RX ADMIN — POTASSIUM CHLORIDE 20 MEQ: 1500 TABLET, EXTENDED RELEASE ORAL at 22:30

## 2024-10-27 RX ADMIN — OXYCODONE HYDROCHLORIDE 2.5 MG: 5 TABLET ORAL at 20:20

## 2024-10-27 RX ADMIN — BUMETANIDE 2 MG: 2 TABLET ORAL at 08:58

## 2024-10-27 RX ADMIN — POTASSIUM CHLORIDE 20 MEQ: 1500 TABLET, EXTENDED RELEASE ORAL at 17:28

## 2024-10-27 RX ADMIN — METOPROLOL TARTRATE 50 MG: 25 TABLET, FILM COATED ORAL at 08:58

## 2024-10-27 RX ADMIN — POTASSIUM CHLORIDE 20 MEQ: 1500 TABLET, EXTENDED RELEASE ORAL at 11:58

## 2024-10-27 RX ADMIN — ACETAMINOPHEN 500 MG: 500 TABLET ORAL at 11:58

## 2024-10-27 RX ADMIN — MAGNESIUM SULFATE HEPTAHYDRATE 2 G: 40 INJECTION, SOLUTION INTRAVENOUS at 03:35

## 2024-10-27 RX ADMIN — CLOPIDOGREL BISULFATE 75 MG: 75 TABLET ORAL at 08:58

## 2024-10-27 RX ADMIN — MAGNESIUM OXIDE TAB 400 MG (241.3 MG ELEMENTAL MG) 400 MG: 400 (241.3 MG) TAB at 11:58

## 2024-10-27 RX ADMIN — WARFARIN SODIUM 4 MG: 2 TABLET ORAL at 17:28

## 2024-10-27 RX ADMIN — ACETAMINOPHEN 500 MG: 500 TABLET ORAL at 17:28

## 2024-10-27 RX ADMIN — ACETAMINOPHEN 500 MG: 500 TABLET ORAL at 06:15

## 2024-10-27 RX ADMIN — LEVOTHYROXINE SODIUM 125 MCG: 0.03 TABLET ORAL at 06:15

## 2024-10-27 RX ADMIN — BUDESONIDE 1 MG: 0.5 INHALANT ORAL at 08:05

## 2024-10-27 RX ADMIN — CETIRIZINE HYDROCHLORIDE 10 MG: 10 TABLET, FILM COATED ORAL at 08:58

## 2024-10-27 RX ADMIN — POTASSIUM CHLORIDE 20 MEQ: 1500 TABLET, EXTENDED RELEASE ORAL at 06:15

## 2024-10-27 RX ADMIN — MAGNESIUM OXIDE TAB 400 MG (241.3 MG ELEMENTAL MG) 400 MG: 400 (241.3 MG) TAB at 14:39

## 2024-10-27 RX ADMIN — FLUTICASONE FUROATE AND VILANTEROL TRIFENATATE 1 PUFF: 100; 25 POWDER RESPIRATORY (INHALATION) at 20:24

## 2024-10-27 RX ADMIN — ACETAMINOPHEN 500 MG: 500 TABLET ORAL at 00:05

## 2024-10-27 RX ADMIN — IPRATROPIUM BROMIDE AND ALBUTEROL SULFATE 3 ML: .5; 3 SOLUTION RESPIRATORY (INHALATION) at 16:09

## 2024-10-27 RX ADMIN — ATORVASTATIN CALCIUM 80 MG: 40 TABLET, FILM COATED ORAL at 20:17

## 2024-10-27 RX ADMIN — FLUDROCORTISONE ACETATE 0.1 MG: 0.1 TABLET ORAL at 08:59

## 2024-10-27 RX ADMIN — OXYCODONE HYDROCHLORIDE 2.5 MG: 5 TABLET ORAL at 13:27

## 2024-10-27 RX ADMIN — OXYCODONE HYDROCHLORIDE 2.5 MG: 5 TABLET ORAL at 03:35

## 2024-10-27 RX ADMIN — Medication 1 TABLET: at 11:58

## 2024-10-27 NOTE — PLAN OF CARE
sc  Patient Name: Jory Ambrose   MRN: [id number]   Date of Admission: 10/8/2024    Procedure: Procedure(s):  CORONARY ARTERY BYPASS GRAFT TIMES FOUR, LEFT INTERNAL MAMMARY ARTERY HARVEST, RIGHT LEG ENDOSCOPIC VESSEL PROCUREMENT, EPIAORTIC ULTRASOUND,  MITRAL VALVE REAIR CONVERTED TO MITRAL VALVE REPLACEMENT  LEFT ATRIAL APPENDAGE LIGATION,  ANESTHESIA TRANSESOPHAGEAL ECHOCARDIOGRAM    Post Op day #:19    Subjective (Patient focus/Primary Problem for shift): SOB, pain control          Pain Goal0 Pain Rating2           Pain Medication/ Regime effective to reduce patient painyes    Objective (Physical assessment):           Rhythm: atrial fibrillation            Bowel Activity: no if Yes indicate when: yesterday          Bowel Medications: yes            Incision: healing well          Incentive Spirometry Q 1-2 hour when awake:  yes Volume: 1000          Epicardial Pacing Wires:  not applicable            Patient Activity:           Up to chair for meals: yes          Ambulation with RN x2 (Not including CR): yes            Is patient in home clothes:no             Chest Tubes   Pleural: not applicable Draining: not applicable               Suction: not applicable              Mediastinal: not applicable Draining: not applicable               Suction: not applicable   Dressing Change Daily:not applicable If No, why? ROBERT                     Urinary Catheter: no           Preventative WOC consult (need MD order): no     A&O x4. The pt's  was here until after dinner. The pt c/o [place]. PRN Neb given x1 for wheezing/SOB. This was effective per pt. PICC dressing changed this shift. Call-light within reach.     1630 K+ recheck came back low (3.3). Replace and recheck at 2200 it was still not WNL (3.4), replaced and recheck at 0324.     Tele- A-fib rate controlled.           Nathalie Pina RN   10/27/2024   6:10 PM

## 2024-10-27 NOTE — PLAN OF CARE
Goal Outcome Evaluation:      Plan of Care Reviewed With: patient, spouse    Overall Patient Progress: improving      Problem: Adult Inpatient Plan of Care  Goal: Optimal Comfort and Wellbeing  10/27/2024 0541 by June Goff RN  Outcome: Progressing     Problem: Risk for Delirium  Goal: Optimal Coping  10/27/2024 0541 by June Goff RN  Outcome: Progressing     Problem: Risk for Delirium  Goal: Improved Sleep  10/27/2024 0541 by June Goff RN  Outcome: Progressing     Problem: Cardiovascular Surgery  Goal: Improved Activity Tolerance  10/27/2024 0541 by June Goff RN  Outcome: Progressing     Problem: Pain Acute  Goal: Optimal Pain Control and Function  10/27/2024 0541 by June Goff RN  Outcome: Progressing    sc  Patient Name: Jory Ambrose   MRN: [id number]   Date of Admission: 10/8/2024    Procedure: Procedure(s):  CORONARY ARTERY BYPASS GRAFT TIMES FOUR, LEFT INTERNAL MAMMARY ARTERY HARVEST, RIGHT LEG ENDOSCOPIC VESSEL PROCUREMENT, EPIAORTIC ULTRASOUND,  MITRAL VALVE REAIR CONVERTED TO MITRAL VALVE REPLACEMENT  LEFT ATRIAL APPENDAGE LIGATION,  ANESTHESIA TRANSESOPHAGEAL ECHOCARDIOGRAM    Post Op day #:19    Subjective (Patient focus/Primary Problem for shift): Rest          Pain Goal 4 Pain Rating 6-8           Pain Medication/ Regime effective to reduce patient pain Scheduled Tylenol and PRN Oxy    Objective (Physical assessment):           Rhythm: atrial fibrillation            Bowel Activity: yes if Yes indicate when: 10/26/24          Bowel Medications: yes            Incision: healing well          Incentive Spirometry Q 1-2 hour when awake:  yes Volume: 1000          Epicardial Pacing Wires:  no            Patient Activity:           Up to chair for meals: yes          Ambulation with RN x2 (Not including CR): no            Is patient in home clothes:no             Chest Tubes   Pleural: no Draining: no               Suction: no              Mediastinal: no  Draining: no               Suction: no   Dressing Change Daily:no If No, why? ROBERT                     Urinary Catheter: no           Preventative WOC consult (need MD order): no     Patient Aox4. Afib, RA with CPAP overnight. Patient denies chest pain and SOB. Endorses incisional pain relieve with the above meds. K+ replaced overnight recheck tomorrow (10/28). Mag replaced overnight recheck at 0803. Pt able to make needs known, call light within reach. POCC.    June Goff RN on 10/27/2024 at 5:49 AM

## 2024-10-27 NOTE — PROGRESS NOTES
'    [facility]NAL ([facility]) progress note  CC: F/U ALEXEI  S:  Patient feels very SOB today. Just got back to the chair and feels that she cannot catch her breath. Chest feels painful and tight, like she is having a constant asthma attack. She says she is peeing more. Happy to hear Cr is relatively stable.     A/P:   Acute kidney injury.  Acute tubular necrosis.  Secondary to CABG and mitral valve replacement followed by cardiogenic shock.  Baseline creatinine 0.95 (10/8/2024).  Dialysis initiated 10/11/24.  Required CRRT 10/11 - 10/13/24    HD 10/15, 10/17, 10/19 (Held) but did dialysis again 10/23 due to rising creatinine (2.7 (10/20)-->-->3.8 mg/dl)  With lack of renal recovery, will set up outpt dialysis for ALEXEI, my office working on this ([place] [facility] are being considered per  notes)  I suspect she is close to recovering but may need a few more sessions of dialysis....Creatinine increased just slightly today, 3.09 from 3.05. UOP is improving. No indication to run today. Labs acceptable. CXR from yesterday with trace pleural effusions, no pulmonary edema, so I do not think running her would help her SOB today.   Check daily labs and will review possible HD needs daily.    Goal currently is to dialyze her as little as needed for earliest detection of recovery  PCAD placed 10/24.   Bumex 2 mg PO daily     2. Status post coronary artery bypass grafting x 4 vessels and mechanical mitral valve replacement on 10/8.  Diffuse coronary artery disease   -complicated post op course with cardiogenic shock, severe shock liver, dialysis dependent ALEXEI    3. Shock liver. Resolved.      4. History of Hodgkin's lymphoma.  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    5. Anasarca - Improved on Bumex. Dizziness/cramping with UF on dialysis 10/23 so may need to be patient with this.     6. Hypokalemia - due to bumex/poor PO intake. Now resolved.      7. Anemia  due to acute blood loss: Transfuse PRN per Hosp med/surgery. Iron 28, sat 12, ferritin 723.     Brea Buchanan  [facility]  [phone] (off)    /62 (BP Location: Right arm)   Pulse 79   Temp 98.2  F (36.8  C) (Oral)   Resp 18   Wt 105.6 kg (232 lb 12.8 oz)   LMP  (LMP Unknown)   SpO2 100%   BMI 39.96 kg/m      I/O last 3 completed shifts:  In: 1470 [P.O.:1470]  Out: 2125 [Urine:2125]    Physical Exam:   GENERAL: Alert, sitting up in chair, ill appearing   EYES: EOMI   ENT: MMM, hearing intact  RESP: tachypneic, increased WOB but satting well on RA   CV: regular rate, + leg edema (R>L)  GI: NT/ND  SKIN: Pale, right leg incisions clean/ bruising noted, sternotomy site healing  RIJ PCAD site clean    Recent Labs   Lab 10/27/24  0931 10/27/24  0337 10/27/24  0219 10/26/24  2056 10/26/24  1325 10/26/24  0637 10/25/24  0431 10/24/24  0620 10/23/24  0501 10/22/24  0624 10/21/24  0452   NA  --  136  --   --   --  137 136 136 134* 134* 133*   POTASSIUM 3.4 3.4 3.6 3.3* 3.2* 3.2* 3.1* 3.4 3.2* 3.4 3.4   CHLORIDE  --  99  --   --   --  98 98 99 95* 96* 97*   CO2  --  23  --   --   --  23 25 24 23 22 22   BUN  --  60.6*  --   --   --  52.8* 46.4* 41.5* 63.4* 56.3* 49.9*   CR  --  3.09*  --   --   --  3.05* 2.96* 2.81* 3.86* 3.54* 3.13*   GFRESTIMATED  --  18*  --   --   --  18* 19* 20* 14* 15* 17*   CINDY  --  8.4*  --   --   --  8.4* 8.6* 8.9 8.6* 8.5* 8.4*   MAG 2.0  --  1.4*  --   --  1.6* 1.7 1.9 2.0 1.7 1.8   ALBUMIN  --   --   --   --   --   --  3.1*  --  3.2*  --  3.0*     Recent Labs   Lab 10/27/24  0337 10/26/24  0637 10/25/24  0431 10/24/24  0620 10/23/24  0501 10/22/24  0624 10/21/24  0452   WBC 9.8 9.0 10.1 12.4* 13.1* 13.8* 13.2*   HGB 7.0* 7.1* 6.9* 7.2* 7.6* 7.8* 7.6*   HCT 22.1* 22.6* 22.0* 22.8* 23.4* 24.6* 23.7*   MCV 90 91 90 90 89 90 89    374 369 404 423 412 305       Current Facility-Administered Medications:     acetaminophen (TYLENOL) tablet 500 mg, 500  mg, Oral, Q6H, Catherine Galvez PA-C, 500 mg at 10/27/24 1158    albuterol (PROVENTIL HFA/VENTOLIN HFA) inhaler, 2 puff, Inhalation, Q6H PRN, Catherine Galvez PA-C, 2 puff at 10/26/24 1455    atorvastatin (LIPITOR) tablet 80 mg, 80 mg, Oral, At Bedtime, Catherine Galvez PA-C, 80 mg at 10/26/24 2107    bisacodyl (DULCOLAX) suppository 10 mg, 10 mg, Rectal, Daily PRN, Catherine Galvez PA-C    budesonide (PULMICORT) neb solution 1 mg, 1 mg, Nebulization, BID, Catherine Galvez PA-C, 1 mg at 10/27/24 0805    bumetanide (BUMEX) tablet 2 mg, 2 mg, Oral, Daily, Danish Rhoades MD, 2 mg at 10/27/24 0858    [COMPLETED] buPROPion (WELLBUTRIN XL) 24 hr tablet 150 mg, 150 mg, Oral, Daily **FOLLOWED BY** [START ON 10/28/2024] buPROPion (WELLBUTRIN XL) 24 hr tablet 300 mg, 300 mg, Oral, Daily, Kerri Dolan PA-C    cetirizine (zyrTEC) tablet 10 mg, 10 mg, Oral, Daily, Catherine Galvez PA-KENIA, 10 mg at 10/27/24 0858    clopidogrel (PLAVIX) tablet 75 mg, 75 mg, Oral, Daily, Catherine Galvez PA-C, 75 mg at 10/27/24 0858    glucose gel 15-30 g, 15-30 g, Oral, Q15 Min PRN **OR** dextrose 50 % injection 25-50 mL, 25-50 mL, Intravenous, Q15 Min PRN, 50 mL at 10/14/24 0806 **OR** glucagon injection 1 mg, 1 mg, Subcutaneous, Q15 Min PRN, Catherine Galvez PA-C    fludrocortisone (FLORINEF) tablet 0.1 mg, 0.1 mg, Oral, Daily, Catherine Galvez PA-C, 0.1 mg at 10/27/24 0859    fluticasone (FLONASE) 50 MCG/ACT spray 2 spray, 2 spray, Both Nostrils, Daily PRN, Catherine Galvez PA-C    fluticasone-vilanterol (BREO ELLIPTA) 100-25 MCG/ACT inhaler 1 puff, 1 puff, Inhalation, At Bedtime, Catherine Galvez PA-C, 1 puff at 10/25/24 2100    [COMPLETED] sodium chloride 0.9% DIALYSIS Cath LOCK - RED Lumen, 10 mL, Intracatheter, Once in dialysis/CRRT, 10 mL at 10/17/24 1527 **FOLLOWED BY** heparin 1000 unit/mL DIALYSIS Cath LOCK - RED Lumen, 1.3-2.6 mL, Intracatheter, Q1H PRN, Rohan Polo MD,  3,200 Units at 10/24/24 0934    [COMPLETED] sodium chloride 0.9% DIALYSIS Cath LOCK - BLUE Lumen, 10 mL, Intracatheter, Once in dialysis/CRRT, 10 mL at 10/17/24 1526 **FOLLOWED BY** heparin 1000 unit/mL DIALYSIS Cath LOCK -BLUE Lumen, 1.3-2.6 mL, Intracatheter, Q1H PRN, Rohan Polo MD    heparin lock flush 10 unit/mL injection 5-20 mL, 5-20 mL, Intracatheter, Q24H, Catherine Galvez PA-C, 5 mL at 10/26/24 2108    heparin lock flush 10 unit/mL injection 5-20 mL, 5-20 mL, Intracatheter, Q1H PRN, Catherine Galvez PA-C    hydrALAZINE (APRESOLINE) injection 10 mg, 10 mg, Intravenous, Q30 Min PRN, Catherine Galvez PA-C    hydrOXYzine HCl (ATARAX) tablet 25 mg, 25 mg, Oral, TID PRN, Cathreine Galvez PA-C, 25 mg at 10/20/24 1642    ipratropium - albuterol 0.5 mg/2.5 mg/3 mL (DUONEB) neb solution 3 mL, 3 mL, Nebulization, Q4H PRN, Catherine Galvez PA-C, 3 mL at 10/26/24 1507    levothyroxine (SYNTHROID/LEVOTHROID) tablet 125 mcg, 125 mcg, Oral, QAM AC, Catherine Galvez PA-C, 125 mcg at 10/27/24 0615    Lidocaine (LIDOCARE) 4 % Patch 1-2 patch, 1-2 patch, Transdermal, Q24H, Catherine Galvez PA-C, 1 patch at 10/26/24 2106    magnesium oxide (MAG-OX) tablet 400 mg, 400 mg, Oral, Q4H, Nikki Thurston PA-C, 400 mg at 10/27/24 1158    metoprolol tartrate (LOPRESSOR) tablet 50 mg, 50 mg, Oral, BID, Nikki Thurston PA-C, 50 mg at 10/27/24 0858    multivitamin w/minerals (THERA-VIT-M) tablet 1 tablet, 1 tablet, Oral, Daily, Catherine Galvez PA-C, 1 tablet at 10/27/24 1158    naloxone (NARCAN) injection 0.2 mg, 0.2 mg, Intravenous, Q2 Min PRN **OR** naloxone (NARCAN) injection 0.4 mg, 0.4 mg, Intravenous, Q2 Min PRN **OR** naloxone (NARCAN) injection 0.2 mg, 0.2 mg, Intramuscular, Q2 Min PRN **OR** naloxone (NARCAN) injection 0.4 mg, 0.4 mg, Intramuscular, Q2 Min PRN, Catherine Galvez PA-C    No heparin via hemodialysis machine, , Does not apply, Once, Straight,  Jaclyn GARCIA MD    ondansetron (ZOFRAN ODT) ODT tab 4 mg, 4 mg, Oral, Q6H PRN **OR** ondansetron (ZOFRAN) injection 4 mg, 4 mg, Intravenous, Q6H PRN, Catherine Galvez PA-C, 4 mg at 10/10/24 1945    oxyCODONE IR (ROXICODONE) half-tab 2.5 mg, 2.5 mg, Oral, Q6H PRN, 2.5 mg at 10/27/24 1327 **OR** [DISCONTINUED] oxyCODONE (ROXICODONE) tablet 5 mg, 5 mg, Oral, Q4H PRN, David Wade MD, 5 mg at 10/19/24 0902    [DISCONTINUED] pantoprazole (PROTONIX) 2 mg/mL suspension 40 mg, 40 mg, Oral or NG Tube, Daily, 40 mg at 10/08/24 1729 **OR** pantoprazole (PROTONIX) EC tablet 40 mg, 40 mg, Oral, Daily, Catherine Galvez PA-C, 40 mg at 10/27/24 0858    polyethylene glycol (MIRALAX) Packet 17 g, 17 g, Oral, Daily, Catherine Galvez PA-C    prochlorperazine (COMPAZINE) injection 10 mg, 10 mg, Intravenous, Q6H PRN **OR** prochlorperazine (COMPAZINE) tablet 10 mg, 10 mg, Oral, Q6H PRN, Catherine Galvez PA-C    senna-docusate (SENOKOT-S/PERICOLACE) 8.6-50 MG per tablet 1 tablet, 1 tablet, Oral, BID, Catherine Galvez PA-C, 1 tablet at 10/25/24 1854    simethicone (MYLICON) chewable tablet 80 mg, 80 mg, Oral, 4x Daily PRN, David Wade MD, 80 mg at 10/25/24 2221    sodium chloride (PF) 0.9% PF flush 10-20 mL, 10-20 mL, Intracatheter, q1 min prn, Catherine Galvez PA-C    sodium chloride (PF) 0.9% PF flush 10-40 mL, 10-40 mL, Intracatheter, Q8H, Catherine Galvez PA-C, 10 mL at 10/27/24 1320    sodium chloride (PF) 0.9% PF flush 10-40 mL, 10-40 mL, Intracatheter, Q7 Days, Catherine Galvez PA-C, 30 mL at 10/25/24 1746    sodium chloride (PF) 0.9% PF flush 10-40 mL, 10-40 mL, Intracatheter, Q1H PRN, Catherine Galvez PA-C    sodium chloride 0.9% BOLUS 100-150 mL, 100-150 mL, Intravenous, Q15 Min PRN, Rohan Polo MD    sodium chloride 0.9% BOLUS 100-150 mL, 100-150 mL, Intravenous, Q15 Min PRN, Rohan Polo MD    Vitamin D3 (CHOLECALCIFEROL) tablet 125  mcg, 125 mcg, Oral, Daily, Catherine Galvez PA-C, 125 mcg at 10/27/24 0858    warfarin ANTICOAGULANT (COUMADIN) tablet 4 mg, 4 mg, Oral, ONCE at 18:00, David Wade MD    Warfarin Dose Required Daily - Pharmacist Managed, 1 each, Oral, See Admin Instructions, Danish Rhoades MD    Labs personally reviewed today during this evaluation

## 2024-10-27 NOTE — PROGRESS NOTES
sc  Patient Name: Jory Ambrose   MRN: [id number]   Date of Admission: 10/8/2024    Procedure: Procedure(s):  CORONARY ARTERY BYPASS GRAFT TIMES FOUR, LEFT INTERNAL MAMMARY ARTERY HARVEST, RIGHT LEG ENDOSCOPIC VESSEL PROCUREMENT, EPIAORTIC ULTRASOUND,  MITRAL VALVE REAIR CONVERTED TO MITRAL VALVE REPLACEMENT  LEFT ATRIAL APPENDAGE LIGATION,  ANESTHESIA TRANSESOPHAGEAL ECHOCARDIOGRAM    Post Op day #:19    Subjective (Patient focus/Primary Problem for shift): pain. Afib, SOB          Pain Goal 2 Pain Rating 2           Pain Medication/ Regime effective to reduce patient  pain management- scheduled tylenol    Objective (Physical assessment):           Rhythm: atrial fibrillation            Bowel Activity: yes if Yes indicate when: today          Bowel Medications: patient refused            Incision: healing well          Incentive Spirometry Q 1-2 hour when awake:  yes Volume: 1000 ml's          Epicardial Pacing Wires:  no            Patient Activity:           Up to chair for meals: yes          Ambulation with RN x2 (Not including CR): yes            Is patient in home clothes:no             Chest Tubes   Pleural: no Draining: no               Suction: no              Mediastinal: no Draining: no               Suction: no   Dressing Change Daily:not applicable If No, why?ROBERT                     Urinary Catheter: not applicable           Preventative WOC consult (need MD order): not applicable       Assessment (Nursing primary shift focus): Patient did not have any SOB episodes and hardily much pain today so far. MD aware of lab results. Creatinine 3.09. Hgb 7.0. Electrolytes supplemented and next potassium draw at 1615. She was calm and comfortable. Appetite good. Belches. PICC dressing change due to be changed tonight.     Plan (Patient Care Plan/focus): Continue to monitor any episodes of SOB, neb txs prn, Patient said she had education on sternal precautions and post surgery care. Uses heart pillow  when up and when repositioning. Showered today with help of one assist.   in earlier today. Plan for hemodialysis Monday and eventually TCU.Call light in reach.         Monika Jacobsen RN   10/27/2024   12:53 PM

## 2024-10-27 NOTE — PROGRESS NOTES
CVTS Daily Progress Note  POD#19 s/p CABG x4 (LIMA>LAD, rSVG>RPDA, rSVG>LPL, rSVG>D2), RLE EVH, Attempted MVR/r, MVR ( 29 mm [place] Mechanical Mitral Valve), LAAE  Attending: Mauro  LOS: 19    SUBJECTIVE/INTERVAL EVENTS:    No acute events overnight. Continues in rate controlled a-fib/a-flutter. One episode hypotension yesterday; resolved. Continues to have decent UOP. Maintaining oxygen saturations on home CPAP at night and room air during the day. Normotensive. Working w therapy. Pain well controlled. +BM. Tolerating regular diet. INR near therapeutic (2.23). Patient denies new chest pain, shortness of breath, abdominal pain, calf pain, nausea. Patient has no questions today.    OBJECTIVE:  Temp:  [97.6  F (36.4  C)-98.2  F (36.8  C)] 98.2  F (36.8  C)  Pulse:  [67-92] 79  Resp:  [16-20] 18  BP: ()/(51-79) 122/62  SpO2:  [97 %-100 %] 100 %  Vitals:    10/23/24 0442 10/24/24 0645 10/25/24 0625 10/26/24 0640   Weight: 107.2 kg (236 lb 4.8 oz) 105.5 kg (232 lb 9.6 oz) 105.8 kg (233 lb 4.8 oz) 105.6 kg (232 lb 14.4 oz)    10/27/24 0615   Weight: 105.6 kg (232 lb 12.8 oz)       Clinically Significant Risk Factors        # Hypokalemia: Lowest K = 3.2 mmol/L in last 2 days, will replace as needed      # Hypomagnesemia: Lowest Mg = 1.4 mg/dL in last 2 days, will replace as needed   # Hypoalbuminemia: Lowest albumin = 2.6 g/dL at 10/16/2024  4:35 AM, will monitor as appropriate       # Hypertension: Noted on problem list            # Moderate Malnutrition: based on nutrition assessment     # History of CABG: noted on surgical history                Current Medications:    Scheduled Meds:  Current Facility-Administered Medications   Medication Dose Route Frequency Provider Last Rate Last Admin    acetaminophen (TYLENOL) tablet 500 mg  500 mg Oral Q6H Catherine Galvez PA-C   500 mg at 10/27/24 0615    atorvastatin (LIPITOR) tablet 80 mg  80 mg Oral At Bedtime Catherine Galvez PA-C   80 mg at 10/26/24  2107    budesonide (PULMICORT) neb solution 1 mg  1 mg Nebulization BID Catherine Galvez PA-C   1 mg at 10/27/24 0805    bumetanide (BUMEX) tablet 2 mg  2 mg Oral Daily Danish Rhoades MD   2 mg at 10/27/24 0858    [START ON 10/28/2024] buPROPion (WELLBUTRIN XL) 24 hr tablet 300 mg  300 mg Oral Daily Kerri Dolan PA-C        cetirizine (zyrTEC) tablet 10 mg  10 mg Oral Daily Catherine Galvez PA-C   10 mg at 10/27/24 0858    clopidogrel (PLAVIX) tablet 75 mg  75 mg Oral Daily Catherine Galvez PA-C   75 mg at 10/27/24 0858    fludrocortisone (FLORINEF) tablet 0.1 mg  0.1 mg Oral Daily Catherine Galvez PA-C   0.1 mg at 10/27/24 0859    fluticasone-vilanterol (BREO ELLIPTA) 100-25 MCG/ACT inhaler 1 puff  1 puff Inhalation At Bedtime Catherine Galvez PA-C   1 puff at 10/25/24 2100    heparin lock flush 10 unit/mL injection 5-20 mL  5-20 mL Intracatheter Q24H Catherine Galvez PA-C   5 mL at 10/26/24 2108    levothyroxine (SYNTHROID/LEVOTHROID) tablet 125 mcg  125 mcg Oral QAM AC Catherine Galvez PA-C   125 mcg at 10/27/24 0615    Lidocaine (LIDOCARE) 4 % Patch 1-2 patch  1-2 patch Transdermal Q24H Catherine Galvez PA-C   1 patch at 10/26/24 2106    metoprolol tartrate (LOPRESSOR) tablet 50 mg  50 mg Oral BID Nikki Thurston PA-C   50 mg at 10/27/24 0858    multivitamin w/minerals (THERA-VIT-M) tablet 1 tablet  1 tablet Oral Daily Catherine Galvez PA-C   1 tablet at 10/26/24 1127    No heparin via hemodialysis machine   Does not apply Once Jaclyn Murillo MD        pantoprazole (PROTONIX) EC tablet 40 mg  40 mg Oral Daily Catherine Galvez PA-C   40 mg at 10/27/24 0858    polyethylene glycol (MIRALAX) Packet 17 g  17 g Oral Daily Catherine Galvez PA-C        senna-docusate (SENOKOT-S/PERICOLACE) 8.6-50 MG per tablet 1 tablet  1 tablet Oral BID Catherine Galvez PA-C   1 tablet at 10/25/24 8434    sodium chloride (PF) 0.9% PF flush 10-40 mL  10-40 mL  Intracatheter Q8H Catherine Galvez PA-C   30 mL at 10/27/24 0614    sodium chloride (PF) 0.9% PF flush 10-40 mL  10-40 mL Intracatheter Q7 Days Catherine Galvez PA-C   30 mL at 10/25/24 1746    Vitamin D3 (CHOLECALCIFEROL) tablet 125 mcg  125 mcg Oral Daily Catherine Galvez PA-C   125 mcg at 10/27/24 0858    warfarin ANTICOAGULANT (COUMADIN) tablet 4 mg  4 mg Oral ONCE at 18:00 David Wade MD        Warfarin Dose Required Daily - Pharmacist Managed  1 each Oral See Admin Instructions Danish Rhoades MD         Continuous Infusions:  Current Facility-Administered Medications   Medication Dose Route Frequency Provider Last Rate Last Admin     PRN Meds:.  Current Facility-Administered Medications   Medication Dose Route Frequency Provider Last Rate Last Admin    albuterol (PROVENTIL HFA/VENTOLIN HFA) inhaler  2 puff Inhalation Q6H PRN Catherine Galvez PA-C   2 puff at 10/26/24 1455    bisacodyl (DULCOLAX) suppository 10 mg  10 mg Rectal Daily PRN Catherine Galvez PA-C        glucose gel 15-30 g  15-30 g Oral Q15 Min PRN Catherine Galvez PA-C        Or    dextrose 50 % injection 25-50 mL  25-50 mL Intravenous Q15 Min PRN Catherine Galvez PA-C   50 mL at 10/14/24 0806    Or    glucagon injection 1 mg  1 mg Subcutaneous Q15 Min PRN Catherine Galvez PA-C        fluticasone (FLONASE) 50 MCG/ACT spray 2 spray  2 spray Both Nostrils Daily PRN Catherine Galvez PA-C        heparin 1000 unit/mL DIALYSIS Cath LOCK - RED Lumen  1.3-2.6 mL Intracatheter Q1H PRN Rohan Polo MD   3,200 Units at 10/24/24 0934    heparin 1000 unit/mL DIALYSIS Cath LOCK -BLUE Lumen  1.3-2.6 mL Intracatheter Q1H PRN Rohan Polo MD        heparin lock flush 10 unit/mL injection 5-20 mL  5-20 mL Intracatheter Q1H PRN Catherine Galvez PA-C        hydrALAZINE (APRESOLINE) injection 10 mg  10 mg Intravenous Q30 Min PRN Catherine Galvez PA-C        hydrOXYzine HCl (ATARAX)  tablet 25 mg  25 mg Oral TID PRN Catherine Galvez PA-C   25 mg at 10/20/24 1642    ipratropium - albuterol 0.5 mg/2.5 mg/3 mL (DUONEB) neb solution 3 mL  3 mL Nebulization Q4H PRN Catherine Galvez PA-C   3 mL at 10/26/24 1507    naloxone (NARCAN) injection 0.2 mg  0.2 mg Intravenous Q2 Min PRN Catherine Galvez PA-C        Or    naloxone (NARCAN) injection 0.4 mg  0.4 mg Intravenous Q2 Min PRN Catherine Galvez PA-C        Or    naloxone (NARCAN) injection 0.2 mg  0.2 mg Intramuscular Q2 Min PRN Catherine Galvez PA-C        Or    naloxone (NARCAN) injection 0.4 mg  0.4 mg Intramuscular Q2 Min PRN Catherine Galvez PA-C        ondansetron (ZOFRAN ODT) ODT tab 4 mg  4 mg Oral Q6H PRN Catherine Galvez PA-C        Or    ondansetron (ZOFRAN) injection 4 mg  4 mg Intravenous Q6H PRN Catherine Galvez PA-C   4 mg at 10/10/24 1945    oxyCODONE IR (ROXICODONE) half-tab 2.5 mg  2.5 mg Oral Q6H PRN Catherine Galvez PA-C   2.5 mg at 10/27/24 0335    prochlorperazine (COMPAZINE) injection 10 mg  10 mg Intravenous Q6H PRN Catherine Galvez PA-C        Or    prochlorperazine (COMPAZINE) tablet 10 mg  10 mg Oral Q6H PRN Catherine Galvez PA-C        simethicone (MYLICON) chewable tablet 80 mg  80 mg Oral 4x Daily PRN David Wade MD   80 mg at 10/25/24 2221    sodium chloride (PF) 0.9% PF flush 10-20 mL  10-20 mL Intracatheter q1 min prn Catherine Galvze PA-C        sodium chloride (PF) 0.9% PF flush 10-40 mL  10-40 mL Intracatheter Q1H PRN Catherine Galvez PA-C        sodium chloride 0.9% BOLUS 100-150 mL  100-150 mL Intravenous Q15 Min PRN Rohan Polo MD        sodium chloride 0.9% BOLUS 100-150 mL  100-150 mL Intravenous Q15 Min PRN Rohan Polo MD           Cardiographics:    Telemetry monitoring demonstrates a flutter w/ rates in the 60-70s per my personal review.    Imaging:  Results for orders placed or performed during the hospital  encounter of 10/08/24   XR Chest Port 1 View    Impression    IMPRESSION: Interval sternotomy, CABG procedure and mitral valve repair. Endotracheal tube proximally 2 cm above the sofia. Nasogastric tube in the stomach. Bilateral chest tubes and midline mediastinal drain in expected position. Epicardial leads are   present. Left IJ line in the brachiocephalic vein.    Low lung volumes. No evidence for CHF or pneumonia. No pleural effusion or pneumothorax.   XR Chest Port 1 View    Impression    IMPRESSION: Poststernotomy changes with prosthetic mitral valve. Patient's been extubated and the NG tube has been removed. Mediastinal and pleural chest tubes are in place.    Left IJ cordis sheath is in the distal left innominate artery. Catheter has a very subtle kink within it 3.5 centimeters from the tip.    Low lung volumes. No pneumothorax. Likely trace left effusion at the base. No signs of pneumonia or failure. Heart is of normal size.   XR Abdomen Port 1 View    Impression    IMPRESSION: Orogastric tube tip in the proximal stomach and oriented superiorly towards the left diaphragm. Visualized bowel gas pattern unremarkable. Numerous tubes and lines lower chest and upper abdomen as described on chest x-ray report from today.        Labs, personally reviewed.  Hemoglobin   Date Value Ref Range Status   10/27/2024 7.0 (L) 11.7 - 15.7 g/dL Final   10/26/2024 7.1 (L) 11.7 - 15.7 g/dL Final   10/25/2024 6.9 (LL) 11.7 - 15.7 g/dL Final   06/09/2015 13.5 11.7 - 15.7 g/dL Final   03/11/2015 12.3 11.7 - 15.7 g/dL Final   01/08/2015 13.6 11.7 - 15.7 g/dL Final     WBC   Date Value Ref Range Status   06/09/2015 7.9 4.0 - 11.0 10e9/L Final   03/11/2015 9.4 4.0 - 11.0 10e9/L Final   01/08/2015 8.1 4.0 - 11.0 10e9/L Final     WBC Count   Date Value Ref Range Status   10/27/2024 9.8 4.0 - 11.0 10e3/uL Final   10/26/2024 9.0 4.0 - 11.0 10e3/uL Final   10/25/2024 10.1 4.0 - 11.0 10e3/uL Final     Platelet Count   Date Value Ref Range  Status   10/27/2024 374 150 - 450 10e3/uL Final   10/26/2024 374 150 - 450 10e3/uL Final   10/25/2024 369 150 - 450 10e3/uL Final   06/09/2015 219 150 - 450 10e9/L Final   03/11/2015 240 150 - 450 10e9/L Final   01/08/2015 221 150 - 450 10e9/L Final     Creatinine   Date Value Ref Range Status   10/27/2024 3.09 (H) 0.51 - 0.95 mg/dL Final   10/26/2024 3.05 (H) 0.51 - 0.95 mg/dL Final   10/25/2024 2.96 (H) 0.51 - 0.95 mg/dL Final   06/09/2015 0.80 0.52 - 1.04 mg/dL Final   03/11/2015 0.87 0.52 - 1.04 mg/dL Final   01/08/2015 0.79 0.52 - 1.04 mg/dL Final     Potassium   Date Value Ref Range Status   10/27/2024 3.4 3.4 - 5.3 mmol/L Final   10/27/2024 3.6 3.4 - 5.3 mmol/L Final   10/26/2024 3.3 (L) 3.4 - 5.3 mmol/L Final   06/09/2015 3.8 3.4 - 5.3 mmol/L Final   03/11/2015 3.9 3.4 - 5.3 mmol/L Final   01/08/2015 4.1 3.4 - 5.3 mmol/L Final     Potassium POCT   Date Value Ref Range Status   10/08/2024 4.4 3.4 - 5.3 mmol/L Final   10/08/2024 4.0 3.4 - 5.3 mmol/L Final   10/08/2024 4.1 3.4 - 5.3 mmol/L Final     Magnesium   Date Value Ref Range Status   10/27/2024 1.4 (L) 1.7 - 2.3 mg/dL Final   10/26/2024 1.6 (L) 1.7 - 2.3 mg/dL Final   10/25/2024 1.7 1.7 - 2.3 mg/dL Final   06/09/2015 1.8 1.6 - 2.3 mg/dL Final   03/11/2015 1.7 1.6 - 2.3 mg/dL Final   01/08/2015 1.8 1.6 - 2.3 mg/dL Final          I/O:  I/O last 3 completed shifts:  In: 1470 [P.O.:1470]  Out: 2125 [Urine:2125]       Physical Exam:    General: Patient seen in chair this AM. NAD. Pleasant, conversant.   HEENT: DORITA, no sclera icterus, moist mucosa  CV: A-fib/a-flutter on monitor. 2+ peripheral pulses in all extremities. Moderate peripheral edema. Incision C/D/I.  Pulm: Satting well w/ nonlabored breathing on RA  Abd: Soft, NT, ND  Ext: Moderate pedal edema, SCDs in place, warm, distal pulses intact  Neuro: CNs grossly intact, FAM, A&Ox3      ASSESSMENT/PLAN:    Jory Ambrose is a 49 year old female with a history of GIA, asthma, GERD,   hypothyroidism, h/o PE on plavix, carotid stenosis, Hodgkin's lymphoma (s/p chemo/radiation and bone marrow transplant x2 after recurrence) currently in remission who is s/p CABG x4, RLE EVH, attempted mitral repair, mechanical MVR, LAAE.    Principal Problem:    Coronary artery disease involving native coronary artery of native heart with angina pectoris (H)  Active Problems:    Mitral regurgitation    Paroxysmal atrial fibrillation (H)    Paroxysmal atrial flutter (H)    Typical atrial flutter (H)  Acute Renal Failure d/t ATN, ongoing.      NEURO:   - Scheduled lidocaine patches and PRN oxycodone for pain  - PTA bupropion restarted 10/25 at lower dose due to renal dysfunction. Plan for 150mg x3 days then increase to 300mg if tolerating ok  - Tylenol 500 mg q6hrs (max 2g per day given recent hepatic dysfunction)    CV:  - Pre-op EF 55-60%  - Chest tubes and TPWs removed POD#5  - Normotensive  - Metoprolol 75mg BID decreased to 50mg BID per patient request  - Plavix daily indefinitely d/t unclear h/o anaphylaxis to ASA  - Atorvastatin 80mg daily  - Warfarin for mechanical MVR, INR therapeutic, heparin gtt stopped 10/15  - Paroxysmal rate controlled afib - s/p amiodarone bolus/gtt 10/13 (ok per Mauro). Holding given LFTs and allergy to PO amio additive. EP consulted- recommend follow up as outpatient (scheduled)  - New baseline echo completed 10/25    PULM:   - Extubated on POD#0  - Maintaining O2 sats on room air w/ home CPAP qHS  - Encourage pulmonary toilet  - PTA zyrtec, fludrocortisone, flonase, simethicone, albuterol, pulmicort, breo ellipta, duoneb    FEN/GI:  - Continue electrolyte replacement protocol  - Regular diet  - Bowel regimen  - Ischemic hepatitis - LFTs normalized  - Hepatic w/u and labs unremarkable  - PTA vitamin D3     RENAL:  - Nephrology consulted for ARF w/ decreased UOP, appreciate. Following.  - CRRT 10/11 - 10/13  - HD 10/15, 17, 19, 23  - Strict I/O  - Daily renal panel  - Cr elevated  2.7-3.1 (baseline ~0.9)  - UA/Urine cx 10/10 & 10/13 NG    HEME:  - H/o bone marrow tx. Needs irradiated blood  - Acute blood loss anemia post-op, resolved  - Hgb stable, likely dilutional component. Hgb 7.0 and stable. No need for transfusion  - Warfarin INR goal 2.5-3.5 in s/o mech MVR  - HIT screen negative.  - INR therapeutic  - Transfusion hx  - 2u PRBCs periop  - 1u PRBCs 10/11  - 1u PRBCs 10/16    ID:  - Lydia op ppx complete. Afebrile.   - UA/Urine cx 10/10 & 10/13 NG.  - Blood cx 10/9 & 10/13 NG  - CBC qday    ENDO:   - HbA1c 5.7%  - Adrenal, thyroid, and pancreatitis labs unremarkable  - PTA Metformin discontinued (renal function)  - PTA synthroid    PPx:   - DVT: SCDs, SQ heparin TID, ambulation   - GI: Protonix 40mg IV/PO daily    DISPO:   - Continue general tele care (POD#8)  - PT/OT recs at discharge: TCU  - Medically Ready for Discharge: Ready since 10/24. Awaiting outpatient HD to be arranged and TCU acceptance.       _______  Nikki Thurston PA-C  Cardiothoracic Surgery  [phone]

## 2024-10-28 ENCOUNTER — APPOINTMENT (OUTPATIENT)
Dept: PHYSICAL THERAPY | Facility: HOSPITAL | Age: 49
DRG: 219 | End: 2024-10-28
Attending: SURGERY
Payer: COMMERCIAL

## 2024-10-28 ENCOUNTER — APPOINTMENT (OUTPATIENT)
Dept: OCCUPATIONAL THERAPY | Facility: HOSPITAL | Age: 49
DRG: 219 | End: 2024-10-28
Attending: SURGERY
Payer: COMMERCIAL

## 2024-10-28 LAB
ANION GAP SERPL CALCULATED.3IONS-SCNC: 14 MMOL/L (ref 7–15)
BUN SERPL-MCNC: 60.7 MG/DL (ref 6–20)
CA-I BLD-MCNC: 4.6 MG/DL (ref 4.4–5.2)
CALCIUM SERPL-MCNC: 8.5 MG/DL (ref 8.8–10.4)
CHLORIDE SERPL-SCNC: 101 MMOL/L (ref 98–107)
CREAT SERPL-MCNC: 2.75 MG/DL (ref 0.51–0.95)
EGFRCR SERPLBLD CKD-EPI 2021: 20 ML/MIN/1.73M2
ERYTHROCYTE [DISTWIDTH] IN BLOOD BY AUTOMATED COUNT: 19.1 % (ref 10–15)
GLUCOSE SERPL-MCNC: 107 MG/DL (ref 70–99)
HCO3 SERPL-SCNC: 23 MMOL/L (ref 22–29)
HCT VFR BLD AUTO: 23.4 % (ref 35–47)
HGB BLD-MCNC: 7 G/DL (ref 11.7–15.7)
INR PPP: 3.15 (ref 0.85–1.15)
MAGNESIUM SERPL-MCNC: 1.7 MG/DL (ref 1.7–2.3)
MCH RBC QN AUTO: 27.8 PG (ref 26.5–33)
MCHC RBC AUTO-ENTMCNC: 29.9 G/DL (ref 31.5–36.5)
MCV RBC AUTO: 93 FL (ref 78–100)
PLATELET # BLD AUTO: 398 10E3/UL (ref 150–450)
POTASSIUM SERPL-SCNC: 3.7 MMOL/L (ref 3.4–5.3)
POTASSIUM SERPL-SCNC: 3.8 MMOL/L (ref 3.4–5.3)
RBC # BLD AUTO: 2.52 10E6/UL (ref 3.8–5.2)
SODIUM SERPL-SCNC: 138 MMOL/L (ref 135–145)
WBC # BLD AUTO: 11.7 10E3/UL (ref 4–11)

## 2024-10-28 PROCEDURE — 85610 PROTHROMBIN TIME: CPT | Performed by: SURGERY

## 2024-10-28 PROCEDURE — 250N000013 HC RX MED GY IP 250 OP 250 PS 637: Performed by: PHYSICIAN ASSISTANT

## 2024-10-28 PROCEDURE — 94660 CPAP INITIATION&MGMT: CPT

## 2024-10-28 PROCEDURE — 999N000157 HC STATISTIC RCP TIME EA 10 MIN

## 2024-10-28 PROCEDURE — 250N000013 HC RX MED GY IP 250 OP 250 PS 637: Performed by: SURGERY

## 2024-10-28 PROCEDURE — 250N000013 HC RX MED GY IP 250 OP 250 PS 637

## 2024-10-28 PROCEDURE — 250N000013 HC RX MED GY IP 250 OP 250 PS 637: Performed by: INTERNAL MEDICINE

## 2024-10-28 PROCEDURE — 94799 UNLISTED PULMONARY SVC/PX: CPT

## 2024-10-28 PROCEDURE — 83735 ASSAY OF MAGNESIUM: CPT

## 2024-10-28 PROCEDURE — 97530 THERAPEUTIC ACTIVITIES: CPT | Mod: GP | Performed by: PHYSICAL THERAPIST

## 2024-10-28 PROCEDURE — 82310 ASSAY OF CALCIUM: CPT | Performed by: INTERNAL MEDICINE

## 2024-10-28 PROCEDURE — 85027 COMPLETE CBC AUTOMATED: CPT

## 2024-10-28 PROCEDURE — 250N000009 HC RX 250

## 2024-10-28 PROCEDURE — 94640 AIRWAY INHALATION TREATMENT: CPT

## 2024-10-28 PROCEDURE — 94640 AIRWAY INHALATION TREATMENT: CPT | Mod: 76

## 2024-10-28 PROCEDURE — 250N000011 HC RX IP 250 OP 636

## 2024-10-28 PROCEDURE — 82330 ASSAY OF CALCIUM: CPT

## 2024-10-28 PROCEDURE — 97110 THERAPEUTIC EXERCISES: CPT | Mod: GP | Performed by: PHYSICAL THERAPIST

## 2024-10-28 PROCEDURE — 80048 BASIC METABOLIC PNL TOTAL CA: CPT | Performed by: SURGERY

## 2024-10-28 PROCEDURE — 120N000004 HC R&B MS OVERFLOW

## 2024-10-28 PROCEDURE — 97535 SELF CARE MNGMENT TRAINING: CPT | Mod: GO

## 2024-10-28 RX ORDER — WARFARIN SODIUM 1 MG/1
1 TABLET ORAL
Status: DISCONTINUED | OUTPATIENT
Start: 2024-10-28 | End: 2024-10-28

## 2024-10-28 RX ORDER — ACETAMINOPHEN 500 MG
500 TABLET ORAL EVERY 6 HOURS PRN
Status: DISCONTINUED | OUTPATIENT
Start: 2024-10-28 | End: 2024-10-28

## 2024-10-28 RX ORDER — POTASSIUM CHLORIDE 750 MG/1
10 TABLET, EXTENDED RELEASE ORAL ONCE
Status: COMPLETED | OUTPATIENT
Start: 2024-10-28 | End: 2024-10-28

## 2024-10-28 RX ORDER — ACETAMINOPHEN 500 MG
500 TABLET ORAL EVERY 4 HOURS PRN
Status: DISCONTINUED | OUTPATIENT
Start: 2024-10-28 | End: 2024-10-30

## 2024-10-28 RX ADMIN — PROCHLORPERAZINE EDISYLATE 10 MG: 5 INJECTION INTRAMUSCULAR; INTRAVENOUS at 20:01

## 2024-10-28 RX ADMIN — CETIRIZINE HYDROCHLORIDE 10 MG: 10 TABLET, FILM COATED ORAL at 09:01

## 2024-10-28 RX ADMIN — FLUTICASONE FUROATE AND VILANTEROL TRIFENATATE 1 PUFF: 100; 25 POWDER RESPIRATORY (INHALATION) at 22:22

## 2024-10-28 RX ADMIN — POLYETHYLENE GLYCOL 3350 17 G: 17 POWDER, FOR SOLUTION ORAL at 09:02

## 2024-10-28 RX ADMIN — BUPROPION HYDROCHLORIDE 300 MG: 150 TABLET, FILM COATED, EXTENDED RELEASE ORAL at 09:01

## 2024-10-28 RX ADMIN — SENNOSIDES AND DOCUSATE SODIUM 1 TABLET: 8.6; 5 TABLET ORAL at 09:01

## 2024-10-28 RX ADMIN — OXYCODONE HYDROCHLORIDE 2.5 MG: 5 TABLET ORAL at 09:02

## 2024-10-28 RX ADMIN — POTASSIUM CHLORIDE 10 MEQ: 750 TABLET, EXTENDED RELEASE ORAL at 05:01

## 2024-10-28 RX ADMIN — WARFARIN SODIUM 1.5 MG: 1 TABLET ORAL at 17:06

## 2024-10-28 RX ADMIN — BUDESONIDE 1 MG: 0.5 INHALANT ORAL at 19:18

## 2024-10-28 RX ADMIN — LEVOTHYROXINE SODIUM 125 MCG: 0.03 TABLET ORAL at 06:23

## 2024-10-28 RX ADMIN — SIMETHICONE 80 MG: 80 TABLET, CHEWABLE ORAL at 06:23

## 2024-10-28 RX ADMIN — BUDESONIDE 1 MG: 0.5 INHALANT ORAL at 07:25

## 2024-10-28 RX ADMIN — FLUDROCORTISONE ACETATE 0.1 MG: 0.1 TABLET ORAL at 09:02

## 2024-10-28 RX ADMIN — ATORVASTATIN CALCIUM 80 MG: 40 TABLET, FILM COATED ORAL at 20:30

## 2024-10-28 RX ADMIN — SIMETHICONE 80 MG: 80 TABLET, CHEWABLE ORAL at 20:01

## 2024-10-28 RX ADMIN — ACETAMINOPHEN 500 MG: 500 TABLET ORAL at 17:06

## 2024-10-28 RX ADMIN — PANTOPRAZOLE SODIUM 40 MG: 40 TABLET, DELAYED RELEASE ORAL at 09:01

## 2024-10-28 RX ADMIN — LIDOCAINE 2 PATCH: 4 PATCH TOPICAL at 20:31

## 2024-10-28 RX ADMIN — SIMETHICONE 80 MG: 80 TABLET, CHEWABLE ORAL at 13:19

## 2024-10-28 RX ADMIN — Medication 1 TABLET: at 13:19

## 2024-10-28 RX ADMIN — CLOPIDOGREL BISULFATE 75 MG: 75 TABLET ORAL at 09:03

## 2024-10-28 RX ADMIN — METOPROLOL TARTRATE 50 MG: 25 TABLET, FILM COATED ORAL at 20:30

## 2024-10-28 RX ADMIN — OXYCODONE HYDROCHLORIDE 2.5 MG: 5 TABLET ORAL at 03:31

## 2024-10-28 RX ADMIN — SIMETHICONE 80 MG: 80 TABLET, CHEWABLE ORAL at 01:44

## 2024-10-28 RX ADMIN — METOPROLOL TARTRATE 50 MG: 25 TABLET, FILM COATED ORAL at 09:01

## 2024-10-28 RX ADMIN — ACETAMINOPHEN 500 MG: 500 TABLET ORAL at 06:23

## 2024-10-28 RX ADMIN — Medication 125 MCG: at 09:00

## 2024-10-28 NOTE — PROGRESS NOTES
CVTS Daily Progress Note  POD#20 s/p CABG x4 (LIMA>LAD, rSVG>RPDA, rSVG>LPL, rSVG>D2), RLE EVH, Attempted MVR/r, MVR ( 29 mm [employer] Mechanical Mitral Valve), LAAE  Attending: Mauro  LOS: 20    SUBJECTIVE/INTERVAL EVENTS:    No acute events overnight. Awaiting TCU placement. Continues in rate controlled a-fib/a-flutter. Continues to have decent UOP. Maintaining oxygen saturations on home CPAP at night and room air during the day. Normotensive. Working w therapy. Pain well controlled. +BM. Tolerating regular diet. INR pending for today, has been slightly subtherapeutic past couple of days. Patient denies new chest pain, shortness of breath, abdominal pain, calf pain, nausea. Patient has no questions today.    OBJECTIVE:  Temp:  [97.6  F (36.4  C)-98.5  F (36.9  C)] 98.3  F (36.8  C)  Pulse:  [91-97] 93  Resp:  [18-20] 20  BP: (116-129)/(60-87) 129/81  FiO2 (%):  [21 %] 21 %  SpO2:  [92 %-100 %] 94 %  Vitals:    10/24/24 0645 10/25/24 0625 10/26/24 0640 10/27/24 0615   Weight: 105.5 kg (232 lb 9.6 oz) 105.8 kg (233 lb 4.8 oz) 105.6 kg (232 lb 14.4 oz) 105.6 kg (232 lb 12.8 oz)    10/28/24 0500   Weight: 106.2 kg (234 lb 3.2 oz)       Clinically Significant Risk Factors        # Hypokalemia: Lowest K = 3.2 mmol/L in last 2 days, will replace as needed      # Hypomagnesemia: Lowest Mg = 1.4 mg/dL in last 2 days, will replace as needed   # Hypoalbuminemia: Lowest albumin = 2.6 g/dL at 10/16/2024  4:35 AM, will monitor as appropriate       # Hypertension: Noted on problem list            # Moderate Malnutrition: based on nutrition assessment     # History of CABG: noted on surgical history                  Current Medications:    Scheduled Meds:  Current Facility-Administered Medications   Medication Dose Route Frequency Provider Last Rate Last Admin    acetaminophen (TYLENOL) tablet 500 mg  500 mg Oral Q6H Catherine Galvez PA-C   500 mg at 10/28/24 0623    atorvastatin (LIPITOR) tablet 80 mg  80 mg Oral At  Bedtime Catherine Galvez PA-C   80 mg at 10/27/24 2017    budesonide (PULMICORT) neb solution 1 mg  1 mg Nebulization BID Catherine Galvez PA-C   1 mg at 10/28/24 0725    bumetanide (BUMEX) tablet 2 mg  2 mg Oral Daily Danish Rhoades MD   2 mg at 10/28/24 0900    buPROPion (WELLBUTRIN XL) 24 hr tablet 300 mg  300 mg Oral Daily Kerri Dolan PA-C   300 mg at 10/28/24 0901    cetirizine (zyrTEC) tablet 10 mg  10 mg Oral Daily Catherine Galvez PA-C   10 mg at 10/28/24 0901    clopidogrel (PLAVIX) tablet 75 mg  75 mg Oral Daily Catherine Galvez PA-C   75 mg at 10/28/24 0903    fludrocortisone (FLORINEF) tablet 0.1 mg  0.1 mg Oral Daily Catherine Galvez PA-C   0.1 mg at 10/28/24 0902    fluticasone-vilanterol (BREO ELLIPTA) 100-25 MCG/ACT inhaler 1 puff  1 puff Inhalation At Bedtime Catherine Galvez PA-C   1 puff at 10/27/24 2024    heparin lock flush 10 unit/mL injection 5-20 mL  5-20 mL Intracatheter Q24H Catherine Galvez PA-C   5 mL at 10/26/24 2108    levothyroxine (SYNTHROID/LEVOTHROID) tablet 125 mcg  125 mcg Oral QAM AC Catherine Galvez PA-C   125 mcg at 10/28/24 0623    Lidocaine (LIDOCARE) 4 % Patch 1-2 patch  1-2 patch Transdermal Q24H Catherine Galvez PA-C   1 patch at 10/26/24 2106    metoprolol tartrate (LOPRESSOR) tablet 50 mg  50 mg Oral BID Nikki Thurston PA-C   50 mg at 10/28/24 0901    multivitamin w/minerals (THERA-VIT-M) tablet 1 tablet  1 tablet Oral Daily Catherine Galvez PA-C   1 tablet at 10/27/24 1158    pantoprazole (PROTONIX) EC tablet 40 mg  40 mg Oral Daily Catherine Galvez PA-C   40 mg at 10/28/24 0901    polyethylene glycol (MIRALAX) Packet 17 g  17 g Oral Daily Cathernie Galvez PA-C   17 g at 10/28/24 0902    senna-docusate (SENOKOT-S/PERICOLACE) 8.6-50 MG per tablet 1 tablet  1 tablet Oral BID Catherine Galvez PA-C   1 tablet at 10/28/24 0901    sodium chloride (PF) 0.9% PF flush 10-40 mL  10-40 mL Intracatheter  Q8H Catherine Galvez PA-C   30 mL at 10/28/24 0623    sodium chloride (PF) 0.9% PF flush 10-40 mL  10-40 mL Intracatheter Q7 Days Catherine Galvez PA-C   30 mL at 10/25/24 1746    Vitamin D3 (CHOLECALCIFEROL) tablet 125 mcg  125 mcg Oral Daily Catherine Galvez PA-C   125 mcg at 10/28/24 0900    Warfarin Dose Required Daily - Pharmacist Managed  1 each Oral See Admin Instructions Danish Rhoades MD         Continuous Infusions:  Current Facility-Administered Medications   Medication Dose Route Frequency Provider Last Rate Last Admin     PRN Meds:.  Current Facility-Administered Medications   Medication Dose Route Frequency Provider Last Rate Last Admin    albuterol (PROVENTIL HFA/VENTOLIN HFA) inhaler  2 puff Inhalation Q6H PRN Catherine Galvez PA-C   2 puff at 10/26/24 1455    bisacodyl (DULCOLAX) suppository 10 mg  10 mg Rectal Daily PRN Catherine Galvez PA-C        glucose gel 15-30 g  15-30 g Oral Q15 Min PRN Catherine Galvez PA-C        Or    dextrose 50 % injection 25-50 mL  25-50 mL Intravenous Q15 Min PRN Catherine Galvez PA-C   50 mL at 10/14/24 0806    Or    glucagon injection 1 mg  1 mg Subcutaneous Q15 Min PRN Catherine Galvez PA-C        fluticasone (FLONASE) 50 MCG/ACT spray 2 spray  2 spray Both Nostrils Daily PRN Catherine Galvez PA-C        heparin 1000 unit/mL DIALYSIS Cath LOCK - RED Lumen  1.3-2.6 mL Intracatheter Q1H PRN Rohan Polo MD   3,200 Units at 10/24/24 0934    heparin 1000 unit/mL DIALYSIS Cath LOCK -BLUE Lumen  1.3-2.6 mL Intracatheter Q1H PRN Rohan Polo MD        heparin lock flush 10 unit/mL injection 5-20 mL  5-20 mL Intracatheter Q1H PRN Catherine Galvez PA-C        hydrALAZINE (APRESOLINE) injection 10 mg  10 mg Intravenous Q30 Min PRN Catherine Galvez PA-C        hydrOXYzine HCl (ATARAX) tablet 25 mg  25 mg Oral TID PRN Catherine Galvez PA-C   25 mg at 10/20/24 1642    ipratropium - albuterol 0.5  mg/2.5 mg/3 mL (DUONEB) neb solution 3 mL  3 mL Nebulization Q4H PRN Catherine Galvez PA-C   3 mL at 10/27/24 1609    naloxone (NARCAN) injection 0.2 mg  0.2 mg Intravenous Q2 Min PRN Catherine Galvez PA-C        Or    naloxone (NARCAN) injection 0.4 mg  0.4 mg Intravenous Q2 Min PRN Catherine Galvez PA-C        Or    naloxone (NARCAN) injection 0.2 mg  0.2 mg Intramuscular Q2 Min PRN Catherine Galvez PA-C        Or    naloxone (NARCAN) injection 0.4 mg  0.4 mg Intramuscular Q2 Min PRN Catherine Galvez PA-C        ondansetron (ZOFRAN ODT) ODT tab 4 mg  4 mg Oral Q6H PRN Catherine Galvez PA-C        Or    ondansetron (ZOFRAN) injection 4 mg  4 mg Intravenous Q6H PRN Catherine Galvez PA-C   4 mg at 10/10/24 1945    oxyCODONE IR (ROXICODONE) half-tab 2.5 mg  2.5 mg Oral Q6H PRN Catherine Galvez PA-C   2.5 mg at 10/28/24 0902    prochlorperazine (COMPAZINE) injection 10 mg  10 mg Intravenous Q6H PRN Catherine Galvez PA-C        Or    prochlorperazine (COMPAZINE) tablet 10 mg  10 mg Oral Q6H PRN Catherine Galvez PA-C        simethicone (MYLICON) chewable tablet 80 mg  80 mg Oral 4x Daily PRN David Wade MD   80 mg at 10/28/24 0623    sodium chloride (PF) 0.9% PF flush 10-20 mL  10-20 mL Intracatheter q1 min prn Catherine Galvez PA-C        sodium chloride (PF) 0.9% PF flush 10-40 mL  10-40 mL Intracatheter Q1H PRN Catherine Galvez PA-C        sodium chloride 0.9% BOLUS 100-150 mL  100-150 mL Intravenous Q15 Min PRN Rohan Polo MD        sodium chloride 0.9% BOLUS 100-150 mL  100-150 mL Intravenous Q15 Min PRN Rohan Polo MD           Cardiographics:    Telemetry monitoring demonstrates a flutter w/ rates in the 60-70s vs a fib w/ rates in the 90s per my personal review.    Imaging:  Results for orders placed or performed during the hospital encounter of 10/08/24   XR Chest Port 1 View    Impression    IMPRESSION: Interval  sternotomy, CABG procedure and mitral valve repair. Endotracheal tube proximally 2 cm above the sofia. Nasogastric tube in the stomach. Bilateral chest tubes and midline mediastinal drain in expected position. Epicardial leads are   present. Left IJ line in the brachiocephalic vein.    Low lung volumes. No evidence for CHF or pneumonia. No pleural effusion or pneumothorax.   XR Chest Port 1 View    Impression    IMPRESSION: Poststernotomy changes with prosthetic mitral valve. Patient's been extubated and the NG tube has been removed. Mediastinal and pleural chest tubes are in place.    Left IJ cordis sheath is in the distal left innominate artery. Catheter has a very subtle kink within it 3.5 centimeters from the tip.    Low lung volumes. No pneumothorax. Likely trace left effusion at the base. No signs of pneumonia or failure. Heart is of normal size.   XR Abdomen Port 1 View    Impression    IMPRESSION: Orogastric tube tip in the proximal stomach and oriented superiorly towards the left diaphragm. Visualized bowel gas pattern unremarkable. Numerous tubes and lines lower chest and upper abdomen as described on chest x-ray report from today.        Labs, personally reviewed.  Hemoglobin   Date Value Ref Range Status   10/28/2024 7.0 (L) 11.7 - 15.7 g/dL Final   10/27/2024 7.0 (L) 11.7 - 15.7 g/dL Final   10/26/2024 7.1 (L) 11.7 - 15.7 g/dL Final   06/09/2015 13.5 11.7 - 15.7 g/dL Final   03/11/2015 12.3 11.7 - 15.7 g/dL Final   01/08/2015 13.6 11.7 - 15.7 g/dL Final     WBC   Date Value Ref Range Status   06/09/2015 7.9 4.0 - 11.0 10e9/L Final   03/11/2015 9.4 4.0 - 11.0 10e9/L Final   01/08/2015 8.1 4.0 - 11.0 10e9/L Final     WBC Count   Date Value Ref Range Status   10/28/2024 11.7 (H) 4.0 - 11.0 10e3/uL Final   10/27/2024 9.8 4.0 - 11.0 10e3/uL Final   10/26/2024 9.0 4.0 - 11.0 10e3/uL Final     Platelet Count   Date Value Ref Range Status   10/28/2024 398 150 - 450 10e3/uL Final   10/27/2024 374 150 - 450  10e3/uL Final   10/26/2024 374 150 - 450 10e3/uL Final   06/09/2015 219 150 - 450 10e9/L Final   03/11/2015 240 150 - 450 10e9/L Final   01/08/2015 221 150 - 450 10e9/L Final     Creatinine   Date Value Ref Range Status   10/28/2024 2.75 (H) 0.51 - 0.95 mg/dL Final   10/27/2024 3.09 (H) 0.51 - 0.95 mg/dL Final   10/26/2024 3.05 (H) 0.51 - 0.95 mg/dL Final   06/09/2015 0.80 0.52 - 1.04 mg/dL Final   03/11/2015 0.87 0.52 - 1.04 mg/dL Final   01/08/2015 0.79 0.52 - 1.04 mg/dL Final     Potassium   Date Value Ref Range Status   10/28/2024 3.8 3.4 - 5.3 mmol/L Final   10/28/2024 3.7 3.4 - 5.3 mmol/L Final   10/27/2024 3.4 3.4 - 5.3 mmol/L Final   06/09/2015 3.8 3.4 - 5.3 mmol/L Final   03/11/2015 3.9 3.4 - 5.3 mmol/L Final   01/08/2015 4.1 3.4 - 5.3 mmol/L Final     Potassium POCT   Date Value Ref Range Status   10/08/2024 4.4 3.4 - 5.3 mmol/L Final   10/08/2024 4.0 3.4 - 5.3 mmol/L Final   10/08/2024 4.1 3.4 - 5.3 mmol/L Final     Magnesium   Date Value Ref Range Status   10/28/2024 1.7 1.7 - 2.3 mg/dL Final   10/27/2024 2.0 1.7 - 2.3 mg/dL Final   10/27/2024 1.4 (L) 1.7 - 2.3 mg/dL Final   06/09/2015 1.8 1.6 - 2.3 mg/dL Final   03/11/2015 1.7 1.6 - 2.3 mg/dL Final   01/08/2015 1.8 1.6 - 2.3 mg/dL Final          I/O:  I/O last 3 completed shifts:  In: 1500 [P.O.:1500]  Out: 2600 [Urine:2600]       Physical Exam:    General: Patient seen in chair this AM. NAD. Pleasant, conversant.   HEENT: DORITA, no sclera icterus, moist mucosa  CV: A-fib/a-flutter on monitor. 2+ peripheral pulses in all extremities. Moderate peripheral edema. Incision C/D/I.  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    Principal Problem:    Coronary artery disease involving native coronary artery of native heart with angina pectoris (H)  Active Problems:    Mitral regurgitation    Paroxysmal atrial fibrillation (H)    Paroxysmal atrial flutter (H)    Typical atrial flutter (H)  Acute Renal Failure d/t ATN, ongoing.      NEURO:   - Scheduled lidocaine patches and PRN oxycodone for pain  - PTA bupropion  - Tylenol 500 mg q6hrs PRN    CV:  - Pre-op EF 55-60%  - Chest tubes and TPWs removed POD#5  - Normotensive  - Metoprolol 50 mg BID (decreased per patient request)  - Plavix daily indefinitely d/t unclear h/o anaphylaxis to ASA  - Atorvastatin 80mg daily  - Warfarin for mechanical MVR, INR therapeutic, heparin gtt stopped 10/15  - Rate controlled a fib/flutter - s/p amiodarone bolus/gtt 10/13 (ok per [place]). Holding PO given postop ischemic hepatitis and allergy to PO amio additive. EP consulted, signed off. A fib clinic f/u scheduled  - New baseline echo completed 10/25    PULM:   - Extubated on POD#0  - Maintaining O2 sats on room air w/ home CPAP qHS  - Encourage pulmonary toilet  - PTA zyrtec, fludrocortisone, flonase, simethicone, albuterol, pulmicort, breo ellipta, duoneb    FEN/GI:  - Continue electrolyte replacement protocol  - Regular diet  - Bowel regimen  - Ischemic hepatitis - resolved, hepatic w/u unremarkable.  - PTA vitamin D3     RENAL:  - Nephrology consulted for ARF w/ decreased UOP, appreciate. Following.  - CRRT 10/11 - 10/13  - HD 10/15, 17, 19, 23  - Strict I/O  - Daily renal panel  - Cr elevated 2.7-3.1 (baseline ~0.9)    HEME:  - H/o bone marrow tx. Needs irradiated blood  - Acute blood loss anemia post-op, resolved  - Hgb stable, likely dilutional component. Hgb 7.0 and stable. No need for transfusion  - Warfarin INR goal 2.5-3.5 in s/o mech MVR  - HIT  screen negative.  - INR pending today  - Transfusion hx  - 2u PRBCs periop  - 1u PRBCs 10/11  - 1u PRBCs 10/16    ID:  - Lydia op ppx complete. Afebrile.   - UA/Urine cx 10/10 & 10/13 NG.  - Blood cx 10/9 & 10/13 NG    ENDO:   - HbA1c 5.7%  - Adrenal, thyroid, and pancreatitis labs unremarkable  - PTA Metformin discontinued (renal function)  - PTA synthroid    PPx:   - DVT: SCDs, SQ heparin TID, ambulation   - GI: Protonix 40mg IV/PO daily    DISPO:   - Continue general tele care (POD#8)  - PT/OT recs at discharge: TCU  - Medically Ready for Discharge: Ready since 10/24. Awaiting outpatient HD to be arranged and TCU acceptance.       Eva Galvez PA-C  [facility] Cardiothoracic Surgery  Vocera or Secure Chat  October 28, 2024

## 2024-10-28 NOTE — PROGRESS NOTES
'    RENAL ([facility]) progress note  CC: F/U ALEXEI  S:  She feels fatigued today. No cp, sob with exertion earlier now improving. UOP good, 2.6L yesterday. Creatinine down today.     A/P:   Acute kidney injury.  Acute tubular necrosis.  Secondary to CABG and mitral valve replacement followed by cardiogenic shock.  Baseline creatinine 0.95 (10/8/2024).  Dialysis initiated 10/11/24.  Required CRRT 10/11 - 10/13/24    HD 10/15, 10/17, 10/19 (Held) but did dialysis again 10/23 due to rising creatinine (2.7 (10/20)-->-->3.8 mg/dl)  With lack of renal recovery, will set up outpt dialysis for ALEXEI, my office working on this ([facility] vs [facility] are being considered per  notes)  Evidence of ongoing renal recovery with improved creatinine, increased UOP.  Check daily labs and will review possible HD needs daily.    Goal currently is to dialyze her as little as needed for earliest detection of recovery  PCAD placed 10/24.   Bumex 2 mg PO daily     2. Status post coronary artery bypass grafting x 4 vessels and mechanical mitral valve replacement on 10/8.  Diffuse coronary artery disease   -complicated post op course with cardiogenic shock, severe shock liver, dialysis dependent ALEXEI    3. Shock liver. Resolved.      4. History of Hodgkin's lymphoma.  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    5. Anasarca - Improved on Bumex. Dizziness/cramping with UF on dialysis 10/23 so may need to be patient with this.     6. Hypokalemia - due to bumex/poor PO intake. Now resolved. Trend.     7. Anemia due to acute blood loss: Transfuse PRN per Hosp med/surgery prn    Rohan Polo MD  [facility]  [phone]      /81 (BP Location: Right arm)   Pulse 93   Temp 98.3  F (36.8  C) (Oral)   Resp 20   Wt 106.2 kg (234 lb 3.2 oz)   LMP  (LMP Unknown)   SpO2 94%   BMI 40.20 kg/m      I/O last 3 completed shifts:  In: 1500 [P.O.:1500]  Out: 2600  [Urine:2600]    Physical Exam:   GENERAL: Alert,calm  EYES: EOMI   ENT: MMM, hearing intact  RESP: tachypneic, on RA  CV: + leg edema (R>L)  GI: NT/ND  SKIN: Pale, right leg incisions clean/ bruising noted, sternotomy site healing  RIJ PCAD site clean    Recent Labs   Lab 10/28/24  0629 10/28/24  0336 10/27/24  2153 10/27/24  1629 10/27/24  0931 10/27/24  0337 10/27/24  0219 10/26/24  1325 10/26/24  0637 10/25/24  0431 10/24/24  0620 10/23/24  0501 10/22/24  0624     --   --   --   --  136  --   --  137 136 136 134* 134*   POTASSIUM 3.8 3.7 3.4 3.3* 3.4 3.4 3.6   < > 3.2* 3.1* 3.4 3.2* 3.4   CHLORIDE 101  --   --   --   --  99  --   --  98 98 99 95* 96*   CO2 23  --   --   --   --  23  --   --  23 25 24 23 22   BUN 60.7*  --   --   --   --  60.6*  --   --  52.8* 46.4* 41.5* 63.4* 56.3*   CR 2.75*  --   --   --   --  3.09*  --   --  3.05* 2.96* 2.81* 3.86* 3.54*   GFRESTIMATED 20*  --   --   --   --  18*  --   --  18* 19* 20* 14* 15*   CINDY 8.5*  --   --   --   --  8.4*  --   --  8.4* 8.6* 8.9 8.6* 8.5*   MAG 1.7  --   --   --  2.0  --  1.4*  --  1.6* 1.7 1.9 2.0 1.7   ALBUMIN  --   --   --   --   --   --   --   --   --  3.1*  --  3.2*  --     < > = values in this interval not displayed.     Recent Labs   Lab 10/28/24  0629 10/27/24  0337 10/26/24  0637 10/25/24  0431 10/24/24  0620 10/23/24  0501 10/22/24  0624   WBC 11.7* 9.8 9.0 10.1 12.4* 13.1* 13.8*   HGB 7.0* 7.0* 7.1* 6.9* 7.2* 7.6* 7.8*   HCT 23.4* 22.1* 22.6* 22.0* 22.8* 23.4* 24.6*   MCV 93 90 91 90 90 89 90    374 374 [phone] 412       Current Facility-Administered Medications:     acetaminophen (TYLENOL) tablet 500 mg, 500 mg, Oral, Q4H PRN, Catherine Galvez PA-C    albuterol (PROVENTIL HFA/VENTOLIN HFA) inhaler, 2 puff, Inhalation, Q6H PRN, Catherine Galvez PA-C, 2 puff at 10/26/24 1455    atorvastatin (LIPITOR) tablet 80 mg, 80 mg, Oral, At Bedtime, Catherine Galvez PA-C, 80 mg at 10/27/24 2017    bisacodyl (DULCOLAX)  suppository 10 mg, 10 mg, Rectal, Daily PRN, Catherine Galvez PA-C    budesonide (PULMICORT) neb solution 1 mg, 1 mg, Nebulization, BID, Catherine Galvez PA-C, 1 mg at 10/28/24 0725    bumetanide (BUMEX) tablet 2 mg, 2 mg, Oral, Daily, Danish Rhoades MD, 2 mg at 10/27/24 0858    [COMPLETED] buPROPion (WELLBUTRIN XL) 24 hr tablet 150 mg, 150 mg, Oral, Daily **FOLLOWED BY** buPROPion (WELLBUTRIN XL) 24 hr tablet 300 mg, 300 mg, Oral, Daily, Kerri Dolan PA-C, 300 mg at 10/28/24 0901    cetirizine (zyrTEC) tablet 10 mg, 10 mg, Oral, Daily, Catherine Galvez PA-C, 10 mg at 10/28/24 0901    clopidogrel (PLAVIX) tablet 75 mg, 75 mg, Oral, Daily, Catherine Galvez PA-C, 75 mg at 10/28/24 0903    glucose gel 15-30 g, 15-30 g, Oral, Q15 Min PRN **OR** dextrose 50 % injection 25-50 mL, 25-50 mL, Intravenous, Q15 Min PRN, 50 mL at 10/14/24 0806 **OR** glucagon injection 1 mg, 1 mg, Subcutaneous, Q15 Min PRN, Catherine Galvez PA-C    fludrocortisone (FLORINEF) tablet 0.1 mg, 0.1 mg, Oral, Daily, Catherine Galvez PA-C, 0.1 mg at 10/28/24 0902    fluticasone (FLONASE) 50 MCG/ACT spray 2 spray, 2 spray, Both Nostrils, Daily PRN, Catherine Galvez PA-C    fluticasone-vilanterol (BREO ELLIPTA) 100-25 MCG/ACT inhaler 1 puff, 1 puff, Inhalation, At Bedtime, Catherine Galvez PA-C, 1 puff at 10/27/24 2024    [COMPLETED] sodium chloride 0.9% DIALYSIS Cath LOCK - RED Lumen, 10 mL, Intracatheter, Once in dialysis/CRRT, 10 mL at 10/17/24 1527 **FOLLOWED BY** heparin 1000 unit/mL DIALYSIS Cath LOCK - RED Lumen, 1.3-2.6 mL, Intracatheter, Q1H PRN, Rohan Polo MD, 3,200 Units at 10/24/24 0934    [COMPLETED] sodium chloride 0.9% DIALYSIS Cath LOCK - BLUE Lumen, 10 mL, Intracatheter, Once in dialysis/CRRT, 10 mL at 10/17/24 1526 **FOLLOWED BY** heparin 1000 unit/mL DIALYSIS Cath LOCK -BLUE Lumen, 1.3-2.6 mL, Intracatheter, Q1H PRN, Rohan Polo MD    heparin lock flush 10 unit/mL  injection 5-20 mL, 5-20 mL, Intracatheter, Q24H, Catherine Galvez PA-KENIA, 5 mL at 10/26/24 2108    heparin lock flush 10 unit/mL injection 5-20 mL, 5-20 mL, Intracatheter, Q1H PRN, Catherine Galvez PA-C    hydrALAZINE (APRESOLINE) injection 10 mg, 10 mg, Intravenous, Q30 Min PRN, Catherine Galvez PA-C    ipratropium - albuterol 0.5 mg/2.5 mg/3 mL (DUONEB) neb solution 3 mL, 3 mL, Nebulization, Q4H PRN, Catherine Galvez PA-C, 3 mL at 10/27/24 1609    levothyroxine (SYNTHROID/LEVOTHROID) tablet 125 mcg, 125 mcg, Oral, QAM AC, Catherine Glavez PA-C, 125 mcg at 10/28/24 0623    Lidocaine (LIDOCARE) 4 % Patch 1-2 patch, 1-2 patch, Transdermal, Q24H, Catherine Galvez PA-C, 1 patch at 10/26/24 2106    metoprolol tartrate (LOPRESSOR) tablet 50 mg, 50 mg, Oral, BID, Nikki Thurston PA-C, 50 mg at 10/28/24 0901    multivitamin w/minerals (THERA-VIT-M) tablet 1 tablet, 1 tablet, Oral, Daily, Catherine Galvez PA-C, 1 tablet at 10/27/24 1158    naloxone (NARCAN) injection 0.2 mg, 0.2 mg, Intravenous, Q2 Min PRN **OR** naloxone (NARCAN) injection 0.4 mg, 0.4 mg, Intravenous, Q2 Min PRN **OR** naloxone (NARCAN) injection 0.2 mg, 0.2 mg, Intramuscular, Q2 Min PRN **OR** naloxone (NARCAN) injection 0.4 mg, 0.4 mg, Intramuscular, Q2 Min PRN, Catherine Galvez PA-C    ondansetron (ZOFRAN ODT) ODT tab 4 mg, 4 mg, Oral, Q6H PRN **OR** ondansetron (ZOFRAN) injection 4 mg, 4 mg, Intravenous, Q6H PRN, Catherine Galvez PA-C, 4 mg at 10/10/24 1945    [DISCONTINUED] pantoprazole (PROTONIX) 2 mg/mL suspension 40 mg, 40 mg, Oral or NG Tube, Daily, 40 mg at 10/08/24 1729 **OR** pantoprazole (PROTONIX) EC tablet 40 mg, 40 mg, Oral, Daily, Catherine Galvez PA-C, 40 mg at 10/28/24 0901    polyethylene glycol (MIRALAX) Packet 17 g, 17 g, Oral, Daily, Catherine Galvez PA-C, 17 g at 10/28/24 0902    prochlorperazine (COMPAZINE) injection 10 mg, 10 mg, Intravenous, Q6H PRN **OR**  prochlorperazine (COMPAZINE) tablet 10 mg, 10 mg, Oral, Q6H PRN, Catherine Galvez, PA-C    senna-docusate (SENOKOT-S/PERICOLACE) 8.6-50 MG per tablet 1 tablet, 1 tablet, Oral, BID, Catherine Galvez, PA-C, 1 tablet at 10/28/24 0901    simethicone (MYLICON) chewable tablet 80 mg, 80 mg, Oral, 4x Daily PRN, David Wade MD, 80 mg at 10/28/24 0623    sodium chloride (PF) 0.9% PF flush 10-20 mL, 10-20 mL, Intracatheter, q1 min prn, Catherine Galvez K, PA-C    sodium chloride (PF) 0.9% PF flush 10-40 mL, 10-40 mL, Intracatheter, Q8H, Morgan Galvezrielle K, PA-C, 30 mL at 10/28/24 0623    sodium chloride (PF) 0.9% PF flush 10-40 mL, 10-40 mL, Intracatheter, Q7 Days, Lenny Catherine K, PA-C, 30 mL at 10/25/24 1746    sodium chloride (PF) 0.9% PF flush 10-40 mL, 10-40 mL, Intracatheter, Q1H PRN, Morgan Galvezrielle K, PA-C    sodium chloride 0.9% BOLUS 100-150 mL, 100-150 mL, Intravenous, Q15 Min PRN, Rohan Polo MD    sodium chloride 0.9% BOLUS 100-150 mL, 100-150 mL, Intravenous, Q15 Min PRN, Rohan Polo MD    Vitamin D3 (CHOLECALCIFEROL) tablet 125 mcg, 125 mcg, Oral, Daily, Catherine Galvez, PA-C, 125 mcg at 10/28/24 0900    warfarin ANTICOAGULANT (COUMADIN) half-tab 1.5 mg, 1.5 mg, Oral, ONCE at 18:00, David Wade MD    Warfarin Dose Required Daily - Pharmacist Managed, 1 each, Oral, See Admin Instructions, Danish Rhoades MD    Labs personally reviewed today during this evaluation    Lab: [id number]

## 2024-10-28 NOTE — PLAN OF CARE
Goal Outcome Evaluation:         sc  Patient Name: Jory Ambrose   MRN: [id number]   Date of Admission: 10/8/2024    Procedure: Procedure(s):  CORONARY ARTERY BYPASS GRAFT TIMES FOUR, LEFT INTERNAL MAMMARY ARTERY HARVEST, RIGHT LEG ENDOSCOPIC VESSEL PROCUREMENT, EPIAORTIC ULTRASOUND,  MITRAL VALVE REAIR CONVERTED TO MITRAL VALVE REPLACEMENT  LEFT ATRIAL APPENDAGE LIGATION,  ANESTHESIA TRANSESOPHAGEAL ECHOCARDIOGRAM    Post Op day #:20    Subjective (Patient focus/Primary Problem for shift): lots of belching and flatus          Pain Goal0 Pain Rating4-5/10           Pain Medication/ Regime effective to reduce patient painOxycodone 2.5 mg's.    Objective (Physical assessment):           Rhythm: atrial fibrillation            Bowel Activity: yes if Yes indicate when: 10/27/24          Bowel Medications: yes            Incision: healing well          Incentive Spirometry Q 1-2 hour when awake:  yes Volume: 1200          Epicardial Pacing Wires:  not applicable            Patient Activity:           Up to chair for meals: yes          Ambulation with RN x2 (Not including CR): no            Is patient in home clothes:no             Chest Tubes   Pleural: not applicable Draining: not applicable               Suction: not applicable              Mediastinal: not applicable Draining: not applicable               Suction: not applicable   Dressing Change Daily:not applicable If No, why?na                       Urinary Catheter: no           Preventative WOC consult (need MD order): no       Assessment (Nursing primary shift focus): sleep/pain management    Plan (Patient Care Plan/focus): normal lab levels.  Patient did not sleep well do to Gas discomfort, simethicone given x2, patient has been belching all night. Oxycodone 2.5 mg's given for incison pain and effective. Potassium replaced on protocol.    Adwoa Daigle RN   10/28/2024   7:29 AM

## 2024-10-28 NOTE — PROGRESS NOTES
Care Management Follow Up    Length of Stay (days): 20    Expected Discharge Date: 10/29/2024     Concerns to be Addressed: Care progression - discharge planning     Patient plan of care discussed at interdisciplinary rounds: Yes    Anticipated Discharge Disposition: Dialysis Services, Transitional Care     Anticipated Discharge Services: Dialysis Services, Transitional Care  Anticipated Discharge DME: NA    Patient/family educated on Medicare website which has current facility and service quality ratings: Yes  Education Provided on the Discharge Plan:  Yes per team  Patient/Family in Agreement with the Plan: yes    Referrals Placed by CM/SW:  Yes  Private pay costs discussed: Not applicable    Discussed  Partnership in Safe Discharge Planning  document with patient/family: Yes:  patient's motherMia    Handoff Completed: No, handoff not indicated or clinically appropriate    Additional Information:  Rec'd a message from Eva requesting an update on TCU placement.    Called and informed Eva, vasu for nephrology to see if patient will need on-going dialysis. This will determine where patient wants to go: No dialysis, will choose a TCU close to home. If needs dialysis, prefer an on-site dialysis TCU.    Social Hx:  Assessment: Follow.   Last Note: 10/28/24  Plan: Rehab rec TCU (prefer on site dialysis).  [facility] considering.  Updated PT/OT notes faxed 10/26.   Needs: Medical clearance, TCU acceptance. [facility]SP set up OP Dialysis - potentially at [facility]  Hand off sent: NA - EHO  Transport: Family     Next Steps: RNCM to follow for medical progression, recommendations, and final discharge plan.     Adrienne Stevenson RN     Per patient's mother, Mia, states no dialysis today. Nephrology said to monitor daily. Unknown discharge needs. Pat still wants to pursue TCU placement.    1244 called [facility] and updated Uday. She will screen patient and respond.

## 2024-10-28 NOTE — PROGRESS NOTES
"CLINICAL NUTRITION SERVICES - REASSESSMENT NOTE     Nutrition Prescription    RECOMMENDATIONS FOR MDs/PROVIDERS TO ORDER:    Malnutrition Status:    Moderate in acute illness    Recommendations already ordered by Registered Dietitian (RD):  Notified nutrition staff that supplement changed to [employer] ( chocolate)    Future/Additional Recommendations:  Continue to monitor po intake, weight, labs     EVALUATION OF THE PROGRESS TOWARD GOALS   Diet: Regular  Nutrition Supplement: Chocolate [employer] daily at 2 pm  Intake: % ( 10/26-10/27)consumed 1334+ Calories and 39+ g protein on 10/27 ( had a meal from home that was not calculated in), and 903+ Calories and 43+ g protein on 10/26.  She has still been receiving the ensure max and not the [employer] supplement ( notified nutrition staff to change that)       NEW FINDINGS   \"I suspect she is close to recovering but may need a few more sessions of dialysis\" per Dr. Brea Buchanan    ANTHROPOMETRICS  Height: 5'4\"  Most Recent Weight: 106.2 kg (234 lb 3.2 oz)    Weight History:   Wt Readings from Last 10 Encounters:   10/28/24 106.2 kg (234 lb 3.2 oz)   10/04/24 98 kg (216 lb)   09/25/24 99.3 kg (219 lb)   09/17/24 99.8 kg (220 lb)   09/13/24 99.8 kg (220 lb)   06/21/24 103.6 kg (228 lb 6.4 oz)   06/06/24 104.3 kg (230 lb)   05/24/24 104.3 kg (230 lb)   08/27/15 115.3 kg (254 lb 3.1 oz)   06/09/15 116.8 kg (257 lb 8 oz)     GI CONCERNS  Belching, abdominal discomfort  Last BM 10/26/2024    LABS  Reviewed: Na 138, urea nitrogen 60.7 (H), Cr 2.75 (H), Mg 1.7, Glu 107    MEDICATIONS  Reviewed: Lipitor, bumex, wellbutrin, plavix, florinef, levothyroxine, lopressor, multi vit w/ minerals, pantoprazole, miralax, senna-docusate, vit D 3, warfarin    CURRENT NUTRITION DIAGNOSIS  Malnutrition related to poor oral intake as evidenced by NPO/clear, mentation- diet has advanced now, pt A and O- eating % of meals      INTERVENTIONS  Implementation  Notify nutrition " staff of supplement change to [employer]    Goals  Patient to consume % of nutritionally adequate meals three times per day, or the equivalent with supplements/snacks.-met    Monitoring/Evaluation  Progress toward goals will be monitored and evaluated per protocol.

## 2024-10-28 NOTE — PLAN OF CARE
Problem: Comorbidity Management  Goal: Maintenance of Asthma Control  Outcome: Progressing     Problem: Cardiovascular Surgery  Goal: Nausea and Vomiting Relief  Outcome: Progressing     Problem: Pain Acute  Goal: Optimal Pain Control and Function  Outcome: Progressing     Problem: Infection  Goal: Absence of Infection Signs and Symptoms  Outcome: Progressing     Problem: Gas Exchange Impaired  Goal: Optimal Gas Exchange  Outcome: Progressing   Goal Outcome Evaluation:       VS stable. Pt c/o of SOB with activity and MD notified.Prn oxycodone given this morning for general pain and was effective.Oxygen saturation maintained well on RA. Pt has been up in chair and tolerated well.

## 2024-10-29 ENCOUNTER — APPOINTMENT (OUTPATIENT)
Dept: OCCUPATIONAL THERAPY | Facility: HOSPITAL | Age: 49
DRG: 219 | End: 2024-10-29
Attending: SURGERY
Payer: COMMERCIAL

## 2024-10-29 LAB
ABO/RH(D): NORMAL
ALBUMIN SERPL BCG-MCNC: 3.2 G/DL (ref 3.5–5.2)
ALBUMIN SERPL BCG-MCNC: 3.4 G/DL (ref 3.5–5.2)
ANION GAP SERPL CALCULATED.3IONS-SCNC: 15 MMOL/L (ref 7–15)
ANION GAP SERPL CALCULATED.3IONS-SCNC: 18 MMOL/L (ref 7–15)
ANTIBODY SCREEN: NEGATIVE
BLD PROD TYP BPU: NORMAL
BLOOD COMPONENT TYPE: NORMAL
BUN SERPL-MCNC: 60.7 MG/DL (ref 6–20)
BUN SERPL-MCNC: 62.3 MG/DL (ref 6–20)
CA-I BLD-MCNC: 4.5 MG/DL (ref 4.4–5.2)
CALCIUM SERPL-MCNC: 8.5 MG/DL (ref 8.8–10.4)
CALCIUM SERPL-MCNC: 8.6 MG/DL (ref 8.8–10.4)
CHLORIDE SERPL-SCNC: 99 MMOL/L (ref 98–107)
CHLORIDE SERPL-SCNC: 99 MMOL/L (ref 98–107)
CODING SYSTEM: NORMAL
CREAT SERPL-MCNC: 2.59 MG/DL (ref 0.51–0.95)
CREAT SERPL-MCNC: 2.6 MG/DL (ref 0.51–0.95)
CROSSMATCH: NORMAL
EGFRCR SERPLBLD CKD-EPI 2021: 22 ML/MIN/1.73M2
EGFRCR SERPLBLD CKD-EPI 2021: 22 ML/MIN/1.73M2
GLUCOSE SERPL-MCNC: 108 MG/DL (ref 70–99)
GLUCOSE SERPL-MCNC: 123 MG/DL (ref 70–99)
HCO3 SERPL-SCNC: 20 MMOL/L (ref 22–29)
HCO3 SERPL-SCNC: 22 MMOL/L (ref 22–29)
HGB BLD-MCNC: 6.7 G/DL (ref 11.7–15.7)
HGB BLD-MCNC: 8.6 G/DL (ref 11.7–15.7)
HOLD SPECIMEN: NORMAL
HOLD SPECIMEN: NORMAL
INR PPP: 4.06 (ref 0.85–1.15)
ISSUE DATE AND TIME: NORMAL
MAGNESIUM SERPL-MCNC: 1.4 MG/DL (ref 1.7–2.3)
MAGNESIUM SERPL-MCNC: 2 MG/DL (ref 1.7–2.3)
PHOSPHATE SERPL-MCNC: 3.3 MG/DL (ref 2.5–4.5)
PHOSPHATE SERPL-MCNC: 3.5 MG/DL (ref 2.5–4.5)
POTASSIUM SERPL-SCNC: 3.3 MMOL/L (ref 3.4–5.3)
POTASSIUM SERPL-SCNC: 3.4 MMOL/L (ref 3.4–5.3)
POTASSIUM SERPL-SCNC: 3.4 MMOL/L (ref 3.4–5.3)
POTASSIUM SERPL-SCNC: 3.7 MMOL/L (ref 3.4–5.3)
SODIUM SERPL-SCNC: 136 MMOL/L (ref 135–145)
SODIUM SERPL-SCNC: 137 MMOL/L (ref 135–145)
SPECIMEN EXPIRATION DATE: NORMAL
UNIT ABO/RH: NORMAL
UNIT NUMBER: NORMAL
UNIT STATUS: NORMAL
UNIT TYPE ISBT: 6200

## 2024-10-29 PROCEDURE — 250N000009 HC RX 250

## 2024-10-29 PROCEDURE — 250N000011 HC RX IP 250 OP 636: Performed by: SURGERY

## 2024-10-29 PROCEDURE — 82330 ASSAY OF CALCIUM: CPT

## 2024-10-29 PROCEDURE — 86923 COMPATIBILITY TEST ELECTRIC: CPT

## 2024-10-29 PROCEDURE — 250N000013 HC RX MED GY IP 250 OP 250 PS 637

## 2024-10-29 PROCEDURE — 85610 PROTHROMBIN TIME: CPT | Performed by: SURGERY

## 2024-10-29 PROCEDURE — 999N000157 HC STATISTIC RCP TIME EA 10 MIN

## 2024-10-29 PROCEDURE — 97535 SELF CARE MNGMENT TRAINING: CPT | Mod: GO

## 2024-10-29 PROCEDURE — G0463 HOSPITAL OUTPT CLINIC VISIT: HCPCS

## 2024-10-29 PROCEDURE — 83735 ASSAY OF MAGNESIUM: CPT | Performed by: SURGERY

## 2024-10-29 PROCEDURE — 84132 ASSAY OF SERUM POTASSIUM: CPT | Performed by: INTERNAL MEDICINE

## 2024-10-29 PROCEDURE — 84132 ASSAY OF SERUM POTASSIUM: CPT | Performed by: SURGERY

## 2024-10-29 PROCEDURE — 250N000013 HC RX MED GY IP 250 OP 250 PS 637: Performed by: SURGERY

## 2024-10-29 PROCEDURE — 120N000004 HC R&B MS OVERFLOW

## 2024-10-29 PROCEDURE — 250N000011 HC RX IP 250 OP 636

## 2024-10-29 PROCEDURE — 86901 BLOOD TYPING SEROLOGIC RH(D): CPT

## 2024-10-29 PROCEDURE — 85018 HEMOGLOBIN: CPT

## 2024-10-29 PROCEDURE — 83735 ASSAY OF MAGNESIUM: CPT

## 2024-10-29 PROCEDURE — 94640 AIRWAY INHALATION TREATMENT: CPT | Mod: 76

## 2024-10-29 PROCEDURE — P9040 RBC LEUKOREDUCED IRRADIATED: HCPCS

## 2024-10-29 PROCEDURE — 94640 AIRWAY INHALATION TREATMENT: CPT

## 2024-10-29 PROCEDURE — 94660 CPAP INITIATION&MGMT: CPT

## 2024-10-29 PROCEDURE — 250N000013 HC RX MED GY IP 250 OP 250 PS 637: Performed by: INTERNAL MEDICINE

## 2024-10-29 PROCEDURE — 86900 BLOOD TYPING SEROLOGIC ABO: CPT

## 2024-10-29 RX ORDER — IPRATROPIUM BROMIDE AND ALBUTEROL SULFATE 2.5; .5 MG/3ML; MG/3ML
3 SOLUTION RESPIRATORY (INHALATION)
Status: DISCONTINUED | OUTPATIENT
Start: 2024-10-29 | End: 2024-10-29

## 2024-10-29 RX ORDER — POTASSIUM CHLORIDE 1500 MG/1
20 TABLET, EXTENDED RELEASE ORAL ONCE
Status: COMPLETED | OUTPATIENT
Start: 2024-10-29 | End: 2024-10-29

## 2024-10-29 RX ORDER — METOPROLOL TARTRATE 25 MG/1
75 TABLET, FILM COATED ORAL 2 TIMES DAILY
Status: DISCONTINUED | OUTPATIENT
Start: 2024-10-29 | End: 2024-11-01 | Stop reason: HOSPADM

## 2024-10-29 RX ORDER — IPRATROPIUM BROMIDE AND ALBUTEROL SULFATE 2.5; .5 MG/3ML; MG/3ML
3 SOLUTION RESPIRATORY (INHALATION) 4 TIMES DAILY PRN
Status: DISCONTINUED | OUTPATIENT
Start: 2024-10-29 | End: 2024-11-01 | Stop reason: HOSPADM

## 2024-10-29 RX ORDER — MAGNESIUM OXIDE 400 MG/1
400 TABLET ORAL EVERY 4 HOURS
Status: COMPLETED | OUTPATIENT
Start: 2024-10-29 | End: 2024-10-29

## 2024-10-29 RX ORDER — CALCIUM GLUCONATE 20 MG/ML
1 INJECTION, SOLUTION INTRAVENOUS ONCE
Status: COMPLETED | OUTPATIENT
Start: 2024-10-29 | End: 2024-10-29

## 2024-10-29 RX ORDER — POTASSIUM CHLORIDE 1500 MG/1
40 TABLET, EXTENDED RELEASE ORAL ONCE
Status: COMPLETED | OUTPATIENT
Start: 2024-10-29 | End: 2024-10-29

## 2024-10-29 RX ORDER — DIPHENHYDRAMINE HCL 25 MG
25 CAPSULE ORAL
Status: DISCONTINUED | OUTPATIENT
Start: 2024-10-29 | End: 2024-10-30

## 2024-10-29 RX ORDER — GABAPENTIN 100 MG/1
100 CAPSULE ORAL 3 TIMES DAILY PRN
Status: DISCONTINUED | OUTPATIENT
Start: 2024-10-29 | End: 2024-11-01 | Stop reason: HOSPADM

## 2024-10-29 RX ORDER — DIPHENHYDRAMINE HCL 25 MG
25 CAPSULE ORAL ONCE
Status: COMPLETED | OUTPATIENT
Start: 2024-10-29 | End: 2024-10-29

## 2024-10-29 RX ORDER — MAGNESIUM SULFATE 4 G/50ML
4 INJECTION INTRAVENOUS ONCE
Status: COMPLETED | OUTPATIENT
Start: 2024-10-29 | End: 2024-10-29

## 2024-10-29 RX ADMIN — METOPROLOL TARTRATE 75 MG: 25 TABLET, FILM COATED ORAL at 21:41

## 2024-10-29 RX ADMIN — POTASSIUM CHLORIDE 40 MEQ: 1500 TABLET, EXTENDED RELEASE ORAL at 17:10

## 2024-10-29 RX ADMIN — PANTOPRAZOLE SODIUM 40 MG: 40 TABLET, DELAYED RELEASE ORAL at 08:22

## 2024-10-29 RX ADMIN — BUMETANIDE 2 MG: 2 TABLET ORAL at 08:23

## 2024-10-29 RX ADMIN — DIPHENHYDRAMINE HYDROCHLORIDE 25 MG: 25 CAPSULE ORAL at 23:27

## 2024-10-29 RX ADMIN — LIDOCAINE 2 PATCH: 4 PATCH TOPICAL at 21:43

## 2024-10-29 RX ADMIN — MAGNESIUM OXIDE TAB 400 MG (241.3 MG ELEMENTAL MG) 400 MG: 400 (241.3 MG) TAB at 17:10

## 2024-10-29 RX ADMIN — MAGNESIUM SULFATE HEPTAHYDRATE 4 G: 80 INJECTION, SOLUTION INTRAVENOUS at 07:13

## 2024-10-29 RX ADMIN — LEVOTHYROXINE SODIUM 125 MCG: 0.03 TABLET ORAL at 07:12

## 2024-10-29 RX ADMIN — POTASSIUM CHLORIDE 20 MEQ: 1500 TABLET, EXTENDED RELEASE ORAL at 22:46

## 2024-10-29 RX ADMIN — Medication 125 MCG: at 08:22

## 2024-10-29 RX ADMIN — BUDESONIDE 1 MG: 0.5 INHALANT ORAL at 19:32

## 2024-10-29 RX ADMIN — BUDESONIDE 1 MG: 0.5 INHALANT ORAL at 07:41

## 2024-10-29 RX ADMIN — CALCIUM GLUCONATE 1 G: 20 INJECTION, SOLUTION INTRAVENOUS at 09:07

## 2024-10-29 RX ADMIN — POTASSIUM CHLORIDE 40 MEQ: 1500 TABLET, EXTENDED RELEASE ORAL at 08:25

## 2024-10-29 RX ADMIN — ACETAMINOPHEN 500 MG: 500 TABLET ORAL at 18:38

## 2024-10-29 RX ADMIN — MAGNESIUM OXIDE TAB 400 MG (241.3 MG ELEMENTAL MG) 400 MG: 400 (241.3 MG) TAB at 21:42

## 2024-10-29 RX ADMIN — ATORVASTATIN CALCIUM 80 MG: 40 TABLET, FILM COATED ORAL at 21:41

## 2024-10-29 RX ADMIN — METOPROLOL TARTRATE 75 MG: 25 TABLET, FILM COATED ORAL at 08:22

## 2024-10-29 RX ADMIN — FLUDROCORTISONE ACETATE 0.1 MG: 0.1 TABLET ORAL at 08:23

## 2024-10-29 RX ADMIN — IPRATROPIUM BROMIDE AND ALBUTEROL SULFATE 3 ML: .5; 3 SOLUTION RESPIRATORY (INHALATION) at 03:27

## 2024-10-29 RX ADMIN — HYDRALAZINE HYDROCHLORIDE 10 MG: 20 INJECTION INTRAMUSCULAR; INTRAVENOUS at 03:26

## 2024-10-29 RX ADMIN — DIPHENHYDRAMINE HYDROCHLORIDE 25 MG: 25 CAPSULE ORAL at 03:53

## 2024-10-29 RX ADMIN — CLOPIDOGREL BISULFATE 75 MG: 75 TABLET ORAL at 08:23

## 2024-10-29 RX ADMIN — Medication 1 TABLET: at 13:48

## 2024-10-29 RX ADMIN — CETIRIZINE HYDROCHLORIDE 10 MG: 10 TABLET, FILM COATED ORAL at 08:22

## 2024-10-29 RX ADMIN — HYDRALAZINE HYDROCHLORIDE 10 MG: 20 INJECTION INTRAMUSCULAR; INTRAVENOUS at 00:00

## 2024-10-29 NOTE — CONSULTS
[facility] Nurse Inpatient Assessment     Consulted for: Abd/Thighs blisters resolved - new consult 10/29 for sacrum    Patient History (according to provider note(s):    This patient is a 49 year old female who presents with exertional angina characterized by pain in the neck and face with exertion. 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 She has no symptoms of heart failure. She was thinking of starting an exercise program but she noticed symptoms of      Ms. Jory Ambrose is a 48 year old female who comes in for cardiac evaluation.  She had 2 recent visits to emergency room.  The first time she went with the pain around her eye and numbness in her arm.  She underwent imaging of head without evidence of an acute stroke.  She had moderate disease in the carotids.  The second visit to emergency room was because of a tight heavy sensation in the left side of her chest.  This sensation came on at rest.  Her EKG did not show any ischemic changes and troponins were negative.  She did not have recurrence of the discomfort.  She is not known to have coronary artery disease, valvular heart disease, cardiomyopathy.  She has a history of Hodgkin lymphoma she had chemotherapy, radiation, bone marrow transplant x 2.  She is in remission now.  2 months ago she started taking phentermine for weight loss.     Assessment:      Skin Injury Location: L buttock    10/29    Last photo: 10/29  Skin injury due to: Friction and blister  Skin history and plan of care:   small round wound on fleshy part of buttock, not pressure related  Affected area:      Skin assessment: Intact     Measurements (length x width x depth, in cm) 0.4  x 0.4  x  0.1 cm      Color: pink     Temperature  warm     Drainage: scant .      Color: serous      Odor: none  Pain: denies , none  Pain interventions prior to dressing  change: slow and gentle cares   Treatment goal: Heal , Infection control/prevention, and Protection  STATUS: initial assessment  Supplies ordered: at bedside    Mom has aquaphor in room and prefers to use this ointment, woc agrees it is appropriate.   ______________________________________________________________________________________________________________________________________________________________________________________________________________________________________________________    Skin Injury Location: Abd/Thighs - healed    Last photo: 10/16/24  Skin injury due to:  Blister (intact and unroofed  Skin history and plan of care:  wounds healed  Affected area:      Abdomen with intact blister (serous fluid filled)   Pain: denies   Pain interventions prior to dressing change: patient tolerated well and slow and gentle cares   Treatment goal: Heal   STATUS: healed          Treatment Plan:     L buttock wound: Every 3 days   Cleanse the area with NS and pat dry.  Apply No sting film barrier to periwound skin.  Cover wound with Sacral Mepilex (#[id number])Change dressing Q 3 days.  May use aquaphor ointment for comfort - own supply in room    Orders: Written    RECOMMEND PRIMARY TEAM ORDER: None, at this time  Education provided: plan of care  Discussed plan of care with: Patient and Family  WOC nurse follow-up plan: every other week  Notify WOC if wound(s) deteriorate.  Nursing to notify the Provider(s) and re-consult the [facility] Nurse if new skin concern.    DATA:     Current support surface: Standard  Low air loss (QUYEN pump, Isolibrium, Pulsate)  Containment of urine/stool: Incontinence Protocol  BMI: Body mass index is 38.33 kg/m .   Active diet order: Orders Placed This Encounter      Regular Diet Adult     Output: I/O last 3 completed shifts:  In: 650 [P.O.:650]  Out: 2250 [Urine:2250]     Labs:   Recent Labs   Lab 10/29/24  0543 10/28/24  0917 10/28/24  0629   ALBUMIN 3.2*  --   --    HGB 6.7*  --  7.0*    INR 4.06*   < >  --    WBC  --   --  11.7*    < > = values in this interval not displayed.     Pressure injury risk assessment:   Sensory Perception: 4-->no impairment  Moisture: 4-->rarely moist  Activity: 3-->walks occasionally  Mobility: 3-->slightly limited  Nutrition: 3-->adequate  Friction and Shear: 3-->no apparent problem  Bari Score: 20    TRE Salcedo RN CWOCN  Pager no longer is use, please contact through Revstr group: [facility] Amanda Huff DBA [facility]

## 2024-10-29 NOTE — PLAN OF CARE
sc  Patient Name: Jory Ambrose   MRN: [id number]   Date of Admission: 10/8/2024    Procedure: Procedure(s):  CORONARY ARTERY BYPASS GRAFT TIMES FOUR, LEFT INTERNAL MAMMARY ARTERY HARVEST, RIGHT LEG ENDOSCOPIC VESSEL PROCUREMENT, EPIAORTIC ULTRASOUND,  MITRAL VALVE REAIR CONVERTED TO MITRAL VALVE REPLACEMENT  LEFT ATRIAL APPENDAGE LIGATION,  ANESTHESIA TRANSESOPHAGEAL ECHOCARDIOGRAM    Post Op day #:21    Subjective (Patient focus/Primary Problem for shift): Pt had rough night with SOB, wheezing, bloody noses, nausea, belching.          Pain Goal 0/10 Pain Rating 0/10           Pain Medication/ Regime effective to reduce patient pain lidocaine patch    Objective (Physical assessment):           Rhythm: atrial fibrillation rates 100's-110's mostly             Bowel Activity: no if Yes indicate when:           Bowel Medications: no, pt refuse            Incision: healing well          Incentive Spirometry Q 1-2 hour when awake:  yes Volume: 1200          Epicardial Pacing Wires:  no            Patient Activity:           Up to chair for meals: not applicable          Ambulation with RN x2 (Not including CR): not applicable            Is patient in home clothes:no             Chest Tubes   Pleural: no Draining: not applicable               Suction: not applicable              Mediastinal: no Draining: not applicable               Suction: not applicable   Dressing Change Daily:no If No, why?                     Urinary Catheter: no           Preventative WOC consult (need MD order): no - but left sticky note for possible WOC consult d/t new pressure injury      Assessment (Nursing primary shift focus): Pt had a rough night. Was feeling SOB and wheezy - prn nebulizer helped. Was also feeling very anxious - one time dose benadryl ordered and effective. Noted stage II pressure injury on coccyx. Bleeding noted after bathroom earlier found to be from inner thigh chafing, not from BM. IV PRN hydralazine given x2  with effect. PRN compazine and simethicone given for belching and nausea. Pt had hgb of 6.7 this AM, paged out on call CV surg team (Dr Hdez), but no response yet. Pt stable. Had frequent nose bleeds overnight, but resolved now.    Plan (Patient Care Plan/focus): Likely give blood. Monitor pt., VS. Watch for any other signs of bleeding. Monitor heart rhythm and rate. Have someone take a look at coccyx pressure injury. Ambulate. IS use.       Becka Barrera RN   10/29/2024   6:25 AM

## 2024-10-29 NOTE — TREATMENT PLAN
Respiratory Treatment Plan     Patient Score: 7  Patient Acuity: 4    Clinical Indication for Therapy: CVTS, atelectasis, home regime    Therapy Ordered:   Aerosol Therapy: Pulmicort 1mg BID, Breo Ellipta  Broncho-Pulmonary Hygiene: Flutter valve QID - encourage pt to perform 10 reps Q1H while awake  Volume Expansion: Incentive spirometry QID - encourage pt to perform 10 reps Q1H while awake  EZPAP      History:       -Home Medications: budesonide-formoterol (SYMBICORT) 80-4.5 MCG/ACT Inhaler, duoneb prn, albuterol inhaler prn     -GIA Hx: home cpap, compliant      Assessment Summary: Patient is POD#21 s/p CABG x4, hx of asthma. Patient  has had SOB throughout her stay. EZPAP was started due to atelectasis and low lung volumes. She is currently on room air and completes her aerobika and IS.     Intermittent upper airway wheezing of the neck heard but resolves after reposition or resting. No wheezing heard in the lung fields. Lower lobes are diminished.     X-ray 10/26- right midlung atelectatics with elevated right hemidiaphragm.    Will continue current treatment plan.       Marybeth Chiu, RT  10/29/2024

## 2024-10-29 NOTE — PLAN OF CARE
Problem: Adult Inpatient Plan of Care  Goal: Plan of Care Review  Description: The Plan of Care Review/Shift note should be completed every shift.  The Outcome Evaluation is a brief statement about your assessment that the patient is improving, declining, or no change.  This information will be displayed automatically on your shift  note.  Outcome: Progressing   Goal Outcome Evaluation:       Patient hgb 6.7 and one unit PRBC irradiated as ordered started. Patient tolerating well. She is aware to call for any symptoms of SOB, itching and anything unusual of her normal. Mother bedside and asks many questions. Reassured patient and mother of the plan of care. Call light in reach.

## 2024-10-29 NOTE — PROGRESS NOTES
CVTS Daily Progress Note  POD#21 s/p CABG x4 (LIMA>LAD, rSVG>RPDA, rSVG>LPL, rSVG>D2), RLE EVH, Attempted MVR/r, MVR ( 29 mm [employer] Mechanical Mitral Valve), LAAE  Attending: Mauro  LOS: 21    SUBJECTIVE/INTERVAL EVENTS:    No acute events overnight. Awaiting TCU placement. Continues in rate controlled a-fib/a-flutter. Continues to have improving UOP. Maintaining oxygen saturations on home CPAP at night and room air during the day but continues to report subjective shortness of breath. Normotensive. Working w therapy. Pain fairly controlled. +BM. Tolerating regular diet. INR supratherapeutic at 4.09 today. Patient denies new chest pain, shortness of breath, abdominal pain, calf pain, nausea. Patient has no questions today.    OBJECTIVE:  Temp:  [97.2  F (36.2  C)-98.2  F (36.8  C)] 98.1  F (36.7  C)  Pulse:  [] 112  Resp:  [20-22] 22  BP: (105-150)/(67-85) 105/67  SpO2:  [94 %-100 %] 98 %  Vitals:    10/25/24 0625 10/26/24 0640 10/27/24 0615 10/28/24 0500   Weight: 105.8 kg (233 lb 4.8 oz) 105.6 kg (232 lb 14.4 oz) 105.6 kg (232 lb 12.8 oz) 106.2 kg (234 lb 3.2 oz)    10/29/24 0727   Weight: 101.3 kg (223 lb 4.8 oz)       Clinically Significant Risk Factors        # Hypokalemia: Lowest K = 3.3 mmol/L in last 2 days, will replace as needed    # Hypocalcemia: Lowest Ca = 8.5 mg/dL in last 2 days, will monitor and replace as appropriate   # Hypomagnesemia: Lowest Mg = 1.4 mg/dL in last 2 days, will replace as needed   # Hypoalbuminemia: Lowest albumin = 2.6 g/dL at 10/16/2024  4:35 AM, will monitor as appropriate       # Hypertension: Noted on problem list            # Moderate Malnutrition: based on nutrition assessment     # History of CABG: noted on surgical history            Current Medications:    Scheduled Meds:  Current Facility-Administered Medications   Medication Dose Route Frequency Provider Last Rate Last Admin    atorvastatin (LIPITOR) tablet 80 mg  80 mg Oral At Bedtime Catherine Galvez,  EARNESTINE   80 mg at 10/28/24 2030    budesonide (PULMICORT) neb solution 1 mg  1 mg Nebulization BID Catherine Galvez PA-C   1 mg at 10/29/24 0741    bumetanide (BUMEX) tablet 2 mg  2 mg Oral Daily Danish Rhoades MD   2 mg at 10/29/24 0823    buPROPion (WELLBUTRIN XL) 24 hr tablet 300 mg  300 mg Oral Daily Kerri Dolan PA-C   300 mg at 10/28/24 0901    calcium gluconate 1 g in 50 mL in sodium chloride intermittent infusion  1 g Intravenous Once Catherine Galvez PA-C        cetirizine (zyrTEC) tablet 10 mg  10 mg Oral Daily Catherine Galvez PA-C   10 mg at 10/29/24 0822    clopidogrel (PLAVIX) tablet 75 mg  75 mg Oral Daily Catherine Galvez PA-C   75 mg at 10/29/24 0823    fludrocortisone (FLORINEF) tablet 0.1 mg  0.1 mg Oral Daily Catherine Galvez PA-C   0.1 mg at 10/29/24 0823    fluticasone-vilanterol (BREO ELLIPTA) 100-25 MCG/ACT inhaler 1 puff  1 puff Inhalation At Bedtime Catherine Galvez PA-C   1 puff at 10/28/24 2222    heparin lock flush 10 unit/mL injection 5-20 mL  5-20 mL Intracatheter Q24H Catherine Galvez PA-C   5 mL at 10/26/24 2108    ipratropium - albuterol 0.5 mg/2.5 mg/3 mL (DUONEB) neb solution 3 mL  3 mL Nebulization 4x daily Catherine Galvez PA-C        levothyroxine (SYNTHROID/LEVOTHROID) tablet 125 mcg  125 mcg Oral QAM AC Catherine Galvez PA-C   125 mcg at 10/29/24 0712    Lidocaine (LIDOCARE) 4 % Patch 1-2 patch  1-2 patch Transdermal Q24H Catherine Galvez PA-C   2 patch at 10/28/24 2031    magnesium sulfate 4 g in 50 mL sterile water intermittent infusion  4 g Intravenous Once David Wade MD 25 mL/hr at 10/29/24 0713 4 g at 10/29/24 0713    metoprolol tartrate (LOPRESSOR) tablet 75 mg  75 mg Oral BID Catherine Galvez PA-C   75 mg at 10/29/24 0822    multivitamin w/minerals (THERA-VIT-M) tablet 1 tablet  1 tablet Oral Daily Catherine Galvez PA-C   1 tablet at 10/28/24 1319    pantoprazole (PROTONIX) EC tablet 40 mg  40  mg Oral Daily Catherine Galvez PA-C   40 mg at 10/29/24 0822    polyethylene glycol (MIRALAX) Packet 17 g  17 g Oral Daily Catherine Galvez PA-C   17 g at 10/28/24 0902    senna-docusate (SENOKOT-S/PERICOLACE) 8.6-50 MG per tablet 1 tablet  1 tablet Oral BID Catherine Galvez PA-C   1 tablet at 10/28/24 0901    sodium chloride (PF) 0.9% PF flush 10-40 mL  10-40 mL Intracatheter Q8H Catherine Galvez PA-C   10 mL at 10/29/24 0824    sodium chloride (PF) 0.9% PF flush 10-40 mL  10-40 mL Intracatheter Q7 Days Catherine Galvez PA-C   30 mL at 10/25/24 1746    Vitamin D3 (CHOLECALCIFEROL) tablet 125 mcg  125 mcg Oral Daily Catherine Galvez PA-C   125 mcg at 10/29/24 0822    Warfarin Dose Required Daily - Pharmacist Managed  1 each Oral See Admin Instructions Danish Rhoades MD        warfarin-No DOSE today  1 each Does not apply no dose today (warfarin) David Wade MD         Continuous Infusions:  Current Facility-Administered Medications   Medication Dose Route Frequency Provider Last Rate Last Admin     PRN Meds:.  Current Facility-Administered Medications   Medication Dose Route Frequency Provider Last Rate Last Admin    acetaminophen (TYLENOL) tablet 500 mg  500 mg Oral Q4H PRN Catherine Galvez PA-C   500 mg at 10/28/24 1706    albuterol (PROVENTIL HFA/VENTOLIN HFA) inhaler  2 puff Inhalation Q6H PRN Catherine Galvez PA-C   2 puff at 10/26/24 1455    bisacodyl (DULCOLAX) suppository 10 mg  10 mg Rectal Daily PRN Catherine Galvez PA-KENIA        glucose gel 15-30 g  15-30 g Oral Q15 Min PRN Catherine Galvez PA-KENIA        Or    dextrose 50 % injection 25-50 mL  25-50 mL Intravenous Q15 Min PRN Catherine Galvez PA-C   50 mL at 10/14/24 0806    Or    glucagon injection 1 mg  1 mg Subcutaneous Q15 Min PRN Catherine Galvez PA-C        fluticasone (FLONASE) 50 MCG/ACT spray 2 spray  2 spray Both Nostrils Daily PRN Catherine Galvez PA-C        gabapentin  (NEURONTIN) capsule 100 mg  100 mg Oral TID PRN Catherine Galvez PA-C        heparin 1000 unit/mL DIALYSIS Cath LOCK - RED Lumen  1.3-2.6 mL Intracatheter Q1H PRN Rohan Polo MD   3,200 Units at 10/24/24 0934    heparin 1000 unit/mL DIALYSIS Cath LOCK -BLUE Lumen  1.3-2.6 mL Intracatheter Q1H PRN Rohan Polo MD        heparin lock flush 10 unit/mL injection 5-20 mL  5-20 mL Intracatheter Q1H PRN Catherine Galvez PA-C        hydrALAZINE (APRESOLINE) injection 10 mg  10 mg Intravenous Q30 Min PRN Catherine Galvez PA-C   10 mg at 10/29/24 0326    naloxone (NARCAN) injection 0.2 mg  0.2 mg Intravenous Q2 Min PRN Catherine Galvez PA-C        Or    naloxone (NARCAN) injection 0.4 mg  0.4 mg Intravenous Q2 Min PRN Catherine Galvez PA-C        Or    naloxone (NARCAN) injection 0.2 mg  0.2 mg Intramuscular Q2 Min PRN Catherine Galvez PA-C        Or    naloxone (NARCAN) injection 0.4 mg  0.4 mg Intramuscular Q2 Min PRN Catherine Galvez PA-C        ondansetron (ZOFRAN ODT) ODT tab 4 mg  4 mg Oral Q6H PRN Catherine Galvez PA-C        Or    ondansetron (ZOFRAN) injection 4 mg  4 mg Intravenous Q6H PRN Catherine Galvez PA-C   4 mg at 10/10/24 1945    prochlorperazine (COMPAZINE) injection 10 mg  10 mg Intravenous Q6H PRN Catherine Galvez PA-C   10 mg at 10/28/24 2001    Or    prochlorperazine (COMPAZINE) tablet 10 mg  10 mg Oral Q6H PRN Catherine Galvez PA-C        simethicone (MYLICON) chewable tablet 80 mg  80 mg Oral 4x Daily PRN David Wade MD   80 mg at 10/28/24 2001    sodium chloride (PF) 0.9% PF flush 10-20 mL  10-20 mL Intracatheter q1 min prn Morgan Galvezrielle K PA-C        sodium chloride (PF) 0.9% PF flush 10-40 mL  10-40 mL Intracatheter Q1H PRN Herson Galvezelle K, PA-C        sodium chloride 0.9% BOLUS 1-250 mL  1-250 mL Intravenous Q1H PRN Catherine Galvez K, PA-C        sodium chloride 0.9% BOLUS 100-150 mL  100-150 mL  Intravenous Q15 Min PRRohan Zavala MD        sodium chloride 0.9% BOLUS 100-150 mL  100-150 mL Intravenous Q15 Min PRN Rohan Polo MD           Cardiographics:    Telemetry monitoring demonstrates a flutter w/ rates in the 60-70s vs a fib w/ rates in the 90-100s per my personal review.      Imaging:  Results for orders placed or performed during the hospital encounter of 10/08/24   XR Chest Port 1 View    Impression    IMPRESSION: Interval sternotomy, CABG procedure and mitral valve repair. Endotracheal tube proximally 2 cm above the sofia. Nasogastric tube in the stomach. Bilateral chest tubes and midline mediastinal drain in expected position. Epicardial leads are   present. Left IJ line in the brachiocephalic vein.    Low lung volumes. No evidence for CHF or pneumonia. No pleural effusion or pneumothorax.   XR Chest Port 1 View    Impression    IMPRESSION: Poststernotomy changes with prosthetic mitral valve. Patient's been extubated and the NG tube has been removed. Mediastinal and pleural chest tubes are in place.    Left IJ cordis sheath is in the distal left innominate artery. Catheter has a very subtle kink within it 3.5 centimeters from the tip.    Low lung volumes. No pneumothorax. Likely trace left effusion at the base. No signs of pneumonia or failure. Heart is of normal size.   XR Abdomen Port 1 View    Impression    IMPRESSION: Orogastric tube tip in the proximal stomach and oriented superiorly towards the left diaphragm. Visualized bowel gas pattern unremarkable. Numerous tubes and lines lower chest and upper abdomen as described on chest x-ray report from today.        Labs, personally reviewed.  Hemoglobin   Date Value Ref Range Status   10/29/2024 6.7 (LL) 11.7 - 15.7 g/dL Final   10/28/2024 7.0 (L) 11.7 - 15.7 g/dL Final   10/27/2024 7.0 (L) 11.7 - 15.7 g/dL Final   06/09/2015 13.5 11.7 - 15.7 g/dL Final   03/11/2015 12.3 11.7 - 15.7 g/dL Final   01/08/2015 13.6 11.7 - 15.7  g/dL Final     WBC   Date Value Ref Range Status   06/09/2015 7.9 4.0 - 11.0 10e9/L Final   03/11/2015 9.4 4.0 - 11.0 10e9/L Final   01/08/2015 8.1 4.0 - 11.0 10e9/L Final     WBC Count   Date Value Ref Range Status   10/28/2024 11.7 (H) 4.0 - 11.0 10e3/uL Final   10/27/2024 9.8 4.0 - 11.0 10e3/uL Final   10/26/2024 9.0 4.0 - 11.0 10e3/uL Final     Platelet Count   Date Value Ref Range Status   10/28/2024 398 150 - 450 10e3/uL Final   10/27/2024 374 150 - 450 10e3/uL Final   10/26/2024 374 150 - 450 10e3/uL Final   06/09/2015 219 150 - 450 10e9/L Final   03/11/2015 240 150 - 450 10e9/L Final   01/08/2015 221 150 - 450 10e9/L Final     Creatinine   Date Value Ref Range Status   10/29/2024 2.59 (H) 0.51 - 0.95 mg/dL Final   10/28/2024 2.75 (H) 0.51 - 0.95 mg/dL Final   10/27/2024 3.09 (H) 0.51 - 0.95 mg/dL Final   06/09/2015 0.80 0.52 - 1.04 mg/dL Final   03/11/2015 0.87 0.52 - 1.04 mg/dL Final   01/08/2015 0.79 0.52 - 1.04 mg/dL Final     Potassium   Date Value Ref Range Status   10/29/2024 3.3 (L) 3.4 - 5.3 mmol/L Final   10/28/2024 3.8 3.4 - 5.3 mmol/L Final   10/28/2024 3.7 3.4 - 5.3 mmol/L Final   06/09/2015 3.8 3.4 - 5.3 mmol/L Final   03/11/2015 3.9 3.4 - 5.3 mmol/L Final   01/08/2015 4.1 3.4 - 5.3 mmol/L Final     Potassium POCT   Date Value Ref Range Status   10/08/2024 4.4 3.4 - 5.3 mmol/L Final   10/08/2024 4.0 3.4 - 5.3 mmol/L Final   10/08/2024 4.1 3.4 - 5.3 mmol/L Final     Magnesium   Date Value Ref Range Status   10/29/2024 1.4 (L) 1.7 - 2.3 mg/dL Final   10/28/2024 1.7 1.7 - 2.3 mg/dL Final   10/27/2024 2.0 1.7 - 2.3 mg/dL Final   06/09/2015 1.8 1.6 - 2.3 mg/dL Final   03/11/2015 1.7 1.6 - 2.3 mg/dL Final   01/08/2015 1.8 1.6 - 2.3 mg/dL Final          I/O:  I/O last 3 completed shifts:  In: 650 [P.O.:650]  Out: 2250 [Urine:2250]       Physical Exam:    General: Patient seen in chair this AM. NAD. Pleasant, conversant.   HEENT: DORITA, no sclera icterus, moist mucosa  CV: A-fib/a-flutter on monitor. 2+  peripheral pulses in all extremities. Moderate peripheral edema. Incision C/D/I.  Pulm: Satting well w/ nonlabored breathing on RA  Abd: Soft, NT, ND  Ext: Moderate pedal edema, SCDs in place, warm, distal pulses intact  Neuro: CNs grossly intact, FAM, A&Ox3      ASSESSMENT/PLAN:    Jory Ambrose is a 49 year old female with a history of GIA, asthma, GERD,  hypothyroidism, h/o PE on plavix, carotid stenosis, Hodgkin's lymphoma (s/p chemo/radiation and bone marrow transplant x2 after recurrence) currently in remission who is s/p CABG x4, RLE EVH, attempted mitral repair, mechanical MVR, LAAE.    Principal Problem:    Coronary artery disease involving native coronary artery of native heart with angina pectoris (H)  Active Problems:    Mitral regurgitation    Paroxysmal atrial fibrillation (H)    Paroxysmal atrial flutter (H)    Typical atrial flutter (H)  Acute Renal Failure d/t ATN, improving.      NEURO:   - Scheduled lidocaine patches  - PTA bupropion  - Tylenol 500 mg q6hrs PRN  - Gabapentin for reported neuropathic pain    CV:  - Pre-op EF 55-60%  - Chest tubes and TPWs removed POD#5  - Normotensive  - Metoprolol 75 mg BID  - Plavix daily indefinitely d/t unclear h/o anaphylaxis to ASA  - Atorvastatin 80mg daily  - Warfarin for mechanical MVR, INR therapeutic, heparin gtt stopped 10/15  - Rate controlled a fib/flutter - s/p amiodarone bolus/gtt 10/13 (ok per [place]). Holding PO given postop ischemic hepatitis and allergy to PO amio additive. EP consulted, signed off. A fib clinic f/u scheduled  - New baseline echo completed 10/25    PULM:   - Extubated on POD#0  - Maintaining O2 sats on room air w/ home CPAP qHS  - Encourage pulmonary toilet  - PTA zyrtec, fludrocortisone, flonase, simethicone, albuterol, pulmicort, breo ellipta, duoneb    FEN/GI:  - Continue electrolyte replacement protocol  - Regular diet  - Bowel regimen, LBM 10/28  - Ischemic hepatitis - resolved, hepatic w/u unremarkable.  -  PTA vitamin D3     RENAL:  - Nephrology consulted for ARF w/ decreased UOP, appreciate. Following.  - CRRT 10/11 - 10/13  - HD 10/15, 17, 19, 23  - Strict I/O  - Daily renal panel  - Cr downtrending, 2.59 today (baseline ~0.9)    HEME:  - H/o bone marrow tx. Needs irradiated blood  - Acute blood loss anemia post-op, resolved  - Likely dilutional component. Hgb has been 7.0 and stable for a couple of days, 6.7 today  - Plan for transfusion 1u PRBCs today  - [facility]  - HIT screen negative.  - INR 4.06  - Transfusion hx  - 2u PRBCs periop  - 1u PRBCs 10/11  - 1u PRBCs 10/16    ID:  - Lydia op ppx complete. Afebrile.   - UA/Urine cx 10/10 & 10/13 NG.  - Blood cx 10/9 & 10/13 NG    ENDO:   - HbA1c 5.7%  - Adrenal, thyroid, and pancreatitis labs unremarkable  - PTA Metformin discontinued (renal function)  - PTA synthroid    PPx:   - DVT: SCDs, SQ heparin TID, ambulation   - GI: Protonix 40mg IV/PO daily    DISPO:   - Continue general tele care (POD#8)  - PT/OT recs at discharge: TCU  - Medically Ready for Discharge: Ready since 10/24. Awaiting outpatient HD to be arranged and TCU acceptance. Will touch base w/ nephrology today.       Eva Galvez PA-C  [facility] Cardiothoracic Surgery  Vocera or Secure Chat  October 29, 2024

## 2024-10-29 NOTE — PROGRESS NOTES
sc  Patient Name: Jory Ambrose   MRN: [id number]   Date of Admission: 10/8/2024    Procedure: Procedure(s):  CORONARY ARTERY BYPASS GRAFT TIMES FOUR, LEFT INTERNAL MAMMARY ARTERY HARVEST, RIGHT LEG ENDOSCOPIC VESSEL PROCUREMENT, EPIAORTIC ULTRASOUND,  MITRAL VALVE REAIR CONVERTED TO MITRAL VALVE REPLACEMENT  LEFT ATRIAL APPENDAGE LIGATION,  ANESTHESIA TRANSESOPHAGEAL ECHOCARDIOGRAM    Post Op day #:21      Subjective (Patient focus/Primary Problem for shift): anemia          Pain Goal0 Pain Rating0           Pain Medication/ Regime effective to reduce patient pain tylenol    Objective (Physical assessment):           Rhythm: atrial fibrillation            Bowel Activity: yes if Yes indicate when: today          Bowel Medications: no            Incision: healing well          Incentive Spirometry Q 1-2 hour when awake:  yes Volume: 1000          Epicardial Pacing Wires:  not applicable            Patient Activity:           Up to chair for meals: yes          Ambulation with RN x2 (Not including CR): yes            Is patient in home clothes:no             Chest Tubes   Pleural: no Draining: no               Suction: no              Mediastinal: no Draining: no               Suction: no   Dressing Change Daily:no If No, why?ROBERT                     Urinary Catheter: no           Preventative WOC consult (need MD order): yes       Assessment (Nursing primary shift focus): Patient received on e unit of blood today for a low HGB 6.7. Patient tolerated well. Voided 500 ml after transfusion. WOC RN here to assess right buttock area dime size superficial site. Meplix dressing on and changed today. Pictures.     Plan (Patient Care Plan/focus): Continue to follow up on hgb results after blood transfusion. Pain management and electrolytes followed up. Encourage activity. WOC consult buttock. Call light in reach.      Monika Jacobsen RN   10/29/2024   1:19 PM

## 2024-10-29 NOTE — PROGRESS NOTES
Care Management Follow Up    Length of Stay (days): 21    Expected Discharge Date: 10/29/2024     Concerns to be Addressed: Care progression - discharge planning     Patient plan of care discussed at interdisciplinary rounds: Yes    Anticipated Discharge Disposition: Dialysis Services, Transitional Care     Anticipated Discharge Services: Dialysis Services, Transitional Care  Anticipated Discharge DME: NA    Patient/family educated on Medicare website which has current facility and service quality ratings: Yes  Education Provided on the Discharge Plan:  Yes per team  Patient/Family in Agreement with the Plan: yes    Referrals Placed by CM/SW:  Yes  Private pay costs discussed: Not applicable    Discussed  Partnership in Safe Discharge Planning  document with patient/family: Yes:  patient's mother, Mia    Handoff Completed: No, handoff not indicated or clinically appropriate    Additional Information:  0918 called [facility] and spoke with Uday. Kanwal said she has not review patient yet. She will review and respond.    Social Hx:  Assessment: Follow.   Last Note: 10/29/24  Plan: Rehab rec TCU (prefer on site dialysis).  [facility] considering.     Needs: Medical clearance, TCU acceptance. [facility] set up OP Dialysis - potentially at [facility]  Hand off sent:  - [facility]  Transport: Family     Next Steps: RNCM to follow for medical progression, recommendations, and final discharge plan.     Adrienne Stevenson RN     Met with patient and her mother, Mia, at bedside to update.  Pat would like to continue with [facility] or not patient will continue with dialysis.

## 2024-10-30 ENCOUNTER — APPOINTMENT (OUTPATIENT)
Dept: OCCUPATIONAL THERAPY | Facility: HOSPITAL | Age: 49
DRG: 219 | End: 2024-10-30
Attending: SURGERY
Payer: COMMERCIAL

## 2024-10-30 ENCOUNTER — APPOINTMENT (OUTPATIENT)
Dept: PHYSICAL THERAPY | Facility: HOSPITAL | Age: 49
DRG: 219 | End: 2024-10-30
Attending: SURGERY
Payer: COMMERCIAL

## 2024-10-30 PROBLEM — K76.0 HEPATIC STEATOSIS: Status: ACTIVE | Noted: 2023-01-10

## 2024-10-30 PROBLEM — E88.810 METABOLIC SYNDROME: Status: ACTIVE | Noted: 2023-01-10

## 2024-10-30 PROBLEM — F33.41 MDD (MAJOR DEPRESSIVE DISORDER), RECURRENT, IN PARTIAL REMISSION (H): Status: ACTIVE | Noted: 2020-01-28

## 2024-10-30 PROBLEM — I34.0 MITRAL REGURGITATION: Status: RESOLVED | Noted: 2024-10-08 | Resolved: 2024-10-30

## 2024-10-30 PROBLEM — R73.03 PREDIABETES: Status: ACTIVE | Noted: 2022-03-28

## 2024-10-30 PROBLEM — F43.23 ADJUSTMENT DISORDER WITH MIXED ANXIETY AND DEPRESSED MOOD: Status: ACTIVE | Noted: 2020-01-24

## 2024-10-30 PROBLEM — G47.33 OSA (OBSTRUCTIVE SLEEP APNEA): Status: ACTIVE | Noted: 2023-01-10

## 2024-10-30 LAB
ALBUMIN SERPL BCG-MCNC: 3.1 G/DL (ref 3.5–5.2)
ANION GAP SERPL CALCULATED.3IONS-SCNC: 15 MMOL/L (ref 7–15)
BUN SERPL-MCNC: 59.4 MG/DL (ref 6–20)
CA-I BLD-MCNC: 4.6 MG/DL (ref 4.4–5.2)
CALCIUM SERPL-MCNC: 8.4 MG/DL (ref 8.8–10.4)
CHLORIDE SERPL-SCNC: 101 MMOL/L (ref 98–107)
CREAT SERPL-MCNC: 2.47 MG/DL (ref 0.51–0.95)
EGFRCR SERPLBLD CKD-EPI 2021: 23 ML/MIN/1.73M2
GLUCOSE SERPL-MCNC: 131 MG/DL (ref 70–99)
HCO3 SERPL-SCNC: 21 MMOL/L (ref 22–29)
HGB BLD-MCNC: 7.8 G/DL (ref 11.7–15.7)
INR PPP: 3.8 (ref 0.85–1.15)
MAGNESIUM SERPL-MCNC: 1.8 MG/DL (ref 1.7–2.3)
PHOSPHATE SERPL-MCNC: 3.3 MG/DL (ref 2.5–4.5)
PHOSPHATE SERPL-MCNC: 3.3 MG/DL (ref 2.5–4.5)
POTASSIUM SERPL-SCNC: 3.6 MMOL/L (ref 3.4–5.3)
POTASSIUM SERPL-SCNC: 3.6 MMOL/L (ref 3.4–5.3)
SODIUM SERPL-SCNC: 137 MMOL/L (ref 135–145)

## 2024-10-30 PROCEDURE — 250N000011 HC RX IP 250 OP 636

## 2024-10-30 PROCEDURE — 97110 THERAPEUTIC EXERCISES: CPT | Mod: GP

## 2024-10-30 PROCEDURE — 84100 ASSAY OF PHOSPHORUS: CPT | Performed by: SURGERY

## 2024-10-30 PROCEDURE — 250N000013 HC RX MED GY IP 250 OP 250 PS 637: Performed by: SURGERY

## 2024-10-30 PROCEDURE — 250N000013 HC RX MED GY IP 250 OP 250 PS 637: Performed by: PHYSICIAN ASSISTANT

## 2024-10-30 PROCEDURE — 84132 ASSAY OF SERUM POTASSIUM: CPT | Performed by: SURGERY

## 2024-10-30 PROCEDURE — 250N000013 HC RX MED GY IP 250 OP 250 PS 637

## 2024-10-30 PROCEDURE — 210N000001 HC R&B IMCU HEART CARE

## 2024-10-30 PROCEDURE — 250N000013 HC RX MED GY IP 250 OP 250 PS 637: Performed by: INTERNAL MEDICINE

## 2024-10-30 PROCEDURE — 250N000009 HC RX 250

## 2024-10-30 PROCEDURE — 97110 THERAPEUTIC EXERCISES: CPT | Mod: GO

## 2024-10-30 PROCEDURE — 83735 ASSAY OF MAGNESIUM: CPT

## 2024-10-30 PROCEDURE — 94640 AIRWAY INHALATION TREATMENT: CPT | Mod: 76

## 2024-10-30 PROCEDURE — 97530 THERAPEUTIC ACTIVITIES: CPT | Mod: GP

## 2024-10-30 PROCEDURE — 94660 CPAP INITIATION&MGMT: CPT

## 2024-10-30 PROCEDURE — 82310 ASSAY OF CALCIUM: CPT

## 2024-10-30 PROCEDURE — 999N000157 HC STATISTIC RCP TIME EA 10 MIN

## 2024-10-30 PROCEDURE — 85610 PROTHROMBIN TIME: CPT | Performed by: SURGERY

## 2024-10-30 PROCEDURE — 85018 HEMOGLOBIN: CPT

## 2024-10-30 PROCEDURE — 97535 SELF CARE MNGMENT TRAINING: CPT | Mod: GO

## 2024-10-30 PROCEDURE — 82330 ASSAY OF CALCIUM: CPT

## 2024-10-30 RX ORDER — BUDESONIDE 0.5 MG/2ML
1 INHALANT ORAL 2 TIMES DAILY
DISCHARGE
Start: 2024-10-30 | End: 2024-11-02

## 2024-10-30 RX ORDER — POTASSIUM CHLORIDE 1500 MG/1
20 TABLET, EXTENDED RELEASE ORAL ONCE
Status: COMPLETED | OUTPATIENT
Start: 2024-10-30 | End: 2024-10-30

## 2024-10-30 RX ORDER — METOPROLOL TARTRATE 75 MG/1
75 TABLET ORAL 2 TIMES DAILY
DISCHARGE
Start: 2024-10-30 | End: 2024-11-02

## 2024-10-30 RX ORDER — MAGNESIUM OXIDE 400 MG/1
400 TABLET ORAL EVERY 4 HOURS
Status: COMPLETED | OUTPATIENT
Start: 2024-10-30 | End: 2024-10-30

## 2024-10-30 RX ORDER — ACETAMINOPHEN 325 MG/1
325-650 TABLET ORAL EVERY 4 HOURS PRN
Status: DISCONTINUED | OUTPATIENT
Start: 2024-10-30 | End: 2024-11-01 | Stop reason: HOSPADM

## 2024-10-30 RX ORDER — METHOCARBAMOL 500 MG/1
500 TABLET, FILM COATED ORAL 4 TIMES DAILY PRN
Status: DISCONTINUED | OUTPATIENT
Start: 2024-10-30 | End: 2024-11-01 | Stop reason: HOSPADM

## 2024-10-30 RX ADMIN — METOPROLOL TARTRATE 75 MG: 25 TABLET, FILM COATED ORAL at 08:50

## 2024-10-30 RX ADMIN — CLOPIDOGREL BISULFATE 75 MG: 75 TABLET ORAL at 08:50

## 2024-10-30 RX ADMIN — HEPARIN, PORCINE (PF) 10 UNIT/ML INTRAVENOUS SYRINGE 5 ML: at 20:55

## 2024-10-30 RX ADMIN — BUDESONIDE 1 MG: 0.5 INHALANT ORAL at 21:35

## 2024-10-30 RX ADMIN — BUMETANIDE 2 MG: 2 TABLET ORAL at 08:49

## 2024-10-30 RX ADMIN — Medication 125 MCG: at 08:48

## 2024-10-30 RX ADMIN — LEVOTHYROXINE SODIUM 125 MCG: 0.03 TABLET ORAL at 06:30

## 2024-10-30 RX ADMIN — FLUDROCORTISONE ACETATE 0.1 MG: 0.1 TABLET ORAL at 08:48

## 2024-10-30 RX ADMIN — METOPROLOL TARTRATE 75 MG: 25 TABLET, FILM COATED ORAL at 20:54

## 2024-10-30 RX ADMIN — ACETAMINOPHEN 500 MG: 500 TABLET ORAL at 06:28

## 2024-10-30 RX ADMIN — ACETAMINOPHEN 500 MG: 500 TABLET ORAL at 12:51

## 2024-10-30 RX ADMIN — BUDESONIDE 1 MG: 0.5 INHALANT ORAL at 07:45

## 2024-10-30 RX ADMIN — SALINE NASAL SPRAY 1 SPRAY: 1.5 SOLUTION NASAL at 21:18

## 2024-10-30 RX ADMIN — SIMETHICONE 80 MG: 80 TABLET, CHEWABLE ORAL at 03:39

## 2024-10-30 RX ADMIN — SALINE NASAL SPRAY 1 SPRAY: 1.5 SOLUTION NASAL at 13:11

## 2024-10-30 RX ADMIN — FLUTICASONE FUROATE AND VILANTEROL TRIFENATATE 1 PUFF: 100; 25 POWDER RESPIRATORY (INHALATION) at 21:18

## 2024-10-30 RX ADMIN — PANTOPRAZOLE SODIUM 40 MG: 40 TABLET, DELAYED RELEASE ORAL at 08:48

## 2024-10-30 RX ADMIN — POTASSIUM CHLORIDE 20 MEQ: 1500 TABLET, EXTENDED RELEASE ORAL at 08:49

## 2024-10-30 RX ADMIN — ACETAMINOPHEN 650 MG: 325 TABLET ORAL at 17:53

## 2024-10-30 RX ADMIN — MAGNESIUM OXIDE TAB 400 MG (241.3 MG ELEMENTAL MG) 400 MG: 400 (241.3 MG) TAB at 08:50

## 2024-10-30 RX ADMIN — ONDANSETRON 4 MG: 4 TABLET, ORALLY DISINTEGRATING ORAL at 18:50

## 2024-10-30 RX ADMIN — ATORVASTATIN CALCIUM 80 MG: 40 TABLET, FILM COATED ORAL at 20:54

## 2024-10-30 RX ADMIN — Medication 1 TABLET: at 12:51

## 2024-10-30 RX ADMIN — LIDOCAINE 1 PATCH: 4 PATCH TOPICAL at 20:55

## 2024-10-30 RX ADMIN — CETIRIZINE HYDROCHLORIDE 10 MG: 10 TABLET, FILM COATED ORAL at 08:48

## 2024-10-30 RX ADMIN — WARFARIN SODIUM 0.5 MG: 1 TABLET ORAL at 17:54

## 2024-10-30 RX ADMIN — SENNOSIDES AND DOCUSATE SODIUM 1 TABLET: 8.6; 5 TABLET ORAL at 20:54

## 2024-10-30 RX ADMIN — MAGNESIUM OXIDE TAB 400 MG (241.3 MG ELEMENTAL MG) 400 MG: 400 (241.3 MG) TAB at 12:51

## 2024-10-30 NOTE — PLAN OF CARE
Goal Outcome Evaluation:      Plan of Care Reviewed With: patient    Overall Patient Progress: improvingOverall Patient Progress: improving         sc  Patient Name: Jory Ambrose   MRN: [id number]   Date of Admission: 10/8/2024    Procedure: Procedure(s):  CORONARY ARTERY BYPASS GRAFT TIMES FOUR, LEFT INTERNAL MAMMARY ARTERY HARVEST, RIGHT LEG ENDOSCOPIC VESSEL PROCUREMENT, EPIAORTIC ULTRASOUND,  MITRAL VALVE REAIR CONVERTED TO MITRAL VALVE REPLACEMENT  LEFT ATRIAL APPENDAGE LIGATION,  ANESTHESIA TRANSESOPHAGEAL ECHOCARDIOGRAM    Post Op day #:22    Subjective (Patient focus/Primary Problem for shift): rest/sleep          Pain Goal 0 Pain Rating 0           Pain Medication/ Regime effective to reduce patient pain yes    Objective (Physical assessment):           Rhythm: atrial fibrillation            Bowel Activity: yes if Yes indicate when:  10/23          Bowel Medications: not applicable            Incision: healing well          Incentive Spirometry Q 1-2 hour when awake:  yes Volume: 1000          Epicardial Pacing Wires:  not applicable            Patient Activity:           Up to chair for meals: yes          Ambulation with RN x2 (Not including CR): not applicable            Is patient in home clothes:no             Chest Tubes   Pleural: not applicable Draining: not applicable               Suction: not applicable              Mediastinal: not applicable Draining: not applicable               Suction: not applicable   Dressing Change Daily:not applicable If No, why? N/A                     Urinary Catheter: not applicable           Preventative WOC consult (need MD order): no       Assessment (Nursing primary shift focus): A & O. VSS on RA, uses CPAP while sleeping. Afib on tele, HR in 90s. Gave tylenol this morning for pain. Pt requested for benadryl to help sleep - effective. Gave simethicone for gas effective. Make needs known appropriately.    K+, Mag and phosphorus protocol, recheck  tomorrow.    Hgb 7.8    Plan (Patient Care Plan/focus): Increase activity, encourage IS use.       Arelis Ramirez RN   10/30/2024   4:41 AM

## 2024-10-30 NOTE — PROGRESS NOTES
'    [facility] ([facility]) progress note  CC: F/U ALEXEI  S: Patient tells me that she is doing better today.  She is sitting in a recliner at the bedside.  Mother is also present in the room during the visit.  Patient denies chest pain, no shortness of breath.  She remains weak and debilitated.  Able to follow simple conversation.    Assessment and plan:  Acute kidney injury.  Acute tubular necrosis.  Secondary to CABG and mitral valve replacement followed by cardiogenic shock.  Baseline creatinine 0.95 (10/8/2024).  Dialysis initiated 10/11/24.  Required CRRT 10/11 - 10/13/24    HD 10/15, 10/17, 10/19 (Held) but did dialysis again 10/23 due to rising creatinine (2.7 (10/20)-->-->3.8 mg/dl)  Renal function is slowly recovering.  Last time dialyzing on 10/26.  Serum creatinine is slowly dropping off dialysis  Creatinine 3.07 (10/27)-->--> 2.47 (10/30)  Good urine output for now  Continue bumetanide 2 mg p.o. daily  Keep PCAD in place for now (placed on 10/24)  Status post coronary artery bypass grafting x 4 vessels and mechanical mitral valve replacement on 10/8.  Diffuse coronary artery disease  complicated post op course with cardiogenic shock, severe shock liver, dialysis dependent ALEXEI  Shock liver. Resolved.   History of Hodgkin's lymphoma.  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  Anasarca -slowly improving.  Continue diuretics.  Hypokalemia, hypomagnesemia.  Secondary to poor oral intake and diuretics.  Continue to replace and monitor.  Anemia due to acute illness.  Transfuse as needed.  Continue management as per primary service.    Condition and plan of care discussed with patient and mother at the bedside during the visit today.    Doc Dhillon MD  Nephrology        /73 (BP Location: Right arm)   Pulse 109   Temp 98.2  F (36.8  C) (Oral)   Resp 20   Wt 106.4 kg (234 lb 8 oz)   LMP  (LMP Unknown)   SpO2 98%   BMI 40.25  kg/m      I/O last 3 completed shifts:  In: 1004 [P.O.:650]  Out: 2400 [Urine:2400]    Physical Exam:   GENERAL: NAD  EYES: EOMI   ENT: MMM, hearing intact  RESP: tachypneic, on RA  CV: + leg edema (R>L)  GI: NT/ND  SKIN: Pale, right leg incisions clean/ bruising noted, sternotomy site healing  RIJ PCAD site clean    Recent Labs   Lab 10/30/24  0534 10/29/24  2143 10/29/24  1347 10/29/24  0543 10/28/24  0629 10/28/24  0336 10/27/24  2153 10/27/24  1629 10/27/24  0931 10/27/24  0337 10/27/24  0219 10/26/24  1325 10/26/24  0637 10/25/24  0431     --  137 136 138  --   --   --   --  136  --   --  137 136   POTASSIUM 3.6  3.6 3.7 3.4  3.4 3.3* 3.8 3.7 3.4   < > 3.4 3.4 3.6   < > 3.2* 3.1*   CHLORIDE 101  --  99 99 101  --   --   --   --  99  --   --  98 98   CO2 21*  --  20* 22 23  --   --   --   --  23  --   --  23 25   BUN 59.4*  --  62.3* 60.7* 60.7*  --   --   --   --  60.6*  --   --  52.8* 46.4*   CR 2.47*  --  2.60* 2.59* 2.75*  --   --   --   --  3.09*  --   --  3.05* 2.96*   GFRESTIMATED 23*  --  22* 22* 20*  --   --   --   --  18*  --   --  18* 19*   CINDY 8.4*  --  8.6* 8.5* 8.5*  --   --   --   --  8.4*  --   --  8.4* 8.6*   PHOS 3.3  3.3  --  3.3 3.5  --   --   --   --   --   --   --   --   --   --    MAG 1.8  --  2.0 1.4* 1.7  --   --   --  2.0  --  1.4*  --  1.6* 1.7   ALBUMIN 3.1*  --  3.4* 3.2*  --   --   --   --   --   --   --   --   --  3.1*    < > = values in this interval not displayed.     Recent Labs   Lab 10/30/24  0534 10/29/24  1347 10/29/24  0543 10/28/24  0629 10/27/24  0337 10/26/24  0637 10/25/24  0431 10/24/24  0620   WBC  --   --   --  11.7* 9.8 9.0 10.1 12.4*   HGB 7.8* 8.6* 6.7* 7.0* 7.0* 7.1* 6.9* 7.2*   HCT  --   --   --  23.4* 22.1* 22.6* 22.0* 22.8*   MCV  --   --   --  93 90 91 90 90   PLT  --   --   --  398 374 374 369 404       Current Facility-Administered Medications:     acetaminophen (TYLENOL) tablet 325-650 mg, 325-650 mg, Oral, Q4H PRN, Catherine Galvez, EARNESTINE     albuterol (PROVENTIL HFA/VENTOLIN HFA) inhaler, 2 puff, Inhalation, Q6H PRN, Catherine Galvez PA-C, 2 puff at 10/26/24 1455    atorvastatin (LIPITOR) tablet 80 mg, 80 mg, Oral, At Bedtime, Catherine Galvez PA-C, 80 mg at 10/29/24 2141    bisacodyl (DULCOLAX) suppository 10 mg, 10 mg, Rectal, Daily PRN, Catherine Galvez PA-C    budesonide (PULMICORT) neb solution 1 mg, 1 mg, Nebulization, BID, Catherine Galvez PA-C, 1 mg at 10/30/24 0745    bumetanide (BUMEX) tablet 2 mg, 2 mg, Oral, Daily, Danish Rhoades MD, 2 mg at 10/30/24 0849    [COMPLETED] buPROPion (WELLBUTRIN XL) 24 hr tablet 150 mg, 150 mg, Oral, Daily **FOLLOWED BY** buPROPion (WELLBUTRIN XL) 24 hr tablet 300 mg, 300 mg, Oral, Daily, Kerri Dolan PA-C, 300 mg at 10/28/24 0901    cetirizine (zyrTEC) tablet 10 mg, 10 mg, Oral, Daily, Catherine Galvez PA-C, 10 mg at 10/30/24 0848    clopidogrel (PLAVIX) tablet 75 mg, 75 mg, Oral, Daily, Catherine Galvez PA-C, 75 mg at 10/30/24 0850    glucose gel 15-30 g, 15-30 g, Oral, Q15 Min PRN **OR** dextrose 50 % injection 25-50 mL, 25-50 mL, Intravenous, Q15 Min PRN, 50 mL at 10/14/24 0806 **OR** glucagon injection 1 mg, 1 mg, Subcutaneous, Q15 Min PRN, Catherine Galvez PA-C    fludrocortisone (FLORINEF) tablet 0.1 mg, 0.1 mg, Oral, Daily, Catherine Galvez PA-C, 0.1 mg at 10/30/24 0848    fluticasone (FLONASE) 50 MCG/ACT spray 2 spray, 2 spray, Both Nostrils, Daily PRN, Catherine Galvez PA-C    fluticasone-vilanterol (BREO ELLIPTA) 100-25 MCG/ACT inhaler 1 puff, 1 puff, Inhalation, At Bedtime, Catherine Galvez PA-C, 1 puff at 10/28/24 2222    gabapentin (NEURONTIN) capsule 100 mg, 100 mg, Oral, TID PRN, Catherine Galvez PA-C    [COMPLETED] sodium chloride 0.9% DIALYSIS Cath LOCK - RED Lumen, 10 mL, Intracatheter, Once in dialysis/CRRT, 10 mL at 10/17/24 1527 **FOLLOWED BY** heparin 1000 unit/mL DIALYSIS Cath LOCK - RED Lumen, 1.3-2.6 mL, Intracatheter, Q1H PRN,  Rohan Polo MD, 3,200 Units at 10/24/24 0934    [COMPLETED] sodium chloride 0.9% DIALYSIS Cath LOCK - BLUE Lumen, 10 mL, Intracatheter, Once in dialysis/CRRT, 10 mL at 10/17/24 1526 **FOLLOWED BY** heparin 1000 unit/mL DIALYSIS Cath LOCK -BLUE Lumen, 1.3-2.6 mL, Intracatheter, Q1H PRN, Rohan Polo MD    heparin lock flush 10 unit/mL injection 5-20 mL, 5-20 mL, Intracatheter, Q24H, Catherine Galvez, PA-C, 5 mL at 10/26/24 2108    heparin lock flush 10 unit/mL injection 5-20 mL, 5-20 mL, Intracatheter, Q1H PRN, Morgan Galvezrielle K, PA-C    hydrALAZINE (APRESOLINE) injection 10 mg, 10 mg, Intravenous, Q30 Min PRN, Catherine Galvez, PA-C, 10 mg at 10/29/24 0326    ipratropium - albuterol 0.5 mg/2.5 mg/3 mL (DUONEB) neb solution 3 mL, 3 mL, Nebulization, 4x Daily PRN, Morgan Galvezrielle K, PA-C    levothyroxine (SYNTHROID/LEVOTHROID) tablet 125 mcg, 125 mcg, Oral, QAM AC, Morgan Galvezrielle K, PA-C, 125 mcg at 10/30/24 0630    Lidocaine (LIDOCARE) 4 % Patch 1-2 patch, 1-2 patch, Transdermal, Q24H, Morgan Galvezrielle K, PA-C, 2 patch at 10/29/24 2143    melatonin tablet 5 mg, 5 mg, Oral, At Bedtime PRN, Morgan Galvezrielle K, PA-C    methocarbamol (ROBAXIN) tablet 500 mg, 500 mg, Oral, 4x Daily PRN, Herson Galvezelle K, PA-C    metoprolol tartrate (LOPRESSOR) tablet 75 mg, 75 mg, Oral, BID, Morgan Galvezrielle LINDA, PA-C, 75 mg at 10/30/24 0850    multivitamin w/minerals (THERA-VIT-M) tablet 1 tablet, 1 tablet, Oral, Daily, Catherine Galvez PA-C, 1 tablet at 10/30/24 1251    naloxone (NARCAN) injection 0.2 mg, 0.2 mg, Intravenous, Q2 Min PRN **OR** naloxone (NARCAN) injection 0.4 mg, 0.4 mg, Intravenous, Q2 Min PRN **OR** naloxone (NARCAN) injection 0.2 mg, 0.2 mg, Intramuscular, Q2 Min PRN **OR** naloxone (NARCAN) injection 0.4 mg, 0.4 mg, Intramuscular, Q2 Min PRN, Catherine Galvez PA-C    ondansetron (ZOFRAN ODT) ODT tab 4 mg, 4 mg, Oral, Q6H PRN **OR** ondansetron (ZOFRAN) injection  4 mg, 4 mg, Intravenous, Q6H PRN, Catherine Galvez PA-C, 4 mg at 10/10/24 1945    [DISCONTINUED] pantoprazole (PROTONIX) 2 mg/mL suspension 40 mg, 40 mg, Oral or NG Tube, Daily, 40 mg at 10/08/24 1729 **OR** pantoprazole (PROTONIX) EC tablet 40 mg, 40 mg, Oral, Daily, Catherine Galvez PA-C, 40 mg at 10/30/24 0848    polyethylene glycol (MIRALAX) Packet 17 g, 17 g, Oral, Daily, Catherine Galvez PA-C, 17 g at 10/28/24 0902    prochlorperazine (COMPAZINE) injection 10 mg, 10 mg, Intravenous, Q6H PRN, 10 mg at 10/28/24 2001 **OR** prochlorperazine (COMPAZINE) tablet 10 mg, 10 mg, Oral, Q6H PRN, Catherine Galvez PA-C    senna-docusate (SENOKOT-S/PERICOLACE) 8.6-50 MG per tablet 1 tablet, 1 tablet, Oral, BID, Catherine Galvez PA-C, 1 tablet at 10/28/24 0901    simethicone (MYLICON) chewable tablet 80 mg, 80 mg, Oral, 4x Daily PRN, David Wade MD, 80 mg at 10/30/24 0339    sodium chloride (OCEAN) 0.65 % nasal spray 1 spray, 1 spray, Both Nostrils, Q1H PRN, Catherine Galvez PA-C, 1 spray at 10/30/24 1311    sodium chloride (PF) 0.9% PF flush 10-20 mL, 10-20 mL, Intracatheter, q1 min prn, Catherine Galvez PA-C    sodium chloride (PF) 0.9% PF flush 10-40 mL, 10-40 mL, Intracatheter, Q8H, Catherine Galvez PA-C, 30 mL at 10/30/24 1311    sodium chloride (PF) 0.9% PF flush 10-40 mL, 10-40 mL, Intracatheter, Q7 Days, Catherine Galvez PA-C, 30 mL at 10/25/24 1746    sodium chloride (PF) 0.9% PF flush 10-40 mL, 10-40 mL, Intracatheter, Q1H PRN, Catherine Galvez PA-C    Vitamin D3 (CHOLECALCIFEROL) tablet 125 mcg, 125 mcg, Oral, Daily, Catherine Galvez PA-C, 125 mcg at 10/30/24 0848    warfarin ANTICOAGULANT (COUMADIN) half-tab 0.5 mg, 0.5 mg, Oral, ONCE at 18:00, David Wade MD    Warfarin Dose Required Daily - Pharmacist Managed, 1 each, Oral, See Admin Instructions, Danish Rhoades MD    Labs personally reviewed today during this evaluation

## 2024-10-30 NOTE — PLAN OF CARE
Problem: Adult Inpatient Plan of Care  Goal: Plan of Care Review  Description: The Plan of Care Review/Shift note should be completed every shift.  The Outcome Evaluation is a brief statement about your assessment that the patient is improving, declining, or no change.  This information will be displayed automatically on your shift  note.  Outcome: Progressing   Goal Outcome Evaluation:      Pt A&OX4, able to make needs known, uses call light approp. Family at bedside. Prn tyelnol given for incisional sternal pain. Pt reports tylenol doesn't help much. Pt wanting to discuss additional pain meds with am rounder. Appetite fair, did have nausea after supper and prn zofran given. Denies constipation or voiding issues, reports emptying bladder well. Pt encouarged to elevate LE when in chair. IS and flutter valve encouarged hourly. Lung sounds diminished. Tele: afib/flutter, does go up into low 100s with activity, does not sustain. Reports feeling SOB with activity that is unchanged. Pt due for CHG bath and incisions cleaned this evening. Wanted to wait until later.  visiting. Pt encouarged to walk out into [place]. Plan of care ongoing .

## 2024-10-30 NOTE — PROGRESS NOTES
CVTS Daily Progress Note  POD#22 s/p CABG x4 (LIMA>LAD, rSVG>RPDA, rSVG>LPL, rSVG>D2), RLE EVH, Attempted MVR/r, MVR ( 29 mm [employer] Mechanical Mitral Valve), LAAE  Attending: Mauro  LOS: 22    SUBJECTIVE/INTERVAL EVENTS:    No acute events overnight. Awaiting TCU placement. Continues in rate controlled a-fib/a-flutter. Continues to have improving UOP. Maintaining oxygen saturations on home CPAP at night and room air during the day but continues to report subjective shortness of breath. Normotensive. Working w therapy. Pain fairly controlled. +BM. Tolerating regular diet. INR therapeutic. Patient denies new chest pain, shortness of breath, abdominal pain, calf pain, nausea. Patient has no questions today.    OBJECTIVE:  Temp:  [97.4  F (36.3  C)-98.9  F (37.2  C)] 98.9  F (37.2  C)  Pulse:  [] 93  Resp:  [18-24] 20  BP: (104-134)/(55-78) 134/71  FiO2 (%):  [21 %] 21 %  SpO2:  [96 %-99 %] 97 %  Vitals:    10/26/24 0640 10/27/24 0615 10/28/24 0500 10/29/24 0727   Weight: 105.6 kg (232 lb 14.4 oz) 105.6 kg (232 lb 12.8 oz) 106.2 kg (234 lb 3.2 oz) 101.3 kg (223 lb 4.8 oz)    10/30/24 0340   Weight: 106.4 kg (234 lb 8 oz)       Clinically Significant Risk Factors        # Hypokalemia: Lowest K = 3.3 mmol/L in last 2 days, will replace as needed      # Hypomagnesemia: Lowest Mg = 1.4 mg/dL in last 2 days, will replace as needed   # Hypoalbuminemia: Lowest albumin = 2.6 g/dL at 10/16/2024  4:35 AM, will monitor as appropriate       # Hypertension: Noted on problem list            # Moderate Malnutrition: based on nutrition assessment     # History of CABG: noted on surgical history              Current Medications:    Scheduled Meds:  Current Facility-Administered Medications   Medication Dose Route Frequency Provider Last Rate Last Admin    atorvastatin (LIPITOR) tablet 80 mg  80 mg Oral At Bedtime Catherine Galvez PA-C   80 mg at 10/29/24 2141    budesonide (PULMICORT) neb solution 1 mg  1 mg  Nebulization BID Catherine Galvez PA-C   1 mg at 10/30/24 0745    bumetanide (BUMEX) tablet 2 mg  2 mg Oral Daily Danish Rhoades MD   2 mg at 10/29/24 0823    buPROPion (WELLBUTRIN XL) 24 hr tablet 300 mg  300 mg Oral Daily Kerri Dolan PA-C   300 mg at 10/28/24 0901    cetirizine (zyrTEC) tablet 10 mg  10 mg Oral Daily Catherine Galvez PA-C   10 mg at 10/29/24 0822    clopidogrel (PLAVIX) tablet 75 mg  75 mg Oral Daily Catherine Galvez PA-C   75 mg at 10/29/24 0823    fludrocortisone (FLORINEF) tablet 0.1 mg  0.1 mg Oral Daily Catherine Galvez PA-C   0.1 mg at 10/29/24 0823    fluticasone-vilanterol (BREO ELLIPTA) 100-25 MCG/ACT inhaler 1 puff  1 puff Inhalation At Bedtime Catherine Galvez PA-C   1 puff at 10/28/24 2222    heparin lock flush 10 unit/mL injection 5-20 mL  5-20 mL Intracatheter Q24H Catherine Galvez PA-C   5 mL at 10/26/24 2108    levothyroxine (SYNTHROID/LEVOTHROID) tablet 125 mcg  125 mcg Oral QAM AC Catherine Galvez PA-C   125 mcg at 10/30/24 0630    Lidocaine (LIDOCARE) 4 % Patch 1-2 patch  1-2 patch Transdermal Q24H Catherine Galvez PA-C   2 patch at 10/29/24 2143    magnesium oxide (MAG-OX) tablet 400 mg  400 mg Oral Q4H David Wade MD        metoprolol tartrate (LOPRESSOR) tablet 75 mg  75 mg Oral BID Catherine Galvez PA-C   75 mg at 10/29/24 2141    multivitamin w/minerals (THERA-VIT-M) tablet 1 tablet  1 tablet Oral Daily Catherine Galvez PA-C   1 tablet at 10/29/24 1348    pantoprazole (PROTONIX) EC tablet 40 mg  40 mg Oral Daily Catherine Galvez PA-C   40 mg at 10/29/24 0822    polyethylene glycol (MIRALAX) Packet 17 g  17 g Oral Daily Catherine Galvez PA-C   17 g at 10/28/24 0902    potassium chloride maryann ER (KLOR-CON M20) CR tablet 20 mEq  20 mEq Oral Once David Wade MD        senna-docusate (SENOKOT-S/PERICOLACE) 8.6-50 MG per tablet 1 tablet  1 tablet Oral BID Catherine Galvez PA-C   1  tablet at 10/28/24 0901    sodium chloride (PF) 0.9% PF flush 10-40 mL  10-40 mL Intracatheter Q8H Catherine Galvez PA-C   10 mL at 10/30/24 0534    sodium chloride (PF) 0.9% PF flush 10-40 mL  10-40 mL Intracatheter Q7 Days Catherine Galvez PA-C   30 mL at 10/25/24 1746    Vitamin D3 (CHOLECALCIFEROL) tablet 125 mcg  125 mcg Oral Daily Catherine Galvez PA-C   125 mcg at 10/29/24 0822    warfarin ANTICOAGULANT (COUMADIN) half-tab 0.5 mg  0.5 mg Oral ONCE at 18:00 David Wade MD        Warfarin Dose Required Daily - Pharmacist Managed  1 each Oral See Admin Instructions Danish Rhoades MD         Continuous Infusions:  Current Facility-Administered Medications   Medication Dose Route Frequency Provider Last Rate Last Admin     PRN Meds:.  Current Facility-Administered Medications   Medication Dose Route Frequency Provider Last Rate Last Admin    acetaminophen (TYLENOL) tablet 500 mg  500 mg Oral Q4H PRN Catherine Galvez PA-C   500 mg at 10/30/24 0628    albuterol (PROVENTIL HFA/VENTOLIN HFA) inhaler  2 puff Inhalation Q6H PRN Catherine Galvez PA-C   2 puff at 10/26/24 1455    bisacodyl (DULCOLAX) suppository 10 mg  10 mg Rectal Daily PRN Catherine Galvez PA-C        glucose gel 15-30 g  15-30 g Oral Q15 Min PRN Catherine Galvez PA-C        Or    dextrose 50 % injection 25-50 mL  25-50 mL Intravenous Q15 Min PRN Catherine Galvez PA-C   50 mL at 10/14/24 0806    Or    glucagon injection 1 mg  1 mg Subcutaneous Q15 Min PRN Catherine Galvez PA-C        diphenhydrAMINE (BENADRYL) capsule 25 mg  25 mg Oral At Bedtime PRN Cortez Elder MD   25 mg at 10/29/24 2327    fluticasone (FLONASE) 50 MCG/ACT spray 2 spray  2 spray Both Nostrils Daily PRN Catherine Galvez PA-C        gabapentin (NEURONTIN) capsule 100 mg  100 mg Oral TID PRN Catherine Galvez PA-C        heparin 1000 unit/mL DIALYSIS Cath LOCK - RED Lumen  1.3-2.6 mL Intracatheter Q1H PRN Melani  Rohan Kovacs MD   3,200 Units at 10/24/24 0934    heparin 1000 unit/mL DIALYSIS Cath LOCK -BLUE Lumen  1.3-2.6 mL Intracatheter Q1H PRN Rohan Polo MD        heparin lock flush 10 unit/mL injection 5-20 mL  5-20 mL Intracatheter Q1H PRN Catherine Galvez PA-C        hydrALAZINE (APRESOLINE) injection 10 mg  10 mg Intravenous Q30 Min PRN Catherine Galvez PA-C   10 mg at 10/29/24 0326    ipratropium - albuterol 0.5 mg/2.5 mg/3 mL (DUONEB) neb solution 3 mL  3 mL Nebulization 4x Daily PRN Catherine Galvez PA-C        naloxone (NARCAN) injection 0.2 mg  0.2 mg Intravenous Q2 Min PRN Catherine Galvez PA-C        Or    naloxone (NARCAN) injection 0.4 mg  0.4 mg Intravenous Q2 Min PRN Catherine Galvez PA-C        Or    naloxone (NARCAN) injection 0.2 mg  0.2 mg Intramuscular Q2 Min PRN Catherine Galvez PA-C        Or    naloxone (NARCAN) injection 0.4 mg  0.4 mg Intramuscular Q2 Min PRN Catherine Galvez PA-C        ondansetron (ZOFRAN ODT) ODT tab 4 mg  4 mg Oral Q6H PRN Catherine Galvez PA-C        Or    ondansetron (ZOFRAN) injection 4 mg  4 mg Intravenous Q6H PRN Catherine Galvez PA-C   4 mg at 10/10/24 1945    prochlorperazine (COMPAZINE) injection 10 mg  10 mg Intravenous Q6H PRN Catherine Galvez PA-C   10 mg at 10/28/24 2001    Or    prochlorperazine (COMPAZINE) tablet 10 mg  10 mg Oral Q6H PRN Catherine Galvez PA-C        simethicone (MYLICON) chewable tablet 80 mg  80 mg Oral 4x Daily PRN David Wade MD   80 mg at 10/30/24 0339    sodium chloride (PF) 0.9% PF flush 10-20 mL  10-20 mL Intracatheter q1 min prn Shue, Catherine K, PA-C        sodium chloride (PF) 0.9% PF flush 10-40 mL  10-40 mL Intracatheter Q1H PRN Shue, Catherine K, PA-C        sodium chloride 0.9% BOLUS 1-250 mL  1-250 mL Intravenous Q1H PRN Shue, Catherine K, PA-C        sodium chloride 0.9% BOLUS 100-150 mL  100-150 mL Intravenous Q15 Min PRN Rohan Polo  MD Bethanie        sodium chloride 0.9% BOLUS 100-150 mL  100-150 mL Intravenous Q15 Min PRN Rohan Polo MD           Cardiographics:    Telemetry monitoring demonstrates a flutter w/ rates in the 60-70s vs a fib w/ rates in the 90-100s per my personal review.      Imaging:  Results for orders placed or performed during the hospital encounter of 10/08/24   XR Chest Port 1 View    Impression    IMPRESSION: Interval sternotomy, CABG procedure and mitral valve repair. Endotracheal tube proximally 2 cm above the sofia. Nasogastric tube in the stomach. Bilateral chest tubes and midline mediastinal drain in expected position. Epicardial leads are   present. Left IJ line in the brachiocephalic vein.    Low lung volumes. No evidence for CHF or pneumonia. No pleural effusion or pneumothorax.   XR Chest Port 1 View    Impression    IMPRESSION: Poststernotomy changes with prosthetic mitral valve. Patient's been extubated and the NG tube has been removed. Mediastinal and pleural chest tubes are in place.    Left IJ cordis sheath is in the distal left innominate artery. Catheter has a very subtle kink within it 3.5 centimeters from the tip.    Low lung volumes. No pneumothorax. Likely trace left effusion at the base. No signs of pneumonia or failure. Heart is of normal size.   XR Abdomen Port 1 View    Impression    IMPRESSION: Orogastric tube tip in the proximal stomach and oriented superiorly towards the left diaphragm. Visualized bowel gas pattern unremarkable. Numerous tubes and lines lower chest and upper abdomen as described on chest x-ray report from today.        Labs, personally reviewed.  Hemoglobin   Date Value Ref Range Status   10/30/2024 7.8 (L) 11.7 - 15.7 g/dL Final   10/29/2024 8.6 (L) 11.7 - 15.7 g/dL Final   10/29/2024 6.7 (LL) 11.7 - 15.7 g/dL Final   06/09/2015 13.5 11.7 - 15.7 g/dL Final   03/11/2015 12.3 11.7 - 15.7 g/dL Final   01/08/2015 13.6 11.7 - 15.7 g/dL Final     WBC   Date Value Ref  Range Status   06/09/2015 7.9 4.0 - 11.0 10e9/L Final   03/11/2015 9.4 4.0 - 11.0 10e9/L Final   01/08/2015 8.1 4.0 - 11.0 10e9/L Final     WBC Count   Date Value Ref Range Status   10/28/2024 11.7 (H) 4.0 - 11.0 10e3/uL Final   10/27/2024 9.8 4.0 - 11.0 10e3/uL Final   10/26/2024 9.0 4.0 - 11.0 10e3/uL Final     Platelet Count   Date Value Ref Range Status   10/28/2024 398 150 - 450 10e3/uL Final   10/27/2024 374 150 - 450 10e3/uL Final   10/26/2024 374 150 - 450 10e3/uL Final   06/09/2015 219 150 - 450 10e9/L Final   03/11/2015 240 150 - 450 10e9/L Final   01/08/2015 221 150 - 450 10e9/L Final     Creatinine   Date Value Ref Range Status   10/30/2024 2.47 (H) 0.51 - 0.95 mg/dL Final   10/29/2024 2.60 (H) 0.51 - 0.95 mg/dL Final   10/29/2024 2.59 (H) 0.51 - 0.95 mg/dL Final   06/09/2015 0.80 0.52 - 1.04 mg/dL Final   03/11/2015 0.87 0.52 - 1.04 mg/dL Final   01/08/2015 0.79 0.52 - 1.04 mg/dL Final     Potassium   Date Value Ref Range Status   10/30/2024 3.6 3.4 - 5.3 mmol/L Final   10/30/2024 3.6 3.4 - 5.3 mmol/L Final   10/29/2024 3.7 3.4 - 5.3 mmol/L Final   06/09/2015 3.8 3.4 - 5.3 mmol/L Final   03/11/2015 3.9 3.4 - 5.3 mmol/L Final   01/08/2015 4.1 3.4 - 5.3 mmol/L Final     Potassium POCT   Date Value Ref Range Status   10/08/2024 4.4 3.4 - 5.3 mmol/L Final   10/08/2024 4.0 3.4 - 5.3 mmol/L Final   10/08/2024 4.1 3.4 - 5.3 mmol/L Final     Magnesium   Date Value Ref Range Status   10/30/2024 1.8 1.7 - 2.3 mg/dL Final   10/29/2024 2.0 1.7 - 2.3 mg/dL Final   10/29/2024 1.4 (L) 1.7 - 2.3 mg/dL Final   06/09/2015 1.8 1.6 - 2.3 mg/dL Final   03/11/2015 1.7 1.6 - 2.3 mg/dL Final   01/08/2015 1.8 1.6 - 2.3 mg/dL Final          I/O:  I/O last 3 completed shifts:  In: 1004 [P.O.:650]  Out: 2400 [Urine:2400]       Physical Exam:    General: Patient seen in chair this AM. NAD. Pleasant, conversant.   HEENT: DORITA, no sclera icterus, moist mucosa  CV: A-fib/a-flutter on monitor. 2+ peripheral pulses in all extremities.  Moderate peripheral edema. Incision C/D/I.  Pulm: Satting well w/ nonlabored breathing on RA  Abd: Soft, NT, ND  Ext: Moderate pedal edema, SCDs in place, warm, distal pulses intact  Neuro: CNs grossly intact, FAM, A&Ox3      ASSESSMENT/PLAN:    Jory Ambrose is a 49 year old female with a history of GIA, asthma, GERD,  hypothyroidism, h/o PE on plavix, carotid stenosis, Hodgkin's lymphoma (s/p chemo/radiation and bone marrow transplant x2 after recurrence) currently in remission who is s/p CABG x4, RLE EVH, attempted mitral repair, mechanical MVR, LAAE.    Principal Problem:    Coronary artery disease involving native coronary artery of native heart with angina pectoris (H)  Active Problems:    Mitral regurgitation    Paroxysmal atrial fibrillation (H)    Paroxysmal atrial flutter (H)    Typical atrial flutter (H)  Acute Renal Failure d/t ATN, improving.      NEURO:   - Scheduled lidocaine patches  - PTA bupropion  - Tylenol 500 mg q4hrs PRN  - Gabapentin for reported neuropathic pain    CV:  - Pre-op EF 55-60%  - Chest tubes and TPWs removed POD#5  - Normotensive  - Metoprolol 75 mg BID  - Plavix daily indefinitely d/t unclear h/o anaphylaxis to ASA  - Atorvastatin 80mg daily  - Warfarin for mechanical MVR, INR therapeutic, heparin gtt stopped 10/15  - Rate controlled a fib/flutter - s/p amiodarone bolus/gtt 10/13 (ok per Randolph). Holding PO given postop ischemic hepatitis and allergy to PO amio additive. EP consulted, signed off. A fib clinic f/u scheduled  - New baseline echo completed 10/25    PULM:   - Extubated on POD#0  - Maintaining O2 sats on room air w/ home CPAP qHS  - Encourage pulmonary toilet  - PTA zyrtec, fludrocortisone, flonase, simethicone, albuterol, pulmicort, breo ellipta, duoneb    FEN/GI:  - Continue electrolyte replacement protocol  - Regular diet  - Bowel regimen, LBM 10/29  - Ischemic hepatitis - resolved, hepatic w/u unremarkable.  - PTA vitamin D3     RENAL:  - Nephrology  consulted for ARF w/ decreased UOP, appreciate. Following.  - CRRT 10/11 - 10/13  - HD 10/15, 17, 19, 23  - Strict I/O  - Daily renal panel  - Cr downtrending, 2.47 today (baseline ~0.9)    HEME:  - H/o bone marrow tx. Needs irradiated blood  - Acute blood loss anemia post-op, resolved  - Hgb 7.8 today, likely dilutional component  - Warfarin INR goal 2.5-3.5 in s/o mech MVR  - HIT screen negative.  - INR 3.80  - Transfusion hx  - 2u PRBCs periop  - 1u PRBCs 10/11  - 1u PRBCs 10/16  - 1u PRBCs 10/30    ID:  - Lydia op ppx complete. Afebrile.   - UA/Urine cx 10/10 & 10/13 NG.  - Blood cx 10/9 & 10/13 NG    ENDO:   - HbA1c 5.7%  - Adrenal, thyroid, and pancreatitis labs unremarkable  - PTA Metformin discontinued (renal function)  - PTA synthroid    PPx:   - DVT: SCDs, SQ heparin TID, ambulation   - GI: Protonix 40mg IV/PO daily    DISPO:   - Continue general tele care (POD#8)  - PT/OT recs at discharge: TCU  - Medically Ready for Discharge: Ready since 10/24. Awaiting outpatient HD to be arranged and TCU acceptance.       Eva Galvez PA-C  [facility] Cardiothoracic Surgery  Vocera or Secure Chat  October 30, 2024

## 2024-10-30 NOTE — PROGRESS NOTES
Care Management Follow Up    Length of Stay (days): 22    Expected Discharge Date: 10/31/2024     Concerns to be Addressed: Care progression - discharge planning     Patient plan of care discussed at interdisciplinary rounds: Yes    Anticipated Discharge Disposition: Dialysis Services, Transitional Care     Anticipated Discharge Services: Dialysis Services, Transitional Care  Anticipated Discharge DME: NA    Patient/family educated on Medicare website which has current facility and service quality ratings: Yes  Education Provided on the Discharge Plan:  Yes per team  Patient/Family in Agreement with the Plan: yes    Referrals Placed by CM/SW:  Yes  Private pay costs discussed: Transportation costs    Discussed  Partnership in Safe Discharge Planning  document with patient/family: Yes:  patient's mother, Mia    Handoff Completed: No, handoff not indicated or clinically appropriate    Additional Information:  0883 called [facility] and spoke with Sebastián. Sebastián said patient has been accepted and pending dialysis needs. Will initiate [facility] dialysis.    Message sent to update Eva with cardiothoracic surgery    Social Hx:  Assessment: Follow.   Last Note: 10/30/24  Plan: Rehab rec TCU (prefer on site dialysis).  [facility] accepted.     Needs: Medical clearance  Hand off sent: NA - EHO  Transport: Family     Next Steps: RNCM to follow for medical progression, recommendations, and final discharge plan.     Adrienne Stevenson, RN     0168 met with patient and her mother, Pat, at bedside. Pat reported nephrology just saw patient. No dialysis today and patient is not ready to discharge yet. Continue to monitor labs.     Message sent to update Eva    [id number]    5210 patient's mother, Mia, said patient may need [employer] [facility] at discharge. Pat does not think, she will be able to get patient in/out of her car.  Discussed out of pocket cost of [employer] medical transportation  by wheelchair with patient and family member, Mia. Patient/family member agreed with the plan to have transportation arranged by [employer] transport.

## 2024-10-30 NOTE — PROGRESS NOTES
Pt had a brief episode of chest pain when she went from siting to standing.  Pt sat back down and sx resolved.  A few minutes later attempted to ambulate patient and she said she was unsure of walking. Felt short of breath.  Discussed with patient possibility of have some amount of anxiety and she said yes possibly. Pt refusing to take home Wellbutrin. States she doesn't need it. MD informed

## 2024-10-30 NOTE — PLAN OF CARE
Problem: Pain Acute  Goal: Optimal Pain Control and Function  Outcome: Not Progressing  Intervention: Develop Pain Management Plan  Recent Flowsheet Documentation  Taken 10/30/2024 1251 by Adwoa Hernandez RN  Pain Management Interventions:   medication (see MAR)   MD notified (comment)   cold applied     Problem: Pain Acute  Goal: Optimal Pain Control and Function  Outcome: Not Progressing  Intervention: Develop Pain Management Plan  Recent Flowsheet Documentation  Taken 10/30/2024 1251 by Adwoa Hernandez RN  Pain Management Interventions:   medication (see MAR)   MD notified (comment)   cold applied     Problem: Pain Acute  Goal: Optimal Pain Control and Function  Outcome: Not Progressing  Intervention: Develop Pain Management Plan  Recent Flowsheet Documentation  Taken 10/30/2024 1251 by Adwoa Hernandez RN  Pain Management Interventions:   medication (see MAR)   MD notified (comment)   cold applied   Goal Outcome Evaluation:      Plan of Care Reviewed With: patient, family    Overall Patient Progress: improvingOverall Patient Progress: improving     Pt rated pain 3/10 prior to going up to PT. Pt returned saying she did well and liked going up to PT.  Pt sat up in chair for a bit and then called to use bathroom.  Pt ambulated to bathroom with CGA,walker and gait belt and then wanted to sit back in chair.  Pt called out later complaining of chest pain 8/10 across her whole chest and stated it was worse with movements.  Tylenol given as ordered.  Ice pack applied.  Essential oils offered and declined. Repositioning offered and declined. Provider notified. Pt's mother stated she wanted Oxy given. None ordered.Gabapentin order was declined by patient. Mother asked to speak with surgery team. Provider notified. Pt declines gabapentin as shse is worried about adding another new medication. Declines robaxin as it makes her too sleepy. Offered to ask provider to cut dose in half and pt declined.After above  treatment pain down to 5/10.

## 2024-10-31 ENCOUNTER — APPOINTMENT (OUTPATIENT)
Dept: PHYSICAL THERAPY | Facility: HOSPITAL | Age: 49
DRG: 219 | End: 2024-10-31
Attending: SURGERY
Payer: COMMERCIAL

## 2024-10-31 ENCOUNTER — APPOINTMENT (OUTPATIENT)
Dept: OCCUPATIONAL THERAPY | Facility: HOSPITAL | Age: 49
DRG: 219 | End: 2024-10-31
Attending: SURGERY
Payer: COMMERCIAL

## 2024-10-31 LAB
ANION GAP SERPL CALCULATED.3IONS-SCNC: 13 MMOL/L (ref 7–15)
BUN SERPL-MCNC: 59.6 MG/DL (ref 6–20)
CA-I BLD-MCNC: 4.6 MG/DL (ref 4.4–5.2)
CALCIUM SERPL-MCNC: 8.6 MG/DL (ref 8.8–10.4)
CHLORIDE SERPL-SCNC: 102 MMOL/L (ref 98–107)
CREAT SERPL-MCNC: 2.59 MG/DL (ref 0.51–0.95)
EGFRCR SERPLBLD CKD-EPI 2021: 22 ML/MIN/1.73M2
GLUCOSE SERPL-MCNC: 98 MG/DL (ref 70–99)
HCO3 SERPL-SCNC: 23 MMOL/L (ref 22–29)
HGB BLD-MCNC: 7.9 G/DL (ref 11.7–15.7)
INR PPP: 3.29 (ref 0.85–1.15)
MAGNESIUM SERPL-MCNC: 1.8 MG/DL (ref 1.7–2.3)
PHOSPHATE SERPL-MCNC: 3.8 MG/DL (ref 2.5–4.5)
POTASSIUM SERPL-SCNC: 3.6 MMOL/L (ref 3.4–5.3)
SODIUM SERPL-SCNC: 138 MMOL/L (ref 135–145)

## 2024-10-31 PROCEDURE — 82947 ASSAY GLUCOSE BLOOD QUANT: CPT | Performed by: INTERNAL MEDICINE

## 2024-10-31 PROCEDURE — 97116 GAIT TRAINING THERAPY: CPT | Mod: GP | Performed by: PHYSICAL THERAPIST

## 2024-10-31 PROCEDURE — 250N000011 HC RX IP 250 OP 636

## 2024-10-31 PROCEDURE — 94660 CPAP INITIATION&MGMT: CPT

## 2024-10-31 PROCEDURE — 94640 AIRWAY INHALATION TREATMENT: CPT | Mod: 76

## 2024-10-31 PROCEDURE — 94640 AIRWAY INHALATION TREATMENT: CPT

## 2024-10-31 PROCEDURE — 999N000157 HC STATISTIC RCP TIME EA 10 MIN

## 2024-10-31 PROCEDURE — 250N000013 HC RX MED GY IP 250 OP 250 PS 637

## 2024-10-31 PROCEDURE — 97110 THERAPEUTIC EXERCISES: CPT | Mod: GO

## 2024-10-31 PROCEDURE — 80048 BASIC METABOLIC PNL TOTAL CA: CPT | Performed by: INTERNAL MEDICINE

## 2024-10-31 PROCEDURE — 97535 SELF CARE MNGMENT TRAINING: CPT | Mod: GO

## 2024-10-31 PROCEDURE — 84100 ASSAY OF PHOSPHORUS: CPT | Performed by: SURGERY

## 2024-10-31 PROCEDURE — 82330 ASSAY OF CALCIUM: CPT

## 2024-10-31 PROCEDURE — 250N000013 HC RX MED GY IP 250 OP 250 PS 637: Performed by: SURGERY

## 2024-10-31 PROCEDURE — 250N000013 HC RX MED GY IP 250 OP 250 PS 637: Performed by: INTERNAL MEDICINE

## 2024-10-31 PROCEDURE — 250N000009 HC RX 250

## 2024-10-31 PROCEDURE — 85018 HEMOGLOBIN: CPT

## 2024-10-31 PROCEDURE — 120N000004 HC R&B MS OVERFLOW

## 2024-10-31 PROCEDURE — 83735 ASSAY OF MAGNESIUM: CPT

## 2024-10-31 PROCEDURE — 85610 PROTHROMBIN TIME: CPT | Performed by: SURGERY

## 2024-10-31 RX ORDER — WARFARIN SODIUM 1 MG/1
TABLET ORAL
DISCHARGE
Start: 2024-10-31 | End: 2024-11-01

## 2024-10-31 RX ORDER — BUMETANIDE 1 MG/1
1 TABLET ORAL DAILY
Qty: 30 TABLET | Refills: 0 | DISCHARGE
Start: 2024-11-01 | End: 2024-11-01

## 2024-10-31 RX ORDER — BUMETANIDE 1 MG/1
1 TABLET ORAL DAILY
Status: DISCONTINUED | OUTPATIENT
Start: 2024-11-01 | End: 2024-11-01 | Stop reason: HOSPADM

## 2024-10-31 RX ORDER — POTASSIUM CHLORIDE 1500 MG/1
20 TABLET, EXTENDED RELEASE ORAL ONCE
Status: COMPLETED | OUTPATIENT
Start: 2024-10-31 | End: 2024-10-31

## 2024-10-31 RX ORDER — WARFARIN SODIUM 1 MG/1
1 TABLET ORAL
Status: COMPLETED | OUTPATIENT
Start: 2024-10-31 | End: 2024-10-31

## 2024-10-31 RX ORDER — MAGNESIUM OXIDE 400 MG/1
400 TABLET ORAL EVERY 4 HOURS
Status: COMPLETED | OUTPATIENT
Start: 2024-10-31 | End: 2024-10-31

## 2024-10-31 RX ADMIN — METOPROLOL TARTRATE 75 MG: 25 TABLET, FILM COATED ORAL at 07:49

## 2024-10-31 RX ADMIN — BUDESONIDE 1 MG: 0.5 INHALANT ORAL at 20:07

## 2024-10-31 RX ADMIN — SENNOSIDES AND DOCUSATE SODIUM 1 TABLET: 8.6; 5 TABLET ORAL at 20:26

## 2024-10-31 RX ADMIN — FLUDROCORTISONE ACETATE 0.1 MG: 0.1 TABLET ORAL at 07:52

## 2024-10-31 RX ADMIN — Medication 1 TABLET: at 12:14

## 2024-10-31 RX ADMIN — SENNOSIDES AND DOCUSATE SODIUM 1 TABLET: 8.6; 5 TABLET ORAL at 07:47

## 2024-10-31 RX ADMIN — MAGNESIUM OXIDE TAB 400 MG (241.3 MG ELEMENTAL MG) 400 MG: 400 (241.3 MG) TAB at 10:39

## 2024-10-31 RX ADMIN — PANTOPRAZOLE SODIUM 40 MG: 40 TABLET, DELAYED RELEASE ORAL at 07:46

## 2024-10-31 RX ADMIN — METOPROLOL TARTRATE 75 MG: 25 TABLET, FILM COATED ORAL at 20:26

## 2024-10-31 RX ADMIN — ACETAMINOPHEN 650 MG: 325 TABLET ORAL at 12:14

## 2024-10-31 RX ADMIN — MICONAZOLE NITRATE: 20 POWDER TOPICAL at 20:30

## 2024-10-31 RX ADMIN — WARFARIN SODIUM 1 MG: 1 TABLET ORAL at 17:58

## 2024-10-31 RX ADMIN — POTASSIUM CHLORIDE 20 MEQ: 1500 TABLET, EXTENDED RELEASE ORAL at 10:39

## 2024-10-31 RX ADMIN — LIDOCAINE 1 PATCH: 4 PATCH TOPICAL at 20:33

## 2024-10-31 RX ADMIN — SALINE NASAL SPRAY 1 SPRAY: 1.5 SOLUTION NASAL at 19:47

## 2024-10-31 RX ADMIN — ACETAMINOPHEN 650 MG: 325 TABLET ORAL at 19:45

## 2024-10-31 RX ADMIN — Medication 125 MCG: at 07:46

## 2024-10-31 RX ADMIN — CETIRIZINE HYDROCHLORIDE 10 MG: 10 TABLET, FILM COATED ORAL at 07:47

## 2024-10-31 RX ADMIN — CLOPIDOGREL BISULFATE 75 MG: 75 TABLET ORAL at 07:46

## 2024-10-31 RX ADMIN — ATORVASTATIN CALCIUM 80 MG: 40 TABLET, FILM COATED ORAL at 20:26

## 2024-10-31 RX ADMIN — LEVOTHYROXINE SODIUM 125 MCG: 0.03 TABLET ORAL at 05:50

## 2024-10-31 RX ADMIN — FLUTICASONE FUROATE AND VILANTEROL TRIFENATATE 1 PUFF: 100; 25 POWDER RESPIRATORY (INHALATION) at 20:27

## 2024-10-31 RX ADMIN — BUDESONIDE 1 MG: 0.5 INHALANT ORAL at 08:30

## 2024-10-31 RX ADMIN — HEPARIN, PORCINE (PF) 10 UNIT/ML INTRAVENOUS SYRINGE 5 ML: at 20:34

## 2024-10-31 RX ADMIN — ACETAMINOPHEN 650 MG: 325 TABLET ORAL at 07:46

## 2024-10-31 RX ADMIN — BUMETANIDE 2 MG: 2 TABLET ORAL at 10:39

## 2024-10-31 RX ADMIN — MAGNESIUM OXIDE TAB 400 MG (241.3 MG ELEMENTAL MG) 400 MG: 400 (241.3 MG) TAB at 05:51

## 2024-10-31 NOTE — PROGRESS NOTES
'    [facility] ([facility]) progress note  CC: F/U ALEXEI  S: Patient tells me that she is doing better today.   Resting recliner at the bedside.  IV fluids have been discontinued.  Oral intake improving.  Peripheral edema improving.  Patient tells me that she feels stronger.  She hopes to be able to leave the hospital over the next few days.  Plans for TCU transfer ongoing.    Assessment and plan:  Acute kidney injury.  Acute tubular necrosis.  Secondary to CABG and mitral valve replacement followed by cardiogenic shock.  Baseline creatinine 0.95 (10/8/2024).  Dialysis initiated 10/11/24.  Required CRRT 10/11 - 10/13/24    HD 10/15, 10/17, 10/19 (Held) but did dialysis again 10/23 due to rising creatinine (2.7 (10/20)-->-->3.8 mg/dl)  Renal function is slowly recovering.  Last time dialyzed on 10/26.  Serum creatinine continues to improve without dialysis.  Creatinine 3.07 (10/27)-->--> 2.47 (10/30)--> 2.59 (10/31)  Good urine output for now  Bumetanide dose reduced to 1 mg p.o. daily (10/31)  Keep PCAD in place for now (placed on 10/24)  Status post coronary artery bypass grafting x 4 vessels and mechanical mitral valve replacement on 10/8.  Diffuse coronary artery disease  complicated post op course with cardiogenic shock, severe shock liver, dialysis dependent ALEXEI  Shock liver. Resolved.   History of Hodgkin's lymphoma.  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  Anasarca -slowly improving.  Continue diuretics.  Hypokalemia, hypomagnesemia.  Secondary to poor oral intake and diuretics.  Continue to replace and monitor.  Anemia due to acute illness.  Transfuse as needed.  Continue management as per primary service.    Condition and plan of care discussed with patient and mother at the bedside during the visit today.    From renal perspective okay for TCU transfer.    Doc Dhillon MD  Nephrology        /66   Pulse 96   Temp 98.3  F  (36.8  C) (Oral)   Resp 20   Wt 106.1 kg (233 lb 12.8 oz)   LMP  (LMP Unknown)   SpO2 99%   BMI 40.13 kg/m      I/O last 3 completed shifts:  In: 890 [P.O.:890]  Out: 1750 [Urine:1750]    Physical Exam:   GENERAL: NAD  EYES: EOMI   ENT: MMM, hearing intact  RESP: tachypneic, on RA  CV: + leg edema (R>L)  GI: NT/ND  SKIN: Pale, right leg incisions clean/ bruising noted, sternotomy site healing  RIJ PCAD site clean    Recent Labs   Lab 10/31/24  0450 10/30/24  0534 10/29/24  2143 10/29/24  1347 10/29/24  0543 10/28/24  0629 10/28/24  0336 10/27/24  1629 10/27/24  0931 10/27/24  0337 10/27/24  0219 10/26/24  1325 10/26/24  0637 10/25/24  0431    137  --  137 136 138  --   --   --  136  --   --  137 136   POTASSIUM 3.6 3.6  3.6 3.7 3.4  3.4 3.3* 3.8 3.7   < > 3.4 3.4 3.6   < > 3.2* 3.1*   CHLORIDE 102 101  --  99 99 101  --   --   --  99  --   --  98 98   CO2 23 21*  --  20* 22 23  --   --   --  23  --   --  23 25   BUN 59.6* 59.4*  --  62.3* 60.7* 60.7*  --   --   --  60.6*  --   --  52.8* 46.4*   CR 2.59* 2.47*  --  2.60* 2.59* 2.75*  --   --   --  3.09*  --   --  3.05* 2.96*   GFRESTIMATED 22* 23*  --  22* 22* 20*  --   --   --  18*  --   --  18* 19*   CINDY 8.6* 8.4*  --  8.6* 8.5* 8.5*  --   --   --  8.4*  --   --  8.4* 8.6*   PHOS 3.8 3.3  3.3  --  3.3 3.5  --   --   --   --   --   --   --   --   --    MAG 1.8 1.8  --  2.0 1.4* 1.7  --   --  2.0  --  1.4*  --  1.6* 1.7   ALBUMIN  --  3.1*  --  3.4* 3.2*  --   --   --   --   --   --   --   --  3.1*    < > = values in this interval not displayed.     Recent Labs   Lab 10/31/24  0450 10/30/24  0534 10/29/24  1347 10/29/24  0543 10/28/24  0629 10/27/24  0337 10/26/24  0637 10/25/24  0431   WBC  --   --   --   --  11.7* 9.8 9.0 10.1   HGB 7.9* 7.8* 8.6* 6.7* 7.0* 7.0* 7.1* 6.9*   HCT  --   --   --   --  23.4* 22.1* 22.6* 22.0*   MCV  --   --   --   --  93 90 91 90   PLT  --   --   --   --  536 [phone]       Current Facility-Administered Medications:      acetaminophen (TYLENOL) tablet 325-650 mg, 325-650 mg, Oral, Q4H PRN, Catherine Galvez PA-C, 650 mg at 10/31/24 1214    albuterol (PROVENTIL HFA/VENTOLIN HFA) inhaler, 2 puff, Inhalation, Q6H PRN, Catherine Galvez PA-C, 2 puff at 10/26/24 1455    atorvastatin (LIPITOR) tablet 80 mg, 80 mg, Oral, At Bedtime, Catherine Galvez PA-C, 80 mg at 10/30/24 2054    bisacodyl (DULCOLAX) suppository 10 mg, 10 mg, Rectal, Daily PRN, Catherine Galvez PA-C    budesonide (PULMICORT) neb solution 1 mg, 1 mg, Nebulization, BID, Catherine Galvez PA-C, 1 mg at 10/31/24 0830    bumetanide (BUMEX) tablet 2 mg, 2 mg, Oral, Daily, Danish Rhoades MD, 2 mg at 10/31/24 1039    [COMPLETED] buPROPion (WELLBUTRIN XL) 24 hr tablet 150 mg, 150 mg, Oral, Daily **FOLLOWED BY** buPROPion (WELLBUTRIN XL) 24 hr tablet 300 mg, 300 mg, Oral, Daily, Kerri Dolan PA-C, 300 mg at 10/28/24 0901    cetirizine (zyrTEC) tablet 10 mg, 10 mg, Oral, Daily, Catherine Galvez PA-C, 10 mg at 10/31/24 0747    clopidogrel (PLAVIX) tablet 75 mg, 75 mg, Oral, Daily, Catherine Galvez PA-C, 75 mg at 10/31/24 0746    glucose gel 15-30 g, 15-30 g, Oral, Q15 Min PRN **OR** dextrose 50 % injection 25-50 mL, 25-50 mL, Intravenous, Q15 Min PRN, 50 mL at 10/14/24 0806 **OR** glucagon injection 1 mg, 1 mg, Subcutaneous, Q15 Min PRN, Catherine Galvez PA-C    fludrocortisone (FLORINEF) tablet 0.1 mg, 0.1 mg, Oral, Daily, Catherine Galvez PA-C, 0.1 mg at 10/31/24 0752    fluticasone (FLONASE) 50 MCG/ACT spray 2 spray, 2 spray, Both Nostrils, Daily PRN, Catherine Galvez PA-C    fluticasone-vilanterol (BREO ELLIPTA) 100-25 MCG/ACT inhaler 1 puff, 1 puff, Inhalation, At Bedtime, Catherine Galvez PA-C, 1 puff at 10/30/24 2118    gabapentin (NEURONTIN) capsule 100 mg, 100 mg, Oral, TID PRN, Catherine Galvez PA-C    [COMPLETED] sodium chloride 0.9% DIALYSIS Cath LOCK - RED Lumen, 10 mL, Intracatheter, Once in dialysis/CRRT, 10 mL  at 10/17/24 1527 **FOLLOWED BY** heparin 1000 unit/mL DIALYSIS Cath LOCK - RED Lumen, 1.3-2.6 mL, Intracatheter, Q1H PRN, Rohan Polo MD, 3,200 Units at 10/24/24 0934    [COMPLETED] sodium chloride 0.9% DIALYSIS Cath LOCK - BLUE Lumen, 10 mL, Intracatheter, Once in dialysis/CRRT, 10 mL at 10/17/24 1526 **FOLLOWED BY** heparin 1000 unit/mL DIALYSIS Cath LOCK -BLUE Lumen, 1.3-2.6 mL, Intracatheter, Q1H PRN, Rohan Polo MD    heparin lock flush 10 unit/mL injection 5-20 mL, 5-20 mL, Intracatheter, Q24H, Catherine Galvez PA-C, 5 mL at 10/30/24 2055    heparin lock flush 10 unit/mL injection 5-20 mL, 5-20 mL, Intracatheter, Q1H PRN, Catherine Galvez PA-C    hydrALAZINE (APRESOLINE) injection 10 mg, 10 mg, Intravenous, Q30 Min PRN, Catherine Galvez PA-C, 10 mg at 10/29/24 0326    ipratropium - albuterol 0.5 mg/2.5 mg/3 mL (DUONEB) neb solution 3 mL, 3 mL, Nebulization, 4x Daily PRN, Catherine Galvez, PA-C    levothyroxine (SYNTHROID/LEVOTHROID) tablet 125 mcg, 125 mcg, Oral, QAM AC, Catherine Galvez PA-C, 125 mcg at 10/31/24 0550    Lidocaine (LIDOCARE) 4 % Patch 1-2 patch, 1-2 patch, Transdermal, Q24H, Catherine Galvez PA-C, 1 patch at 10/30/24 2055    melatonin tablet 5 mg, 5 mg, Oral, At Bedtime PRN, Catherine Galvez, PA-C    methocarbamol (ROBAXIN) tablet 500 mg, 500 mg, Oral, 4x Daily PRN, Catherine Galvez PA-C    metoprolol tartrate (LOPRESSOR) tablet 75 mg, 75 mg, Oral, BID, Catherine Galvez PA-C, 75 mg at 10/31/24 0749    multivitamin w/minerals (THERA-VIT-M) tablet 1 tablet, 1 tablet, Oral, Daily, Catherine Galvez PA-C, 1 tablet at 10/31/24 1214    naloxone (NARCAN) injection 0.2 mg, 0.2 mg, Intravenous, Q2 Min PRN **OR** naloxone (NARCAN) injection 0.4 mg, 0.4 mg, Intravenous, Q2 Min PRN **OR** naloxone (NARCAN) injection 0.2 mg, 0.2 mg, Intramuscular, Q2 Min PRN **OR** naloxone (NARCAN) injection 0.4 mg, 0.4 mg, Intramuscular, Q2 Min PRN,  Catherine Galvez PA-C    ondansetron (ZOFRAN ODT) ODT tab 4 mg, 4 mg, Oral, Q6H PRN, 4 mg at 10/30/24 1850 **OR** ondansetron (ZOFRAN) injection 4 mg, 4 mg, Intravenous, Q6H PRN, Catherine Galvez PA-C, 4 mg at 10/10/24 1945    [DISCONTINUED] pantoprazole (PROTONIX) 2 mg/mL suspension 40 mg, 40 mg, Oral or NG Tube, Daily, 40 mg at 10/08/24 1729 **OR** pantoprazole (PROTONIX) EC tablet 40 mg, 40 mg, Oral, Daily, Catherine Galvez PA-C, 40 mg at 10/31/24 0746    polyethylene glycol (MIRALAX) Packet 17 g, 17 g, Oral, Daily, Catherine Galvez PA-C, 17 g at 10/28/24 0902    prochlorperazine (COMPAZINE) injection 10 mg, 10 mg, Intravenous, Q6H PRN, 10 mg at 10/28/24 2001 **OR** prochlorperazine (COMPAZINE) tablet 10 mg, 10 mg, Oral, Q6H PRN, Catherine Galvez PA-C    senna-docusate (SENOKOT-S/PERICOLACE) 8.6-50 MG per tablet 1 tablet, 1 tablet, Oral, BID, Catherine Galvez PA-C, 1 tablet at 10/31/24 0747    simethicone (MYLICON) chewable tablet 80 mg, 80 mg, Oral, 4x Daily PRN, David Wade MD, 80 mg at 10/30/24 0339    sodium chloride (OCEAN) 0.65 % nasal spray 1 spray, 1 spray, Both Nostrils, Q1H PRN, Catherine Galvez PA-C, 1 spray at 10/30/24 2118    sodium chloride (PF) 0.9% PF flush 10-20 mL, 10-20 mL, Intracatheter, q1 min prn, Catherine Galvez PA-C    sodium chloride (PF) 0.9% PF flush 10-40 mL, 10-40 mL, Intracatheter, Q8H, Catherine Galvez PA-C, 30 mL at 10/31/24 0552    sodium chloride (PF) 0.9% PF flush 10-40 mL, 10-40 mL, Intracatheter, Q7 Days, Catherine Galvez, PA-C, 30 mL at 10/25/24 1746    sodium chloride (PF) 0.9% PF flush 10-40 mL, 10-40 mL, Intracatheter, Q1H PRN, Catherine Galvez PA-C    Vitamin D3 (CHOLECALCIFEROL) tablet 125 mcg, 125 mcg, Oral, Daily, Catherine Galvez PA-C, 125 mcg at 10/31/24 0746    warfarin ANTICOAGULANT (COUMADIN) tablet 1 mg, 1 mg, Oral, ONCE at 18:00, David Wade MD    Warfarin Dose Required  Daily - Pharmacist Managed, 1 each, Oral, See Admin Instructions, Danish Rhoades MD    Labs personally reviewed today during this evaluation

## 2024-10-31 NOTE — PLAN OF CARE
sc  Patient Name: Jory Ambrose   MRN: [id number]   Date of Admission: 10/8/2024    Procedure: Procedure(s):  CORONARY ARTERY BYPASS GRAFT TIMES FOUR, LEFT INTERNAL MAMMARY ARTERY HARVEST, RIGHT LEG ENDOSCOPIC VESSEL PROCUREMENT, EPIAORTIC ULTRASOUND,  MITRAL VALVE REAIR CONVERTED TO MITRAL VALVE REPLACEMENT  LEFT ATRIAL APPENDAGE LIGATION,  ANESTHESIA TRANSESOPHAGEAL ECHOCARDIOGRAM    Post Op day #:23      Subjective (Patient focus/Primary Problem for shift): improve breathing and better pain control          Pain Goal3 Pain Rating4           Pain Medication/ Regime effective to reduce patient pain prn tyelnol.    Objective (Physical assessment):           Rhythm: atrial fibrillation            Bowel Activity: yes if Yes indicate when: 10/30          Bowel Medications: yes            Incision: ROBERT, had shower with antibacterial soap to all incisions          Incentive Spirometry Q 1-2 hour when awake:  yes Volume: 1000          Epicardial Pacing Wires:  no            Patient Activity:           Up to chair for meals: yes          Ambulation with RN x2 (Not including CR): no            Is patient in home clothes:no             Chest Tubes   Pleural: no Draining: no               Suction: no              Mediastinal: no Draining: no               Suction: no   Dressing Change Daily:no If No, why?ROBERT                     Urinary Catheter: no           Preventative WOC consult (need MD order): no       Assessment (Nursing primary shift focus): Increase activity, take shower    Plan (Patient Care Plan/focus): Pt A&OX4, up with assist of one and walker, took shower this am. Pain better controlled this am with wearing a bra and having telemetry removed as pt reported tele box heaving and pulls at chest. Pt walked with therapy. Does feel SOB with ambulation and easily tired. Pt given bumex per Dr. Dhillon, dose decreased, pt advised to wear SCDs when in recliner to help with BLE edema. Appetite fair. Pt doing IS  and flutter valve hourly. Mom present in room and helpful. All questions answered. Plan of care ongoing.       Kathrine Griffin RN   10/31/2024   2:32 PM

## 2024-10-31 NOTE — PROGRESS NOTES
CVTS Daily Progress Note  POD#23 s/p CABG x4 (LIMA>LAD, rSVG>RPDA, rSVG>LPL, rSVG>D2), RLE EVH, Attempted MVR/r, MVR ( 29 mm [employer] Mechanical Mitral Valve), LAAE  Attending: Mauro  LOS: 23    SUBJECTIVE/INTERVAL EVENTS:    No acute events overnight. Accepted at TCU, awaiting clearance from nephrology. Continues in rate controlled a-fib/a-flutter. Continues w/ adequate UOP. Maintaining oxygen saturations on home CPAP at night and room air during the day. Normotensive. Working w therapy, progressing slowly from a rehab standpoint. Pain fairly controlled. +BM. Tolerating regular diet. INR therapeutic. Patient denies new chest pain, shortness of breath, abdominal pain, calf pain, nausea. Patient has no questions today.    OBJECTIVE:  Temp:  [97.7  F (36.5  C)-98.3  F (36.8  C)] 98.3  F (36.8  C)  Pulse:  [] 82  Resp:  [16-20] 20  BP: (115-133)/(63-89) 117/80  SpO2:  [95 %-100 %] 99 %  Vitals:    10/27/24 0615 10/28/24 0500 10/29/24 0727 10/30/24 0340   Weight: 105.6 kg (232 lb 12.8 oz) 106.2 kg (234 lb 3.2 oz) 101.3 kg (223 lb 4.8 oz) 106.4 kg (234 lb 8 oz)    10/31/24 0614   Weight: 106.1 kg (233 lb 12.8 oz)       Clinically Significant Risk Factors               # Hypoalbuminemia: Lowest albumin = 2.6 g/dL at 10/16/2024  4:35 AM, will monitor as appropriate       # Hypertension: Noted on problem list            # Moderate Malnutrition: based on nutrition assessment    # Asthma: noted on problem list  # History of CABG: noted on surgical history                Current Medications:    Scheduled Meds:  Current Facility-Administered Medications   Medication Dose Route Frequency Provider Last Rate Last Admin    atorvastatin (LIPITOR) tablet 80 mg  80 mg Oral At Bedtime Catherine Galvez PA-C   80 mg at 10/30/24 2054    budesonide (PULMICORT) neb solution 1 mg  1 mg Nebulization BID Catherine Galvez PA-C   1 mg at 10/31/24 0830    bumetanide (BUMEX) tablet 2 mg  2 mg Oral Daily Danish Rhoades MD   2  mg at 10/30/24 0849    buPROPion (WELLBUTRIN XL) 24 hr tablet 300 mg  300 mg Oral Daily Kerri Dolan PA-C   300 mg at 10/28/24 0901    cetirizine (zyrTEC) tablet 10 mg  10 mg Oral Daily Catherine Galvez PA-C   10 mg at 10/31/24 0747    clopidogrel (PLAVIX) tablet 75 mg  75 mg Oral Daily Catherine Galvez PA-C   75 mg at 10/31/24 0746    fludrocortisone (FLORINEF) tablet 0.1 mg  0.1 mg Oral Daily Catherine Galvez PA-C   0.1 mg at 10/31/24 0752    fluticasone-vilanterol (BREO ELLIPTA) 100-25 MCG/ACT inhaler 1 puff  1 puff Inhalation At Bedtime Catherine Galvez PA-C   1 puff at 10/30/24 2118    heparin lock flush 10 unit/mL injection 5-20 mL  5-20 mL Intracatheter Q24H Catherine Galvez PA-C   5 mL at 10/30/24 2055    levothyroxine (SYNTHROID/LEVOTHROID) tablet 125 mcg  125 mcg Oral QAM AC Catherine Galvez PA-C   125 mcg at 10/31/24 0550    Lidocaine (LIDOCARE) 4 % Patch 1-2 patch  1-2 patch Transdermal Q24H Catherine Galvze PA-C   1 patch at 10/30/24 2055    magnesium oxide (MAG-OX) tablet 400 mg  400 mg Oral Q4H David Wade MD   400 mg at 10/31/24 0551    metoprolol tartrate (LOPRESSOR) tablet 75 mg  75 mg Oral BID Catherine Galvez PA-C   75 mg at 10/31/24 0749    multivitamin w/minerals (THERA-VIT-M) tablet 1 tablet  1 tablet Oral Daily Catherine Galvez PA-C   1 tablet at 10/30/24 1251    pantoprazole (PROTONIX) EC tablet 40 mg  40 mg Oral Daily Catherine Galvez PA-C   40 mg at 10/31/24 0746    polyethylene glycol (MIRALAX) Packet 17 g  17 g Oral Daily Catherine Galvez PA-C   17 g at 10/28/24 0902    potassium chloride maryann ER (KLOR-CON M20) CR tablet 20 mEq  20 mEq Oral Once David Wade MD        senna-docusate (SENOKOT-S/PERICOLACE) 8.6-50 MG per tablet 1 tablet  1 tablet Oral BID Catherine Galvez PA-C   1 tablet at 10/31/24 0747    sodium chloride (PF) 0.9% PF flush 10-40 mL  10-40 mL Intracatheter Q8H Catherine Galvez,  EARNESTINE   30 mL at 10/31/24 0552    sodium chloride (PF) 0.9% PF flush 10-40 mL  10-40 mL Intracatheter Q7 Days Catherine Galvez PA-C   30 mL at 10/25/24 1746    Vitamin D3 (CHOLECALCIFEROL) tablet 125 mcg  125 mcg Oral Daily Catherine Galvez PA-C   125 mcg at 10/31/24 0746    warfarin ANTICOAGULANT (COUMADIN) tablet 1 mg  1 mg Oral ONCE at 18:00 David Wade MD        Warfarin Dose Required Daily - Pharmacist Managed  1 each Oral See Admin Instructions Danish Rhoades MD         Continuous Infusions:  Current Facility-Administered Medications   Medication Dose Route Frequency Provider Last Rate Last Admin     PRN Meds:.  Current Facility-Administered Medications   Medication Dose Route Frequency Provider Last Rate Last Admin    acetaminophen (TYLENOL) tablet 325-650 mg  325-650 mg Oral Q4H PRN Catherine Galvez PA-C   650 mg at 10/31/24 0746    albuterol (PROVENTIL HFA/VENTOLIN HFA) inhaler  2 puff Inhalation Q6H PRN Catherine Galvez PA-C   2 puff at 10/26/24 1455    bisacodyl (DULCOLAX) suppository 10 mg  10 mg Rectal Daily PRN Catherine Galvez PA-C        glucose gel 15-30 g  15-30 g Oral Q15 Min PRN Catherine Galvez PA-C        Or    dextrose 50 % injection 25-50 mL  25-50 mL Intravenous Q15 Min PRN Catherine Galvez PA-C   50 mL at 10/14/24 0806    Or    glucagon injection 1 mg  1 mg Subcutaneous Q15 Min PRN Catherine Galvez PA-C        fluticasone (FLONASE) 50 MCG/ACT spray 2 spray  2 spray Both Nostrils Daily PRN Catherine Galvez PA-C        gabapentin (NEURONTIN) capsule 100 mg  100 mg Oral TID PRN Catherine Galvez PA-C        heparin 1000 unit/mL DIALYSIS Cath LOCK - RED Lumen  1.3-2.6 mL Intracatheter Q1H PRN Rohan Polo MD   3,200 Units at 10/24/24 0934    heparin 1000 unit/mL DIALYSIS Cath LOCK -BLUE Lumen  1.3-2.6 mL Intracatheter Q1H PRN Rohan Polo MD        heparin lock flush 10 unit/mL injection 5-20 mL  5-20 mL  Intracatheter Q1H PRN Catherine Galvez PA-C        hydrALAZINE (APRESOLINE) injection 10 mg  10 mg Intravenous Q30 Min PRN Catherine Galvez PA-C   10 mg at 10/29/24 0326    ipratropium - albuterol 0.5 mg/2.5 mg/3 mL (DUONEB) neb solution 3 mL  3 mL Nebulization 4x Daily PRN Catherine Galvez PA-C        melatonin tablet 5 mg  5 mg Oral At Bedtime PRN Catherine Galvez PA-C        methocarbamol (ROBAXIN) tablet 500 mg  500 mg Oral 4x Daily PRN Catherine Galvez PA-C        naloxone (NARCAN) injection 0.2 mg  0.2 mg Intravenous Q2 Min PRN Catherine Galvez PA-C        Or    naloxone (NARCAN) injection 0.4 mg  0.4 mg Intravenous Q2 Min PRN Catherine Galvez PA-C        Or    naloxone (NARCAN) injection 0.2 mg  0.2 mg Intramuscular Q2 Min PRN Catherine Galvez PA-C        Or    naloxone (NARCAN) injection 0.4 mg  0.4 mg Intramuscular Q2 Min PRN Catherine Galvez PA-C        ondansetron (ZOFRAN ODT) ODT tab 4 mg  4 mg Oral Q6H PRN Catherine Galvez PA-C   4 mg at 10/30/24 1850    Or    ondansetron (ZOFRAN) injection 4 mg  4 mg Intravenous Q6H PRN Catherine Galvez PA-C   4 mg at 10/10/24 1945    prochlorperazine (COMPAZINE) injection 10 mg  10 mg Intravenous Q6H PRN Catherine Galvez PA-C   10 mg at 10/28/24 2001    Or    prochlorperazine (COMPAZINE) tablet 10 mg  10 mg Oral Q6H PRN Catherine Galvez PA-C        simethicone (MYLICON) chewable tablet 80 mg  80 mg Oral 4x Daily PRN David Wade MD   80 mg at 10/30/24 0339    sodium chloride (OCEAN) 0.65 % nasal spray 1 spray  1 spray Both Nostrils Q1H PRN Catherine Galvez PA-C   1 spray at 10/30/24 2118    sodium chloride (PF) 0.9% PF flush 10-20 mL  10-20 mL Intracatheter q1 min prn Catherine Galvez PA-C        sodium chloride (PF) 0.9% PF flush 10-40 mL  10-40 mL Intracatheter Q1H PRN Catherine Galvez PA-C           Cardiographics:    Telemetry monitoring demonstrates a flutter w/ rates in  the 60-70s vs a fib w/ rates in the 90-100s per my personal review.      Imaging:  Results for orders placed or performed during the hospital encounter of 10/08/24   XR Chest Port 1 View    Impression    IMPRESSION: Interval sternotomy, CABG procedure and mitral valve repair. Endotracheal tube proximally 2 cm above the sofia. Nasogastric tube in the stomach. Bilateral chest tubes and midline mediastinal drain in expected position. Epicardial leads are   present. Left IJ line in the brachiocephalic vein.    Low lung volumes. No evidence for CHF or pneumonia. No pleural effusion or pneumothorax.   XR Chest Port 1 View    Impression    IMPRESSION: Poststernotomy changes with prosthetic mitral valve. Patient's been extubated and the NG tube has been removed. Mediastinal and pleural chest tubes are in place.    Left IJ cordis sheath is in the distal left innominate artery. Catheter has a very subtle kink within it 3.5 centimeters from the tip.    Low lung volumes. No pneumothorax. Likely trace left effusion at the base. No signs of pneumonia or failure. Heart is of normal size.   XR Abdomen Port 1 View    Impression    IMPRESSION: Orogastric tube tip in the proximal stomach and oriented superiorly towards the left diaphragm. Visualized bowel gas pattern unremarkable. Numerous tubes and lines lower chest and upper abdomen as described on chest x-ray report from today.        Labs, personally reviewed.  Hemoglobin   Date Value Ref Range Status   10/31/2024 7.9 (L) 11.7 - 15.7 g/dL Final   10/30/2024 7.8 (L) 11.7 - 15.7 g/dL Final   10/29/2024 8.6 (L) 11.7 - 15.7 g/dL Final   06/09/2015 13.5 11.7 - 15.7 g/dL Final   03/11/2015 12.3 11.7 - 15.7 g/dL Final   01/08/2015 13.6 11.7 - 15.7 g/dL Final     WBC   Date Value Ref Range Status   06/09/2015 7.9 4.0 - 11.0 10e9/L Final   03/11/2015 9.4 4.0 - 11.0 10e9/L Final   01/08/2015 8.1 4.0 - 11.0 10e9/L Final     WBC Count   Date Value Ref Range Status   10/28/2024 11.7 (H) 4.0 -  11.0 10e3/uL Final   10/27/2024 9.8 4.0 - 11.0 10e3/uL Final   10/26/2024 9.0 4.0 - 11.0 10e3/uL Final     Platelet Count   Date Value Ref Range Status   10/28/2024 398 150 - 450 10e3/uL Final   10/27/2024 374 150 - 450 10e3/uL Final   10/26/2024 374 150 - 450 10e3/uL Final   06/09/2015 219 150 - 450 10e9/L Final   03/11/2015 240 150 - 450 10e9/L Final   01/08/2015 221 150 - 450 10e9/L Final     Creatinine   Date Value Ref Range Status   10/31/2024 2.59 (H) 0.51 - 0.95 mg/dL Final   10/30/2024 2.47 (H) 0.51 - 0.95 mg/dL Final   10/29/2024 2.60 (H) 0.51 - 0.95 mg/dL Final   06/09/2015 0.80 0.52 - 1.04 mg/dL Final   03/11/2015 0.87 0.52 - 1.04 mg/dL Final   01/08/2015 0.79 0.52 - 1.04 mg/dL Final     Potassium   Date Value Ref Range Status   10/31/2024 3.6 3.4 - 5.3 mmol/L Final   10/30/2024 3.6 3.4 - 5.3 mmol/L Final   10/30/2024 3.6 3.4 - 5.3 mmol/L Final   06/09/2015 3.8 3.4 - 5.3 mmol/L Final   03/11/2015 3.9 3.4 - 5.3 mmol/L Final   01/08/2015 4.1 3.4 - 5.3 mmol/L Final     Potassium POCT   Date Value Ref Range Status   10/08/2024 4.4 3.4 - 5.3 mmol/L Final   10/08/2024 4.0 3.4 - 5.3 mmol/L Final   10/08/2024 4.1 3.4 - 5.3 mmol/L Final     Magnesium   Date Value Ref Range Status   10/31/2024 1.8 1.7 - 2.3 mg/dL Final   10/30/2024 1.8 1.7 - 2.3 mg/dL Final   10/29/2024 2.0 1.7 - 2.3 mg/dL Final   06/09/2015 1.8 1.6 - 2.3 mg/dL Final   03/11/2015 1.7 1.6 - 2.3 mg/dL Final   01/08/2015 1.8 1.6 - 2.3 mg/dL Final          I/O:  I/O last 3 completed shifts:  In: 890 [P.O.:890]  Out: 1750 [Urine:1750]       Physical Exam:    General: Patient seen in chair this AM. NAD. Pleasant, conversant.   HEENT: DORITA, no sclera icterus, moist mucosa  CV: A-fib/a-flutter on monitor. 2+ peripheral pulses in all extremities. Moderate peripheral edema. Incision C/D/I.  Pulm: Satting well w/ nonlabored breathing on RA  Abd: Soft, NT, ND  Ext: Moderate pedal edema, SCDs in place, warm, distal pulses intact  Neuro: CNs grossly intact,  BRAIN FAM&Ox3      ASSESSMENT/PLAN:    Jory Ambrose is a 49 year old female with a history of GIA, asthma, GERD,  hypothyroidism, h/o PE on plavix, carotid stenosis, Hodgkin's lymphoma (s/p chemo/radiation and bone marrow transplant x2 after recurrence) currently in remission who is s/p CABG x4, RLE EVH, attempted mitral repair, mechanical MVR, LAAE.    Principal Problem:    Coronary artery disease involving native coronary artery of native heart with angina pectoris (H)  Active Problems:    Paroxysmal atrial fibrillation (H)    Paroxysmal atrial flutter (H)    Typical atrial flutter (H)  Acute Renal Failure d/t ATN, improving.      NEURO:   - Scheduled lidocaine patches  - PTA bupropion  - PRN tylenol, gabapentin, and robaxin available. Consider low dose ultram if necessary but avoid narcotics as possible given patient is > 3 weeks postop.    CV:  - Pre-op EF 55-60%  - Chest tubes and TPWs removed POD#5  - Metoprolol 75 mg BID  - Plavix daily indefinitely d/t unclear h/o anaphylaxis to ASA  - Atorvastatin 80mg daily  - Warfarin as below  - Rate controlled a fib/flutter - s/p amiodarone bolus/gtt 10/13. Holding PO given postop ischemic hepatitis and allergy to PO amio additive. EP consulted, signed off. A fib clinic f/u scheduled  - New baseline echo completed 10/25    PULM:   - Extubated on POD#0  - Maintaining O2 sats on room air w/ home CPAP qHS  - PTA zyrtec, fludrocortisone, flonase, simethicone, albuterol, pulmicort, breo ellipta, duoneb    FEN/GI:  - Regular diet  - Bowel regimen, having regular BMs  - Ischemic hepatitis - resolved, hepatic w/u unremarkable.  - PTA vitamin D3     RENAL:  - Nephrology consulted for ARF w/ decreased UOP, appreciate. Following.  - CRRT 10/11 - 10/13  - HD 10/15, 17, 19, 23  - Strict I/O  - Daily renal panel  - Cr seems to have plateaued ~2.5 (baseline ~0.9)    HEME:  - H/o bone marrow tx. Needs irradiated blood  - Acute blood loss anemia and thrombocytopenia post-op,  resolved. HIT screen negative  - Hgb grossly stable, likely dilutional component   - Warfarin INR goal 2.5-3.5 in s/o mech MVR. INR therapeutic.  - Transfusion hx  - 2u PRBCs periop  - 1u PRBCs 10/11  - 1u PRBCs 10/16  - 1u PRBCs 10/30    ID:  - Lydia op ppx complete. Afebrile.    ENDO:   - HbA1c 5.7%  - Adrenal, thyroid, and pancreatitis labs unremarkable  - PTA Metformin discontinued (renal function)  - PTA synthroid    PPx:   - DVT: SCDs, warfarin, ambulation   - GI: Protonix 40mg IV/PO daily    DISPO:   - Continue general tele care (Transferred POD#8)  - PT/OT recs at discharge: TCU  - Medically Ready for Discharge: Ready Now. Ready from CV surgery perspective since 10/24. Accepted at TCU per  on 10/30. Awaiting nephrology clearance.       Eva Galvez PA-C  [facility] Cardiothoracic Surgery  Vocera or Secure Chat  October 31, 2024

## 2024-10-31 NOTE — PROGRESS NOTES
Care Management Follow Up    Length of Stay (days): 23    Expected Discharge Date: 10/31/2024     Concerns to be Addressed: Care progression - discharge planning     Patient plan of care discussed at interdisciplinary rounds: Yes    Anticipated Discharge Disposition: Dialysis Services, Transitional Care     Anticipated Discharge Services: Dialysis Services, Transitional Care  Anticipated Discharge DME: NA    Patient/family educated on Medicare website which has current facility and service quality ratings: Yes  Education Provided on the Discharge Plan:  Yes per team  Patient/Family in Agreement with the Plan: yes    Referrals Placed by CM/SW:  Yes  Private pay costs discussed: Transportation costs    Discussed  Partnership in Safe Discharge Planning  document with patient/family: Yes:  patient's mother, Pat    Handoff Completed: No, handoff not indicated or clinically appropriate    Additional Information:  6205 called [facility] and spoke with Sebastián.   Sebastián states they are waiting for their director to approve for [facility] referral  Patient's insurance will not cover [facility] dialysis for [facility]    Message sent to update Eva with cardiothoracic surgery    Social Hx:  Assessment: Follow.   Last Note: 10/31/24  Plan: Rehab rec TCU (prefer on site dialysis).  [facility] accepted.     Needs: NA  Hand off sent: NA - O  [phone] 11/1/24    Next Steps: RNCM to follow for medical progression, recommendations, and final discharge plan.     Adrienne Stevenson, RN     9040 rec'd a message from Eva, got word from nephrology, patient OK to transfer to TCU when transport is arranged.    Sent a message to clarify if patient will need dialysis?  Eva said to follow up with nephrology    6040 called [facility] [phone] and spoke with Esthela. She will page Dr. Jacquie Dhillon called and said no current plans for dialysis, but will keep line in for now.  Patient has not had dialysis since last Sat. Patient is recovering and maybe able to discharge tomorrow.    Called [facility] and spoke with Sebastián to update on the above. Sebastián said if no current dialysis plan, then patient can arrive tomorrow around 2:00 pm.    Met with patient and her mother, Pat, at bedside to update on the above.  Patient has questions regardin) PICC line  2) Central Line for dialysis  3) Respiratory Care    Message sent to Eva Velasquez responded, she will met with patient to discuss the above. Nephrology will need to address the Central Line.    Discussed out of pocket cost of [facility] medical transportation by wheelchair with patient and family member, Pat. Patient/family member agreed with the plan to have transportation arranged by [facility] transport.       [facility] transport between [phone]

## 2024-10-31 NOTE — PLAN OF CARE
Goal Outcome Evaluation:      Plan of Care Reviewed With: patient    Overall Patient Progress: improving      Problem: Adult Inpatient Plan of Care  Goal: Optimal Comfort and Wellbeing  Outcome: Progressing     Problem: Risk for Delirium  Goal: Optimal Coping  Outcome: Progressing     Problem: Risk for Delirium  Goal: Improved Sleep  Outcome: Progressing     Problem: Cardiovascular Surgery  Goal: Improved Activity Tolerance  Outcome: Progressing    Problem: Cardiovascular Surgery  Goal: Optimal Coping with Heart Surgery     sc  Patient Name: Jory Ambrose   MRN: [id number]   Date of Admission: 10/8/2024    Procedure: Procedure(s):  CORONARY ARTERY BYPASS GRAFT TIMES FOUR, LEFT INTERNAL MAMMARY ARTERY HARVEST, RIGHT LEG ENDOSCOPIC VESSEL PROCUREMENT, EPIAORTIC ULTRASOUND,  MITRAL VALVE REAIR CONVERTED TO MITRAL VALVE REPLACEMENT  LEFT ATRIAL APPENDAGE LIGATION,  ANESTHESIA TRANSESOPHAGEAL ECHOCARDIOGRAM    Post Op day #: 23    Subjective (Patient focus/Primary Problem for shift): Activity tolerance          Pain Goal 2 Pain Rating 4           Pain Medication/ Regime effective to reduce patient pain Scheduled Tylenol and Lidocaine    Objective (Physical assessment):           Rhythm: normal sinus rhythm            Bowel Activity: yes if Yes indicate when: 10/29/24          Bowel Medications: yes, pt refusing bowel meds            Incision: healing well          Incentive Spirometry Q 1-2 hour when awake:  yes Volume: 1000          Epicardial Pacing Wires:  no            Patient Activity:           Up to chair for meals: yes          Ambulation with RN x2 (Not including CR): no            Is patient in home clothes:no             Chest Tubes   Pleural: no Draining: no               Suction: no              Mediastinal: no Draining: no               Suction: no   Dressing Change Daily:no If No, why? ROBERT                     Urinary Catheter: no           Preventative WOC consult (need MD order): no     Patient  Aox4. Afib. RA. Denies SOB and chest pain. VSS. Endorses incisional and back pain but refused medications offered. Refused walk before bed, d/t tiredness. Mag+ replaced, K+ will replaced on days. Mag+, K+, Phos all recheck tomorrow. Up to chair in AM. Pt able to make needs known, call light within reach. POCC.    June Goff RN on 10/31/2024 at 6:18 AM

## 2024-10-31 NOTE — PLAN OF CARE
Goal Outcome Evaluation:      Plan of Care Reviewed With: patient, parent    Overall Patient Progress: improvingOverall Patient Progress: improving     sc  Patient Name: Jory Ambrose   MRN: [id number]   Date of Admission: 10/8/2024    Procedure: Procedure(s):  CORONARY ARTERY BYPASS GRAFT TIMES FOUR, LEFT INTERNAL MAMMARY ARTERY HARVEST, RIGHT LEG ENDOSCOPIC VESSEL PROCUREMENT, EPIAORTIC ULTRASOUND,  MITRAL VALVE REAIR CONVERTED TO MITRAL VALVE REPLACEMENT  LEFT ATRIAL APPENDAGE LIGATION,  ANESTHESIA TRANSESOPHAGEAL ECHOCARDIOGRAM    Post Op day #:23    Subjective (Patient focus/Primary Problem for shift): increased activity tolerance and pain control          Pain Goal 0 Pain Rating 0           Pain Medication/ Regime effective to reduce patient pain PRN tylenol     Objective (Physical assessment):           Rhythm: atrial fibrillation, off tele            Bowel Activity: yes if Yes indicate when: 10/31          Bowel Medications: yes            Incision: healing well          Incentive Spirometry Q 1-2 hour when awake:  yes           Epicardial Pacing Wires:  no            Patient Activity:           Up to chair for meals: yes          Ambulation with RN x2 (Not including CR): yes            Is patient in home clothes:no             Chest Tubes   Pleural: no Draining: no               Suction: no              Mediastinal: no Draining: no               Suction: no   Dressing Change Daily:not applicable If No, why?chest tubes removed                     Urinary Catheter: no           Preventative WOC consult (need MD order): no       Assessment (Nursing primary shift focus): pt A/Ox4, on RA, denies pain, assistx1 with gait belt, med surg status. Mother, Pat, at bedside.     Mg, ph, K protocol, all recheck in AM.     Plan (Patient Care Plan/focus): increase activity tolerance and monitor kidney function.      Alma Mcgee RN   10/31/2024   4:12 PM

## 2024-11-01 ENCOUNTER — APPOINTMENT (OUTPATIENT)
Dept: INTERVENTIONAL RADIOLOGY/VASCULAR | Facility: HOSPITAL | Age: 49
DRG: 219 | End: 2024-11-01
Attending: NURSE PRACTITIONER
Payer: COMMERCIAL

## 2024-11-01 VITALS
TEMPERATURE: 97.7 F | OXYGEN SATURATION: 96 % | WEIGHT: 233.8 LBS | BODY MASS INDEX: 40.13 KG/M2 | DIASTOLIC BLOOD PRESSURE: 77 MMHG | SYSTOLIC BLOOD PRESSURE: 111 MMHG | RESPIRATION RATE: 18 BRPM | HEART RATE: 95 BPM

## 2024-11-01 LAB
ANION GAP SERPL CALCULATED.3IONS-SCNC: 12 MMOL/L (ref 7–15)
BUN SERPL-MCNC: 57.1 MG/DL (ref 6–20)
CA-I BLD-MCNC: 4.6 MG/DL (ref 4.4–5.2)
CALCIUM SERPL-MCNC: 8.7 MG/DL (ref 8.8–10.4)
CHLORIDE SERPL-SCNC: 100 MMOL/L (ref 98–107)
CREAT SERPL-MCNC: 2.54 MG/DL (ref 0.51–0.95)
EGFRCR SERPLBLD CKD-EPI 2021: 22 ML/MIN/1.73M2
GLUCOSE SERPL-MCNC: 103 MG/DL (ref 70–99)
HCO3 SERPL-SCNC: 25 MMOL/L (ref 22–29)
HGB BLD-MCNC: 8.2 G/DL (ref 11.7–15.7)
INR PPP: 2.91 (ref 0.85–1.15)
MAGNESIUM SERPL-MCNC: 1.7 MG/DL (ref 1.7–2.3)
PHOSPHATE SERPL-MCNC: 4.3 MG/DL (ref 2.5–4.5)
POTASSIUM SERPL-SCNC: 3.7 MMOL/L (ref 3.4–5.3)
SODIUM SERPL-SCNC: 137 MMOL/L (ref 135–145)

## 2024-11-01 PROCEDURE — 83735 ASSAY OF MAGNESIUM: CPT

## 2024-11-01 PROCEDURE — 94799 UNLISTED PULMONARY SVC/PX: CPT

## 2024-11-01 PROCEDURE — 999N000157 HC STATISTIC RCP TIME EA 10 MIN

## 2024-11-01 PROCEDURE — 250N000013 HC RX MED GY IP 250 OP 250 PS 637: Performed by: SURGERY

## 2024-11-01 PROCEDURE — 97530 THERAPEUTIC ACTIVITIES: CPT | Mod: GP | Performed by: PHYSICAL THERAPIST

## 2024-11-01 PROCEDURE — 85018 HEMOGLOBIN: CPT

## 2024-11-01 PROCEDURE — 250N000013 HC RX MED GY IP 250 OP 250 PS 637

## 2024-11-01 PROCEDURE — 94640 AIRWAY INHALATION TREATMENT: CPT

## 2024-11-01 PROCEDURE — 36589 REMOVAL TUNNELED CV CATH: CPT

## 2024-11-01 PROCEDURE — 250N000013 HC RX MED GY IP 250 OP 250 PS 637: Performed by: INTERNAL MEDICINE

## 2024-11-01 PROCEDURE — 84100 ASSAY OF PHOSPHORUS: CPT | Performed by: SURGERY

## 2024-11-01 PROCEDURE — 82330 ASSAY OF CALCIUM: CPT

## 2024-11-01 PROCEDURE — 80048 BASIC METABOLIC PNL TOTAL CA: CPT | Performed by: INTERNAL MEDICINE

## 2024-11-01 PROCEDURE — 250N000009 HC RX 250

## 2024-11-01 PROCEDURE — 85610 PROTHROMBIN TIME: CPT | Performed by: SURGERY

## 2024-11-01 PROCEDURE — 94660 CPAP INITIATION&MGMT: CPT

## 2024-11-01 RX ORDER — MAGNESIUM OXIDE 400 MG/1
400 TABLET ORAL EVERY 4 HOURS
Status: COMPLETED | OUTPATIENT
Start: 2024-11-01 | End: 2024-11-01

## 2024-11-01 RX ORDER — POTASSIUM CHLORIDE 1.5 G/1.58G
20 POWDER, FOR SOLUTION ORAL ONCE
Status: COMPLETED | OUTPATIENT
Start: 2024-11-01 | End: 2024-11-01

## 2024-11-01 RX ORDER — WARFARIN SODIUM 1 MG/1
TABLET ORAL
DISCHARGE
Start: 2024-11-01 | End: 2024-11-02

## 2024-11-01 RX ADMIN — SENNOSIDES AND DOCUSATE SODIUM 1 TABLET: 8.6; 5 TABLET ORAL at 08:57

## 2024-11-01 RX ADMIN — BUMETANIDE 1 MG: 1 TABLET ORAL at 08:56

## 2024-11-01 RX ADMIN — MAGNESIUM OXIDE TAB 400 MG (241.3 MG ELEMENTAL MG) 400 MG: 400 (241.3 MG) TAB at 06:20

## 2024-11-01 RX ADMIN — CLOPIDOGREL BISULFATE 75 MG: 75 TABLET ORAL at 08:56

## 2024-11-01 RX ADMIN — POTASSIUM CHLORIDE 20 MEQ: 1.5 POWDER, FOR SOLUTION ORAL at 08:56

## 2024-11-01 RX ADMIN — ACETAMINOPHEN 650 MG: 325 TABLET ORAL at 06:41

## 2024-11-01 RX ADMIN — METOPROLOL TARTRATE 75 MG: 25 TABLET, FILM COATED ORAL at 08:57

## 2024-11-01 RX ADMIN — FLUDROCORTISONE ACETATE 0.1 MG: 0.1 TABLET ORAL at 08:57

## 2024-11-01 RX ADMIN — CETIRIZINE HYDROCHLORIDE 10 MG: 10 TABLET, FILM COATED ORAL at 08:56

## 2024-11-01 RX ADMIN — BUDESONIDE 1 MG: 0.5 INHALANT ORAL at 08:06

## 2024-11-01 RX ADMIN — LEVOTHYROXINE SODIUM 125 MCG: 0.03 TABLET ORAL at 06:21

## 2024-11-01 RX ADMIN — Medication 1 TABLET: at 13:39

## 2024-11-01 RX ADMIN — Medication 125 MCG: at 08:57

## 2024-11-01 RX ADMIN — ACETAMINOPHEN 650 MG: 325 TABLET ORAL at 13:39

## 2024-11-01 RX ADMIN — PANTOPRAZOLE SODIUM 40 MG: 40 TABLET, DELAYED RELEASE ORAL at 08:56

## 2024-11-01 RX ADMIN — MAGNESIUM OXIDE TAB 400 MG (241.3 MG ELEMENTAL MG) 400 MG: 400 (241.3 MG) TAB at 08:57

## 2024-11-01 ASSESSMENT — ACTIVITIES OF DAILY LIVING (ADL)
ADLS_ACUITY_SCORE: 0

## 2024-11-01 NOTE — PLAN OF CARE
Physical Therapy Discharge Summary    Reason for therapy discharge:    Discharged to transitional care facility.    Progress towards therapy goal(s). See goals on Care Plan in [facility] electronic health record for goal details.  Goals partially met.  Barriers to achieving goals:   discharge from facility.    Therapy recommendation(s):    Continued therapy is recommended.  Rationale/Recommendations:  recommend continued PT at TCU.    January Burt, PT  11/1/2024

## 2024-11-01 NOTE — PROGRESS NOTES
'    RENAL ([facility]) progress note  CC: F/U ALEXEI  S: Plans for discharge to TCU this afternoon ongoing.  Renal function remains stable, no dialysis needed, discussed removal of tunneled dialysis catheter in anticipation of discharge to a TCU (very high risk of infection in the setting of immunosuppression if catheter is not removed)    Assessment and plan:  Acute kidney injury (slowly resolving).  Acute tubular necrosis.  Secondary to CABG and mitral valve replacement followed by cardiogenic shock.  Baseline creatinine 0.95 (10/8/2024).  Dialysis initiated 10/11/24.  Required CRRT 10/11 - 10/13/24    HD 10/15, 10/17, 10/19 (Held) but did dialysis again 10/23 due to rising creatinine (2.7 (10/20)-->-->3.8 mg/dl)  Renal function is slowly recovering.  Last time dialyzed on 10/26.  Serum creatinine continues to improve without dialysis.  Creatinine 3.07 (10/27)-->--> 2.47 (10/30)--> 2.59 (10/31)--> 2.54 (11/1)  Good urine output for now  Bumetanide discontinued (11/1)  PCAD to be removed before discharge to TCU  Status post coronary artery bypass grafting x 4 vessels and mechanical mitral valve replacement on 10/8.  Diffuse coronary artery disease  complicated post op course with cardiogenic shock, severe shock liver, dialysis dependent ALEXEI  Shock liver. Resolved.   History of Hodgkin's lymphoma.  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  Anasarca -slowly improving.  Continue diuretics.  Hypokalemia, hypomagnesemia.  Secondary to poor oral intake and diuretics.  Continue to replace and monitor.  Anemia due to acute illness.  Transfuse as needed.  Continue management as per primary service.    Discussed with cardiothoracic surgery team  Discussed with interventional radiology  Discussed with RN      From renal perspective venkatesh for TCU transfer.    Doc Dhillon MD  Nephrology        /77   Pulse 95   Temp 97.7  F (36.5  C) (Oral)    Resp 18   Wt 106.1 kg (233 lb 12.8 oz)   LMP  (LMP Unknown)   SpO2 96%   BMI 40.13 kg/m      I/O last 3 completed shifts:  In: 1650 [P.O.:1650]  Out: 2150 [Urine:2150]    Physical Exam:   GENERAL: NAD  EYES: EOMI   ENT: MMM, hearing intact  RESP: tachypneic, on RA  CV: + leg edema (R>L)  GI: NT/ND  SKIN: Pale, right leg incisions clean/ bruising noted, sternotomy site healing  RIJ PCAD site clean    Recent Labs   Lab 11/01/24  0324 10/31/24  0450 10/30/24  0534 10/29/24  2143 10/29/24  1347 10/29/24  0543 10/28/24  0629 10/27/24  1629 10/27/24  0931 10/27/24  0337    138 137  --  137 136 138  --   --  136   POTASSIUM 3.7 3.6 3.6  3.6 3.7 3.4  3.4 3.3* 3.8   < > 3.4 3.4   CHLORIDE 100 102 101  --  99 99 101  --   --  99   CO2 25 23 21*  --  20* 22 23  --   --  23   BUN 57.1* 59.6* 59.4*  --  62.3* 60.7* 60.7*  --   --  60.6*   CR 2.54* 2.59* 2.47*  --  2.60* 2.59* 2.75*  --   --  3.09*   GFRESTIMATED 22* 22* 23*  --  22* 22* 20*  --   --  18*   CINDY 8.7* 8.6* 8.4*  --  8.6* 8.5* 8.5*  --   --  8.4*   PHOS 4.3 3.8 3.3  3.3  --  3.3 3.5  --   --   --   --    MAG 1.7 1.8 1.8  --  2.0 1.4* 1.7  --  2.0  --    ALBUMIN  --   --  3.1*  --  3.4* 3.2*  --   --   --   --     < > = values in this interval not displayed.     Recent Labs   Lab 11/01/24  0324 10/31/24  0450 10/30/24  0534 10/29/24  1347 10/29/24  0543 10/28/24  0629 10/27/24  0337 10/26/24  0637   WBC  --   --   --   --   --  11.7* 9.8 9.0   HGB 8.2* 7.9* 7.8* 8.6* 6.7* 7.0* 7.0* 7.1*   HCT  --   --   --   --   --  23.4* 22.1* 22.6*   MCV  --   --   --   --   --  93 90 91   PLT  --   --   --   --   --  [phone]       Current Facility-Administered Medications:     acetaminophen (TYLENOL) tablet 325-650 mg, 325-650 mg, Oral, Q4H PRN, Catherine Galvez PA-C, 650 mg at 11/01/24 0641    albuterol (PROVENTIL HFA/VENTOLIN HFA) inhaler, 2 puff, Inhalation, Q6H PRN, Catherine Galvez PA-C, 2 puff at 10/26/24 1455    atorvastatin (LIPITOR) tablet 80  mg, 80 mg, Oral, At Bedtime, Catherine Galvez PA-C, 80 mg at 10/31/24 2026    bisacodyl (DULCOLAX) suppository 10 mg, 10 mg, Rectal, Daily PRN, Catherine Galvez PA-C    budesonide (PULMICORT) neb solution 1 mg, 1 mg, Nebulization, BID, Catherine Galvez PA-C, 1 mg at 11/01/24 0806    bumetanide (BUMEX) tablet 1 mg, 1 mg, Oral, Daily, Doc Dhillon MD, 1 mg at 11/01/24 0856    [COMPLETED] buPROPion (WELLBUTRIN XL) 24 hr tablet 150 mg, 150 mg, Oral, Daily **FOLLOWED BY** buPROPion (WELLBUTRIN XL) 24 hr tablet 300 mg, 300 mg, Oral, Daily, Kerri Dolan PA-C, 300 mg at 10/28/24 0901    cetirizine (zyrTEC) tablet 10 mg, 10 mg, Oral, Daily, Catherine Galvez PA-C, 10 mg at 11/01/24 0856    clopidogrel (PLAVIX) tablet 75 mg, 75 mg, Oral, Daily, Catherine Galvez PA-C, 75 mg at 11/01/24 0856    glucose gel 15-30 g, 15-30 g, Oral, Q15 Min PRN **OR** dextrose 50 % injection 25-50 mL, 25-50 mL, Intravenous, Q15 Min PRN, 50 mL at 10/14/24 0806 **OR** glucagon injection 1 mg, 1 mg, Subcutaneous, Q15 Min PRN, Catherine Galvez PA-C    fludrocortisone (FLORINEF) tablet 0.1 mg, 0.1 mg, Oral, Daily, Catherine Galvez PA-C, 0.1 mg at 11/01/24 0857    fluticasone (FLONASE) 50 MCG/ACT spray 2 spray, 2 spray, Both Nostrils, Daily PRN, Catherine Galvez PA-C    fluticasone-vilanterol (BREO ELLIPTA) 100-25 MCG/ACT inhaler 1 puff, 1 puff, Inhalation, At Bedtime, Catherine Galvez PA-C, 1 puff at 10/31/24 2027    gabapentin (NEURONTIN) capsule 100 mg, 100 mg, Oral, TID PRN, Catherine Galvez PA-C    [COMPLETED] sodium chloride 0.9% DIALYSIS Cath LOCK - RED Lumen, 10 mL, Intracatheter, Once in dialysis/CRRT, 10 mL at 10/17/24 1527 **FOLLOWED BY** heparin 1000 unit/mL DIALYSIS Cath LOCK - RED Lumen, 1.3-2.6 mL, Intracatheter, Q1H PRN, Rohan Polo MD, 3,200 Units at 10/24/24 0934    [COMPLETED] sodium chloride 0.9% DIALYSIS Cath LOCK - BLUE Lumen, 10 mL, Intracatheter, Once in  dialysis/CRRT, 10 mL at 10/17/24 1526 **FOLLOWED BY** heparin 1000 unit/mL DIALYSIS Cath LOCK -BLUE Lumen, 1.3-2.6 mL, Intracatheter, Q1H PRN, Rohan Polo MD    heparin lock flush 10 unit/mL injection 5-20 mL, 5-20 mL, Intracatheter, Q24H, Catherine Galvez, PA-C, 5 mL at 10/31/24 2034    heparin lock flush 10 unit/mL injection 5-20 mL, 5-20 mL, Intracatheter, Q1H PRN, Catherine Galvez, PA-C    hydrALAZINE (APRESOLINE) injection 10 mg, 10 mg, Intravenous, Q30 Min PRN, Catherine Galvez PA-C, 10 mg at 10/29/24 0326    ipratropium - albuterol 0.5 mg/2.5 mg/3 mL (DUONEB) neb solution 3 mL, 3 mL, Nebulization, 4x Daily PRN, Catherine Galvez PA-C    levothyroxine (SYNTHROID/LEVOTHROID) tablet 125 mcg, 125 mcg, Oral, QAM AC, Catherine Galvez, PA-C, 125 mcg at 11/01/24 0621    Lidocaine (LIDOCARE) 4 % Patch 1-2 patch, 1-2 patch, Transdermal, Q24H, Catherine Galvez PA-C, 1 patch at 10/31/24 2033    melatonin tablet 5 mg, 5 mg, Oral, At Bedtime PRN, Catherine Galvez PA-C    methocarbamol (ROBAXIN) tablet 500 mg, 500 mg, Oral, 4x Daily PRN, Catherine Galvez PA-C    metoprolol tartrate (LOPRESSOR) tablet 75 mg, 75 mg, Oral, BID, Catherine Galvez PA-C, 75 mg at 11/01/24 0857    miconazole (MICATIN) 2 % powder, , Topical, BID, Catherine Galvez PA-C, Given at 10/31/24 2030    multivitamin w/minerals (THERA-VIT-M) tablet 1 tablet, 1 tablet, Oral, Daily, Catherine Galvez PA-C, 1 tablet at 10/31/24 1214    naloxone (NARCAN) injection 0.2 mg, 0.2 mg, Intravenous, Q2 Min PRN **OR** naloxone (NARCAN) injection 0.4 mg, 0.4 mg, Intravenous, Q2 Min PRN **OR** naloxone (NARCAN) injection 0.2 mg, 0.2 mg, Intramuscular, Q2 Min PRN **OR** naloxone (NARCAN) injection 0.4 mg, 0.4 mg, Intramuscular, Q2 Min PRN, Catherine Galvez PA-C    ondansetron (ZOFRAN ODT) ODT tab 4 mg, 4 mg, Oral, Q6H PRN, 4 mg at 10/30/24 1850 **OR** ondansetron (ZOFRAN) injection 4 mg, 4 mg, Intravenous, Q6H  PRN, Catherine Galvez PA-C, 4 mg at 10/10/24 1945    [DISCONTINUED] pantoprazole (PROTONIX) 2 mg/mL suspension 40 mg, 40 mg, Oral or NG Tube, Daily, 40 mg at 10/08/24 1729 **OR** pantoprazole (PROTONIX) EC tablet 40 mg, 40 mg, Oral, Daily, Catherine Galvez PA-C, 40 mg at 11/01/24 0856    polyethylene glycol (MIRALAX) Packet 17 g, 17 g, Oral, Daily, Catherine Galvez PA-C, 17 g at 10/28/24 0902    prochlorperazine (COMPAZINE) injection 10 mg, 10 mg, Intravenous, Q6H PRN, 10 mg at 10/28/24 2001 **OR** prochlorperazine (COMPAZINE) tablet 10 mg, 10 mg, Oral, Q6H PRN, Catherine Galvez PA-C    senna-docusate (SENOKOT-S/PERICOLACE) 8.6-50 MG per tablet 1 tablet, 1 tablet, Oral, BID, Catherine Galvez PA-C, 1 tablet at 11/01/24 0857    simethicone (MYLICON) chewable tablet 80 mg, 80 mg, Oral, 4x Daily PRN, David Wade MD, 80 mg at 10/30/24 0339    sodium chloride (OCEAN) 0.65 % nasal spray 1 spray, 1 spray, Both Nostrils, Q1H PRN, Catherine Galvez PA-KENIA, 1 spray at 10/31/24 1947    sodium chloride (PF) 0.9% PF flush 10-20 mL, 10-20 mL, Intracatheter, q1 min prn, Catherine Galvez PA-C, 20 mL at 10/31/24 2029    sodium chloride (PF) 0.9% PF flush 10-40 mL, 10-40 mL, Intracatheter, Q8H, Catherine Galvez PA-C, 30 mL at 11/01/24 0620    sodium chloride (PF) 0.9% PF flush 10-40 mL, 10-40 mL, Intracatheter, Q7 Days, Catherine Galvez PA-C, 30 mL at 10/25/24 1746    sodium chloride (PF) 0.9% PF flush 10-40 mL, 10-40 mL, Intracatheter, Q1H PRN, Catherine Galvez PA-C    Vitamin D3 (CHOLECALCIFEROL) tablet 125 mcg, 125 mcg, Oral, Daily, Catherine Galvez PA-C, 125 mcg at 11/01/24 0857    warfarin ANTICOAGULANT (COUMADIN) half-tab 1.5 mg, 1.5 mg, Oral, ONCE at 18:00, David Wade MD    Warfarin Dose Required Daily - Pharmacist Managed, 1 each, Oral, See Admin Instructions, Danish Rhoades MD    Labs personally reviewed today during this evaluation

## 2024-11-01 NOTE — PROGRESS NOTES
Care Management Discharge Note    Discharge Date: 11/01/2024       Discharge Disposition: [facility]    Discharge Services: [facility]    Discharge DME: None    Discharge Transportation: Family or friend will provide    Private pay costs discussed: Not applicable    Does the patient's insurance plan have a 3 day qualifying hospital stay waiver?  No    PAS Confirmation Code: [id number]  Patient/family educated on Medicare website which has current facility and service quality ratings: no    Education Provided on the Discharge Plan: Yes per team  Persons Notified of Discharge Plans: Patient/mother  Patient/Family in Agreement with the Plan: yes    Handoff Referral Completed: No, handoff not indicated or clinically appropriate    Additional Information:  Patient discharge [facility]. Family will transport between 1:30-2:30 PM.    Orders sent to [facility]    Adrienne Stevenson RN

## 2024-11-01 NOTE — PROGRESS NOTES
Care Management Follow Up    Length of Stay (days): 24    Expected Discharge Date: 11/01/2024     Concerns to be Addressed: Care progression - discharge planning     Patient plan of care discussed at interdisciplinary rounds: Yes    Anticipated Discharge Disposition: Dialysis Services, Transitional Care     Anticipated Discharge Services: Dialysis Services, Transitional Care  Anticipated Discharge DME: NA    Patient/family educated on Medicare website which has current facility and service quality ratings: Yes  Education Provided on the Discharge Plan:  Yes per team  Patient/Family in Agreement with the Plan: yes    Referrals Placed by CM/SW:  Yes  Private pay costs discussed: Transportation costs    Discussed  Partnership in Safe Discharge Planning  document with patient/family: Yes:  patient's mother, Mia    Handoff Completed: No, handoff not indicated or clinically appropriate    Additional Information:  0730 rec'd a voicemail from Sebastián with [facility] requesting nephrology note to say patient does not need dialysis at this time, social security number and latest PT/OT notes.    Printed and faxed last nursing note, nephrology note, cardiothoracic surgery note, and face sheet with social security number.    0825 called [facility] and spoke with Sebastián. She just got in and have not rec'd the fax yet. Will check and call back.  Updated Sebastián with transport time for today and she is OK with the time.    Message sent to update Eva with cardiothoracic surgery    Social Hx:  Assessment: Follow.   Last Note: 11/1/24  Plan: Accepted to [facility]. PAS completed.     Needs: NA  Hand off sent: [facility]  Transport: [facility] transport between 7200-1093 on 11/1/24    Next Steps: RNCM to follow for medical progression, recommendations, and final discharge plan.     Adrienne Stevenson, RN     1015 called [facility] and spoke with Uday and she verified  receipt of the faxes.     Patient's mother, Pat, requested to cancel the [facility]. Patient has been working with PT and has demonstrated she can go up the step. Patient will be able to get into the car for Pat to transfer.    Paged and left a message for bedside nurse, Becka, regarding the discharge and if appropriate for the mother to transport.  Called to update the [facility], Eliu    Called  [facility] to cancelled the ride

## 2024-11-01 NOTE — PLAN OF CARE
Goal Outcome Evaluation:      Plan of Care Reviewed With: patient, spouse    Overall Patient Progress: improving      Problem: Adult Inpatient Plan of Care  Goal: Optimal Comfort and Wellbeing  Outcome: Progressing     Problem: Adult Inpatient Plan of Care  Goal: Readiness for Transition of Care  Outcome: Progressing     Problem: Risk for Delirium  Goal: Improved Sleep  Outcome: Progressing     Problem: Cardiovascular Surgery  Goal: Improved Activity Tolerance  Outcome: Progressing     Problem: Cardiovascular Surgery  Goal: Optimal Coping with Heart Surgery  Intervention: Support Psychosocial Response to Surgery    Problem: Cardiovascular Surgery  Goal: Fluid and Electrolyte Balance  Outcome: Progressing     sc  Patient Name: Jory Ambrose   MRN: [id number]   Date of Admission: 10/8/2024    Procedure: Procedure(s):  CORONARY ARTERY BYPASS GRAFT TIMES FOUR, LEFT INTERNAL MAMMARY ARTERY HARVEST, RIGHT LEG ENDOSCOPIC VESSEL PROCUREMENT, EPIAORTIC ULTRASOUND,  MITRAL VALVE REAIR CONVERTED TO MITRAL VALVE REPLACEMENT  LEFT ATRIAL APPENDAGE LIGATION,  ANESTHESIA TRANSESOPHAGEAL ECHOCARDIOGRAM    Post Op day #:24      Subjective (Patient focus/Primary Problem for shift): Activity tolerance          Pain Goal 0 Pain Rating 4           Pain Medication/ Regime effective to reduce patient pain Scheduled tylenol and lidocaine patch, repositioning.    Objective (Physical assessment):           Rhythm:  Off tele            Bowel Activity: yes if Yes indicate when: 10/30/24          Bowel Medications: yes            Incision: healing well          Incentive Spirometry Q 1-2 hour when awake:  yes Volume: 1500          Epicardial Pacing Wires:  no            Patient Activity:           Up to chair for meals: yes          Ambulation with RN x2 (Not including CR): yes            Is patient in home clothes:no             Chest Tubes   Pleural: no Draining: no               Suction: no              Mediastinal: no Draining:  no               Suction: no   Dressing Change Daily:no If No, why? ROBERT                     Urinary Catheter: no           Preventative WOC consult (need MD order): no     Patient Aox4. RA. Off tele today. VSS. Denies SOB and chest pain. Endorses incisional pain but relieved with scheduled Tylenol and repositioning. Micatin powder administered under R breast. Up to chair in AM. Pt discharging today to [facility] and will transport via [facility]. Pt able to make needs known, call light within reach. POCC.

## 2024-11-01 NOTE — PLAN OF CARE
Goal Outcome Evaluation:    sc  Patient Name: Jory Ambrose   MRN: [id number]   Date of Admission: 10/8/2024    Procedure: Procedure(s):  CORONARY ARTERY BYPASS GRAFT TIMES FOUR, LEFT INTERNAL MAMMARY ARTERY HARVEST, RIGHT LEG ENDOSCOPIC VESSEL PROCUREMENT, EPIAORTIC ULTRASOUND,  MITRAL VALVE REAIR CONVERTED TO MITRAL VALVE REPLACEMENT  LEFT ATRIAL APPENDAGE LIGATION,  ANESTHESIA TRANSESOPHAGEAL ECHOCARDIOGRAM    Post Op day #:23    Subjective (Patient focus/Primary Problem for shift): Discharge          Pain Goal0 Pain Rating4           Pain Medication/ Regime effective to reduce patient paintylenol    Objective (Physical assessment):           Rhythm: atrial fibrillation            Bowel Activity: yes if Yes indicate when: 10/30          Bowel Medications: yes            Incision: healing well          Incentive Spirometry Q 1-2 hour when awake:  yes Volume: 1000          Epicardial Pacing Wires:  no            Patient Activity:           Up to chair for meals: yes          Ambulation with RN x2 (Not including CR): yes            Is patient in home clothes:yes             Chest Tubes   Pleural: no Draining: not applicable               Suction: not applicable              Mediastinal: no Draining: not applicable               Suction: not applicable   Dressing Change Daily:no If No, why?ROBERT                     Urinary Catheter: no           Preventative WOC consult (need MD order): no       Assessment (Nursing primary shift focus): Vital signs stable. Pain managed with PRN tylenol. PICC removed. Patient went down to IR and tunneled dialysis CVC removed. Incision care completed with soap and water. Discharge instructions reviewed with patient and patient's mother. Patient discharging to rehab, packet sent with patient. Transported by mother.    Plan (Patient Care Plan/focus): Discharging to [facility]      Becka Lewis RN   11/1/2024   2:30 PM

## 2024-11-01 NOTE — CONSULTS
Interventional Radiology - Procedure Documentation  11/1/2024    Procedure details:  Successful tunneled venous catheter removal without incident or difficulty. Catheter appeared to be fulling intact upon removal. No immediate complications. See dictation for full procedure report.    Plan:  -Bedrest and monitoring for 30 minutes post catheter removal.  -Patient should sit upright for 1 hr following tunneled CVC removal.  -Monitor site for bleeding and check vitals signs every 15 minutes x2 post catheter removal.  -Apply pressure to exit site and internal jugular insertion site at clavicle if bleeding starts post catheter removal.  -Okay to discharge from IR perspective when post-procedure monitoring/bedrest timeframe is complete, patient remains hemodynamically stable and procedural site remains stable.    RAFY Garcia CNP  Interventional Radiology

## 2024-11-02 ENCOUNTER — MYC REFILL (OUTPATIENT)
Dept: CARDIOLOGY | Facility: CLINIC | Age: 49
End: 2024-11-02
Payer: COMMERCIAL

## 2024-11-02 ENCOUNTER — PATIENT OUTREACH (OUTPATIENT)
Dept: CARE COORDINATION | Facility: CLINIC | Age: 49
End: 2024-11-02
Payer: COMMERCIAL

## 2024-11-02 DIAGNOSIS — E03.8 SUBCLINICAL HYPOTHYROIDISM: ICD-10-CM

## 2024-11-02 DIAGNOSIS — Z79.01 LONG TERM (CURRENT) USE OF ANTICOAGULANTS: ICD-10-CM

## 2024-11-02 DIAGNOSIS — I15.8 HYPERTENSION SECONDARY TO DRUG: ICD-10-CM

## 2024-11-02 DIAGNOSIS — R93.1 ABNORMAL COMPUTED TOMOGRAPHY ANGIOGRAPHY OF HEART: ICD-10-CM

## 2024-11-02 DIAGNOSIS — I25.119 CORONARY ARTERY DISEASE INVOLVING NATIVE CORONARY ARTERY OF NATIVE HEART WITH ANGINA PECTORIS (H): ICD-10-CM

## 2024-11-02 DIAGNOSIS — T50.905A HYPERTENSION SECONDARY TO DRUG: ICD-10-CM

## 2024-11-02 DIAGNOSIS — Z95.2 H/O MITRAL VALVE REPLACEMENT WITH MECHANICAL VALVE: ICD-10-CM

## 2024-11-02 DIAGNOSIS — I48.0 PAROXYSMAL ATRIAL FIBRILLATION (H): ICD-10-CM

## 2024-11-02 DIAGNOSIS — R07.9 CHEST PAIN ON EXERTION: ICD-10-CM

## 2024-11-02 RX ORDER — TRAZODONE HYDROCHLORIDE 100 MG/1
100 TABLET ORAL AT BEDTIME
Qty: 30 TABLET | Refills: 0 | Status: SHIPPED | OUTPATIENT
Start: 2024-11-02

## 2024-11-02 RX ORDER — CETIRIZINE HYDROCHLORIDE 10 MG/1
10 TABLET ORAL DAILY
Qty: 30 TABLET | Refills: 11 | Status: SHIPPED | OUTPATIENT
Start: 2024-11-02

## 2024-11-02 RX ORDER — LEVOTHYROXINE SODIUM 125 UG/1
125 TABLET ORAL DAILY
Qty: 30 TABLET | Refills: 0 | Status: SHIPPED | OUTPATIENT
Start: 2024-11-02

## 2024-11-02 RX ORDER — ACETAMINOPHEN 500 MG
1000 TABLET ORAL EVERY 6 HOURS PRN
COMMUNITY
Start: 2024-11-02

## 2024-11-02 RX ORDER — ATORVASTATIN CALCIUM 80 MG/1
80 TABLET, FILM COATED ORAL DAILY
Qty: 90 TABLET | Refills: 3 | Status: SHIPPED | OUTPATIENT
Start: 2024-11-02

## 2024-11-02 RX ORDER — BUDESONIDE AND FORMOTEROL FUMARATE DIHYDRATE 80; 4.5 UG/1; UG/1
1 AEROSOL RESPIRATORY (INHALATION)
Qty: 6.9 G | Refills: 0 | Status: SHIPPED | OUTPATIENT
Start: 2024-11-02

## 2024-11-02 RX ORDER — MULTIVITAMIN
1 TABLET ORAL DAILY
COMMUNITY
Start: 2024-11-02

## 2024-11-02 RX ORDER — CLOPIDOGREL BISULFATE 75 MG/1
75 TABLET ORAL DAILY
Qty: 30 TABLET | Refills: 1 | Status: SHIPPED | OUTPATIENT
Start: 2024-11-02

## 2024-11-02 RX ORDER — BUPROPION HYDROCHLORIDE 300 MG/1
300 TABLET ORAL EVERY MORNING
Qty: 30 TABLET | Refills: 0 | Status: SHIPPED | OUTPATIENT
Start: 2024-11-02

## 2024-11-02 RX ORDER — FLUTICASONE PROPIONATE 50 MCG
2 SPRAY, SUSPENSION (ML) NASAL DAILY PRN
Qty: 9.9 ML | Refills: 0 | Status: SHIPPED | OUTPATIENT
Start: 2024-11-02

## 2024-11-02 RX ORDER — METOPROLOL TARTRATE 75 MG/1
75 TABLET ORAL 2 TIMES DAILY
Qty: 60 TABLET | Refills: 1 | Status: SHIPPED | OUTPATIENT
Start: 2024-11-02

## 2024-11-02 RX ORDER — FLUDROCORTISONE ACETATE 0.1 MG/1
0.1 TABLET ORAL DAILY
Qty: 30 TABLET | Refills: 0 | Status: SHIPPED | OUTPATIENT
Start: 2024-11-02

## 2024-11-02 RX ORDER — ALBUTEROL SULFATE 90 UG/1
2 INHALANT RESPIRATORY (INHALATION) EVERY 6 HOURS PRN
Qty: 18 G | Refills: 0 | Status: SHIPPED | OUTPATIENT
Start: 2024-11-02

## 2024-11-02 RX ORDER — WARFARIN SODIUM 1 MG/1
TABLET ORAL
Qty: 60 TABLET | Refills: 1 | Status: SHIPPED | OUTPATIENT
Start: 2024-11-02

## 2024-11-02 NOTE — PROGRESS NOTES
[facility]    Background: Transitional Care Management program identified per system criteria and reviewed by [facility] team for possible outreach.    Assessment: Upon chart review, CCRC Team member will not proceed with patient outreach related to this episode of Transitional Care Management program due to reason below:    Non-[facility] TCU: CCRC team member noted patient discharged to TCU/ARU/LTACH. Patient is not established with a [facility] currently supported by Primary Care-Care Coordination therefore handoff to Primary Care-Care Coordination is not appropriate at this time.    Plan: Transitional Care Management episode addressed appropriately per reason noted above.      MIREYA Jerez  [facility] [place], [facility]    *Connected Care Resource Team does NOT follow patient ongoing. Referrals are identified based on internal discharge reports and the outreach is to ensure patient has an understanding of their discharge instructions.

## 2024-11-02 NOTE — TELEPHONE ENCOUNTER
Was contacted by mother Pat that Jory was leaving TCU AMA today and would need prescriptions. Discharge medications ordered and sent to [facility] at [facility] in [place]. Per patient's mother, INR was unable to be drawn today at the TCU. Jory will need to get INR drawn on Monday 11/4 and follow up with INR clinic for further dosing.    Kerri Dolan PA-C   Cardiothoracic Surgery   November 2, 2024 at 11:41 AM

## 2024-11-04 ENCOUNTER — ANTICOAGULATION THERAPY VISIT (OUTPATIENT)
Dept: ANTICOAGULATION | Facility: CLINIC | Age: 49
End: 2024-11-04

## 2024-11-04 ENCOUNTER — LAB (OUTPATIENT)
Dept: CARDIOLOGY | Facility: CLINIC | Age: 49
End: 2024-11-04
Payer: COMMERCIAL

## 2024-11-04 ENCOUNTER — TELEPHONE (OUTPATIENT)
Dept: CARDIOLOGY | Facility: CLINIC | Age: 49
End: 2024-11-04
Payer: COMMERCIAL

## 2024-11-04 ENCOUNTER — TELEPHONE (OUTPATIENT)
Dept: ANTICOAGULATION | Facility: CLINIC | Age: 49
End: 2024-11-04

## 2024-11-04 DIAGNOSIS — Z95.2 H/O MITRAL VALVE REPLACEMENT WITH MECHANICAL VALVE: Primary | ICD-10-CM

## 2024-11-04 DIAGNOSIS — Z95.2 H/O MITRAL VALVE REPLACEMENT WITH MECHANICAL VALVE: ICD-10-CM

## 2024-11-04 LAB — INR POINT OF CARE: 3.5 (ref 0.9–1.1)

## 2024-11-04 PROCEDURE — 85610 PROTHROMBIN TIME: CPT

## 2024-11-04 PROCEDURE — 36416 COLLJ CAPILLARY BLOOD SPEC: CPT

## 2024-11-04 NOTE — PROGRESS NOTES
Received a message that patient left AMA from transitional care over the weekend prior to INR being checked. We sent her meds to her preferred [facility] pharmacy and placed a referral to anticoagulation clinic this AM. INR goal 2.5-3.5 in s/o mechanical mitral valve. Given that patient was not able to have an INR checked at TCU, first INR should be checked today if possible, otherwise tomorrow. Please feel free to reach out to our team if questions or concerns ([phone]).    Eva Galvez PA-C  [facility] Cardiothoracic Surgery  Vocera or Secure Chat  November 4, 2024

## 2024-11-04 NOTE — TELEPHONE ENCOUNTER
"ANTICOAGULATION  MANAGEMENT: NEW REFERRAL      SUBJECTIVE/OBJECTIVE     Jory Ambrose, a 49 year old female  is newly referred to [facility].    Anticoagulation:    Previously on warfarin: No, new to anticoagulation  Warfarin initiation date (approximate): 10/13/24   Indication(s): Mechanical MVR - 10/8/24   Goal Range:  2.5-3.5   Anticoagulation Bridge/Overlap: No   Referring provider: from [facility] provider    Results:        Recent labs: (last 7 days)     11/01/24  0324   INR 2.91*       Wt Readings from Last 2 Encounters:   10/31/24 106.1 kg (233 lb 12.8 oz)   10/04/24 98 kg (216 lb)      Estimated body mass index is 40.13 kg/m  as calculated from the following:    Height as of 9/25/24: 1.626 m (5' 4\").    Weight as of 10/31/24: 106.1 kg (233 lb 12.8 oz).  Lab Results   Component Value Date    AST 34 10/25/2024    ALT 80 (H) 10/25/2024    ALBUMIN 3.1 (L) 10/30/2024     Lab Results   Component Value Date    CR 2.54 (H) 11/01/2024     Estimated Creatinine Clearance: 31.8 mL/min (A) (based on SCr of 2.54 mg/dL (H)).    ASSESSMENT     Goal INR 2.5-3.5 standard for indication(s) above  Establishing initial warfarin maintenance dose (on warfarin < 30 days)   Factors that may increase sensitivity to warfarin: Female gender  Factors that may reduce sensitivity to warfarin: No factors  Potential significant drug interactions with home medications:  Plavix  Starting warfarin dose is appropriate for patient's anticipated sensitivity to warfarin    PLAN     Dosing Instructions: Check INR today    Summary  As of 11/4/2024      Full warfarin instructions:  1.5 mg every day   Next INR check:  11/4/2024               Education provided:   Taking warfarin: purpose of warfarin and how it works and take warfarin at same time each day; preferably in the evening  Goal range and lab monitoring: goal range and significance of current result and Importance of following up at instructed " interval    Education still needed:   Goal range and lab monitoring: frequency of lab work when starting warfarin and importance of following up when instructed (extends after stability established)  Healthy lifestyle considerations: potential interaction between warfarin and alcohol  Symptom monitoring: monitoring for bleeding signs and symptoms, monitoring for clotting signs and symptoms, and monitoring for stroke signs and symptoms  Importance of notifying anticoagulation clinic for: changes in medications; a sooner lab recheck maybe needed, diarrhea, nausea/vomiting, reduced intake, cold/flu, and/or infections; a sooner lab recheck maybe needed, and upcoming surgeries and procedures 2 weeks in advance      Telephone call with patients mom, Pat who verbalizes understanding and agrees to plan    Lab visit scheduled    Standing orders placed in Epic: Point of Care INR (Lab 5000) and POCT INR (POC 15)--for [facility] testing only    Plan made per [facility] anticoagulation protocol    Paz Mendoza, RN  Anticoagulation Clinic  11/4/2024

## 2024-11-04 NOTE — PROGRESS NOTES
ANTICOAGULATION MANAGEMENT     Jory Ambrose 49 year old female is on warfarin with therapeutic INR result. (Goal INR 2.5-3.5)    Recent labs: (last 7 days)     11/04/24  1559   INR 3.5*       ASSESSMENT     Source(s): Chart Review, Patient/Caregiver Call, and Template     Warfarin doses taken: Less warfarin taken than planned which may be affecting INR - took 1 mg instead of 1.5 on Sunday  Diet: No new diet changes identified  Medication/supplement changes: None noted  New illness, injury, or hospitalization: Yes: hospital 10/8-11/1 for CABG and MVR  Signs or symptoms of bleeding or clotting: No  Previous result: Therapeutic last 2(+) visits  Additional findings: Recent heart valve replacement in last 10 weeks - 10/8/24. Warfarin started 10/13/24       PLAN     Recommended plan for no diet, medication or health factor changes and recent heart valve replacement last 10 weeks affecting INR     Dosing Instructions: change your warfarin dose with next INR in 4 days      *dosed through Friday, although has OV tomorrow and may have INR done then.      Summary  As of 11/4/2024      Full warfarin instructions:  1 mg every Sun, Tue, Thu; 1.5 mg all other days   Next INR check:  11/8/2024               Telephone call with momMia who agrees to plan and repeated back plan correctly    Check at provider office visit    Education provided: Goal range and lab monitoring: goal range and significance of current result, Importance of therapeutic range, and Importance of following up at instructed interval    Plan made with [facility] Pharmacist Rossy Mendoza RN  11/4/2024  Anticoagulation Clinic  [email or web address] for routing messages: qasim PEARSON  [facility] patient phone line: [phone]        _______________________________________________________________________     Anticoagulation Episode Summary       Current INR goal:  2.5-3.5   TTR:  --   Target end date:  Indefinite   Send INR reminders to:  JAKE  LILI    Indications    H/O mitral valve replacement with mechanical valve [Z95.2]             Comments:  --             Anticoagulation Care Providers       Provider Role Specialty Phone number    Catherine Galvez PA-C Referring  [phone]

## 2024-11-04 NOTE — TELEPHONE ENCOUNTER
Called mother to follow up, referral for ACC placed per CV ORQUIDEA. Pt left the TCU AMA this weekend.      ----- Message from Roseann JOHNSON sent at 11/4/2024  8:37 AM CST -----    Some from TCU called and left message and would like to speak to someone on the team. She did not say her name, regarding INR,  please call [phone]

## 2024-11-05 ENCOUNTER — OFFICE VISIT (OUTPATIENT)
Dept: CARDIOLOGY | Facility: CLINIC | Age: 49
End: 2024-11-05
Payer: COMMERCIAL

## 2024-11-05 VITALS
SYSTOLIC BLOOD PRESSURE: 137 MMHG | RESPIRATION RATE: 22 BRPM | DIASTOLIC BLOOD PRESSURE: 91 MMHG | HEART RATE: 88 BPM | WEIGHT: 235 LBS | BODY MASS INDEX: 40.34 KG/M2

## 2024-11-05 DIAGNOSIS — E78.5 HYPERLIPIDEMIA LDL GOAL <70: ICD-10-CM

## 2024-11-05 DIAGNOSIS — N17.0 ACUTE RENAL FAILURE WITH TUBULAR NECROSIS (H): ICD-10-CM

## 2024-11-05 DIAGNOSIS — I34.0 NONRHEUMATIC MITRAL VALVE REGURGITATION: ICD-10-CM

## 2024-11-05 DIAGNOSIS — R73.03 PREDIABETES: ICD-10-CM

## 2024-11-05 DIAGNOSIS — Z95.2 H/O MITRAL VALVE REPLACEMENT WITH MECHANICAL VALVE: Primary | ICD-10-CM

## 2024-11-05 DIAGNOSIS — R93.1 ABNORMAL COMPUTED TOMOGRAPHY ANGIOGRAPHY OF HEART: ICD-10-CM

## 2024-11-05 DIAGNOSIS — Z79.01 LONG TERM (CURRENT) USE OF ANTICOAGULANTS: ICD-10-CM

## 2024-11-05 DIAGNOSIS — G47.33 OSA (OBSTRUCTIVE SLEEP APNEA): ICD-10-CM

## 2024-11-05 DIAGNOSIS — I48.3 TYPICAL ATRIAL FLUTTER (H): ICD-10-CM

## 2024-11-05 DIAGNOSIS — Z95.1 S/P CABG (CORONARY ARTERY BYPASS GRAFT): ICD-10-CM

## 2024-11-05 DIAGNOSIS — R07.9 CHEST PAIN ON EXERTION: ICD-10-CM

## 2024-11-05 DIAGNOSIS — I25.119 CORONARY ARTERY DISEASE INVOLVING NATIVE CORONARY ARTERY OF NATIVE HEART WITH ANGINA PECTORIS (H): ICD-10-CM

## 2024-11-05 LAB
ANION GAP SERPL CALCULATED.3IONS-SCNC: 15 MMOL/L (ref 7–15)
BUN SERPL-MCNC: 38.5 MG/DL (ref 6–20)
CALCIUM SERPL-MCNC: 9 MG/DL (ref 8.8–10.4)
CHLORIDE SERPL-SCNC: 101 MMOL/L (ref 98–107)
CREAT SERPL-MCNC: 1.77 MG/DL (ref 0.51–0.95)
EGFRCR SERPLBLD CKD-EPI 2021: 35 ML/MIN/1.73M2
GLUCOSE SERPL-MCNC: 94 MG/DL (ref 70–99)
HCO3 SERPL-SCNC: 23 MMOL/L (ref 22–29)
HGB BLD-MCNC: 8.5 G/DL (ref 11.7–15.7)
POTASSIUM SERPL-SCNC: 3.9 MMOL/L (ref 3.4–5.3)
SODIUM SERPL-SCNC: 139 MMOL/L (ref 135–145)

## 2024-11-05 PROCEDURE — 36415 COLL VENOUS BLD VENIPUNCTURE: CPT

## 2024-11-05 PROCEDURE — 99024 POSTOP FOLLOW-UP VISIT: CPT

## 2024-11-05 PROCEDURE — 85018 HEMOGLOBIN: CPT

## 2024-11-05 PROCEDURE — 80048 BASIC METABOLIC PNL TOTAL CA: CPT

## 2024-11-05 NOTE — PATIENT INSTRUCTIONS
- Surgically doing well. Incisions are healing well with no signs of infection.  - Blood pressure and heart rate are stable.  - Continue plavix daily lifelong per surgeon preference  - Continue warfarin lifelong INR goal 2.5-3.5 for history of mechanical mitral valve replacement  - Follow up appointments:  - PCP - call to schedule w/ Dr. Joshua (can also see another provider in that clinic including an MD, PA, or NP). You should be seen w/in the next 7 days.  - Cardiology - Scheduled. 11/8/2024 at 3:50pm w/ Dr. Rodriguez  - EP (a fib) - Scheduled. 11/27/2024 at7:50am w/ Perez Person PA-C  - Anticoagulation Clinic - Continue following w/ them (they will manage your INR and warfarin)  - Nephrology (Kidneys) - [facility]. Referral placed, they will contact you. Call [phone] if you don't hear from them by Thursday.  - Cardiac Rehab - Start and continue until completed - referral placed, they will contact you.   - May start driving today (11/5/2024 // 4 weeks post-op) if not using narcotic pain medications.  - Continue strict sternal precautions until 12/3/2024. No lifting >10bs; may gradually increase at this point (8 weeks post-op).   - No need for further follow-up with CV surgery unless concerns. Feel free to call our office with questions. [phone]

## 2024-11-05 NOTE — PROGRESS NOTES
(4) rarely moist Hgb  CARDIOTHORACIC SURGERY FOLLOW-UP VISIT     Jory Ambrose   1975   [id number]      Reason for visit: Post operative clinic visit. Patient underwent CABG x 4, attempted MVR/r, mechanical MVR, left atrial appendage excision with Dr. Wade on 10/8/2024.     HPI: Jory Ambrose is a 49 year old year old female seen in clinic for a routine follow-up appointment after surgery. Patient has past medical history as below. Hospital course was complicated by ischemic hepatitis and acute renal failure requiring CRRT and a couple sessions of iHD. Patient was discharged to TCU on 11/1/2024 but left AMA on 113/2024. Details from discharge summary as below.    - Postop ischemic hepatitis. Hepatitis w/u and labs unremarkable. LFTs normalized prior to discharge.  - Acute renal failure. [facility] followed patient.              - CRRT 10/11 - 10/13              - Received inpt HD 10/15, 10/17, 10/19, and 10/23              - Tunneled dialysis catheter placed per IR on 10/24 - removed prior to discharge              - Sending w/o diuretics. Ok for bumex per nephrology if weight increasing or increased edema              - Cr on day of discharge is 2.54 and grossly stable  - Concern for sepsis postop. Received a short course of vanc/rocephin and meropenem for poss UTI. ID was consulted and signed off w/o identified etiology of sepsis and improving leukocytosis/clinical picture off abx.  - Required multiple transfusions postop.   - Patient on plavix qday s/p CABG d/t unclear history of allergy to ASA.   - Warfarin s/p mechanical MVR indefinitely. INR goal 2.5-3.5.INR on day of discharge was 2.91.  - New baseline echo performed prior to discharge. EF 60-65%, mitral valve well seated and w/ normal gradiens, trivial pericardial effusion  - Her metformin was stopped d/t ARF, but BG consistently ~100 so no additional medication was added.  - Patient w/ paroxysmal a fib and flutter postop. A fib  rates in the 90s, flutter rates 60-70s. S/p amio bolus/gtt 10/13 (ok per surgeon in s/o LFTs) but was not placed on PO d/t LFTs and allergy to PO amio additive. Metoprolol was increased as tolerated. Work toward euvolemia as able over time. EP was consulted and stated DCCV not necessary at this time. Patient has folow-up scheduled with them.  ________________________________________________________    Patient has been doing fair since discharge, had a poor experience at TCU and left AMA on Sunday as she did not feel safe there. Patient did not yet follow-up with primary care since leaving the hospital. Reports that incision is healing well. Denies fevers. Remains with significant peripheral edema. Appetite is improving and patient is voiding without difficulty. Weight has been the same each day since hospital discharge. Pain management at this point with PRN tylenol. Patient has not yet been attending cardiac rehab, referral place. Patient is on anti-coagulation w/ warfarin in s/o mechanical MVR. Lifelong INR goal 2-3.      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    This would prevent her from following up on a regular basis      Deconditioned low back 06/18/2015    H/O autologous stem cell transplant (H) 07/26/2011    History of radiation therapy 01/01/2010    3600 cGy to mediastinum and neck    Hodgkin lymphoma, nodular sclerosis (H)     Dx Feb 2010    Hypothyroidism, secondary     r/t radiation    Morbid obesity with BMI of 40.0-44.9, adult (H)     Neutropenic fever (H) 06/09/2013    Polycystic ovary disease     Pulmonary embolism (H)     Pulmonary embolus (H) 02/21/2011    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    Seasonal allergies     Shingles     Aug 2010    Sleep apnea        PAST SURGICAL HISTORY:  Past Surgical History:   Procedure Laterality Date     CHOLECYSTECTOMY      gallstone pancreatitis Jan 2010    CORONARY ARTERY BYPASS GRAFT, WITH ENDOSCOPIC VESSEL PROCUREMENT N/A 10/8/2024    Procedure: CORONARY ARTERY BYPASS GRAFT TIMES FOUR, LEFT INTERNAL MAMMARY ARTERY HARVEST, RIGHT LEG ENDOSCOPIC VESSEL PROCUREMENT, EPIAORTIC ULTRASOUND,;  Surgeon: David Wade MD;  Location: [facility]    CV CORONARY ANGIOGRAM N/A 9/13/2024    Procedure: Coronary Angiogram;  Surgeon: Tarun Johnston MD;  Location: [facility] CATH LAB CV    CV LEFT HEART CATH N/A 9/13/2024    Procedure: Left Heart Catheterization;  Surgeon: Tarun Johnston MD;  Location: [facility] CATH LAB CV    IR CVC NON TUNNEL PLACEMENT > 5 YRS  10/11/2024    IR CVC TUNNEL PLACEMENT > 5 YRS OF AGE  10/24/2024    IR CVC TUNNEL REMOVAL RIGHT  11/1/2024    Lymphectomy  2/2010    right neck area    OR LIGATION, LEFT ATRIAL APPENDAGE N/A 10/8/2024    Procedure: LEFT ATRIAL APPENDAGE LIGATION,;  Surgeon: David Wade MD;  Location: [facility]    PICC TRIPLE LUMEN PLACEMENT  10/15/2024    REPLACE VALVE MITRAL N/A 10/8/2024    Procedure: MITRAL VALVE REAIR CONVERTED TO MITRAL VALVE REPLACEMENT;  Surgeon: David Wade MD;  Location: [facility]    TRANSESOPHAGEAL ECHOCARDIOGRAM INTRAOPERATIVE  10/8/2024    Procedure: ANESTHESIA TRANSESOPHAGEAL ECHOCARDIOGRAM;  Surgeon: David Wade MD;  Location: [facility]       CURRENT MEDICATIONS:     Current Outpatient Medications:     acetaminophen (TYLENOL) 500 MG tablet, Take 2 tablets (1,000 mg) by mouth every 6 hours as needed for mild pain., Disp: , Rfl:     albuterol (PROAIR HFA) 108 (90 Base) MCG/ACT inhaler, Inhale 2 puffs into the lungs every 6 hours as needed., Disp: 18 g, Rfl: 0    atorvastatin (LIPITOR) 80 MG tablet, Take 1 tablet (80 mg) by mouth daily., Disp: 90 tablet, Rfl: 3    budesonide-formoterol (SYMBICORT) 80-4.5 MCG/ACT Inhaler, Inhale 1 puff into the lungs two times daily.,  Disp: 6.9 g, Rfl: 0    cetirizine (ZYRTEC) 10 MG tablet, Take 1 tablet (10 mg) by mouth daily., Disp: 30 tablet, Rfl: 11    cholecalciferol (VITAMIN D3) 125 mcg (5000 units) capsule, Take 1 capsule (125 mcg) by mouth daily., Disp: 30 capsule, Rfl: 0    clopidogrel (PLAVIX) 75 MG tablet, Take 1 tablet (75 mg) by mouth daily., Disp: 30 tablet, Rfl: 1    fludrocortisone (FLORINEF) 0.1 MG tablet, Take 1 tablet (0.1 mg) by mouth daily., Disp: 30 tablet, Rfl: 0    levothyroxine (SYNTHROID/LEVOTHROID) 125 MCG tablet, Take 1 tablet (125 mcg) by mouth daily., Disp: 30 tablet, Rfl: 0    Metoprolol Tartrate 75 MG TABS, Take 75 mg by mouth 2 times daily., Disp: 60 tablet, Rfl: 1    miconazole (MICATIN) 2 % external powder, Apply topically 2 times daily. (Patient taking differently: Apply topically as needed.), Disp: , Rfl:     multivitamin w/minerals (MULTI-VITAMIN) tablet, Take 1 tablet by mouth daily., Disp: , Rfl:     omeprazole (PRILOSEC) 20 MG DR capsule, Take 1 capsule (20 mg) by mouth daily., Disp: 30 capsule, Rfl: 12    simethicone (MYLICON) 125 MG chewable tablet, Take 1 tablet (125 mg) by mouth 4 times daily as needed for intestinal gas, Disp: 40 tablet, Rfl: 0    sodium chloride (OCEAN) 0.65 % nasal spray, Spray 1 spray into both nostrils every hour as needed for congestion or other (dry nose)., Disp: 15 mL, Rfl: 0    warfarin ANTICOAGULANT (COUMADIN) 1 MG tablet, Take 1.5 mg today (11/1/2024), then as directed by TCU provider for INR goal 2.5-3.5, Disp: 60 tablet, Rfl: 1    buPROPion (WELLBUTRIN XL) 300 MG 24 hr tablet, Take 1 tablet (300 mg) by mouth every morning. (Patient not taking: Reported on 11/5/2024), Disp: 30 tablet, Rfl: 0    fluticasone (FLONASE) 50 MCG/ACT nasal spray, Spray 2 sprays into both nostrils daily as needed for rhinitis or allergies. (Patient not taking: Reported on 11/5/2024), Disp: 9.9 mL, Rfl: 0    traZODone (DESYREL) 100 MG tablet, Take 1 tablet (100 mg) by mouth at bedtime. (Patient not  taking: Reported on 11/5/2024), Disp: 30 tablet, Rfl: 0    ALLERGIES:      Allergies   Allergen Reactions    Atarax [Hydroxyzine] Shortness Of Breath    Dye [Contrast Dye] Swelling     Pre-medicated with methylprednisolone    Shellfish Allergy Shortness Of Breath, Anaphylaxis and Swelling     Shrimp, crab, lobster    Cats      Other Reaction(s): Rhinitis, Sneezing    Dust Mite Extract Other (See Comments)     Other Reaction(s): Rhinitis, Sneezing    Food Itching     Shrimp and walnuts.    Penicillins Hives     Patient cannot recall if she has tried cephalosporins in the past.     Wheat Germ Oil Other (See Comments)     Seasonal - multiple    Erythromycin Hives    Morphine Hcl      Causes change in mental status    Nickel Rash     rash    Sulfa Antibiotics Hives and Rash       ROS:  Gen: No fevers, weight loss/gain  CV: Denies chest pain, peripheral edema  Pulm: Denies SOB  GI/: Voiding without problems, appetite improving.     LABS:  None    IMAGING:  None    PHYSICAL EXAM:   BP (!) 137/91 (BP Location: Left arm, Patient Position: Sitting, Cuff Size: Adult Regular)   Pulse 88   Resp 22   Wt 106.6 kg (235 lb)   LMP  (LMP Unknown)   BMI 40.34 kg/m    General: Alert and oriented, pleasant, no acute distress.  CV:  No peripheral edema.  Pulm: Easy work of breathing on room air.   GI: Soft, non-tender, and non-distended  Incision: Chest and leg incisions clean dry and intact without erythema, swelling or drainage  Neuro: CNs grossly intact.      ASSESSMENT/PLAN:  Jory Ambrose is a 49 year old year old female status post CABG x4 and mechanical MVR who returns to clinic for postop visit.     - Surgically doing well. Incisions are healing well with no signs of infection.  - Hemodynamics are stable. No medication changes were needed today.  - Continue plavix qday lifelong per surgeon preference  - Continue warfarin lifelong INR goal 2.5-3.5 for history of mechanical mitral valve replacement  - Follow up  appointments:   - PCP - within 7 days  - Cardiology - scheduled.   - EP (a fib) - scheduled.  - Anticoagulation clinic - established 11/4.  - Nephrology (Kidneys) - [facility]. Referral placed.  - Start cardiac rehab and continue until completed - referral placed.  - May start driving today (11/5/2024 // 4 weeks post-op) if not using narcotic pain medications.  - BMP to monitor Cr today.  - Will plan to start bumex since weight has been stable and still w/ significant edema (ok per discussion w/ nephrology on day of discharge). Dose pending Cr today  - Hgb checked today per patient and family preference  - Continue strict sternal precautions until 12/3/2024. No lifting >10bs; may gradually increase at this point (8 weeks post-op).   - No need for further follow-up with CV surgery unless concerns. Feel free to call our office with questions. [phone]         Eva Galvez PA-C  [facility] Cardiothoracic Surgery  Vocera or Secure Chat  November 5, 2024

## 2024-11-06 ENCOUNTER — MYC MEDICAL ADVICE (OUTPATIENT)
Dept: CARDIOLOGY | Facility: CLINIC | Age: 49
End: 2024-11-06
Payer: COMMERCIAL

## 2024-11-06 DIAGNOSIS — Z95.1 S/P CABG (CORONARY ARTERY BYPASS GRAFT): Primary | ICD-10-CM

## 2024-11-06 RX ORDER — BUMETANIDE 1 MG/1
TABLET ORAL
Qty: 21 TABLET | Refills: 0 | Status: SHIPPED | OUTPATIENT
Start: 2024-11-06 | End: 2024-11-20

## 2024-11-08 ENCOUNTER — ANTICOAGULATION THERAPY VISIT (OUTPATIENT)
Dept: ANTICOAGULATION | Facility: CLINIC | Age: 49
End: 2024-11-08

## 2024-11-08 ENCOUNTER — LAB (OUTPATIENT)
Dept: CARDIOLOGY | Facility: CLINIC | Age: 49
End: 2024-11-08
Payer: COMMERCIAL

## 2024-11-08 ENCOUNTER — OFFICE VISIT (OUTPATIENT)
Dept: CARDIOLOGY | Facility: CLINIC | Age: 49
End: 2024-11-08
Payer: COMMERCIAL

## 2024-11-08 ENCOUNTER — ORDERS ONLY (AUTO-RELEASED) (OUTPATIENT)
Dept: CARDIOLOGY | Facility: CLINIC | Age: 49
End: 2024-11-08

## 2024-11-08 VITALS
DIASTOLIC BLOOD PRESSURE: 84 MMHG | WEIGHT: 231.8 LBS | BODY MASS INDEX: 39.79 KG/M2 | SYSTOLIC BLOOD PRESSURE: 132 MMHG | RESPIRATION RATE: 20 BRPM | OXYGEN SATURATION: 97 % | HEART RATE: 98 BPM

## 2024-11-08 DIAGNOSIS — I25.119 CORONARY ARTERY DISEASE INVOLVING NATIVE CORONARY ARTERY OF NATIVE HEART WITH ANGINA PECTORIS (H): Primary | ICD-10-CM

## 2024-11-08 DIAGNOSIS — Z95.2 H/O MITRAL VALVE REPLACEMENT WITH MECHANICAL VALVE: Primary | ICD-10-CM

## 2024-11-08 DIAGNOSIS — Z95.2 H/O MITRAL VALVE REPLACEMENT WITH MECHANICAL VALVE: ICD-10-CM

## 2024-11-08 DIAGNOSIS — I48.0 PAF (PAROXYSMAL ATRIAL FIBRILLATION) (H): ICD-10-CM

## 2024-11-08 LAB
ANION GAP SERPL CALCULATED.3IONS-SCNC: 13 MMOL/L (ref 7–15)
BUN SERPL-MCNC: 32.1 MG/DL (ref 6–20)
CALCIUM SERPL-MCNC: 8.5 MG/DL (ref 8.8–10.4)
CHLORIDE SERPL-SCNC: 100 MMOL/L (ref 98–107)
CREAT SERPL-MCNC: 1.77 MG/DL (ref 0.51–0.95)
EGFRCR SERPLBLD CKD-EPI 2021: 35 ML/MIN/1.73M2
GLUCOSE SERPL-MCNC: 96 MG/DL (ref 70–99)
HCO3 SERPL-SCNC: 27 MMOL/L (ref 22–29)
INR POINT OF CARE: 4.5 (ref 0.9–1.1)
POTASSIUM SERPL-SCNC: 2.8 MMOL/L (ref 3.4–5.3)
SODIUM SERPL-SCNC: 140 MMOL/L (ref 135–145)

## 2024-11-08 PROCEDURE — 36415 COLL VENOUS BLD VENIPUNCTURE: CPT | Performed by: INTERNAL MEDICINE

## 2024-11-08 PROCEDURE — 99214 OFFICE O/P EST MOD 30 MIN: CPT | Performed by: INTERNAL MEDICINE

## 2024-11-08 PROCEDURE — G2211 COMPLEX E/M VISIT ADD ON: HCPCS | Performed by: INTERNAL MEDICINE

## 2024-11-08 PROCEDURE — 80048 BASIC METABOLIC PNL TOTAL CA: CPT | Performed by: INTERNAL MEDICINE

## 2024-11-08 PROCEDURE — 85610 PROTHROMBIN TIME: CPT

## 2024-11-08 RX ORDER — PNV NO.95/FERROUS FUM/FOLIC AC 28MG-0.8MG
1 TABLET ORAL DAILY
Qty: 30 TABLET | Refills: 2 | Status: SHIPPED | OUTPATIENT
Start: 2024-11-08

## 2024-11-08 NOTE — LETTER
11/8/2024    Rome Joshua MD  [address] [address]    RE: Jory Ambrose       Dear Colleague,     I had the pleasure of seeing Jory Ambrose in the [facility].      Cardiology Progress Note     Assessment:  Coronary artery disease status post CABG in October 2024, no angina  Mitral regurgitation status post mitral valve replacement with mechanical prosthesis, normal auscultation  Postoperative atrial flutter  ATN post open heart surgery, resolving  Postoperative severe fluid overload improving with increased dose of Bumex  Postoperative anemia, stable  Hypercholesterolemia, on statin  History of Hodgkin lymphoma status post chemo chest radiation and bone marrow transplant, in remission  Cerebrovascular disease mild to moderate, on clopidogrel      Plan:  Continue Bumex twice a day check basic metabolic panel today and again next week when she is seeing nephrologist    3-day Zio patch, further recommendation regards to antiarrhythmics, cardioversion etc. based on the results of the test( her heart rhythm appeared to be regular on exam today).    Cardiac rehab in 2 weeks    Iron supplements    Follow-up in 1 month  The longitudinal plan of care for the diagnosis(es)/condition(s) as documented were addressed during this visit. Due to the added complexity in care, I will continue to support Jory in the subsequent management and with ongoing continuity of care.   Subjective:   This is 49 year old female who comes in today for follow-up visit.  I saw her last June because of chest pains.  She had abnormal CT angiogram and then also abnormal invasive coronary angiogram and echo.  She went to have coronary artery bypass graft surgery.  In the operating room the attempt was made to repair mitral valve with annuloplasty ring.  Unfortunately that attempt was unsuccessful she eventually required mechanical valve implantation.  She has prolonged recovery complicated  by shock and acute tubular necrosis.  She was on dialysis for relatively short period of time.  She is back home now for about a week.  She was in TCU for 1 day but she did not like the location.  She is sleeping with an upright position in recliner.  She is about 16 pounds heavier now that she was prior to surgery.  She has swelling of lower extremities.  She denies any pain in the incision site.  She cannot hear the taking of the valve.    Review of Systems:   Negative other than history of present illness    Objective:   /84 (BP Location: Right arm, Patient Position: Sitting, Cuff Size: Adult Large)   Pulse 98   Resp 20   Wt 105.1 kg (231 lb 12.8 oz)   LMP  (LMP Unknown)   SpO2 97%   BMI 39.79 kg/m    Physical Exam:  GENERAL: no distress  NECK: JVD is not visible  LUNGS: Decreased breath sounds at the bases  CARDIAC: regular rhythm, crisp clicks of artificial mitral valve S2 normal.  No heaves, thrills, gallops or murmurs.  Incision is dry  ABDOMEN: flat, negative hepatosplenomegaly, soft and non-tender.  EXTREMITIES: No evidence of cyanosis, clubbing 4+ edema bilaterally lower extremities    Current Outpatient Medications   Medication Sig Dispense Refill     acetaminophen (TYLENOL) 500 MG tablet Take 2 tablets (1,000 mg) by mouth every 6 hours as needed for mild pain.       albuterol (PROAIR HFA) 108 (90 Base) MCG/ACT inhaler Inhale 2 puffs into the lungs every 6 hours as needed. 18 g 0     atorvastatin (LIPITOR) 80 MG tablet Take 1 tablet (80 mg) by mouth daily. 90 tablet 3     budesonide-formoterol (SYMBICORT) 80-4.5 MCG/ACT Inhaler Inhale 1 puff into the lungs two times daily. 6.9 g 0     bumetanide (BUMEX) 1 MG tablet Take 1 tablet (1 mg) by mouth 2 times daily for 7 days, THEN 1 tablet (1 mg) daily for 7 days. 21 tablet 0     cetirizine (ZYRTEC) 10 MG tablet Take 1 tablet (10 mg) by mouth daily. 30 tablet 11     cholecalciferol (VITAMIN D3) 125 mcg (5000 units) capsule Take 1 capsule (125 mcg)  by mouth daily. 30 capsule 0     clopidogrel (PLAVIX) 75 MG tablet Take 1 tablet (75 mg) by mouth daily. 30 tablet 1     Ferrous Sulfate (IRON) 325 (65 Fe) MG tablet Take 1 tablet by mouth daily. 30 tablet 2     fludrocortisone (FLORINEF) 0.1 MG tablet Take 1 tablet (0.1 mg) by mouth daily. 30 tablet 0     levothyroxine (SYNTHROID/LEVOTHROID) 125 MCG tablet Take 1 tablet (125 mcg) by mouth daily. 30 tablet 0     Metoprolol Tartrate 75 MG TABS Take 75 mg by mouth 2 times daily. 60 tablet 1     miconazole (MICATIN) 2 % external powder Apply topically 2 times daily. (Patient taking differently: Apply topically as needed.)       multivitamin w/minerals (MULTI-VITAMIN) tablet Take 1 tablet by mouth daily.       omeprazole (PRILOSEC) 20 MG DR capsule Take 1 capsule (20 mg) by mouth daily. 30 capsule 12     simethicone (MYLICON) 125 MG chewable tablet Take 1 tablet (125 mg) by mouth 4 times daily as needed for intestinal gas 40 tablet 0     sodium chloride (OCEAN) 0.65 % nasal spray Spray 1 spray into both nostrils every hour as needed for congestion or other (dry nose). 15 mL 0     traZODone (DESYREL) 100 MG tablet Take 1 tablet (100 mg) by mouth at bedtime. (Patient taking differently: Take 0.5 mg by mouth at bedtime.) 30 tablet 0     warfarin ANTICOAGULANT (COUMADIN) 1 MG tablet Take 1.5 mg today (11/1/2024), then as directed by TCU provider for INR goal 2.5-3.5 60 tablet 1     buPROPion (WELLBUTRIN XL) 300 MG 24 hr tablet Take 1 tablet (300 mg) by mouth every morning. (Patient not taking: Reported on 11/8/2024) 30 tablet 0     fluticasone (FLONASE) 50 MCG/ACT nasal spray Spray 2 sprays into both nostrils daily as needed for rhinitis or allergies. (Patient not taking: Reported on 11/8/2024) 9.9 mL 0       Cardiographics:    ECG: 10/24/2024  Atrial flutter with controlled ventricular response      Echocardiogram: June 2024 preop    1.Left ventricular size, wall motion and function are normal. The ejection  fraction is  "55-60%.  2.Normal right ventricle size and systolic function.  3.There is moderate (2+) mitral regurgitation. PISA not measured or performed.  4.Poor images for analysis.  October 2024 Post op  Left ventricular function is normal.The ejection fraction is 60-65%.  Septal motion is consistent with post-operative state.  Normal right ventricle size and systolic function.  There is a mechanical mitral valve.  Normal prosthetic mitral valve gradients.  Trivial pericardial effusion    Coronary angio: September 2024  LM:mid-distal 60-70%   LAD:mid-vessel 85% narrowing at the take off of a diagonal branch  Lcx:distal 50- 60% narrowing  RCA:dominant, 100% occluded ostial, fills distally via L-R collaterals     LVEDP:10   Lab Results    Chemistry/lipid CBC Cardiac Enzymes/BNP/TSH/INR   No results for input(s): \"CHOL\", \"HDL\", \"LDL\", \"TRIG\", \"CHOLHDLRATIO\" in the last 92603 hours.  No results for input(s): \"LDL\" in the last 79009 hours.  Recent Labs   Lab Test 11/05/24  1202      POTASSIUM 3.9   CHLORIDE 101   CO2 23   GLC 94   BUN 38.5*   CR 1.77*   GFRESTIMATED 35*   CINDY 9.0     Recent Labs   Lab Test 11/05/24  1202 11/01/24 0324 10/31/24  0450   CR 1.77* 2.54* 2.59*     Recent Labs   Lab Test 10/07/24  0842   A1C 5.7*          Recent Labs   Lab Test 11/05/24  1202 10/29/24  0543 10/28/24  0629   WBC  --   --  11.7*   HGB 8.5*   < > 7.0*   HCT  --   --  23.4*   MCV  --   --  93   PLT  --   --  398    < > = values in this interval not displayed.     Recent Labs   Lab Test 11/05/24  1202 11/01/24  0324 10/31/24  0450   HGB 8.5* 8.2* 7.9*    No results for input(s): \"TROPONINI\" in the last 06957 hours.  Recent Labs   Lab Test 06/06/24  1302   NTBNPI 101     Recent Labs   Lab Test 10/15/24  0648   TSH 2.49     Recent Labs   Lab Test 11/08/24  1538 11/04/24  1559 11/01/24  0324   INR 4.5* 3.5* 2.91*                         Thank you for allowing me to participate in the care of your patient.      Sincerely,     Jordon" MD Michael     [facility]  cc:   No referring provider defined for this encounter.

## 2024-11-08 NOTE — H&P (VIEW-ONLY)
Cardiology Progress Note     Assessment:  Coronary artery disease status post CABG in October 2024, no angina  Mitral regurgitation status post mitral valve replacement with mechanical prosthesis, normal auscultation  Postoperative atrial flutter  ATN post open heart surgery, resolving  Postoperative severe fluid overload improving with increased dose of Bumex  Postoperative anemia, stable  Hypercholesterolemia, on statin  History of Hodgkin lymphoma status post chemo chest radiation and bone marrow transplant, in remission  Cerebrovascular disease mild to moderate, on clopidogrel      Plan:  Continue Bumex twice a day check basic metabolic panel today and again next week when she is seeing nephrologist    3-day Zio patch, further recommendation regards to antiarrhythmics, cardioversion etc. based on the results of the test( her heart rhythm appeared to be regular on exam today).    Cardiac rehab in 2 weeks    Iron supplements    Follow-up in 1 month  The longitudinal plan of care for the diagnosis(es)/condition(s) as documented were addressed during this visit. Due to the added complexity in care, I will continue to support Jory in the subsequent management and with ongoing continuity of care.   Subjective:   This is 49 year old female who comes in today for follow-up visit.  I saw her last June because of chest pains.  She had abnormal CT angiogram and then also abnormal invasive coronary angiogram and echo.  She went to have coronary artery bypass graft surgery.  In the operating room the attempt was made to repair mitral valve with annuloplasty ring.  Unfortunately that attempt was unsuccessful she eventually required mechanical valve implantation.  She has prolonged recovery complicated by shock and acute tubular necrosis.  She was on dialysis for relatively short period of time.  She is back home now for about a week.  She was in TCU for 1 day but she did not like the location.  She is sleeping with an  upright position in recliner.  She is about 16 pounds heavier now that she was prior to surgery.  She has swelling of lower extremities.  She denies any pain in the incision site.  She cannot hear the taking of the valve.    Review of Systems:   Negative other than history of present illness    Objective:   /84 (BP Location: Right arm, Patient Position: Sitting, Cuff Size: Adult Large)   Pulse 98   Resp 20   Wt 105.1 kg (231 lb 12.8 oz)   LMP  (LMP Unknown)   SpO2 97%   BMI 39.79 kg/m    Physical Exam:  GENERAL: no distress  NECK: JVD is not visible  LUNGS: Decreased breath sounds at the bases  CARDIAC: regular rhythm, crisp clicks of artificial mitral valve S2 normal.  No heaves, thrills, gallops or murmurs.  Incision is dry  ABDOMEN: flat, negative hepatosplenomegaly, soft and non-tender.  EXTREMITIES: No evidence of cyanosis, clubbing 4+ edema bilaterally lower extremities    Current Outpatient Medications   Medication Sig Dispense Refill    acetaminophen (TYLENOL) 500 MG tablet Take 2 tablets (1,000 mg) by mouth every 6 hours as needed for mild pain.      albuterol (PROAIR HFA) 108 (90 Base) MCG/ACT inhaler Inhale 2 puffs into the lungs every 6 hours as needed. 18 g 0    atorvastatin (LIPITOR) 80 MG tablet Take 1 tablet (80 mg) by mouth daily. 90 tablet 3    budesonide-formoterol (SYMBICORT) 80-4.5 MCG/ACT Inhaler Inhale 1 puff into the lungs two times daily. 6.9 g 0    bumetanide (BUMEX) 1 MG tablet Take 1 tablet (1 mg) by mouth 2 times daily for 7 days, THEN 1 tablet (1 mg) daily for 7 days. 21 tablet 0    cetirizine (ZYRTEC) 10 MG tablet Take 1 tablet (10 mg) by mouth daily. 30 tablet 11    cholecalciferol (VITAMIN D3) 125 mcg (5000 units) capsule Take 1 capsule (125 mcg) by mouth daily. 30 capsule 0    clopidogrel (PLAVIX) 75 MG tablet Take 1 tablet (75 mg) by mouth daily. 30 tablet 1    Ferrous Sulfate (IRON) 325 (65 Fe) MG tablet Take 1 tablet by mouth daily. 30 tablet 2    fludrocortisone  (FLORINEF) 0.1 MG tablet Take 1 tablet (0.1 mg) by mouth daily. 30 tablet 0    levothyroxine (SYNTHROID/LEVOTHROID) 125 MCG tablet Take 1 tablet (125 mcg) by mouth daily. 30 tablet 0    Metoprolol Tartrate 75 MG TABS Take 75 mg by mouth 2 times daily. 60 tablet 1    miconazole (MICATIN) 2 % external powder Apply topically 2 times daily. (Patient taking differently: Apply topically as needed.)      multivitamin w/minerals (MULTI-VITAMIN) tablet Take 1 tablet by mouth daily.      omeprazole (PRILOSEC) 20 MG DR capsule Take 1 capsule (20 mg) by mouth daily. 30 capsule 12    simethicone (MYLICON) 125 MG chewable tablet Take 1 tablet (125 mg) by mouth 4 times daily as needed for intestinal gas 40 tablet 0    sodium chloride (OCEAN) 0.65 % nasal spray Spray 1 spray into both nostrils every hour as needed for congestion or other (dry nose). 15 mL 0    traZODone (DESYREL) 100 MG tablet Take 1 tablet (100 mg) by mouth at bedtime. (Patient taking differently: Take 0.5 mg by mouth at bedtime.) 30 tablet 0    warfarin ANTICOAGULANT (COUMADIN) 1 MG tablet Take 1.5 mg today (11/1/2024), then as directed by TCU provider for INR goal 2.5-3.5 60 tablet 1    buPROPion (WELLBUTRIN XL) 300 MG 24 hr tablet Take 1 tablet (300 mg) by mouth every morning. (Patient not taking: Reported on 11/8/2024) 30 tablet 0    fluticasone (FLONASE) 50 MCG/ACT nasal spray Spray 2 sprays into both nostrils daily as needed for rhinitis or allergies. (Patient not taking: Reported on 11/8/2024) 9.9 mL 0       Cardiographics:    ECG: 10/24/2024  Atrial flutter with controlled ventricular response      Echocardiogram: June 2024 preop    1.Left ventricular size, wall motion and function are normal. The ejection  fraction is 55-60%.  2.Normal right ventricle size and systolic function.  3.There is moderate (2+) mitral regurgitation. PISA not measured or performed.  4.Poor images for analysis.  October 2024 Post op  Left ventricular function is normal.The  "ejection fraction is 60-65%.  Septal motion is consistent with post-operative state.  Normal right ventricle size and systolic function.  There is a mechanical mitral valve.  Normal prosthetic mitral valve gradients.  Trivial pericardial effusion    Coronary angio: September 2024  LM:mid-distal 60-70%   LAD:mid-vessel 85% narrowing at the take off of a diagonal branch  Lcx:distal 50- 60% narrowing  RCA:dominant, 100% occluded ostial, fills distally via L-R collaterals     LVEDP:10   Lab Results    Chemistry/lipid CBC Cardiac Enzymes/BNP/TSH/INR   No results for input(s): \"CHOL\", \"HDL\", \"LDL\", \"TRIG\", \"CHOLHDLRATIO\" in the last 23347 hours.  No results for input(s): \"LDL\" in the last 50051 hours.  Recent Labs   Lab Test 11/05/24  1202      POTASSIUM 3.9   CHLORIDE 101   CO2 23   GLC 94   BUN 38.5*   CR 1.77*   GFRESTIMATED 35*   CINDY 9.0     Recent Labs   Lab Test 11/05/24  1202 11/01/24  0324 10/31/24  0450   CR 1.77* 2.54* 2.59*     Recent Labs   Lab Test 10/07/24  0842   A1C 5.7*          Recent Labs   Lab Test 11/05/24  1202 10/29/24  0543 10/28/24  0629   WBC  --   --  11.7*   HGB 8.5*   < > 7.0*   HCT  --   --  23.4*   MCV  --   --  93   PLT  --   --  398    < > = values in this interval not displayed.     Recent Labs   Lab Test 11/05/24  1202 11/01/24  0324 10/31/24  0450   HGB 8.5* 8.2* 7.9*    No results for input(s): \"TROPONINI\" in the last 79289 hours.  Recent Labs   Lab Test 06/06/24  1302   NTBNPI 101     Recent Labs   Lab Test 10/15/24  0648   TSH 2.49     Recent Labs   Lab Test 11/08/24  1538 11/04/24  1559 11/01/24  0324   INR 4.5* 3.5* 2.91*                     "

## 2024-11-08 NOTE — PROGRESS NOTES
ANTICOAGULATION MANAGEMENT     Jory Ambrose 49 year old female is on warfarin with supratherapeutic INR result. (Goal INR 2.5-3.5)    Recent labs: (last 7 days)     11/08/24  1538   INR 4.5*       ASSESSMENT     Source(s): Chart Review and Patient/Caregiver Call     Warfarin doses taken: Warfarin taken as instructed  Diet: No new diet changes identified  Medication/supplement changes: None noted  New illness, injury, or hospitalization: No  Signs or symptoms of bleeding or clotting: No  Previous result: Therapeutic last 2(+) visits  Additional findings: Recent heart valve replacement in last 10 weeks       PLAN     Recommended plan for no diet, medication or health factor changes and recent heart valve replacement last 10 weeks affecting INR     Dosing Instructions: partial hold then decrease your warfarin dose (16.7% change) with next INR in 4 days       *Only dosed through til Tuesday    Summary  As of 11/8/2024      Full warfarin instructions:  11/8: 0.5 mg; Otherwise 1.5 mg every Wed; 1 mg all other days   Next INR check:  11/12/2024               Telephone call with momMia who agrees to plan and repeated back plan correctly    Lab visit scheduled    Education provided: Goal range and lab monitoring: goal range and significance of current result and Importance of following up at instructed interval    Plan made with [facility] Pharmacist Maine Mendoza RN  11/8/2024  Anticoagulation Clinic  [employer] for routing messages: qasim PEARSON  [facility] patient phone line: [phone]        _______________________________________________________________________     Anticoagulation Episode Summary       Current INR goal:  2.5-3.5   TTR:  --   Target end date:  Indefinite   Send INR reminders to:  JAKE PEARSON    Indications    H/O mitral valve replacement with mechanical valve [Z95.2]             Comments:  --             Anticoagulation Care Providers       Provider Role Specialty Phone  number    Catherine Galvez PA-C [facility]  [phone]

## 2024-11-08 NOTE — PROGRESS NOTES
Cardiology Progress Note     Assessment:  Coronary artery disease status post CABG in October 2024, no angina  Mitral regurgitation status post mitral valve replacement with mechanical prosthesis, normal auscultation  Postoperative atrial flutter  ATN post open heart surgery, resolving  Postoperative severe fluid overload improving with increased dose of Bumex  Postoperative anemia, stable  Hypercholesterolemia, on statin  History of Hodgkin lymphoma status post chemo chest radiation and bone marrow transplant, in remission  Cerebrovascular disease mild to moderate, on clopidogrel      Plan:  Continue Bumex twice a day check basic metabolic panel today and again next week when she is seeing nephrologist    3-day Zio patch, further recommendation regards to antiarrhythmics, cardioversion etc. based on the results of the test( her heart rhythm appeared to be regular on exam today).    Cardiac rehab in 2 weeks    Iron supplements    Follow-up in 1 month  The longitudinal plan of care for the diagnosis(es)/condition(s) as documented were addressed during this visit. Due to the added complexity in care, I will continue to support Jory in the subsequent management and with ongoing continuity of care.   Subjective:   This is 49 year old female who comes in today for follow-up visit.  I saw her last June because of chest pains.  She had abnormal CT angiogram and then also abnormal invasive coronary angiogram and echo.  She went to have coronary artery bypass graft surgery.  In the operating room the attempt was made to repair mitral valve with annuloplasty ring.  Unfortunately that attempt was unsuccessful she eventually required mechanical valve implantation.  She has prolonged recovery complicated by shock and acute tubular necrosis.  She was on dialysis for relatively short period of time.  She is back home now for about a week.  She was in TCU for 1 day but she did not like the location.  She is sleeping with an  upright position in recliner.  She is about 16 pounds heavier now that she was prior to surgery.  She has swelling of lower extremities.  She denies any pain in the incision site.  She cannot hear the taking of the valve.    Review of Systems:   Negative other than history of present illness    Objective:   /84 (BP Location: Right arm, Patient Position: Sitting, Cuff Size: Adult Large)   Pulse 98   Resp 20   Wt 105.1 kg (231 lb 12.8 oz)   LMP  (LMP Unknown)   SpO2 97%   BMI 39.79 kg/m    Physical Exam:  GENERAL: no distress  NECK: JVD is not visible  LUNGS: Decreased breath sounds at the bases  CARDIAC: regular rhythm, crisp clicks of artificial mitral valve S2 normal.  No heaves, thrills, gallops or murmurs.  Incision is dry  ABDOMEN: flat, negative hepatosplenomegaly, soft and non-tender.  EXTREMITIES: No evidence of cyanosis, clubbing 4+ edema bilaterally lower extremities    Current Outpatient Medications   Medication Sig Dispense Refill    acetaminophen (TYLENOL) 500 MG tablet Take 2 tablets (1,000 mg) by mouth every 6 hours as needed for mild pain.      albuterol (PROAIR HFA) 108 (90 Base) MCG/ACT inhaler Inhale 2 puffs into the lungs every 6 hours as needed. 18 g 0    atorvastatin (LIPITOR) 80 MG tablet Take 1 tablet (80 mg) by mouth daily. 90 tablet 3    budesonide-formoterol (SYMBICORT) 80-4.5 MCG/ACT Inhaler Inhale 1 puff into the lungs two times daily. 6.9 g 0    bumetanide (BUMEX) 1 MG tablet Take 1 tablet (1 mg) by mouth 2 times daily for 7 days, THEN 1 tablet (1 mg) daily for 7 days. 21 tablet 0    cetirizine (ZYRTEC) 10 MG tablet Take 1 tablet (10 mg) by mouth daily. 30 tablet 11    cholecalciferol (VITAMIN D3) 125 mcg (5000 units) capsule Take 1 capsule (125 mcg) by mouth daily. 30 capsule 0    clopidogrel (PLAVIX) 75 MG tablet Take 1 tablet (75 mg) by mouth daily. 30 tablet 1    Ferrous Sulfate (IRON) 325 (65 Fe) MG tablet Take 1 tablet by mouth daily. 30 tablet 2    fludrocortisone  (FLORINEF) 0.1 MG tablet Take 1 tablet (0.1 mg) by mouth daily. 30 tablet 0    levothyroxine (SYNTHROID/LEVOTHROID) 125 MCG tablet Take 1 tablet (125 mcg) by mouth daily. 30 tablet 0    Metoprolol Tartrate 75 MG TABS Take 75 mg by mouth 2 times daily. 60 tablet 1    miconazole (MICATIN) 2 % external powder Apply topically 2 times daily. (Patient taking differently: Apply topically as needed.)      multivitamin w/minerals (MULTI-VITAMIN) tablet Take 1 tablet by mouth daily.      omeprazole (PRILOSEC) 20 MG DR capsule Take 1 capsule (20 mg) by mouth daily. 30 capsule 12    simethicone (MYLICON) 125 MG chewable tablet Take 1 tablet (125 mg) by mouth 4 times daily as needed for intestinal gas 40 tablet 0    sodium chloride (OCEAN) 0.65 % nasal spray Spray 1 spray into both nostrils every hour as needed for congestion or other (dry nose). 15 mL 0    traZODone (DESYREL) 100 MG tablet Take 1 tablet (100 mg) by mouth at bedtime. (Patient taking differently: Take 0.5 mg by mouth at bedtime.) 30 tablet 0    warfarin ANTICOAGULANT (COUMADIN) 1 MG tablet Take 1.5 mg today (11/1/2024), then as directed by TCU provider for INR goal 2.5-3.5 60 tablet 1    buPROPion (WELLBUTRIN XL) 300 MG 24 hr tablet Take 1 tablet (300 mg) by mouth every morning. (Patient not taking: Reported on 11/8/2024) 30 tablet 0    fluticasone (FLONASE) 50 MCG/ACT nasal spray Spray 2 sprays into both nostrils daily as needed for rhinitis or allergies. (Patient not taking: Reported on 11/8/2024) 9.9 mL 0       Cardiographics:    ECG: 10/24/2024  Atrial flutter with controlled ventricular response      Echocardiogram: June 2024 preop    1.Left ventricular size, wall motion and function are normal. The ejection  fraction is 55-60%.  2.Normal right ventricle size and systolic function.  3.There is moderate (2+) mitral regurgitation. PISA not measured or performed.  4.Poor images for analysis.  October 2024 Post op  Left ventricular function is normal.The  "ejection fraction is 60-65%.  Septal motion is consistent with post-operative state.  Normal right ventricle size and systolic function.  There is a mechanical mitral valve.  Normal prosthetic mitral valve gradients.  Trivial pericardial effusion    Coronary angio: September 2024  LM:mid-distal 60-70%   LAD:mid-vessel 85% narrowing at the take off of a diagonal branch  Lcx:distal 50- 60% narrowing  RCA:dominant, 100% occluded ostial, fills distally via L-R collaterals     LVEDP:10   Lab Results    Chemistry/lipid CBC Cardiac Enzymes/BNP/TSH/INR   No results for input(s): \"CHOL\", \"HDL\", \"LDL\", \"TRIG\", \"CHOLHDLRATIO\" in the last 59450 hours.  No results for input(s): \"LDL\" in the last 54827 hours.  Recent Labs   Lab Test 11/05/24  1202      POTASSIUM 3.9   CHLORIDE 101   CO2 23   GLC 94   BUN 38.5*   CR 1.77*   GFRESTIMATED 35*   CINDY 9.0     Recent Labs   Lab Test 11/05/24  1202 11/01/24  0324 10/31/24  0450   CR 1.77* 2.54* 2.59*     Recent Labs   Lab Test 10/07/24  0842   A1C 5.7*          Recent Labs   Lab Test 11/05/24  1202 10/29/24  0543 10/28/24  0629   WBC  --   --  11.7*   HGB 8.5*   < > 7.0*   HCT  --   --  23.4*   MCV  --   --  93   PLT  --   --  398    < > = values in this interval not displayed.     Recent Labs   Lab Test 11/05/24  1202 11/01/24  0324 10/31/24  0450   HGB 8.5* 8.2* 7.9*    No results for input(s): \"TROPONINI\" in the last 14686 hours.  Recent Labs   Lab Test 06/06/24  1302   NTBNPI 101     Recent Labs   Lab Test 10/15/24  0648   TSH 2.49     Recent Labs   Lab Test 11/08/24  1538 11/04/24  1559 11/01/24  0324   INR 4.5* 3.5* 2.91*                     "

## 2024-11-08 NOTE — H&P (VIEW-ONLY)
Cardiology Progress Note     Assessment:  Coronary artery disease status post CABG in October 2024, no angina  Mitral regurgitation status post mitral valve replacement with mechanical prosthesis, normal auscultation  Postoperative atrial flutter  ATN post open heart surgery, resolving  Postoperative severe fluid overload improving with increased dose of Bumex  Postoperative anemia, stable  Hypercholesterolemia, on statin  History of Hodgkin lymphoma status post chemo chest radiation and bone marrow transplant, in remission  Cerebrovascular disease mild to moderate, on clopidogrel      Plan:  Continue Bumex twice a day check basic metabolic panel today and again next week when she is seeing nephrologist    3-day Zio patch, further recommendation regards to antiarrhythmics, cardioversion etc. based on the results of the test( her heart rhythm appeared to be regular on exam today).    Cardiac rehab in 2 weeks    Iron supplements    Follow-up in 1 month  The longitudinal plan of care for the diagnosis(es)/condition(s) as documented were addressed during this visit. Due to the added complexity in care, I will continue to support Jory in the subsequent management and with ongoing continuity of care.   Subjective:   This is 49 year old female who comes in today for follow-up visit.  I saw her last June because of chest pains.  She had abnormal CT angiogram and then also abnormal invasive coronary angiogram and echo.  She went to have coronary artery bypass graft surgery.  In the operating room the attempt was made to repair mitral valve with annuloplasty ring.  Unfortunately that attempt was unsuccessful she eventually required mechanical valve implantation.  She has prolonged recovery complicated by shock and acute tubular necrosis.  She was on dialysis for relatively short period of time.  She is back home now for about a week.  She was in TCU for 1 day but she did not like the location.  She is sleeping with an  upright position in recliner.  She is about 16 pounds heavier now that she was prior to surgery.  She has swelling of lower extremities.  She denies any pain in the incision site.  She cannot hear the taking of the valve.    Review of Systems:   Negative other than history of present illness    Objective:   /84 (BP Location: Right arm, Patient Position: Sitting, Cuff Size: Adult Large)   Pulse 98   Resp 20   Wt 105.1 kg (231 lb 12.8 oz)   LMP  (LMP Unknown)   SpO2 97%   BMI 39.79 kg/m    Physical Exam:  GENERAL: no distress  NECK: JVD is not visible  LUNGS: Decreased breath sounds at the bases  CARDIAC: regular rhythm, crisp clicks of artificial mitral valve S2 normal.  No heaves, thrills, gallops or murmurs.  Incision is dry  ABDOMEN: flat, negative hepatosplenomegaly, soft and non-tender.  EXTREMITIES: No evidence of cyanosis, clubbing 4+ edema bilaterally lower extremities    Current Outpatient Medications   Medication Sig Dispense Refill    acetaminophen (TYLENOL) 500 MG tablet Take 2 tablets (1,000 mg) by mouth every 6 hours as needed for mild pain.      albuterol (PROAIR HFA) 108 (90 Base) MCG/ACT inhaler Inhale 2 puffs into the lungs every 6 hours as needed. 18 g 0    atorvastatin (LIPITOR) 80 MG tablet Take 1 tablet (80 mg) by mouth daily. 90 tablet 3    budesonide-formoterol (SYMBICORT) 80-4.5 MCG/ACT Inhaler Inhale 1 puff into the lungs two times daily. 6.9 g 0    bumetanide (BUMEX) 1 MG tablet Take 1 tablet (1 mg) by mouth 2 times daily for 7 days, THEN 1 tablet (1 mg) daily for 7 days. 21 tablet 0    cetirizine (ZYRTEC) 10 MG tablet Take 1 tablet (10 mg) by mouth daily. 30 tablet 11    cholecalciferol (VITAMIN D3) 125 mcg (5000 units) capsule Take 1 capsule (125 mcg) by mouth daily. 30 capsule 0    clopidogrel (PLAVIX) 75 MG tablet Take 1 tablet (75 mg) by mouth daily. 30 tablet 1    Ferrous Sulfate (IRON) 325 (65 Fe) MG tablet Take 1 tablet by mouth daily. 30 tablet 2    fludrocortisone  (FLORINEF) 0.1 MG tablet Take 1 tablet (0.1 mg) by mouth daily. 30 tablet 0    levothyroxine (SYNTHROID/LEVOTHROID) 125 MCG tablet Take 1 tablet (125 mcg) by mouth daily. 30 tablet 0    Metoprolol Tartrate 75 MG TABS Take 75 mg by mouth 2 times daily. 60 tablet 1    miconazole (MICATIN) 2 % external powder Apply topically 2 times daily. (Patient taking differently: Apply topically as needed.)      multivitamin w/minerals (MULTI-VITAMIN) tablet Take 1 tablet by mouth daily.      omeprazole (PRILOSEC) 20 MG DR capsule Take 1 capsule (20 mg) by mouth daily. 30 capsule 12    simethicone (MYLICON) 125 MG chewable tablet Take 1 tablet (125 mg) by mouth 4 times daily as needed for intestinal gas 40 tablet 0    sodium chloride (OCEAN) 0.65 % nasal spray Spray 1 spray into both nostrils every hour as needed for congestion or other (dry nose). 15 mL 0    traZODone (DESYREL) 100 MG tablet Take 1 tablet (100 mg) by mouth at bedtime. (Patient taking differently: Take 0.5 mg by mouth at bedtime.) 30 tablet 0    warfarin ANTICOAGULANT (COUMADIN) 1 MG tablet Take 1.5 mg today (11/1/2024), then as directed by TCU provider for INR goal 2.5-3.5 60 tablet 1    buPROPion (WELLBUTRIN XL) 300 MG 24 hr tablet Take 1 tablet (300 mg) by mouth every morning. (Patient not taking: Reported on 11/8/2024) 30 tablet 0    fluticasone (FLONASE) 50 MCG/ACT nasal spray Spray 2 sprays into both nostrils daily as needed for rhinitis or allergies. (Patient not taking: Reported on 11/8/2024) 9.9 mL 0       Cardiographics:    ECG: 10/24/2024  Atrial flutter with controlled ventricular response      Echocardiogram: June 2024 preop    1.Left ventricular size, wall motion and function are normal. The ejection  fraction is 55-60%.  2.Normal right ventricle size and systolic function.  3.There is moderate (2+) mitral regurgitation. PISA not measured or performed.  4.Poor images for analysis.  October 2024 Post op  Left ventricular function is normal.The  "ejection fraction is 60-65%.  Septal motion is consistent with post-operative state.  Normal right ventricle size and systolic function.  There is a mechanical mitral valve.  Normal prosthetic mitral valve gradients.  Trivial pericardial effusion    Coronary angio: September 2024  LM:mid-distal 60-70%   LAD:mid-vessel 85% narrowing at the take off of a diagonal branch  Lcx:distal 50- 60% narrowing  RCA:dominant, 100% occluded ostial, fills distally via L-R collaterals     LVEDP:10   Lab Results    Chemistry/lipid CBC Cardiac Enzymes/BNP/TSH/INR   No results for input(s): \"CHOL\", \"HDL\", \"LDL\", \"TRIG\", \"CHOLHDLRATIO\" in the last 01799 hours.  No results for input(s): \"LDL\" in the last 11706 hours.  Recent Labs   Lab Test 11/05/24  1202      POTASSIUM 3.9   CHLORIDE 101   CO2 23   GLC 94   BUN 38.5*   CR 1.77*   GFRESTIMATED 35*   CINDY 9.0     Recent Labs   Lab Test 11/05/24  1202 11/01/24  0324 10/31/24  0450   CR 1.77* 2.54* 2.59*     Recent Labs   Lab Test 10/07/24  0842   A1C 5.7*          Recent Labs   Lab Test 11/05/24  1202 10/29/24  0543 10/28/24  0629   WBC  --   --  11.7*   HGB 8.5*   < > 7.0*   HCT  --   --  23.4*   MCV  --   --  93   PLT  --   --  398    < > = values in this interval not displayed.     Recent Labs   Lab Test 11/05/24  1202 11/01/24  0324 10/31/24  0450   HGB 8.5* 8.2* 7.9*    No results for input(s): \"TROPONINI\" in the last 50342 hours.  Recent Labs   Lab Test 06/06/24  1302   NTBNPI 101     Recent Labs   Lab Test 10/15/24  0648   TSH 2.49     Recent Labs   Lab Test 11/08/24  1538 11/04/24  1559 11/01/24  0324   INR 4.5* 3.5* 2.91*                     "

## 2024-11-08 NOTE — PATIENT INSTRUCTIONS
Jory Ambrose,    It was a pleasure to see you today at [facility].     My recommendations after this visit include:    Blood work  Iron pills    RICK Rodriguez MD, FACC, LUIS

## 2024-11-11 DIAGNOSIS — E87.6 HYPOKALEMIA: ICD-10-CM

## 2024-11-11 DIAGNOSIS — E87.6 LOW BLOOD POTASSIUM: ICD-10-CM

## 2024-11-11 DIAGNOSIS — I48.0 PAF (PAROXYSMAL ATRIAL FIBRILLATION) (H): ICD-10-CM

## 2024-11-11 DIAGNOSIS — I10 PRIMARY HYPERTENSION: Primary | ICD-10-CM

## 2024-11-11 RX ORDER — POTASSIUM CHLORIDE 1500 MG/1
40 TABLET, EXTENDED RELEASE ORAL DAILY
Qty: 60 TABLET | Refills: 0 | Status: SHIPPED | OUTPATIENT
Start: 2024-11-11

## 2024-11-12 ENCOUNTER — ANTICOAGULATION THERAPY VISIT (OUTPATIENT)
Dept: ANTICOAGULATION | Facility: CLINIC | Age: 49
End: 2024-11-12

## 2024-11-12 ENCOUNTER — LAB (OUTPATIENT)
Dept: CARDIOLOGY | Facility: CLINIC | Age: 49
End: 2024-11-12
Payer: COMMERCIAL

## 2024-11-12 DIAGNOSIS — Z95.2 H/O MITRAL VALVE REPLACEMENT WITH MECHANICAL VALVE: Primary | ICD-10-CM

## 2024-11-12 DIAGNOSIS — Z95.2 H/O MITRAL VALVE REPLACEMENT WITH MECHANICAL VALVE: ICD-10-CM

## 2024-11-12 LAB — INR POINT OF CARE: 2.5 (ref 0.9–1.1)

## 2024-11-12 NOTE — PROGRESS NOTES
"  ANTICOAGULATION MANAGEMENT     Jory Ambrose 49 year old female is on warfarin with therapeutic INR result. (Goal INR 2.5-3.5)    Recent labs: (last 7 days)     11/12/24  1139   INR 2.5*       ASSESSMENT     Source(s): Chart Review and Patient/Caregiver Call     Warfarin doses taken: Warfarin taken as instructed  Diet:  diet is getting back to baseline following surgery, eating more and eating \"better\"  Medication/supplement changes:  Started iron  and potassium supplement (no interaction expected). Bumex was started last week on 11/6/24 twice daily, they have now gone down to once daily because she was losing too much weight. Bumex can diminish the effect of warfarin. Bumex may be finishing next week, they will update.  New illness, injury, or hospitalization: No  Signs or symptoms of bleeding or clotting: No  Previous result: Supratherapeutic  Additional findings: Recent heart valve replacement in last 10 weeks       PLAN     Recommended plan for temporary change(s) and ongoing change(s) affecting INR     Dosing Instructions: Increase your warfarin dose (6.7% change) with next INR in 3 days       Summary  As of 11/12/2024      Full warfarin instructions:  2 mg every Tue; 1 mg all other days   Next INR check:  11/15/2024               Telephone call with mom Mia who agrees to plan and repeated back plan correctly    Lab visit scheduled    Education provided: Goal range and lab monitoring: goal range and significance of current result  Dietary considerations: importance of consistent vitamin K intake and Impact of protein intake on INR   Interaction IS anticipated between warfarin and Bumex  Contact [phone] with any changes, questions or concerns.     Plan made with [facility] Pharmacist Maine Hernandez RN  11/12/2024  Anticoagulation Clinic  [place] for routing messages: qasim PEARSON  [facility] patient phone line: " [phone]        _______________________________________________________________________     Anticoagulation Episode Summary       Current INR goal:  2.5-3.5   TTR:  --   Target end date:  Indefinite   Send INR reminders to:  JAKE PEARSON    Indications    H/O mitral valve replacement with mechanical valve [Z95.2]             Comments:  --             Anticoagulation Care Providers       Provider Role Specialty Phone number    Catherine Galvez PA-C Referring  [phone]

## 2024-11-14 ENCOUNTER — TELEPHONE (OUTPATIENT)
Dept: CARDIOLOGY | Facility: CLINIC | Age: 49
End: 2024-11-14
Payer: COMMERCIAL

## 2024-11-14 NOTE — TELEPHONE ENCOUNTER
Called patient to follow up re: shortness of breath and edema s/p CV surgery. Call went straight to voicemail. Left VM w/ clinic number and advised patient I would attempt to call again either later today or tomorrow to check in.     Eva Galvez PA-C  [facility] [place] Cardiothoracic Surgery  Vocera or Secure Chat  November 14, 2024

## 2024-11-15 ENCOUNTER — LAB (OUTPATIENT)
Dept: CARDIOLOGY | Facility: CLINIC | Age: 49
End: 2024-11-15
Payer: COMMERCIAL

## 2024-11-15 ENCOUNTER — ANTICOAGULATION THERAPY VISIT (OUTPATIENT)
Dept: ANTICOAGULATION | Facility: CLINIC | Age: 49
End: 2024-11-15

## 2024-11-15 DIAGNOSIS — Z95.2 H/O MITRAL VALVE REPLACEMENT WITH MECHANICAL VALVE: ICD-10-CM

## 2024-11-15 DIAGNOSIS — Z95.2 H/O MITRAL VALVE REPLACEMENT WITH MECHANICAL VALVE: Primary | ICD-10-CM

## 2024-11-15 LAB — INR POINT OF CARE: 2.4 (ref 0.9–1.1)

## 2024-11-15 NOTE — PROGRESS NOTES
ANTICOAGULATION MANAGEMENT     Jory Ambrose 49 year old female is on warfarin with subtherapeutic INR result. (Goal INR 2.5-3.5)    Recent labs: (last 7 days)     11/15/24  0944   INR 2.4*       ASSESSMENT     Source(s): Chart Review and Patient/Caregiver Call     Warfarin doses taken: Warfarin taken as instructed  Diet: No new diet changes identified  Medication/supplement changes:  remains on daily bumex. Patient has lost about 18lbs of water weight. Per Pat, feet are still edematous and nephrology thinks she has at least another 10 lbs of fluid   New illness, injury, or hospitalization: No  Signs or symptoms of bleeding or clotting: No  Previous result: Therapeutic last visit; previously outside of goal range  Additional findings: Recent heart valve replacement in last 10 weeks       PLAN     Recommended plan for ongoing change(s) affecting INR     Dosing Instructions: Increase your warfarin dose (12.5% change) with next INR in 3 days       Summary  As of 11/15/2024      Full warfarin instructions:  2 mg every Tue, Fri; 1 mg all other days   Next INR check:  11/18/2024               Telephone call with mom Mia who agrees to plan and repeated back plan correctly    Lab visit scheduled    Education provided: Goal range and lab monitoring: goal range and significance of current result and Importance of following up at instructed interval    Plan made with [facility] Pharmacist Maine Mendoza RN  11/15/2024  Anticoagulation Clinic  [employer] for routing messages: p [facility] patient phone line: [phone]        _______________________________________________________________________     Anticoagulation Episode Summary       Current INR goal:  2.5-3.5   TTR:  0.0% (1 d)   Target end date:  Indefinite   Send INR reminders to:  [facility]    Indications    H/O mitral valve replacement with mechanical valve [Z95.2]             Comments:  --              Anticoagulation Care Providers       Provider Role Specialty Phone number    Catherine Galvez PA-C Referring  [phone]

## 2024-11-18 ENCOUNTER — ANTICOAGULATION THERAPY VISIT (OUTPATIENT)
Dept: ANTICOAGULATION | Facility: CLINIC | Age: 49
End: 2024-11-18

## 2024-11-18 ENCOUNTER — LAB (OUTPATIENT)
Dept: CARDIOLOGY | Facility: CLINIC | Age: 49
End: 2024-11-18
Payer: COMMERCIAL

## 2024-11-18 ENCOUNTER — HOSPITAL ENCOUNTER (OUTPATIENT)
Dept: CARDIAC REHAB | Facility: CLINIC | Age: 49
Discharge: HOME | End: 2024-11-18
Attending: SURGERY
Payer: COMMERCIAL

## 2024-11-18 DIAGNOSIS — Z95.2 H/O MITRAL VALVE REPLACEMENT WITH MECHANICAL VALVE: ICD-10-CM

## 2024-11-18 DIAGNOSIS — I48.0 PAF (PAROXYSMAL ATRIAL FIBRILLATION) (H): ICD-10-CM

## 2024-11-18 DIAGNOSIS — I10 PRIMARY HYPERTENSION: ICD-10-CM

## 2024-11-18 DIAGNOSIS — E87.6 HYPOKALEMIA: ICD-10-CM

## 2024-11-18 DIAGNOSIS — E87.6 LOW BLOOD POTASSIUM: ICD-10-CM

## 2024-11-18 DIAGNOSIS — Z95.2 H/O MITRAL VALVE REPLACEMENT WITH MECHANICAL VALVE: Primary | ICD-10-CM

## 2024-11-18 LAB
ANION GAP SERPL CALCULATED.3IONS-SCNC: 16 MMOL/L (ref 7–15)
BUN SERPL-MCNC: 35.9 MG/DL (ref 6–20)
CALCIUM SERPL-MCNC: 9.4 MG/DL (ref 8.8–10.4)
CHLORIDE SERPL-SCNC: 99 MMOL/L (ref 98–107)
CREAT SERPL-MCNC: 1.45 MG/DL (ref 0.51–0.95)
EGFRCR SERPLBLD CKD-EPI 2021: 44 ML/MIN/1.73M2
GLUCOSE SERPL-MCNC: 94 MG/DL (ref 70–99)
HCO3 SERPL-SCNC: 28 MMOL/L (ref 22–29)
INR POINT OF CARE: 1.9 (ref 0.9–1.1)
POTASSIUM SERPL-SCNC: 3.9 MMOL/L (ref 3.4–5.3)
SODIUM SERPL-SCNC: 143 MMOL/L (ref 135–145)

## 2024-11-18 PROCEDURE — 93797 PHYS/QHP OP CAR RHAB WO ECG: CPT

## 2024-11-18 PROCEDURE — 93798 PHYS/QHP OP CAR RHAB W/ECG: CPT

## 2024-11-18 PROCEDURE — 80048 BASIC METABOLIC PNL TOTAL CA: CPT

## 2024-11-18 PROCEDURE — 36415 COLL VENOUS BLD VENIPUNCTURE: CPT

## 2024-11-18 NOTE — PROGRESS NOTES
ANTICOAGULATION MANAGEMENT     Jory Ambrose 49 year old female is on warfarin with subtherapeutic INR result. (Goal INR 2.5-3.5)    Recent labs: (last 7 days)     11/18/24  1313   INR 1.9*       ASSESSMENT     Source(s): Chart Review and Patient/Caregiver Call     Warfarin doses taken: Warfarin taken as instructed - confirmed no missed doses  Diet: No new diet changes identified  Medication/supplement changes: None noted  New illness, injury, or hospitalization: Yes: continues to lose up to ~2lbs a day of fluid.   Signs or symptoms of bleeding or clotting: No  Previous result: Subtherapeutic  Additional findings: Recent heart valve replacement in last 10 weeks       PLAN     Recommended plan for ongoing change(s) affecting INR     Dosing Instructions: booster dose then Increase your warfarin dose (55% change) with next INR in 2 days       Summary  As of 11/18/2024      Full warfarin instructions:  11/18: 4 mg; Otherwise 2 mg every day   Next INR check:  11/20/2024               Telephone call with mom, Mia who agrees to plan and repeated back plan correctly    Lab visit scheduled    Education provided: Goal range and lab monitoring: goal range and significance of current result, Importance of therapeutic range, and Importance of following up at instructed interval    Plan made with [facility] Pharmacist Maine Mendoza RN  11/18/2024  Anticoagulation Clinic  [place] for routing messages: p [facility] HEART [facility] patient phone line: [phone]        _______________________________________________________________________     Anticoagulation Episode Summary       Current INR goal:  2.5-3.5   TTR:  0.0% (4 d)   Target end date:  Indefinite   Send INR reminders to:  [facility] HEART [facility]    Indications    H/O mitral valve replacement with mechanical valve [Z95.2]             Comments:  --             Anticoagulation Care Providers       Provider Role Specialty  Phone number    Catherine Galvez PA-C Referring  [phone]

## 2024-11-18 NOTE — PROGRESS NOTES
ANTICOAGULATION MANAGEMENT     Jory Ambrose 49 year old female is on warfarin with subtherapeutic INR result. (Goal INR 2.5-3.5)    Recent labs: (last 7 days)     11/18/24  1313   INR 1.9*       ASSESSMENT     Source(s): Chart Review  Previous INR was Subtherapeutic  Medication, diet, health changes since last INR chart reviewed; none identified         PLAN     Unable to reach Pat today.    Left message to take a booster dose of warfarin,  4 mg tonight. Request call back for assessment.    Follow up required to confirm warfarin dose taken and assess for changes    Per [facility], if no factors, 4 mg x1, then 2 mg daily.     Paz Mendoza RN  11/18/2024  Anticoagulation Clinic  [facility] for routing messages: qasim [facility] HEART [facility] patient phone line: [phone]

## 2024-11-19 ENCOUNTER — TELEPHONE (OUTPATIENT)
Dept: CARDIOLOGY | Facility: CLINIC | Age: 49
End: 2024-11-19
Payer: COMMERCIAL

## 2024-11-19 ENCOUNTER — DOCUMENTATION ONLY (OUTPATIENT)
Dept: CARDIOLOGY | Facility: CLINIC | Age: 49
End: 2024-11-19
Payer: COMMERCIAL

## 2024-11-19 DIAGNOSIS — I48.0 PAF (PAROXYSMAL ATRIAL FIBRILLATION) (H): Primary | ICD-10-CM

## 2024-11-19 DIAGNOSIS — I48.0 PAF (PAROXYSMAL ATRIAL FIBRILLATION) (H): ICD-10-CM

## 2024-11-19 DIAGNOSIS — E87.6 HYPOKALEMIA: ICD-10-CM

## 2024-11-19 DIAGNOSIS — Z95.1 S/P CABG (CORONARY ARTERY BYPASS GRAFT): ICD-10-CM

## 2024-11-19 DIAGNOSIS — I25.119 CORONARY ARTERY DISEASE INVOLVING NATIVE CORONARY ARTERY OF NATIVE HEART WITH ANGINA PECTORIS (H): ICD-10-CM

## 2024-11-19 DIAGNOSIS — I10 PRIMARY HYPERTENSION: ICD-10-CM

## 2024-11-19 DIAGNOSIS — E87.6 LOW BLOOD POTASSIUM: ICD-10-CM

## 2024-11-19 RX ORDER — METOPROLOL TARTRATE 100 MG/1
100 TABLET ORAL 2 TIMES DAILY
Qty: 60 TABLET | Refills: 3 | Status: SHIPPED | OUTPATIENT
Start: 2024-11-19

## 2024-11-19 RX ORDER — POTASSIUM CHLORIDE 1500 MG/1
20 TABLET, EXTENDED RELEASE ORAL
OUTPATIENT
Start: 2024-11-19 | End: 2024-11-19

## 2024-11-19 RX ORDER — POTASSIUM CHLORIDE 1500 MG/1
40 TABLET, EXTENDED RELEASE ORAL DAILY
Qty: 120 TABLET | Refills: 1 | Status: SHIPPED | OUTPATIENT
Start: 2024-11-19

## 2024-11-19 RX ORDER — LIDOCAINE 40 MG/G
CREAM TOPICAL
OUTPATIENT
Start: 2024-11-19

## 2024-11-19 RX ORDER — BUMETANIDE 1 MG/1
1 TABLET ORAL DAILY
Qty: 30 TABLET | Refills: 2 | Status: SHIPPED | OUTPATIENT
Start: 2024-11-19

## 2024-11-19 RX ORDER — SODIUM CHLORIDE 9 MG/ML
INJECTION, SOLUTION INTRAVENOUS CONTINUOUS
Status: DISCONTINUED | OUTPATIENT
Start: 2024-11-19 | End: 2024-11-19 | Stop reason: HOSPADM

## 2024-11-19 NOTE — TELEPHONE ENCOUNTER
Decrease metoprolol to 50mg daily AM of procedure. If CARMEN unremarkable consider starting sotalol (CrCl >70). Thank you

## 2024-11-19 NOTE — TELEPHONE ENCOUNTER
Noted- will route to EP to place order and set up CARMEN guided cardioversion.     Called Jory to go over plan + lab results. We discussed CARMEN guided cardioversion and the purpose. She denies any sensation like her heart is racing but reports ongoing shortness of breath and fatigue. She is unsure if this is really changed. Her heart rate about an hour ago was 130 bpm at rest. Of note, she has not yet taken her medication this morning, including Metoprolol tartrate 75 mg. Writer instructed her to please take her morning medications now. Writer will update Dr. Rodriguez on current resting HR and see if this changes the plan at all, otherwise will begin setting up of CARMEN guided cardioversion. Will [place] back with patient in approx 1-2 hours to check on HR and response to morning medication. -[facility] [place]

## 2024-11-19 NOTE — TELEPHONE ENCOUNTER
----- Message -----  From: Miles Rodriguez MD  Sent: 11/19/2024   1:41 PM CST  To: Lluvia Still RN    Increase metoprolol to 100 mg twice a day          ==Rx sent to preferred pharmacy. Metoprolol 75 mg tablets discontinued and 100 mg tablets sent through. Called patient and understanding verbalized. We discussed when to seek out medical attention and she verbalized understanding. She will still present to her PMD appt this afternoon and also  new Rx. -[place]

## 2024-11-19 NOTE — TELEPHONE ENCOUNTER
----- Message from Jordon Rodriguez sent at 11/18/2024  4:52 PM CST -----  Regarding: FW: A-Flutter 11/18 Cardiac Rehab Session  Proceed with CARMEN guided cardiversion  ----- Message -----  From: Namrata Elizondo EP  Sent: 11/18/2024   4:11 PM CST  To: Miles Rodriguez MD  Subject: A-Flutter 11/18 Cardiac Rehab Session            Dr. Rodriguez    Our mutual patient jered started CR today. She reports she has had a high HR since surgery. I see you ordered Zio patch and they just got in mail yesterday and she plans on putting that on today after Cardiac Rehab. Pt's HR is 136-138bpm at rest she states she is asymptomatic. When starting 6min walk test she stopped due to fatigue, SOB and got dizziness post walk. Symptoms resolved except she felt more tired after walking. HR/BP -flat response O2 sats increased from 93- to 97% with exercise. See EKG's and notes from today's session.     I am concerned with her high HR and our policy is she needs to have RHR <120bpm to exercise. Pt took medications as prescribed. She is on Metoprolol Tartrate 75mg BID. No other rate medications noted on her list that I saw of. Pt was at rest for 60 minutes today  before hooking her up to EKG monitor for initial cardiac rehab session and her HR stayed 136-138 bpm with rest and with exercise. Flat BP response.     Do you have other HR parameters pre/during exercise?    Thank you for your time    Namrata ART EP  [facility]  [phone]

## 2024-11-19 NOTE — TELEPHONE ENCOUNTER
"Called patient to check in on HR - noted scheduled for CARMEN guided cardioversion 11/29. Patient took her Metoprolol shortly after conversation this morning- approximately 10 am. Despite this, HR still elevated in the 130's. She denies any symptoms that are new- continued shortness of breath with activity but does not feel it is any worse.     She is on duonebs right now due to postoperative shortness of breath- complex post op period. She takes approx 2 times daily and last dose was last evening. She has a follow up visit with her PMD today at 3:20 pm with Daija and visit is in person. Will update Dr. Rodriguez. -[facility] [place]      --------------------------------------------------------------      Dr. Rodriguez,  CARMEN guided cardioversion is scheduled 11/29. I called her to check in on her HR after taking her morning medication and she remains in the 130's. She  denies change in her symptoms -\"feels fine- the same as I have been feeling\". It was a little difficult to get more information from her. She has been taking Duonebs with albuterol twice daily- last dose last night. Any recommendations for her HR?  She will see her PMD this afternoon in follow up at 3:20.   Thanks,  Sorin   "

## 2024-11-19 NOTE — PROGRESS NOTES
AC: Coumadin NP Med Review: NEEDS review    DM Meds: None  GLP-1:None     Component      Latest Ref Rng 11/4/2024  3:59 PM 11/8/2024  3:38 PM 11/12/2024  11:39 AM 11/15/2024  9:44 AM 11/18/2024  1:13 PM   INR Point of Care      0.9 - 1.1  3.5 !  4.5 !  2.5 !  2.4 !  1.9 !           Jory Ambrose, 1975, [id number]  Home:[phone] (home) Cell:[phone] (mobile)  Emergency Contact: SUSIE ALVES [phone]  PCP: Rome Joshua, [phone]    +++Important patient information for CSC/Cath Lab staff : None+++    [facility] EP Cath Lab Procedure Order   Procedure: CARMEN guided Cardioversion for AF  Ordering Provider:  Michael  Date Ordered and Prepped: 11/19/2024 Jory Chris RN  Anticipated Case Duration:  Standard  Scheduling Needs/Timeframe:  Urgent-Next available spot  Scheduling Contact: Please contact pt to schedule  Cardiology Follow Up Apt s/p: Standard- EP ORQUIDEA @ 4-6 wks or previously scheduled General Card apt  Current Device/Device Co Needed for Procedure: None NoneNone  Pre-Procedural Testing needed: CARMEN  Anesthesia:  General    [facility] EP Cath Lab Prep   H&P:  Compled by cardiology on 11/8 if scheduled within 30 days, pt to schedule with PMD if procedure outside of this timeframe  Pre-op Labs: K if pt taking diuretic medication or hx of low Potassium, Beta HcG if appropriate, and INR if on Warfarin will be ordered AM of procedure and Review of most recent labs, WEL for procedure  T&S Pre-Procedure Review: T&S is not required for DCCV/DFT Testing  Medical Records Pertinent for Procedure:  None  Iodinated Contrast Dye Allergies (Does not include Shellfish, Egg, and/or Iodine Allergy): NA  GLP-1 Protocol: If on Dulaglutide (Trulicity) (weekly)- Injection hold 7 days prior to procedure  , Exenatide extended release (Bydureon bcise) (weekly)- Injection hold 7 days prior to procedure, Exenatide (Byetta) (twice daily)- Oral Tablet hold day prior and morning of procedure and for Injection hold 7  days prior to procedure, Semaglutide (Ozempic) (weekly)- Injection and Oral hold 7 days prior to procedure, Liraglutide (Victoza, Saxenda) (daily)- Injection hold day prior and morning of procedure    Allergies   Allergen Reactions    Atarax [Hydroxyzine] Shortness Of Breath    Dye [Contrast Dye] Swelling     Pre-medicated with methylprednisolone    Shellfish Allergy Shortness Of Breath, Anaphylaxis and Swelling     Shrimp, crab, lobster    Cats      Other Reaction(s): Rhinitis, Sneezing    Dust Mite Extract Other (See Comments)     Other Reaction(s): Rhinitis, Sneezing    Food Itching     Shrimp and walnuts.    Penicillins Hives     Patient cannot recall if she has tried cephalosporins in the past.     Wheat Germ Oil Other (See Comments)     Seasonal - multiple    Erythromycin Hives    Morphine Hcl      Causes change in mental status    Nickel Rash     rash    Sulfa Antibiotics Hives and Rash       Current Outpatient Medications:     acetaminophen (TYLENOL) 500 MG tablet, Take 2 tablets (1,000 mg) by mouth every 6 hours as needed for mild pain., Disp: , Rfl:     albuterol (PROAIR HFA) 108 (90 Base) MCG/ACT inhaler, Inhale 2 puffs into the lungs every 6 hours as needed., Disp: 18 g, Rfl: 0    atorvastatin (LIPITOR) 80 MG tablet, Take 1 tablet (80 mg) by mouth daily., Disp: 90 tablet, Rfl: 3    budesonide-formoterol (SYMBICORT) 80-4.5 MCG/ACT Inhaler, Inhale 1 puff into the lungs two times daily., Disp: 6.9 g, Rfl: 0    bumetanide (BUMEX) 1 MG tablet, Take 1 tablet (1 mg) by mouth 2 times daily for 7 days, THEN 1 tablet (1 mg) daily for 7 days., Disp: 21 tablet, Rfl: 0    buPROPion (WELLBUTRIN XL) 300 MG 24 hr tablet, Take 1 tablet (300 mg) by mouth every morning. (Patient not taking: Reported on 11/8/2024), Disp: 30 tablet, Rfl: 0    cetirizine (ZYRTEC) 10 MG tablet, Take 1 tablet (10 mg) by mouth daily., Disp: 30 tablet, Rfl: 11    cholecalciferol (VITAMIN D3) 125 mcg (5000 units) capsule, Take 1 capsule (125 mcg)  by mouth daily., Disp: 30 capsule, Rfl: 0    clopidogrel (PLAVIX) 75 MG tablet, Take 1 tablet (75 mg) by mouth daily., Disp: 30 tablet, Rfl: 1    Ferrous Sulfate (IRON) 325 (65 Fe) MG tablet, Take 1 tablet by mouth daily., Disp: 30 tablet, Rfl: 2    fludrocortisone (FLORINEF) 0.1 MG tablet, Take 1 tablet (0.1 mg) by mouth daily., Disp: 30 tablet, Rfl: 0    fluticasone (FLONASE) 50 MCG/ACT nasal spray, Spray 2 sprays into both nostrils daily as needed for rhinitis or allergies. (Patient not taking: Reported on 11/8/2024), Disp: 9.9 mL, Rfl: 0    levothyroxine (SYNTHROID/LEVOTHROID) 125 MCG tablet, Take 1 tablet (125 mcg) by mouth daily., Disp: 30 tablet, Rfl: 0    Metoprolol Tartrate 75 MG TABS, Take 75 mg by mouth 2 times daily., Disp: 60 tablet, Rfl: 1    miconazole (MICATIN) 2 % external powder, Apply topically 2 times daily. (Patient taking differently: Apply topically as needed.), Disp: , Rfl:     multivitamin w/minerals (MULTI-VITAMIN) tablet, Take 1 tablet by mouth daily., Disp: , Rfl:     omeprazole (PRILOSEC) 20 MG DR capsule, Take 1 capsule (20 mg) by mouth daily., Disp: 30 capsule, Rfl: 12    potassium chloride maryann ER (KLOR-CON M20) 20 MEQ CR tablet, Take 2 tablets (40 mEq) by mouth daily., Disp: 60 tablet, Rfl: 0    simethicone (MYLICON) 125 MG chewable tablet, Take 1 tablet (125 mg) by mouth 4 times daily as needed for intestinal gas, Disp: 40 tablet, Rfl: 0    sodium chloride (OCEAN) 0.65 % nasal spray, Spray 1 spray into both nostrils every hour as needed for congestion or other (dry nose)., Disp: 15 mL, Rfl: 0    traZODone (DESYREL) 100 MG tablet, Take 1 tablet (100 mg) by mouth at bedtime. (Patient taking differently: Take 0.5 mg by mouth at bedtime.), Disp: 30 tablet, Rfl: 0    warfarin ANTICOAGULANT (COUMADIN) 1 MG tablet, Take 1.5 mg today (11/1/2024), then as directed by TCU provider for INR goal 2.5-3.5, Disp: 60 tablet, Rfl: 1    Documentation Date:11/19/2024 10:17 AM  Jory Chris RN

## 2024-11-20 ENCOUNTER — ANTICOAGULATION THERAPY VISIT (OUTPATIENT)
Dept: ANTICOAGULATION | Facility: CLINIC | Age: 49
End: 2024-11-20

## 2024-11-20 ENCOUNTER — TELEPHONE (OUTPATIENT)
Dept: CARDIOLOGY | Facility: CLINIC | Age: 49
End: 2024-11-20

## 2024-11-20 ENCOUNTER — LAB (OUTPATIENT)
Dept: CARDIOLOGY | Facility: CLINIC | Age: 49
End: 2024-11-20
Payer: COMMERCIAL

## 2024-11-20 DIAGNOSIS — Z95.2 H/O MITRAL VALVE REPLACEMENT WITH MECHANICAL VALVE: Primary | ICD-10-CM

## 2024-11-20 DIAGNOSIS — E03.8 SUBCLINICAL HYPOTHYROIDISM: Primary | ICD-10-CM

## 2024-11-20 DIAGNOSIS — Z95.2 H/O MITRAL VALVE REPLACEMENT WITH MECHANICAL VALVE: ICD-10-CM

## 2024-11-20 DIAGNOSIS — E03.8 SUBCLINICAL HYPOTHYROIDISM: ICD-10-CM

## 2024-11-20 LAB
INR POINT OF CARE: 2.4 (ref 0.9–1.1)
TSH SERPL DL<=0.005 MIU/L-ACNC: 12.4 UIU/ML (ref 0.3–4.2)

## 2024-11-20 PROCEDURE — 36415 COLL VENOUS BLD VENIPUNCTURE: CPT

## 2024-11-20 PROCEDURE — 84443 ASSAY THYROID STIM HORMONE: CPT

## 2024-11-20 NOTE — TELEPHONE ENCOUNTER
TSH results faxed to Dr. Colunga's office at [phone]. Called  [facility] and spoke with Bhavana. Results were discussed and she will watch for the fax for them to come through and give to Dr. Colunga. Mychart message in result note to patient updating on this. -[facility]

## 2024-11-20 NOTE — TELEPHONE ENCOUNTER
Pre-Procedure Education    Procedure:  CARMEN/CV  with Dr Leigh on 11/29/2024 with arrival time 8:30 am      PT IS ON COUMADIN INR'S IN [facility] LAB TAB  11/18=1.9  11/15=2.4  11/12=2.5  11/8=4.5  11/4=3.5    Orders: Orderset for procedure verified signed/held    COVID: COVID policy- if pt develops COVID like symptoms prior to procedure, he/she would need to complete an at home with a rapid antigen COVID test 1-2 days prior to your procedure date. If COVID + pt is aware the procedure will need to be rescheduled, and to contact CV scheduling as soon as possible    Pre-Op H&P: Completed- Available in Epic    Education:    PT HAS A  FOR PROCEDURE TH AT WILL STAY WITH HER    PT HAS MY CHART   PT AWARE PROCEDURE LETTER SENT    PT INSTRUCTED TO HOLD ANY VITAMINS, MINERALS  CALCIUM IRON OR SUPPLEMENTS THE MORNING OF CV  PT INSTRUCTED NO GUM CHEWING MINTS OR CANDY THE MORNING OF CV  PT INSTRUCTED TO LEAVE  JEWELRY AT HOME  PT INSTRUCTED TO BATHE OR SHOWER BEFORE COMING IN  PT IS ON COUMADIN  PT INSTRUCTED TO DECREASE HER METOPROLOL TO 50 MG THE MORNING OF CV PER TAMIA HACKETT CNP  PT IS GIA  PT HAS A POST CV FOLLOW UP WITH JACKIE 1/17    Contact: Reviewed via phone with pt    Pre-Procedure Instruction: NPO after midnight pre procedure, Defined NPO with pt, Remove all jewelry and leave all valuables at home, Shower prior to arrival, Sedation plan/orders, Transportation requirements and arrangements post procedure, Post-procedure follow up process, Post-procedure restrictions/expectations, and Pre-procedure letter sent- letter tab  Risks:      Medication:   Instructions regarding anticoagulants: Coumadin- To continue anticoagulation uninterrupted through their procedure    Instructions regarding antiarrhythmic medication: Beta Blocker;  PT INSTRUCTED  TO DECREASE HER METOPROLOL TO 50 MG THE MORNING OF CV    Instructions given to pt regarding diuretics medication: NA for DCCV    Instructions given to pt regarding DM/GLP-1  medication:   DM- None  GLP-1- None    Instructions for medication, other than anticoagulants and antiarrhythmics listed above, given to pt: Take all medication AM of procedure with small sips of water     Important patient information for staff:  PT IS GIA    11/20/2024 9:00 AM  Consuelo Land LPN

## 2024-11-20 NOTE — TELEPHONE ENCOUNTER
Patient came in for INR draw today. She was at PMD yesterday and came with order to have TSH drawn. Writer cannot place or transcribe outside orders from another facility. Instructed CMA to draw additional vial and writer will obtain order from Dr. Rodriguez for TSH, if authorized. -[facility]       Plan of Treatment  - documented as of this encounter   Plan of Treatment - Scheduled Orders  Scheduled Orders  Name Type Priority Associated Diagnoses Order Schedule   TSH Lab Routine Acquired hypothyroidism (HRC)  Expected: 11/19/2024, Expires: 05/18/2025

## 2024-11-20 NOTE — TELEPHONE ENCOUNTER
Order placed. Update to [facility] to send down sample that was drawn. -[facility]         ----- Message -----  From: Miles Rodriguez MD  Sent: 11/20/2024  10:35 AM CST  To: Lluvia Still RN    yes  ----- Message -----  From: Lluvia Still RN  Sent: 11/20/2024  10:25 AM CST  To: Miles Rodriguez MD    ----- Message from Lluvia Still RN sent at 11/20/2024 10:25 AM CST -----  Dr. Rodriguez,  Pt came in with an outside order for TSH from her PMD yesterday. Ok to place order under you please and I will fax to PMD office? She came in for her INR and wanted it all done at one place and I cannot place under [facility] doc.   Thanks,  Mal

## 2024-11-20 NOTE — PROGRESS NOTES
ANTICOAGULATION MANAGEMENT     Jory Ambrose 49 year old female is on warfarin with subtherapeutic INR result. (Goal INR 2.5-3.5)    Recent labs: (last 7 days)     11/20/24  1051   INR 2.4*       ASSESSMENT     Source(s): Chart Review, Patient/Caregiver Call, and Template     Warfarin doses taken: Booster dose(s) recently taken which may be affecting INR  Diet: No new diet changes identified  Medication/supplement changes:  metoprolol dose increased on 11/19 No interaction anticipated  New illness, injury, or hospitalization: Yes: a.fib. will be having cardioversion next Friday   Signs or symptoms of bleeding or clotting: No  Previous result: Subtherapeutic  Additional findings:  trending up  nicely from INR of 1.9 on Monday. Has only had 12 mg of current 14 mg weekly dose, so not change to dose today.        PLAN     Recommended plan for temporary change(s) affecting INR     Dosing Instructions: Continue your current warfarin dose with next INR in 2 days       Summary  As of 11/20/2024      Full warfarin instructions:  2 mg every day   Next INR check:  11/22/2024               Telephone call with mom arik who agrees to plan and repeated back plan correctly    Lab visit scheduled    Education provided: Goal range and lab monitoring: goal range and significance of current result, Importance of therapeutic range, and Importance of following up at instructed interval    Plan made with [facility] Pharmacist Maine Mendoza RN  11/20/2024  Anticoagulation Clinic  [employer] for routing messages: p [facility] patient phone line: [phone]        _______________________________________________________________________     Anticoagulation Episode Summary       Current INR goal:  2.5-3.5   TTR:  0.0% (6 d)   Target end date:  Indefinite   Send INR reminders to:  [facility]    Indications    H/O mitral valve replacement with mechanical valve  [Z95.2]             Comments:  --             Anticoagulation Care Providers       Provider Role Specialty Phone number    Catherine Galvez PA-C Referring  [phone]

## 2024-11-26 ENCOUNTER — LAB (OUTPATIENT)
Dept: CARDIOLOGY | Facility: CLINIC | Age: 49
End: 2024-11-26
Payer: COMMERCIAL

## 2024-11-26 ENCOUNTER — ANTICOAGULATION THERAPY VISIT (OUTPATIENT)
Dept: ANTICOAGULATION | Facility: CLINIC | Age: 49
End: 2024-11-26

## 2024-11-26 DIAGNOSIS — Z79.01 LONG TERM (CURRENT) USE OF ANTICOAGULANTS: Primary | ICD-10-CM

## 2024-11-26 DIAGNOSIS — Z95.2 H/O MITRAL VALVE REPLACEMENT WITH MECHANICAL VALVE: Primary | ICD-10-CM

## 2024-11-26 LAB
CV ZIO PRELIM RESULTS: NORMAL
INR POINT OF CARE: 4.7 (ref 0.9–1.1)

## 2024-11-26 PROCEDURE — 36416 COLLJ CAPILLARY BLOOD SPEC: CPT

## 2024-11-26 PROCEDURE — 85610 PROTHROMBIN TIME: CPT

## 2024-11-26 NOTE — PROGRESS NOTES
ANTICOAGULATION MANAGEMENT     Jory Grant Halie 49 year old female is on warfarin with supratherapeutic INR result. (Goal INR 2.5-3.5)    Recent labs: (last 7 days)     11/26/24  1015   INR 4.7*       ASSESSMENT     Source(s): Chart Review, Patient/Caregiver Call, and Template     Warfarin doses taken: Warfarin taken as instructed  Diet: No new diet changes identified  Medication/supplement changes: None noted  New illness, injury, or hospitalization: Yes. Continues to have symptomatic a.fib. SOB and fatigue.   Signs or symptoms of bleeding or clotting: No. Notices some blood most mornings when blowing nose, but not having full nose bleeds. She is aware to update ACN if this worsens or has any other s/s of bleeding.   Previous result: Subtherapeutic  Additional findings: Upcoming Cardioversion; weekly INR monitoring. To notify [facility] and cardiologist if INR < 2 if scheduled, INR > 3.3 within a week of ablation/PVI, or non-compliance with monitoring > 1 week.       PLAN     Recommended plan for temporary change(s) affecting INR     Dosing Instructions: partial hold then decrease your warfarin dose (10.5% change) with next INR in 3 days       Summary  As of 11/26/2024      Full warfarin instructions:  11/26: 1 mg; Otherwise 3 mg every Mon, Thu, Sat; 2 mg all other days   Next INR check:  11/29/2024               Telephone call with Jory who agrees to plan and repeated back plan correctly    Lab visit scheduled    Education provided: Goal range and lab monitoring: goal range and significance of current result, Importance of therapeutic range, and Importance of following up at instructed interval  Symptom monitoring: monitoring for bleeding signs and symptoms    Plan made with [facility] Pharmacist Maine Mendoza RN  11/26/2024  Anticoagulation Clinic  [facility] for routing messages: [facility] patient phone line:  [phone]        _______________________________________________________________________     Anticoagulation Episode Summary       Current INR goal:  2.5-3.5   TTR:  12.9% (1.7 wk)   Target end date:  Indefinite   Send INR reminders to:  [facility]    Indications    H/O mitral valve replacement with mechanical valve [Z95.2]             Comments:  --             Anticoagulation Care Providers       Provider Role Specialty Phone number    Catherine Galvez PA-C Referring  [phone]

## 2024-11-27 ENCOUNTER — ANESTHESIA EVENT (OUTPATIENT)
Dept: CARDIOLOGY | Facility: HOSPITAL | Age: 49
End: 2024-11-27
Payer: COMMERCIAL

## 2024-11-29 ENCOUNTER — ANESTHESIA (OUTPATIENT)
Dept: CARDIOLOGY | Facility: HOSPITAL | Age: 49
End: 2024-11-29
Payer: COMMERCIAL

## 2024-11-29 ENCOUNTER — HOSPITAL ENCOUNTER (OUTPATIENT)
Dept: CARDIOLOGY | Facility: HOSPITAL | Age: 49
Discharge: HOME | End: 2024-11-29
Attending: INTERNAL MEDICINE
Payer: COMMERCIAL

## 2024-11-29 ENCOUNTER — HOSPITAL ENCOUNTER (OUTPATIENT)
Dept: CARDIOLOGY | Facility: HOSPITAL | Age: 49
Discharge: HOME | End: 2024-11-29
Attending: INTERNAL MEDICINE | Admitting: INTERNAL MEDICINE
Payer: COMMERCIAL

## 2024-11-29 VITALS
TEMPERATURE: 98.1 F | WEIGHT: 231 LBS | HEART RATE: 84 BPM | SYSTOLIC BLOOD PRESSURE: 134 MMHG | RESPIRATION RATE: 18 BRPM | OXYGEN SATURATION: 99 % | HEIGHT: 64 IN | DIASTOLIC BLOOD PRESSURE: 70 MMHG | BODY MASS INDEX: 39.44 KG/M2

## 2024-11-29 DIAGNOSIS — I48.0 PAF (PAROXYSMAL ATRIAL FIBRILLATION) (H): ICD-10-CM

## 2024-11-29 LAB
ATRIAL RATE - MUSE: 86 BPM
DIASTOLIC BLOOD PRESSURE - MUSE: NORMAL MMHG
INR POINT OF CARE: 3.1 (ref 0.9–1.1)
INTERPRETATION ECG - MUSE: NORMAL
LVEF ECHO: NORMAL
P AXIS - MUSE: -12 DEGREES
POTASSIUM SERPL-SCNC: 3.7 MMOL/L (ref 3.4–5.3)
PR INTERVAL - MUSE: 212 MS
QRS DURATION - MUSE: 94 MS
QT - MUSE: 414 MS
QTC - MUSE: 495 MS
R AXIS - MUSE: 55 DEGREES
SYSTOLIC BLOOD PRESSURE - MUSE: NORMAL MMHG
T AXIS - MUSE: 0 DEGREES
VENTRICULAR RATE- MUSE: 86 BPM

## 2024-11-29 PROCEDURE — 999N000054 HC STATISTIC EKG NON-CHARGEABLE

## 2024-11-29 PROCEDURE — 93312 ECHO TRANSESOPHAGEAL: CPT | Mod: 26 | Performed by: INTERNAL MEDICINE

## 2024-11-29 PROCEDURE — 250N000009 HC RX 250: Performed by: NURSE ANESTHETIST, CERTIFIED REGISTERED

## 2024-11-29 PROCEDURE — 93312 ECHO TRANSESOPHAGEAL: CPT

## 2024-11-29 PROCEDURE — 93320 DOPPLER ECHO COMPLETE: CPT

## 2024-11-29 PROCEDURE — 93325 DOPPLER ECHO COLOR FLOW MAPG: CPT

## 2024-11-29 PROCEDURE — 93005 ELECTROCARDIOGRAM TRACING: CPT

## 2024-11-29 PROCEDURE — 85610 PROTHROMBIN TIME: CPT | Performed by: INTERNAL MEDICINE

## 2024-11-29 PROCEDURE — 99152 MOD SED SAME PHYS/QHP 5/>YRS: CPT | Performed by: INTERNAL MEDICINE

## 2024-11-29 PROCEDURE — 92960 CARDIOVERSION ELECTRIC EXT: CPT | Performed by: INTERNAL MEDICINE

## 2024-11-29 PROCEDURE — 84132 ASSAY OF SERUM POTASSIUM: CPT | Performed by: INTERNAL MEDICINE

## 2024-11-29 PROCEDURE — 250N000011 HC RX IP 250 OP 636: Performed by: INTERNAL MEDICINE

## 2024-11-29 PROCEDURE — 36415 COLL VENOUS BLD VENIPUNCTURE: CPT | Performed by: INTERNAL MEDICINE

## 2024-11-29 PROCEDURE — 93010 ELECTROCARDIOGRAM REPORT: CPT | Performed by: INTERNAL MEDICINE

## 2024-11-29 PROCEDURE — 93320 DOPPLER ECHO COMPLETE: CPT | Mod: 26 | Performed by: INTERNAL MEDICINE

## 2024-11-29 PROCEDURE — 370N000017 HC ANESTHESIA TECHNICAL FEE, PER MIN

## 2024-11-29 PROCEDURE — 250N000009 HC RX 250: Performed by: INTERNAL MEDICINE

## 2024-11-29 PROCEDURE — 92960 CARDIOVERSION ELECTRIC EXT: CPT

## 2024-11-29 PROCEDURE — 93325 DOPPLER ECHO COLOR FLOW MAPG: CPT | Mod: 26 | Performed by: INTERNAL MEDICINE

## 2024-11-29 RX ORDER — BENZOCAINE/MENTHOL 6 MG-10 MG
LOZENGE MUCOUS MEMBRANE 3 TIMES DAILY PRN
Status: DISCONTINUED | OUTPATIENT
Start: 2024-11-29 | End: 2024-12-01 | Stop reason: HOSPADM

## 2024-11-29 RX ORDER — FENTANYL CITRATE 50 UG/ML
INJECTION, SOLUTION INTRAMUSCULAR; INTRAVENOUS
Status: COMPLETED | OUTPATIENT
Start: 2024-11-29 | End: 2024-11-29

## 2024-11-29 RX ORDER — METHOHEXITAL IN WATER/PF 100MG/10ML
SYRINGE (ML) INTRAVENOUS PRN
Status: DISCONTINUED | OUTPATIENT
Start: 2024-11-29 | End: 2024-11-29

## 2024-11-29 RX ORDER — LIDOCAINE HYDROCHLORIDE 20 MG/ML
SOLUTION OROPHARYNGEAL
Status: COMPLETED | OUTPATIENT
Start: 2024-11-29 | End: 2024-11-29

## 2024-11-29 RX ORDER — LIDOCAINE 40 MG/G
CREAM TOPICAL
Status: DISCONTINUED | OUTPATIENT
Start: 2024-11-29 | End: 2024-12-02 | Stop reason: HOSPADM

## 2024-11-29 RX ORDER — POTASSIUM CHLORIDE 1500 MG/1
20 TABLET, EXTENDED RELEASE ORAL
Status: DISCONTINUED | OUTPATIENT
Start: 2024-11-29 | End: 2024-11-29 | Stop reason: HOSPADM

## 2024-11-29 RX ADMIN — FENTANYL CITRATE 50 MCG: 50 INJECTION, SOLUTION INTRAMUSCULAR; INTRAVENOUS at 08:59

## 2024-11-29 RX ADMIN — TOPICAL ANESTHETIC 0.5 ML: 200 SPRAY DENTAL; PERIODONTAL at 08:56

## 2024-11-29 RX ADMIN — LIDOCAINE HYDROCHLORIDE 15 ML: 20 SOLUTION ORAL; TOPICAL at 08:56

## 2024-11-29 RX ADMIN — MIDAZOLAM HYDROCHLORIDE 2 MG: 1 INJECTION, SOLUTION INTRAMUSCULAR; INTRAVENOUS at 08:57

## 2024-11-29 RX ADMIN — Medication 70 MG: at 09:25

## 2024-11-29 ASSESSMENT — ACTIVITIES OF DAILY LIVING (ADL)
ADLS_ACUITY_SCORE: 60
ADLS_ACUITY_SCORE: 59
ADLS_ACUITY_SCORE: 60

## 2024-11-29 NOTE — PROGRESS NOTES
Afib with RVR and hx of Aflutter.      INR was 1.9 within the past 4 weeks.     Underwent successful CARMEN (which showed no thrombus) and conversion to NSR with DCCV (see procedure note).      Post-EKG shows sinus with 1st degree AVB.     Continue metoprolol.     Follow up with Dr. Vences on December 9   Follow up with Stephen Person PA-C on January 17    Adriane Cain PA-C, [facility]  Structural Heart Program  [facility]

## 2024-11-29 NOTE — PLAN OF CARE
Pt alert and oriented. Vss on room air. Pt nsr post cardioversion. Pt ate meal, tolerated well. Ready to be discharged to home via family. All belongings remain with patient and family. Discharge education given to patient and family. Pt and family understand discharge teaching.

## 2024-11-29 NOTE — ANESTHESIA CARE TRANSFER NOTE
Patient: Jory Ambrose    Procedure: * No procedures listed *  Cardioversion External    Diagnosis: * No pre-op diagnosis entered *  Diagnosis Additional Information: No value filed.    Anesthesia Type:   General     Note:    Oropharynx: oropharynx clear of all foreign objects  Level of Consciousness: drowsy  Oxygen Supplementation: nasal cannula  Level of Supplemental Oxygen (L/min / FiO2): 4  Independent Airway: airway patency satisfactory and stable  Dentition: dentition unchanged  Vital Signs Stable: post-procedure vital signs reviewed and stable  Report to RN Given: handoff report given  Patient transferred to: Cardiac Special Care          Vitals:  Vitals Value Taken Time   /87 11/29/24 0942   Temp 36.7  C (98.1  F) 11/29/24 0930   Pulse 83 11/29/24 0943   Resp 23 11/29/24 0943   SpO2 100 % 11/29/24 0943   Vitals shown include unfiled device data.    Electronically Signed By: RAFY Heart CRNA  November 29, 2024  9:44 AM

## 2024-11-29 NOTE — INTERVAL H&P NOTE
"I have reviewed the surgical (or preoperative) H&P that is linked to this encounter, and examined the patient. There are no significant changes    Clinical Conditions Present on Arrival:  Clinically Significant Risk Factors Present on Admission        # Hypokalemia: Lowest K = 2.8 mmol/L in last 30 days, will replace as needed          # Drug Induced Coagulation Defect: home medication list includes an anticoagulant medication  # Drug Induced Platelet Defect: home medication list includes an antiplatelet medication      # Obesity: Estimated body mass index is 39.65 kg/m  as calculated from the following:    Height as of an earlier encounter on 11/29/24: 1.626 m (5' 4\").    Weight as of an earlier encounter on 11/29/24: 104.8 kg (231 lb).       "

## 2024-11-29 NOTE — PRE-PROCEDURE
GENERAL PRE-PROCEDURE:   Date/Time:  11/29/2024 8:55 AM    Written consent obtained?: Yes    Risks and benefits: Risks, benefits and alternatives were discussed    Consent given by:  Patient  Patient states understanding of procedure being performed: Yes    Patient's understanding of procedure matches consent: Yes    Procedure consent matches procedure scheduled: Yes    Expected level of sedation:  Moderate  Appropriately NPO:  Yes  ASA Class:  3  Mallampati  :  Grade 2- soft palate, base of uvula, tonsillar pillars, and portion of posterior pharyngeal wall visible  Lungs:  Lungs clear with good breath sounds bilaterally  Heart:  Normal heart sounds and rate and a-fib  History & Physical reviewed:  History and physical reviewed and no updates needed  Statement of review:  I have reviewed the lab findings, diagnostic data, medications, and the plan for sedation

## 2024-11-29 NOTE — ANESTHESIA PREPROCEDURE EVALUATION
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    This would prevent her from following up on a regular basis      Deconditioned low back 2015    H/O autologous stem cell transplant (H) 2011    History of radiation therapy 2010    3600 cGy to mediastinum and neck    Hodgkin lymphoma, nodular sclerosis (H)     Dx 2010    Hypothyroidism, secondary     r/t radiation    Morbid obesity with BMI of 40.0-44.9, adult (H)     Neutropenic fever (H) 2013    Polycystic ovary disease     Pulmonary embolism (H)     Pulmonary embolus (H) 2011    S/P allogeneic bone marrow transplant (H) 2013    brother    Seasonal allergies     Shingles     Aug 2010    Sleep apnea       Past Surgical History:   Procedure Laterality Date    CHOLECYSTECTOMY      gallstone pancreatitis 2010    CORONARY ARTERY BYPASS GRAFT, WITH ENDOSCOPIC VESSEL PROCUREMENT N/A 10/8/2024    Procedure: CORONARY ARTERY BYPASS GRAFT TIMES FOUR, LEFT INTERNAL MAMMARY ARTERY HARVEST, RIGHT LEG ENDOSCOPIC VESSEL PROCUREMENT, EPIAORTIC ULTRASOUND,;  Surgeon: David Wade MD;  Location: [facility] Main OR    CV CORONARY ANGIOGRAM N/A 2024    Procedure: Coronary Angiogram;  Surgeon: Tarun Johnston MD;  Location: [facility] CATH LAB CV    CV LEFT HEART CATH N/A 2024    Procedure: Left Heart Catheterization;  Surgeon: Tarun Johnston MD;  Location: [facility] CATH LAB CV    IR CVC NON TUNNEL PLACEMENT > 5 YRS  10/11/2024    IR CVC TUNNEL PLACEMENT > 5 YRS OF AGE  10/24/2024    IR CVC TUNNEL REMOVAL RIGHT  2024    Lymphectomy  2010    right neck area    OR LIGATION, LEFT ATRIAL APPENDAGE N/A 10/8/2024    Procedure: LEFT ATRIAL APPENDAGE LIGATION,;  Surgeon: David Wade  MD Lang;  Location: [facility] OR    PICC TRIPLE LUMEN PLACEMENT  10/15/2024    REPLACE VALVE MITRAL N/A 10/8/2024    Procedure: MITRAL VALVE REAIR CONVERTED TO MITRAL VALVE REPLACEMENT;  Surgeon: David Wade MD;  Location: [facility] OR    TRANSESOPHAGEAL ECHOCARDIOGRAM INTRAOPERATIVE  10/8/2024    Procedure: ANESTHESIA TRANSESOPHAGEAL ECHOCARDIOGRAM;  Surgeon: David Wade MD;  Location: [facility] OR      Allergies   Allergen Reactions    Atarax [Hydroxyzine] Shortness Of Breath    Dye [Contrast Dye] Swelling     Pre-medicated with methylprednisolone    Shellfish Allergy Shortness Of Breath, Anaphylaxis and Swelling     Shrimp, crab, lobster    Cats      Other Reaction(s): Rhinitis, Sneezing    Dust Mite Extract Other (See Comments)     Other Reaction(s): Rhinitis, Sneezing    Food Itching     Shrimp and walnuts.    Penicillins Hives     Patient cannot recall if she has tried cephalosporins in the past.     Wheat Germ Oil Other (See Comments)     Seasonal - multiple    Erythromycin Hives    Morphine Hcl      Causes change in mental status    Nickel Rash     rash    Sulfa Antibiotics Hives and Rash      Social History     Tobacco Use    Smoking status: Never    Smokeless tobacco: Never    Tobacco comments:     denies tabacco use   Substance Use Topics    Alcohol use: No     Comment: minimal alcohol use, 1 glass of wine in 6 months      Wt Readings from Last 1 Encounters:   11/29/24 104.8 kg (231 lb)        Anesthesia Evaluation            ROS/MED HX  ENT/Pulmonary:     (+) sleep apnea,                     asthma                  Neurologic:       Cardiovascular:     (+)  hypertension- -  CAD -  - -                                      METS/Exercise Tolerance:     Hematologic:       Musculoskeletal:       GI/Hepatic:     (+)             liver disease,       Renal/Genitourinary:       Endo:     (+)          thyroid problem,     Obesity,       Psychiatric/Substance Use:        Infectious Disease:       Malignancy:       Other:            Physical Exam    Airway        Mallampati: II   TM distance: > 3 FB   Neck ROM: full   Mouth opening: > 3 cm    Respiratory Devices and Support         Dental       (+) Completely normal teeth      Cardiovascular          Rhythm and rate: irregular     Pulmonary                   OUTSIDE LABS:  CBC:   Lab Results   Component Value Date    WBC 11.7 (H) 10/28/2024    WBC 9.8 10/27/2024    HGB 8.5 (L) 11/05/2024    HGB 8.2 (L) 11/01/2024    HCT 23.4 (L) 10/28/2024    HCT 22.1 (L) 10/27/2024     10/28/2024     10/27/2024     BMP:   Lab Results   Component Value Date     11/18/2024     11/08/2024    POTASSIUM 3.9 11/18/2024    POTASSIUM 2.8 (L) 11/08/2024    CHLORIDE 99 11/18/2024    CHLORIDE 100 11/08/2024    CO2 28 11/18/2024    CO2 27 11/08/2024    BUN 35.9 (H) 11/18/2024    BUN 32.1 (H) 11/08/2024    CR 1.45 (H) 11/18/2024    CR 1.77 (H) 11/08/2024    GLC 94 11/18/2024    GLC 96 11/08/2024     COAGS:   Lab Results   Component Value Date    PTT 83 (H) 10/11/2024    INR 4.7 (A) 11/26/2024    FIBR 285 10/10/2024     POC:   Lab Results   Component Value Date     (H) 08/04/2015    HCGS Negative 08/12/2013     HEPATIC:   Lab Results   Component Value Date    ALBUMIN 3.1 (L) 10/30/2024    PROTTOTAL 5.7 (L) 10/25/2024    ALT 80 (H) 10/25/2024    AST 34 10/25/2024    ALKPHOS 125 10/25/2024    BILITOTAL 0.7 10/25/2024    BROWN 39 10/12/2024     OTHER:   Lab Results   Component Value Date    PH 7.40 10/12/2024    LACT 0.9 10/19/2024    A1C 5.7 (H) 10/07/2024    CINDY 9.4 11/18/2024    PHOS 4.3 11/01/2024    MAG 1.7 11/01/2024    LIPASE 67 (H) 10/15/2024    AMYLASE 55 10/15/2024    TSH 12.40 (H) 11/20/2024    T4 1.05 09/09/2014    T3 79 11/30/2011       Anesthesia Plan    ASA Status:  3       Anesthesia Type: General.              Consents    Anesthesia Plan(s) and associated risks, benefits, and realistic alternatives discussed.  "Questions answered and patient/representative(s) expressed understanding.     - Discussed:     - Discussed with:  Patient            Postoperative Care            Comments:               Anu Lopez MD    I have reviewed the pertinent notes and labs in the chart from the past 30 days and (re)examined the patient.  Any updates or changes from those notes are reflected in this note.               # Hypertension: Noted on problem list           # Obesity: Estimated body mass index is 39.65 kg/m  as calculated from the following:    Height as of an earlier encounter on 11/29/24: 1.626 m (5' 4\").    Weight as of an earlier encounter on 11/29/24: 104.8 kg (231 lb).       # Asthma: noted on problem list  # History of CABG: noted on surgical history      "

## 2024-11-29 NOTE — PROCEDURES
[facility]    Procedure: *CARMEN with Cardioversion    Date/Time: 11/29/2024 8:39 AM    Performed by: Adriane Cain PA-C  Authorized by: Adriane Cain PA-C      UNIVERSAL PROTOCOL   Site Marked: NA  Prior Images Obtained and Reviewed:  Yes  Required items: Required blood products, implants, devices and special equipment available    Patient identity confirmed:  Verbally with patient  Patient was reevaluated immediately before administering moderate or deep sedation or anesthesia  Confirmation Checklist:  Procedure was appropriate and matched the consent or emergent situation  Time out: Immediately prior to the procedure a time out was called    Universal Protocol: the Joint Commission Universal Protocol was followed      SEDATION  Patient Sedated: Yes    Sedation Type:  Moderate (conscious) sedation  Vital signs: Vital signs monitored during sedation      PROCEDURE  Describe Procedure: 200Jx1 into NSR  Patient Tolerance:  Patient tolerated the procedure well with no immediate complications  Length of time physician/provider present for 1:1 monitoring during sedation: 15

## 2024-11-29 NOTE — PROGRESS NOTES
Transesophageal Echocardiogram    Patient tolerated procedure well. VSS. Pt received a total of Versed 2mg IV and Fentanyl 50mcg IV for sedation. NPO until 1009 and CV completed and no hot foods/liquids til 1509. Per Dr. Rodriguez, CARRIE ligated and no residual pouch or clot seen. Okay to proceed with scheduled cardioversion. Talked to patient's nurse Mini and updated pt's  and mother in the waiting room.    Sabiha Gagnon RN

## 2024-11-29 NOTE — DISCHARGE INSTRUCTIONS
What is a Transesophageal Echocardiogram (CARMEN)?  This is a test that allows your doctor to record images of your heart from inside your esophagus, or food pipe. These images help your doctor identify or treat problems such as infection, disease, blood clots, or defects in your heart s walls or valves.     The test uses sound waves to see images of the heart muscle, valves, and function of the heart. A special instrument called a flexible transducer (CARMEN probe) is passed through your mouth into your esophagus. The transducer picks up heart sound waves and uses them to project a picture of the heart onto a screen. A recording is made for the cardiologist to review.  The test itself takes approximately 15-30 minutes, but since medications are given to ensure your comfort, your visit will be longer. Allow 2-4 hours from your arrival to the time you can leave.    Preparation  To ensure an accurate and safe test, follow all instructions given by your doctor, including the following:  Do not eat or drink for six (6) hours prior to the test.   Unless otherwise instructed by your doctor, take your medications as you do normally with a small sip of water.  Bring a list of your current medications, including over-the-counter drugs, vitamins, and herbal supplements. List any allergies.  You must make arrangements for someone to drive you home. We recommend someone stay with you at home for up to 24 hours.    What happens during the test?   When you arrive for your CARMEN, you will change into a hospital gown, and you will be asked questions about medical history, medications, and allergies. An IV (intravenous) line will be started in your vein.  You will be given oxygen and your throat will be sprayed with a numbing medication. You may be given a mild sedative through your IV to help you relax. You will be asked to lie on your left side.  The doctor gently inserts the CARMEN probe into your mouth. As you swallow, the tube is slowly  guided into your esophagus. The tube will be lubricated to make it slide easily.  You may feel the doctor moving the probe, but it shouldn t hurt or interfere with your breathing. A nurse monitors your heart rhythm, blood pressure, and breathing. The test usually takes 15-30 minutes.    When the test is complete  The medications you receive may make you drowsy. Do not drive for the remainder of the day of the test.  Because the back of your throat will be numb, you should not eat or drink until the numbness wears off (usually about 1 hour after the test). You will receive something to drink and eat 1 hour after the test. You may not have any hot foods or liquids for 6 hours after the test.    At Home/After the Procedure  Rest the remainder of the day. Over activity may cause nausea and dizziness.  Do not drive or operate any machinery.  Follow your normal diet. Drink plenty of fluids.   Do not drink alcoholic beverages for 24 hours.  Use throat lozenges or ice chips if you experience a mild sore throat.  Call your primary doctor if you have questions, concerns, or experience any of the following:  Increased pain or difficulty when swallowing.    At Home/After the Procedure Continued  Stomach or abdominal pain.  Temperature above 99 degrees.    Test Results  The cardiologist will study and interpret the images. Your doctor s office will be given test results and contact you. If you do not hear from them within 3-5 business days, please contact your doctor directly.       Here at [facility], we are dedicated to providing the best possible care. We hope to make your experience as pleasant as possible. Thank you for taking the time to read this information. Feel free to ask questions if something is not clear to you.         Cardioversion    Cardioversion is a procedure to restore your heart's normal rhythm from a fast or irregular rhythm (arrhythmia) in the top or bottom chambers of your heart. You may have the  procedure in a hospital or surgery center. It's often done on an outpatient (same day) basis. During the procedure, your doctor will give you medication to keep you free from pain. Then the doctor gives you a brief electric shock. This helps your heartbeat become normal again. In most cases, you can go home within hours of the procedure.    Before Your Procedure    Tell your doctor what over-the-counter and prescription medications, herbs, and supplements you are taking.  Take medication as directed. Your doctor may prescribe anticoagulants (blood thinners), depending on your situation. They help prevent blood clots from forming.  Ask your doctor about the risks and benefits of cardioversion.  Sign your consent form.  Don't eat or drink anything for 8  hours before your procedure.  Follow any other instructions your doctor gives you.   Arrange for an adult to drive you home after the procedure.    During Your Procedure    Your health care provider will place small pads (electrodes) on your chest to record your heartbeat at all times.  Your health care provider will place an intravenous (IV) line in your arm. This gives you medication (sedation) that keeps you free of pain. You'll feel sleepy.  Your health care provider will give you oxygen through a soft, plastic tube in your nose.  Pads will be placed on your chest and back. Your health care provider will give you a very brief electric shock through the pads. Remember, because of sedation, you won't feel the shock.  Your doctor will watch your heartbeat to make sure your normal rhythm has been restored.           During cardioversion, you are fully sedated and won't feel a thing    After Your Procedure    Your health care provider will monitor you until you are fully awake. Then you'll be able to sit up, walk, and eat.  In most cases, you'll be able to go home after the sedation wears off. This usually takes a few hours.  For a few days, the skin on your chest may  feel a little sore, like a mild sunburn.  DO NOT  drive or operate heavy machinery for 24 hours after the procedure.  The day after your procedure, try to take it easy. Take medication as directed.  Call your doctor if you notice skipped beats, a rapid heartbeat, or chest tightness. These may be signs that an irregular heartbeat has returned.        Be sure to have someone else drive you home.

## 2024-11-29 NOTE — INTERVAL H&P NOTE
"I have reviewed the surgical (or preoperative) H&P that is linked to this encounter, and examined the patient. There are no significant changes    INR 3.1 today    Clinical Conditions Present on Arrival:  Clinically Significant Risk Factors Present on Admission     # Hypokalemia: Lowest K = 2.8 mmol/L in last 30 days, will replace as needed          # Drug Induced Coagulation Defect: home medication list includes an anticoagulant medication  # Drug Induced Platelet Defect: home medication list includes an antiplatelet medication      # Obesity: Estimated body mass index is 39.65 kg/m  as calculated from the following:    Height as of this encounter: 1.626 m (5' 4\").    Weight as of this encounter: 104.8 kg (231 lb).           "

## 2024-11-30 ASSESSMENT — ACTIVITIES OF DAILY LIVING (ADL)
ADLS_ACUITY_SCORE: 60

## 2024-11-30 NOTE — ANESTHESIA POSTPROCEDURE EVALUATION
Patient: Jory Ambrose    Procedure: * No procedures listed *  Cardioversion External    Anesthesia Type:  General    Note:  Disposition: Inpatient   Postop Pain Control: Uneventful            Sign Out: Well controlled pain   PONV: No   Neuro/Psych: Uneventful            Sign Out: Acceptable/Baseline neuro status   Airway/Respiratory: Uneventful            Sign Out: Acceptable/Baseline resp. status   CV/Hemodynamics: Uneventful            Sign Out: Acceptable CV status; No obvious hypovolemia; No obvious fluid overload   Other NRE:    DID A NON-ROUTINE EVENT OCCUR? No           Last vitals:  Vitals:    11/29/24 1040 11/29/24 1041 11/29/24 1042   BP:      Pulse: 85 84 84   Resp:      Temp:      SpO2: 97% 99% 99%       Electronically Signed By: Anu Lopez MD  November 30, 2024  9:38 AM  
Never

## 2024-12-01 ASSESSMENT — ACTIVITIES OF DAILY LIVING (ADL)
ADLS_ACUITY_SCORE: 60

## 2024-12-02 ENCOUNTER — HOSPITAL ENCOUNTER (OUTPATIENT)
Dept: CARDIAC REHAB | Facility: CLINIC | Age: 49
Discharge: HOME | End: 2024-12-02
Attending: SURGERY
Payer: COMMERCIAL

## 2024-12-02 ENCOUNTER — ANTICOAGULATION THERAPY VISIT (OUTPATIENT)
Dept: ANTICOAGULATION | Facility: CLINIC | Age: 49
End: 2024-12-02
Payer: COMMERCIAL

## 2024-12-02 DIAGNOSIS — Z95.2 H/O MITRAL VALVE REPLACEMENT WITH MECHANICAL VALVE: ICD-10-CM

## 2024-12-02 DIAGNOSIS — I48.0 PAROXYSMAL ATRIAL FIBRILLATION (H): ICD-10-CM

## 2024-12-02 DIAGNOSIS — Z95.2 H/O MITRAL VALVE REPLACEMENT WITH MECHANICAL VALVE: Primary | ICD-10-CM

## 2024-12-02 PROCEDURE — 93798 PHYS/QHP OP CAR RHAB W/ECG: CPT

## 2024-12-02 RX ORDER — WARFARIN SODIUM 1 MG/1
TABLET ORAL
Qty: 120 TABLET | Refills: 0 | Status: SHIPPED | OUTPATIENT
Start: 2024-12-02 | End: 2024-12-02

## 2024-12-02 RX ORDER — WARFARIN SODIUM 1 MG/1
TABLET ORAL
Qty: 120 TABLET | Refills: 0 | Status: SHIPPED | OUTPATIENT
Start: 2024-12-02

## 2024-12-02 ASSESSMENT — ACTIVITIES OF DAILY LIVING (ADL)
ADLS_ACUITY_SCORE: 60

## 2024-12-02 NOTE — PROGRESS NOTES
ANTICOAGULATION MANAGEMENT     Jory Ambrose 49 year old female is on warfarin with therapeutic INR result. (Goal INR 2.5-3.5)    Recent labs: (last 7 days)     11/29/24  0747   INR 3.1*     Staff message:       ASSESSMENT     Source(s): Chart Review and Patient/Caregiver Call     Warfarin doses taken: Less warfarin taken than planned which may be affecting INR-Took 1.5 mg daily Fri,Sat,Sun  Diet: No new diet changes identified  Medication/supplement changes: NOT taking Bupropion; not sure if she will restart.  New illness, injury, or hospitalization: No  Signs or symptoms of bleeding or clotting: No  Previous result: Supratherapeutic on 11/29 - 4.7.  Additional findings: Echo CARMEN with cardioversion 11/29/24. Will send message to Dr. Rodriguez to let them know of dosing. Warfarin refill needed. Using 1 mg tabs. Sent in with dosing of 4 mg daily but informed Jory and her mother to only take the dosing advised by INR clinic, not the dosing on the bottle.       PLAN     Recommended plan for temporary change(s) affecting INR     Dosing Instructions: booster dose today (now) and also tomorrow; then continue your current warfarin dose with next INR in 2 days       Summary  As of 12/2/2024      Full warfarin instructions:  12/2: 5 mg; 12/3: 3 mg; Otherwise 3 mg every Mon, Thu, Sat; 2 mg all other days   Next INR check:  12/4/2024               Telephone call with Jory and her mother who agrees to plan and repeated back plan correctly  Sent Redeemr message with dosing and follow up instructions    Lab visit scheduled    Education provided: Taking warfarin: prescribed tablet strength and color  Goal range and lab monitoring: Importance of therapeutic range and Importance of following up at instructed interval  Symptom monitoring: monitoring for bleeding signs and symptoms  Written instructions provided  Contact [phone] with any changes, questions or concerns.     Plan made with [facility] Pharmacist [place]  Beto Early, RN  12/2/2024  Anticoagulation Clinic  [facility] for routing messages: p [facility] HEART [facility]  ACC patient phone line: [phone]        _______________________________________________________________________     Anticoagulation Episode Summary       Current INR goal:  2.5-3.5   TTR:  15.2% (2.1 wk)   Target end date:  Indefinite   Send INR reminders to:  [facility]    Indications    H/O mitral valve replacement with mechanical valve [Z95.2]             Comments:  --             Anticoagulation Care Providers       Provider Role Specialty Phone number    Catherine Galvez PA-C Referring  [phone]

## 2024-12-02 NOTE — PROGRESS NOTES
"Rx updated with dosing directions of \"2-3 mg daily\" and resent to pharmacy. Dosing is still changing so more accurate dosing instructions can be added at next refill of Warfarin.    Chayo Early RN   " Last visit: 1/24/2023

## 2024-12-03 DIAGNOSIS — I25.119 CORONARY ARTERY DISEASE INVOLVING NATIVE CORONARY ARTERY OF NATIVE HEART WITH ANGINA PECTORIS (H): ICD-10-CM

## 2024-12-03 RX ORDER — PNV NO.95/FERROUS FUM/FOLIC AC 28MG-0.8MG
1 TABLET ORAL DAILY
Qty: 30 TABLET | Refills: 2 | Status: SHIPPED | OUTPATIENT
Start: 2024-12-03

## 2024-12-04 ENCOUNTER — HOSPITAL ENCOUNTER (OUTPATIENT)
Dept: CARDIAC REHAB | Facility: CLINIC | Age: 49
Discharge: HOME | End: 2024-12-04
Attending: SURGERY
Payer: COMMERCIAL

## 2024-12-04 ENCOUNTER — ANTICOAGULATION THERAPY VISIT (OUTPATIENT)
Dept: ANTICOAGULATION | Facility: CLINIC | Age: 49
End: 2024-12-04

## 2024-12-04 ENCOUNTER — LAB (OUTPATIENT)
Dept: CARDIOLOGY | Facility: CLINIC | Age: 49
End: 2024-12-04
Payer: COMMERCIAL

## 2024-12-04 DIAGNOSIS — Z95.2 H/O MITRAL VALVE REPLACEMENT WITH MECHANICAL VALVE: Primary | ICD-10-CM

## 2024-12-04 DIAGNOSIS — Z95.2 H/O MITRAL VALVE REPLACEMENT WITH MECHANICAL VALVE: ICD-10-CM

## 2024-12-04 LAB — INR POINT OF CARE: 4.5 (ref 0.9–1.1)

## 2024-12-04 PROCEDURE — 93798 PHYS/QHP OP CAR RHAB W/ECG: CPT

## 2024-12-04 NOTE — PROGRESS NOTES
ANTICOAGULATION MANAGEMENT     Jory Ambrose 49 year old female is on warfarin with supratherapeutic INR result. (Goal INR 2.5-3.5)    Recent labs: (last 7 days)     12/04/24  1118   INR 4.5*       ASSESSMENT     Source(s): Chart Review and Patient/Caregiver Call     Warfarin doses taken: Booster dose(s) recently taken which may be affecting INR  Diet: No new diet changes identified  Medication/supplement changes: None noted  New illness, injury, or hospitalization: No  Signs or symptoms of bleeding or clotting: No  Previous result: Therapeutic last visit; previously outside of goal range  Additional findings: Recent heart valve replacement in last 10 weeks and Recent Cardioversion within 4 weeks; Hx of Labile INR: at least weekly INR x 4 advised. Notify cardiologist and  pool if INR <= 1.7 within 4 weeks, procedure/surgery hold requested, or non-compliance with monitoring > 1 week.       PLAN     Recommended plan for temporary change(s) affecting INR     Dosing Instructions: partial hold then decrease your warfarin dose (5.9% change) with next INR in 5 days       Summary  As of 12/4/2024      Full warfarin instructions:  12/4: 1 mg; Otherwise 3 mg every Tue, Sat; 2 mg all other days   Next INR check:  12/9/2024               Telephone call with momMia who agrees to plan and repeated back plan correctly    Lab visit scheduled    Education provided: Goal range and lab monitoring: goal range and significance of current result    Plan made with [facility] Pharmacist [place] Mendoza, RN  12/4/2024  Anticoagulation Clinic  [facility] for routing messages: p [facility] patient phone line: [phone]        _______________________________________________________________________     Anticoagulation Episode Summary       Current INR goal:  2.5-3.5   TTR:  18.5% (2.9 wk)   Target end date:  Indefinite   Send INR reminders to:  [facility]     Indications    H/O mitral valve replacement with mechanical valve [Z95.2]             Comments:  --             Anticoagulation Care Providers       Provider Role Specialty Phone number    Catherine Galvez PA-C Referring  [phone]

## 2024-12-06 ENCOUNTER — HOSPITAL ENCOUNTER (OUTPATIENT)
Dept: CARDIAC REHAB | Facility: CLINIC | Age: 49
Discharge: HOME | End: 2024-12-06
Attending: SURGERY
Payer: COMMERCIAL

## 2024-12-06 PROCEDURE — 93798 PHYS/QHP OP CAR RHAB W/ECG: CPT

## 2024-12-09 ENCOUNTER — OFFICE VISIT (OUTPATIENT)
Dept: CARDIOLOGY | Facility: CLINIC | Age: 49
End: 2024-12-09
Attending: INTERNAL MEDICINE
Payer: COMMERCIAL

## 2024-12-09 ENCOUNTER — HOSPITAL ENCOUNTER (OUTPATIENT)
Dept: CARDIAC REHAB | Facility: CLINIC | Age: 49
Discharge: HOME | End: 2024-12-09
Attending: SURGERY
Payer: COMMERCIAL

## 2024-12-09 ENCOUNTER — ANTICOAGULATION THERAPY VISIT (OUTPATIENT)
Dept: ANTICOAGULATION | Facility: CLINIC | Age: 49
End: 2024-12-09

## 2024-12-09 ENCOUNTER — HOSPITAL ENCOUNTER (OUTPATIENT)
Dept: RADIOLOGY | Facility: HOSPITAL | Age: 49
Discharge: HOME | End: 2024-12-09
Attending: INTERNAL MEDICINE | Admitting: INTERNAL MEDICINE
Payer: COMMERCIAL

## 2024-12-09 ENCOUNTER — LAB (OUTPATIENT)
Dept: CARDIOLOGY | Facility: CLINIC | Age: 49
End: 2024-12-09
Payer: COMMERCIAL

## 2024-12-09 ENCOUNTER — TELEPHONE (OUTPATIENT)
Dept: CARDIOLOGY | Facility: CLINIC | Age: 49
End: 2024-12-09

## 2024-12-09 VITALS
HEART RATE: 80 BPM | WEIGHT: 216 LBS | DIASTOLIC BLOOD PRESSURE: 74 MMHG | RESPIRATION RATE: 18 BRPM | SYSTOLIC BLOOD PRESSURE: 108 MMHG | BODY MASS INDEX: 37.08 KG/M2

## 2024-12-09 DIAGNOSIS — Z95.2 H/O MITRAL VALVE REPLACEMENT WITH MECHANICAL VALVE: Primary | ICD-10-CM

## 2024-12-09 DIAGNOSIS — J90 PLEURAL EFFUSION: Primary | ICD-10-CM

## 2024-12-09 DIAGNOSIS — Z95.2 H/O MITRAL VALVE REPLACEMENT WITH MECHANICAL VALVE: ICD-10-CM

## 2024-12-09 DIAGNOSIS — I48.0 PAROXYSMAL ATRIAL FIBRILLATION (H): ICD-10-CM

## 2024-12-09 DIAGNOSIS — I25.119 CORONARY ARTERY DISEASE INVOLVING NATIVE CORONARY ARTERY OF NATIVE HEART WITH ANGINA PECTORIS (H): ICD-10-CM

## 2024-12-09 DIAGNOSIS — J90 PLEURAL EFFUSION: ICD-10-CM

## 2024-12-09 LAB
ANION GAP SERPL CALCULATED.3IONS-SCNC: 11 MMOL/L (ref 7–15)
BUN SERPL-MCNC: 24.6 MG/DL (ref 6–20)
CALCIUM SERPL-MCNC: 9.1 MG/DL (ref 8.8–10.4)
CHLORIDE SERPL-SCNC: 103 MMOL/L (ref 98–107)
CREAT SERPL-MCNC: 1.09 MG/DL (ref 0.51–0.95)
EGFRCR SERPLBLD CKD-EPI 2021: 62 ML/MIN/1.73M2
GLUCOSE SERPL-MCNC: 125 MG/DL (ref 70–99)
HCO3 SERPL-SCNC: 22 MMOL/L (ref 22–29)
INR POINT OF CARE: 4.6 (ref 0.9–1.1)
POTASSIUM SERPL-SCNC: 4 MMOL/L (ref 3.4–5.3)
SODIUM SERPL-SCNC: 136 MMOL/L (ref 135–145)

## 2024-12-09 PROCEDURE — 99214 OFFICE O/P EST MOD 30 MIN: CPT | Performed by: INTERNAL MEDICINE

## 2024-12-09 PROCEDURE — 36415 COLL VENOUS BLD VENIPUNCTURE: CPT | Performed by: INTERNAL MEDICINE

## 2024-12-09 PROCEDURE — 71046 X-RAY EXAM CHEST 2 VIEWS: CPT

## 2024-12-09 PROCEDURE — 80048 BASIC METABOLIC PNL TOTAL CA: CPT | Performed by: INTERNAL MEDICINE

## 2024-12-09 PROCEDURE — 93798 PHYS/QHP OP CAR RHAB W/ECG: CPT

## 2024-12-09 PROCEDURE — 85610 PROTHROMBIN TIME: CPT

## 2024-12-09 PROCEDURE — G2211 COMPLEX E/M VISIT ADD ON: HCPCS | Performed by: INTERNAL MEDICINE

## 2024-12-09 NOTE — LETTER
12/9/2024    Rome Joshua MD  [phone]    RE: Jory Ambrose       Dear Colleague,     I had the pleasure of seeing Jory Ambrose in the [facility].      Cardiology Progress Note     Assessment:  Coronary artery disease status post CABG in October 2024, no angina  Mitral regurgitation status post mitral valve replacement with mechanical prosthesis, normal auscultation  Postoperative atrial flutter/fib status post CARMEN guided cardioversion on 11/29/2024 with recurrence of tachyarrhythmia earlier today, status post surgical exclusion of left atrial appendage with good results  ATN post open heart surgery, resolving  Postoperative severe fluid overload improved  Postop pleural effusion, persistent  Postoperative anemia, stable  Hypercholesterolemia, on statin  History of Hodgkin lymphoma status post chemo chest radiation and bone marrow transplant, in remission  Cerebrovascular disease mild to moderate, on clopidogrel      Plan:  Metabolic panel and then start sotalol in preparation for another cardioversion down the road  Dose of sotalol dependent kidney function    Will restart diuretics based on the kidney function    Chest x-ray and ultrasound-guided thoracentesis if significant pleural effusions are present    Follow-up based on the results of the test  The longitudinal plan of care for the diagnosis(es)/condition(s) as documented were addressed during this visit. Due to the added complexity in care, I will continue to support Jory in the subsequent management and with ongoing continuity of care.   Subjective:   This is 49 year old female who comes in today for follow-up visit.  10 days ago she underwent CARMEN guided cardioversion she felt better and her heart rate stayed in the 70s until she came to cardiac rehab today when her rate was elevated again.  Rhythm strip showed atrial fibrillation with a rate of 120 bpm.  She continues to struggle  with shortness of breath especially when she tries to lay down.  She has not had chest pain.  She has not had syncope.  She stopped taking furosemide because her weight was below her baseline.  The pedal edema has completely resolved with the diuretic treatment.    Review of Systems:   Negative other than history of present illness    Objective:   /74 (BP Location: Left arm, Patient Position: Sitting, Cuff Size: Adult Large)   Pulse 80   Resp 18   Wt 98 kg (216 lb)   BMI 37.08 kg/m    Physical Exam:  GENERAL: no distress  NECK: No JVD  LUNGS: Absent breath sounds right lower third of the field  CARDIAC: irregular rhythm, no clicks or ectopies on mitral valve S2 normal.  No heaves, thrills, gallops or murmurs.  ABDOMEN: flat, negative hepatosplenomegaly, soft and non-tender.  EXTREMITIES: No evidence of cyanosis, clubbing.  Trace lower extremity edema.    Current Outpatient Medications   Medication Sig Dispense Refill     acetaminophen (TYLENOL) 500 MG tablet Take 2 tablets (1,000 mg) by mouth every 6 hours as needed for mild pain.       albuterol (PROAIR HFA) 108 (90 Base) MCG/ACT inhaler Inhale 2 puffs into the lungs every 6 hours as needed. 18 g 0     atorvastatin (LIPITOR) 80 MG tablet Take 1 tablet (80 mg) by mouth daily. 90 tablet 3     budesonide-formoterol (SYMBICORT) 80-4.5 MCG/ACT Inhaler Inhale 1 puff into the lungs two times daily. 6.9 g 0     cetirizine (ZYRTEC) 10 MG tablet Take 1 tablet (10 mg) by mouth daily. 30 tablet 11     cholecalciferol (VITAMIN D3) 125 mcg (5000 units) capsule Take 1 capsule (125 mcg) by mouth daily. 30 capsule 0     clopidogrel (PLAVIX) 75 MG tablet Take 1 tablet (75 mg) by mouth daily. 30 tablet 1     Ferrous Sulfate (IRON) 325 (65 Fe) MG tablet Take 1 tablet by mouth daily. 30 tablet 2     levothyroxine (SYNTHROID/LEVOTHROID) 125 MCG tablet Take 1 tablet (125 mcg) by mouth daily. 30 tablet 0     metoprolol tartrate (LOPRESSOR) 100 MG tablet Take 1 tablet (100 mg) by  mouth 2 times daily. 60 tablet 3     miconazole (MICATIN) 2 % external powder Apply topically 2 times daily. (Patient taking differently: Apply topically as needed.)       omeprazole (PRILOSEC) 20 MG DR capsule Take 1 capsule (20 mg) by mouth daily. 30 capsule 12     potassium chloride maryann ER (KLOR-CON M20) 20 MEQ CR tablet Take 2 tablets (40 mEq) by mouth daily. 120 tablet 1     simethicone (MYLICON) 125 MG chewable tablet Take 1 tablet (125 mg) by mouth 4 times daily as needed for intestinal gas 40 tablet 0     sodium chloride (OCEAN) 0.65 % nasal spray Spray 1 spray into both nostrils every hour as needed for congestion or other (dry nose). 15 mL 0     traZODone (DESYREL) 100 MG tablet Take 1 tablet (100 mg) by mouth at bedtime. 30 tablet 0     warfarin ANTICOAGULANT (COUMADIN) 1 MG tablet Take 2 - 3 mg daily. OR as directed by INR clinic. 120 tablet 0     bumetanide (BUMEX) 1 MG tablet Take 1 tablet (1 mg) by mouth daily. 30 tablet 2     buPROPion (WELLBUTRIN XL) 300 MG 24 hr tablet Take 1 tablet (300 mg) by mouth every morning. (Patient not taking: Reported on 11/5/2024) 30 tablet 0     fluticasone (FLONASE) 50 MCG/ACT nasal spray Spray 2 sprays into both nostrils daily as needed for rhinitis or allergies. (Patient not taking: Reported on 11/5/2024) 9.9 mL 0     multivitamin w/minerals (MULTI-VITAMIN) tablet Take 1 tablet by mouth daily. (Patient not taking: Reported on 12/9/2024)         Cardiographics:    ECG: 10/24/2024  Atrial flutter with controlled ventricular response        Echocardiogram: June 2024 preop     1.Left ventricular size, wall motion and function are normal. The ejection  fraction is 55-60%.  2.Normal right ventricle size and systolic function.  3.There is moderate (2+) mitral regurgitation. PISA not measured or performed.  4.Poor images for analysis.  October 2024 Post op  Left ventricular function is normal.The ejection fraction is 60-65%.  Septal motion is consistent with post-operative  "state.  Normal right ventricle size and systolic function.  There is a mechanical mitral valve.  Normal prosthetic mitral valve gradients.  Trivial pericardial effusion     CARMEN: November 2024  Left ventricular size, wall motion and function are normal. The ejection  fraction is 60-65%.  Normal right ventricle size and systolic function.  Left atrial appendage was surgically excised. There is no residual pouch  No left atrial mass or thrombus visualized.  The left atrium is mildly dilated.  There is a bi-leaflet ([employer]) mechanical prosthesis.  Normal prosthetic mitral valve gradients.    Coronary angio: September 2024  LM:mid-distal 60-70%   LAD:mid-vessel 85% narrowing at the take off of a diagonal branch  Lcx:distal 50- 60% narrowing  RCA:dominant, 100% occluded ostial, fills distally via L-R collaterals     LVEDP:10     Lab Results    Chemistry/lipid CBC Cardiac Enzymes/BNP/TSH/INR   No results for input(s): \"CHOL\", \"HDL\", \"LDL\", \"TRIG\", \"CHOLHDLRATIO\" in the last 84206 hours.  No results for input(s): \"LDL\" in the last 55407 hours.  Recent Labs   Lab Test 11/29/24  0757 11/18/24  1315   NA  --  143   POTASSIUM 3.7 3.9   CHLORIDE  --  99   CO2  --  28   GLC  --  94   BUN  --  35.9*   CR  --  1.45*   GFRESTIMATED  --  44*   CINDY  --  9.4     Recent Labs   Lab Test 11/18/24  1315 11/08/24  1616 11/05/24  1202   CR 1.45* 1.77* 1.77*     Recent Labs   Lab Test 10/07/24  0842   A1C 5.7*          Recent Labs   Lab Test 11/05/24  1202 10/29/24  0543 10/28/24  0629   WBC  --   --  11.7*   HGB 8.5*   < > 7.0*   HCT  --   --  23.4*   MCV  --   --  93   PLT  --   --  398    < > = values in this interval not displayed.     Recent Labs   Lab Test 11/05/24  1202 11/01/24  0324 10/31/24  0450   HGB 8.5* 8.2* 7.9*    No results for input(s): \"TROPONINI\" in the last 36229 hours.  Recent Labs   Lab Test 06/06/24  1302   NTBNPI 101     Recent Labs   Lab Test 11/20/24  1055   TSH 12.40*     Recent Labs   Lab Test 12/09/24  1510 " 12/04/24  1118 11/29/24  0747   INR 4.6* 4.5* 3.1*                         Thank you for allowing me to participate in the care of your patient.      Sincerely,     Jordon Rodriguez MD     [facility]  cc:   Miles Rodriguez MD  [address] [address]

## 2024-12-09 NOTE — PROGRESS NOTES
ANTICOAGULATION MANAGEMENT     Jory Ambrose 49 year old female is on warfarin with supratherapeutic INR result. (Goal INR 2.5-3.5)    Recent labs: (last 7 days)     12/09/24  1510   INR 4.6*       ASSESSMENT     Warfarin Lab Questionnaire    Warfarin Doses Last 7 Days - confirmed taken as instructed with Mia.           12/8/2024   Warfarin Lab Questionnaire   Missed doses within past 14 days? No   Changes in diet or alcohol within past 14 days? No   Medication changes since last result? No   Injuries or illness since last result? No - noted to be back in a.fib today. Waiting to hear next steps from cardiology.    New shortness of breath, severe headaches or sudden changes in vision since last result? No   Abnormal bleeding since last result? No   Upcoming surgery, procedure? No        Previous result: Supratherapeutic  Additional findings: Recent heart valve replacement in last 10 weeks and Recent Cardioversion within 4 weeks; Hx of Labile INR: at least weekly INR x 4 advised. Notify cardiologist and  pool if INR <= 1.7 within 4 weeks, procedure/surgery hold requested, or non-compliance with monitoring > 1 week.       PLAN     Recommended plan for temporary change(s) affecting INR     Dosing Instructions: partial hold then decrease your warfarin dose (12.5% change) with next INR in 5 days       Summary  As of 12/9/2024      Full warfarin instructions:  12/9: 1 mg; Otherwise 2 mg every day   Next INR check:  12/13/2024               Telephone call with mommia who agrees to plan and repeated back plan correctly    Lab visit scheduled    Education provided: Goal range and lab monitoring: goal range and significance of current result, Importance of therapeutic range, and Importance of following up at instructed interval    Plan made with [facility] Pharmacist Maine Mendoza, RN  12/9/2024  Anticoagulation Clinic  [facility] for routing messages: [facility] patient  phone line: [phone]        _______________________________________________________________________     Anticoagulation Episode Summary       Current INR goal:  2.5-3.5   TTR:  14.8% (3.6 wk)   Target end date:  Indefinite   Send INR reminders to:  [facility]    Indications    H/O mitral valve replacement with mechanical valve [Z95.2]             Comments:  --             Anticoagulation Care Providers       Provider Role Specialty Phone number    Catherine Galvez PA-C Referring  [phone]

## 2024-12-09 NOTE — TELEPHONE ENCOUNTER
Provided disability paperwork by Dr. Rodriguez. Paperwork filled out and faxed to provided fax number. [facility] msg sent to pt regarding this. aj

## 2024-12-09 NOTE — PROGRESS NOTES
Cardiology Progress Note     Assessment:  Coronary artery disease status post CABG in October 2024, no angina  Mitral regurgitation status post mitral valve replacement with mechanical prosthesis, normal auscultation  Postoperative atrial flutter/fib status post CARMEN guided cardioversion on 11/29/2024 with recurrence of tachyarrhythmia earlier today, status post surgical exclusion of left atrial appendage with good results  ATN post open heart surgery, resolving  Postoperative severe fluid overload improved  Postop pleural effusion, persistent  Postoperative anemia, stable  Hypercholesterolemia, on statin  History of Hodgkin lymphoma status post chemo chest radiation and bone marrow transplant, in remission  Cerebrovascular disease mild to moderate, on clopidogrel      Plan:  Metabolic panel and then start sotalol in preparation for another cardioversion down the road  Dose of sotalol dependent kidney function    Will restart diuretics based on the kidney function    Chest x-ray and ultrasound-guided thoracentesis if significant pleural effusions are present    Follow-up based on the results of the test  The longitudinal plan of care for the diagnosis(es)/condition(s) as documented were addressed during this visit. Due to the added complexity in care, I will continue to support Jory in the subsequent management and with ongoing continuity of care.   Subjective:   This is 49 year old female who comes in today for follow-up visit.  10 days ago she underwent CARMEN guided cardioversion she felt better and her heart rate stayed in the 70s until she came to cardiac rehab today when her rate was elevated again.  Rhythm strip showed atrial fibrillation with a rate of 120 bpm.  She continues to struggle with shortness of breath especially when she tries to lay down.  She has not had chest pain.  She has not had syncope.  She stopped taking furosemide because her weight was below her baseline.  The pedal edema has  completely resolved with the diuretic treatment.    Review of Systems:   Negative other than history of present illness    Objective:   /74 (BP Location: Left arm, Patient Position: Sitting, Cuff Size: Adult Large)   Pulse 80   Resp 18   Wt 98 kg (216 lb)   BMI 37.08 kg/m    Physical Exam:  GENERAL: no distress  NECK: No JVD  LUNGS: Absent breath sounds right lower third of the field  CARDIAC: irregular rhythm, no clicks or ectopies on mitral valve S2 normal.  No heaves, thrills, gallops or murmurs.  ABDOMEN: flat, negative hepatosplenomegaly, soft and non-tender.  EXTREMITIES: No evidence of cyanosis, clubbing.  Trace lower extremity edema.    Current Outpatient Medications   Medication Sig Dispense Refill    acetaminophen (TYLENOL) 500 MG tablet Take 2 tablets (1,000 mg) by mouth every 6 hours as needed for mild pain.      albuterol (PROAIR HFA) 108 (90 Base) MCG/ACT inhaler Inhale 2 puffs into the lungs every 6 hours as needed. 18 g 0    atorvastatin (LIPITOR) 80 MG tablet Take 1 tablet (80 mg) by mouth daily. 90 tablet 3    budesonide-formoterol (SYMBICORT) 80-4.5 MCG/ACT Inhaler Inhale 1 puff into the lungs two times daily. 6.9 g 0    cetirizine (ZYRTEC) 10 MG tablet Take 1 tablet (10 mg) by mouth daily. 30 tablet 11    cholecalciferol (VITAMIN D3) 125 mcg (5000 units) capsule Take 1 capsule (125 mcg) by mouth daily. 30 capsule 0    clopidogrel (PLAVIX) 75 MG tablet Take 1 tablet (75 mg) by mouth daily. 30 tablet 1    Ferrous Sulfate (IRON) 325 (65 Fe) MG tablet Take 1 tablet by mouth daily. 30 tablet 2    levothyroxine (SYNTHROID/LEVOTHROID) 125 MCG tablet Take 1 tablet (125 mcg) by mouth daily. 30 tablet 0    metoprolol tartrate (LOPRESSOR) 100 MG tablet Take 1 tablet (100 mg) by mouth 2 times daily. 60 tablet 3    miconazole (MICATIN) 2 % external powder Apply topically 2 times daily. (Patient taking differently: Apply topically as needed.)      omeprazole (PRILOSEC) 20 MG DR capsule Take 1 capsule  (20 mg) by mouth daily. 30 capsule 12    potassium chloride maryann ER (KLOR-CON M20) 20 MEQ CR tablet Take 2 tablets (40 mEq) by mouth daily. 120 tablet 1    simethicone (MYLICON) 125 MG chewable tablet Take 1 tablet (125 mg) by mouth 4 times daily as needed for intestinal gas 40 tablet 0    sodium chloride (OCEAN) 0.65 % nasal spray Spray 1 spray into both nostrils every hour as needed for congestion or other (dry nose). 15 mL 0    traZODone (DESYREL) 100 MG tablet Take 1 tablet (100 mg) by mouth at bedtime. 30 tablet 0    warfarin ANTICOAGULANT (COUMADIN) 1 MG tablet Take 2 - 3 mg daily. OR as directed by INR clinic. 120 tablet 0    bumetanide (BUMEX) 1 MG tablet Take 1 tablet (1 mg) by mouth daily. 30 tablet 2    buPROPion (WELLBUTRIN XL) 300 MG 24 hr tablet Take 1 tablet (300 mg) by mouth every morning. (Patient not taking: Reported on 11/5/2024) 30 tablet 0    fluticasone (FLONASE) 50 MCG/ACT nasal spray Spray 2 sprays into both nostrils daily as needed for rhinitis or allergies. (Patient not taking: Reported on 11/5/2024) 9.9 mL 0    multivitamin w/minerals (MULTI-VITAMIN) tablet Take 1 tablet by mouth daily. (Patient not taking: Reported on 12/9/2024)         Cardiographics:    ECG: 10/24/2024  Atrial flutter with controlled ventricular response        Echocardiogram: June 2024 preop     1.Left ventricular size, wall motion and function are normal. The ejection  fraction is 55-60%.  2.Normal right ventricle size and systolic function.  3.There is moderate (2+) mitral regurgitation. PISA not measured or performed.  4.Poor images for analysis.  October 2024 Post op  Left ventricular function is normal.The ejection fraction is 60-65%.  Septal motion is consistent with post-operative state.  Normal right ventricle size and systolic function.  There is a mechanical mitral valve.  Normal prosthetic mitral valve gradients.  Trivial pericardial effusion     CARMEN: November 2024  Left ventricular size, wall motion and  "function are normal. The ejection  fraction is 60-65%.  Normal right ventricle size and systolic function.  Left atrial appendage was surgically excised. There is no residual pouch  No left atrial mass or thrombus visualized.  The left atrium is mildly dilated.  There is a bi-leaflet ([employer]) mechanical prosthesis.  Normal prosthetic mitral valve gradients.    Coronary angio: September 2024  LM:mid-distal 60-70%   LAD:mid-vessel 85% narrowing at the take off of a diagonal branch  Lcx:distal 50- 60% narrowing  RCA:dominant, 100% occluded ostial, fills distally via L-R collaterals     LVEDP:10     Lab Results    Chemistry/lipid CBC Cardiac Enzymes/BNP/TSH/INR   No results for input(s): \"CHOL\", \"HDL\", \"LDL\", \"TRIG\", \"CHOLHDLRATIO\" in the last 05008 hours.  No results for input(s): \"LDL\" in the last 91342 hours.  Recent Labs   Lab Test 11/29/24  0757 11/18/24  1315   NA  --  143   POTASSIUM 3.7 3.9   CHLORIDE  --  99   CO2  --  28   GLC  --  94   BUN  --  35.9*   CR  --  1.45*   GFRESTIMATED  --  44*   CINDY  --  9.4     Recent Labs   Lab Test 11/18/24  1315 11/08/24  1616 11/05/24  1202   CR 1.45* 1.77* 1.77*     Recent Labs   Lab Test 10/07/24  0842   A1C 5.7*          Recent Labs   Lab Test 11/05/24  1202 10/29/24  0543 10/28/24  0629   WBC  --   --  11.7*   HGB 8.5*   < > 7.0*   HCT  --   --  23.4*   MCV  --   --  93   PLT  --   --  398    < > = values in this interval not displayed.     Recent Labs   Lab Test 11/05/24  1202 11/01/24  0324 10/31/24  0450   HGB 8.5* 8.2* 7.9*    No results for input(s): \"TROPONINI\" in the last 81607 hours.  Recent Labs   Lab Test 06/06/24  1302   NTBNPI 101     Recent Labs   Lab Test 11/20/24  1055   TSH 12.40*     Recent Labs   Lab Test 12/09/24  1510 12/04/24  1118 11/29/24  0747   INR 4.6* 4.5* 3.1*                     "

## 2024-12-10 ENCOUNTER — TELEPHONE (OUTPATIENT)
Dept: CARDIOLOGY | Facility: CLINIC | Age: 49
End: 2024-12-10
Payer: COMMERCIAL

## 2024-12-10 ENCOUNTER — DOCUMENTATION ONLY (OUTPATIENT)
Dept: CARDIOLOGY | Facility: CLINIC | Age: 49
End: 2024-12-10
Payer: COMMERCIAL

## 2024-12-10 DIAGNOSIS — I48.0 PAROXYSMAL ATRIAL FIBRILLATION (H): ICD-10-CM

## 2024-12-10 DIAGNOSIS — I48.3 TYPICAL ATRIAL FLUTTER (H): ICD-10-CM

## 2024-12-10 DIAGNOSIS — I48.0 PAROXYSMAL ATRIAL FIBRILLATION (H): Primary | ICD-10-CM

## 2024-12-10 DIAGNOSIS — E87.6 HYPOKALEMIA: Primary | ICD-10-CM

## 2024-12-10 RX ORDER — SOTALOL HYDROCHLORIDE 80 MG/1
80 TABLET ORAL 2 TIMES DAILY
Qty: 60 TABLET | Refills: 1 | Status: SHIPPED | OUTPATIENT
Start: 2024-12-10

## 2024-12-10 RX ORDER — POTASSIUM CHLORIDE 1500 MG/1
20 TABLET, EXTENDED RELEASE ORAL
OUTPATIENT
Start: 2024-12-10 | End: 2024-12-10

## 2024-12-10 RX ORDER — SODIUM CHLORIDE 9 MG/ML
INJECTION, SOLUTION INTRAVENOUS CONTINUOUS
Status: DISCONTINUED | OUTPATIENT
Start: 2024-12-10 | End: 2024-12-10 | Stop reason: HOSPADM

## 2024-12-10 RX ORDER — LIDOCAINE 40 MG/G
CREAM TOPICAL
OUTPATIENT
Start: 2024-12-10

## 2024-12-10 NOTE — PROGRESS NOTES
AC: Coumadin NP Med Review: NEEDS review    DM Meds: None  GLP-1:None     Component      Latest Ref Rng 11/20/2024  10:51 AM 11/22/2024  10:17 AM 11/26/2024  10:15 AM 11/29/2024  7:47 AM 12/4/2024  11:18 AM   INR Point of Care      0.9 - 1.1  2.4 !  2.2 !  4.7 !  3.1 !  4.5 !      Component      Latest Ref Rng 12/9/2024  3:10 PM   INR Point of Care      0.9 - 1.1  4.6 !         Jory Contehers, 1975, [id number]  Home:[phone] (home) Cell:[phone] (mobile)  Emergency Contact: SUSIE ALVES [phone]  PCP: [place] Joshua, [phone]    +++Important patient information for CSC/Cath Lab staff : None+++    [facility] EP Cath Lab Procedure Order   Procedure: Cardioversion for AF  Ordering Provider:  Michael  Date Ordered and Prepped: 12/10/2024 Jory Chris RN  Anticipated Case Duration:  Standard  Scheduling Needs/Timeframe:   in 1 week  Scheduling Contact: Please contact pt to schedule  Cardiology Follow Up Apt s/p: Standard- EP ORQUIDEA @ 4-6 wks or previously scheduled General Card apt  Current Device/Device Co Needed for Procedure: None NoneNone  Pre-Procedural Testing needed: None  Anesthesia:  General    [facility] EP Cath Lab Prep   H&P:  Compled by cardiology on 12/9 if scheduled within 30 days, pt to schedule with PMD if procedure outside of this timeframe  Pre-op Labs: K if pt taking diuretic medication or hx of low Potassium, Beta HcG if appropriate, and INR if on Warfarin will be ordered AM of procedure and Review of most recent labs, WEL for procedure  T&S Pre-Procedure Review: T&S is not required for DCCV/DFT Testing  Medical Records Pertinent for Procedure:  None  Iodinated Contrast Dye Allergies (Does not include Shellfish, Egg, and/or Iodine Allergy): NA  GLP-1 Protocol: If on Dulaglutide (Trulicity) (weekly)- Injection hold 7 days prior to procedure  , Exenatide extended release (Bydureon bcise) (weekly)- Injection hold 7 days prior to procedure, Exenatide (Byetta) (twice daily)- Oral  Tablet hold day prior and morning of procedure and for Injection hold 7 days prior to procedure, Semaglutide (Ozempic) (weekly)- Injection and Oral hold 7 days prior to procedure, Liraglutide (Victoza, Saxenda) (daily)- Injection hold day prior and morning of procedure    Allergies   Allergen Reactions    Atarax [Hydroxyzine] Shortness Of Breath    Dye [Contrast Dye] Swelling     Pre-medicated with methylprednisolone    Shellfish Allergy Shortness Of Breath, Anaphylaxis and Swelling     Shrimp, crab, lobster    Cats      Other Reaction(s): Rhinitis, Sneezing    Dust Mite Extract Other (See Comments)     Other Reaction(s): Rhinitis, Sneezing    Food Itching     Shrimp and walnuts.    Penicillins Hives     Patient cannot recall if she has tried cephalosporins in the past.     Wheat Germ Oil Other (See Comments)     Seasonal - multiple    Erythromycin Hives    Morphine Hcl      Causes change in mental status    Nickel Rash     rash    Sulfa Antibiotics Hives and Rash       Current Outpatient Medications:     acetaminophen (TYLENOL) 500 MG tablet, Take 2 tablets (1,000 mg) by mouth every 6 hours as needed for mild pain., Disp: , Rfl:     albuterol (PROAIR HFA) 108 (90 Base) MCG/ACT inhaler, Inhale 2 puffs into the lungs every 6 hours as needed., Disp: 18 g, Rfl: 0    atorvastatin (LIPITOR) 80 MG tablet, Take 1 tablet (80 mg) by mouth daily., Disp: 90 tablet, Rfl: 3    budesonide-formoterol (SYMBICORT) 80-4.5 MCG/ACT Inhaler, Inhale 1 puff into the lungs two times daily., Disp: 6.9 g, Rfl: 0    bumetanide (BUMEX) 1 MG tablet, Take 1 tablet (1 mg) by mouth daily., Disp: 30 tablet, Rfl: 2    buPROPion (WELLBUTRIN XL) 300 MG 24 hr tablet, Take 1 tablet (300 mg) by mouth every morning. (Patient not taking: Reported on 11/5/2024), Disp: 30 tablet, Rfl: 0    cetirizine (ZYRTEC) 10 MG tablet, Take 1 tablet (10 mg) by mouth daily., Disp: 30 tablet, Rfl: 11    cholecalciferol (VITAMIN D3) 125 mcg (5000 units) capsule, Take 1  capsule (125 mcg) by mouth daily., Disp: 30 capsule, Rfl: 0    clopidogrel (PLAVIX) 75 MG tablet, Take 1 tablet (75 mg) by mouth daily., Disp: 30 tablet, Rfl: 1    Ferrous Sulfate (IRON) 325 (65 Fe) MG tablet, Take 1 tablet by mouth daily., Disp: 30 tablet, Rfl: 2    fluticasone (FLONASE) 50 MCG/ACT nasal spray, Spray 2 sprays into both nostrils daily as needed for rhinitis or allergies. (Patient not taking: Reported on 11/5/2024), Disp: 9.9 mL, Rfl: 0    levothyroxine (SYNTHROID/LEVOTHROID) 125 MCG tablet, Take 1 tablet (125 mcg) by mouth daily., Disp: 30 tablet, Rfl: 0    metoprolol tartrate (LOPRESSOR) 100 MG tablet, Take 1 tablet (100 mg) by mouth 2 times daily., Disp: 60 tablet, Rfl: 3    miconazole (MICATIN) 2 % external powder, Apply topically 2 times daily. (Patient taking differently: Apply topically as needed.), Disp: , Rfl:     multivitamin w/minerals (MULTI-VITAMIN) tablet, Take 1 tablet by mouth daily. (Patient not taking: Reported on 12/9/2024), Disp: , Rfl:     omeprazole (PRILOSEC) 20 MG DR capsule, Take 1 capsule (20 mg) by mouth daily., Disp: 30 capsule, Rfl: 12    potassium chloride maryann ER (KLOR-CON M20) 20 MEQ CR tablet, Take 2 tablets (40 mEq) by mouth daily., Disp: 120 tablet, Rfl: 1    simethicone (MYLICON) 125 MG chewable tablet, Take 1 tablet (125 mg) by mouth 4 times daily as needed for intestinal gas, Disp: 40 tablet, Rfl: 0    sodium chloride (OCEAN) 0.65 % nasal spray, Spray 1 spray into both nostrils every hour as needed for congestion or other (dry nose)., Disp: 15 mL, Rfl: 0    sotalol (BETAPACE) 80 MG tablet, Take 1 tablet (80 mg) by mouth 2 times daily., Disp: 60 tablet, Rfl: 1    traZODone (DESYREL) 100 MG tablet, Take 1 tablet (100 mg) by mouth at bedtime., Disp: 30 tablet, Rfl: 0    warfarin ANTICOAGULANT (COUMADIN) 1 MG tablet, Take 2 - 3 mg daily. OR as directed by INR clinic., Disp: 120 tablet, Rfl: 0    Documentation Date:12/10/2024 1:59 PM  Jory Chris RN

## 2024-12-10 NOTE — TELEPHONE ENCOUNTER
Order placed for Sotalol 80 mg BID. Order placed for external cardioversion in 1 week.     BMP + EKG ordered and will do same time as INR POCT appt on Friday at 11:15.     Refilled Potassium 40 Meq and Bumex 1 mg daily.     Called patient and explained results from CXR + BMP lab work after visit with Dr. Rodriguez. Understanding verbalized. Message to schedulers to add BMP + EKG to POCT INR lab appt on Friday. Message to EP team to arrange cardioversion in 1 week. Medication education provided and she was instructed to stay on metoprolol for now.  She was told to call in if she feels dizzy, lightheaded, faint or has low HR. Understanding verbalized. -issa

## 2024-12-10 NOTE — TELEPHONE ENCOUNTER
Lluvia Still RN  12/10/2024 11:56 AM CST Back to Top      See telephone encounter dated 12/10. -Miles Wagner MD Caswell, Mallory J, RN  yes        ----- Message -----  From: Lluvia Still RN  Sent: 12/10/2024  10:25 AM CST  To: Miles Rodriguez MD     Continue with current dose of Metoprolol Tartrate 100 mg BID with addition of Sotalol?  Thanks,  Sorin Rodriguez MD  12/10/2024  8:42 AM CST       She should restart Bumex 1 mg once a day and potassium 40 mill equivalents once a day basic metabolic panel in 3 days  Start sotalol 80 mg twice a day and get EKG in 3 days  External cardioversion in 1 week

## 2024-12-10 NOTE — TELEPHONE ENCOUNTER
Kaylah Staton, Jory Quigley, RN  Caller: Unspecified (Today,  1:59 PM)  Continue with Sotalol, hold Lopressor AM of DCCV if still taking.    Thanks,  Kaylah

## 2024-12-11 ENCOUNTER — TELEPHONE (OUTPATIENT)
Dept: CARDIOLOGY | Facility: CLINIC | Age: 49
End: 2024-12-11
Payer: COMMERCIAL

## 2024-12-11 DIAGNOSIS — I48.0 PAF (PAROXYSMAL ATRIAL FIBRILLATION) (H): ICD-10-CM

## 2024-12-11 DIAGNOSIS — E87.6 HYPOKALEMIA: ICD-10-CM

## 2024-12-11 DIAGNOSIS — E87.6 LOW BLOOD POTASSIUM: ICD-10-CM

## 2024-12-11 DIAGNOSIS — Z95.1 S/P CABG (CORONARY ARTERY BYPASS GRAFT): ICD-10-CM

## 2024-12-11 DIAGNOSIS — I10 PRIMARY HYPERTENSION: ICD-10-CM

## 2024-12-11 RX ORDER — BUMETANIDE 1 MG/1
1 TABLET ORAL DAILY
Qty: 30 TABLET | Refills: 2 | Status: SHIPPED | OUTPATIENT
Start: 2024-12-11

## 2024-12-11 RX ORDER — POTASSIUM CHLORIDE 1500 MG/1
40 TABLET, EXTENDED RELEASE ORAL DAILY
Qty: 120 TABLET | Refills: 1 | Status: SHIPPED | OUTPATIENT
Start: 2024-12-11

## 2024-12-11 NOTE — TELEPHONE ENCOUNTER
[facility] Call Center    Phone Message    May a detailed message be left on voicemail: yes     Reason for Call: Medication Refill Request    Has the patient contacted the pharmacy for the refill? Yes   Name of medication being requested: potassium chloride maryann ER (KLOR-CON M20) 20 MEQ CR tablet   Provider who prescribed the medication: Dr. Rodriguez  Pharmacy:    [facility] [address] [address] [facility]      Date medication is needed: OUT        Action Taken: Other: cardiology     Travel Screening: Not Applicable     Thank you!  [facility]      Date of Service:

## 2024-12-11 NOTE — TELEPHONE ENCOUNTER
Pre-Procedure Education    Procedure: DCCV with Lázaro Person on 12/19/2024 with arrival time 8:30 am      PT IS ON COUMADIN INR'S ARE IN EPIC     12/9=4.6  12/4=4.5  11/29=3.1  11/26=4.7  11/22=2.2  11/20=2.4    Orders: Orderset for procedure verified signed/held    COVID: COVID policy- if pt develops COVID like symptoms prior to procedure, he/she would need to complete an at home with a rapid antigen COVID test 1-2 days prior to your procedure date. If COVID + pt is aware the procedure will need to be rescheduled, and to contact CV scheduling as soon as possible    Pre-Op H&P: Completed- Available in Epic    Education:    PT HAS A  FOR PROCEDURE THAT WILL STAY WITH HER    PT HAS MY CHART  PT AWARE PROCEDURE LETTER SENT    THIS IS A REPEAT CV  PT INSTRUCTED TO HOLD ANY VITAMINS MINERALS CALCIUM IRON OR SUPPLEMENTS THE MORNING OF CV  PT INSTRUCTED NO GUM CHEWING MINTS OR CANDY THE MORNING OF CV  PT INSTRUCTED TO LEAVE JEWELRY AT HOME   PT INSTRUCTED TO BATHE OR SHOWER BEFORE COMING IN  PT INSTRUCTED TO LEAVE JEWELRY AT HOME  PT IS GIA  PT IS ON COUMADIN  PT IS ON SOTALOL  PER HALEY WELCH [facility] PT INSTRUCTED TO HOLD HER METOPROLOL THE MORNING OF CV  PT HAS POST CV FOLLOW UP WITH LÁZARO 1/17     Contact: Reviewed via phone with pt    Pre-Procedure Instruction: NPO after midnight pre procedure, Defined NPO with pt, Remove all jewelry and leave all valuables at home, Shower prior to arrival, Sedation plan/orders, Transportation requirements and arrangements post procedure, Post-procedure follow up process, Post-procedure restrictions/expectations, and Pre-procedure letter sent- letter tab  Risks:      Medication:   Instructions regarding anticoagulants: Coumadin- To continue anticoagulation uninterrupted through their procedure    Instructions regarding antiarrhythmic medication: Beta Blocker;  PT INSTRUCTED TO HOLD HER LOPRESSOR THE MORNING OF CV PT IS ON SOTALOL    Instructions given to pt regarding diuretics  medication: NA for DCCV    Instructions given to pt regarding DM/GLP-1 medication:   DM- None  GLP-1- None  Instructions for medication, other than anticoagulants and antiarrhythmics listed above, given to pt: Take all medication AM of procedure with small sips of water     Important patient information for staff:  PT IS GIA PT IS ON SOTALOL    12/11/2024 8:04 AM  Consuelo Land LPN

## 2024-12-13 ENCOUNTER — LAB (OUTPATIENT)
Dept: CARDIOLOGY | Facility: CLINIC | Age: 49
End: 2024-12-13
Payer: COMMERCIAL

## 2024-12-13 ENCOUNTER — TELEPHONE (OUTPATIENT)
Dept: CARDIOLOGY | Facility: CLINIC | Age: 49
End: 2024-12-13

## 2024-12-13 DIAGNOSIS — I48.0 PAROXYSMAL ATRIAL FIBRILLATION (H): ICD-10-CM

## 2024-12-13 DIAGNOSIS — E87.6 HYPOKALEMIA: ICD-10-CM

## 2024-12-13 DIAGNOSIS — Z95.2 H/O MITRAL VALVE REPLACEMENT WITH MECHANICAL VALVE: ICD-10-CM

## 2024-12-13 LAB
ANION GAP SERPL CALCULATED.3IONS-SCNC: 7 MMOL/L (ref 7–15)
BUN SERPL-MCNC: 28.4 MG/DL (ref 6–20)
CALCIUM SERPL-MCNC: 9.8 MG/DL (ref 8.8–10.4)
CHLORIDE SERPL-SCNC: 102 MMOL/L (ref 98–107)
CREAT SERPL-MCNC: 1.52 MG/DL (ref 0.51–0.95)
EGFRCR SERPLBLD CKD-EPI 2021: 42 ML/MIN/1.73M2
GLUCOSE SERPL-MCNC: 96 MG/DL (ref 70–99)
HCO3 SERPL-SCNC: 30 MMOL/L (ref 22–29)
INR POINT OF CARE: 3.3 (ref 0.9–1.1)
POTASSIUM SERPL-SCNC: 3.8 MMOL/L (ref 3.4–5.3)
SODIUM SERPL-SCNC: 139 MMOL/L (ref 135–145)

## 2024-12-13 PROCEDURE — 36415 COLL VENOUS BLD VENIPUNCTURE: CPT

## 2024-12-13 PROCEDURE — 80048 BASIC METABOLIC PNL TOTAL CA: CPT

## 2024-12-13 RX ORDER — SOTALOL HYDROCHLORIDE 80 MG/1
80 TABLET ORAL DAILY
Qty: 90 TABLET | Refills: 1 | Status: SHIPPED | OUTPATIENT
Start: 2024-12-13

## 2024-12-13 NOTE — TELEPHONE ENCOUNTER
Left a detailed message outlining result from BMP + EKG from [facility]. Sotalol changed to 80 mg daily. Bumex and Potassium discontinued from medication list to avoid confusion. Routed to EP re: cancelling cardioversion. Message to schedulers to arrange follow up visits. -[facility]

## 2024-12-13 NOTE — TELEPHONE ENCOUNTER
----- Message from Jordon Rodriguez sent at 12/13/2024  1:07 PM CST -----  Creatinine is up with Bumex.  Should stop both Bumex and potassium replacement at this point  EKG shows that she converted to sinus rhythm.  We can cancel cardioversion  Change sotalol to 80 mg once a day should follow-up of [facility] ORQUIDEA next available    Follow-up with me in 1 month

## 2024-12-17 ENCOUNTER — HOSPITAL ENCOUNTER (OUTPATIENT)
Dept: CARDIAC REHAB | Facility: CLINIC | Age: 49
Discharge: HOME | End: 2024-12-17
Attending: SURGERY
Payer: COMMERCIAL

## 2024-12-17 ENCOUNTER — TELEPHONE (OUTPATIENT)
Dept: ANTICOAGULATION | Facility: CLINIC | Age: 49
End: 2024-12-17
Payer: COMMERCIAL

## 2024-12-17 DIAGNOSIS — Z95.2 H/O MITRAL VALVE REPLACEMENT WITH MECHANICAL VALVE: Primary | ICD-10-CM

## 2024-12-17 PROCEDURE — 93798 PHYS/QHP OP CAR RHAB W/ECG: CPT

## 2024-12-17 NOTE — TELEPHONE ENCOUNTER
Called and spoke with patient.  Patient stated that she called to set up an appointment since her procedure was canceled for Thursday.  INR appointment set for Thursday after cardiac rehab.  Patient instructed to continue current maintenance dose of warfarin.  No further questions from patient.    Lesa Kent RN  [facility]  [phone]

## 2024-12-17 NOTE — TELEPHONE ENCOUNTER
Received call into [facility] from someone at the Heart clinic. She is noting that the patient never got a call last week for her INR. She notes to call the mobile number to discuss. She states that the patient has a procedure on Thursday.    Sending to monitoring ACC team.

## 2024-12-19 ENCOUNTER — HOSPITAL ENCOUNTER (OUTPATIENT)
Dept: CARDIAC REHAB | Facility: CLINIC | Age: 49
Discharge: HOME | End: 2024-12-19
Attending: SURGERY
Payer: COMMERCIAL

## 2024-12-19 ENCOUNTER — ANTICOAGULATION THERAPY VISIT (OUTPATIENT)
Dept: ANTICOAGULATION | Facility: CLINIC | Age: 49
End: 2024-12-19

## 2024-12-19 ENCOUNTER — LAB (OUTPATIENT)
Dept: CARDIOLOGY | Facility: CLINIC | Age: 49
End: 2024-12-19
Payer: COMMERCIAL

## 2024-12-19 DIAGNOSIS — Z79.01 LONG TERM (CURRENT) USE OF ANTICOAGULANTS: Primary | ICD-10-CM

## 2024-12-19 DIAGNOSIS — Z95.2 H/O MITRAL VALVE REPLACEMENT WITH MECHANICAL VALVE: Primary | ICD-10-CM

## 2024-12-19 LAB — INR POINT OF CARE: 3.2 (ref 0.9–1.1)

## 2024-12-19 PROCEDURE — 93798 PHYS/QHP OP CAR RHAB W/ECG: CPT

## 2024-12-19 NOTE — PROGRESS NOTES
ANTICOAGULATION MANAGEMENT     Jory RIYA Grant Halie 49 year old female is on warfarin with therapeutic INR result. (Goal INR 2.5-3.5)    Recent labs: (last 7 days)     12/19/24  1125   INR 3.2*       ASSESSMENT     Source(s): Chart Review and Patient/Caregiver Call     Warfarin doses taken: Warfarin taken as instructed  Diet: No new diet changes identified  Medication/supplement changes: Bumex & potassium stopped on 12/13/24 and sotalol decreased to once a day. Per [employer] no anticipated interaction with medications and warfarin   New illness, injury, or hospitalization: No  Signs or symptoms of bleeding or clotting: No  Previous result: Therapeutic last visit; previously outside of goal range  Additional findings: Recent heart valve replacement in last 10 weeks and Pt was suppose to have a cardioversion today but procedure was canceled due to pt returning to sinus rhythm.        PLAN     Recommended plan for no diet, medication or health factor changes and recent heart valve replacement last 10 weeks affecting INR     Dosing Instructions: Continue your current warfarin dose with next INR on 12/24/24 or 12/26/24, pt requested to have next INR prior to cardiac rehab on 12/27/24.     Summary  As of 12/19/2024      Full warfarin instructions:  2 mg every day   Next INR check:  12/27/2024               Telephone call with Jory who verbalizes understanding and agrees to plan    Lab visit scheduled    Education provided: Goal range and lab monitoring: goal range and significance of current result, Importance of therapeutic range, and Importance of following up at instructed interval    Plan made with [facility] Pharmacist Maine Vergara, RN  12/19/2024  Anticoagulation Clinic  [employer] for routing messages: qasim [facility] HEART [facility] patient phone line: [phone]        _______________________________________________________________________     Anticoagulation Episode Summary        Current INR goal:  2.5-3.5   TTR:  29.2% (1.2 mo)   Target end date:  Indefinite   Send INR reminders to:  [facility]ART [facility]    Indications    H/O mitral valve replacement with mechanical valve [Z95.2]             Comments:  --             Anticoagulation Care Providers       Provider Role Specialty Phone number    Catherine Galvez PA-C Referring  [phone]

## 2024-12-23 ENCOUNTER — HOSPITAL ENCOUNTER (OUTPATIENT)
Dept: CARDIAC REHAB | Facility: CLINIC | Age: 49
Discharge: HOME | End: 2024-12-23
Attending: SURGERY
Payer: COMMERCIAL

## 2024-12-23 PROCEDURE — 93798 PHYS/QHP OP CAR RHAB W/ECG: CPT

## 2024-12-27 ENCOUNTER — ANTICOAGULATION THERAPY VISIT (OUTPATIENT)
Dept: ANTICOAGULATION | Facility: CLINIC | Age: 49
End: 2024-12-27

## 2024-12-27 ENCOUNTER — LAB (OUTPATIENT)
Dept: CARDIOLOGY | Facility: CLINIC | Age: 49
End: 2024-12-27
Payer: COMMERCIAL

## 2024-12-27 ENCOUNTER — HOSPITAL ENCOUNTER (OUTPATIENT)
Dept: CARDIAC REHAB | Facility: CLINIC | Age: 49
Discharge: HOME | End: 2024-12-27
Attending: SURGERY
Payer: COMMERCIAL

## 2024-12-27 DIAGNOSIS — Z79.01 LONG TERM (CURRENT) USE OF ANTICOAGULANTS: Primary | ICD-10-CM

## 2024-12-27 DIAGNOSIS — Z95.2 H/O MITRAL VALVE REPLACEMENT WITH MECHANICAL VALVE: Primary | ICD-10-CM

## 2024-12-27 LAB — INR POINT OF CARE: 3.5 (ref 0.9–1.1)

## 2024-12-27 PROCEDURE — 36416 COLLJ CAPILLARY BLOOD SPEC: CPT

## 2024-12-27 PROCEDURE — 93798 PHYS/QHP OP CAR RHAB W/ECG: CPT

## 2024-12-27 PROCEDURE — 85610 PROTHROMBIN TIME: CPT

## 2024-12-27 NOTE — PROGRESS NOTES
ANTICOAGULATION MANAGEMENT     Jory Ambrose 49 year old female is on warfarin with therapeutic INR result. (Goal INR 2.5-3.5)    Recent labs: (last 7 days)     12/27/24  1030   INR 3.5*       ASSESSMENT     Warfarin Lab Questionnaire    Warfarin Doses Last 7 Days      12/26/2024     7:49 PM   Dose in Tablet or Mg   TAB or MG? milligram (mg)     Pt Rptd Dose SUNDAY MONDAY TUESDAY WED THURS FRIDAY SATURDAY 12/26/2024   7:49 PM 2 2 2 2 2 2 2         12/26/2024   Warfarin Lab Questionnaire   Missed doses within past 14 days? No   Changes in diet or alcohol within past 14 days? No   Medication changes since last result? No   Injuries or illness since last result? No   New shortness of breath, severe headaches or sudden changes in vision since last result? No   Abnormal bleeding since last result? No   Upcoming surgery, procedure? No   Best number to call with results? [phone]     Previous result: Therapeutic last 2(+) visits  Additional findings: Recent heart valve replacement in last 10 weeks and Recent Cardioversion within 4 weeks; Hx of Labile INR, Recent warfarin dose changes, and Recent amiodarone start: at least weekly INR x 4 advised. Notify cardiologist and  pool if INR <= 1.7 within 4 weeks, procedure/surgery hold requested, or non-compliance with monitoring > 1 week.       PLAN     Unable to reach Jory today.    Left message to continue current dose this weekend. Request call back for assessment.  [facility] also sent    Follow up required to confirm warfarin dose taken and assess for changes    Per Rossy KERNS okay to continue 2 mg daily and recheck in 1 week.     Paz Mendoza, RN  12/27/2024  Anticoagulation Clinic  [facility] for routing messages: qasim [facility] HEART CLINIC [facility]  ACC patient phone line: [phone]

## 2024-12-30 NOTE — PROGRESS NOTES
ANTICOAGULATION MANAGEMENT     Jory R Rudy Ambrose 49 year old female is on warfarin with therapeutic INR result. (Goal INR 2.5-3.5)    Recent labs: (last 7 days)     12/27/24  1030   INR 3.5*       ASSESSMENT     Source(s): Chart Review and Patient/Caregiver Call     Warfarin doses taken: Warfarin taken as instructed  Diet: No new diet changes identified  Medication/supplement changes: None noted  New illness, injury, or hospitalization: No  Signs or symptoms of bleeding or clotting: No  Previous result: Therapeutic last 2(+) visits  Additional findings: Recent heart valve replacement in last 10 weeks  Per previous notes   Recent heart valve replacement in last 10 weeks and Recent Cardioversion within 4 weeks; Hx of Labile INR, Recent warfarin dose changes, and Recent amiodarone start: at least weekly INR x 4 advised. Notify cardiologist and EP pool if INR <= 1.7 within 4 weeks, procedure/surgery hold requested, or non-compliance with monitoring > 1 week.           PLAN     Recommended plan for no diet, medication or health factor changes affecting INR     Dosing Instructions: Continue your current warfarin dose with next INR in 1 week       Summary  As of 12/27/2024      Full warfarin instructions:  2 mg every day   Next INR check:  1/3/2025               Telephone call with Jory who verbalizes understanding and agrees to plan    Lab visit scheduled    Education provided: Please call back if any changes to your diet, medications or how you've been taking warfarin  Goal range and lab monitoring: goal range and significance of current result, Importance of therapeutic range, and Importance of following up at instructed interval    Plan made per [facility] anticoagulation protocol per [facility] continue 2 mg daily and recheck in 1 week.     Yamila Mccarthy, RN  12/30/2024  Anticoagulation Clinic  [facility] for routing messages: qasim [facility] HEART [facility] patient phone  line: [phone]        _______________________________________________________________________     Anticoagulation Episode Summary       Current INR goal:  2.5-3.5   TTR:  42.2% (1.4 mo)   Target end date:  Indefinite   Send INR reminders to:  [facility] HEART [facility]    Indications    H/O mitral valve replacement with mechanical valve [Z95.2]             Comments:  --             Anticoagulation Care Providers       Provider Role Specialty Phone number    Catherine Galvez PA-C Referring  [phone]

## 2025-01-03 ENCOUNTER — TELEPHONE (OUTPATIENT)
Dept: CARDIOLOGY | Facility: CLINIC | Age: 50
End: 2025-01-03

## 2025-01-03 ENCOUNTER — HOSPITAL ENCOUNTER (OUTPATIENT)
Dept: CARDIAC REHAB | Facility: CLINIC | Age: 50
Discharge: HOME | End: 2025-01-03
Attending: SURGERY
Payer: COMMERCIAL

## 2025-01-03 PROCEDURE — 93798 PHYS/QHP OP CAR RHAB W/ECG: CPT

## 2025-01-03 NOTE — TELEPHONE ENCOUNTER
FMLA paperwork received during clinic visit. LM with patient with direct line for call back to make sure it is filled out accurately with the correct dates. -issa

## 2025-01-06 ENCOUNTER — HOSPITAL ENCOUNTER (OUTPATIENT)
Dept: CARDIAC REHAB | Facility: CLINIC | Age: 50
Discharge: HOME | End: 2025-01-06
Attending: SURGERY
Payer: COMMERCIAL

## 2025-01-06 PROCEDURE — 93798 PHYS/QHP OP CAR RHAB W/ECG: CPT

## 2025-01-08 NOTE — TELEPHONE ENCOUNTER
Called back Jory to discuss continuous FMLA. She states that he first day out of work was 10/7/24 and she wants it to reflect going back to work end of February 2025. Paperwork originally filed with CV surgery and needs to be updated/fixed. Pt will  original copy on 1/10/25 when in for cardiac rehab and pt will need to sign the GODWIN form at that time. -[facility]

## 2025-01-10 ENCOUNTER — HOSPITAL ENCOUNTER (OUTPATIENT)
Dept: CARDIAC REHAB | Facility: CLINIC | Age: 50
Discharge: HOME | End: 2025-01-10
Attending: SURGERY
Payer: COMMERCIAL

## 2025-01-10 PROCEDURE — 93798 PHYS/QHP OP CAR RHAB W/ECG: CPT

## 2025-01-11 ENCOUNTER — HEALTH MAINTENANCE LETTER (OUTPATIENT)
Age: 50
End: 2025-01-11

## 2025-01-13 ENCOUNTER — HOSPITAL ENCOUNTER (OUTPATIENT)
Dept: CARDIAC REHAB | Facility: CLINIC | Age: 50
Discharge: HOME | End: 2025-01-13
Attending: SURGERY
Payer: COMMERCIAL

## 2025-01-13 PROCEDURE — 93798 PHYS/QHP OP CAR RHAB W/ECG: CPT

## 2025-01-13 NOTE — TELEPHONE ENCOUNTER
Paper work was signed by adolfo- faxed to employer and patient presented to clinic and filled out GODWIN and has original copy. -faby

## 2025-01-15 ENCOUNTER — HOSPITAL ENCOUNTER (OUTPATIENT)
Dept: CARDIAC REHAB | Facility: CLINIC | Age: 50
Discharge: HOME | End: 2025-01-15
Attending: SURGERY
Payer: COMMERCIAL

## 2025-01-15 ENCOUNTER — LAB (OUTPATIENT)
Dept: CARDIOLOGY | Facility: CLINIC | Age: 50
End: 2025-01-15
Payer: COMMERCIAL

## 2025-01-15 ENCOUNTER — ANTICOAGULATION THERAPY VISIT (OUTPATIENT)
Dept: ANTICOAGULATION | Facility: CLINIC | Age: 50
End: 2025-01-15

## 2025-01-15 DIAGNOSIS — Z79.01 LONG TERM (CURRENT) USE OF ANTICOAGULANTS: Primary | ICD-10-CM

## 2025-01-15 DIAGNOSIS — Z95.2 H/O MITRAL VALVE REPLACEMENT WITH MECHANICAL VALVE: Primary | ICD-10-CM

## 2025-01-15 LAB — INR POINT OF CARE: 2.4 (ref 0.9–1.1)

## 2025-01-15 PROCEDURE — 93798 PHYS/QHP OP CAR RHAB W/ECG: CPT

## 2025-01-15 NOTE — PROGRESS NOTES
ANTICOAGULATION MANAGEMENT     Jory Ambrose 49 year old female is on warfarin with subtherapeutic INR result. (Goal INR 2.5-3.5)    Recent labs: (last 7 days)     01/15/25  1012   INR 2.4*       ASSESSMENT     Source(s): Chart Review and Patient/Caregiver Call     Warfarin doses taken: Warfarin taken as instructed  Diet: No new diet changes identified  Medication/supplement changes: None noted  New illness, injury, or hospitalization: No  Signs or symptoms of bleeding or clotting: No  Previous result: Therapeutic last 2(+) visits  Additional findings: None       PLAN     Recommended plan for no diet, medication or health factor changes affecting INR     Dosing Instructions: Increase your warfarin dose (3.3% change) with next INR in 10 days       Summary  As of 1/15/2025      Full warfarin instructions:  2.5 mg every Mon, Wed, Fri; 2 mg all other days   Next INR check:  1/24/2025               Telephone call with Jory who agrees to plan and repeated back plan correctly  Sent [employer] message with dosing and follow up instructions    Lab visit scheduled    Education provided: Goal range and lab monitoring: goal range and significance of current result    Plan made per [facility] anticoagulation protocol    Paz Mendoza, RN  1/15/2025  Anticoagulation Clinic  [employer] for routing messages: p [facility] HEART [facility] patient phone line: [phone]        _______________________________________________________________________     Anticoagulation Episode Summary       Current INR goal:  2.5-3.5   TTR:  55.0% (2.1 mo)   Target end date:  Indefinite   Send INR reminders to:  [facility] HEART [facility]    Indications    H/O mitral valve replacement with mechanical valve [Z95.2]             Comments:  --             Anticoagulation Care Providers       Provider Role Specialty Phone number    Catherine Galvez PA-C Referring  [phone]

## 2025-01-17 ENCOUNTER — HOSPITAL ENCOUNTER (OUTPATIENT)
Dept: CARDIAC REHAB | Facility: CLINIC | Age: 50
Discharge: HOME | End: 2025-01-17
Attending: SURGERY
Payer: COMMERCIAL

## 2025-01-17 ENCOUNTER — LAB (OUTPATIENT)
Dept: LAB | Facility: CLINIC | Age: 50
End: 2025-01-17
Payer: COMMERCIAL

## 2025-01-17 DIAGNOSIS — E87.6 HYPOKALEMIA: ICD-10-CM

## 2025-01-17 DIAGNOSIS — N17.8 ACUTE RENAL FAILURE WITH SPECIFIED LESION: ICD-10-CM

## 2025-01-17 LAB
ALBUMIN SERPL BCG-MCNC: 3.9 G/DL (ref 3.5–5.2)
ANION GAP SERPL CALCULATED.3IONS-SCNC: 11 MMOL/L (ref 7–15)
BUN SERPL-MCNC: 20 MG/DL (ref 6–20)
CALCIUM SERPL-MCNC: 9.5 MG/DL (ref 8.8–10.4)
CHLORIDE SERPL-SCNC: 103 MMOL/L (ref 98–107)
CREAT SERPL-MCNC: 1.08 MG/DL (ref 0.51–0.95)
EGFRCR SERPLBLD CKD-EPI 2021: 63 ML/MIN/1.73M2
GLUCOSE SERPL-MCNC: 100 MG/DL (ref 70–99)
HCO3 SERPL-SCNC: 25 MMOL/L (ref 22–29)
MAGNESIUM SERPL-MCNC: 1.4 MG/DL (ref 1.7–2.3)
PHOSPHATE SERPL-MCNC: 3.5 MG/DL (ref 2.5–4.5)
POTASSIUM SERPL-SCNC: 4.5 MMOL/L (ref 3.4–5.3)
SODIUM SERPL-SCNC: 139 MMOL/L (ref 135–145)

## 2025-01-17 PROCEDURE — 36415 COLL VENOUS BLD VENIPUNCTURE: CPT

## 2025-01-17 PROCEDURE — 82947 ASSAY GLUCOSE BLOOD QUANT: CPT

## 2025-01-17 PROCEDURE — 83735 ASSAY OF MAGNESIUM: CPT

## 2025-01-17 PROCEDURE — 93798 PHYS/QHP OP CAR RHAB W/ECG: CPT

## 2025-01-20 ENCOUNTER — HOSPITAL ENCOUNTER (OUTPATIENT)
Dept: CARDIAC REHAB | Facility: CLINIC | Age: 50
Discharge: HOME | End: 2025-01-20
Attending: SURGERY
Payer: COMMERCIAL

## 2025-01-20 PROCEDURE — 93798 PHYS/QHP OP CAR RHAB W/ECG: CPT

## 2025-01-22 ENCOUNTER — HOSPITAL ENCOUNTER (OUTPATIENT)
Dept: CARDIAC REHAB | Facility: CLINIC | Age: 50
Discharge: HOME | End: 2025-01-22
Attending: SURGERY
Payer: COMMERCIAL

## 2025-01-22 PROCEDURE — 93798 PHYS/QHP OP CAR RHAB W/ECG: CPT

## 2025-01-30 ENCOUNTER — ANTICOAGULATION THERAPY VISIT (OUTPATIENT)
Dept: ANTICOAGULATION | Facility: CLINIC | Age: 50
End: 2025-01-30

## 2025-01-30 ENCOUNTER — OFFICE VISIT (OUTPATIENT)
Dept: CARDIOLOGY | Facility: CLINIC | Age: 50
End: 2025-01-30
Payer: COMMERCIAL

## 2025-01-30 ENCOUNTER — TELEPHONE (OUTPATIENT)
Dept: CARDIOLOGY | Facility: CLINIC | Age: 50
End: 2025-01-30

## 2025-01-30 ENCOUNTER — LAB (OUTPATIENT)
Dept: CARDIOLOGY | Facility: CLINIC | Age: 50
End: 2025-01-30
Payer: COMMERCIAL

## 2025-01-30 VITALS
SYSTOLIC BLOOD PRESSURE: 118 MMHG | HEART RATE: 89 BPM | DIASTOLIC BLOOD PRESSURE: 60 MMHG | WEIGHT: 210.2 LBS | OXYGEN SATURATION: 93 % | BODY MASS INDEX: 36.08 KG/M2 | RESPIRATION RATE: 20 BRPM

## 2025-01-30 DIAGNOSIS — Z95.2 H/O MITRAL VALVE REPLACEMENT WITH MECHANICAL VALVE: ICD-10-CM

## 2025-01-30 DIAGNOSIS — Z95.2 H/O MITRAL VALVE REPLACEMENT WITH MECHANICAL VALVE: Primary | ICD-10-CM

## 2025-01-30 DIAGNOSIS — I48.0 PAROXYSMAL ATRIAL FIBRILLATION (H): Primary | ICD-10-CM

## 2025-01-30 DIAGNOSIS — I48.3 TYPICAL ATRIAL FLUTTER (H): ICD-10-CM

## 2025-01-30 PROBLEM — I48.92 PAROXYSMAL ATRIAL FLUTTER (H): Status: RESOLVED | Noted: 2024-10-25 | Resolved: 2025-01-30

## 2025-01-30 LAB — INR POINT OF CARE: 2.4 (ref 0.9–1.1)

## 2025-01-30 RX ORDER — WARFARIN SODIUM 1 MG/1
TABLET ORAL
Qty: 120 TABLET | Refills: 3 | Status: SHIPPED | OUTPATIENT
Start: 2025-01-30

## 2025-01-30 NOTE — PROGRESS NOTES
[facility]  Cardiac Electrophysiology  [address]  [place] [address]   Office: [phone]  Fax: [phone]     Patient: Jory Ambrose   : 1975       CHIEF COMPLAINT/REASON FOR VISIT  Paroxysmal atrial fibrillation and atrial flutter      Assessment/Recommendations     Stage 3A/Paroxysmal atrial fibrillation and atrial flutter - very symptomatic, associated with increased risk of stroke, heart failure and mortality  [id number] - CARRIE excised 10/8/2024 without residual pouch by CARMEN  We reviewed atrial fibrillation, managing stroke risk via anticoagulation, managing heart failure risk, cardioversion, antiarrhythmic drug therapy, and catheter ablation.  We discussed atrial fibrillation ablation procedures, anticipated success rates, the potential need for re-do ablation vs addition of anti-arrhythmic drugs, procedural risks (including groin bleeding, vascular injury, tamponade, phrenic or esophageal injury, stroke, pulmonary vein stenosis, catheter entrapment) and recovery expectations.  She would prefer to consider options further - our office will contact her in a few days to address any questions and consolidate plan of care  - PVI, CTI ablation, assessment for AFL inducibility, general anesthesia, continue warfarin, hold sotalol 8 days prior with plan to continue for 6-12 weeks post ablation  - continue sotalol 80mg daily  - continue metoprolol 100mg twice daily  - continue warfarin  - we discussed the ongoing importance of lifestyle modification (maintaining a healthy weight, aerobic activity, sleep apnea diagnosis and management, alcohol avoidance) as part of a long term strategy for atrial fibrillation management      Follow up: as above           History of Present Illness   Jory Ambrose is a 49 year old female with persistent atrial fibrillation and flutter, 4 vessel CABG/mechanical MVR/CARRIE excision (10/2024), CKD, Hodgkin  lymphoma with prior chest radiation and bone marrow transplant, PEs referred by Dr. Rodriguez for consultation regarding atrial fibrillation and atrial flutter.    Mrs. Rudy Ambrose' atrial fibrillation history is as summarized below:  Symptoms: palpitations, dyspnea, lightheadedness  Symptom onset date: diagnosed post-operatively, though possible she had had AF prior to surgery given moderate-severe mitral regurgitation  Diagnosis date: 10/2024 post operatively  Admissions/ER visits: none  Prior medical therapies: sotalol (12/10/2024-present)  Prior DCCVs: CARMEN/DCCV 11/29/2024  Prior ablations: none  Percutaneous left atrial appendage occlusion: CARRIE excised 10/8/2024 without residual pouch by CARMEN    She notes significant symptomatic improvement after CARMEN/DCCV 11/29/2024.  She had recurrent AF/AFL until mid-late 1/2025.  She is participating in cardiac rehabilitation.  She had a pulmonary emboli 2011, 2013 - she was anticoagulation for intervals thereafter, though this had been discontinued thereafter.         Physical Examination  Review of Systems   VITALS: /60 (BP Location: Left arm, Patient Position: Sitting, Cuff Size: Adult Large)   Pulse 89   Resp 20   Wt 95.3 kg (210 lb 3.2 oz)   SpO2 93%   BMI 36.08 kg/m    Wt Readings from Last 3 Encounters:   01/24/25 94.3 kg (208 lb)   01/03/25 94.8 kg (208 lb 14.4 oz)   12/13/24 94.9 kg (209 lb 3.2 oz)     CONSTITUTIONAL: well nourished, comfortable, no distress  EYES:  Conjunctivae pink, sclerae clear.    E/N/T:  Oral mucosa pink  RESPIRATORY:  Respiratory effort is normal  CARDIOVASCULAR:  irregular, mechanical S1  GASTROINTESTINAL:  Abdomen without masses or tenderness  EXTREMITIES:  No clubbing or cyanosis.    MUSCULOSKELETAL:  Overall grossly normal muscle strength  SKIN:  Overall, skin warm and dry, no lesions.  NEURO/PSYCH:  Oriented x 3 with normal affect.   Constitutional:  No weight loss or loss of appetite    Eyes:  No difficulty with vision, no  double vision, no dry eyes  ENT:  No sore throat, difficulty swallowing; changes in hearing or tinnitus  Cardiovascular: As detailed above  Respiratory:  No cough  Musculoskeletal  No joint pain, muscle aches  Neurologic:  No syncope, lightheadedness, fainting spells   Hematologic: No easy bruising, excessive bleeding tendency   Gastrointestinal:  No jaundice, abdominal pain or abdominal bloating  Genitourinary: No changes in urinary habits, no trouble urinating    Psychiatric: No anxiety or depression      Medical History  Surgical History   Past Medical History:   Diagnosis Date    Asthma     Backache 01/03/2013    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    This would prevent her from following up on a regular basis      Deconditioned low back 06/18/2015    H/O autologous stem cell transplant (H) 07/26/2011    History of radiation therapy 01/01/2010    3600 cGy to mediastinum and neck    Hodgkin lymphoma, nodular sclerosis (H)     Dx Feb 2010    Hypothyroidism, secondary     r/t radiation    Morbid obesity with BMI of 40.0-44.9, adult (H)     Neutropenic fever 06/09/2013    Polycystic ovary disease     Pulmonary embolism (H)     Pulmonary embolus (H) 02/21/2011    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    Seasonal allergies     Shingles     Aug 2010    Sleep apnea     Past Surgical History:   Procedure Laterality Date    CHOLECYSTECTOMY      gallstone pancreatitis Jan 2010    CORONARY ARTERY BYPASS GRAFT, WITH ENDOSCOPIC VESSEL PROCUREMENT N/A 10/8/2024    Procedure: CORONARY ARTERY BYPASS GRAFT TIMES FOUR, LEFT INTERNAL MAMMARY ARTERY HARVEST, RIGHT LEG ENDOSCOPIC VESSEL PROCUREMENT, EPIAORTIC ULTRASOUND,;  Surgeon: David Wade MD;  Location: [facility] Main OR    CV CORONARY ANGIOGRAM N/A 9/13/2024    Procedure: Coronary Angiogram;  Surgeon: Tarun Johnston MD;  Location: [facility] CATH LAB CV    CV LEFT HEART CATH N/A 9/13/2024    Procedure: Left  Heart Catheterization;  Surgeon: Tarun Johnston MD;  Location: [facility] CATH LAB CV    IR CVC NON TUNNEL PLACEMENT > 5 YRS  10/11/2024    IR CVC TUNNEL PLACEMENT > 5 YRS OF AGE  10/24/2024    IR CVC TUNNEL REMOVAL RIGHT  11/1/2024    Lymphectomy  2/2010    right neck area    OR LIGATION, LEFT ATRIAL APPENDAGE N/A 10/8/2024    Procedure: LEFT ATRIAL APPENDAGE LIGATION,;  Surgeon: David Wade MD;  Location: [facility] OR    PICC TRIPLE LUMEN PLACEMENT  10/15/2024    REPLACE VALVE MITRAL N/A 10/8/2024    Procedure: MITRAL VALVE REAIR CONVERTED TO MITRAL VALVE REPLACEMENT;  Surgeon: David Wade MD;  Location: [facility] OR    TRANSESOPHAGEAL ECHOCARDIOGRAM INTRAOPERATIVE  10/8/2024    Procedure: ANESTHESIA TRANSESOPHAGEAL ECHOCARDIOGRAM;  Surgeon: David Wade MD;  Location: [facility] OR         Family History Social History   Family History   Problem Relation Age of Onset    Cancer Mother         thyroid cancer    Unknown/Adopted Father     Breast Cancer No family hx of     Cancer - colorectal No family hx of     Prostate Cancer No family hx of     Hypertension Mother     C.A.D. Maternal Grandmother     C.A.D. Paternal Grandmother         Social History     Tobacco Use    Smoking status: Never    Smokeless tobacco: Never    Tobacco comments:     denies tabacco use   Vaping Use    Vaping status: Never Used   Substance Use Topics    Alcohol use: No     Comment: minimal alcohol use, 1 glass of wine in 6 months    Drug use: No         Medications  Allergies     Current Outpatient Medications:     acetaminophen (TYLENOL) 500 MG tablet, Take 2 tablets (1,000 mg) by mouth every 6 hours as needed for mild pain., Disp: , Rfl:     albuterol (PROAIR HFA) 108 (90 Base) MCG/ACT inhaler, Inhale 2 puffs into the lungs every 6 hours as needed., Disp: 18 g, Rfl: 0    atorvastatin (LIPITOR) 80 MG tablet, Take 1 tablet (80 mg) by mouth daily., Disp: 90 tablet, Rfl: 3    cetirizine  (ZYRTEC) 10 MG tablet, Take 1 tablet (10 mg) by mouth daily., Disp: 30 tablet, Rfl: 11    cholecalciferol (VITAMIN D3) 125 mcg (5000 units) capsule, Take 1 capsule (125 mcg) by mouth daily., Disp: 30 capsule, Rfl: 0    clopidogrel (PLAVIX) 75 MG tablet, Take 1 tablet (75 mg) by mouth daily., Disp: 30 tablet, Rfl: 1    Continuous Glucose Sensor (FREESTYLE JACINTO 3 SENSOR) MISC, 1 Application every 14 days., Disp: , Rfl:     Ferrous Sulfate (IRON) 325 (65 Fe) MG tablet, Take 1 tablet by mouth daily., Disp: 30 tablet, Rfl: 2    ipratropium - albuterol 0.5 mg/2.5 mg/3 mL (DUONEB) 0.5-2.5 (3) MG/3ML neb solution, Inhale 3 mLs into the lungs every 6 hours as needed for wheezing., Disp: , Rfl:     levothyroxine (SYNTHROID/LEVOTHROID) 125 MCG tablet, Take 1 tablet (125 mcg) by mouth daily., Disp: 30 tablet, Rfl: 0    LORazepam (ATIVAN) 0.5 MG tablet, Take 0.5 mg by mouth nightly as needed for anxiety., Disp: , Rfl:     magnesium oxide 400 MG CAPS, Take 400 mg by mouth 2 times daily., Disp: , Rfl:     metoprolol tartrate (LOPRESSOR) 100 MG tablet, Take 1 tablet (100 mg) by mouth 2 times daily., Disp: 180 tablet, Rfl: 3    metoprolol tartrate (LOPRESSOR) 100 MG tablet, Take 1 tablet (100 mg) by mouth 2 times daily., Disp: 60 tablet, Rfl: 3    miconazole (MICATIN) 2 % external powder, Apply topically 2 times daily. (Patient taking differently: Apply topically as needed.), Disp: , Rfl:     multivitamin w/minerals (MULTI-VITAMIN) tablet, Take 1 tablet by mouth daily., Disp: , Rfl:     nitroGLYcerin (NITROSTAT) 0.4 MG sublingual tablet, Place 0.4 mg under the tongue every 5 minutes as needed for chest pain., Disp: , Rfl:     omeprazole (PRILOSEC) 20 MG DR capsule, Take 1 capsule (20 mg) by mouth daily., Disp: 30 capsule, Rfl: 12    simethicone (MYLICON) 125 MG chewable tablet, Take 1 tablet (125 mg) by mouth 4 times daily as needed for intestinal gas, Disp: 40 tablet, Rfl: 0    sodium chloride (OCEAN) 0.65 % nasal spray, Spray 1  "spray into both nostrils every hour as needed for congestion or other (dry nose)., Disp: 15 mL, Rfl: 0    sotalol (BETAPACE) 80 MG tablet, Take 1 tablet (80 mg) by mouth daily., Disp: 90 tablet, Rfl: 1    traZODone (DESYREL) 100 MG tablet, Take 1 tablet (100 mg) by mouth at bedtime., Disp: 30 tablet, Rfl: 0    warfarin ANTICOAGULANT (COUMADIN) 1 MG tablet, Take 2 - 3 mg daily. OR as directed by INR clinic., Disp: 120 tablet, Rfl: 0     Allergies   Allergen Reactions    Atarax [Hydroxyzine] Shortness Of Breath    Dye [Contrast Dye] Swelling     Pre-medicated with methylprednisolone    Shellfish Allergy Shortness Of Breath, Anaphylaxis and Swelling     Shrimp, crab, lobster    Cats      Other Reaction(s): Rhinitis, Sneezing    Dust Mite Extract Other (See Comments)     Other Reaction(s): Rhinitis, Sneezing    Food Itching     Shrimp and walnuts.    Penicillins Hives     Patient cannot recall if she has tried cephalosporins in the past.     Wheat Germ Oil Other (See Comments)     Seasonal - multiple    Erythromycin Hives    Morphine Hcl      Causes change in mental status    Nickel Rash     rash    Sulfa Antibiotics Hives and Rash          Lab Results    Chemistry CBC Cardiac Enzymes/BNP/TSH/INR   Recent Labs   Lab Test 01/17/25  1219      POTASSIUM 4.5   CHLORIDE 103   CO2 25   *   BUN 20.0   CR 1.08*   GFRESTIMATED 63   CINDY 9.5     Recent Labs   Lab Test 01/17/25  1219 12/13/24  1201 12/09/24  1550   CR 1.08* 1.52* 1.09*          Recent Labs   Lab Test 11/05/24  1202 10/29/24  0543 10/28/24  0629   WBC  --   --  11.7*   HGB 8.5*   < > 7.0*   HCT  --   --  23.4*   MCV  --   --  93   PLT  --   --  398    < > = values in this interval not displayed.     Recent Labs   Lab Test 11/05/24  1202 11/01/24  0324 10/31/24  0450   HGB 8.5* 8.2* 7.9*    No results for input(s): \"TROPONINI\" in the last 81680 hours.  Recent Labs   Lab Test 06/06/24  1302   NTBNPI 101     Recent Labs   Lab Test 11/20/24  1055   TSH " 12.40*     Recent Labs   Lab Test 01/24/25  1045 01/15/25  1012 01/03/25  0926   INR 2.4* 2.4* 2.8*         Data Review    ECGs (tracings independently reviewed)  1/24/2025 - AFL, 120bpm  1/3/2025 - SR 77bpm, IL 236ms  12/13/2024 - SR 77bpm, IL 244ms  10/23/2024 - typical appearing AFL, 67bpm    Zio monitoring from 11/18/2024 to 11/21/2024 (duration 3 days) (independently reviewed)  Continuous atrial fibrillation/flutter, 64 to 143 bpm, average 109 bpm.  There were no pauses of greater than 3 seconds.  Rare premature ventricular contractions (<1%).  Symptom triggers none.    11/29/2024 CARMEN  Left ventricular size, wall motion and function are normal. The ejection  fraction is 60-65%.  Normal right ventricle size and systolic function.  Left atrial appendage was surgically excised. There is no residual pouch  No left atrial mass or thrombus visualized.  The left atrium is mildly dilated.  There is a bi-leaflet ([employer]) mechanical prosthesis.  Normal prosthetic mitral valve gradients.       Cc: Miles Rodriguez MD, Rome Joshua MD Amila Dilusha William, MD  1/30/2025  4:05 PM

## 2025-01-30 NOTE — PATIENT INSTRUCTIONS
[facility]  Cardiac Electrophysiology  [address]  [place] [address]   Office: [phone]  Fax: [phone]     Thank you for seeing us in clinic today - it is a pleasure to be a part of your care team.  Below is a summary of our plan from today's visit.       You have atrial fibrillation.  We reviewed atrial fibrillation, treatment options (ablation vs antiarrhythmic drug therapy), and long term stroke risk prevention (blood thinner).    We will plan for the following:  - consider options further, and let us know with any questions or decision as to how you would like to proceed - our office will call in a few days to check in, address any questions, and consolidate a treatment plan  - continue sotalol 80mg daily  - continue metoprolol 100mg twice daily  - continue warfarin     Please do not hesitate to be in touch with our office at [phone] with any questions that may arise.       Thank you for trusting us with your care,    Arlette Balbuena MD  Clinical Cardiac Electrophysiology  [facility]  [address]  [place] [address]   Office: [phone]  Fax: [phone]        ATRIAL FIBRILLATION: Patient Information    What is atrial fibrillation?  Atrial fibrillation (AF, A-fib) is a common heart rhythm problem (arrhythmia) occurring within the upper chambers of the heart (the atria).  In normal rhythm, the upper and lower chambers of the heart are electrically driven to contract in a coordinated sequence.  In atrial fibrillation, the atria lose their ability to contract because of rapid and chaotic electrical activity.  The lower chambers of the heart (the ventricles) continue to pump blood throughout the body, though with irregular and often faster rate due to the chaotic activity within the atria.        How do I know if I have atrial fibrillation?   Some people may feel their heart beating faster, harder, or  irregularly while in atrial fibrillation.  Others may be lightheaded, fatigued, feel weak or tired or become more short of breath especially with activities.  Some patients have no symptoms at all.  Atrial fibrillation may be found due to an irregular pulse or on an electrocardiogram (ECG). Atrial fibrillation can start and stop on its own, and episodes can last from seconds to several months.      How common is atrial fibrillation?   An estimated 3-6 million people in the [place] have atrial fibrillation.  Atrial fibrillation is a common heart rhythm problem for older persons, affecting as estimated 12-15% of people over the age of 65 years of age.    What causes atrial fibrillation?   Age is the most important risk factor for atrial fibrillation.  Atrial fibrillation is more common in people with other heart disease, high blood pressure, diabetes, obesity, sleep apnea and in people who regularly consume alcohol.  Surgery, lung disease, or thyroid problems can lead to atrial fibrillation.  Atrial fibrillation has multiple possible causes, and in most cases a single cause cannot be found.  Atrial fibrillation is a progressive condition, usually starting with at an early stage with short and infrequent episodes.  In later stages of disease, more frequent and longer lasting episodes of atrial fibrillation occur, ultimately culminating in episodes which do not spontaneously terminate.  Generally, more enlargement and scarring within the upper chambers of the heart is observed as atrial fibrillation progresses from early to late-stage disease.    How is atrial fibrillation diagnosed and evaluated?    Because of its start-stop nature, atrial fibrillation can be challenging to diagnose.  Atrial fibrillation is most commonly diagnosed via cardiac rhythm recordings - either an ECG or wearable cardiac rhythm monitor.  For patients with pacemakers, defibrillators or implantable loop recorders, atrial fibrillation may be  recorded via these devices.  Recently, commercially available devices (eg. Apple Watch, [employer] device, certain FitBit devices, others) can allow patients to take 30 second cardiac rhythm recordings which may document atrial fibrillation.  Once atrial fibrillation is diagnosed, additional tests include blood tests and an echocardiogram.  The echocardiogram uses ultrasound to look at your heart to assess your cardiac function and evaluate for other heart disease.  Additional evaluation may include CT or MRI studies.    Is atrial fibrillation dangerous?   Atrial fibrillation is not usually a life-threatening arrhythmia.  The most serious consequences of atrial fibrillation including stroke and worsening of overall cardiac function.  While in atrial fibrillation, the upper cardiac chambers do not contract normally, resulting in slower blood flow and increased risk of clot formation.  If this blood clot becomes detached from the heart a stroke can occur.  Unfortunately, stroke can be the first sign of atrial fibrillation for some people.  With a stroke, you may notice abnormal sensation, weakness on one side of the body or face, changes in your vision or speech.  If you have any of these signs, you should contact EMS and be evaluated in an emergency room as soon as possible.      How is atrial fibrillation treated?     Several treatment options exist for suppressing atrial fibrillation - however, it is not an easily curable arrhythmia.  The first goal in managing atrial fibrillation is to minimize stroke risk.  The second goal is to improve symptoms associated with atrial fibrillation.  Finally, in patients with reduced cardiac function, maintaining normal rhythm can help improve cardiac function.      Blood thinners are used to reduce the risk of stroke in patients with high estimated stroke risk related to atrial fibrillation.  For patients at higher risk of bleeding related to blood thinner use, implantable devices  may be an option to reduce stroke risk without the need for long term blood thinner use.      Atrial fibrillation can be managed via two strategies: rate control and rhythm control.  In a rate control strategy, continued atrial fibrillation is accepted and medications (eg. beta-blockers or calcium channel blockers) are used to control the lower chamber rate.  In a rhythm control strategy, anti-arrhythmic medications or catheter ablation are used to maintain normal cardiac rhythm and slow disease progression by suppressing atrial fibrillation.  A procedure called a cardioversion, in which an electric shock is delivered through patches placed on the chest wall while under deep sedation, can be performed to temporarily restore normal cardiac rhythm, though does not address the chance of atrial fibrillation recurrence.  Treatments are more effective for earlier rather than later stage atrial fibrillation.  Lifestyle modifications (maintaining a healthy weight, aerobic exercise, diagnosing and treating sleep apnea, and minimizing alcohol intake) are important elements of atrial fibrillation rhythm control.     What is catheter ablation for atrial fibrillation?  Cardiac catheter ablation is a commonly performed, minimally invasive procedure performed by a cardiac electrophysiologist to treat many different cardiac rhythm abnormalities.  During catheter ablation, long, thin, flexible tubes are advanced into the heart via small sheaths inserted into the femoral veins and thermal energy (either heating or cooling) is applied within the heart to disrupt abnormal electrical activity.  Atrial fibrillation ablation is performed under general anesthesia, with procedures generally taking approximately 2-3 hours.  Patients are typically observed for 3-5 hours after the ablation, and in most cases can be discharged home the same day.  Atrial fibrillation ablation is associated with better outcomes (mortality, cardiovascular  hospitalizations, atrial arrhythmia recurrences) compared to antiarrhythmic drug therapy.  However, atrial fibrillation recurrences are not uncommon, and repeat catheter ablation may be offered.  Your electrophysiology team can review atrial fibrillation ablation, anticipated success rates, risks, and recovery expectations with you.    What are anti-arrhythmic medications?  Anti-arrhythmic medications are specialized drugs which alter cardiac electrical functioning to suppress arrhythmias.  There are several anti-arrhythmic medications available, each with its own success rate and side effects.  Some anti-arrhythmic medications are less effective though safer to use, others are more effective though have serious potential toxicities.  Atrial fibrillation recurrences are common and may require dose adjustment or change in antiarrhythmic therapy.  Your electrophysiology team will carefully consider which medication would be the best and safest for your particular case.      Can I live a normal life?    The goal of atrial fibrillation management is for patients to live normal lives without being limited by symptoms related to atrial fibrillation.    Are any additional educational resources available?  There are a number of excellent atrial fibrillation education resources available to you online.  A few options you may wish to review include:  hrsonline.org/guide-atrial-fibrillation  afibmatters.org  getsmartaboutafib.com  stopaf.com    What comes next?    Consider your management options and let us know how we can help in your decision process.  Please take medications as they have been prescribed.  You should also get any tests that may have been ordered for you.      When to Call Your Doctor or seek emergency care:  Call your doctor or seek emergency care if you have any significant changes with the following:  Weakness  Dizziness  Fainting  Fatigue  Shortness of breath  Chest pain with increased activity  If you  are concerned that your heart rate is too fast or too slow  Bleeding that does not stop in 10 minutes  Coughing or throwing up blood  Bloody diarrhea or bleeding hemorrhoids  Dark-colored urine or black stool  Allergic reactions:  Rash  Itching  Swelling  Trouble breathing or swallowing      Please call the [facility] at [phone] if you have concerns about your symptoms, your medicines, or your follow-up appointments.

## 2025-01-30 NOTE — TELEPHONE ENCOUNTER
Sree, Arlette Garcia MD  P [facility] Ep Support [facility],    Can we please check in with Mrs Pieter Ambrose re: AF/AFL management?    Thank you,  Radha

## 2025-01-30 NOTE — LETTER
2025    Rome Joshua MD  [address] [address]    RE: Jory RIYA Rudy Contehers       Dear Colleague,     I had the pleasure of seeing Jory Ambrose in the [facility].     [facility]  Cardiac Electrophysiology  [address]  [place] [address]   Office: [phone]  Fax: [phone]     Patient: Jory Ambrose   : 1975       CHIEF COMPLAINT/REASON FOR VISIT  Paroxysmal atrial fibrillation and atrial flutter      Assessment/Recommendations     Stage 3A/Paroxysmal atrial fibrillation and atrial flutter - very symptomatic, associated with increased risk of stroke, heart failure and mortality  [id number] - CARRIE excised 10/8/2024 without residual pouch by CARMEN  We reviewed atrial fibrillation, managing stroke risk via anticoagulation, managing heart failure risk, cardioversion, antiarrhythmic drug therapy, and catheter ablation.  We discussed atrial fibrillation ablation procedures, anticipated success rates, the potential need for re-do ablation vs addition of anti-arrhythmic drugs, procedural risks (including groin bleeding, vascular injury, tamponade, phrenic or esophageal injury, stroke, pulmonary vein stenosis, catheter entrapment) and recovery expectations.  She would prefer to consider options further - our office will contact her in a few days to address any questions and consolidate plan of care  - PVI, CTI ablation, assessment for AFL inducibility, general anesthesia, continue warfarin, hold sotalol 8 days prior with plan to continue for 6-12 weeks post ablation  - continue sotalol 80mg daily  - continue metoprolol 100mg twice daily  - continue warfarin  - we discussed the ongoing importance of lifestyle modification (maintaining a healthy weight, aerobic activity, sleep apnea diagnosis and management, alcohol avoidance) as part of a long term strategy for atrial fibrillation management      Follow  up: as above           History of Present Illness   Jory Ambrose is a 49 year old female with persistent atrial fibrillation and flutter, 4 vessel CABG/mechanical MVR/CARRIE excision (10/2024), CKD, Hodgkin lymphoma with prior chest radiation and bone marrow transplant, PEs referred by Dr. Rodriguez for consultation regarding atrial fibrillation and atrial flutter.    Mrs. Rudy Ambrose' atrial fibrillation history is as summarized below:  Symptoms: palpitations, dyspnea, lightheadedness  Symptom onset date: diagnosed post-operatively, though possible she had had AF prior to surgery given moderate-severe mitral regurgitation  Diagnosis date: 10/2024 post operatively  Admissions/ER visits: none  Prior medical therapies: sotalol (12/10/2024-present)  Prior DCCVs: CARMEN/DCCV 11/29/2024  Prior ablations: none  Percutaneous left atrial appendage occlusion: CARRIE excised 10/8/2024 without residual pouch by CARMEN    She notes significant symptomatic improvement after CARMEN/DCCV 11/29/2024.  She had recurrent AF/AFL until mid-late 1/2025.  She is participating in cardiac rehabilitation.  She had a pulmonary emboli 2011, 2013 - she was anticoagulation for intervals thereafter, though this had been discontinued thereafter.         Physical Examination  Review of Systems   VITALS: /60 (BP Location: Left arm, Patient Position: Sitting, Cuff Size: Adult Large)   Pulse 89   Resp 20   Wt 95.3 kg (210 lb 3.2 oz)   SpO2 93%   BMI 36.08 kg/m    Wt Readings from Last 3 Encounters:   01/24/25 94.3 kg (208 lb)   01/03/25 94.8 kg (208 lb 14.4 oz)   12/13/24 94.9 kg (209 lb 3.2 oz)     CONSTITUTIONAL: well nourished, comfortable, no distress  EYES:  Conjunctivae pink, sclerae clear.    E/N/T:  Oral mucosa pink  RESPIRATORY:  Respiratory effort is normal  CARDIOVASCULAR:  irregular, mechanical S1  GASTROINTESTINAL:  Abdomen without masses or tenderness  EXTREMITIES:  No clubbing or cyanosis.    MUSCULOSKELETAL:  Overall grossly  normal muscle strength  SKIN:  Overall, skin warm and dry, no lesions.  NEURO/PSYCH:  Oriented x 3 with normal affect.   Constitutional:  No weight loss or loss of appetite    Eyes:  No difficulty with vision, no double vision, no dry eyes  ENT:  No sore throat, difficulty swallowing; changes in hearing or tinnitus  Cardiovascular: As detailed above  Respiratory:  No cough  Musculoskeletal  No joint pain, muscle aches  Neurologic:  No syncope, lightheadedness, fainting spells   Hematologic: No easy bruising, excessive bleeding tendency   Gastrointestinal:  No jaundice, abdominal pain or abdominal bloating  Genitourinary: No changes in urinary habits, no trouble urinating    Psychiatric: No anxiety or depression      Medical History  Surgical History   Past Medical History:   Diagnosis Date     Asthma      Backache 01/03/2013     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    This would prevent her from following up on a regular basis       Deconditioned low back 06/18/2015     H/O autologous stem cell transplant (H) 07/26/2011     History of radiation therapy 01/01/2010    3600 cGy to mediastinum and neck     Hodgkin lymphoma, nodular sclerosis (H)     Dx Feb 2010     Hypothyroidism, secondary     r/t radiation     Morbid obesity with BMI of 40.0-44.9, adult (H)      Neutropenic fever 06/09/2013     Polycystic ovary disease      Pulmonary embolism (H)      Pulmonary embolus (H) 02/21/2011     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     Seasonal allergies      Shingles     Aug 2010     Sleep apnea     Past Surgical History:   Procedure Laterality Date     CHOLECYSTECTOMY      gallstone pancreatitis Jan 2010     CORONARY ARTERY BYPASS GRAFT, WITH ENDOSCOPIC VESSEL PROCUREMENT N/A 10/8/2024    Procedure: CORONARY ARTERY BYPASS GRAFT TIMES FOUR, LEFT INTERNAL MAMMARY ARTERY HARVEST, RIGHT LEG ENDOSCOPIC VESSEL PROCUREMENT, EPIAORTIC ULTRASOUND,;  Surgeon: David Wade  MD Lang;  Location: [facility] OR     CV CORONARY ANGIOGRAM N/A 9/13/2024    Procedure: Coronary Angiogram;  Surgeon: Tarun Johnston MD;  Location: [facility] CATH LAB CV     CV LEFT HEART CATH N/A 9/13/2024    Procedure: Left Heart Catheterization;  Surgeon: Tarun Johnston MD;  Location: [facility] CATH LAB CV     IR CVC NON TUNNEL PLACEMENT > 5 YRS  10/11/2024     IR CVC TUNNEL PLACEMENT > 5 YRS OF AGE  10/24/2024     IR CVC TUNNEL REMOVAL RIGHT  11/1/2024     Lymphectomy  2/2010    right neck area     OR LIGATION, LEFT ATRIAL APPENDAGE N/A 10/8/2024    Procedure: LEFT ATRIAL APPENDAGE LIGATION,;  Surgeon: David Wade MD;  Location: [facility] OR     PICC TRIPLE LUMEN PLACEMENT  10/15/2024     REPLACE VALVE MITRAL N/A 10/8/2024    Procedure: MITRAL VALVE REAIR CONVERTED TO MITRAL VALVE REPLACEMENT;  Surgeon: David Wade MD;  Location: [facility] OR     TRANSESOPHAGEAL ECHOCARDIOGRAM INTRAOPERATIVE  10/8/2024    Procedure: ANESTHESIA TRANSESOPHAGEAL ECHOCARDIOGRAM;  Surgeon: David Wade MD;  Location: [facility] OR         Family History Social History   Family History   Problem Relation Age of Onset     Cancer Mother         thyroid cancer     Unknown/Adopted Father      Breast Cancer No family hx of      Cancer - colorectal No family hx of      Prostate Cancer No family hx of      Hypertension Mother      C.A.D. Maternal Grandmother      C.A.D. Paternal Grandmother         Social History     Tobacco Use     Smoking status: Never     Smokeless tobacco: Never     Tobacco comments:     denies tabacco use   Vaping Use     Vaping status: Never Used   Substance Use Topics     Alcohol use: No     Comment: minimal alcohol use, 1 glass of wine in 6 months     Drug use: No         Medications  Allergies     Current Outpatient Medications:      acetaminophen (TYLENOL) 500 MG tablet, Take 2 tablets (1,000 mg) by mouth every 6 hours as needed for mild pain., Disp:  , Rfl:      albuterol (PROAIR HFA) 108 (90 Base) MCG/ACT inhaler, Inhale 2 puffs into the lungs every 6 hours as needed., Disp: 18 g, Rfl: 0     atorvastatin (LIPITOR) 80 MG tablet, Take 1 tablet (80 mg) by mouth daily., Disp: 90 tablet, Rfl: 3     cetirizine (ZYRTEC) 10 MG tablet, Take 1 tablet (10 mg) by mouth daily., Disp: 30 tablet, Rfl: 11     cholecalciferol (VITAMIN D3) 125 mcg (5000 units) capsule, Take 1 capsule (125 mcg) by mouth daily., Disp: 30 capsule, Rfl: 0     clopidogrel (PLAVIX) 75 MG tablet, Take 1 tablet (75 mg) by mouth daily., Disp: 30 tablet, Rfl: 1     Continuous Glucose Sensor (FREESTYLE JACINTO 3 SENSOR) [facility], 1 Application every 14 days., Disp: , Rfl:      Ferrous Sulfate (IRON) 325 (65 Fe) MG tablet, Take 1 tablet by mouth daily., Disp: 30 tablet, Rfl: 2     ipratropium - albuterol 0.5 mg/2.5 mg/3 mL (DUONEB) 0.5-2.5 (3) MG/3ML neb solution, Inhale 3 mLs into the lungs every 6 hours as needed for wheezing., Disp: , Rfl:      levothyroxine (SYNTHROID/LEVOTHROID) 125 MCG tablet, Take 1 tablet (125 mcg) by mouth daily., Disp: 30 tablet, Rfl: 0     LORazepam (ATIVAN) 0.5 MG tablet, Take 0.5 mg by mouth nightly as needed for anxiety., Disp: , Rfl:      magnesium oxide 400 MG CAPS, Take 400 mg by mouth 2 times daily., Disp: , Rfl:      metoprolol tartrate (LOPRESSOR) 100 MG tablet, Take 1 tablet (100 mg) by mouth 2 times daily., Disp: 180 tablet, Rfl: 3     metoprolol tartrate (LOPRESSOR) 100 MG tablet, Take 1 tablet (100 mg) by mouth 2 times daily., Disp: 60 tablet, Rfl: 3     miconazole (MICATIN) 2 % external powder, Apply topically 2 times daily. (Patient taking differently: Apply topically as needed.), Disp: , Rfl:      multivitamin w/minerals (MULTI-VITAMIN) tablet, Take 1 tablet by mouth daily., Disp: , Rfl:      nitroGLYcerin (NITROSTAT) 0.4 MG sublingual tablet, Place 0.4 mg under the tongue every 5 minutes as needed for chest pain., Disp: , Rfl:      omeprazole (PRILOSEC) 20 MG DR  capsule, Take 1 capsule (20 mg) by mouth daily., Disp: 30 capsule, Rfl: 12     simethicone (MYLICON) 125 MG chewable tablet, Take 1 tablet (125 mg) by mouth 4 times daily as needed for intestinal gas, Disp: 40 tablet, Rfl: 0     sodium chloride (OCEAN) 0.65 % nasal spray, Spray 1 spray into both nostrils every hour as needed for congestion or other (dry nose)., Disp: 15 mL, Rfl: 0     sotalol (BETAPACE) 80 MG tablet, Take 1 tablet (80 mg) by mouth daily., Disp: 90 tablet, Rfl: 1     traZODone (DESYREL) 100 MG tablet, Take 1 tablet (100 mg) by mouth at bedtime., Disp: 30 tablet, Rfl: 0     warfarin ANTICOAGULANT (COUMADIN) 1 MG tablet, Take 2 - 3 mg daily. OR as directed by INR clinic., Disp: 120 tablet, Rfl: 0     Allergies   Allergen Reactions     Atarax [Hydroxyzine] Shortness Of Breath     Dye [Contrast Dye] Swelling     Pre-medicated with methylprednisolone     Shellfish Allergy Shortness Of Breath, Anaphylaxis and Swelling     Shrimp, crab, lobster     Cats      Other Reaction(s): Rhinitis, Sneezing     Dust Mite Extract Other (See Comments)     Other Reaction(s): Rhinitis, Sneezing     Food Itching     Shrimp and walnuts.     Penicillins Hives     Patient cannot recall if she has tried cephalosporins in the past.      Wheat Germ Oil Other (See Comments)     Seasonal - multiple     Erythromycin Hives     Morphine Hcl      Causes change in mental status     Nickel Rash     rash     Sulfa Antibiotics Hives and Rash          Lab Results    Chemistry CBC Cardiac Enzymes/BNP/TSH/INR   Recent Labs   Lab Test 01/17/25  1219      POTASSIUM 4.5   CHLORIDE 103   CO2 25   *   BUN 20.0   CR 1.08*   GFRESTIMATED 63   CINDY 9.5     Recent Labs   Lab Test 01/17/25  1219 12/13/24  1201 12/09/24  1550   CR 1.08* 1.52* 1.09*          Recent Labs   Lab Test 11/05/24  1202 10/29/24  0543 10/28/24  0629   WBC  --   --  11.7*   HGB 8.5*   < > 7.0*   HCT  --   --  23.4*   MCV  --   --  93   PLT  --   --  398    < > =  "values in this interval not displayed.     Recent Labs   Lab Test 11/05/24  1202 11/01/24  0324 10/31/24  0450   HGB 8.5* 8.2* 7.9*    No results for input(s): \"TROPONINI\" in the last 59519 hours.  Recent Labs   Lab Test 06/06/24  1302   NTBNPI 101     Recent Labs   Lab Test 11/20/24  1055   TSH 12.40*     Recent Labs   Lab Test 01/24/25  1045 01/15/25  1012 01/03/25  0926   INR 2.4* 2.4* 2.8*         Data Review    ECGs (tracings independently reviewed)  1/24/2025 - AFL, 120bpm  1/3/2025 - SR 77bpm, CA 236ms  12/13/2024 - SR 77bpm, CA 244ms  10/23/2024 - typical appearing AFL, 67bpm    [facility]io monitoring from 11/18/2024 to 11/21/2024 (duration 3 days) (independently reviewed)  Continuous atrial fibrillation/flutter, 64 to 143 bpm, average 109 bpm.  There were no pauses of greater than 3 seconds.  Rare premature ventricular contractions (<1%).  Symptom triggers none.    11/29/2024 CARMEN  Left ventricular size, wall motion and function are normal. The ejection  fraction is 60-65%.  Normal right ventricle size and systolic function.  Left atrial appendage was surgically excised. There is no residual pouch  No left atrial mass or thrombus visualized.  The left atrium is mildly dilated.  There is a bi-leaflet ([employer]) mechanical prosthesis.  Normal prosthetic mitral valve gradients.       Cc: Miles Rodriguez MD, Rome Joshua MD Amila Dilusha William, MD  1/30/2025  4:05 PM        Thank you for allowing me to participate in the care of your patient.      Sincerely,     Arlette Balbuena MD     [facility]  cc:   Miles Rodriguez MD  [address]  [address]  [place] [address]      "

## 2025-01-30 NOTE — PROGRESS NOTES
ANTICOAGULATION MANAGEMENT     Jory Grant Halie 49 year old female is on warfarin with therapeutic INR result. (Goal INR 2.5-3.5)    Recent labs: (last 7 days)     01/30/25  1524   INR 2.4*       ASSESSMENT     Warfarin Lab Questionnaire    Warfarin Doses Last 7 Days          1/30/2025   Warfarin Lab Questionnaire   Missed doses within past 14 days? No   Changes in diet or alcohol within past 14 days? No   Medication changes since last result? No   Injuries or illness since last result? No   New shortness of breath, severe headaches or sudden changes in vision since last result? No   Abnormal bleeding since last result? Yes   If yes, please explain: Nose bleeds   Upcoming surgery, procedure? No     Previous result: Therapeutic last 2(+) visits  Additional findings: None       PLAN     Recommended plan for no diet, medication or health factor changes affecting INR     Dosing Instructions: Increase your warfarin dose (5.9% change) with next INR in 1 week       Summary  As of 1/30/2025      Full warfarin instructions:  2 mg every Sun, Tue, Thu; 3 mg all other days   Next INR check:  2/6/2025               Telephone call with Jory who verbalizes understanding and agrees to plan    Lab visit scheduled    Education provided: Please call back if any changes to your diet, medications or how you've been taking warfarin    Plan made per [facility] anticoagulation protocol    Woody Barroso RN  1/30/2025  Anticoagulation Clinic  [place] [employer] for routing messages: p [facility]ART [facility] patient phone line: [phone]        _______________________________________________________________________     Anticoagulation Episode Summary       Current INR goal:  2.5-3.5   TTR:  44.2% (2.6 mo)   Target end date:  Indefinite   Send INR reminders to:  [facility]    Indications    H/O mitral valve replacement with mechanical valve [Z95.2]             Comments:  --             Anticoagulation  Care Providers       Provider Role Specialty Phone number    Catherine Galvez PA-C Referring  [phone]

## 2025-02-05 ENCOUNTER — LAB (OUTPATIENT)
Dept: CARDIOLOGY | Facility: CLINIC | Age: 50
End: 2025-02-05
Payer: COMMERCIAL

## 2025-02-05 ENCOUNTER — ANTICOAGULATION THERAPY VISIT (OUTPATIENT)
Dept: ANTICOAGULATION | Facility: CLINIC | Age: 50
End: 2025-02-05

## 2025-02-05 ENCOUNTER — TELEPHONE (OUTPATIENT)
Dept: CARDIOLOGY | Facility: CLINIC | Age: 50
End: 2025-02-05

## 2025-02-05 DIAGNOSIS — Z95.2 H/O MITRAL VALVE REPLACEMENT WITH MECHANICAL VALVE: Primary | ICD-10-CM

## 2025-02-05 DIAGNOSIS — Z95.2 H/O MITRAL VALVE REPLACEMENT WITH MECHANICAL VALVE: ICD-10-CM

## 2025-02-05 LAB — INR POINT OF CARE: 2.5 (ref 0.9–1.1)

## 2025-02-05 NOTE — TELEPHONE ENCOUNTER
"Received additional [facility] paperwork that is exactly the same as paperwork that was already addressed and filled out. Writer called patient and inquired what else is needed. [facility] paperwork. She states it is for \"miscellaneous leave\" and said the questions are slightly different. Writer reviewed that they are the exact same and it is due 2/7. Dr. Rodriguez is out of the office this week and cannot sign. Writer requested she call her leave department and further clarify and then receive extension if still needed. Understanding verbalized. -[facility]   "

## 2025-02-05 NOTE — PROGRESS NOTES
ANTICOAGULATION MANAGEMENT     Jory Ambrose 49 year old female is on warfarin with therapeutic INR result. (Goal INR 2.5-3.5)    Recent labs: (last 7 days)     02/05/25  1123   INR 2.5*       ASSESSMENT     Source(s): Chart Review and Patient/Caregiver Call     Warfarin doses taken: Warfarin taken as instructed  Diet: No new diet changes identified  Medication/supplement changes: None noted  New illness, injury, or hospitalization: No  Signs or symptoms of bleeding or clotting: No  Previous result: Subtherapeutic  Additional findings: None       PLAN     Recommended plan for no diet, medication or health factor changes affecting INR     Dosing Instructions: Continue your current warfarin dose with next INR in 1 week       Summary  As of 2/5/2025      Full warfarin instructions:  2 mg every Sun, Tue, Thu; 3 mg all other days   Next INR check:  2/12/2025               Telephone call with Jory who agrees to plan and repeated back plan correctly    Lab visit scheduled    Education provided: Please call back if any changes to your diet, medications or how you've been taking warfarin    Plan made per [facility] anticoagulation protocol    Woody Barroso RN  2/5/2025  Anticoagulation Clinic  [place] for routing messages: p [facility] patient phone line: [phone]        _______________________________________________________________________     Anticoagulation Episode Summary       Current INR goal:  2.5-3.5   TTR:  41.1% (2.8 mo)   Target end date:  Indefinite   Send INR reminders to:  [facility]    Indications    H/O mitral valve replacement with mechanical valve [Z95.2]             Comments:  --             Anticoagulation Care Providers       Provider Role Specialty Phone number    Catherine Galvez PA-C Referring  [phone]

## 2025-02-06 ENCOUNTER — PREP FOR PROCEDURE (OUTPATIENT)
Dept: CARDIOLOGY | Facility: CLINIC | Age: 50
End: 2025-02-06
Payer: COMMERCIAL

## 2025-02-06 ENCOUNTER — DOCUMENTATION ONLY (OUTPATIENT)
Dept: CARDIOLOGY | Facility: CLINIC | Age: 50
End: 2025-02-06
Payer: COMMERCIAL

## 2025-02-06 DIAGNOSIS — I48.3 TYPICAL ATRIAL FLUTTER (H): ICD-10-CM

## 2025-02-06 DIAGNOSIS — I48.0 PAROXYSMAL ATRIAL FIBRILLATION (H): Primary | ICD-10-CM

## 2025-02-06 RX ORDER — LIDOCAINE 40 MG/G
CREAM TOPICAL
OUTPATIENT
Start: 2025-02-06

## 2025-02-06 RX ORDER — SODIUM CHLORIDE 9 MG/ML
100 INJECTION, SOLUTION INTRAVENOUS CONTINUOUS
OUTPATIENT
Start: 2025-02-06

## 2025-02-06 NOTE — PROGRESS NOTES
PVI  Order Case Req Y  Order Set Y Imaging Order None     AC Coumadin:-CONTINUE   AAD Sotalol-Hold 8 days prior   PPI/H2 Blocker Start Protonix 40mg Daily 3 days prior, 6wk post   Diuretics None   DM/GLP-1 DM Meds- None  GLP-1- None     PVI, CTI ablation, assessment for AFL inducibility, general anesthesia, continue warfarin, hold sotalol 8 days prior with plan to continue for 6-12 weeks post ablation         Jory Ambrose, 1975, [id number]  Home:[phone] (home) Cell:[phone] (mobile)  Emergency Contact: SUSIE ALVES [phone]  PCP: Rome Joshua, [phone]    Important patient information for CSC/Cath Lab staff : None    [facility] EP Cath Lab Procedure Order   Ablation Type:  Atrial Fibrillation (Paroxsymal)  Case Request: None  Ordering Provider: Dr Sree Flaherty Ordered and Prepped: 2/6/2025 Renata Talavera RN    Scheduling Information:  Anticipated Case Duration:  Standard ( Case per day SA 2:1, DW 5:1, KA 3:1)   Scheduling Timeframe:  Next Available  Scheduling Restrictions: None  Scheduling Contact: Please contact pt to schedule, if you are unable to schedule date within the next 24 hours please contact pt to update on scheduling process  EP RN Follow Up Apt: Schedule EP RN PC visit 3-4 days s/p PVI  MD Preference: Scheduling with ordering provider  Current Device/Device Co Needed for Procedure: None NoneNone  Pre-Procedural Testing needed: None  Mapping System Required:  Carto (Dr Balbuena and Dr Leigh)  ICE Needed:  Yes  Anesthesia:General Anesthesia    [facility] EP Cath Lab Prep   H&P:  Compled by cardiology on 1/30/25 if scheduled within 30 days, pt will need RN education and Labs apt within 3 days of procedure. If pts PVI scheduled outside of 30 days, pt to schedule H&P with EP ORQUIDEA, RN Teach, and Labs within 30 days of PVI  Pre-op Labs: CBC, BMP, Beta HcG if appropriate, and INR if on Warfarin will be ordered AM of procedure, if not completed at pre-op H&P within 7 days of  procedure.  T&S Pre-Procedure Review: Does not need for PVI procedures  Medical Records Pertinent for Procedure:   Echo CARMEN 11/29/24 Echo 10/8/24  Iodinated Contrast Dye Allergies (Does not include Shellfish, Egg, and/or Iodine Allergy): NA  GLP-1 Protocol: If on Dulaglutide (Trulicity) (weekly)- Injection hold 7 days prior to procedure  , Exenatide extended release (Bydureon bcise) (weekly)- Injection hold 7 days prior to procedure, Exenatide (Byetta) (twice daily)- Oral Tablet hold day prior and morning of procedure and for Injection hold 7 days prior to procedure, Semaglutide (Ozempic) (weekly)- Injection and Oral hold 7 days prior to procedure, Liraglutide (Victoza, Saxenda) (daily)- Injection hold day prior and morning of procedure  Follow Up S/P: EP RN 3-4 PC Visit and EP ORQUIDEA 6wk (To be scheduled at time of case scheduling)    Allergies   Allergen Reactions    Atarax [Hydroxyzine] Shortness Of Breath    Dye [Contrast Dye] Swelling     Pre-medicated with methylprednisolone    Shellfish Allergy Shortness Of Breath, Anaphylaxis and Swelling     Shrimp, crab, lobster    Cats      Other Reaction(s): Rhinitis, Sneezing    Dust Mite Extract Other (See Comments)     Other Reaction(s): Rhinitis, Sneezing    Food Itching     Shrimp and walnuts.    Penicillins Hives     Patient cannot recall if she has tried cephalosporins in the past.     Wheat Germ Oil Other (See Comments)     Seasonal - multiple    Erythromycin Hives    Morphine Hcl      Causes change in mental status    Nickel Rash     rash    Sulfa Antibiotics Hives and Rash       Current Outpatient Medications:     acetaminophen (TYLENOL) 500 MG tablet, Take 2 tablets (1,000 mg) by mouth every 6 hours as needed for mild pain., Disp: , Rfl:     albuterol (PROAIR HFA) 108 (90 Base) MCG/ACT inhaler, Inhale 2 puffs into the lungs every 6 hours as needed., Disp: 18 g, Rfl: 0    atorvastatin (LIPITOR) 80 MG tablet, Take 1 tablet (80 mg) by mouth daily., Disp: 90 tablet,  Rfl: 3    cetirizine (ZYRTEC) 10 MG tablet, Take 1 tablet (10 mg) by mouth daily., Disp: 30 tablet, Rfl: 11    cholecalciferol (VITAMIN D3) 125 mcg (5000 units) capsule, Take 1 capsule (125 mcg) by mouth daily., Disp: 30 capsule, Rfl: 0    clopidogrel (PLAVIX) 75 MG tablet, Take 1 tablet (75 mg) by mouth daily., Disp: 30 tablet, Rfl: 1    Continuous Glucose Sensor (FREESTYLE JACINTO 3 SENSOR) [facility] [place], 1 Application every 14 days. (Patient not taking: Reported on 1/30/2025), Disp: , Rfl:     Ferrous Sulfate (IRON) 325 (65 Fe) MG tablet, Take 1 tablet by mouth daily., Disp: 30 tablet, Rfl: 2    ipratropium - albuterol 0.5 mg/2.5 mg/3 mL (DUONEB) 0.5-2.5 (3) MG/3ML neb solution, Inhale 3 mLs into the lungs every 6 hours as needed for wheezing., Disp: , Rfl:     levothyroxine (SYNTHROID/LEVOTHROID) 125 MCG tablet, Take 1 tablet (125 mcg) by mouth daily., Disp: 30 tablet, Rfl: 0    LORazepam (ATIVAN) 0.5 MG tablet, Take 0.5 mg by mouth nightly as needed for anxiety., Disp: , Rfl:     magnesium oxide 400 MG CAPS, Take 400 mg by mouth daily., Disp: , Rfl:     metoprolol tartrate (LOPRESSOR) 100 MG tablet, Take 1 tablet (100 mg) by mouth 2 times daily., Disp: 60 tablet, Rfl: 3    miconazole (MICATIN) 2 % external powder, Apply topically 2 times daily. (Patient taking differently: Apply topically as needed.), Disp: , Rfl:     multivitamin w/minerals (MULTI-VITAMIN) tablet, Take 1 tablet by mouth daily. (Patient not taking: Reported on 1/30/2025), Disp: , Rfl:     nitroGLYcerin (NITROSTAT) 0.4 MG sublingual tablet, Place 0.4 mg under the tongue every 5 minutes as needed for chest pain. (Patient not taking: Reported on 1/30/2025), Disp: , Rfl:     omeprazole (PRILOSEC) 20 MG DR capsule, Take 1 capsule (20 mg) by mouth daily., Disp: 30 capsule, Rfl: 12    simethicone (MYLICON) 125 MG chewable tablet, Take 1 tablet (125 mg) by mouth 4 times daily as needed for intestinal gas, Disp: 40 tablet, Rfl: 0    sodium chloride (OCEAN) 0.65 %  nasal spray, Spray 1 spray into both nostrils every hour as needed for congestion or other (dry nose)., Disp: 15 mL, Rfl: 0    sotalol (BETAPACE) 80 MG tablet, Take 1 tablet (80 mg) by mouth daily., Disp: 90 tablet, Rfl: 1    traZODone (DESYREL) 100 MG tablet, Take 1 tablet (100 mg) by mouth at bedtime., Disp: 30 tablet, Rfl: 0    warfarin ANTICOAGULANT (COUMADIN) 1 MG tablet, Take 2 - 3 mg daily. OR as directed by INR clinic., Disp: 120 tablet, Rfl: 3    Documentation Date:2/6/2025 3:58 PM  Renata Talavera RN

## 2025-02-12 ENCOUNTER — HOSPITAL ENCOUNTER (OUTPATIENT)
Dept: CARDIAC REHAB | Facility: CLINIC | Age: 50
Discharge: HOME | End: 2025-02-12
Attending: SURGERY
Payer: COMMERCIAL

## 2025-02-12 ENCOUNTER — ANTICOAGULATION THERAPY VISIT (OUTPATIENT)
Dept: ANTICOAGULATION | Facility: CLINIC | Age: 50
End: 2025-02-12

## 2025-02-12 ENCOUNTER — LAB (OUTPATIENT)
Dept: CARDIOLOGY | Facility: CLINIC | Age: 50
End: 2025-02-12
Payer: COMMERCIAL

## 2025-02-12 DIAGNOSIS — Z95.2 H/O MITRAL VALVE REPLACEMENT WITH MECHANICAL VALVE: Primary | ICD-10-CM

## 2025-02-12 DIAGNOSIS — Z95.2 H/O MITRAL VALVE REPLACEMENT WITH MECHANICAL VALVE: ICD-10-CM

## 2025-02-12 LAB — INR POINT OF CARE: 2.8 (ref 0.9–1.1)

## 2025-02-12 PROCEDURE — 999N000104 HC STATISTIC NO CHARGE

## 2025-02-12 PROCEDURE — 93798 PHYS/QHP OP CAR RHAB W/ECG: CPT

## 2025-02-12 NOTE — PROGRESS NOTES
ANTICOAGULATION MANAGEMENT     Jory Ambrose 49 year old female is on warfarin with therapeutic INR result. (Goal INR 2.5-3.5)    Recent labs: (last 7 days)     02/12/25  1109   INR 2.8*       ASSESSMENT     Source(s): Chart Review and Patient/Caregiver Call     Warfarin doses taken: Warfarin taken as instructed  Diet: No new diet changes identified  Medication/supplement changes: None noted  New illness, injury, or hospitalization: No  Signs or symptoms of bleeding or clotting: No  Previous result: Therapeutic last visit; previously outside of goal range  Additional findings: Upcoming Pulmonary Vein Isolation (PVI); weekly INR monitoring. To notify [facility] and cardiologist if INR < 2 if scheduled, INR > 3.3 within a week of ablation/PVI, or non-compliance with monitoring > 1 week. - msg sent to cards to confirm if patient needs weekly monitoring. If so, will reach out to jory to schedule an INR next week.        PLAN     Recommended plan for no diet, medication or health factor changes affecting INR     Dosing Instructions: Continue your current warfarin dose with next INR in 2 weeks       Summary  As of 2/12/2025      Full warfarin instructions:  2 mg every Sun, Tue, Thu; 3 mg all other days   Next INR check:  2/25/2025               Telephone call with Jory who verbalizes understanding and agrees to plan    Lab visit scheduled    Education provided: Goal range and lab monitoring: goal range and significance of current result    Plan made per [facility] anticoagulation protocol    Paz Mendoza, RN  2/12/2025  Anticoagulation Clinic  [facility] 21GRAMS for routing messages: p [facility] patient phone line: [phone]        _______________________________________________________________________     Anticoagulation Episode Summary       Current INR goal:  2.5-3.5   TTR:  45.6% (3 mo)   Target end date:  Indefinite   Send INR reminders to:  [facility]     Indications    H/O mitral valve replacement with mechanical valve [Z95.2]             Comments:  --             Anticoagulation Care Providers       Provider Role Specialty Phone number    Catherine Galvez PA-C Referring  [phone]

## 2025-02-12 NOTE — PROGRESS NOTES
Jory Hendricks RN Pomasl, Holly J, RN  Sorry about that, we forgot to include that in a msg in our prep and msg to you all.  She has been stable, so will need INR at pre-op, then 1 wk after, and then can continue monthly if INR >2.  Thanks!  Earline          Previous Messages       ----- Message -----  From: Paz Mendoza RN  Sent: 2/12/2025   1:48 PM CST  To: [facility] Ep Support Pool - e  Subject: Ablation INR monitoring                          Jory Cross is scheduled for an ablation 3/6/25 and is unsure if she needs weekly INRs prior or not. Her INR's have been consistently >2.0. Could you clarify if patient needs weekly INRs until 3/6 or if okay to have next INR checked in 2 weeks at appointment 2/25/25?    Thank you!    Paz Mendoza, RN  Anticoagulation Clinic

## 2025-02-12 NOTE — TELEPHONE ENCOUNTER
Pt presented to clinic with her return to work paperwork. She wants the fitness for duty paperwork filled out with return date of 2/18. She wants to know if Dr. 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      [place] route. [place] [place]            Jory Wayne is requesting to go back to work next Tuesday. She requests above restrictions and reduced work schedule of 32 hours a week. Would this be acceptable and would you be willing to sign? She reiterated that she wants to go back pre-ablation.  Thanks,  Mal

## 2025-02-13 NOTE — TELEPHONE ENCOUNTER
Miles Rodriguez MD  You20 minutes ago (11:35 AM)       I think it sounds fine by me           ==form filled out. Will route to have signed by wtz. -[facility] [place]

## 2025-02-14 NOTE — TELEPHONE ENCOUNTER
Writer went to call patient to let her know form was signed and writer would fax over. With conversation, patient reports that she thinks we need to hold off again. She will call back later next week after more discussion with her employer. Update to adolfo. -[facility] [place]

## 2025-02-24 NOTE — PROGRESS NOTES
[facility]  Cardiac Electrophysiology  [address]  [place] [address]   Office: [phone]  Fax: [phone]     HEART CARE ELECTROPHYSIOLOGY NOTE     Primary Care: Rome Joshua MD       Assessment/Recommendations     Persistent atrial fibrillation and flutter/stage 3B:   Symptomatic with palpitations, dyspnea, lightheadedness.  Diagnosed postoperatively, though possible had AF prior to surgery given moderate to severe mitral regurgitation. Continues to be symptomatic.    We discussed pulmonary vein isolation ablation procedure including <1-2% risk for major complication, anticipated success rates, recovery and follow-up.  Medical and surgical history reviewed and updated. Current medications and allergies reviewed and updated as appropriate. No personal or family history of adverse reactions to anesthesia or abnormal bleeding with surgery.    [id number] score of at least 2 for gender, CAD,       Plan:  Continue warfarin for stroke prophylaxis  Stop sotalol after today  Increase omeprazole to 40mg every day beginning 3 days prior to ablation and continuing for 6 weeks thereafter, then resume 20 mg daily  EP follow-up 6 weeks post ablation     History of Present Illness/Subjective    Jory Ambrose is a 49 year old female with past medical history significant for persistent atrial fibrillation/flutter, four-vessel CABG/mechanical MVR/CARRIE excision (10/2024), CKD, Hodgkin's lymphoma with prior chest radiation and bone marrow transplant, PEs, seen today for history and physical prior to ablation with Dr. Balbuena 3/6/25.     She continues to be short of breath some occasional dizziness and lightheadedness as well.  She is excited for her ablation coming up.  She is here today with her mom.  All their questions were answered.    She denies chest discomfort, palpitations, pedal edema, or syncope.       Data Review     Arrhythmia hx:  Sx: Palpitations, dyspnea,  weight evidence  Dx/date: 10/2024  Rate control: None  AAD: Sotalol 80 mg twice daily  DCCV: CARMEN/DCCV 11/29/2024  Ablation: Upcoming  [id number]  OAC: None, CARRIE excised 10/18/2024 without residual polyps by CARMEN    EKG personally reviewed:  2/25/25: A-fib 82 bpm  1/24/2025 - AFL, 120bpm  1/3/2025 - SR 77bpm, AR 236ms  12/13/2024 - SR 77bpm, AR 244ms  10/23/2024 - typical appearing AFL, 67bpm     Zio monitoring from 11/18/2024 to 11/21/2024 (duration 3 days) (independently reviewed)  Continuous atrial fibrillation/flutter, 64 to 143 bpm, average 109 bpm.  There were no pauses of greater than 3 seconds.  Rare premature ventricular contractions (<1%).  Symptom triggers none.     11/29/2024 CARMEN  Left ventricular size, wall motion and function are normal. The ejection  fraction is 60-65%.  Normal right ventricle size and systolic function.  Left atrial appendage was surgically excised. There is no residual pouch  No left atrial mass or thrombus visualized.  The left atrium is mildly dilated.  There is a bi-leaflet ([employer]) mechanical prosthesis.  Normal prosthetic mitral valve gradients.    I have reviewed and updated the patient's past medical history, allergies and medication list.               Physical Examination Review of Systems   BMI= There is no height or weight on file to calculate BMI.    Wt Readings from Last 3 Encounters:   01/30/25 95.3 kg (210 lb 3.2 oz)   01/24/25 94.3 kg (208 lb)   01/03/25 94.8 kg (208 lb 14.4 oz)       Vitals: There were no vitals taken for this visit.  General   Appearance:   Alert and oriented, in no acute distress.    HEENT:  Normocephalic and atraumatic. Conjunctiva and sclera are clear. Moist oral mucosa.    Neck: No JVP, carotid bruit or obvious thyromegaly.   Lungs:   Respirations unlabored. Clear bilaterally with no rales, rhonchi, or wheezes.     Cardiovascular:   Rhythm is regular.  Mechanical heart sounds present, no significant murmur is present. Lower extremities  demonstrate no significant edema.   Extremities: No cyanosis or clubbing   Skin: Skin is warm, dry, and otherwise intact.   Neurologic: Gait not asssessed. Mood and affect appropriate.                                                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    This would prevent her from following up on a regular basis      Deconditioned low back 06/18/2015    H/O autologous stem cell transplant (H) 07/26/2011    History of radiation therapy 01/01/2010    3600 cGy to mediastinum and neck    Hodgkin lymphoma, nodular sclerosis (H)     Dx Feb 2010    Hypothyroidism, secondary     r/t radiation    Morbid obesity with BMI of 40.0-44.9, adult (H)     Neutropenic fever 06/09/2013    Polycystic ovary disease     Pulmonary embolism (H)     Pulmonary embolus (H) 02/21/2011    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    Seasonal allergies     Shingles     Aug 2010    Sleep apnea     Past Surgical History:   Procedure Laterality Date    CHOLECYSTECTOMY      gallstone pancreatitis Jan 2010    CORONARY ARTERY BYPASS GRAFT, WITH ENDOSCOPIC VESSEL PROCUREMENT N/A 10/8/2024    Procedure: CORONARY ARTERY BYPASS GRAFT TIMES FOUR, LEFT INTERNAL MAMMARY ARTERY HARVEST, RIGHT LEG ENDOSCOPIC VESSEL PROCUREMENT, EPIAORTIC ULTRASOUND,;  Surgeon: David Wade MD;  [place] [facility] [place]    [place] CORONARY ANGIOGRAM N/A 9/13/2024    Procedure: Coronary Angiogram;  Surgeon: Tarun Johnston MD;  Location: [facility] CATH LAB CV    CV LEFT HEART CATH N/A 9/13/2024    Procedure: Left Heart Catheterization;  Surgeon: Tarun Johnston MD;  Location: [facility] CATH LAB CV    IR CVC NON TUNNEL PLACEMENT > 5 YRS  10/11/2024    IR CVC TUNNEL PLACEMENT > 5 YRS OF AGE  10/24/2024    IR CVC TUNNEL REMOVAL RIGHT  11/1/2024    Lymphectomy   2/2010    right neck area    OR LIGATION, LEFT ATRIAL APPENDAGE N/A 10/8/2024    Procedure: LEFT ATRIAL APPENDAGE LIGATION,;  Surgeon: David Wade MD;  Location: [facility] [place]    PICC TRIPLE LUMEN PLACEMENT  10/15/2024    REPLACE VALVE MITRAL N/A 10/8/2024    Procedure: MITRAL VALVE REAIR CONVERTED TO MITRAL VALVE REPLACEMENT;  Surgeon: David Wade MD;  Location: [facility] [place]    TRANSESOPHAGEAL ECHOCARDIOGRAM INTRAOPERATIVE  10/8/2024    Procedure: ANESTHESIA TRANSESOPHAGEAL ECHOCARDIOGRAM;  Surgeon: David Wade MD;  [facility] [place]    Family History   Problem Relation Age of Onset    Cancer Mother         thyroid cancer    Unknown/Adopted Father     Breast Cancer No family hx of     Cancer - colorectal No family hx of     Prostate Cancer No family hx of     Hypertension Mother     C.A.D. Maternal Grandmother     C.A.D. Paternal Grandmother     Social History     Tobacco Use    Smoking status: Never    Smokeless tobacco: Never    Tobacco comments:     denies tabacco use   Vaping Use    Vaping status: Never Used   Substance Use Topics    Alcohol use: No     Comment: minimal alcohol use, 1 glass of wine in 6 months    Drug use: No          Medications  Allergies   Current Outpatient Medications   Medication Sig Dispense Refill    acetaminophen (TYLENOL) 500 MG tablet Take 2 tablets (1,000 mg) by mouth every 6 hours as needed for mild pain.      albuterol (PROAIR HFA) 108 (90 Base) MCG/ACT inhaler Inhale 2 puffs into the lungs every 6 hours as needed. 18 g 0    atorvastatin (LIPITOR) 80 MG tablet Take 1 tablet (80 mg) by mouth daily. 90 tablet 3    cetirizine (ZYRTEC) 10 MG tablet Take 1 tablet (10 mg) by mouth daily. 30 tablet 11    cholecalciferol (VITAMIN D3) 125 mcg (5000 units) capsule Take 1 capsule (125 mcg) by mouth daily. 30 capsule 0    clopidogrel (PLAVIX) 75 MG tablet Take 1 tablet (75 mg) by mouth daily. 30 tablet 1    Continuous Glucose Sensor  (FREESTYLE JACINTO 3 SENSOR) MISC 1 Application every 14 days. (Patient not taking: Reported on 1/30/2025)      Ferrous Sulfate (IRON) 325 (65 Fe) MG tablet Take 1 tablet by mouth daily. 30 tablet 2    ipratropium - albuterol 0.5 mg/2.5 mg/3 mL (DUONEB) 0.5-2.5 (3) MG/3ML neb solution Inhale 3 mLs into the lungs every 6 hours as needed for wheezing.      levothyroxine (SYNTHROID/LEVOTHROID) 125 MCG tablet Take 1 tablet (125 mcg) by mouth daily. 30 tablet 0    LORazepam (ATIVAN) 0.5 MG tablet Take 0.5 mg by mouth nightly as needed for anxiety.      magnesium oxide 400 MG CAPS Take 400 mg by mouth daily.      metoprolol tartrate (LOPRESSOR) 100 MG tablet Take 1 tablet (100 mg) by mouth 2 times daily. 60 tablet 3    miconazole (MICATIN) 2 % external powder Apply topically 2 times daily. (Patient taking differently: Apply topically as needed.)      multivitamin w/minerals (MULTI-VITAMIN) tablet Take 1 tablet by mouth daily. (Patient not taking: Reported on 1/30/2025)      nitroGLYcerin (NITROSTAT) 0.4 MG sublingual tablet Place 0.4 mg under the tongue every 5 minutes as needed for chest pain. (Patient not taking: Reported on 1/30/2025)      omeprazole (PRILOSEC) 20 MG DR capsule Take 1 capsule (20 mg) by mouth daily. 30 capsule 12    simethicone (MYLICON) 125 MG chewable tablet Take 1 tablet (125 mg) by mouth 4 times daily as needed for intestinal gas 40 tablet 0    sodium chloride (OCEAN) 0.65 % nasal spray Spray 1 spray into both nostrils every hour as needed for congestion or other (dry nose). 15 mL 0    sotalol (BETAPACE) 80 MG tablet Take 1 tablet (80 mg) by mouth daily. 90 tablet 1    traZODone (DESYREL) 100 MG tablet Take 1 tablet (100 mg) by mouth at bedtime. 30 tablet 0    warfarin ANTICOAGULANT (COUMADIN) 1 MG tablet Take 2 - 3 mg daily. OR as directed by INR clinic. 120 tablet 3    Allergies   Allergen Reactions    Atarax [Hydroxyzine] Shortness Of Breath    Dye [Contrast Dye] Swelling     Pre-medicated with  "methylprednisolone    Shellfish Allergy Shortness Of Breath, Anaphylaxis and Swelling     Shrimp, crab, lobster    Cats      Other Reaction(s): Rhinitis, Sneezing    Dust Mite Extract Other (See Comments)     Other Reaction(s): Rhinitis, Sneezing    Food Itching     Shrimp and walnuts.    Penicillins Hives     Patient cannot recall if she has tried cephalosporins in the past.     Wheat Germ Oil Other (See Comments)     Seasonal - multiple    Erythromycin Hives    Morphine Hcl      Causes change in mental status    Nickel Rash     rash    Sulfa Antibiotics Hives and Rash         Lab Results    Chemistry/lipid CBC Cardiac Enzymes/BNP/TSH/INR   Lab Results   Component Value Date    BUN 20.0 2025     2025    CO2 25 2025     No results found for: \"CREATININE\"    No results found for: \"CHOL\", \"HDL\", \"LDL\", \"CHOLHDL\"   Lab Results   Component Value Date    WBC 11.7 (H) 10/28/2024    HGB 8.5 (L) 2024    HCT 23.4 (L) 10/28/2024    MCV 93 10/28/2024     10/28/2024    Lab Results   Component Value Date    TSH 12.40 (H) 2024    INR 2.8 (A) 2025          The longitudinal plan of care for the diagnosis(es)/condition(s) as documented were addressed during this visit. Due to the added complexity in care, I will continue to support Jory in the subsequent management and with ongoing continuity of care.     This note has been dictated using voice recognition software. Any grammatical, typographical, or context distortions are unintentional and inherent to the software.    Stephen Person PA-C  Clinical Cardiac Electrophysiology  [facility]  Clinic and [phone]  Fax: [phone]  Electrophysiology Nurses: [phone]                                  "

## 2025-02-24 NOTE — H&P (VIEW-ONLY)
[facility]  Cardiac Electrophysiology  [address]  [place] [address]   Office: [phone]  Fax: [phone]     HEART CARE ELECTROPHYSIOLOGY NOTE     Primary Care: Rome Joshua MD       Assessment/Recommendations     Persistent atrial fibrillation and flutter/stage 3B:   Symptomatic with palpitations, dyspnea, lightheadedness.  Diagnosed postoperatively, though possible had AF prior to surgery given moderate to severe mitral regurgitation. Continues to be symptomatic.    We discussed pulmonary vein isolation ablation procedure including <1-2% risk for major complication, anticipated success rates, recovery and follow-up.  Medical and surgical history reviewed and updated. Current medications and allergies reviewed and updated as appropriate. No personal or family history of adverse reactions to anesthesia or abnormal bleeding with surgery.    [id number] score of at least 2 for gender, CAD,       Plan:  Continue warfarin for stroke prophylaxis  Stop sotalol after today  Increase omeprazole to 40mg every day beginning 3 days prior to ablation and continuing for 6 weeks thereafter, then resume 20 mg daily  EP follow-up 6 weeks post ablation     History of Present Illness/Subjective    Jory Ambrose is a 49 year old female with past medical history significant for persistent atrial fibrillation/flutter, four-vessel CABG/mechanical MVR/CARRIE excision (10/2024), CKD, Hodgkin's lymphoma with prior chest radiation and bone marrow transplant, PEs, seen today for history and physical prior to ablation with Dr. Balbuena 3/6/25.     She continues to be short of breath some occasional dizziness and lightheadedness as well.  She is excited for her ablation coming up.  She is here today with her mom.  All their questions were answered.    She denies chest discomfort, palpitations, pedal edema, or syncope.       Data Review     Arrhythmia hx:  Sx: Palpitations, dyspnea,  weight evidence  Dx/date: 10/2024  Rate control: None  AAD: Sotalol 80 mg twice daily  DCCV: CARMEN/DCCV 11/29/2024  Ablation: Upcoming  [id number] 4  OAC: None, CARRIE excised 10/18/2024 without residual polyps by CARMEN    EKG personally reviewed:  2/25/25: A-fib 82 bpm  1/24/2025 - AFL, 120bpm  1/3/2025 - SR 77bpm, FL 236ms  12/13/2024 - SR 77bpm, FL 244ms  10/23/2024 - typical appearing AFL, 67bpm     Zio monitoring from 11/18/2024 to 11/21/2024 (duration 3 days) (independently reviewed)  Continuous atrial fibrillation/flutter, 64 to 143 bpm, average 109 bpm.  There were no pauses of greater than 3 seconds.  Rare premature ventricular contractions (<1%).  Symptom triggers none.     11/29/2024 CARMEN  Left ventricular size, wall motion and function are normal. The ejection  fraction is 60-65%.  Normal right ventricle size and systolic function.  Left atrial appendage was surgically excised. There is no residual pouch  No left atrial mass or thrombus visualized.  The left atrium is mildly dilated.  There is a bi-leaflet ([employer]) mechanical prosthesis.  Normal prosthetic mitral valve gradients.    I have reviewed and updated the patient's past medical history, allergies and medication list.               Physical Examination Review of Systems   BMI= There is no height or weight on file to calculate BMI.    Wt Readings from Last 3 Encounters:   01/30/25 95.3 kg (210 lb 3.2 oz)   01/24/25 94.3 kg (208 lb)   01/03/25 94.8 kg (208 lb 14.4 oz)       Vitals: There were no vitals taken for this visit.  General   Appearance:   Alert and oriented, in no acute distress.    HEENT:  Normocephalic and atraumatic. Conjunctiva and sclera are clear. Moist oral mucosa.    Neck: No JVP, carotid bruit or obvious thyromegaly.   Lungs:   Respirations unlabored. Clear bilaterally with no rales, rhonchi, or wheezes.     Cardiovascular:   Rhythm is regular.  Mechanical heart sounds present, no significant murmur is present. Lower extremities  demonstrate no significant edema.   Extremities: No cyanosis or clubbing   Skin: Skin is warm, dry, and otherwise intact.   Neurologic: Gait not asssessed. Mood and affect appropriate.                                                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    This would prevent her from following up on a regular basis      Deconditioned low back 06/18/2015    H/O autologous stem cell transplant (H) 07/26/2011    History of radiation therapy 01/01/2010    3600 cGy to mediastinum and neck    Hodgkin lymphoma, nodular sclerosis (H)     Dx Feb 2010    Hypothyroidism, secondary     r/t radiation    Morbid obesity with BMI of 40.0-44.9, adult (H)     Neutropenic fever 06/09/2013    Polycystic ovary disease     Pulmonary embolism (H)     Pulmonary embolus (H) 02/21/2011    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    Seasonal allergies     Shingles     Aug 2010    Sleep apnea     Past Surgical History:   Procedure Laterality Date    CHOLECYSTECTOMY      gallstone pancreatitis Jan 2010    CORONARY ARTERY BYPASS GRAFT, WITH ENDOSCOPIC VESSEL PROCUREMENT N/A 10/8/2024    Procedure: CORONARY ARTERY BYPASS GRAFT TIMES FOUR, LEFT INTERNAL MAMMARY ARTERY HARVEST, RIGHT LEG ENDOSCOPIC VESSEL PROCUREMENT, EPIAORTIC ULTRASOUND,;  Surgeon: David Wade MD;  Location: [facility] [place] OR    CV CORONARY ANGIOGRAM N/A 9/13/2024    Procedure: Coronary Angiogram;  Surgeon: Tarun Johnston MD;  Location: [facility] CATH LAB CV    CV LEFT HEART CATH N/A 9/13/2024    Procedure: Left Heart Catheterization;  Surgeon: Tarun Johnston MD;  Location: [facility] CATH LAB CV    IR CVC NON TUNNEL PLACEMENT > 5 YRS  10/11/2024    IR CVC TUNNEL PLACEMENT > 5 YRS OF AGE  10/24/2024    IR CVC TUNNEL REMOVAL RIGHT  11/1/2024    Lymphectomy   2/2010    right neck area    OR LIGATION, LEFT ATRIAL APPENDAGE N/A 10/8/2024    Procedure: LEFT ATRIAL APPENDAGE LIGATION,;  Surgeon: David Wade MD;  Location: [facility] [place]    PICC TRIPLE LUMEN PLACEMENT  10/15/2024    REPLACE VALVE MITRAL N/A 10/8/2024    Procedure: MITRAL VALVE REAIR CONVERTED TO MITRAL VALVE REPLACEMENT;  Surgeon: David Wade MD;  Location: [facility] OR    TRANSESOPHAGEAL ECHOCARDIOGRAM INTRAOPERATIVE  10/8/2024    Procedure: ANESTHESIA TRANSESOPHAGEAL ECHOCARDIOGRAM;  Surgeon: David Wade MD;  Location: [facility]    Family History   Problem Relation Age of Onset    Cancer Mother         thyroid cancer    Unknown/Adopted Father     Breast Cancer No family hx of     Cancer - colorectal No family hx of     Prostate Cancer No family hx of     Hypertension Mother     C.A.D. Maternal Grandmother     C.A.D. Paternal Grandmother     Social History     Tobacco Use    Smoking status: Never    Smokeless tobacco: Never    Tobacco comments:     denies tabacco use   Vaping Use    Vaping status: Never Used   Substance Use Topics    Alcohol use: No     Comment: minimal alcohol use, 1 glass of wine in 6 months    Drug use: No          Medications  Allergies   Current Outpatient Medications   Medication Sig Dispense Refill    acetaminophen (TYLENOL) 500 MG tablet Take 2 tablets (1,000 mg) by mouth every 6 hours as needed for mild pain.      albuterol (PROAIR HFA) 108 (90 Base) MCG/ACT inhaler Inhale 2 puffs into the lungs every 6 hours as needed. 18 g 0    atorvastatin (LIPITOR) 80 MG tablet Take 1 tablet (80 mg) by mouth daily. 90 tablet 3    cetirizine (ZYRTEC) 10 MG tablet Take 1 tablet (10 mg) by mouth daily. 30 tablet 11    cholecalciferol (VITAMIN D3) 125 mcg (5000 units) capsule Take 1 capsule (125 mcg) by mouth daily. 30 capsule 0    clopidogrel (PLAVIX) 75 MG tablet Take 1 tablet (75 mg) by mouth daily. 30 tablet 1    Continuous Glucose Sensor  (FREESTYLE JACINTO 3 SENSOR) MISC 1 Application every 14 days. (Patient not taking: Reported on 1/30/2025)      Ferrous Sulfate (IRON) 325 (65 Fe) MG tablet Take 1 tablet by mouth daily. 30 tablet 2    ipratropium - albuterol 0.5 mg/2.5 mg/3 mL (DUONEB) 0.5-2.5 (3) MG/3ML neb solution Inhale 3 mLs into the lungs every 6 hours as needed for wheezing.      levothyroxine (SYNTHROID/LEVOTHROID) 125 MCG tablet Take 1 tablet (125 mcg) by mouth daily. 30 tablet 0    LORazepam (ATIVAN) 0.5 MG tablet Take 0.5 mg by mouth nightly as needed for anxiety.      magnesium oxide 400 MG CAPS Take 400 mg by mouth daily.      metoprolol tartrate (LOPRESSOR) 100 MG tablet Take 1 tablet (100 mg) by mouth 2 times daily. 60 tablet 3    miconazole (MICATIN) 2 % external powder Apply topically 2 times daily. (Patient taking differently: Apply topically as needed.)      multivitamin w/minerals (MULTI-VITAMIN) tablet Take 1 tablet by mouth daily. (Patient not taking: Reported on 1/30/2025)      nitroGLYcerin (NITROSTAT) 0.4 MG sublingual tablet Place 0.4 mg under the tongue every 5 minutes as needed for chest pain. (Patient not taking: Reported on 1/30/2025)      omeprazole (PRILOSEC) 20 MG DR capsule Take 1 capsule (20 mg) by mouth daily. 30 capsule 12    simethicone (MYLICON) 125 MG chewable tablet Take 1 tablet (125 mg) by mouth 4 times daily as needed for intestinal gas 40 tablet 0    sodium chloride (OCEAN) 0.65 % nasal spray Spray 1 spray into both nostrils every hour as needed for congestion or other (dry nose). 15 mL 0    sotalol (BETAPACE) 80 MG tablet Take 1 tablet (80 mg) by mouth daily. 90 tablet 1    traZODone (DESYREL) 100 MG tablet Take 1 tablet (100 mg) by mouth at bedtime. 30 tablet 0    warfarin ANTICOAGULANT (COUMADIN) 1 MG tablet Take 2 - 3 mg daily. OR as directed by INR clinic. 120 tablet 3    Allergies   Allergen Reactions    Atarax [Hydroxyzine] Shortness Of Breath    Dye [Contrast Dye] Swelling     Pre-medicated with  "methylprednisolone    Shellfish Allergy Shortness Of Breath, Anaphylaxis and Swelling     Shrimp, crab, lobster    Cats      Other Reaction(s): Rhinitis, Sneezing    Dust Mite Extract Other (See Comments)     Other Reaction(s): Rhinitis, Sneezing    Food Itching     Shrimp and walnuts.    Penicillins Hives     Patient cannot recall if she has tried cephalosporins in the past.     Wheat Germ Oil Other (See Comments)     Seasonal - multiple    Erythromycin Hives    Morphine Hcl      Causes change in mental status    Nickel Rash     rash    Sulfa Antibiotics Hives and Rash         Lab Results    Chemistry/lipid CBC Cardiac Enzymes/BNP/TSH/INR   Lab Results   Component Value Date    BUN 20.0 2025     2025    CO2 25 2025     No results found for: \"CREATININE\"    No results found for: \"CHOL\", \"HDL\", \"LDL\", \"CHOLHDL\"   Lab Results   Component Value Date    WBC 11.7 (H) 10/28/2024    HGB 8.5 (L) 2024    HCT 23.4 (L) 10/28/2024    MCV 93 10/28/2024     10/28/2024    Lab Results   Component Value Date    TSH 12.40 (H) 2024    INR 2.8 (A) 2025          The longitudinal plan of care for the diagnosis(es)/condition(s) as documented were addressed during this visit. Due to the added complexity in care, I will continue to support Jory in the subsequent management and with ongoing continuity of care.     This note has been dictated using voice recognition software. Any grammatical, typographical, or context distortions are unintentional and inherent to the software.    Stephen Person PA-C  Clinical Cardiac Electrophysiology  [facility]  Clinic and [phone]  Fax: [phone]  Electrophysiology Nurses: [phone]                                  "

## 2025-02-25 ENCOUNTER — LAB (OUTPATIENT)
Dept: CARDIOLOGY | Facility: CLINIC | Age: 50
End: 2025-02-25
Payer: COMMERCIAL

## 2025-02-25 ENCOUNTER — OFFICE VISIT (OUTPATIENT)
Dept: CARDIOLOGY | Facility: CLINIC | Age: 50
End: 2025-02-25
Payer: COMMERCIAL

## 2025-02-25 ENCOUNTER — ALLIED HEALTH/NURSE VISIT (OUTPATIENT)
Dept: CARDIOLOGY | Facility: CLINIC | Age: 50
End: 2025-02-25
Payer: COMMERCIAL

## 2025-02-25 ENCOUNTER — ANTICOAGULATION THERAPY VISIT (OUTPATIENT)
Dept: ANTICOAGULATION | Facility: CLINIC | Age: 50
End: 2025-02-25

## 2025-02-25 VITALS
WEIGHT: 209 LBS | BODY MASS INDEX: 35.68 KG/M2 | OXYGEN SATURATION: 94 % | SYSTOLIC BLOOD PRESSURE: 120 MMHG | RESPIRATION RATE: 16 BRPM | DIASTOLIC BLOOD PRESSURE: 78 MMHG | HEIGHT: 64 IN | HEART RATE: 82 BPM

## 2025-02-25 DIAGNOSIS — I48.3 TYPICAL ATRIAL FLUTTER (H): ICD-10-CM

## 2025-02-25 DIAGNOSIS — E03.8 SUBCLINICAL HYPOTHYROIDISM: ICD-10-CM

## 2025-02-25 DIAGNOSIS — I48.0 PAROXYSMAL ATRIAL FIBRILLATION (H): Primary | ICD-10-CM

## 2025-02-25 DIAGNOSIS — Z79.01 LONG TERM (CURRENT) USE OF ANTICOAGULANTS: ICD-10-CM

## 2025-02-25 DIAGNOSIS — G47.33 OSA (OBSTRUCTIVE SLEEP APNEA): ICD-10-CM

## 2025-02-25 DIAGNOSIS — Z95.2 H/O MITRAL VALVE REPLACEMENT WITH MECHANICAL VALVE: Primary | ICD-10-CM

## 2025-02-25 LAB
ANION GAP SERPL CALCULATED.3IONS-SCNC: 9 MMOL/L (ref 7–15)
BUN SERPL-MCNC: 22.9 MG/DL (ref 6–20)
CALCIUM SERPL-MCNC: 9.8 MG/DL (ref 8.8–10.4)
CHLORIDE SERPL-SCNC: 102 MMOL/L (ref 98–107)
CREAT SERPL-MCNC: 1.01 MG/DL (ref 0.51–0.95)
EGFRCR SERPLBLD CKD-EPI 2021: 68 ML/MIN/1.73M2
ERYTHROCYTE [DISTWIDTH] IN BLOOD BY AUTOMATED COUNT: 18.8 % (ref 10–15)
GLUCOSE SERPL-MCNC: 89 MG/DL (ref 70–99)
HCO3 SERPL-SCNC: 26 MMOL/L (ref 22–29)
HCT VFR BLD AUTO: 28 % (ref 35–47)
HGB BLD-MCNC: 9 G/DL (ref 11.7–15.7)
INR POINT OF CARE: 2.6 (ref 0.9–1.1)
MCH RBC QN AUTO: 28 PG (ref 26.5–33)
MCHC RBC AUTO-ENTMCNC: 32.1 G/DL (ref 31.5–36.5)
MCV RBC AUTO: 87 FL (ref 78–100)
PLATELET # BLD AUTO: 305 10E3/UL (ref 150–450)
POTASSIUM SERPL-SCNC: 4.7 MMOL/L (ref 3.4–5.3)
RBC # BLD AUTO: 3.22 10E6/UL (ref 3.8–5.2)
SODIUM SERPL-SCNC: 137 MMOL/L (ref 135–145)
WBC # BLD AUTO: 5.7 10E3/UL (ref 4–11)

## 2025-02-25 PROCEDURE — 99214 OFFICE O/P EST MOD 30 MIN: CPT

## 2025-02-25 PROCEDURE — 36416 COLLJ CAPILLARY BLOOD SPEC: CPT

## 2025-02-25 PROCEDURE — G2211 COMPLEX E/M VISIT ADD ON: HCPCS

## 2025-02-25 PROCEDURE — 80048 BASIC METABOLIC PNL TOTAL CA: CPT

## 2025-02-25 PROCEDURE — 36415 COLL VENOUS BLD VENIPUNCTURE: CPT

## 2025-02-25 PROCEDURE — 93000 ELECTROCARDIOGRAM COMPLETE: CPT | Performed by: INTERNAL MEDICINE

## 2025-02-25 PROCEDURE — 85027 COMPLETE CBC AUTOMATED: CPT

## 2025-02-25 PROCEDURE — 99207 PR NO CHARGE NURSE ONLY: CPT

## 2025-02-25 PROCEDURE — 85610 PROTHROMBIN TIME: CPT

## 2025-02-25 RX ORDER — OMEPRAZOLE 20 MG/1
CAPSULE, DELAYED RELEASE ORAL
Qty: 90 CAPSULE | Refills: 12 | Status: SHIPPED | OUTPATIENT
Start: 2025-02-25

## 2025-02-25 NOTE — PROGRESS NOTES
ANTICOAGULATION MANAGEMENT     Jory Ambrose 49 year old female is on warfarin with therapeutic INR result. (Goal INR 2.5-3.5)    Recent labs: (last 7 days)     02/25/25  0945   INR 2.6*       ASSESSMENT     Source(s): Chart Review, Patient/Caregiver Call, and Template     Warfarin doses taken: Warfarin taken as instructed  Diet: No new diet changes identified  Medication/supplement changes: None noted  New illness, injury, or hospitalization: No  Signs or symptoms of bleeding or clotting: No  Previous result: Therapeutic last 2(+) visits  Additional findings: Upcoming Pulmonary Vein Isolation (PVI); weekly INR monitoring. To notify  pool and cardiologist if INR < 2 if scheduled, INR > 3.3 within a week of ablation/PVI, or non-compliance with monitoring > 1 week.       PLAN     Recommended plan for no diet, medication or health factor changes affecting INR     Dosing Instructions: Continue your current warfarin dose with next INR in 1 week   post procedure.    Summary  As of 2/25/2025      Full warfarin instructions:  2 mg every Sun, Tue, Thu; 3 mg all other days   Next INR check:  3/13/2025               Telephone call with Jory who agrees to plan and repeated back plan correctly    Lab visit scheduled    Education provided: Please call back if any changes to your diet, medications or how you've been taking warfarin  Goal range and lab monitoring: goal range and significance of current result and Importance of therapeutic range  Contact [phone] with any changes, questions or concerns.     Plan made per [facility] anticoagulation protocol    Hermelinda Reed RN  2/25/2025  Anticoagulation Clinic  [facility] for routing messages: [facility] HEART [facility] patient phone line: [phone]        _______________________________________________________________________     Anticoagulation Episode Summary       Current INR goal:  2.5-3.5   TTR:  52.5% (3.4 mo)   Target end date:   Indefinite   Send INR reminders to:  [facility] HEART [facility]    Indications    H/O mitral valve replacement with mechanical valve [Z95.2]             Comments:  --             Anticoagulation Care Providers       Provider Role Specialty Phone number    Catherine Galvez PA-C Referring Radiation Oncology [phone]

## 2025-02-25 NOTE — PROGRESS NOTES
Pre-Procedure Pulmonary Vein Ablation (AF) Education    Procedure: PVI with Dr Balbuena on 3/6/25 with arrival time 5:30am    COVID: Pt denies COVID like symptoms, and is aware if he/she develops COVID like symptoms they would need to complete an at home with a rapid antigen COVID test 1-2 days prior to your procedure date. If COVID + pt is aware the procedure will need to be rescheduled, and to contact CV scheduling as soon as possible    Type & Screen: Is not required for PVI Ablation    Pre-Op H&P: Completed today with EP ORQUIDEA- See record in Epic    Education:   Reviewed with pt in Clinic today  Pre-Procedure Instruction: NPO after midnight pre procedure, Defined NPO, Remove all jewelry and leave all valuables at home, Shower prior to arrival, Anesthesia and intubation plan/orders, Intra-procedure PVI process, Post- PVI procedure expectations/recovery, Transportation requirements and arrangements post procedure, Post-procedure follow up process, Letter sent to pt via [employer] and mail with written instructions (Refer to letters tab), Lab results would be called to pt if abnormal  Risks:   Atrial Fibrillation Ablation/Left Atrial Ablation  Cardiac Ablation  <1% Hypotension, Hemorrhage, Thrombophlebitis, Systemic or pulmonic emboli, Cardiac perforation (tamponade), Infection, Pneumothorax, Arrhythmias, Proarrhythmic effects of drugs, Radiation exposure, Catheter entrapment  <1 % Vascular injury including perforation of vein, artery or heart  1-2% Tamponade and Aortic puncture with left sided transeptal approach  1% CVA   <1% MI  <0.1% death  If external defibrillation or CV is needed, 25% risk for superficial burn  Risks associated with general anesthesia will be addressed by the Anesthesiology Department  Radiofrequency Risks:  In addition to standard risks for Radiofrequency Ablation, there is:  <2% Significant pulmonary vein stenosis  <2% Embolic events  <1% Esophageal fistula  <1% Phrenic nerve paralysis    Cryoablation Risks:  In addition to standard risks for Cryoablation Ablation, there is:  <1% Phrenic nerve paralysis  <1% Pulmonary vein stenosis  <1% Esophageal fistula    Medication:   Instructions regarding anticoagulants: Warfarin- Continue anticoagulation uninterrupted through their procedure, importance of taking AC for stroke prevention, taking AC as prescribed, to call prior to PVI if missed a dose of AC, if upon arrival pt INR < 2 PVI will potentially be cnx/postponed, compliant with obtaining INRs- See listed INR values below  Instructions given to pt regarding antiarrhythmic medication: Sotalol- hold 8 days prior to procedure, and will be started on 2/26/25  Instructions given to pt regarding PPI medication: Increase Omeprazole to 40mg daily, 3 days prior, 6wk post  Instructions given to pt regarding diuretics medication: None  Instructions given to pt regarding DM/GLP-1 medication:   DM- None  GLP-1- None  Instructions for medication, other than anticoagulants and antiarrhythmics listed above, given to pt: Take all medication AM of procedure with small sips of water     Important patient information for staff: None    2/25/2025 9:20 AM  Renata Talavera RN

## 2025-02-25 NOTE — PATIENT INSTRUCTIONS
Jory Ambrose,    It was a pleasure to see you today at the [facility].     Before ablation:   -Continue your warfarin as ordered for stroke prevention. Take this with a sip of water on the morning of your procedure.  -Stop sotalol after your last dose today.   -On 3/3, increase omeprazole 40mg by mouth once a day. We will continue this medication for the next 6 weeks after ablation, then can decrease to 20 mg daily     After ablation:   -Blood thinner/anticoagulation must be continued, without interruption, for at least 3 months following your ablation.   -No driving for 3 days after your procedure. No lifting more than 10-20 pounds or strenuous exercise for 3-5 days after your procedure to allow for groin site healing.   -Short episodes of atrial fibrillation can be common after your procedure. Call the office if episodes are lasting longer than 4 hours.  -Call the office if you are experiencing increasing bleeding or pain at groin sites, lump that is larger than a walnut, or fever.   -Dial 911 if you have any NEW signs or symptoms of a stroke, including but not limited to injuries in vision, problems talking, numbness on one side of your face or body, sudden headache, confusion, or problems with walking.  -Do not hesitate to call the office with additional questions or concerns.    Stephen Person PA-C  Clinical Cardiac Electrophysiology  [facility]  Office: [phone]  Fax: [phone]   Nurses: [phone]

## 2025-02-25 NOTE — LETTER
2/25/2025    Rome Joshua MD  [address] [facility] [address]    RE: Jory Ambrose       Dear Colleague,     I had the pleasure of seeing Jory Ambrose in the [facility].       [facility]  Cardiac Electrophysiology  [address]  [place] [address]   Office: [phone]  Fax: [phone]     HEART CARE ELECTROPHYSIOLOGY NOTE     Primary Care: Rome Joshua MD       Assessment/Recommendations     Persistent atrial fibrillation and flutter/stage 3B:   Symptomatic with palpitations, dyspnea, lightheadedness.  Diagnosed postoperatively, though possible had AF prior to surgery given moderate to severe mitral regurgitation. Continues to be symptomatic.    We discussed pulmonary vein isolation ablation procedure including <1-2% risk for major complication, anticipated success rates, recovery and follow-up.  Medical and surgical history reviewed and updated. Current medications and allergies reviewed and updated as appropriate. No personal or family history of adverse reactions to anesthesia or abnormal bleeding with surgery.    [id number] score of at least 2 for gender, CAD,       Plan:  Continue warfarin for stroke prophylaxis  Stop sotalol after today  Increase omeprazole to 40mg every day beginning 3 days prior to ablation and continuing for 6 weeks thereafter, then resume 20 mg daily  EP follow-up 6 weeks post ablation     History of Present Illness/Subjective    Jory Ambrose is a 49 year old female with past medical history significant for persistent atrial fibrillation/flutter, four-vessel CABG/mechanical MVR/CARRIE excision (10/2024), CKD, Hodgkin's lymphoma with prior chest radiation and bone marrow transplant, PEs, seen today for history and physical prior to ablation with Dr. Balbuena 3/6/25.     She continues to be short of breath some occasional dizziness and lightheadedness as well.  She is excited for her  ablation coming up.  She is here today with her mom.  All their questions were answered.    She denies chest discomfort, palpitations, pedal edema, or syncope.       Data Review     Arrhythmia hx:  Sx: Palpitations, dyspnea, weight evidence  Dx/date: 10/2024  Rate control: None  AAD: Sotalol 80 mg twice daily  DCCV: CARMEN/DCCV 11/29/2024  Ablation: Upcoming  [id number] 4  OAC: None, CARRIE excised 10/18/2024 without residual polyps by CARMEN    EKG personally reviewed:  2/25/25: A-fib 82 bpm  1/24/2025 - AFL, 120bpm  1/3/2025 - SR 77bpm, MS 236ms  12/13/2024 - SR 77bpm, MS 244ms  10/23/2024 - typical appearing AFL, 67bpm     Zio monitoring from 11/18/2024 to 11/21/2024 (duration 3 days) (independently reviewed)  Continuous atrial fibrillation/flutter, 64 to 143 bpm, average 109 bpm.  There were no pauses of greater than 3 seconds.  Rare premature ventricular contractions (<1%).  Symptom triggers none.     11/29/2024 CARMEN  Left ventricular size, wall motion and function are normal. The ejection  fraction is 60-65%.  Normal right ventricle size and systolic function.  Left atrial appendage was surgically excised. There is no residual pouch  No left atrial mass or thrombus visualized.  The left atrium is mildly dilated.  There is a bi-leaflet ([employer] Brian) mechanical prosthesis.  Normal prosthetic mitral valve gradients.    I have reviewed and updated the patient's past medical history, allergies and medication list.               Physical Examination Review of Systems   BMI= There is no height or weight on file to calculate BMI.    Wt Readings from Last 3 Encounters:   01/30/25 95.3 kg (210 lb 3.2 oz)   01/24/25 94.3 kg (208 lb)   01/03/25 94.8 kg (208 lb 14.4 oz)       Vitals: There were no vitals taken for this visit.  General   Appearance:   Alert and oriented, in no acute distress.    HEENT:  Normocephalic and atraumatic. Conjunctiva and sclera are clear. Moist oral mucosa.    Neck: No JVP, carotid bruit or obvious  thyromegaly.   Lungs:   Respirations unlabored. Clear bilaterally with no rales, rhonchi, or wheezes.     Cardiovascular:   Rhythm is regular.  Mechanical heart sounds present, no significant murmur is present. Lower extremities demonstrate no significant edema.   Extremities: No cyanosis or clubbing   Skin: Skin is warm, dry, and otherwise intact.   Neurologic: Gait not asssessed. Mood and affect appropriate.                                                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    This would prevent her from following up on a regular basis       Deconditioned low back 06/18/2015     H/O autologous stem cell transplant (H) 07/26/2011     History of radiation therapy 01/01/2010    3600 cGy to mediastinum and neck     Hodgkin lymphoma, nodular sclerosis (H)     Dx Feb 2010     Hypothyroidism, secondary     r/t radiation     Morbid obesity with BMI of 40.0-44.9, adult (H)      Neutropenic fever 06/09/2013     Polycystic ovary disease      Pulmonary embolism (H)      Pulmonary embolus (H) 02/21/2011     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     Seasonal allergies      Shingles     Aug 2010     Sleep apnea     Past Surgical History:   Procedure Laterality Date     CHOLECYSTECTOMY      gallstone pancreatitis Jan 2010     CORONARY ARTERY BYPASS GRAFT, WITH ENDOSCOPIC VESSEL PROCUREMENT N/A 10/8/2024    Procedure: CORONARY ARTERY BYPASS GRAFT TIMES FOUR, LEFT INTERNAL MAMMARY ARTERY HARVEST, RIGHT LEG ENDOSCOPIC VESSEL PROCUREMENT, EPIAORTIC ULTRASOUND,;  Surgeon: David Wade MD;  Location: [facility] Main OR     CV CORONARY ANGIOGRAM N/A 9/13/2024    Procedure: Coronary Angiogram;  Surgeon: Tarun Johnston MD;  Location: [facility] CATH LAB CV     CV LEFT HEART CATH N/A 9/13/2024    Procedure: Left  Heart Catheterization;  Surgeon: Tarun Johnston MD;  Location: [facility] CATH LAB CV     IR CVC NON TUNNEL PLACEMENT > 5 YRS  10/11/2024     IR CVC TUNNEL PLACEMENT > 5 YRS OF AGE  10/24/2024     IR CVC TUNNEL REMOVAL RIGHT  11/1/2024     Lymphectomy  2/2010    right neck area     OR LIGATION, LEFT ATRIAL APPENDAGE N/A 10/8/2024    Procedure: LEFT ATRIAL APPENDAGE LIGATION,;  Surgeon: David Wade MD;  Location: [facility] OR     PICC TRIPLE LUMEN PLACEMENT  10/15/2024     REPLACE VALVE MITRAL N/A 10/8/2024    Procedure: MITRAL VALVE REAIR CONVERTED TO MITRAL VALVE REPLACEMENT;  Surgeon: David Wade MD;  Location: [facility] OR     TRANSESOPHAGEAL ECHOCARDIOGRAM INTRAOPERATIVE  10/8/2024    Procedure: ANESTHESIA TRANSESOPHAGEAL ECHOCARDIOGRAM;  Surgeon: David Wade MD;  Location: [facility] OR    Family History   Problem Relation Age of Onset     Cancer Mother         thyroid cancer     Unknown/Adopted Father      Breast Cancer No family hx of      Cancer - colorectal No family hx of      Prostate Cancer No family hx of      Hypertension Mother      C.A.D. Maternal Grandmother      C.A.D. Paternal Grandmother     Social History     Tobacco Use     Smoking status: Never     Smokeless tobacco: Never     Tobacco comments:     denies tabacco use   Vaping Use     Vaping status: Never Used   Substance Use Topics     Alcohol use: No     Comment: minimal alcohol use, 1 glass of wine in 6 months     Drug use: No          Medications  Allergies   Current Outpatient Medications   Medication Sig Dispense Refill     acetaminophen (TYLENOL) 500 MG tablet Take 2 tablets (1,000 mg) by mouth every 6 hours as needed for mild pain.       albuterol (PROAIR HFA) 108 (90 Base) MCG/ACT inhaler Inhale 2 puffs into the lungs every 6 hours as needed. 18 g 0     atorvastatin (LIPITOR) 80 MG tablet Take 1 tablet (80 mg) by mouth daily. 90 tablet 3     cetirizine (ZYRTEC) 10 MG tablet Take  1 tablet (10 mg) by mouth daily. 30 tablet 11     cholecalciferol (VITAMIN D3) 125 mcg (5000 units) capsule Take 1 capsule (125 mcg) by mouth daily. 30 capsule 0     clopidogrel (PLAVIX) 75 MG tablet Take 1 tablet (75 mg) by mouth daily. 30 tablet 1     Continuous Glucose Sensor (FREESTYLE JACINTO 3 SENSOR) MISC 1 Application every 14 days. (Patient not taking: Reported on 1/30/2025)       Ferrous Sulfate (IRON) 325 (65 Fe) MG tablet Take 1 tablet by mouth daily. 30 tablet 2     ipratropium - albuterol 0.5 mg/2.5 mg/3 mL (DUONEB) 0.5-2.5 (3) MG/3ML neb solution Inhale 3 mLs into the lungs every 6 hours as needed for wheezing.       levothyroxine (SYNTHROID/LEVOTHROID) 125 MCG tablet Take 1 tablet (125 mcg) by mouth daily. 30 tablet 0     LORazepam (ATIVAN) 0.5 MG tablet Take 0.5 mg by mouth nightly as needed for anxiety.       magnesium oxide 400 MG CAPS Take 400 mg by mouth daily.       metoprolol tartrate (LOPRESSOR) 100 MG tablet Take 1 tablet (100 mg) by mouth 2 times daily. 60 tablet 3     miconazole (MICATIN) 2 % external powder Apply topically 2 times daily. (Patient taking differently: Apply topically as needed.)       multivitamin w/minerals (MULTI-VITAMIN) tablet Take 1 tablet by mouth daily. (Patient not taking: Reported on 1/30/2025)       nitroGLYcerin (NITROSTAT) 0.4 MG sublingual tablet Place 0.4 mg under the tongue every 5 minutes as needed for chest pain. (Patient not taking: Reported on 1/30/2025)       omeprazole (PRILOSEC) 20 MG DR capsule Take 1 capsule (20 mg) by mouth daily. 30 capsule 12     simethicone (MYLICON) 125 MG chewable tablet Take 1 tablet (125 mg) by mouth 4 times daily as needed for intestinal gas 40 tablet 0     sodium chloride (OCEAN) 0.65 % nasal spray Spray 1 spray into both nostrils every hour as needed for congestion or other (dry nose). 15 mL 0     sotalol (BETAPACE) 80 MG tablet Take 1 tablet (80 mg) by mouth daily. 90 tablet 1     traZODone (DESYREL) 100 MG tablet Take 1  "tablet (100 mg) by mouth at bedtime. 30 tablet 0     warfarin ANTICOAGULANT (COUMADIN) 1 MG tablet Take 2 - 3 mg daily. OR as directed by INR clinic. 120 tablet 3    Allergies   Allergen Reactions     Atarax [Hydroxyzine] Shortness Of Breath     Dye [Contrast Dye] Swelling     Pre-medicated with methylprednisolone     Shellfish Allergy Shortness Of Breath, Anaphylaxis and Swelling     Shrimp, crab, lobster     Cats      Other Reaction(s): Rhinitis, Sneezing     Dust Mite Extract Other (See Comments)     Other Reaction(s): Rhinitis, Sneezing     Food Itching     Shrimp and walnuts.     Penicillins Hives     Patient cannot recall if she has tried cephalosporins in the past.      Wheat Germ Oil Other (See Comments)     Seasonal - multiple     Erythromycin Hives     Morphine Hcl      Causes change in mental status     Nickel Rash     rash     Sulfa Antibiotics Hives and Rash         Lab Results    Chemistry/lipid CBC Cardiac Enzymes/BNP/TSH/INR   Lab Results   Component Value Date    BUN 20.0 01/17/2025     01/17/2025    CO2 25 01/17/2025     No results found for: \"CREATININE\"    No results found for: \"CHOL\", \"HDL\", \"LDL\", \"CHOLHDL\"   Lab Results   Component Value Date    WBC 11.7 (H) 10/28/2024    HGB 8.5 (L) 11/05/2024    HCT 23.4 (L) 10/28/2024    MCV 93 10/28/2024     10/28/2024    Lab Results   Component Value Date    TSH 12.40 (H) 11/20/2024    INR 2.8 (A) 02/12/2025          The longitudinal plan of care for the diagnosis(es)/condition(s) as documented were addressed during this visit. Due to the added complexity in care, I will continue to support Jory in the subsequent management and with ongoing continuity of care.     This note has been dictated using voice recognition software. Any grammatical, typographical, or context distortions are unintentional and inherent to the software.    Stephen Person PA-C  Clinical Cardiac Electrophysiology  [facility]  Clinic and scheduling: " [phone]  Fax: [phone]  Electrophysiology Nurses: [phone]                                    Thank you for allowing me to participate in the care of your patient.      Sincerely,     EARNESTINE Granados [facility]  cc:   No referring provider defined for this encounter.

## 2025-02-26 LAB
ATRIAL RATE - MUSE: 82 BPM
DIASTOLIC BLOOD PRESSURE - MUSE: NORMAL MMHG
INTERPRETATION ECG - MUSE: NORMAL
P AXIS - MUSE: NORMAL DEGREES
PR INTERVAL - MUSE: NORMAL MS
QRS DURATION - MUSE: 94 MS
QT - MUSE: 428 MS
QTC - MUSE: 500 MS
R AXIS - MUSE: 42 DEGREES
SYSTOLIC BLOOD PRESSURE - MUSE: NORMAL MMHG
T AXIS - MUSE: 10 DEGREES
VENTRICULAR RATE- MUSE: 82 BPM

## 2025-02-28 DIAGNOSIS — I25.119 CORONARY ARTERY DISEASE INVOLVING NATIVE CORONARY ARTERY OF NATIVE HEART WITH ANGINA PECTORIS: ICD-10-CM

## 2025-03-04 RX ORDER — FERROUS SULFATE 325(65) MG
325 TABLET ORAL DAILY
Qty: 90 TABLET | OUTPATIENT
Start: 2025-03-04

## 2025-03-06 ENCOUNTER — HOSPITAL ENCOUNTER (OUTPATIENT)
Facility: HOSPITAL | Age: 50
Discharge: HOME | End: 2025-03-06
Attending: INTERNAL MEDICINE | Admitting: INTERNAL MEDICINE
Payer: COMMERCIAL

## 2025-03-06 ENCOUNTER — ANTICOAGULATION THERAPY VISIT (OUTPATIENT)
Dept: ANTICOAGULATION | Facility: CLINIC | Age: 50
End: 2025-03-06

## 2025-03-06 ENCOUNTER — ANESTHESIA EVENT (OUTPATIENT)
Dept: CARDIOLOGY | Facility: HOSPITAL | Age: 50
End: 2025-03-06
Payer: COMMERCIAL

## 2025-03-06 ENCOUNTER — ANESTHESIA (OUTPATIENT)
Dept: CARDIOLOGY | Facility: HOSPITAL | Age: 50
End: 2025-03-06
Payer: COMMERCIAL

## 2025-03-06 VITALS
RESPIRATION RATE: 18 BRPM | OXYGEN SATURATION: 95 % | HEART RATE: 79 BPM | DIASTOLIC BLOOD PRESSURE: 62 MMHG | HEIGHT: 64 IN | TEMPERATURE: 97.7 F | SYSTOLIC BLOOD PRESSURE: 117 MMHG | WEIGHT: 208 LBS | BODY MASS INDEX: 35.51 KG/M2

## 2025-03-06 DIAGNOSIS — Z95.2 H/O MITRAL VALVE REPLACEMENT WITH MECHANICAL VALVE: Primary | ICD-10-CM

## 2025-03-06 DIAGNOSIS — I48.0 PAROXYSMAL ATRIAL FIBRILLATION (H): ICD-10-CM

## 2025-03-06 DIAGNOSIS — I48.3 TYPICAL ATRIAL FLUTTER (H): ICD-10-CM

## 2025-03-06 LAB
ACT BLD: 397 SECONDS (ref 74–150)
ACT BLD: 491 SECONDS (ref 74–150)
ANION GAP SERPL CALCULATED.3IONS-SCNC: 8 MMOL/L (ref 7–15)
ATRIAL RATE - MUSE: 77 BPM
BASE EXCESS BLDA CALC-SCNC: -0.1 MMOL/L (ref -3–3)
BASE EXCESS BLDV CALC-SCNC: 1.6 MMOL/L (ref -3–3)
BASE EXCESS BLDV CALC-SCNC: 1.6 MMOL/L (ref -3–3)
BUN SERPL-MCNC: 23.4 MG/DL (ref 6–20)
CALCIUM SERPL-MCNC: 9.6 MG/DL (ref 8.8–10.4)
CHLORIDE SERPL-SCNC: 105 MMOL/L (ref 98–107)
CREAT SERPL-MCNC: 1.21 MG/DL (ref 0.51–0.95)
DIASTOLIC BLOOD PRESSURE - MUSE: NORMAL MMHG
EGFRCR SERPLBLD CKD-EPI 2021: 55 ML/MIN/1.73M2
ERYTHROCYTE [DISTWIDTH] IN BLOOD BY AUTOMATED COUNT: 18.9 % (ref 10–15)
GLUCOSE SERPL-MCNC: 109 MG/DL (ref 70–99)
HCO3 BLDA-SCNC: 24 MMOL/L (ref 21–28)
HCO3 BLDV-SCNC: 25 MMOL/L (ref 21–28)
HCO3 BLDV-SCNC: 25 MMOL/L (ref 21–28)
HCO3 SERPL-SCNC: 27 MMOL/L (ref 22–29)
HCT VFR BLD AUTO: 29.9 % (ref 35–47)
HGB BLD-MCNC: 9.3 G/DL (ref 11.7–15.7)
INR PPP: 2.86 (ref 0.85–1.15)
INTERPRETATION ECG - MUSE: NORMAL
MCH RBC QN AUTO: 27.4 PG (ref 26.5–33)
MCHC RBC AUTO-ENTMCNC: 31.1 G/DL (ref 31.5–36.5)
MCV RBC AUTO: 88 FL (ref 78–100)
OXYHGB MFR BLDA: 99 % (ref 92–100)
OXYHGB MFR BLDV: 60 % (ref 70–75)
OXYHGB MFR BLDV: 71 % (ref 70–75)
P AXIS - MUSE: -57 DEGREES
PCO2 BLDA: 34 MM HG (ref 35–45)
PCO2 BLDV: 43 MM HG (ref 40–50)
PCO2 BLDV: 44 MM HG (ref 40–50)
PH BLDA: 7.45 [PH] (ref 7.35–7.45)
PH BLDV: 7.4 [PH] (ref 7.32–7.43)
PH BLDV: 7.4 [PH] (ref 7.32–7.43)
PLATELET # BLD AUTO: 317 10E3/UL (ref 150–450)
PO2 BLDA: 258 MM HG (ref 80–105)
PO2 BLDV: 35 MM HG (ref 25–47)
PO2 BLDV: 41 MM HG (ref 25–47)
POTASSIUM SERPL-SCNC: 3.9 MMOL/L (ref 3.4–5.3)
PR INTERVAL - MUSE: 272 MS
QRS DURATION - MUSE: 96 MS
QT - MUSE: 452 MS
QTC - MUSE: 511 MS
R AXIS - MUSE: 43 DEGREES
RBC # BLD AUTO: 3.4 10E6/UL (ref 3.8–5.2)
SAO2 % BLDA: 100 % (ref 96–97)
SAO2 % BLDV: 61 % (ref 70–75)
SAO2 % BLDV: 73 % (ref 70–75)
SODIUM SERPL-SCNC: 140 MMOL/L (ref 135–145)
SYSTOLIC BLOOD PRESSURE - MUSE: NORMAL MMHG
T AXIS - MUSE: 11 DEGREES
VENTRICULAR RATE- MUSE: 77 BPM
WBC # BLD AUTO: 6.2 10E3/UL (ref 4–11)

## 2025-03-06 PROCEDURE — 85347 COAGULATION TIME ACTIVATED: CPT

## 2025-03-06 PROCEDURE — 250N000011 HC RX IP 250 OP 636: Mod: JW | Performed by: INTERNAL MEDICINE

## 2025-03-06 PROCEDURE — C1733 CATH, EP, OTHR THAN COOL-TIP: HCPCS | Performed by: INTERNAL MEDICINE

## 2025-03-06 PROCEDURE — C1887 CATHETER, GUIDING: HCPCS | Performed by: INTERNAL MEDICINE

## 2025-03-06 PROCEDURE — C1769 GUIDE WIRE: HCPCS | Performed by: INTERNAL MEDICINE

## 2025-03-06 PROCEDURE — 36415 COLL VENOUS BLD VENIPUNCTURE: CPT | Performed by: INTERNAL MEDICINE

## 2025-03-06 PROCEDURE — 258N000003 HC RX IP 258 OP 636: Performed by: NURSE ANESTHETIST, CERTIFIED REGISTERED

## 2025-03-06 PROCEDURE — 93656 COMPRE EP EVAL ABLTJ ATR FIB: CPT | Performed by: INTERNAL MEDICINE

## 2025-03-06 PROCEDURE — C1759 CATH, INTRA ECHOCARDIOGRAPHY: HCPCS | Performed by: INTERNAL MEDICINE

## 2025-03-06 PROCEDURE — 93615 ESOPHAGEAL RECORDING: CPT | Mod: 26 | Performed by: INTERNAL MEDICINE

## 2025-03-06 PROCEDURE — 85041 AUTOMATED RBC COUNT: CPT | Performed by: INTERNAL MEDICINE

## 2025-03-06 PROCEDURE — 82805 BLOOD GASES W/O2 SATURATION: CPT

## 2025-03-06 PROCEDURE — 272N000001 HC OR GENERAL SUPPLY STERILE: Performed by: INTERNAL MEDICINE

## 2025-03-06 PROCEDURE — 93655 ICAR CATH ABLTJ DSCRT ARRHYT: CPT | Performed by: INTERNAL MEDICINE

## 2025-03-06 PROCEDURE — 85610 PROTHROMBIN TIME: CPT | Performed by: INTERNAL MEDICINE

## 2025-03-06 PROCEDURE — 93005 ELECTROCARDIOGRAM TRACING: CPT

## 2025-03-06 PROCEDURE — 82805 BLOOD GASES W/O2 SATURATION: CPT | Mod: 91

## 2025-03-06 PROCEDURE — 999N000054 HC STATISTIC EKG NON-CHARGEABLE

## 2025-03-06 PROCEDURE — 370N000017 HC ANESTHESIA TECHNICAL FEE, PER MIN: Performed by: INTERNAL MEDICINE

## 2025-03-06 PROCEDURE — C1732 CATH, EP, DIAG/ABL, 3D/VECT: HCPCS | Performed by: INTERNAL MEDICINE

## 2025-03-06 PROCEDURE — 82565 ASSAY OF CREATININE: CPT | Performed by: INTERNAL MEDICINE

## 2025-03-06 PROCEDURE — C1730 CATH, EP, 19 OR FEW ELECT: HCPCS | Performed by: INTERNAL MEDICINE

## 2025-03-06 PROCEDURE — 94640 AIRWAY INHALATION TREATMENT: CPT

## 2025-03-06 PROCEDURE — 250N000009 HC RX 250: Performed by: NURSE ANESTHETIST, CERTIFIED REGISTERED

## 2025-03-06 PROCEDURE — 250N000011 HC RX IP 250 OP 636: Performed by: NURSE ANESTHETIST, CERTIFIED REGISTERED

## 2025-03-06 PROCEDURE — 250N000009 HC RX 250: Performed by: INTERNAL MEDICINE

## 2025-03-06 PROCEDURE — 710N000010 HC RECOVERY PHASE 1, LEVEL 2, PER MIN

## 2025-03-06 PROCEDURE — 250N000013 HC RX MED GY IP 250 OP 250 PS 637: Performed by: INTERNAL MEDICINE

## 2025-03-06 PROCEDURE — 93615 ESOPHAGEAL RECORDING: CPT | Performed by: INTERNAL MEDICINE

## 2025-03-06 PROCEDURE — 93623 PRGRMD STIMJ&PACG IV RX NFS: CPT | Mod: 26 | Performed by: INTERNAL MEDICINE

## 2025-03-06 PROCEDURE — 250N000009 HC RX 250: Performed by: PHYSICIAN ASSISTANT

## 2025-03-06 PROCEDURE — 93623 PRGRMD STIMJ&PACG IV RX NFS: CPT | Performed by: INTERNAL MEDICINE

## 2025-03-06 PROCEDURE — C1766 INTRO/SHEATH,STRBLE,NON-PEEL: HCPCS | Performed by: INTERNAL MEDICINE

## 2025-03-06 PROCEDURE — 258N000003 HC RX IP 258 OP 636: Performed by: INTERNAL MEDICINE

## 2025-03-06 PROCEDURE — 93010 ELECTROCARDIOGRAM REPORT: CPT | Performed by: INTERNAL MEDICINE

## 2025-03-06 RX ORDER — ONDANSETRON 2 MG/ML
INJECTION INTRAMUSCULAR; INTRAVENOUS PRN
Status: DISCONTINUED | OUTPATIENT
Start: 2025-03-06 | End: 2025-03-06

## 2025-03-06 RX ORDER — SODIUM CHLORIDE 9 MG/ML
INJECTION, SOLUTION INTRAVENOUS CONTINUOUS PRN
Status: DISCONTINUED | OUTPATIENT
Start: 2025-03-06 | End: 2025-03-06

## 2025-03-06 RX ORDER — LIDOCAINE HYDROCHLORIDE AND EPINEPHRINE 10; 10 MG/ML; UG/ML
INJECTION, SOLUTION INFILTRATION; PERINEURAL
Status: DISCONTINUED | OUTPATIENT
Start: 2025-03-06 | End: 2025-03-06 | Stop reason: HOSPADM

## 2025-03-06 RX ORDER — HEPARIN SODIUM 10000 [USP'U]/100ML
INJECTION, SOLUTION INTRAVENOUS CONTINUOUS PRN
Status: DISCONTINUED | OUTPATIENT
Start: 2025-03-06 | End: 2025-03-06 | Stop reason: HOSPADM

## 2025-03-06 RX ORDER — ONDANSETRON 4 MG/1
4 TABLET, ORALLY DISINTEGRATING ORAL EVERY 6 HOURS PRN
Status: DISCONTINUED | OUTPATIENT
Start: 2025-03-06 | End: 2025-03-06 | Stop reason: HOSPADM

## 2025-03-06 RX ORDER — HEPARIN SODIUM 1000 [USP'U]/ML
INJECTION, SOLUTION INTRAVENOUS; SUBCUTANEOUS
Status: DISCONTINUED | OUTPATIENT
Start: 2025-03-06 | End: 2025-03-06 | Stop reason: HOSPADM

## 2025-03-06 RX ORDER — ADENOSINE 3 MG/ML
INJECTION, SOLUTION INTRAVENOUS
Status: DISCONTINUED | OUTPATIENT
Start: 2025-03-06 | End: 2025-03-06 | Stop reason: HOSPADM

## 2025-03-06 RX ORDER — FUROSEMIDE 10 MG/ML
40 INJECTION INTRAMUSCULAR; INTRAVENOUS ONCE
Status: DISCONTINUED | OUTPATIENT
Start: 2025-03-06 | End: 2025-03-06

## 2025-03-06 RX ORDER — IPRATROPIUM BROMIDE AND ALBUTEROL SULFATE 2.5; .5 MG/3ML; MG/3ML
3 SOLUTION RESPIRATORY (INHALATION) ONCE
Status: COMPLETED | OUTPATIENT
Start: 2025-03-06 | End: 2025-03-06

## 2025-03-06 RX ORDER — BUPIVACAINE HYDROCHLORIDE 2.5 MG/ML
INJECTION, SOLUTION EPIDURAL; INFILTRATION; INTRACAUDAL; PERINEURAL
Status: DISCONTINUED | OUTPATIENT
Start: 2025-03-06 | End: 2025-03-06 | Stop reason: HOSPADM

## 2025-03-06 RX ORDER — FENTANYL CITRATE 50 UG/ML
INJECTION, SOLUTION INTRAMUSCULAR; INTRAVENOUS PRN
Status: DISCONTINUED | OUTPATIENT
Start: 2025-03-06 | End: 2025-03-06

## 2025-03-06 RX ORDER — PROTAMINE SULFATE 10 MG/ML
INJECTION, SOLUTION INTRAVENOUS PRN
Status: DISCONTINUED | OUTPATIENT
Start: 2025-03-06 | End: 2025-03-06

## 2025-03-06 RX ORDER — SODIUM CHLORIDE 9 MG/ML
100 INJECTION, SOLUTION INTRAVENOUS CONTINUOUS
Status: DISCONTINUED | OUTPATIENT
Start: 2025-03-06 | End: 2025-03-06 | Stop reason: HOSPADM

## 2025-03-06 RX ORDER — DEXAMETHASONE SODIUM PHOSPHATE 10 MG/ML
INJECTION, SOLUTION INTRAMUSCULAR; INTRAVENOUS PRN
Status: DISCONTINUED | OUTPATIENT
Start: 2025-03-06 | End: 2025-03-06

## 2025-03-06 RX ORDER — LIDOCAINE 40 MG/G
CREAM TOPICAL
Status: DISCONTINUED | OUTPATIENT
Start: 2025-03-06 | End: 2025-03-06 | Stop reason: HOSPADM

## 2025-03-06 RX ORDER — IBUPROFEN 600 MG/1
600 TABLET, FILM COATED ORAL EVERY 6 HOURS PRN
Status: DISCONTINUED | OUTPATIENT
Start: 2025-03-06 | End: 2025-03-06 | Stop reason: HOSPADM

## 2025-03-06 RX ORDER — PROPOFOL 10 MG/ML
INJECTION, EMULSION INTRAVENOUS PRN
Status: DISCONTINUED | OUTPATIENT
Start: 2025-03-06 | End: 2025-03-06

## 2025-03-06 RX ORDER — ONDANSETRON 2 MG/ML
4 INJECTION INTRAMUSCULAR; INTRAVENOUS EVERY 6 HOURS PRN
Status: DISCONTINUED | OUTPATIENT
Start: 2025-03-06 | End: 2025-03-06 | Stop reason: HOSPADM

## 2025-03-06 RX ORDER — ACETAMINOPHEN 325 MG/1
650 TABLET ORAL EVERY 4 HOURS PRN
Status: DISCONTINUED | OUTPATIENT
Start: 2025-03-06 | End: 2025-03-06 | Stop reason: HOSPADM

## 2025-03-06 RX ADMIN — FENTANYL CITRATE 100 MCG: 50 INJECTION, SOLUTION INTRAMUSCULAR; INTRAVENOUS at 07:05

## 2025-03-06 RX ADMIN — DEXAMETHASONE SODIUM PHOSPHATE 10 MG: 10 INJECTION, SOLUTION INTRAMUSCULAR; INTRAVENOUS at 07:05

## 2025-03-06 RX ADMIN — Medication 100 MG: at 07:05

## 2025-03-06 RX ADMIN — ACETAMINOPHEN 650 MG: 325 TABLET ORAL at 12:21

## 2025-03-06 RX ADMIN — PHENYLEPHRINE HYDROCHLORIDE 200 MCG: 10 INJECTION INTRAVENOUS at 07:12

## 2025-03-06 RX ADMIN — PROTAMINE SULFATE 50 MG: 10 INJECTION, SOLUTION INTRAVENOUS at 09:11

## 2025-03-06 RX ADMIN — SODIUM CHLORIDE: 9 INJECTION, SOLUTION INTRAVENOUS at 07:01

## 2025-03-06 RX ADMIN — PROPOFOL 160 MG: 10 INJECTION, EMULSION INTRAVENOUS at 07:05

## 2025-03-06 RX ADMIN — ROCURONIUM 50 MG: 50 INJECTION, SOLUTION INTRAVENOUS at 07:53

## 2025-03-06 RX ADMIN — ROCURONIUM 20 MG: 50 INJECTION, SOLUTION INTRAVENOUS at 08:32

## 2025-03-06 RX ADMIN — PHENYLEPHRINE HYDROCHLORIDE 200 MCG: 10 INJECTION INTRAVENOUS at 07:24

## 2025-03-06 RX ADMIN — ONDANSETRON 4 MG: 2 INJECTION INTRAMUSCULAR; INTRAVENOUS at 09:03

## 2025-03-06 RX ADMIN — SODIUM CHLORIDE: 9 INJECTION, SOLUTION INTRAVENOUS at 07:03

## 2025-03-06 RX ADMIN — IPRATROPIUM BROMIDE AND ALBUTEROL SULFATE 3 ML: .5; 3 SOLUTION RESPIRATORY (INHALATION) at 10:23

## 2025-03-06 RX ADMIN — PHENYLEPHRINE HYDROCHLORIDE 200 MCG: 10 INJECTION INTRAVENOUS at 07:09

## 2025-03-06 RX ADMIN — SUGAMMADEX 200 MG: 100 INJECTION, SOLUTION INTRAVENOUS at 09:10

## 2025-03-06 RX ADMIN — PHENYLEPHRINE HYDROCHLORIDE 200 MCG: 10 INJECTION INTRAVENOUS at 07:18

## 2025-03-06 RX ADMIN — PHENYLEPHRINE HYDROCHLORIDE 0.4 MCG/KG/MIN: 10 INJECTION INTRAVENOUS at 07:18

## 2025-03-06 RX ADMIN — SODIUM CHLORIDE 100 ML/HR: 0.9 INJECTION, SOLUTION INTRAVENOUS at 06:50

## 2025-03-06 ASSESSMENT — ACTIVITIES OF DAILY LIVING (ADL)
ADLS_ACUITY_SCORE: 64
ADLS_ACUITY_SCORE: 58
ADLS_ACUITY_SCORE: 64

## 2025-03-06 NOTE — PROGRESS NOTES
ANTICOAGULATION  MANAGEMENT: Discharge Review    Jory Ambrose chart reviewed for anticoagulation continuity of care    Outpatient surgery/procedure on 3/6/25 for ablation.    Discharge disposition: Home    Results:    Recent labs: (last 7 days)     03/06/25  0602   INR 2.86*     Anticoagulation inpatient management:     not applicable     Anticoagulation discharge instructions:     Warfarin dosing: home regimen continued   Bridging: No   INR goal change: No      Medication changes affecting anticoagulation: No    Additional factors affecting anticoagulation: No     PLAN     No adjustment to anticoagulation plan needed    Recommended follow up is scheduled    No adjustment to Anticoagulation Calendar was required    Paz Mendoza RN  3/6/2025  Anticoagulation Clinic  [facility] for routing messages: [facility]asim [facility] HE[facility] patient phone line: [phone]

## 2025-03-06 NOTE — INTERVAL H&P NOTE
"I have reviewed the surgical (or preoperative) H&P that is linked to this encounter, and examined the patient. There are no significant changes    Clinical Conditions Present on Arrival:  Clinically Significant Risk Factors Present on Admission                 # Drug Induced Coagulation Defect: home medication list includes an anticoagulant medication  # Drug Induced Platelet Defect: home medication list includes an antiplatelet medication      # Obesity: Estimated body mass index is 35.7 kg/m  as calculated from the following:    Height as of this encounter: 1.626 m (5' 4\").    Weight as of this encounter: 94.3 kg (208 lb).       "

## 2025-03-06 NOTE — Clinical Note
[employer] system 12 lead EKG, hemodynamics 5 lead, pulse oximetery, NIBP, [employer] hands off defibrillator/external pacer, with 3 monitoring leads to patient. Baseline assessment done.

## 2025-03-06 NOTE — PROGRESS NOTES
Patient was kept comfortable during post-procedure stay.VSS. Rhythm SR, 1st AVB Pt at baseline neuro status.Denies pain. Right femoral access site remains dry & free from signs of bleeding. Appointments made & included in AVS. Dr. Balbuena was able to speak with patient post procedure. Post-op instructions given to patient & Mom Pat. Able to ask questions. Verbalized no concerns. Belongings returned. Discharged in stable condition.

## 2025-03-06 NOTE — ANESTHESIA PREPROCEDURE EVALUATION
Anesthesia Pre-Procedure Evaluation    Patient: Jory Ambrose   MRN: [id number] : 1975        Procedure : Procedure(s):  Ablation Atrial Fibrillation        Left ventricular size, wall motion and function are normal. The ejection  fraction is 60-65%.  Normal right ventricle size and systolic function.  Left atrial appendage was surgically excised. There is no residual pouch  No left atrial mass or thrombus visualized.  The left atrium is mildly dilated.  There is a bi-leaflet (St. Brian) mechanical prosthesis.  Normal prosthetic mitral valve gradients.        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 2007    This would prevent her from following up on a regular basis      Deconditioned low back 2015    H/O autologous stem cell transplant (H) 2011    History of radiation therapy 2010    3600 cGy to mediastinum and neck    Hodgkin lymphoma, nodular sclerosis (H)     Dx 2010    Hypothyroidism, secondary     r/t radiation    Morbid obesity with BMI of 40.0-44.9, adult (H)     Neutropenic fever 2013    Polycystic ovary disease     Pulmonary embolism (H)     Pulmonary embolus (H) 2011    S/P allogeneic bone marrow transplant (H) 2013    brother    Seasonal allergies     Shingles     Aug 2010    Sleep apnea       Past Surgical History:   Procedure Laterality Date    CHOLECYSTECTOMY      gallstone pancreatitis 2010    CORONARY ARTERY BYPASS GRAFT, WITH ENDOSCOPIC VESSEL PROCUREMENT N/A 10/8/2024    Procedure: CORONARY ARTERY BYPASS GRAFT TIMES FOUR, LEFT INTERNAL MAMMARY ARTERY HARVEST, RIGHT LEG ENDOSCOPIC VESSEL PROCUREMENT, EPIAORTIC ULTRASOUND,;  Surgeon: David Wade MD;  Location: [facility] Main OR    CV CORONARY ANGIOGRAM N/A 2024    Procedure: Coronary Angiogram;  Surgeon: Tarun Johnston MD;  Location: [facility] CATH LAB CV    CV LEFT HEART  CATH N/A 9/13/2024    Procedure: Left Heart Catheterization;  Surgeon: Tarun Johnston MD;  Location: [facility] CATH LAB CV    IR CVC NON TUNNEL PLACEMENT > 5 YRS  10/11/2024    IR CVC TUNNEL PLACEMENT > 5 YRS OF AGE  10/24/2024    IR CVC TUNNEL REMOVAL RIGHT  11/1/2024    Lymphectomy  2/2010    right neck area    OR LIGATION, LEFT ATRIAL APPENDAGE N/A 10/8/2024    Procedure: LEFT ATRIAL APPENDAGE LIGATION,;  Surgeon: David Wade MD;  Location: [facility]    PICC TRIPLE LUMEN PLACEMENT  10/15/2024    REPLACE VALVE MITRAL N/A 10/8/2024    Procedure: MITRAL VALVE REAIR CONVERTED TO MITRAL VALVE REPLACEMENT;  Surgeon: David Wade MD;  Location: [facility]    TRANSESOPHAGEAL ECHOCARDIOGRAM INTRAOPERATIVE  10/8/2024    Procedure: ANESTHESIA TRANSESOPHAGEAL ECHOCARDIOGRAM;  Surgeon: David Wade MD;  Location: [facility]      Allergies   Allergen Reactions    Atarax [Hydroxyzine] Shortness Of Breath    Dye [Contrast Dye] Swelling     Pre-medicated with methylprednisolone    Shellfish Allergy Shortness Of Breath, Anaphylaxis and Swelling     Shrimp, crab, lobster    Cats      Other Reaction(s): Rhinitis, Sneezing    Dust Mite Extract Other (See Comments)     Other Reaction(s): Rhinitis, Sneezing    Food Itching     Shrimp and walnuts.    Penicillins Hives     Patient cannot recall if she has tried cephalosporins in the past.     Wheat Germ Oil Other (See Comments)     Seasonal - multiple    Erythromycin Hives    Morphine Hcl      Causes change in mental status    Nickel Rash     rash    Sulfa Antibiotics Hives and Rash      Social History     Tobacco Use    Smoking status: Never    Smokeless tobacco: Never    Tobacco comments:     denies tabacco use   Substance Use Topics    Alcohol use: No     Comment: minimal alcohol use, 1 glass of wine in 6 months      Wt Readings from Last 1 Encounters:   03/06/25 94.3 kg (208 lb)        Anesthesia Evaluation             ROS/MED HX  ENT/Pulmonary:     (+) sleep apnea,                     asthma                  Neurologic:       Cardiovascular:     (+)  hypertension- -  CAD -  - -                                      METS/Exercise Tolerance:     Hematologic:       Musculoskeletal:       GI/Hepatic:     (+)             liver disease,       Renal/Genitourinary:       Endo:     (+)          thyroid problem,     Obesity,       Psychiatric/Substance Use:       Infectious Disease:       Malignancy:       Other:            Physical Exam    Airway        Mallampati: III   TM distance: > 3 FB   Neck ROM: full   Mouth opening: > 3 cm    Respiratory Devices and Support         Dental    unable to assess        Cardiovascular   cardiovascular exam normal          Pulmonary   pulmonary exam normal                OUTSIDE LABS:  CBC:   Lab Results   Component Value Date    WBC 6.2 03/06/2025    WBC 5.7 02/25/2025    HGB 9.3 (L) 03/06/2025    HGB 9.0 (L) 02/25/2025    HCT 29.9 (L) 03/06/2025    HCT 28.0 (L) 02/25/2025     03/06/2025     02/25/2025     BMP:   Lab Results   Component Value Date     03/06/2025     02/25/2025    POTASSIUM 3.9 03/06/2025    POTASSIUM 4.7 02/25/2025    CHLORIDE 105 03/06/2025    CHLORIDE 102 02/25/2025    CO2 27 03/06/2025    CO2 26 02/25/2025    BUN 23.4 (H) 03/06/2025    BUN 22.9 (H) 02/25/2025    CR 1.21 (H) 03/06/2025    CR 1.01 (H) 02/25/2025     (H) 03/06/2025    GLC 89 02/25/2025     COAGS:   Lab Results   Component Value Date    PTT 83 (H) 10/11/2024    INR 2.86 (H) 03/06/2025    FIBR 285 10/10/2024     POC:   Lab Results   Component Value Date     (H) 08/04/2015    HCGS Negative 08/12/2013     HEPATIC:   Lab Results   Component Value Date    ALBUMIN 3.9 01/17/2025    PROTTOTAL 5.7 (L) 10/25/2024    ALT 80 (H) 10/25/2024    AST 34 10/25/2024    ALKPHOS 125 10/25/2024    BILITOTAL 0.7 10/25/2024    BROWN 39 10/12/2024     OTHER:   Lab Results   Component Value Date     "PH 7.40 10/12/2024    LACT 0.9 10/19/2024    A1C 5.7 (H) 10/07/2024    CINDY 9.6 03/06/2025    PHOS 3.5 01/17/2025    MAG 1.4 (L) 01/17/2025    LIPASE 67 (H) 10/15/2024    AMYLASE 55 10/15/2024    TSH 12.40 (H) 11/20/2024    T4 1.05 09/09/2014    T3 79 11/30/2011       Anesthesia Plan    ASA Status:  4       Anesthesia Type: General.     - Airway: ETT   Induction: Intravenous.   Maintenance: Inhalation.   Techniques and Equipment:     - Airway: Video-Laryngoscope (history of radiation to  the neck)       Consents    Anesthesia Plan(s) and associated risks, benefits, and realistic alternatives discussed. Questions answered and patient/representative(s) expressed understanding.     - Discussed: Risks, Benefits and Alternatives for BOTH SEDATION and the PROCEDURE were discussed     - Discussed with:       - Extended Intubation/Ventilatory Support Discussed: No.      - Patient is DNR/DNI Status: No     Use of blood products discussed: No .     Postoperative Care    Pain management: IV analgesics.        Comments:               Sergio Gray MD    I have reviewed the pertinent notes and labs in the chart from the past 30 days and (re)examined the patient.  Any updates or changes from those notes are reflected in this note.    Clinically Significant Risk Factors Present on Admission                # Drug Induced Coagulation Defect: home medication list includes an anticoagulant medication  # Drug Induced Platelet Defect: home medication list includes an antiplatelet medication   # Hypertension: Noted on problem list      # Anemia: based on hgb <11       # Obesity: Estimated body mass index is 35.7 kg/m  as calculated from the following:    Height as of this encounter: 1.626 m (5' 4\").    Weight as of this encounter: 94.3 kg (208 lb).       # Asthma: noted on problem list  # History of CABG: noted on surgical history         "

## 2025-03-06 NOTE — ANESTHESIA PROCEDURE NOTES
Airway       Patient location during procedure: OR       Procedure Start/Stop Times: 3/6/2025 7:07 AM  Staff -        CRNA: Osmar Jacobs APRN CRNA       Performed By: CRNA  Consent for Airway        Urgency: elective  Indications and Patient Condition       Indications for airway management: mario-procedural       Induction type:intravenous       Mask difficulty assessment: 0 - not attempted    Final Airway Details       Final airway type: endotracheal airway       Successful airway: ETT - single and Oral  Endotracheal Airway Details        ETT size (mm): 7.5       Cuffed: yes       Successful intubation technique: video laryngoscopy       VL Blade Size: Glidescope 3       Grade View of Cords: 1       Adjucts: stylet       Position: Right       Measured from: lips       Secured at (cm): 23       Bite block used: None    Post intubation assessment        Placement verified by: capnometry, equal breath sounds and chest rise        Number of attempts at approach: 1       Number of other approaches attempted: 0       Secured with: tape       Ease of procedure: easy       Dentition: Intact and Unchanged    Medication(s) Administered   Medication Administration Time: 3/6/2025 7:07 AM

## 2025-03-06 NOTE — Clinical Note
x2 ACT results ended in 3 stars. 3rd test run with different Istat machine and cartridge from different box.

## 2025-03-06 NOTE — ANESTHESIA CARE TRANSFER NOTE
Patient: Jory Ambrose    Procedure: Procedure(s):  Ablation Atrial Fibrillation       Diagnosis: Paroxysmal Atrial Fibrillation and Atrial Flutter  Diagnosis Additional Information: No value filed.    Anesthesia Type:   General     Note:    Oropharynx: oropharynx clear of all foreign objects and spontaneously breathing  Level of Consciousness: awake  Oxygen Supplementation: face mask  Level of Supplemental Oxygen (L/min / FiO2): 6  Independent Airway: airway patency satisfactory and stable  Dentition: dentition unchanged  Vital Signs Stable: post-procedure vital signs reviewed and stable  Report to RN Given: handoff report given  Patient transferred to: Cardiac Special Care          Vitals:  Vitals Value Taken Time   /58 03/06/25  0930   Temp 36.7 03/06/25  0930   Pulse 88 03/06/25   0930   Resp 16 03/06/25  0930   SpO2 98 03/06/25   0930       Electronically Signed By: RAFY Urbina CRNA  March 6, 2025  9:30 AM

## 2025-03-06 NOTE — DISCHARGE INSTRUCTIONS
[facility]  Cardiac Electrophysiology  [address]  [place] [address]   Office: [phone]  Fax: [phone]       Cardiac Electrophysiology - Post Ablation Discharge Instructions      PROCEDURE   Atrial fibrillation and atrial flutter ablation         MEDICATION INSTRUCTIONS   Continue taking your prior to procedure medications.  Continue taking your blood thinner warfarin.          DISCHARGE INSTRUCTIONS   Medications   Take your medications as prescribed.   It is important for you to continue your blood thinner without interruption, unless your electrophysiologist instructs you otherwise.     General instructions   Have an adult stay with you until tomorrow.   You may resume your normal diet.     You may shower tomorrow.  Do not take a bath, or use a hot tub or pool for at least 1 week.    Activity recommendations   Do not drive for 3 days.   Avoid stooping or squatting more than 90 degrees at the hips for 7 days.   Avoid repetitive motions such as loading , vacuuming, raking or shoveling for 7 days.   Avoid heavy lifting (greater than 25lbs) for 7 days.       Groin care instructions   For the first 24 hours after your ablation, check the groin access sites every 1-2 hours while awake.   You may keep a bandaid over the puncture sites for 1 or 2 days post-procedure and thereafter may keep these sites uncovered.  Change the bandaid daily.  If there is minor oozing, apply another bandaid and remove it after 12 hours.   For 2 days, when you cough, sneeze, laugh or move your bowels, hold your hand over the puncture sites and press firmly.   Do not scrub the groin access sites.   Do not use lotion or powder near the groin access sites.       Arrhythmias following ablation  Recurrent atrial fibrillation and palpitations are common within the first 3 months post ablation while your heart recovers from the procedure.  These are usually more frequent in the first few  weeks following ablation and should occur less frequently over time.  Please contact us if you are having frequent or long lasting atrial fibrillation episodes following ablation.    Common findings after ablation  Bruising and a dime or pea size lump at the access sites is common.  If you notice increased swelling, external bleeding, or have other concerns regarding your groin access sites please call your electrophysiology team's office, and if after hours consider emergency department evaluation.   Soreness and mild pain at your groin sites is normal, to help relieve this pain you can apply ice/cold packs to the sites for 20min 3-4 times per day.   Pleuritic chest discomfort (chest pain worse with taking deep breaths, worse with laying flat on your back) can occur after ablation, usually coming about within the first 24-72hrs post ablation.  If this occurs and is severe enough to be troublesome to you, please call us and consider starting a course of ibuprofen 400mg three times daily for 5 to 7 days.     Things to watch for   As with any type of procedure, please be more attentive to unusual symptoms post ablation (eg. fever, neurologic changes, pain with swallowing, loss of consciousness, etc) - we recommend ER evaluation for any such symptoms in the first few weeks post procedure.       Contact the EP Nurse line with any of the following.  Contact the cardiology on-call number after business hours.    Consider ER evaluation for severe symptoms.   Groin pain, swelling, or growing hard lump around the puncture sites.   Groin redness, tenderness, swelling, or drainage (blood or pus).   Neurological changes (for example: leg, arm or face weakness or numbness, difficulties with speech or word finding, problems walking or with your balance, vision changes).   Any numbness, coolness or changes in color in your extremities.  Sudden onset severe abdominal or back pain.  Moderate or severe chest pain not relieved by  Tylenol or Ibuprofen, particularly after the first 48 hours.   Shortness of breath.   Chills or fever greater than 100 F.   Difficulty swallowing food or liquids.  Coughing up blood.   Blood in your stool.  Nausea and vomiting.  Difficulty urinating.  Recurrent atrial fibrillation associated with prolonged rapid heart rates (for example, heart rates over 140bpm for greater than 4 hours) or associated with additional concerning symptoms (for example, chest pain, lightheadedness, loss of consciousness, sweating).     If you are being evaluated at an emergency department, please tell your ER doctor that you have recently underwent an atrial fibrillation ablation and ask the ER to contact our office.  Many special considerations apply following ablation - these may be overlooked during an ER evaluation.      Our office will have a follow-up visit scheduled for you in approximately 6 weeks.  Please do not hesitate to call us before that time should issues arise.         [facility]      To reach the EP nurses working with Dr. Balbuena:   [phone]        To reach the general Heart Care Clinic line or after hours service:   [phone]   If you are calling after hours, please listen to the entire voicemail,    a live  will answer at the end of the message.

## 2025-03-06 NOTE — ANESTHESIA POSTPROCEDURE EVALUATION
Patient: Jory Ambrose    Procedure: Procedure(s):  Ablation Atrial Fibrillation       Anesthesia Type:  General    Note:  Disposition: Outpatient   Postop Pain Control: Uneventful            Sign Out: Well controlled pain   PONV: No   Neuro/Psych: Uneventful            Sign Out: Acceptable/Baseline neuro status   Airway/Respiratory: Uneventful            Sign Out: Acceptable/Baseline resp. status   CV/Hemodynamics: Uneventful            Sign Out: Acceptable CV status; No obvious hypovolemia; No obvious fluid overload   Other NRE: NONE   DID A NON-ROUTINE EVENT OCCUR? No           Last vitals:  Vitals Value Taken Time   /54 03/06/25 1015   Temp 36.5  C (97.7  F) 03/06/25 0945   Pulse 71 03/06/25 1022   Resp 37 03/06/25 1022   SpO2 100 % 03/06/25 1022   Vitals shown include unfiled device data.    Electronically Signed By: Sergio Gray MD  March 6, 2025  10:24 AM

## 2025-03-10 ENCOUNTER — VIRTUAL VISIT (OUTPATIENT)
Dept: CARDIOLOGY | Facility: CLINIC | Age: 50
End: 2025-03-10
Payer: COMMERCIAL

## 2025-03-10 DIAGNOSIS — Z98.890 STATUS POST ABLATION OF ATRIAL FIBRILLATION: Primary | ICD-10-CM

## 2025-03-10 DIAGNOSIS — Z86.79 STATUS POST ABLATION OF ATRIAL FIBRILLATION: Primary | ICD-10-CM

## 2025-03-10 PROCEDURE — 99207 PR NO CHARGE NURSE ONLY: CPT | Mod: 93

## 2025-03-10 NOTE — PATIENT INSTRUCTIONS
Instructions Following your Ablation Procedure    Your anticoagulation medication Coumadin:  It is important to remain on your anticoagulation medication uninterrupted after your ablation to reduce your risk of a stroke or heart attack, do not stop this medication  Please contact me if you have any questions regarding your anticoagulation medication    Groin care instructions  Keep the site clean and dry, do not place a bandage over the site. If there is minor oozing, apply another bandaid and remove it after 12 hours.  Mild bruising at the access sites is normal. If you notice increased swelling, external bleeding, or have other concerns regarding your access sites please consider emergency department evaluation for significant changes and call your electrophysiology team's office.  You may experience mild discomfort at your groin sites, applying ice packs 20min 3-4 times a day can help alleviate this discomfort.    Activity recommendations  You can resume driving.  Avoid stooping or squatting more than 90 degrees at the hips, repetitive motions such as loading , vacuuming, raking or shoveling, and heavy lifting (greater than 25lbs) for 1 week  Increase your activity gradually over the next 5-10 days, working back to your normal daily activity/routine.    Post ablation instructions  Stay well hydrated, and increase your fluid intake during this recovery period  High protein foods aide in your bodies healing process  You may have some irregular heartbeats and/or atrial fibrillation following your ablation which is normal to recovery, these episodes should occur less frequently over time.   Recurrent atrial fibrillation can occur within the first 3 months post ablation while your heart recovers from the procedure. Please call the electrophysiology team's office if you have an episode lasting greater than 4 hours, or if you notice the episodes are increasing in frequency or duration  Pleuritic chest  discomfort (chest pain worse with taking deep breaths, worse with laying flat on your back) can occur after ablation, usually coming about within the first 24-48hrs post ablation. If this occurs and is severe enough to be troublesome to you, please call us and consider starting a course of ibuprofen 400mg three times daily for 5 to 7 days    Things to watch for  As with any type of procedure, please be more attentive to unusual symptoms post ablation (eg. fever, neurologic changes, pain with swallowing, loss of consciousness, etc) - we recommend ER evaluation for any such symptoms in the first few weeks post procedure.    Consider ER evaluation for the following:  Severe chest pain not relieved by Tylenol or Ibuprofen  You have chills or a fever greater than 101 F (38 C)  Neurologic changes (eg. leg, arm or face weakness or numbness, difficulties with speech or word finding, problems  walking or with your balance, vision changes)  Severe difficulty swallowing and/or you are coughing up blood  Shortness of breath  Increased groin pain or a large or growing hard lump around the site  Groin is red, swollen, hot or tender  Blood or fluid is draining from the groin site  Any numbness, coolness or changes in color in your extremities  Groin pain not relieved by Tylenol or Advil  Recurrent atrial fibrillation associated with sustained rapid heart rates or associated with additional concerning  symptoms.    Your follow up appointments are as follows:  You will be seen by the electrophysiologist nurse practitioner at 6 weeks after your ablation  At your 6 week appointment, a 3 month follow-up appointment will be arranged with either the Nurse Practitioner or the Electrophysiology provider     Sincerely,  Laura Mancia RN [phone]    After hours please contact the on call service at # [phone]

## 2025-03-10 NOTE — PROGRESS NOTES
Post PVI Procedural Follow Up Call    Pt is s/p PVI from 3/6/25 with Dr Balbuena  PC was placed to pt, spoke to pt    General Assessment:     Weight: Pt reports weight is at baseline compared to pre procedural weight    Pain: Pt denies generalized or localized pain abnormal to healing s/p     /GI: Pt denies difficulty swallowing, denies constipation, denies urinary retention/difficulty, reports no s/s of infection, report normal appetite, and reports staying hydrated.    Neurological: Pt Denies any neurological changes, or s/s of CVA    Respiratory: Pt denies SOB, denies difficulty breathing, denies throat pain, denies changes/abnormal sputum, and denies any further symptoms abnormal to normal healing process s/p PVI.    Activity: Pt is tolerating advancement in activity while following physical restrictions, staying well hydrated, and gradually working into baseline activity.     Rhythm Assessment:   Pt denies palpitations, denies irregularities in HR or rhythm, and denies symptoms or sustained AF episodes.    Procedure Site Assessment:   Pts no visible/physical changes in groin sites and small pea/dime size hardening under suture sites normal to PVI recovery    Anticoagulation/Medication:  Pt remain on Coumadin/warfarin without interruption  Per guidelines by Dr Balbuena no ASA needed upon discharge    Education completed with pt at this visit:  Reviewed normal post-op PVI healing process, when to contact EP-RN/ELIZABETH-MD, contact information was given to the pt for further concerns or questions, and pt verbalized understanding    Follow up  Pts AVS was printed and mailed to pt by scheduling team and pt will be seen by EP NP at 6 wks, monitor will be ordered at this follow-up if indicated as well as 3mo follow-up with either EP ORQUIDEA or ELIZABETH SALINAS    3/10/2025 10:33 AM  Laura Mancia RN

## 2025-03-13 ENCOUNTER — LAB (OUTPATIENT)
Dept: CARDIOLOGY | Facility: CLINIC | Age: 50
End: 2025-03-13
Payer: COMMERCIAL

## 2025-03-13 ENCOUNTER — ANTICOAGULATION THERAPY VISIT (OUTPATIENT)
Dept: ANTICOAGULATION | Facility: CLINIC | Age: 50
End: 2025-03-13

## 2025-03-13 DIAGNOSIS — I48.3 TYPICAL ATRIAL FLUTTER (H): Primary | ICD-10-CM

## 2025-03-13 DIAGNOSIS — Z95.2 H/O MITRAL VALVE REPLACEMENT WITH MECHANICAL VALVE: Primary | ICD-10-CM

## 2025-03-13 LAB — INR POINT OF CARE: 3.2 (ref 0.9–1.1)

## 2025-03-13 NOTE — PROGRESS NOTES
ANTICOAGULATION MANAGEMENT     Jory Grant Halie 49 year old female is on warfarin with therapeutic INR result. (Goal INR 2.5-3.5)    Recent labs: (last 7 days)     03/13/25  1109   INR 3.2*       ASSESSMENT     Source(s): Chart Review and Patient/Caregiver Call     Warfarin doses taken: Warfarin taken as instructed  Diet: No new diet changes identified  Medication/supplement changes: None noted  New illness, injury, or hospitalization: No  Signs or symptoms of bleeding or clotting: No  Previous result: Therapeutic last 2(+) visits  Additional findings:  had ablation 3/6/25 , follow up ov 3/19       PLAN     Recommended plan for no diet, medication or health factor changes affecting INR     Dosing Instructions: Continue your current warfarin dose with next INR in 1 week       Summary  As of 3/13/2025      Full warfarin instructions:  2 mg every Sun, Tue, Thu; 3 mg all other days   Next INR check:  3/27/2025               Telephone call with Jory who verbalizes understanding and agrees to plan    Lab visit scheduled    Education provided: Please call back if any changes to your diet, medications or how you've been taking warfarin    Plan made per [facility] anticoagulation protocol    Woody Barroso RN  3/13/2025  Anticoagulation Clinic  [employer] for routing messages: p [facility] patient phone line: [phone]        _______________________________________________________________________     Anticoagulation Episode Summary       Current INR goal:  2.5-3.5   TTR:  58.8% (4 mo)   Target end date:  Indefinite   Send INR reminders to:  [facility]    Indications    H/O mitral valve replacement with mechanical valve [Z95.2]             Comments:  --             Anticoagulation Care Providers       Provider Role Specialty Phone number    Catherine Galvez PA-C Referring Radiation Oncology [phone]

## 2025-03-19 ENCOUNTER — TELEPHONE (OUTPATIENT)
Dept: CARDIOLOGY | Facility: CLINIC | Age: 50
End: 2025-03-19

## 2025-03-19 ENCOUNTER — ANTICOAGULATION THERAPY VISIT (OUTPATIENT)
Dept: ANTICOAGULATION | Facility: CLINIC | Age: 50
End: 2025-03-19

## 2025-03-19 ENCOUNTER — LAB (OUTPATIENT)
Dept: CARDIOLOGY | Facility: CLINIC | Age: 50
End: 2025-03-19
Payer: COMMERCIAL

## 2025-03-19 ENCOUNTER — OFFICE VISIT (OUTPATIENT)
Dept: CARDIOLOGY | Facility: CLINIC | Age: 50
End: 2025-03-19
Payer: COMMERCIAL

## 2025-03-19 VITALS
SYSTOLIC BLOOD PRESSURE: 98 MMHG | RESPIRATION RATE: 20 BRPM | HEIGHT: 64 IN | DIASTOLIC BLOOD PRESSURE: 60 MMHG | WEIGHT: 204.8 LBS | HEART RATE: 56 BPM | BODY MASS INDEX: 34.97 KG/M2

## 2025-03-19 DIAGNOSIS — I48.0 PAROXYSMAL ATRIAL FIBRILLATION (H): Primary | ICD-10-CM

## 2025-03-19 DIAGNOSIS — Z95.2 H/O MITRAL VALVE REPLACEMENT WITH MECHANICAL VALVE: Primary | ICD-10-CM

## 2025-03-19 DIAGNOSIS — Z95.2 H/O MITRAL VALVE REPLACEMENT WITH MECHANICAL VALVE: ICD-10-CM

## 2025-03-19 LAB — INR POINT OF CARE: 2.8 (ref 0.9–1.1)

## 2025-03-19 PROCEDURE — G2211 COMPLEX E/M VISIT ADD ON: HCPCS | Performed by: INTERNAL MEDICINE

## 2025-03-19 PROCEDURE — 99214 OFFICE O/P EST MOD 30 MIN: CPT | Performed by: INTERNAL MEDICINE

## 2025-03-19 PROCEDURE — 3078F DIAST BP <80 MM HG: CPT | Performed by: INTERNAL MEDICINE

## 2025-03-19 PROCEDURE — 3074F SYST BP LT 130 MM HG: CPT | Performed by: INTERNAL MEDICINE

## 2025-03-19 RX ORDER — BUDESONIDE AND FORMOTEROL FUMARATE DIHYDRATE 80; 4.5 UG/1; UG/1
2 AEROSOL RESPIRATORY (INHALATION) EVERY 12 HOURS
COMMUNITY
Start: 2024-11-02

## 2025-03-19 RX ORDER — METOPROLOL TARTRATE 50 MG
50 TABLET ORAL 2 TIMES DAILY
Qty: 60 TABLET | Refills: 5 | Status: SHIPPED | OUTPATIENT
Start: 2025-03-19

## 2025-03-19 NOTE — LETTER
3/19/2025    Rome Joshua MD  [address] [address]    RE: Jory Ambrose       Dear Colleague,     I had the pleasure of seeing Jory Ambrose in the [facility].      Cardiology Progress Note     Assessment:  Coronary artery disease status post CABG(to LAD and vein grafts to distal RCA circumflex and diagonal branch) in October 2024, no angina  Mitral regurgitation status post mitral valve replacement with mechanical prosthesis(29 mm [place] epic), normal auscultation  Postoperative atrial flutter/fib status post PVI and CTI ablation on 3/6/2025, status post surgical exclusion of left atrial appendage with good results, no recurrence of arrhythmia since ablation  ATN post open heart surgery, resolved  Chronic kidney disease  Postoperative severe fluid overload, resolved  Postop pleural effusion, improving, clear on the left and mildly decreased breath sounds on the right side  Postoperative anemia, stable  Hypercholesterolemia, on statin  History of Hodgkin lymphoma status post chemo chest radiation and bone marrow transplant, in remission  Cerebrovascular disease mild to moderate, on clopidogrel      Plan:  Resume cardiac rehab  Reduce metoprolol to 50 mg twice a day because of relatively low blood pressure and orthostatic lightheadedness.    Follow-up in 3 months  The longitudinal plan of care for the diagnosis(es)/condition(s) as documented were addressed during this visit. Due to the added complexity in care, I will continue to support Jory in the subsequent management and with ongoing continuity of care.   Subjective:   This is 49 year old female who comes in today for follow-up visit.  She underwent right-sided cath ablation of atrial dysrhythmias in the beginning of this month.  She has done well since that time.  She reports the shortness of breath Was Completely Resolved.  She Has Very Little Orthopnea.  Her weight is back to her  "baseline.  INR has been therapeutic and she denies any bleeding complications    Review of Systems:   Negative other than history of present illness    Objective:   BP 98/60 (BP Location: Right arm, Patient Position: Sitting, Cuff Size: Adult Large)   Pulse 56   Resp 20   Ht 1.626 m (5' 4\")   Wt 92.9 kg (204 lb 12.8 oz)   BMI 35.15 kg/m    Physical Exam:  GENERAL: no distress  NECK: No JVD  LUNGS: Decreased breath sounds right base  CARDIAC: regular rhythm, crisp clicks of artificial mitral valve  S2 normal.  No heaves, thrills, gallops or murmurs.  ABDOMEN: flat, negative hepatosplenomegaly, soft and non-tender.  EXTREMITIES: No evidence of cyanosis, clubbing or edema.    Current Outpatient Medications   Medication Sig Dispense Refill     acetaminophen (TYLENOL) 500 MG tablet Take 2 tablets (1,000 mg) by mouth every 6 hours as needed for mild pain.       albuterol (PROAIR HFA) 108 (90 Base) MCG/ACT inhaler Inhale 2 puffs into the lungs every 6 hours as needed. 18 g 0     atorvastatin (LIPITOR) 80 MG tablet Take 1 tablet (80 mg) by mouth daily. 90 tablet 3     budesonide-formoterol (SYMBICORT/BREYNA) 80-4.5 MCG/ACT Inhaler Inhale 2 puffs into the lungs every 12 hours.       cetirizine (ZYRTEC) 10 MG tablet Take 1 tablet (10 mg) by mouth daily. 30 tablet 11     cholecalciferol (VITAMIN D3) 125 mcg (5000 units) capsule Take 1 capsule (125 mcg) by mouth daily. 30 capsule 0     clopidogrel (PLAVIX) 75 MG tablet Take 1 tablet (75 mg) by mouth daily. 30 tablet 1     ipratropium - albuterol 0.5 mg/2.5 mg/3 mL (DUONEB) 0.5-2.5 (3) MG/3ML neb solution Inhale 3 mLs into the lungs every 6 hours as needed for wheezing.       levothyroxine (SYNTHROID/LEVOTHROID) 125 MCG tablet Take 1 tablet (125 mcg) by mouth daily. 30 tablet 0     LORazepam (ATIVAN) 0.5 MG tablet Take 0.5 mg by mouth nightly as needed for anxiety.       magnesium oxide 400 MG CAPS Take 400 mg by mouth 2 times daily.       metoprolol tartrate (LOPRESSOR) 50 " MG tablet Take 1 tablet (50 mg) by mouth 2 times daily. 60 tablet 5     miconazole (MICATIN) 2 % external powder Apply topically 2 times daily. (Patient taking differently: Apply topically as needed.)       multivitamin w/minerals (MULTI-VITAMIN) tablet Take 1 tablet by mouth daily.       nitroGLYcerin (NITROSTAT) 0.4 MG sublingual tablet Place 0.4 mg under the tongue every 5 minutes as needed for chest pain.       omeprazole (PRILOSEC) 20 MG DR capsule Increase Omeprazole to 40mg once daily starting on 3/3 and continue for 45 days, then return to previous dose of 20mg daily 90 capsule 12     simethicone (MYLICON) 125 MG chewable tablet Take 1 tablet (125 mg) by mouth 4 times daily as needed for intestinal gas 40 tablet 0     sodium chloride (OCEAN) 0.65 % nasal spray Spray 1 spray into both nostrils every hour as needed for congestion or other (dry nose). 15 mL 0     traZODone (DESYREL) 100 MG tablet Take 1 tablet (100 mg) by mouth at bedtime. 30 tablet 0     warfarin ANTICOAGULANT (COUMADIN) 1 MG tablet Take 2 - 3 mg daily. OR as directed by INR clinic. 120 tablet 3     Continuous Glucose Sensor (FREESTYLE JACINTO 3 SENSOR) MISC 1 Application every 14 days. (Patient not taking: Reported on 1/30/2025)       Ferrous Sulfate (IRON) 325 (65 Fe) MG tablet Take 1 tablet by mouth daily. (Patient not taking: Reported on 3/19/2025) 30 tablet 2       Cardiographics:      ECG: 10/24/2024  Atrial flutter with controlled ventricular response     1/3/2025 read by me  Normal sinus rhythm rate of 77 beats per minutes nonspecific ST-T abnormalities QTc 470 ms     Echocardiogram: June 2024 preop     1.Left ventricular size, wall motion and function are normal. The ejection  fraction is 55-60%.  2.Normal right ventricle size and systolic function.  3.There is moderate (2+) mitral regurgitation. PISA not measured or performed.  4.Poor images for analysis.  October 2024 Post op  Left ventricular function is normal.The ejection fraction  "is 60-65%.  Septal motion is consistent with post-operative state.  Normal right ventricle size and systolic function.  There is a mechanical mitral valve.  Normal prosthetic mitral valve gradients.  Trivial pericardial effusion     CARMEN: November 2024  Left ventricular size, wall motion and function are normal. The ejection  fraction is 60-65%.  Normal right ventricle size and systolic function.  Left atrial appendage was surgically excised. There is no residual pouch  No left atrial mass or thrombus visualized.  The left atrium is mildly dilated.  There is a bi-leaflet ([employer] [facility]) mechanical prosthesis.  Normal prosthetic mitral valve gradients.     Coronary angio: September 2024  LM:mid-distal 60-70%   LAD:mid-vessel 85% narrowing at the take off of a diagonal branch  Lcx:distal 50- 60% narrowing  RCA:dominant, 100% occluded ostial, fills distally via L-R collaterals     LVEDP:10   Lab Results    Chemistry/lipid CBC Cardiac Enzymes/BNP/TSH/INR   No results for input(s): \"CHOL\", \"HDL\", \"LDL\", \"TRIG\", \"CHOLHDLRATIO\" in the last 83258 hours.  No results for input(s): \"LDL\" in the last 07375 hours.  Recent Labs   Lab Test 03/06/25  0602      POTASSIUM 3.9   CHLORIDE 105   CO2 27   *   BUN 23.4*   CR 1.21*   GFRESTIMATED 55*   CINDY 9.6     Recent Labs   Lab Test 03/06/25  0602 02/25/25  1032 01/17/25  1219   CR 1.21* 1.01* 1.08*     Recent Labs   Lab Test 10/07/24  0842   A1C 5.7*          Recent Labs   Lab Test 03/06/25  0602   WBC 6.2   HGB 9.3*   HCT 29.9*   MCV 88        Recent Labs   Lab Test 03/06/25  0602 02/25/25  1032 11/05/24  1202   HGB 9.3* 9.0* 8.5*    No results for input(s): \"TROPONINI\" in the last 98714 hours.  Recent Labs   Lab Test 06/06/24  1302   NTBNPI 101     Recent Labs   Lab Test 11/20/24  1055   TSH 12.40*     Recent Labs   Lab Test 03/19/25  1109 03/13/25  1109 03/06/25  0602   INR 2.8* 3.2* 2.86*                         Thank you for allowing me to participate in the care of " your patient.      Sincerely,     Jordon Rodriguez MD     [facility]  cc:   Miles Rodriguez MD  [address]  [place] [address]

## 2025-03-19 NOTE — PROGRESS NOTES
ANTICOAGULATION MANAGEMENT     Jory RIYA Grant Halie 49 year old female is on warfarin with therapeutic INR result. (Goal INR 2.5-3.5)    Recent labs: (last 7 days)     03/19/25  1109   INR 2.8*       ASSESSMENT     Source(s): Chart Review, Patient/Caregiver Call, and Template     Warfarin doses taken: Warfarin taken as instructed  Diet: No new diet changes identified  Medication/supplement changes: None noted  New illness, injury, or hospitalization: No  Signs or symptoms of bleeding or clotting: No  Previous result: Therapeutic last 2(+) visits  Additional findings: Recent Atrial fib/flutter ablation within 12 weeks; Hx of stable INR warfarin dose: INR 1 week post procedure; if therapeutic resume routine monitoring. Notify cardiologist and  pool if INR <= 1.7 within 4 weeks, procedure/surgery hold requested, or non-compliance with monitoring > 1 week.       PLAN     Recommended plan for no diet, medication or health factor changes affecting INR     Dosing Instructions: Continue your current warfarin dose with next INR in 3-4 weeks       Summary  As of 3/19/2025      Full warfarin instructions:  2 mg every Sun, Tue, Thu; 3 mg all other days   Next INR check:  4/16/2025               Telephone call with Jory who agrees to plan and repeated back plan correctly    Lab visit scheduled    Education provided: Please call back if any changes to your diet, medications or how you've been taking warfarin  Goal range and lab monitoring: goal range and significance of current result and Importance of therapeutic range  Contact [phone] with any changes, questions or concerns.     Plan made per [facility] anticoagulation protocol    Hermelinda Reed RN  3/19/2025  Anticoagulation Clinic  [place] for routing messages: qasim [facility] HEART [facility] patient phone line: [phone]        _______________________________________________________________________     Anticoagulation Episode Summary       Current  INR goal:  2.5-3.5   TTR:  60.8% (4.2 mo)   Target end date:  Indefinite   Send INR reminders to:  [facility] HEART [facility]    Indications    H/O mitral valve replacement with mechanical valve [Z95.2]             Comments:  --             Anticoagulation Care Providers       Provider Role Specialty Phone number    Catherine Galvez PA-C Referring Radiation Oncology [phone]

## 2025-03-19 NOTE — PROGRESS NOTES
Cardiology Progress Note     Assessment:  Coronary artery disease status post CABG(to LAD and vein grafts to distal RCA circumflex and diagonal branch) in October 2024, no angina  Mitral regurgitation status post mitral valve replacement with mechanical prosthesis(29 mm [place] epic), normal auscultation  Postoperative atrial flutter/fib status post PVI and CTI ablation on 3/6/2025, status post surgical exclusion of left atrial appendage with good results, no recurrence of arrhythmia since ablation  ATN post open heart surgery, resolved  Chronic kidney disease  Postoperative severe fluid overload, resolved  Postop pleural effusion, improving, clear on the left and mildly decreased breath sounds on the right side  Postoperative anemia, stable  Hypercholesterolemia, on statin  History of Hodgkin lymphoma status post chemo chest radiation and bone marrow transplant, in remission  Cerebrovascular disease mild to moderate, on clopidogrel      Plan:  Resume cardiac rehab  Reduce metoprolol to 50 mg twice a day because of relatively low blood pressure and orthostatic lightheadedness.    Follow-up in 3 months  The longitudinal plan of care for the diagnosis(es)/condition(s) as documented were addressed during this visit. Due to the added complexity in care, I will continue to support Jory in the subsequent management and with ongoing continuity of care.   Subjective:   This is 49 year old female who comes in today for follow-up visit.  She underwent right-sided cath ablation of atrial dysrhythmias in the beginning of this month.  She has done well since that time.  She reports the shortness of breath Was Completely Resolved.  She Has Very Little Orthopnea.  Her weight is back to her baseline.  INR has been therapeutic and she denies any bleeding complications    Review of Systems:   Negative other than history of present illness    Objective:   BP 98/60 (BP Location: Right arm, Patient Position: Sitting, Cuff  "Size: Adult Large)   Pulse 56   Resp 20   Ht 1.626 m (5' 4\")   Wt 92.9 kg (204 lb 12.8 oz)   BMI 35.15 kg/m    Physical Exam:  GENERAL: no distress  NECK: No JVD  LUNGS: Decreased breath sounds right base  CARDIAC: regular rhythm, crisp clicks of artificial mitral valve  S2 normal.  No heaves, thrills, gallops or murmurs.  ABDOMEN: flat, negative hepatosplenomegaly, soft and non-tender.  EXTREMITIES: No evidence of cyanosis, clubbing or edema.    Current Outpatient Medications   Medication Sig Dispense Refill    acetaminophen (TYLENOL) 500 MG tablet Take 2 tablets (1,000 mg) by mouth every 6 hours as needed for mild pain.      albuterol (PROAIR HFA) 108 (90 Base) MCG/ACT inhaler Inhale 2 puffs into the lungs every 6 hours as needed. 18 g 0    atorvastatin (LIPITOR) 80 MG tablet Take 1 tablet (80 mg) by mouth daily. 90 tablet 3    budesonide-formoterol (SYMBICORT/BREYNA) 80-4.5 MCG/ACT Inhaler Inhale 2 puffs into the lungs every 12 hours.      cetirizine (ZYRTEC) 10 MG tablet Take 1 tablet (10 mg) by mouth daily. 30 tablet 11    cholecalciferol (VITAMIN D3) 125 mcg (5000 units) capsule Take 1 capsule (125 mcg) by mouth daily. 30 capsule 0    clopidogrel (PLAVIX) 75 MG tablet Take 1 tablet (75 mg) by mouth daily. 30 tablet 1    ipratropium - albuterol 0.5 mg/2.5 mg/3 mL (DUONEB) 0.5-2.5 (3) MG/3ML neb solution Inhale 3 mLs into the lungs every 6 hours as needed for wheezing.      levothyroxine (SYNTHROID/LEVOTHROID) 125 MCG tablet Take 1 tablet (125 mcg) by mouth daily. 30 tablet 0    LORazepam (ATIVAN) 0.5 MG tablet Take 0.5 mg by mouth nightly as needed for anxiety.      magnesium oxide 400 MG CAPS Take 400 mg by mouth 2 times daily.      metoprolol tartrate (LOPRESSOR) 50 MG tablet Take 1 tablet (50 mg) by mouth 2 times daily. 60 tablet 5    miconazole (MICATIN) 2 % external powder Apply topically 2 times daily. (Patient taking differently: Apply topically as needed.)      multivitamin w/minerals " (MULTI-VITAMIN) tablet Take 1 tablet by mouth daily.      nitroGLYcerin (NITROSTAT) 0.4 MG sublingual tablet Place 0.4 mg under the tongue every 5 minutes as needed for chest pain.      omeprazole (PRILOSEC) 20 MG DR capsule Increase Omeprazole to 40mg once daily starting on 3/3 and continue for 45 days, then return to previous dose of 20mg daily 90 capsule 12    simethicone (MYLICON) 125 MG chewable tablet Take 1 tablet (125 mg) by mouth 4 times daily as needed for intestinal gas 40 tablet 0    sodium chloride (OCEAN) 0.65 % nasal spray Spray 1 spray into both nostrils every hour as needed for congestion or other (dry nose). 15 mL 0    traZODone (DESYREL) 100 MG tablet Take 1 tablet (100 mg) by mouth at bedtime. 30 tablet 0    warfarin ANTICOAGULANT (COUMADIN) 1 MG tablet Take 2 - 3 mg daily. OR as directed by INR clinic. 120 tablet 3    Continuous Glucose Sensor (FREESTYLE JACINTO 3 SENSOR) MISC 1 Application every 14 days. (Patient not taking: Reported on 1/30/2025)      Ferrous Sulfate (IRON) 325 (65 Fe) MG tablet Take 1 tablet by mouth daily. (Patient not taking: Reported on 3/19/2025) 30 tablet 2       Cardiographics:      ECG: 10/24/2024  Atrial flutter with controlled ventricular response     1/3/2025 read by me  Normal sinus rhythm rate of 77 beats per minutes nonspecific ST-T abnormalities QTc 470 ms     Echocardiogram: June 2024 preop     1.Left ventricular size, wall motion and function are normal. The ejection  fraction is 55-60%.  2.Normal right ventricle size and systolic function.  3.There is moderate (2+) mitral regurgitation. PISA not measured or performed.  4.Poor images for analysis.  October 2024 Post op  Left ventricular function is normal.The ejection fraction is 60-65%.  Septal motion is consistent with post-operative state.  Normal right ventricle size and systolic function.  There is a mechanical mitral valve.  Normal prosthetic mitral valve gradients.  Trivial pericardial effusion     CARMEN:  "November 2024  Left ventricular size, wall motion and function are normal. The ejection  fraction is 60-65%.  Normal right ventricle size and systolic function.  Left atrial appendage was surgically excised. There is no residual pouch  No left atrial mass or thrombus visualized.  The left atrium is mildly dilated.  There is a bi-leaflet ([facility] [facility]) mechanical prosthesis.  Normal prosthetic mitral valve gradients.     Coronary angio: September 2024  LM:mid-distal 60-70%   LAD:mid-vessel 85% narrowing at the take off of a diagonal branch  Lcx:distal 50- 60% narrowing  RCA:dominant, 100% occluded ostial, fills distally via L-R collaterals     LVEDP:10   Lab Results    Chemistry/lipid CBC Cardiac Enzymes/BNP/TSH/INR   No results for input(s): \"CHOL\", \"HDL\", \"LDL\", \"TRIG\", \"CHOLHDLRATIO\" in the last 84288 hours.  No results for input(s): \"LDL\" in the last 67622 hours.  Recent Labs   Lab Test 03/06/25  0602      POTASSIUM 3.9   CHLORIDE 105   CO2 27   *   BUN 23.4*   CR 1.21*   GFRESTIMATED 55*   CINDY 9.6     Recent Labs   Lab Test 03/06/25  0602 02/25/25  1032 01/17/25  1219   CR 1.21* 1.01* 1.08*     Recent Labs   Lab Test 10/07/24  0842   A1C 5.7*          Recent Labs   Lab Test 03/06/25  0602   WBC 6.2   HGB 9.3*   HCT 29.9*   MCV 88        Recent Labs   Lab Test 03/06/25  0602 02/25/25  1032 11/05/24  1202   HGB 9.3* 9.0* 8.5*    No results for input(s): \"TROPONINI\" in the last 91807 hours.  Recent Labs   Lab Test 06/06/24  1302   NTBNPI 101     Recent Labs   Lab Test 11/20/24  1055   TSH 12.40*     Recent Labs   Lab Test 03/19/25  1109 03/13/25  1109 03/06/25  0602   INR 2.8* 3.2* 2.86*                     "

## 2025-03-19 NOTE — PATIENT INSTRUCTIONS
Jory Ambrose,    It was a pleasure to see you today at [facility].     My recommendations after this visit include:    Reduce metoprolol to 50 mg twice a day  Restart cardiac rehab    RICK Rodriguez MD, FACC, [facility]

## 2025-03-19 NOTE — TELEPHONE ENCOUNTER
Patient was seen in clinic today for routine follow up post ablation with Dr. Rodriguez. Fitness of duty paperwork filled out so she can go back to work starting Monday. She requests no driving for 6 weeks, although most of her work is remote. She also requests intermittent leave to complete cardiac rehab from her bypass and valve replacement. This will likely need new paperwork and she is aware of this. She will get this to us to complete. Per records, pt has done approx 19/36 cardiac rehab sessions prior to needing a break.    Writer SEAN with cardiac rehab at [phone] to get her back on the schedule. Direct line left in case they need a new order. -[facility] [place]       Paperwork faxed to [phone]. Original copy with patient and copy sent to [facility] to scan to chart. -[facility] [place]

## 2025-04-07 ENCOUNTER — TELEPHONE (OUTPATIENT)
Dept: CARDIOLOGY | Facility: CLINIC | Age: 50
End: 2025-04-07
Payer: COMMERCIAL

## 2025-04-07 DIAGNOSIS — Z95.1 S/P CABG (CORONARY ARTERY BYPASS GRAFT): Primary | ICD-10-CM

## 2025-04-07 DIAGNOSIS — Z95.2 H/O MITRAL VALVE REPLACEMENT WITH MECHANICAL VALVE: ICD-10-CM

## 2025-04-07 NOTE — TELEPHONE ENCOUNTER
New referral placed. LM outlining this with direct line for call back should this be needed. -[facility] [place]

## 2025-04-07 NOTE — TELEPHONE ENCOUNTER
----- Message from Chen Koenig sent at 4/7/2025  2:22 PM CDT -----  Regarding: JUAN CARLOS PATIENT  General phone call:    Caller: PHILIPP FROM CARDIAC REHAB     Primary cardiologist: JUAN CARLOS     Detailed reason for call: PER PHILIPP, PLEASE PLACE NEW ORDER FOR CARDIAC REHAB, PATIENT IS STARTING BACK ON CARDIAC REHAB, 1ST APPT THIS COMING FRIDAY.     Best phone number: [phone]    Best time to contact: ANY    Ok to leave a detailedmessage? YES    Device? NO    Additional Info:

## 2025-04-11 ENCOUNTER — HOSPITAL ENCOUNTER (OUTPATIENT)
Dept: CARDIAC REHAB | Facility: CLINIC | Age: 50
Discharge: HOME | End: 2025-04-11
Attending: SURGERY
Payer: COMMERCIAL

## 2025-04-11 DIAGNOSIS — Z95.1 S/P CABG (CORONARY ARTERY BYPASS GRAFT): ICD-10-CM

## 2025-04-11 DIAGNOSIS — Z95.2 H/O MITRAL VALVE REPLACEMENT WITH MECHANICAL VALVE: ICD-10-CM

## 2025-04-11 PROCEDURE — 93798 PHYS/QHP OP CAR RHAB W/ECG: CPT

## 2025-04-11 PROCEDURE — 93797 PHYS/QHP OP CAR RHAB WO ECG: CPT

## 2025-04-17 ENCOUNTER — OFFICE VISIT (OUTPATIENT)
Dept: CARDIOLOGY | Facility: CLINIC | Age: 50
End: 2025-04-17
Payer: COMMERCIAL

## 2025-04-17 ENCOUNTER — HOSPITAL ENCOUNTER (OUTPATIENT)
Dept: CARDIAC REHAB | Facility: CLINIC | Age: 50
Discharge: HOME | End: 2025-04-17
Attending: INTERNAL MEDICINE
Payer: COMMERCIAL

## 2025-04-17 VITALS
DIASTOLIC BLOOD PRESSURE: 60 MMHG | SYSTOLIC BLOOD PRESSURE: 114 MMHG | HEART RATE: 84 BPM | BODY MASS INDEX: 35.89 KG/M2 | HEIGHT: 64 IN | RESPIRATION RATE: 16 BRPM | WEIGHT: 210.2 LBS

## 2025-04-17 DIAGNOSIS — I48.19 PERSISTENT ATRIAL FIBRILLATION (H): Primary | ICD-10-CM

## 2025-04-17 DIAGNOSIS — I48.0 PAROXYSMAL ATRIAL FIBRILLATION (H): ICD-10-CM

## 2025-04-17 DIAGNOSIS — Z98.890 STATUS POST ABLATION OF ATRIAL FIBRILLATION: ICD-10-CM

## 2025-04-17 DIAGNOSIS — G47.33 OSA (OBSTRUCTIVE SLEEP APNEA): ICD-10-CM

## 2025-04-17 DIAGNOSIS — Z86.79 STATUS POST ABLATION OF ATRIAL FIBRILLATION: ICD-10-CM

## 2025-04-17 DIAGNOSIS — I48.3 TYPICAL ATRIAL FLUTTER (H): ICD-10-CM

## 2025-04-17 DIAGNOSIS — Z95.2 H/O MITRAL VALVE REPLACEMENT WITH MECHANICAL VALVE: ICD-10-CM

## 2025-04-17 PROCEDURE — 93798 PHYS/QHP OP CAR RHAB W/ECG: CPT

## 2025-04-17 RX ORDER — BUMETANIDE 1 MG/1
1 TABLET ORAL
COMMUNITY
Start: 2025-04-07

## 2025-04-17 NOTE — LETTER
4/17/2025    Rome Joshua MD  [address] [facility] [address]    RE: Jory RITTER Rudy Ambrose       Dear Colleague,     I had the pleasure of seeing Jory Ambrose in the [facility].       [facility]  Cardiac Electrophysiology  [address]  [place] [address]   Office: [phone]  Fax: [phone]     HEART CARE ELECTROPHYSIOLOGY FOLLOW UP    Primary Care: Rome Joshua MD    Assessment/Recommendations     Persistent atrial fibrillation, typical atrial flutter/stage 3D: Symptomatic with palpitations, dyspnea on exertion and lightheadedness.  Failed sotalol due to breakthrough.  Status post RF PVI and CTI ablation 3/6/2025.  Unremarkable recovery without symptomatology or documentation of current arrhythmia  Off antiarrhythmic therapy    History of left atrial appendage excision: [id number] score of 2 for gender, CAD.  No residual appendage by CARMEN 11/29/2024.  She denies bleeding complications    Mechanical mitral valve replacement: On chronic warfarin, goal INR 2.5-3.5    GIA: having some difficulty tolerating CPAP. She is aware of the association between sleep apnea, atrial arrhythmias and overall cardiovascular health and plans to continue therapy     Plan:  Continue warfarin as ordered for stroke prophylaxis and mechanical valve replacement, goal INR 2.5-3.5  Continue metoprolol tartrate 50 mg twice daily  Follow-up with Dr. Rodriguez June 2025 as scheduled, EP 1 year post ablation     History of Present Illness/Subjective    Jory Ambrose is a 49 year old female with past medical history significant for persistent atrial fibrillation and atrial flutter, CAD, moderate mitral regurgitation, status post four-vessel CABG, mechanical mitral valve replacement and left atrial appendage excision 10/8/2024, recurrent pulmonary embolism, Hodgkin's lymphoma with prior chest radiation and bone marrow transplant, GIA,  hypothyroidism, prediabetes, asthma, CKD, obesity, seen today for EP follow-up status post ablation.  She developed atrial fibrillation and atrial flutter following the above cardiac surgery in October 2024 which was rate controlled with metoprolol.  During hospitalization she initially appeared to have paroxysmal arrhythmias though become persistent after discharge for which she underwent DCCV 11/29/2024 with symptomatic improvement. She developed recurrent symptoms, converting to sinus rhythm with sotalol initiation. Due to ongoing breakthrough AF she underwent RF PVI and CTI ablation 3/6/2025; her sotalol was not resumed post ablation.     Jory has felt generally well over the past few weeks. Her stamina is slowly improving and she plans to resume cardiac rehab today. Her Bumex was reinstated for dyspnea on exertion and pulmonary congestion with improvement. She has occasional brief palpitations which have not been sustained. She had no groin site issues, heartburn, difficulty swallowing, or neurologic changes post ablation. She denies palpitations, orthopnea, pedal edema, lightheadedness/dizziness or syncope.     Data Review     Arrhythmia hx:   Sx: dyspnea on exertion, activity intolerance, intermittent palpitations, lightheadedness   Sx onset: diagnosed post-operatively, though possible she had had AF prior to surgery given moderate-severe mitral regurgitation   Dx/date: Postoperative paroxysmal AF/AFL 10/13/2024, persAF post discharge   Rate control: metoprolol   AAD: amiodarone IV 10/2024, discontinued due to worsening transaminitis in setting of ischemic hepatitis. Noted allergy to oral amiodarone. Sotalol 12/2024 - 3/2025  DCCV: 11/29/2024  Ablation: PVI, CTI ablation 3/6/2025 (Dr. Balbuena)  [id number] 2 for gender, CAD  OAC: Warfarin (mechanical MVR)    EKG:   3/6/2025: SR 77 bpm, AV delay, QRS 96 ms, QT/QTc 452/511 ms. Dimunitive P waves  1/3/2025: SR 77 bpm  12/13/2024: SR 77 bpm  10/18/2024: AFL  "76 bpm  Personally reviewed.     Zio monitoring from 11/18/2024 to 11/21/2024 (duration 3 days).  Continuous atrial fibrillation/flutter, 64 to 143 bpm, average 109 bpm.  There were no pauses of greater than 3 seconds.  Rare premature ventricular contractions (<1%).  Symptom triggers none.    CARMEN 11/29/2024  Left ventricular size, wall motion and function are normal. The ejection  fraction is 60-65%.  Normal right ventricle size and systolic function.  Left atrial appendage was surgically excised. There is no residual pouch  No left atrial mass or thrombus visualized.  The left atrium is mildly dilated.  There is a bi-leaflet ([employer]) mechanical prosthesis.  Normal prosthetic mitral valve gradients.    I have reviewed and updated the patient's past medical history, allergy list and medication list.                Physical Examination Review of Systems   BMI= Body mass index is 36.08 kg/m .    Wt Readings from Last 3 Encounters:   04/17/25 95.3 kg (210 lb 3.2 oz)   03/19/25 92.9 kg (204 lb 12.8 oz)   03/06/25 94.3 kg (208 lb)       Vitals: /60 (BP Location: Left arm, Patient Position: Sitting, Cuff Size: Adult Large)   Pulse 84   Resp 16   Ht 1.626 m (5' 4\")   Wt 95.3 kg (210 lb 3.2 oz)   BMI 36.08 kg/m    General   Appearance:   Alert and oriented, in no acute distress   HEENT:  Normocephalic and atraumatic   Neck: No JVP, carotid bruit or obvious thyromegaly   Lungs:   Respirations unlabored. Decreased breath sounds RLL   Cardiovascular:   Rhythm is regular. Mechanical mitral valve click. No significant murmur is present. Lower extremities demonstrate no significant edema   Extremities: No cyanosis or clubbing   Skin: Skin is warm, dry, and otherwise intact   Neurologic: Gait not assessed. Mood and affect appropriate    A 12 point comprehensive review of systems was  negative except as noted.      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    This would prevent her from following up on a regular basis       Deconditioned low back 06/18/2015     H/O autologous stem cell transplant (H) 07/26/2011     History of radiation therapy 01/01/2010    3600 cGy to mediastinum and neck     Hodgkin lymphoma, nodular sclerosis (H)     Dx Feb 2010     Hypothyroidism, secondary     r/t radiation     Morbid obesity with BMI of 40.0-44.9, adult (H)      Neutropenic fever 06/09/2013     Polycystic ovary disease      Pulmonary embolism (H)      Pulmonary embolus (H) 02/21/2011     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     Seasonal allergies      Shingles     Aug 2010     Sleep apnea     Past Surgical History:   Procedure Laterality Date     CHOLECYSTECTOMY      gallstone pancreatitis Jan 2010     CORONARY ARTERY BYPASS GRAFT, WITH ENDOSCOPIC VESSEL PROCUREMENT N/A 10/8/2024    Procedure: CORONARY ARTERY BYPASS GRAFT TIMES FOUR, LEFT INTERNAL MAMMARY ARTERY HARVEST, RIGHT LEG ENDOSCOPIC VESSEL PROCUREMENT, EPIAORTIC ULTRASOUND,;  Surgeon: David Wade MD;  Location: [facility] Main OR     CV CORONARY ANGIOGRAM N/A 9/13/2024    Procedure: Coronary Angiogram;  Surgeon: Tarun Johnston MD;  Location: [facility] CATH LAB CV     CV LEFT HEART CATH N/A 9/13/2024    Procedure: Left Heart Catheterization;  Surgeon: Tarun Johnston MD;  Location: [facility] CATH LAB CV     EP ABLATION PULMONARY VEIN ISOLATION N/A 3/6/2025    Procedure: Ablation Atrial Fibrillation;  Surgeon: Arlette Balbuena MD;  Location: [facility] CATH LAB CV     IR CVC NON TUNNEL PLACEMENT > 5 YRS  10/11/2024     IR CVC TUNNEL PLACEMENT > 5 YRS OF AGE  10/24/2024     IR CVC TUNNEL REMOVAL RIGHT  11/1/2024     Lymphectomy  2/2010    right neck area     OR LIGATION, LEFT ATRIAL APPENDAGE N/A 10/8/2024    Procedure: LEFT ATRIAL APPENDAGE LIGATION,;  Surgeon:  David Wade MD;  Location: [facility] OR     PICC TRIPLE LUMEN PLACEMENT  10/15/2024     REPLACE VALVE MITRAL N/A 10/8/2024    Procedure: MITRAL VALVE REAIR CONVERTED TO MITRAL VALVE REPLACEMENT;  Surgeon: David Wade MD;  Location: [facility] OR     TRANSESOPHAGEAL ECHOCARDIOGRAM INTRAOPERATIVE  10/8/2024    Procedure: ANESTHESIA TRANSESOPHAGEAL ECHOCARDIOGRAM;  Surgeon: David Wade MD;  Location: [facility] OR    Family History   Problem Relation Age of Onset     Cancer Mother         thyroid cancer     Unknown/Adopted Father      Breast Cancer No family hx of      Cancer - colorectal No family hx of      Prostate Cancer No family hx of      Hypertension Mother      C.A.D. Maternal Grandmother      C.A.D. Paternal Grandmother     Social History     Socioeconomic History     Marital status:      Spouse name: Yasir     Number of children: 0     Years of education: Not on file     Highest education level: Not on file   Occupational History     Not on file   Tobacco Use     Smoking status: Never     Smokeless tobacco: Never     Tobacco comments:     denies tabacco use   Vaping Use     Vaping status: Never Used   Substance and Sexual Activity     Alcohol use: No     Comment: minimal alcohol use, 1 glass of wine in 6 months     Drug use: No     Sexual activity: Yes     Partners: Male     Birth control/protection: None   Other Topics Concern     Parent/sibling w/ CABG, MI or angioplasty before 65F 55M? Not Asked   Social History Narrative     Not on file     Social Drivers of Health     Financial Resource Strain: Low Risk  (10/9/2024)    Financial Resource Strain      Within the past 12 months, have you or your family members you live with been unable to get utilities (heat, electricity) when it was really needed?: No   Food Insecurity: Low Risk  (10/9/2024)    Food Insecurity      Within the past 12 months, did you worry that your food would run out before you  got money to buy more?: No      Within the past 12 months, did the food you bought just not last and you didn t have money to get more?: No   Transportation Needs: Low Risk  (10/9/2024)    Transportation Needs      Within the past 12 months, has lack of transportation kept you from medical appointments, getting your medicines, non-medical meetings or appointments, work, or from getting things that you need?: No   Physical Activity: Insufficiently Active (10/24/2023)    [facility] [employer] [facility] [employer] [facility], [facility] Credit [employer] [facility]    Exercise Vital Sign      Days of Exercise per Week: 2 days      Minutes of Exercise per Session: 30 min   Stress: Stress Concern Present (10/24/2023)    Received from [facility] [employer] [facility], [facility] [employer] [facility]    [employer] - Occupational Stress Questionnaire      Feeling of Stress : To some extent   Social Connections: Moderately Integrated (10/24/2023)    Received from [facility] [employer] [facility] [employer] [facility], [facility] [employer] [facility]    Social Connection and Isolation Panel [NHANES]      Frequency of Communication with Friends and Family: More than three times a week      Frequency of Social Gatherings with Friends and Family: Once a week      Attends [employer] Services: More than 4 times per year      Active Member of Clubs or Organizations: No      Attends Club or Organization Meetings: Patient declined      Marital Status:    Interpersonal Safety: Low Risk  (3/6/2025)    Interpersonal Safety      Do you feel physically and emotionally safe where you currently live?: Yes      Within the past 12 months, have you been hit, slapped, kicked or otherwise physically hurt by someone?: No      Within the past 12 months, have you been humiliated or emotionally abused in other ways by your partner or ex-partner?: No   Housing  Stability: Low Risk  (10/9/2024)    Housing Stability      Do you have housing? : Yes      Are you worried about losing your housing?: No          Medications  Allergies   Scheduled Meds:  Current Outpatient Medications   Medication Sig Dispense Refill     acetaminophen (TYLENOL) 500 MG tablet Take 2 tablets (1,000 mg) by mouth every 6 hours as needed for mild pain.       albuterol (PROAIR HFA) 108 (90 Base) MCG/ACT inhaler Inhale 2 puffs into the lungs every 6 hours as needed. 18 g 0     atorvastatin (LIPITOR) 80 MG tablet Take 1 tablet (80 mg) by mouth daily. 90 tablet 3     budesonide-formoterol (SYMBICORT/BREYNA) 80-4.5 MCG/ACT Inhaler Inhale 2 puffs into the lungs every 12 hours.       bumetanide (BUMEX) 1 MG tablet Take 1 tablet by mouth daily at 2 pm.       cetirizine (ZYRTEC) 10 MG tablet Take 1 tablet (10 mg) by mouth daily. 30 tablet 11     cholecalciferol (VITAMIN D3) 125 mcg (5000 units) capsule Take 1 capsule (125 mcg) by mouth daily. 30 capsule 0     clopidogrel (PLAVIX) 75 MG tablet Take 1 tablet (75 mg) by mouth daily. 30 tablet 1     ipratropium - albuterol 0.5 mg/2.5 mg/3 mL (DUONEB) 0.5-2.5 (3) MG/3ML neb solution Inhale 3 mLs into the lungs every 6 hours as needed for wheezing.       levothyroxine (SYNTHROID/LEVOTHROID) 125 MCG tablet Take 1 tablet (125 mcg) by mouth daily. 30 tablet 0     LORazepam (ATIVAN) 0.5 MG tablet Take 0.5 mg by mouth nightly as needed for anxiety.       magnesium oxide 400 MG CAPS Take 400 mg by mouth 2 times daily.       metoprolol tartrate (LOPRESSOR) 50 MG tablet Take 1 tablet (50 mg) by mouth 2 times daily. 60 tablet 5     miconazole (MICATIN) 2 % external powder Apply topically 2 times daily. (Patient taking differently: Apply topically as needed.)       multivitamin w/minerals (MULTI-VITAMIN) tablet Take 1 tablet by mouth daily.       nitroGLYcerin (NITROSTAT) 0.4 MG sublingual tablet Place 0.4 mg under the tongue every 5 minutes as needed for chest pain.        omeprazole (PRILOSEC) 20 MG DR capsule Increase Omeprazole to 40mg once daily starting on 3/3 and continue for 45 days, then return to previous dose of 20mg daily 90 capsule 12     simethicone (MYLICON) 125 MG chewable tablet Take 1 tablet (125 mg) by mouth 4 times daily as needed for intestinal gas 40 tablet 0     sodium chloride (OCEAN) 0.65 % nasal spray Spray 1 spray into both nostrils every hour as needed for congestion or other (dry nose). 15 mL 0     traZODone (DESYREL) 100 MG tablet Take 1 tablet (100 mg) by mouth at bedtime. 30 tablet 0     warfarin ANTICOAGULANT (COUMADIN) 1 MG tablet Take 2 - 3 mg daily. OR as directed by INR clinic. 120 tablet 3     Continuous Glucose Sensor (FREESTYLE JACINTO 3 SENSOR) MISC 1 Application every 14 days. (Patient not taking: Reported on 4/17/2025)       Ferrous Sulfate (IRON) 325 (65 Fe) MG tablet Take 1 tablet by mouth daily. (Patient not taking: Reported on 4/17/2025) 30 tablet 2    Allergies   Allergen Reactions     Atarax [Hydroxyzine] Shortness Of Breath     Dye [Contrast Dye] Swelling     Pre-medicated with methylprednisolone     Shellfish Allergy Shortness Of Breath, Anaphylaxis and Swelling     Shrimp, crab, lobster     Cats      Other Reaction(s): Rhinitis, Sneezing     Dust Mite Extract Other (See Comments)     Other Reaction(s): Rhinitis, Sneezing     Food Itching     Shrimp and walnuts.     Penicillins Hives     Patient cannot recall if she has tried cephalosporins in the past.      Wheat Germ Oil Other (See Comments)     Seasonal - multiple     Erythromycin Hives     Morphine Hcl      Causes change in mental status     Nickel Rash     rash     Sulfa Antibiotics Hives and Rash         Lab Results    Chemistry/lipid CBC Cardiac Enzymes/BNP/TSH/INR   Lab Results   Component Value Date    BUN 23.4 (H) 03/06/2025     03/06/2025    CO2 27 03/06/2025    Lab Results   Component Value Date    WBC 6.2 03/06/2025    HGB 9.3 (L) 03/06/2025    HCT 29.9 (L) 03/06/2025     MCV 88 2025     2025    @RESUFAST(BMP,CBC,BNP,TSH,  INR)@      50 minutes spent reviewing prior records (including documentation, laboratory studies, cardiac testing/imaging), history and physical exam, planning, and subsequent documentation.     The longitudinal plan of care for the diagnosis(es)/condition(s) as documented were addressed during this visit. Due to the added complexity in care, I will continue to support Jory in the subsequent management and with ongoing continuity of care.     This note has been dictated using voice recognition software. Any grammatical, typographical, or context distortions are unintentional and inherent to the software.    Nicolle Garcia CNP  Clinical Cardiac Electrophysiology  [facility]  Clinic and [phone]  Fax: [phone]  Electrophysiology Nurses: [phone]        Thank you for allowing me to participate in the care of your patient.      Sincerely,     RAFY Long CNP     [facility]  cc:   Arlette Balbuena MD  [address]  [place] [address]

## 2025-04-17 NOTE — PROGRESS NOTES
[facility]  Cardiac Electrophysiology  [address]  [place] [address]   Office: [phone]  Fax: [phone]     HEART CARE ELECTROPHYSIOLOGY FOLLOW UP    Primary Care: Rome Joshua MD    Assessment/Recommendations     Persistent atrial fibrillation, typical atrial flutter/stage 3D: Symptomatic with palpitations, dyspnea on exertion and lightheadedness.  Failed sotalol due to breakthrough.  Status post RF PVI and CTI ablation 3/6/2025.  Unremarkable recovery without symptomatology or documentation of current arrhythmia  Off antiarrhythmic therapy    History of left atrial appendage excision: [id number] score of 2 for gender, CAD.  No residual appendage by CARMEN 11/29/2024.  She denies bleeding complications    Mechanical mitral valve replacement: On chronic warfarin, goal INR 2.5-3.5    GIA: having some difficulty tolerating CPAP. She is aware of the association between sleep apnea, atrial arrhythmias and overall cardiovascular health and plans to continue therapy     Plan:  Continue warfarin as ordered for stroke prophylaxis and mechanical valve replacement, goal INR 2.5-3.5  Continue metoprolol tartrate 50 mg twice daily  Follow-up with Dr. Rodriguez June 2025 as scheduled, EP 1 year post ablation     History of Present Illness/Subjective    Jory Ambrose is a 49 year old female with past medical history significant for persistent atrial fibrillation and atrial flutter, CAD, moderate mitral regurgitation, status post four-vessel CABG, mechanical mitral valve replacement and left atrial appendage excision 10/8/2024, recurrent pulmonary embolism, Hodgkin's lymphoma with prior chest radiation and bone marrow transplant, GIA, hypothyroidism, prediabetes, asthma, CKD, obesity, seen today for EP follow-up status post ablation.  She developed atrial fibrillation and atrial flutter following the above cardiac surgery in October 2024 which was rate controlled  with metoprolol.  During hospitalization she initially appeared to have paroxysmal arrhythmias though become persistent after discharge for which she underwent DCCV 11/29/2024 with symptomatic improvement. She developed recurrent symptoms, converting to sinus rhythm with sotalol initiation. Due to ongoing breakthrough AF she underwent RF PVI and CTI ablation 3/6/2025; her sotalol was not resumed post ablation.     Jory has felt generally well over the past few weeks. Her stamina is slowly improving and she plans to resume cardiac rehab today. Her Bumex was reinstated for dyspnea on exertion and pulmonary congestion with improvement. She has occasional brief palpitations which have not been sustained. She had no groin site issues, heartburn, difficulty swallowing, or neurologic changes post ablation. She denies palpitations, orthopnea, pedal edema, lightheadedness/dizziness or syncope.     Data Review     Arrhythmia hx:   Sx: dyspnea on exertion, activity intolerance, intermittent palpitations, lightheadedness   Sx onset: diagnosed post-operatively, though possible she had had AF prior to surgery given moderate-severe mitral regurgitation   Dx/date: Postoperative paroxysmal AF/AFL 10/13/2024, persAF post discharge   Rate control: metoprolol   AAD: amiodarone IV 10/2024, discontinued due to worsening transaminitis in setting of ischemic hepatitis. Noted allergy to oral amiodarone. Sotalol 12/2024 - 3/2025  DCCV: 11/29/2024  Ablation: PVI, CTI ablation 3/6/2025 (Dr. Balbuena)  [id number] 2 for gender, CAD  OAC: Warfarin (mechanical MVR)    EKG:   3/6/2025: SR 77 bpm, AV delay, QRS 96 ms, QT/QTc 452/511 ms. Dimunitive P waves  1/3/2025: SR 77 bpm  12/13/2024: SR 77 bpm  10/18/2024: AFL 76 bpm  Personally reviewed.     Zio monitoring from 11/18/2024 to 11/21/2024 (duration 3 days).  Continuous atrial fibrillation/flutter, 64 to 143 bpm, average 109 bpm.  There were no pauses of greater than 3 seconds.  Rare premature  "ventricular contractions (<1%).  Symptom triggers none.    CARMEN 11/29/2024  Left ventricular size, wall motion and function are normal. The ejection  fraction is 60-65%.  Normal right ventricle size and systolic function.  Left atrial appendage was surgically excised. There is no residual pouch  No left atrial mass or thrombus visualized.  The left atrium is mildly dilated.  There is a bi-leaflet ([employer]) mechanical prosthesis.  Normal prosthetic mitral valve gradients.    I have reviewed and updated the patient's past medical history, allergy list and medication list.                Physical Examination Review of Systems   BMI= Body mass index is 36.08 kg/m .    Wt Readings from Last 3 Encounters:   04/17/25 95.3 kg (210 lb 3.2 oz)   03/19/25 92.9 kg (204 lb 12.8 oz)   03/06/25 94.3 kg (208 lb)       Vitals: /60 (BP Location: Left arm, Patient Position: Sitting, Cuff Size: Adult Large)   Pulse 84   Resp 16   Ht 1.626 m (5' 4\")   Wt 95.3 kg (210 lb 3.2 oz)   BMI 36.08 kg/m    General   Appearance:   Alert and oriented, in no acute distress   HEENT:  Normocephalic and atraumatic   Neck: No JVP, carotid bruit or obvious thyromegaly   Lungs:   Respirations unlabored. Decreased breath sounds RLL   Cardiovascular:   Rhythm is regular. Mechanical mitral valve click. No significant murmur is present. Lower extremities demonstrate no significant edema   Extremities: No cyanosis or clubbing   Skin: Skin is warm, dry, and otherwise intact   Neurologic: Gait not assessed. Mood and affect appropriate    A 12 point comprehensive review of systems was  negative except as noted.      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    This would prevent her from following up on a regular basis      Deconditioned low back 06/18/2015    H/O autologous stem cell " transplant (H) 07/26/2011    History of radiation therapy 01/01/2010    3600 cGy to mediastinum and neck    Hodgkin lymphoma, nodular sclerosis (H)     Dx Feb 2010    Hypothyroidism, secondary     r/t radiation    Morbid obesity with BMI of 40.0-44.9, adult (H)     Neutropenic fever 06/09/2013    Polycystic ovary disease     Pulmonary embolism (H)     Pulmonary embolus (H) 02/21/2011    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    Seasonal allergies     Shingles     Aug 2010    Sleep apnea     Past Surgical History:   Procedure Laterality Date    CHOLECYSTECTOMY      gallstone pancreatitis Jan 2010    CORONARY ARTERY BYPASS GRAFT, WITH ENDOSCOPIC VESSEL PROCUREMENT N/A 10/8/2024    Procedure: CORONARY ARTERY BYPASS GRAFT TIMES FOUR, LEFT INTERNAL MAMMARY ARTERY HARVEST, RIGHT LEG ENDOSCOPIC VESSEL PROCUREMENT, EPIAORTIC ULTRASOUND,;  Surgeon: David Wade MD;  Location: [facility] OR    CV CORONARY ANGIOGRAM N/A 9/13/2024    Procedure: Coronary Angiogram;  Surgeon: Tarun Johnston MD;  Location: [facility] CATH LAB CV    CV LEFT HEART CATH N/A 9/13/2024    Procedure: Left Heart Catheterization;  Surgeon: Tarun Johnston MD;  Location: [facility] LAB CV    EP ABLATION PULMONARY VEIN ISOLATION N/A 3/6/2025    Procedure: Ablation Atrial Fibrillation;  Surgeon: Arlette Balbuena MD;  Location: [facility] LAB CV    IR CVC NON TUNNEL PLACEMENT > 5 YRS  10/11/2024    IR CVC TUNNEL PLACEMENT > 5 YRS OF AGE  10/24/2024    IR CVC TUNNEL REMOVAL RIGHT  11/1/2024    Lymphectomy  2/2010    right neck area    OR LIGATION, LEFT ATRIAL APPENDAGE N/A 10/8/2024    Procedure: LEFT ATRIAL APPENDAGE LIGATION,;  Surgeon: David Wade MD;  Location: [facility] OR    PICC TRIPLE LUMEN PLACEMENT  10/15/2024    REPLACE VALVE MITRAL N/A 10/8/2024    Procedure: MITRAL VALVE REAIR CONVERTED TO MITRAL VALVE REPLACEMENT;  Surgeon: David Wade MD;  Location:   [facility] Main OR    TRANSESOPHAGEAL ECHOCARDIOGRAM INTRAOPERATIVE  10/8/2024    Procedure: ANESTHESIA TRANSESOPHAGEAL ECHOCARDIOGRAM;  Surgeon: David Wade MD;  Location: [facility] Main OR    Family History   Problem Relation Age of Onset    Cancer Mother         thyroid cancer    Unknown/Adopted Father     Breast Cancer No family hx of     Cancer - colorectal No family hx of     Prostate Cancer No family hx of     Hypertension Mother     C.A.D. Maternal Grandmother     C.A.D. Paternal Grandmother     Social History     Socioeconomic History    Marital status:      Spouse name: Yasir    Number of children: 0    Years of education: Not on file    Highest education level: Not on file   Occupational History    Not on file   Tobacco Use    Smoking status: Never    Smokeless tobacco: Never    Tobacco comments:     denies tabacco use   Vaping Use    Vaping status: Never Used   Substance and Sexual Activity    Alcohol use: No     Comment: minimal alcohol use, 1 glass of wine in 6 months    Drug use: No    Sexual activity: Yes     Partners: Male     Birth control/protection: None   Other Topics Concern    Parent/sibling w/ CABG, MI or angioplasty before 65F 55M? Not Asked   Social History Narrative    Not on file     Social Drivers of Health     Financial Resource Strain: Low Risk  (10/9/2024)    Financial Resource Strain     Within the past 12 months, have you or your family members you live with been unable to get utilities (heat, electricity) when it was really needed?: No   Food Insecurity: Low Risk  (10/9/2024)    Food Insecurity     Within the past 12 months, did you worry that your food would run out before you got money to buy more?: No     Within the past 12 months, did the food you bought just not last and you didn t have money to get more?: No   Transportation Needs: Low Risk  (10/9/2024)    Transportation Needs     Within the past 12 months, has lack of transportation kept you from medical  appointments, getting your medicines, non-medical meetings or appointments, work, or from getting things that you need?: No   Physical Activity: Insufficiently Active (10/24/2023)    Received from [facility], [facility]    Exercise Vital Sign     Days of Exercise per Week: 2 days     Minutes of Exercise per Session: 30 min   Stress: Stress Concern Present (10/24/2023)    Received from [facility], [facility]    [employer] [place] [employer] - Occupational Stress Questionnaire     Feeling of Stress : To some extent   Social Connections: Moderately Integrated (10/24/2023)    Received from [facility], [facility]    Social Connection and Isolation Panel [NHANES]     Frequency of Communication with Friends and Family: More than three times a week     Frequency of Social Gatherings with Friends and Family: Once a week     Attends Sikh Services: More than 4 times per year     Active Member of Clubs or Organizations: No     Attends Club or Organization Meetings: Patient declined     Marital Status:    Interpersonal Safety: Low Risk  (3/6/2025)    Interpersonal Safety     Do you feel physically and emotionally safe where you currently live?: Yes     Within the past 12 months, have you been hit, slapped, kicked or otherwise physically hurt by someone?: No     Within the past 12 months, have you been humiliated or emotionally abused in other ways by your partner or ex-partner?: No   Housing Stability: Low Risk  (10/9/2024)    Housing Stability     Do you have housing? : Yes     Are you worried about losing your housing?: No          Medications  Allergies   Scheduled Meds:  Current Outpatient Medications   Medication Sig Dispense Refill    acetaminophen (TYLENOL) 500 MG tablet Take 2 tablets  (1,000 mg) by mouth every 6 hours as needed for mild pain.      albuterol (PROAIR HFA) 108 (90 Base) MCG/ACT inhaler Inhale 2 puffs into the lungs every 6 hours as needed. 18 g 0    atorvastatin (LIPITOR) 80 MG tablet Take 1 tablet (80 mg) by mouth daily. 90 tablet 3    budesonide-formoterol (SYMBICORT/BREYNA) 80-4.5 MCG/ACT Inhaler Inhale 2 puffs into the lungs every 12 hours.      bumetanide (BUMEX) 1 MG tablet Take 1 tablet by mouth daily at 2 pm.      cetirizine (ZYRTEC) 10 MG tablet Take 1 tablet (10 mg) by mouth daily. 30 tablet 11    cholecalciferol (VITAMIN D3) 125 mcg (5000 units) capsule Take 1 capsule (125 mcg) by mouth daily. 30 capsule 0    clopidogrel (PLAVIX) 75 MG tablet Take 1 tablet (75 mg) by mouth daily. 30 tablet 1    ipratropium - albuterol 0.5 mg/2.5 mg/3 mL (DUONEB) 0.5-2.5 (3) MG/3ML neb solution Inhale 3 mLs into the lungs every 6 hours as needed for wheezing.      levothyroxine (SYNTHROID/LEVOTHROID) 125 MCG tablet Take 1 tablet (125 mcg) by mouth daily. 30 tablet 0    LORazepam (ATIVAN) 0.5 MG tablet Take 0.5 mg by mouth nightly as needed for anxiety.      magnesium oxide 400 MG CAPS Take 400 mg by mouth 2 times daily.      metoprolol tartrate (LOPRESSOR) 50 MG tablet Take 1 tablet (50 mg) by mouth 2 times daily. 60 tablet 5    miconazole (MICATIN) 2 % external powder Apply topically 2 times daily. (Patient taking differently: Apply topically as needed.)      multivitamin w/minerals (MULTI-VITAMIN) tablet Take 1 tablet by mouth daily.      nitroGLYcerin (NITROSTAT) 0.4 MG sublingual tablet Place 0.4 mg under the tongue every 5 minutes as needed for chest pain.      omeprazole (PRILOSEC) 20 MG DR capsule Increase Omeprazole to 40mg once daily starting on 3/3 and continue for 45 days, then return to previous dose of 20mg daily 90 capsule 12    simethicone (MYLICON) 125 MG chewable tablet Take 1 tablet (125 mg) by mouth 4 times daily as needed for intestinal gas 40 tablet 0    sodium  chloride (OCEAN) 0.65 % nasal spray Spray 1 spray into both nostrils every hour as needed for congestion or other (dry nose). 15 mL 0    traZODone (DESYREL) 100 MG tablet Take 1 tablet (100 mg) by mouth at bedtime. 30 tablet 0    warfarin ANTICOAGULANT (COUMADIN) 1 MG tablet Take 2 - 3 mg daily. OR as directed by INR clinic. 120 tablet 3    Continuous Glucose Sensor (FREESTYLE JACINTO 3 SENSOR) MISC 1 Application every 14 days. (Patient not taking: Reported on 4/17/2025)      Ferrous Sulfate (IRON) 325 (65 Fe) MG tablet Take 1 tablet by mouth daily. (Patient not taking: Reported on 4/17/2025) 30 tablet 2    Allergies   Allergen Reactions    Atarax [Hydroxyzine] Shortness Of Breath    Dye [Contrast Dye] Swelling     Pre-medicated with methylprednisolone    Shellfish Allergy Shortness Of Breath, Anaphylaxis and Swelling     Shrimp, crab, lobster    Cats      Other Reaction(s): Rhinitis, Sneezing    Dust Mite Extract Other (See Comments)     Other Reaction(s): Rhinitis, Sneezing    Food Itching     Shrimp and walnuts.    Penicillins Hives     Patient cannot recall if she has tried cephalosporins in the past.     Wheat Germ Oil Other (See Comments)     Seasonal - multiple    Erythromycin Hives    Morphine Hcl      Causes change in mental status    Nickel Rash     rash    Sulfa Antibiotics Hives and Rash         Lab Results    Chemistry/lipid CBC Cardiac Enzymes/BNP/TSH/INR   Lab Results   Component Value Date    BUN 23.4 (H) 03/06/2025     03/06/2025    CO2 27 03/06/2025    Lab Results   Component Value Date    WBC 6.2 03/06/2025    HGB 9.3 (L) 03/06/2025    HCT 29.9 (L) 03/06/2025    MCV 88 03/06/2025     03/06/2025    @RESUFAST(BMP,CBC,BNP,TSH,  INR)@      50 minutes spent reviewing prior records (including documentation, laboratory studies, cardiac testing/imaging), history and physical exam, planning, and subsequent documentation.     The longitudinal plan of care for the diagnosis(es)/condition(s) as  documented were addressed during this visit. Due to the added complexity in care, I will continue to support Jory in the subsequent management and with ongoing continuity of care.     This note has been dictated using voice recognition software. Any grammatical, typographical, or context distortions are unintentional and inherent to the software.    Nicolle Garcia CNP  Clinical Cardiac Electrophysiology  [facility]  Clinic and [phone]  Fax: [phone]  Electrophysiology Nurses: [phone]

## 2025-04-18 ENCOUNTER — HOSPITAL ENCOUNTER (OUTPATIENT)
Dept: CARDIAC REHAB | Facility: CLINIC | Age: 50
Discharge: HOME | End: 2025-04-18
Attending: INTERNAL MEDICINE
Payer: COMMERCIAL

## 2025-04-18 PROCEDURE — 93798 PHYS/QHP OP CAR RHAB W/ECG: CPT

## 2025-04-24 ENCOUNTER — HOSPITAL ENCOUNTER (OUTPATIENT)
Dept: CARDIAC REHAB | Facility: CLINIC | Age: 50
Discharge: HOME | End: 2025-04-24
Attending: INTERNAL MEDICINE
Payer: COMMERCIAL

## 2025-04-24 ENCOUNTER — ANTICOAGULATION THERAPY VISIT (OUTPATIENT)
Dept: ANTICOAGULATION | Facility: CLINIC | Age: 50
End: 2025-04-24

## 2025-04-24 ENCOUNTER — LAB (OUTPATIENT)
Dept: CARDIOLOGY | Facility: CLINIC | Age: 50
End: 2025-04-24
Payer: COMMERCIAL

## 2025-04-24 DIAGNOSIS — Z95.2 H/O MITRAL VALVE REPLACEMENT WITH MECHANICAL VALVE: Primary | ICD-10-CM

## 2025-04-24 LAB — INR POINT OF CARE: 3.3 (ref 0.9–1.1)

## 2025-04-24 PROCEDURE — 93798 PHYS/QHP OP CAR RHAB W/ECG: CPT

## 2025-04-24 NOTE — PROGRESS NOTES
ANTICOAGULATION MANAGEMENT     Jory Grant Halie 49 year old female is on warfarin with therapeutic INR result. (Goal INR 2.5-3.5)    Recent labs: (last 7 days)     04/24/25  0916   INR 3.3*       ASSESSMENT     Source(s): Chart Review and Patient/Caregiver Call     Warfarin doses taken: Warfarin taken as instructed  Diet: No new diet changes identified  Medication/supplement changes: None noted  New illness, injury, or hospitalization: No  Signs or symptoms of bleeding or clotting: No  Previous result: Supratherapeutic  Additional findings: None       PLAN     Recommended plan for no diet, medication or health factor changes affecting INR     Dosing Instructions: Continue your current warfarin dose with next INR in 2 weeks       Summary  As of 4/24/2025      Full warfarin instructions:  2 mg every Sun, Tue, Thu; 3 mg all other days   Next INR check:  5/8/2025               Telephone call with Jory who verbalizes understanding and agrees to plan    Lab visit scheduled    Education provided: Please call back if any changes to your diet, medications or how you've been taking warfarin    Plan made per [facility] anticoagulation protocol    Woody Barroso RN  4/24/2025  Anticoagulation Clinic  [employer] for routing messages: p [facility] patient phone line: [phone]        _______________________________________________________________________     Anticoagulation Episode Summary       Current INR goal:  2.5-3.5   TTR:  59.0% (5.4 mo)   Target end date:  Indefinite   Send INR reminders to:  [facility] HEART [facility]    Indications    H/O mitral valve replacement with mechanical valve [Z95.2]             Comments:  --             Anticoagulation Care Providers       Provider Role Specialty Phone number    Catherine Galvez PA-C Referring Radiation Oncology [phone]

## 2025-04-25 ENCOUNTER — HOSPITAL ENCOUNTER (OUTPATIENT)
Dept: CARDIAC REHAB | Facility: CLINIC | Age: 50
Discharge: HOME | End: 2025-04-25
Attending: INTERNAL MEDICINE
Payer: COMMERCIAL

## 2025-04-25 PROCEDURE — 93798 PHYS/QHP OP CAR RHAB W/ECG: CPT

## 2025-05-08 ENCOUNTER — ANTICOAGULATION THERAPY VISIT (OUTPATIENT)
Dept: ANTICOAGULATION | Facility: CLINIC | Age: 50
End: 2025-05-08

## 2025-05-08 ENCOUNTER — LAB (OUTPATIENT)
Dept: CARDIOLOGY | Facility: CLINIC | Age: 50
End: 2025-05-08
Payer: COMMERCIAL

## 2025-05-08 DIAGNOSIS — Z95.2 H/O MITRAL VALVE REPLACEMENT WITH MECHANICAL VALVE: Primary | ICD-10-CM

## 2025-05-08 LAB — INR POINT OF CARE: 3.8 (ref 0.9–1.1)

## 2025-05-08 NOTE — PROGRESS NOTES
ANTICOAGULATION MANAGEMENT     Jorydennis Grant Halie 49 year old female is on warfarin with supratherapeutic INR result. (Goal INR 2.5-3.5)    Recent labs: (last 7 days)     05/08/25  0855   INR 3.8*       ASSESSMENT     Source(s): Chart Review and Patient/Caregiver Call     Warfarin doses taken: Warfarin taken as instructed  Diet: No new diet changes identified  Medication/supplement changes: None noted  New illness, injury, or hospitalization: No   Reported flare up of allergy (seasonal), potentially could have effect on INR.   S/p atrial fib ablation on 3/6/25.   S/p MVR  ([employer] Epic mechanical valve).on 10/8/24 w/ CABS x4, CARRIE excision.  Signs or symptoms of bleeding or clotting: No  Previous result: Therapeutic last visit at 3.3; previously outside of goal range at 3.9  Additional findings: None       PLAN     Recommended plan for temporary change(s) affecting INR     Dosing Instructions: partial hold then continue your current warfarin dose with next INR in 2 weeks       Summary  As of 5/8/2025      Full warfarin instructions:  5/8: 1 mg; Otherwise 2 mg every Sun, Tue, Thu; 3 mg all other days   Next INR check:  5/22/2025               Telephone call with Jory who verbalizes understanding and agrees to plan    Lab visit scheduled - INR on 5/15/25 @   [facility]    Education provided: Taking warfarin: Importance of taking warfarin as instructed  Goal range and lab monitoring: goal range and significance of current result    Plan made with [facility] Pharmacist Maine Stewart RN  5/8/2025  Anticoagulation Clinic  [facility] for routing messages: p [facility] patient phone line: [phone]        _______________________________________________________________________     Anticoagulation Episode Summary       Current INR goal:  2.5-3.5   TTR:  57.5% (5.8 mo)   Target end date:  Indefinite   Send INR reminders to:  [facility]     Indications    H/O mitral valve replacement with mechanical valve [Z95.2]             Comments:  --             Anticoagulation Care Providers       Provider Role Specialty Phone number    Catherine Galvez PA-C Referring Radiation Oncology [phone]

## 2025-05-15 ENCOUNTER — RESULTS FOLLOW-UP (OUTPATIENT)
Dept: ANTICOAGULATION | Facility: CLINIC | Age: 50
End: 2025-05-15

## 2025-05-15 ENCOUNTER — ANTICOAGULATION THERAPY VISIT (OUTPATIENT)
Dept: ANTICOAGULATION | Facility: CLINIC | Age: 50
End: 2025-05-15

## 2025-05-15 ENCOUNTER — TELEPHONE (OUTPATIENT)
Dept: CARDIOLOGY | Facility: CLINIC | Age: 50
End: 2025-05-15

## 2025-05-15 ENCOUNTER — LAB (OUTPATIENT)
Dept: CARDIOLOGY | Facility: CLINIC | Age: 50
End: 2025-05-15
Payer: COMMERCIAL

## 2025-05-15 DIAGNOSIS — Z95.2 H/O MITRAL VALVE REPLACEMENT WITH MECHANICAL VALVE: Primary | ICD-10-CM

## 2025-05-15 DIAGNOSIS — Z79.01 LONG TERM (CURRENT) USE OF ANTICOAGULANTS: Primary | ICD-10-CM

## 2025-05-15 DIAGNOSIS — Z95.1 S/P CABG (CORONARY ARTERY BYPASS GRAFT): ICD-10-CM

## 2025-05-15 DIAGNOSIS — R06.00 DYSPNEA: Primary | ICD-10-CM

## 2025-05-15 DIAGNOSIS — Z95.2 H/O MITRAL VALVE REPLACEMENT WITH MECHANICAL VALVE: ICD-10-CM

## 2025-05-15 LAB — INR POINT OF CARE: 3.7 (ref 0.9–1.1)

## 2025-05-15 NOTE — TELEPHONE ENCOUNTER
----- Message from Leslie Sigala sent at 5/15/2025  9:07 AM CDT -----  General phone call:[facility]ologist: dr nguyễnDetailed reason for call: see above  New or active symptoms? Yes, SOB, PATIENT IS MORE SOB AND ISN'T SEEING DR NGUYỄN UNTIL MID JUNE, AND WANTS TO KNOW WHAT SHE CAN DO OR WHO SHE CAN SEE BEFORE THAT.NOT GOING TO CARDIAC REHAB BECAUSE HER INSURANCE IS BEING DIFFICULTPLEASE CALL  Best phone number: [phone]  Best time to contact: ANY TIME  Ok to leave a detailedmessage? YES  Device? NOAdditional Info:

## 2025-05-15 NOTE — TELEPHONE ENCOUNTER
Return call to patient with Dr. Rodriguez's response - patient verbalized understanding that  will be contacting her tomorrow to arrange stat echo.  mg

## 2025-05-15 NOTE — PROGRESS NOTES
ANTICOAGULATION MANAGEMENT     Jory Ambrose 49 year old female is on warfarin with supratherapeutic INR result. (Goal INR 2.5-3.5)    Recent labs: (last 7 days)     05/15/25  0907   INR 3.7*       ASSESSMENT     Warfarin Lab Questionnaire    Warfarin Doses Last 7 Days          5/14/2025   Warfarin Lab Questionnaire   Missed doses within past 14 days? No   Changes in diet or alcohol within past 14 days? No   Medication changes since last result? No   Injuries or illness since last result? No   New shortness of breath, severe headaches or sudden changes in vision since last result? Yes   If yes, please explain:  SOB worsening with exersion - advised pt to reach out to cardiology or be seen in ED if significant or having other s/s.    Abnormal bleeding since last result? No   Upcoming surgery, procedure? No   Best number to call with results? [phone]     Previous result: Supratherapeutic  Additional findings: None       PLAN     Recommended plan for no diet, medication or health factor changes affecting INR     Dosing Instructions: decrease your warfarin dose (5.6% change) with next INR in 2 weeks       Summary  As of 5/15/2025      Full warfarin instructions:  3 mg every Mon, Wed, Fri; 2 mg all other days   Next INR check:  5/29/2025               Detailed voice message left for Jory with dosing instructions and follow up date.   Sent [employer] message with dosing and follow up instructions    Contact [phone] to schedule and with any changes, questions or concerns.     Education provided: Please call back if any changes to your diet, medications or how you've been taking warfarin    Plan made per [facility] anticoagulation protocol    Paz Mendoza RN  5/15/2025  Anticoagulation Clinic  [facility] for routing messages: qasim [facility]ART CLINIC [facility] patient phone line: [phone]        _______________________________________________________________________     Anticoagulation Episode  Summary       Current INR goal:  2.5-3.5   TTR:  55.3% (6.1 mo)   Target end date:  Indefinite   Send INR reminders to:  [facility]    Indications    H/O mitral valve replacement with mechanical valve [Z95.2]             Comments:  --             Anticoagulation Care Providers       Provider Role Specialty Phone number    Catherine Galvez PA-C Referring Radiation Oncology [phone]

## 2025-05-15 NOTE — TELEPHONE ENCOUNTER
Please review patient sx update - follow-up sched in [facility] 5-21-25 - any new orders during interim?  mg

## 2025-05-15 NOTE — TELEPHONE ENCOUNTER
Miles Rodriguez MD to Me  (Selected Message)    5/15/25  3:27 PM  Echo before follow-up visit    Echo ordered stat since first available routine echo available appts per  6/27/@ STACEY, 7/1 REKHA, 6/4 BOUDREAUX - msg to be sent to non-invasive  for follow-up tomorrow am.  mg

## 2025-05-15 NOTE — TELEPHONE ENCOUNTER
Return call to patient who reported that her sx of increased SOB, dizziness started worsening approx 1.5wks ago - sx similar to what she experienced with AF in the past - patient confirmed she was not in AF at 4-17-25 visit with AF NP - patient ck'd BP which was 111/60, HR 96 and reg - patient denied CP, syncope.    Offered patient 5-21-25 RAC visit with Dr. Rodriguez and informed her that update would also be forwarded for any recommendations during interim - patient verbalized understanding and agreed with plan - call transf to godfrey kim

## 2025-05-20 NOTE — TELEPHONE ENCOUNTER
Noted patient was contacted by non-invasive sched to arrange echo 5-16-25 - patient no showed for appt.  mg

## 2025-05-21 ENCOUNTER — OFFICE VISIT (OUTPATIENT)
Dept: CARDIOLOGY | Facility: CLINIC | Age: 50
End: 2025-05-21
Payer: COMMERCIAL

## 2025-05-21 ENCOUNTER — HOSPITAL ENCOUNTER (OUTPATIENT)
Dept: RADIOLOGY | Facility: HOSPITAL | Age: 50
Discharge: HOME | End: 2025-05-21
Attending: INTERNAL MEDICINE
Payer: COMMERCIAL

## 2025-05-21 VITALS
SYSTOLIC BLOOD PRESSURE: 120 MMHG | DIASTOLIC BLOOD PRESSURE: 68 MMHG | RESPIRATION RATE: 20 BRPM | HEART RATE: 80 BPM | WEIGHT: 209.8 LBS | BODY MASS INDEX: 35.82 KG/M2 | HEIGHT: 64 IN

## 2025-05-21 DIAGNOSIS — R06.02 SHORTNESS OF BREATH: ICD-10-CM

## 2025-05-21 DIAGNOSIS — I48.0 PAROXYSMAL ATRIAL FIBRILLATION (H): Primary | ICD-10-CM

## 2025-05-21 DIAGNOSIS — I48.0 PAROXYSMAL ATRIAL FIBRILLATION (H): ICD-10-CM

## 2025-05-21 LAB
ANION GAP SERPL CALCULATED.3IONS-SCNC: 11 MMOL/L (ref 7–15)
ATRIAL RATE - MUSE: 75 BPM
BUN SERPL-MCNC: 21.9 MG/DL (ref 6–20)
CALCIUM SERPL-MCNC: 9.3 MG/DL (ref 8.8–10.4)
CHLORIDE SERPL-SCNC: 102 MMOL/L (ref 98–107)
CREAT SERPL-MCNC: 1.23 MG/DL (ref 0.51–0.95)
DIASTOLIC BLOOD PRESSURE - MUSE: NORMAL MMHG
EGFRCR SERPLBLD CKD-EPI 2021: 54 ML/MIN/1.73M2
ERYTHROCYTE [DISTWIDTH] IN BLOOD BY AUTOMATED COUNT: 17.6 % (ref 10–15)
GLUCOSE SERPL-MCNC: 103 MG/DL (ref 70–99)
HCO3 SERPL-SCNC: 28 MMOL/L (ref 22–29)
HCT VFR BLD AUTO: 29.4 % (ref 35–47)
HGB BLD-MCNC: 8.9 G/DL (ref 11.7–15.7)
INTERPRETATION ECG - MUSE: NORMAL
MCH RBC QN AUTO: 26.9 PG (ref 26.5–33)
MCHC RBC AUTO-ENTMCNC: 30.3 G/DL (ref 31.5–36.5)
MCV RBC AUTO: 89 FL (ref 78–100)
NT-PROBNP SERPL-MCNC: 852 PG/ML (ref 0–192)
P AXIS - MUSE: 247 DEGREES
PLATELET # BLD AUTO: 390 10E3/UL (ref 150–450)
POTASSIUM SERPL-SCNC: 3.5 MMOL/L (ref 3.4–5.3)
PR INTERVAL - MUSE: 352 MS
QRS DURATION - MUSE: 98 MS
QT - MUSE: 436 MS
QTC - MUSE: 486 MS
R AXIS - MUSE: 20 DEGREES
RBC # BLD AUTO: 3.31 10E6/UL (ref 3.8–5.2)
SODIUM SERPL-SCNC: 141 MMOL/L (ref 135–145)
SYSTOLIC BLOOD PRESSURE - MUSE: NORMAL MMHG
T AXIS - MUSE: -3 DEGREES
VENTRICULAR RATE- MUSE: 75 BPM
WBC # BLD AUTO: 8.7 10E3/UL (ref 4–11)

## 2025-05-21 PROCEDURE — 80048 BASIC METABOLIC PNL TOTAL CA: CPT | Performed by: INTERNAL MEDICINE

## 2025-05-21 PROCEDURE — 99214 OFFICE O/P EST MOD 30 MIN: CPT | Performed by: INTERNAL MEDICINE

## 2025-05-21 PROCEDURE — 83880 ASSAY OF NATRIURETIC PEPTIDE: CPT | Performed by: INTERNAL MEDICINE

## 2025-05-21 PROCEDURE — 36415 COLL VENOUS BLD VENIPUNCTURE: CPT | Performed by: INTERNAL MEDICINE

## 2025-05-21 PROCEDURE — 3074F SYST BP LT 130 MM HG: CPT | Performed by: INTERNAL MEDICINE

## 2025-05-21 PROCEDURE — 71046 X-RAY EXAM CHEST 2 VIEWS: CPT

## 2025-05-21 PROCEDURE — 3078F DIAST BP <80 MM HG: CPT | Performed by: INTERNAL MEDICINE

## 2025-05-21 PROCEDURE — 93000 ELECTROCARDIOGRAM COMPLETE: CPT | Performed by: INTERNAL MEDICINE

## 2025-05-21 PROCEDURE — 85027 COMPLETE CBC AUTOMATED: CPT | Performed by: INTERNAL MEDICINE

## 2025-05-21 NOTE — PROGRESS NOTES
Cardiology Progress Note     Assessment:  Dyspnea, recurrent, persistent left pleural effusion but clinically unchanged from before  Coronary artery disease status post CABG(to LAD and vein grafts to distal RCA circumflex and diagonal branch) in October 2024, no angina  Mitral regurgitation status post mitral valve replacement with mechanical prosthesis(29 mm [facility] Brian epic), normal auscultation  Postoperative atrial flutter/fib status post PVI and CTI ablation on 3/6/2025, status post surgical exclusion of left atrial appendage with good results, no recurrence of arrhythmia since ablation  ATN post open heart surgery, resolved  Chronic kidney disease  Postoperative severe fluid overload, resolved  Postop pleural effusion, improving, clear on the left and mildly decreased breath sounds on the right side  Postoperative anemia, stable  Hypercholesterolemia, on statin  History of Hodgkin lymphoma status post chemo chest radiation and bone marrow transplant, in remission  Cerebrovascular disease mild to moderate, on clopidogrel      Plan:  CBC, BMP, BNP  Chest x-ray PA and lateral  Echo  Further recommendation when the results of the tests available  The longitudinal plan of care for the diagnosis(es)/condition(s) as documented were addressed during this visit. Due to the added complexity in care, I will continue to support Jory in the subsequent management and with ongoing continuity of care.   Subjective:   This is 49 year old female who comes in today for follow-up visit.  She reports that over the course of last few weeks she has developed progressive shortness of breath again.  She denies any chest pains.  She does not feel like she went back in atrial fibrillation.  She has been compliant with all medications including warfarin.  She has not had any subtherapeutic INRs.  She has not seen any blood in stool or urine    Review of Systems:   Negative other than history of present illness    Objective:   BP  "120/68 (BP Location: Left arm, Patient Position: Sitting, Cuff Size: Adult Large)   Pulse 80   Resp 20   Ht 1.626 m (5' 4\")   Wt 95.2 kg (209 lb 12.8 oz)   BMI 36.01 kg/m    Physical Exam:  GENERAL: no distress  NECK: No JVD  LUNGS: Creased breath sounds left base  CARDIAC: regular rhythm, crisp clicks of artificial mitral valve no heaves, thrills, gallops or murmurs.  ABDOMEN: flat, negative hepatosplenomegaly, soft and non-tender.  EXTREMITIES: No evidence of cyanosis, clubbing or edema.    Current Outpatient Medications   Medication Sig Dispense Refill    acetaminophen (TYLENOL) 500 MG tablet Take 2 tablets (1,000 mg) by mouth every 6 hours as needed for mild pain.      albuterol (PROAIR HFA) 108 (90 Base) MCG/ACT inhaler Inhale 2 puffs into the lungs every 6 hours as needed. 18 g 0    atorvastatin (LIPITOR) 80 MG tablet Take 1 tablet (80 mg) by mouth daily. 90 tablet 3    budesonide-formoterol (SYMBICORT/BREYNA) 80-4.5 MCG/ACT Inhaler Inhale 2 puffs into the lungs every 12 hours.      bumetanide (BUMEX) 1 MG tablet Take 1 tablet by mouth daily at 2 pm.      cetirizine (ZYRTEC) 10 MG tablet Take 1 tablet (10 mg) by mouth daily. 30 tablet 11    cholecalciferol (VITAMIN D3) 125 mcg (5000 units) capsule Take 1 capsule (125 mcg) by mouth daily. 30 capsule 0    clopidogrel (PLAVIX) 75 MG tablet Take 1 tablet (75 mg) by mouth daily. 30 tablet 1    levothyroxine (SYNTHROID/LEVOTHROID) 125 MCG tablet Take 1 tablet (125 mcg) by mouth daily. 30 tablet 0    LORazepam (ATIVAN) 0.5 MG tablet Take 0.5 mg by mouth nightly as needed for anxiety.      magnesium oxide 400 MG CAPS Take 400 mg by mouth 2 times daily.      metoprolol tartrate (LOPRESSOR) 50 MG tablet Take 1 tablet (50 mg) by mouth 2 times daily. 60 tablet 5    miconazole (MICATIN) 2 % external powder Apply topically 2 times daily. (Patient taking differently: Apply topically as needed.)      multivitamin w/minerals (MULTI-VITAMIN) tablet Take 1 tablet by mouth " daily.      omeprazole (PRILOSEC) 20 MG DR capsule Increase Omeprazole to 40mg once daily starting on 3/3 and continue for 45 days, then return to previous dose of 20mg daily 90 capsule 12    simethicone (MYLICON) 125 MG chewable tablet Take 1 tablet (125 mg) by mouth 4 times daily as needed for intestinal gas 40 tablet 0    sodium chloride (OCEAN) 0.65 % nasal spray Spray 1 spray into both nostrils every hour as needed for congestion or other (dry nose). 15 mL 0    traZODone (DESYREL) 100 MG tablet Take 1 tablet (100 mg) by mouth at bedtime. 30 tablet 0    warfarin ANTICOAGULANT (COUMADIN) 1 MG tablet Take 2 - 3 mg daily. OR as directed by INR clinic. 120 tablet 3    Continuous Glucose Sensor (FREESTYLE JACINTO 3 SENSOR) MISC 1 Application every 14 days. (Patient not taking: Reported on 2025)      ipratropium - albuterol 0.5 mg/2.5 mg/3 mL (DUONEB) 0.5-2.5 (3) MG/3ML neb solution Inhale 3 mLs into the lungs every 6 hours as needed for wheezing. (Patient not taking: Reported on 2025)      nitroGLYcerin (NITROSTAT) 0.4 MG sublingual tablet Place 0.4 mg under the tongue every 5 minutes as needed for chest pain. (Patient not taking: Reported on 2025)         Cardiographics:    EC2025 ectopic atrial rhythm with nonspecific ST-T abnormalities l unchanged from March of this year     1/3/2025 read by me  Normal sinus rhythm rate of 77 beats per minutes nonspecific ST-T abnormalities QTc 470 ms     Echocardiogram: 2024 preop     1.Left ventricular size, wall motion and function are normal. The ejection  fraction is 55-60%.  2.Normal right ventricle size and systolic function.  3.There is moderate (2+) mitral regurgitation. PISA not measured or performed.  4.Poor images for analysis.  2024 Post op  Left ventricular function is normal.The ejection fraction is 60-65%.  Septal motion is consistent with post-operative state.  Normal right ventricle size and systolic function.  There is a  "mechanical mitral valve.  Normal prosthetic mitral valve gradients.  Trivial pericardial effusion     CARMEN: November 2024  Left ventricular size, wall motion and function are normal. The ejection  fraction is 60-65%.  Normal right ventricle size and systolic function.  Left atrial appendage was surgically excised. There is no residual pouch  No left atrial mass or thrombus visualized.  The left atrium is mildly dilated.  There is a bi-leaflet ([facility] Brian) mechanical prosthesis.  Normal prosthetic mitral valve gradients.     Coronary angio: September 2024  LM:mid-distal 60-70%   LAD:mid-vessel 85% narrowing at the take off of a diagonal branch  Lcx:distal 50- 60% narrowing  RCA:dominant, 100% occluded ostial, fills distally via L-R collaterals     LVEDP:10     Lab Results    Chemistry/lipid CBC Cardiac Enzymes/BNP/TSH/INR   No results for input(s): \"CHOL\", \"HDL\", \"LDL\", \"TRIG\", \"CHOLHDLRATIO\" in the last 83541 hours.  No results for input(s): \"LDL\" in the last 86803 hours.  Recent Labs   Lab Test 03/06/25  0602      POTASSIUM 3.9   CHLORIDE 105   CO2 27   *   BUN 23.4*   CR 1.21*   GFRESTIMATED 55*   CINDY 9.6     Recent Labs   Lab Test 03/06/25  0602 02/25/25  1032 01/17/25  1219   CR 1.21* 1.01* 1.08*     Recent Labs   Lab Test 10/07/24  0842   A1C 5.7*          Recent Labs   Lab Test 03/06/25  0602   WBC 6.2   HGB 9.3*   HCT 29.9*   MCV 88        Recent Labs   Lab Test 03/06/25  0602 02/25/25  1032 11/05/24  1202   HGB 9.3* 9.0* 8.5*    No results for input(s): \"TROPONINI\" in the last 13495 hours.  Recent Labs   Lab Test 06/06/24  1302   NTBNPI 101     Recent Labs   Lab Test 11/20/24  1055   TSH 12.40*     Recent Labs   Lab Test 05/15/25  0907 05/08/25  0855 04/24/25  0916   INR 3.7* 3.8* 3.3*                     "

## 2025-05-21 NOTE — LETTER
5/21/2025    Aníbal Colunga MD  [facility] [address] [address]    RE: Jory Ambrose       Dear Colleague,     I had the pleasure of seeing Jory Ambrose in the [facility].      Cardiology Progress Note     Assessment:  Dyspnea, recurrent, persistent left pleural effusion but clinically unchanged from before  Coronary artery disease status post CABG(to LAD and vein grafts to distal RCA circumflex and diagonal branch) in October 2024, no angina  Mitral regurgitation status post mitral valve replacement with mechanical prosthesis(29 mm [place] Brian epic), normal auscultation  Postoperative atrial flutter/fib status post PVI and CTI ablation on 3/6/2025, status post surgical exclusion of left atrial appendage with good results, no recurrence of arrhythmia since ablation  ATN post open heart surgery, resolved  Chronic kidney disease  Postoperative severe fluid overload, resolved  Postop pleural effusion, improving, clear on the left and mildly decreased breath sounds on the right side  Postoperative anemia, stable  Hypercholesterolemia, on statin  History of Hodgkin lymphoma status post chemo chest radiation and bone marrow transplant, in remission  Cerebrovascular disease mild to moderate, on clopidogrel      Plan:  CBC, BMP, BNP  Chest x-ray PA and lateral  Echo  Further recommendation when the results of the tests available  The longitudinal plan of care for the diagnosis(es)/condition(s) as documented were addressed during this visit. Due to the added complexity in care, I will continue to support Jory in the subsequent management and with ongoing continuity of care.   Subjective:   This is 49 year old female who comes in today for follow-up visit.  She reports that over the course of last few weeks she has developed progressive shortness of breath again.  She denies any chest pains.  She does not feel like she went back in atrial fibrillation.   "She has been compliant with all medications including warfarin.  She has not had any subtherapeutic INRs.  She has not seen any blood in stool or urine    Review of Systems:   Negative other than history of present illness    Objective:   /68 (BP Location: Left arm, Patient Position: Sitting, Cuff Size: Adult Large)   Pulse 80   Resp 20   Ht 1.626 m (5' 4\")   Wt 95.2 kg (209 lb 12.8 oz)   BMI 36.01 kg/m    Physical Exam:  GENERAL: no distress  NECK: No JVD  LUNGS: Creased breath sounds left base  CARDIAC: regular rhythm, crisp clicks of artificial mitral valve no heaves, thrills, gallops or murmurs.  ABDOMEN: flat, negative hepatosplenomegaly, soft and non-tender.  EXTREMITIES: No evidence of cyanosis, clubbing or edema.    Current Outpatient Medications   Medication Sig Dispense Refill     acetaminophen (TYLENOL) 500 MG tablet Take 2 tablets (1,000 mg) by mouth every 6 hours as needed for mild pain.       albuterol (PROAIR HFA) 108 (90 Base) MCG/ACT inhaler Inhale 2 puffs into the lungs every 6 hours as needed. 18 g 0     atorvastatin (LIPITOR) 80 MG tablet Take 1 tablet (80 mg) by mouth daily. 90 tablet 3     budesonide-formoterol (SYMBICORT/BREYNA) 80-4.5 MCG/ACT Inhaler Inhale 2 puffs into the lungs every 12 hours.       bumetanide (BUMEX) 1 MG tablet Take 1 tablet by mouth daily at 2 pm.       cetirizine (ZYRTEC) 10 MG tablet Take 1 tablet (10 mg) by mouth daily. 30 tablet 11     cholecalciferol (VITAMIN D3) 125 mcg (5000 units) capsule Take 1 capsule (125 mcg) by mouth daily. 30 capsule 0     clopidogrel (PLAVIX) 75 MG tablet Take 1 tablet (75 mg) by mouth daily. 30 tablet 1     levothyroxine (SYNTHROID/LEVOTHROID) 125 MCG tablet Take 1 tablet (125 mcg) by mouth daily. 30 tablet 0     LORazepam (ATIVAN) 0.5 MG tablet Take 0.5 mg by mouth nightly as needed for anxiety.       magnesium oxide 400 MG CAPS Take 400 mg by mouth 2 times daily.       metoprolol tartrate (LOPRESSOR) 50 MG tablet Take 1 " tablet (50 mg) by mouth 2 times daily. 60 tablet 5     miconazole (MICATIN) 2 % external powder Apply topically 2 times daily. (Patient taking differently: Apply topically as needed.)       multivitamin w/minerals (MULTI-VITAMIN) tablet Take 1 tablet by mouth daily.       omeprazole (PRILOSEC) 20 MG DR capsule Increase Omeprazole to 40mg once daily starting on 3/3 and continue for 45 days, then return to previous dose of 20mg daily 90 capsule 12     simethicone (MYLICON) 125 MG chewable tablet Take 1 tablet (125 mg) by mouth 4 times daily as needed for intestinal gas 40 tablet 0     sodium chloride (OCEAN) 0.65 % nasal spray Spray 1 spray into both nostrils every hour as needed for congestion or other (dry nose). 15 mL 0     traZODone (DESYREL) 100 MG tablet Take 1 tablet (100 mg) by mouth at bedtime. 30 tablet 0     warfarin ANTICOAGULANT (COUMADIN) 1 MG tablet Take 2 - 3 mg daily. OR as directed by INR clinic. 120 tablet 3     Continuous Glucose Sensor (FREESTYLE JACINTO 3 SENSOR) MISC 1 Application every 14 days. (Patient not taking: Reported on 2025)       ipratropium - albuterol 0.5 mg/2.5 mg/3 mL (DUONEB) 0.5-2.5 (3) MG/3ML neb solution Inhale 3 mLs into the lungs every 6 hours as needed for wheezing. (Patient not taking: Reported on 2025)       nitroGLYcerin (NITROSTAT) 0.4 MG sublingual tablet Place 0.4 mg under the tongue every 5 minutes as needed for chest pain. (Patient not taking: Reported on 2025)         Cardiographics:    EC2025 ectopic atrial rhythm with nonspecific ST-T abnormalities l unchanged from March of this year     1/3/2025 read by me  Normal sinus rhythm rate of 77 beats per minutes nonspecific ST-T abnormalities QTc 470 ms     Echocardiogram: 2024 preop     1.Left ventricular size, wall motion and function are normal. The ejection  fraction is 55-60%.  2.Normal right ventricle size and systolic function.  3.There is moderate (2+) mitral regurgitation. PISA not  "measured or performed.  4.Poor images for analysis.  October 2024 Post op  Left ventricular function is normal.The ejection fraction is 60-65%.  Septal motion is consistent with post-operative state.  Normal right ventricle size and systolic function.  There is a mechanical mitral valve.  Normal prosthetic mitral valve gradients.  Trivial pericardial effusion     CARMEN: November 2024  Left ventricular size, wall motion and function are normal. The ejection  fraction is 60-65%.  Normal right ventricle size and systolic function.  Left atrial appendage was surgically excised. There is no residual pouch  No left atrial mass or thrombus visualized.  The left atrium is mildly dilated.  There is a bi-leaflet ([employer]. Brian) mechanical prosthesis.  Normal prosthetic mitral valve gradients.     Coronary angio: September 2024  LM:mid-distal 60-70%   LAD:mid-vessel 85% narrowing at the take off of a diagonal branch  Lcx:distal 50- 60% narrowing  RCA:dominant, 100% occluded ostial, fills distally via L-R collaterals     LVEDP:10     Lab Results    Chemistry/lipid CBC Cardiac Enzymes/BNP/TSH/INR   No results for input(s): \"CHOL\", \"HDL\", \"LDL\", \"TRIG\", \"CHOLHDLRATIO\" in the last 49427 hours.  No results for input(s): \"LDL\" in the last 93401 hours.  Recent Labs   Lab Test 03/06/25  0602      POTASSIUM 3.9   CHLORIDE 105   CO2 27   *   BUN 23.4*   CR 1.21*   GFRESTIMATED 55*   CINDY 9.6     Recent Labs   Lab Test 03/06/25  0602 02/25/25  1032 01/17/25  1219   CR 1.21* 1.01* 1.08*     Recent Labs   Lab Test 10/07/24  0842   A1C 5.7*          Recent Labs   Lab Test 03/06/25  0602   WBC 6.2   HGB 9.3*   HCT 29.9*   MCV 88        Recent Labs   Lab Test 03/06/25  0602 02/25/25  1032 11/05/24  1202   HGB 9.3* 9.0* 8.5*    No results for input(s): \"TROPONINI\" in the last 82159 hours.  Recent Labs   Lab Test 06/06/24  1302   NTBNPI 101     Recent Labs   Lab Test 11/20/24  1055   TSH 12.40*     Recent Labs   Lab Test " 05/15/25  0907 05/08/25  0855 04/24/25  0916   INR 3.7* 3.8* 3.3*                       Thank you for allowing me to participate in the care of your patient.      Sincerely,     Jordon Rodriguez MD     [facility]  cc:   Miles Rodriguez MD  [address]  [place] [address]

## 2025-05-22 ENCOUNTER — RESULTS FOLLOW-UP (OUTPATIENT)
Dept: CARDIOLOGY | Facility: CLINIC | Age: 50
End: 2025-05-22

## 2025-05-22 ENCOUNTER — HOSPITAL ENCOUNTER (OUTPATIENT)
Dept: CARDIOLOGY | Facility: HOSPITAL | Age: 50
End: 2025-05-22
Attending: INTERNAL MEDICINE
Payer: COMMERCIAL

## 2025-05-22 DIAGNOSIS — Z95.1 S/P CABG (CORONARY ARTERY BYPASS GRAFT): ICD-10-CM

## 2025-05-22 DIAGNOSIS — Z95.2 H/O MITRAL VALVE REPLACEMENT WITH MECHANICAL VALVE: ICD-10-CM

## 2025-05-22 DIAGNOSIS — D64.9 ANEMIA: Primary | ICD-10-CM

## 2025-05-22 DIAGNOSIS — R06.00 DYSPNEA: ICD-10-CM

## 2025-05-22 DIAGNOSIS — I48.0 PAROXYSMAL ATRIAL FIBRILLATION (H): ICD-10-CM

## 2025-05-22 LAB — LVEF ECHO: NORMAL

## 2025-05-22 PROCEDURE — 255N000002 HC RX 255 OP 636: Performed by: INTERNAL MEDICINE

## 2025-05-22 RX ORDER — METOPROLOL TARTRATE 50 MG
25 TABLET ORAL 2 TIMES DAILY
Status: SHIPPED
Start: 2025-05-22 | End: 2025-06-04

## 2025-05-22 RX ORDER — FERROUS SULFATE 325(65) MG
325 TABLET ORAL
Status: SHIPPED
Start: 2025-05-22 | End: 2025-07-01

## 2025-05-22 RX ADMIN — PERFLUTREN 2 ML: 6.52 INJECTION, SUSPENSION INTRAVENOUS at 13:38

## 2025-05-23 ENCOUNTER — RESULTS FOLLOW-UP (OUTPATIENT)
Dept: CARDIOLOGY | Facility: CLINIC | Age: 50
End: 2025-05-23

## 2025-05-23 DIAGNOSIS — Z95.1 S/P CABG (CORONARY ARTERY BYPASS GRAFT): ICD-10-CM

## 2025-05-23 DIAGNOSIS — Z95.2 H/O MITRAL VALVE REPLACEMENT WITH MECHANICAL VALVE: Primary | ICD-10-CM

## 2025-06-02 ENCOUNTER — MYC MEDICAL ADVICE (OUTPATIENT)
Dept: CARDIOLOGY | Facility: CLINIC | Age: 50
End: 2025-06-02
Payer: COMMERCIAL

## 2025-06-02 DIAGNOSIS — R00.0 TACHYCARDIA: ICD-10-CM

## 2025-06-02 DIAGNOSIS — I48.0 PAROXYSMAL ATRIAL FIBRILLATION (H): Primary | ICD-10-CM

## 2025-06-02 NOTE — TELEPHONE ENCOUNTER
Miles Rodriguez MD to [facility] Cv Rn Team Z (Selected Message)        6/2/25 11:11 AM  Get EKG.  Suspect this is just a rebound from beta-blocker therapy          Noted-ordered. -[facility] [place]

## 2025-06-04 ENCOUNTER — TELEPHONE (OUTPATIENT)
Dept: CARDIOLOGY | Facility: CLINIC | Age: 50
End: 2025-06-04

## 2025-06-04 ENCOUNTER — ALLIED HEALTH/NURSE VISIT (OUTPATIENT)
Dept: CARDIOLOGY | Facility: CLINIC | Age: 50
End: 2025-06-04
Payer: COMMERCIAL

## 2025-06-04 ENCOUNTER — HOSPITAL ENCOUNTER (OUTPATIENT)
Dept: CARDIAC REHAB | Facility: CLINIC | Age: 50
Discharge: HOME | End: 2025-06-04
Attending: INTERNAL MEDICINE
Payer: COMMERCIAL

## 2025-06-04 VITALS
WEIGHT: 215.1 LBS | SYSTOLIC BLOOD PRESSURE: 124 MMHG | RESPIRATION RATE: 20 BRPM | HEART RATE: 121 BPM | BODY MASS INDEX: 36.92 KG/M2 | OXYGEN SATURATION: 92 % | DIASTOLIC BLOOD PRESSURE: 70 MMHG

## 2025-06-04 DIAGNOSIS — I48.0 PAROXYSMAL ATRIAL FIBRILLATION (H): ICD-10-CM

## 2025-06-04 DIAGNOSIS — R00.0 TACHYCARDIA: ICD-10-CM

## 2025-06-04 DIAGNOSIS — R00.0 TACHYCARDIA: Primary | ICD-10-CM

## 2025-06-04 LAB
ATRIAL RATE - MUSE: 113 BPM
DIASTOLIC BLOOD PRESSURE - MUSE: NORMAL MMHG
INTERPRETATION ECG - MUSE: NORMAL
P AXIS - MUSE: NORMAL DEGREES
PR INTERVAL - MUSE: NORMAL MS
QRS DURATION - MUSE: 96 MS
QT - MUSE: 350 MS
QTC - MUSE: 482 MS
R AXIS - MUSE: 53 DEGREES
SYSTOLIC BLOOD PRESSURE - MUSE: NORMAL MMHG
T AXIS - MUSE: -18 DEGREES
VENTRICULAR RATE- MUSE: 114 BPM

## 2025-06-04 PROCEDURE — 99207 PR NO CHARGE LOS: CPT

## 2025-06-04 PROCEDURE — 3078F DIAST BP <80 MM HG: CPT

## 2025-06-04 PROCEDURE — 3074F SYST BP LT 130 MM HG: CPT

## 2025-06-04 PROCEDURE — 93797 PHYS/QHP OP CAR RHAB WO ECG: CPT

## 2025-06-04 PROCEDURE — 93798 PHYS/QHP OP CAR RHAB W/ECG: CPT

## 2025-06-04 PROCEDURE — 93000 ELECTROCARDIOGRAM COMPLETE: CPT | Performed by: INTERNAL MEDICINE

## 2025-06-04 RX ORDER — METOPROLOL SUCCINATE 25 MG/1
12.5 TABLET, EXTENDED RELEASE ORAL DAILY
Qty: 45 TABLET | Refills: 1 | Status: SHIPPED | OUTPATIENT
Start: 2025-06-04

## 2025-06-04 NOTE — TELEPHONE ENCOUNTER
Called Jory and updated on recommendation from Dr. Rodriguez based on his review of clinic EKG- sinus tachycardia. She will  new Rx for Metoprolol succinate 12.5 mg. She knows not to use her own Metoprolol as this was short-acting formulary. She will update us in a week or two on her pulse rate. -[facility] [place]   [address], Dr. Bryson

## 2025-06-04 NOTE — TELEPHONE ENCOUNTER
"Miles Rodriguez MD Caswell, Mallory J, RN  EKG looks to me like sinus tachycardia.  Start metoprolol extended release 12.5 mg once a day          Previous Messages       ----- Message -----  From: Alyce Leonard  Sent: 6/4/2025   9:54 AM CDT  To: Miles Rodriguez MD; [facility] Cv Rn Team Z    Patient present to the clinic today for follow up after recent discontinuation of metoprolol.    Patient states she is doing \"well\" and denies lightheadedness, dizziness or fatigue. Reports no new symptoms. Patient has follow up on . She reports her resting HR are between  and never go below 88 bpm. She has a smart watch and BP cuff. Pt discussed decreasing caffeine intake and cutting it out completely. Writer encouraged this plan. She is down to 1/2 cups per day, education provided regarding rebound tachycardia. Pt is also has a dx of asthma.      EKG reveals accelerated junction rhythm w/possible inferior infarct    IN ms  QRS 96 ms  QT/QTc  350/482    Patient was instructed provider will review and if new orders are given a return call will be made. Patient verbalizes understanding and agrees with plan. Patient will follow up as planned. No further questions or concerns.    Dr. Rodriguez,  Please review EKG.  Any further recommendations or instructions?    BP 1214/70    O2 92%  "

## 2025-06-04 NOTE — Clinical Note
"Patient present to the clinic today for follow up after recent discontinuation of metoprolol.   Patient states she is doing \"well\" and denies lightheadedness, dizziness or fatigue. Reports no new symptoms. Patient has follow up on . She reports her resting HR are between  and never go below 88 bpm. She has a smart watch and BP cuff. Pt discussed decreasing caffeine intake and cutting it out completely. Writer encouraged this plan. She is down to 1/2 cups per day, education provided regarding rebound tachycardia. Pt is also has a dx of asthma.    EKG reveals accelerated junction rhythm w/possible inferior infarct  CT ms QRS 96 ms QT/QTc  350/482  Patient was instructed provider will review and if new orders are given a return call will be made. Patient verbalizes understanding and agrees with plan. Patient will follow up as planned. No further questions or concerns.  Dr. Rodriguez, Please review EKG.  Any further recommendations or instructions?  BP 1214/70  O2 92%"

## 2025-06-04 NOTE — NURSING NOTE
Pt here for EKG after messaging team.  Showed to Alyce Leonard RN who will speak with pt.  VS as noted in chart.  Thanks.     KULDEEP [place]   The patient's goals for the shift include      The clinical goals for the shift include WILL USE CALL LIGHT FOR ALL MOBILITY/ASSIST NEEDS AND WAIT FOR STAFF TO RESPOND.    Problem: Fall/Injury  Goal: Not fall by end of shift  Outcome: Progressing

## 2025-06-06 NOTE — PLAN OF CARE
sc  Patient Name: Jory Ambrose   MRN: [id number]   Date of Admission: 10/8/2024    Procedure: Procedure(s):  CORONARY ARTERY BYPASS GRAFT TIMES FOUR, LEFT INTERNAL MAMMARY ARTERY HARVEST, RIGHT LEG ENDOSCOPIC VESSEL PROCUREMENT, EPIAORTIC ULTRASOUND,  MITRAL VALVE REAIR CONVERTED TO MITRAL VALVE REPLACEMENT  LEFT ATRIAL APPENDAGE LIGATION,  ANESTHESIA TRANSESOPHAGEAL ECHOCARDIOGRAM    Post Op day #:21    Subjective (Patient focus/Primary Problem for shift): rest/feel better          Pain Goal0 Pain Rating0           Pain Medication/ Regime effective to reduce patient painyes    Objective (Physical assessment):           Rhythm: atrial fibrillation            Bowel Activity: yes if Yes indicate when: this evening          Bowel Medications: yes            Incision: healing well          Incentive Spirometry Q 1-2 hour when awake:  yes Volume: 1000          Epicardial Pacing Wires:  no            Patient Activity:           Up to chair for meals: yes          Ambulation with RN x2 (Not including CR): with staff           Is patient in home clothes:no             Chest Tubes   Pleural: no Draining: no               Suction: no              Mediastinal: no Draining: no               Suction: no   Dressing Change Daily:no If No, why?ROBERT                     Urinary Catheter: no           Preventative WOC consult (need MD order): yes    A&O x4. The pt c/o 3/10 incisional/chest pain, and foot pain. The pt sat in the chair for supper. Family visited (, mother) most of the shift. Call-light within reach.    Protocols    K+ recheck at 1400 came back low at 3.4- replace/ recheck at [phone] K+ came back at 3.7 replaced and AM draw    Mag recheck at 1400 came back at 2.0- replace/ recheck in the Am        Nathalie Pina RN   10/29/2024   6:06 PM                                Mary Kate

## 2025-06-11 ENCOUNTER — HOSPITAL ENCOUNTER (OUTPATIENT)
Dept: CARDIAC REHAB | Facility: CLINIC | Age: 50
Discharge: HOME | End: 2025-06-11
Attending: INTERNAL MEDICINE
Payer: COMMERCIAL

## 2025-06-11 PROCEDURE — 93798 PHYS/QHP OP CAR RHAB W/ECG: CPT

## 2025-06-13 ENCOUNTER — RESULTS FOLLOW-UP (OUTPATIENT)
Dept: ANTICOAGULATION | Facility: CLINIC | Age: 50
End: 2025-06-13

## 2025-06-13 PROBLEM — E87.6 HYPOKALEMIA: Chronic | Status: ACTIVE | Noted: 2024-11-15

## 2025-06-13 PROBLEM — I10 ESSENTIAL (PRIMARY) HYPERTENSION: Chronic | Status: ACTIVE | Noted: 2024-11-15

## 2025-06-13 PROBLEM — J90 PLEURAL EFFUSION: Status: RESOLVED | Noted: 2024-12-09 | Resolved: 2025-06-13

## 2025-06-13 PROBLEM — N17.8 OTHER ACUTE KIDNEY FAILURE: Chronic | Status: ACTIVE | Noted: 2024-11-15

## 2025-06-13 PROBLEM — Z79.01 LONG TERM (CURRENT) USE OF ANTICOAGULANTS: Status: ACTIVE | Noted: 2024-11-26

## 2025-06-13 PROBLEM — E87.6 HYPOKALEMIA: Chronic | Status: RESOLVED | Noted: 2024-11-15 | Resolved: 2025-06-13

## 2025-06-16 ENCOUNTER — HOSPITAL ENCOUNTER (OUTPATIENT)
Dept: CARDIAC REHAB | Facility: CLINIC | Age: 50
Discharge: HOME | End: 2025-06-16
Attending: INTERNAL MEDICINE
Payer: COMMERCIAL

## 2025-06-16 DIAGNOSIS — I25.119 CORONARY ARTERY DISEASE INVOLVING NATIVE CORONARY ARTERY OF NATIVE HEART WITH ANGINA PECTORIS: ICD-10-CM

## 2025-06-16 PROCEDURE — 93798 PHYS/QHP OP CAR RHAB W/ECG: CPT

## 2025-06-17 ENCOUNTER — TELEPHONE (OUTPATIENT)
Dept: CARDIOLOGY | Facility: CLINIC | Age: 50
End: 2025-06-17
Payer: COMMERCIAL

## 2025-06-17 ENCOUNTER — HOSPITAL ENCOUNTER (OUTPATIENT)
Dept: CARDIAC REHAB | Facility: CLINIC | Age: 50
Discharge: HOME | End: 2025-06-17
Attending: INTERNAL MEDICINE
Payer: COMMERCIAL

## 2025-06-17 NOTE — TELEPHONE ENCOUNTER
Received paperwork for patient from [place] [employer] for unemployment insurance. Called Jory to make sure this is completed correctly. Medical statement needs to back date to initial surgery in October and return to work without restriction should reflect when workability form was filled out March 19th. Pt in to see WTZ in follow up and paperwork will be signed and completed then and faxed/copy to patient. -issa

## 2025-06-18 ENCOUNTER — LAB (OUTPATIENT)
Dept: CARDIOLOGY | Facility: CLINIC | Age: 50
End: 2025-06-18
Payer: COMMERCIAL

## 2025-06-18 ENCOUNTER — RESULTS FOLLOW-UP (OUTPATIENT)
Dept: CARDIOLOGY | Facility: CLINIC | Age: 50
End: 2025-06-18

## 2025-06-18 ENCOUNTER — HOSPITAL ENCOUNTER (OUTPATIENT)
Dept: CARDIAC REHAB | Facility: CLINIC | Age: 50
Discharge: HOME | End: 2025-06-18
Attending: INTERNAL MEDICINE
Payer: COMMERCIAL

## 2025-06-18 ENCOUNTER — OFFICE VISIT (OUTPATIENT)
Dept: CARDIOLOGY | Facility: CLINIC | Age: 50
End: 2025-06-18
Payer: COMMERCIAL

## 2025-06-18 ENCOUNTER — ANTICOAGULATION THERAPY VISIT (OUTPATIENT)
Dept: ANTICOAGULATION | Facility: CLINIC | Age: 50
End: 2025-06-18

## 2025-06-18 VITALS
WEIGHT: 217.1 LBS | RESPIRATION RATE: 16 BRPM | DIASTOLIC BLOOD PRESSURE: 73 MMHG | OXYGEN SATURATION: 95 % | HEART RATE: 116 BPM | SYSTOLIC BLOOD PRESSURE: 116 MMHG | HEIGHT: 64 IN | BODY MASS INDEX: 37.06 KG/M2

## 2025-06-18 DIAGNOSIS — D64.9 ANEMIA: ICD-10-CM

## 2025-06-18 DIAGNOSIS — D64.9 ANEMIA: Primary | ICD-10-CM

## 2025-06-18 DIAGNOSIS — I48.0 PAROXYSMAL ATRIAL FIBRILLATION (H): ICD-10-CM

## 2025-06-18 DIAGNOSIS — Z95.2 H/O MITRAL VALVE REPLACEMENT WITH MECHANICAL VALVE: ICD-10-CM

## 2025-06-18 DIAGNOSIS — Z95.2 H/O MITRAL VALVE REPLACEMENT WITH MECHANICAL VALVE: Primary | ICD-10-CM

## 2025-06-18 DIAGNOSIS — R00.0 TACHYCARDIA: ICD-10-CM

## 2025-06-18 LAB
ANION GAP SERPL CALCULATED.3IONS-SCNC: 12 MMOL/L (ref 7–15)
ATRIAL RATE - MUSE: 108 BPM
BUN SERPL-MCNC: 39.3 MG/DL (ref 6–20)
CALCIUM SERPL-MCNC: 9.3 MG/DL (ref 8.8–10.4)
CHLORIDE SERPL-SCNC: 100 MMOL/L (ref 98–107)
CREAT SERPL-MCNC: 1.14 MG/DL (ref 0.51–0.95)
DIASTOLIC BLOOD PRESSURE - MUSE: NORMAL MMHG
EGFRCR SERPLBLD CKD-EPI 2021: 59 ML/MIN/1.73M2
ERYTHROCYTE [DISTWIDTH] IN BLOOD BY AUTOMATED COUNT: 18.3 % (ref 10–15)
FERRITIN SERPL-MCNC: 131 NG/ML (ref 6–175)
GLUCOSE SERPL-MCNC: 101 MG/DL (ref 70–99)
HCO3 SERPL-SCNC: 26 MMOL/L (ref 22–29)
HCT VFR BLD AUTO: 29.3 % (ref 35–47)
HGB BLD-MCNC: 8.9 G/DL (ref 11.7–15.7)
INR POINT OF CARE: 2.4 (ref 0.9–1.1)
INTERPRETATION ECG - MUSE: NORMAL
IRON SERPL-MCNC: 50 UG/DL (ref 37–145)
MCH RBC QN AUTO: 26.9 PG (ref 26.5–33)
MCHC RBC AUTO-ENTMCNC: 30.4 G/DL (ref 31.5–36.5)
MCV RBC AUTO: 89 FL (ref 78–100)
P AXIS - MUSE: NORMAL DEGREES
PLATELET # BLD AUTO: 336 10E3/UL (ref 150–450)
POTASSIUM SERPL-SCNC: 3.9 MMOL/L (ref 3.4–5.3)
PR INTERVAL - MUSE: NORMAL MS
QRS DURATION - MUSE: 92 MS
QT - MUSE: 380 MS
QTC - MUSE: 497 MS
R AXIS - MUSE: 45 DEGREES
RBC # BLD AUTO: 3.31 10E6/UL (ref 3.8–5.2)
SODIUM SERPL-SCNC: 138 MMOL/L (ref 135–145)
SYSTOLIC BLOOD PRESSURE - MUSE: NORMAL MMHG
T AXIS - MUSE: -9 DEGREES
TRANSFERRIN SERPL-MCNC: 261 MG/DL (ref 200–360)
VENTRICULAR RATE- MUSE: 103 BPM
WBC # BLD AUTO: 8.7 10E3/UL (ref 4–11)

## 2025-06-18 PROCEDURE — 83540 ASSAY OF IRON: CPT | Performed by: INTERNAL MEDICINE

## 2025-06-18 PROCEDURE — 85027 COMPLETE CBC AUTOMATED: CPT | Performed by: INTERNAL MEDICINE

## 2025-06-18 PROCEDURE — 82728 ASSAY OF FERRITIN: CPT | Performed by: INTERNAL MEDICINE

## 2025-06-18 PROCEDURE — 80048 BASIC METABOLIC PNL TOTAL CA: CPT | Performed by: INTERNAL MEDICINE

## 2025-06-18 PROCEDURE — 3078F DIAST BP <80 MM HG: CPT | Performed by: INTERNAL MEDICINE

## 2025-06-18 PROCEDURE — 93798 PHYS/QHP OP CAR RHAB W/ECG: CPT

## 2025-06-18 PROCEDURE — 36415 COLL VENOUS BLD VENIPUNCTURE: CPT | Performed by: INTERNAL MEDICINE

## 2025-06-18 PROCEDURE — G2211 COMPLEX E/M VISIT ADD ON: HCPCS | Performed by: INTERNAL MEDICINE

## 2025-06-18 PROCEDURE — 84466 ASSAY OF TRANSFERRIN: CPT | Performed by: INTERNAL MEDICINE

## 2025-06-18 PROCEDURE — 3074F SYST BP LT 130 MM HG: CPT | Performed by: INTERNAL MEDICINE

## 2025-06-18 PROCEDURE — 99214 OFFICE O/P EST MOD 30 MIN: CPT | Performed by: INTERNAL MEDICINE

## 2025-06-18 RX ORDER — METOPROLOL SUCCINATE 25 MG/1
25 TABLET, EXTENDED RELEASE ORAL DAILY
Qty: 45 TABLET | Refills: 1 | Status: SHIPPED | OUTPATIENT
Start: 2025-06-18

## 2025-06-18 NOTE — TELEPHONE ENCOUNTER
Form signed and original copy given to patient. Sent to [facility] to scan to chart. -[facility] [place]

## 2025-06-18 NOTE — LETTER
6/18/2025    Kaleb Oglesby MD  2022 [facility] [address]    RE: Jory Ambrose       Dear Colleague,     I had the pleasure of seeing Jory Ambrose in the [facility].      Cardiology Progress Note     Assessment:  Coronary artery disease status post CABG(to LAD and vein grafts to distal RCA circumflex and diagonal branch) in October 2024, no angina  Mitral regurgitation status post mitral valve replacement with mechanical prosthesis(29 mm [employer] Brian epic), normal auscultation/function by echo  Postoperative atrial flutter/fib status post PVI and CTI ablation on 3/6/2025, status post surgical exclusion of left atrial appendage with good results, no recurrence of arrhythmia since ablation, long first-degree AV block after ablation  ATN post open heart surgery, resolved  Chronic kidney disease  Postoperative severe fluid overload, resolved  Postop pleural effusion, essentially resolved, no need for thoracentesis  Postoperative anemia, on iron replacement  Hypercholesterolemia, on statin  History of Hodgkin lymphoma status post chemo chest radiation and bone marrow transplant, in remission  Cerebrovascular disease mild to moderate, asymptomatic      Plan:  Increase metoprolol extended release to 25 mg a day    CBC and iron studies  Basic metabolic panel    Will adjust medications according to those test  The longitudinal plan of care for the diagnosis(es)/condition(s) as documented were addressed during this visit. Due to the added complexity in care, I will continue to support Jory in the subsequent management and with ongoing continuity of care.   Subjective:   This is 49 year old female who comes in today follow-up visit.  She continues to feel fatigued and short of breath but may be better than she did last time I saw her.  She denies significant weight gain, PND or orthopnea.  She has not had chest pains.  I attempted to discontinue metoprolol out of concerns  "for long first-degree AV block.  She became tachycardic and she restarted low-dose of metoprolol.  Her heart rate remains elevated about 100 bpm at baseline.  She resumed cardiac rehab program.  She has been taking iron pills.  She became constipated and she has to use stool softeners.      Review of Systems:   Negative other than history of present illness    Objective:   /73 (BP Location: Left arm, Patient Position: Sitting, Cuff Size: Adult Large)   Pulse 116   Resp 16   Ht 1.626 m (5' 4\")   Wt 98.5 kg (217 lb 1.6 oz)   LMP  (LMP Unknown)   SpO2 95%   BMI 37.27 kg/m    Physical Exam:  GENERAL: no distress  NECK: No JVD  LUNGS: Clear to auscultation.  CARDIAC: regular rhythm, S1  normal.  This clicks of artificial  mitral valve, no heaves, thrills, gallops or murmurs.  ABDOMEN: flat, negative hepatosplenomegaly, soft and non-tender.  EXTREMITIES: No evidence of cyanosis, clubbing or edema.    Current Outpatient Medications   Medication Sig Dispense Refill     acetaminophen (TYLENOL) 500 MG tablet Take 2 tablets (1,000 mg) by mouth every 6 hours as needed for mild pain.       albuterol (PROAIR HFA) 108 (90 Base) MCG/ACT inhaler Inhale 2 puffs into the lungs every 6 hours as needed. 18 g 0     atorvastatin (LIPITOR) 80 MG tablet Take 1 tablet (80 mg) by mouth daily. 90 tablet 3     budesonide-formoterol (SYMBICORT/BREYNA) 80-4.5 MCG/ACT Inhaler Inhale 2 puffs into the lungs every 12 hours.       bumetanide (BUMEX) 1 MG tablet Take 1 tablet by mouth daily at 2 pm.       cetirizine (ZYRTEC) 10 MG tablet Take 1 tablet (10 mg) by mouth daily. 30 tablet 11     cholecalciferol (VITAMIN D3) 125 mcg (5000 units) capsule Take 1 capsule (125 mcg) by mouth daily. 30 capsule 0     ferrous sulfate (FEROSUL) 325 (65 Fe) MG tablet Take 1 tablet (325 mg) by mouth daily (with breakfast).       levothyroxine (SYNTHROID/LEVOTHROID) 125 MCG tablet Take 1 tablet (125 mcg) by mouth daily. 30 tablet 0     LORazepam (ATIVAN) " 0.5 MG tablet Take 0.5 mg by mouth nightly as needed for anxiety.       magnesium oxide 400 MG CAPS Take 400 mg by mouth 2 times daily.       metoprolol succinate ER (TOPROL XL) 25 MG 24 hr tablet Take 0.5 tablets (12.5 mg) by mouth daily. 45 tablet 1     multivitamin w/minerals (MULTI-VITAMIN) tablet Take 1 tablet by mouth daily.       nitroGLYcerin (NITROSTAT) 0.4 MG sublingual tablet Place 0.4 mg under the tongue every 5 minutes as needed for chest pain.       omeprazole (PRILOSEC) 20 MG DR capsule Increase Omeprazole to 40mg once daily starting on 3/3 and continue for 45 days, then return to previous dose of 20mg daily 90 capsule 12     simethicone (MYLICON) 125 MG chewable tablet Take 1 tablet (125 mg) by mouth 4 times daily as needed for intestinal gas 40 tablet 0     sodium chloride (OCEAN) 0.65 % nasal spray Spray 1 spray into both nostrils every hour as needed for congestion or other (dry nose). 15 mL 0     traZODone (DESYREL) 100 MG tablet Take 1 tablet (100 mg) by mouth at bedtime. 30 tablet 0     warfarin ANTICOAGULANT (COUMADIN) 1 MG tablet Take 2 - 3 mg daily. OR as directed by INR clinic. 120 tablet 3     Continuous Glucose Sensor (FREESTYLE JACINTO 3 SENSOR) MISC 1 Application every 14 days. (Patient not taking: Reported on 2025)         Cardiographics:    EC2025 ectopic atrial rhythm with nonspecific ST-T abnormalities l unchanged from March of this year     2025 probably sinus rhythm with first-degree AV block no ischemic changes     Echocardiogram: 2024 preop     1.Left ventricular size, wall motion and function are normal. The ejection  fraction is 55-60%.  2.Normal right ventricle size and systolic function.  3.There is moderate (2+) mitral regurgitation. PISA not measured or performed.  4.Poor images for analysis.  2024 Post op  Left ventricular function is normal.The ejection fraction is 60-65%.  Septal motion is consistent with post-operative state.  Normal right  ventricle size and systolic function.  There is a mechanical mitral valve.  Normal prosthetic mitral valve gradients.  Trivial pericardial effusion     CARMEN: November 2024  Left ventricular size, wall motion and function are normal. The ejection  fraction is 60-65%.  Normal right ventricle size and systolic function.  Left atrial appendage was surgically excised. There is no residual pouch  No left atrial mass or thrombus visualized.  The left atrium is mildly dilated.  There is a bi-leaflet ([employer] Brian) mechanical prosthesis.  Normal prosthetic mitral valve gradients.     May 2025  The left ventricle is normal in size.  The visual ejection fraction is 60-65%.  Septal motion is consistent with post-operative state.  There is a 29 mm [employer] Epic prosthesis in the mitral position with  a mean gradient of 3.9mmHg at HR 72, PHT 90 ms. Valve area (PHT) 2.5 cm^2,  TVI  ratio 1.8. Mild central regurgitation. Implant date 10/8/2024.  RVSP 41mmHg  Compared to the prior study dated 11/29/2024 no significant change      Coronary angio: September 2024  LM:mid-distal 60-70%   LAD:mid-vessel 85% narrowing at the take off of a diagonal branch  Lcx:distal 50- 60% narrowing  RCA:dominant, 100% occluded ostial, fills distally via L-R collaterals     LVEDP:10    Chest x-ray: May 2025  Poststernotomy changes with prosthetic mitral valve. Mildly elevated right hemidiaphragm with low lung volumes. Resolution of the trace left effusion noted before. Minimal blunting of the right costophrenic angle is unchanged. Sliver of fluid   or thickening along the right minor fissure. Lungs are otherwise clear. No signs of pneumonia or failure. Heart is of normal size.     Lab Results    Chemistry/lipid CBC Cardiac Enzymes/BNP/TSH/INR   Recent Labs   Lab Test 06/13/25  1155   CHOL 127   HDL 49*   LDL 66   TRIG 61     Recent Labs   Lab Test 06/13/25  1155   LDL 66     Recent Labs   Lab Test 05/21/25  1151      POTASSIUM 3.5   CHLORIDE 102  "  CO2 28   *   BUN 21.9*   CR 1.23*   GFRESTIMATED 54*   CINDY 9.3     Recent Labs   Lab Test 05/21/25  1151 03/06/25  0602 02/25/25  1032   CR 1.23* 1.21* 1.01*     Recent Labs   Lab Test 06/13/25  1155 10/07/24  0842   A1C 5.9* 5.7*          Recent Labs   Lab Test 05/21/25  1151   WBC 8.7   HGB 8.9*   HCT 29.4*   MCV 89        Recent Labs   Lab Test 05/21/25  1151 03/06/25  0602 02/25/25  1032   HGB 8.9* 9.3* 9.0*    No results for input(s): \"TROPONINI\" in the last 62996 hours.  Recent Labs   Lab Test 05/21/25  1151 06/06/24  1302   NTBNPI  --  101   NTBNP 852*  --      Recent Labs   Lab Test 11/20/24  1055   TSH 12.40*     Recent Labs   Lab Test 06/18/25  0947 06/13/25  1155 05/15/25  0907   INR 2.4* 1.8* 3.7*                       Thank you for allowing me to participate in the care of your patient.      Sincerely,     Jordon Rodriguez MD     [facility]  cc:   Miles Rodriguez MD  [address]  [address]  [place] [address]      "

## 2025-06-18 NOTE — PROGRESS NOTES
Cardiology Progress Note     Assessment:  Coronary artery disease status post CABG(to LAD and vein grafts to distal RCA circumflex and diagonal branch) in October 2024, no angina  Mitral regurgitation status post mitral valve replacement with mechanical prosthesis(29 mm [employer] Brian epic), normal auscultation/function by echo  Postoperative atrial flutter/fib status post PVI and CTI ablation on 3/6/2025, status post surgical exclusion of left atrial appendage with good results, no recurrence of arrhythmia since ablation, long first-degree AV block after ablation  ATN post open heart surgery, resolved  Chronic kidney disease  Postoperative severe fluid overload, resolved  Postop pleural effusion, essentially resolved, no need for thoracentesis  Postoperative anemia, on iron replacement  Hypercholesterolemia, on statin  History of Hodgkin lymphoma status post chemo chest radiation and bone marrow transplant, in remission  Cerebrovascular disease mild to moderate, asymptomatic      Plan:  Increase metoprolol extended release to 25 mg a day    CBC and iron studies  Basic metabolic panel    Will adjust medications according to those test  The longitudinal plan of care for the diagnosis(es)/condition(s) as documented were addressed during this visit. Due to the added complexity in care, I will continue to support Jory in the subsequent management and with ongoing continuity of care.   Subjective:   This is 49 year old female who comes in today follow-up visit.  She continues to feel fatigued and short of breath but may be better than she did last time I saw her.  She denies significant weight gain, PND or orthopnea.  She has not had chest pains.  I attempted to discontinue metoprolol out of concerns for long first-degree AV block.  She became tachycardic and she restarted low-dose of metoprolol.  Her heart rate remains elevated about 100 bpm at baseline.  She resumed cardiac rehab program.  She has been taking iron  "pills.  She became constipated and she has to use stool softeners.      Review of Systems:   Negative other than history of present illness    Objective:   /73 (BP Location: Left arm, Patient Position: Sitting, Cuff Size: Adult Large)   Pulse 116   Resp 16   Ht 1.626 m (5' 4\")   Wt 98.5 kg (217 lb 1.6 oz)   LMP  (LMP Unknown)   SpO2 95%   BMI 37.27 kg/m    Physical Exam:  GENERAL: no distress  NECK: No JVD  LUNGS: Clear to auscultation.  CARDIAC: regular rhythm, S1  normal.  This clicks of artificial  mitral valve, no heaves, thrills, gallops or murmurs.  ABDOMEN: flat, negative hepatosplenomegaly, soft and non-tender.  EXTREMITIES: No evidence of cyanosis, clubbing or edema.    Current Outpatient Medications   Medication Sig Dispense Refill    acetaminophen (TYLENOL) 500 MG tablet Take 2 tablets (1,000 mg) by mouth every 6 hours as needed for mild pain.      albuterol (PROAIR HFA) 108 (90 Base) MCG/ACT inhaler Inhale 2 puffs into the lungs every 6 hours as needed. 18 g 0    atorvastatin (LIPITOR) 80 MG tablet Take 1 tablet (80 mg) by mouth daily. 90 tablet 3    budesonide-formoterol (SYMBICORT/BREYNA) 80-4.5 MCG/ACT Inhaler Inhale 2 puffs into the lungs every 12 hours.      bumetanide (BUMEX) 1 MG tablet Take 1 tablet by mouth daily at 2 pm.      cetirizine (ZYRTEC) 10 MG tablet Take 1 tablet (10 mg) by mouth daily. 30 tablet 11    cholecalciferol (VITAMIN D3) 125 mcg (5000 units) capsule Take 1 capsule (125 mcg) by mouth daily. 30 capsule 0    ferrous sulfate (FEROSUL) 325 (65 Fe) MG tablet Take 1 tablet (325 mg) by mouth daily (with breakfast).      levothyroxine (SYNTHROID/LEVOTHROID) 125 MCG tablet Take 1 tablet (125 mcg) by mouth daily. 30 tablet 0    LORazepam (ATIVAN) 0.5 MG tablet Take 0.5 mg by mouth nightly as needed for anxiety.      magnesium oxide 400 MG CAPS Take 400 mg by mouth 2 times daily.      metoprolol succinate ER (TOPROL XL) 25 MG 24 hr tablet Take 0.5 tablets (12.5 mg) by mouth " daily. 45 tablet 1    multivitamin w/minerals (MULTI-VITAMIN) tablet Take 1 tablet by mouth daily.      nitroGLYcerin (NITROSTAT) 0.4 MG sublingual tablet Place 0.4 mg under the tongue every 5 minutes as needed for chest pain.      omeprazole (PRILOSEC) 20 MG DR capsule Increase Omeprazole to 40mg once daily starting on 3/3 and continue for 45 days, then return to previous dose of 20mg daily 90 capsule 12    simethicone (MYLICON) 125 MG chewable tablet Take 1 tablet (125 mg) by mouth 4 times daily as needed for intestinal gas 40 tablet 0    sodium chloride (OCEAN) 0.65 % nasal spray Spray 1 spray into both nostrils every hour as needed for congestion or other (dry nose). 15 mL 0    traZODone (DESYREL) 100 MG tablet Take 1 tablet (100 mg) by mouth at bedtime. 30 tablet 0    warfarin ANTICOAGULANT (COUMADIN) 1 MG tablet Take 2 - 3 mg daily. OR as directed by INR clinic. 120 tablet 3    Continuous Glucose Sensor (FREESTYLE JACINTO 3 SENSOR) MISC 1 Application every 14 days. (Patient not taking: Reported on 2025)         Cardiographics:    EC2025 ectopic atrial rhythm with nonspecific ST-T abnormalities l unchanged from March of this year     2025 probably sinus rhythm with first-degree AV block no ischemic changes     Echocardiogram: 2024 preop     1.Left ventricular size, wall motion and function are normal. The ejection  fraction is 55-60%.  2.Normal right ventricle size and systolic function.  3.There is moderate (2+) mitral regurgitation. PISA not measured or performed.  4.Poor images for analysis.  2024 Post op  Left ventricular function is normal.The ejection fraction is 60-65%.  Septal motion is consistent with post-operative state.  Normal right ventricle size and systolic function.  There is a mechanical mitral valve.  Normal prosthetic mitral valve gradients.  Trivial pericardial effusion     CARMEN: 2024  Left ventricular size, wall motion and function are normal. The  ejection  fraction is 60-65%.  Normal right ventricle size and systolic function.  Left atrial appendage was surgically excised. There is no residual pouch  No left atrial mass or thrombus visualized.  The left atrium is mildly dilated.  There is a bi-leaflet ([employer] Brian) mechanical prosthesis.  Normal prosthetic mitral valve gradients.     May 2025  The left ventricle is normal in size.  The visual ejection fraction is 60-65%.  Septal motion is consistent with post-operative state.  There is a 29 mm [employer] Medical Epic prosthesis in the mitral position with  a mean gradient of 3.9mmHg at HR 72, PHT 90 ms. Valve area (PHT) 2.5 cm^2,  TVI  ratio 1.8. Mild central regurgitation. Implant date 10/8/2024.  RVSP 41mmHg  Compared to the prior study dated 11/29/2024 no significant change      Coronary angio: September 2024  LM:mid-distal 60-70%   LAD:mid-vessel 85% narrowing at the take off of a diagonal branch  Lcx:distal 50- 60% narrowing  RCA:dominant, 100% occluded ostial, fills distally via L-R collaterals     LVEDP:10    Chest x-ray: May 2025  Poststernotomy changes with prosthetic mitral valve. Mildly elevated right hemidiaphragm with low lung volumes. Resolution of the trace left effusion noted before. Minimal blunting of the right costophrenic angle is unchanged. Sliver of fluid   or thickening along the right minor fissure. Lungs are otherwise clear. No signs of pneumonia or failure. Heart is of normal size.     Lab Results    Chemistry/lipid CBC Cardiac Enzymes/BNP/TSH/INR   Recent Labs   Lab Test 06/13/25  1155   CHOL 127   HDL 49*   LDL 66   TRIG 61     Recent Labs   Lab Test 06/13/25  1155   LDL 66     Recent Labs   Lab Test 05/21/25  1151      POTASSIUM 3.5   CHLORIDE 102   CO2 28   *   BUN 21.9*   CR 1.23*   GFRESTIMATED 54*   CINDY 9.3     Recent Labs   Lab Test 05/21/25  1151 03/06/25  0602 02/25/25  1032   CR 1.23* 1.21* 1.01*     Recent Labs   Lab Test 06/13/25  1155 10/07/24  0842   A1C 5.9*  "5.7*          Recent Labs   Lab Test 05/21/25  1151   WBC 8.7   HGB 8.9*   HCT 29.4*   MCV 89        Recent Labs   Lab Test 05/21/25  1151 03/06/25  0602 02/25/25  1032   HGB 8.9* 9.3* 9.0*    No results for input(s): \"TROPONINI\" in the last 20566 hours.  Recent Labs   Lab Test 05/21/25  1151 06/06/24  1302   NTBNPI  --  101   NTBNP 852*  --      Recent Labs   Lab Test 11/20/24  1055   TSH 12.40*     Recent Labs   Lab Test 06/18/25  0947 06/13/25  1155 05/15/25  0907   INR 2.4* 1.8* 3.7*                     "

## 2025-06-18 NOTE — PROGRESS NOTES
ANTICOAGULATION MANAGEMENT     Jory Grant Halie 49 year old female is on warfarin with subtherapeutic INR result. (Goal INR 2.5-3.5)    Recent labs: (last 7 days)     06/18/25  0947   INR 2.4*       ASSESSMENT     Source(s): Chart Review and Patient/Caregiver Call     Warfarin doses taken: Warfarin taken as instructed  Diet: No new diet changes identified  Medication/supplement changes: None noted  New illness, injury, or hospitalization: No  Signs or symptoms of bleeding or clotting: No  Previous result: Subtherapeutic  Additional findings: None       PLAN     Recommended plan for ongoing change(s) affecting INR     Dosing Instructions: booster dose then Increase your warfarin dose (10.5% change) with next INR in 10 days       Summary  As of 6/18/2025      Full warfarin instructions:  6/18: 4 mg; Otherwise 3 mg every day   Next INR check:  6/30/2025               Telephone call with Jory who verbalizes understanding and agrees to plan    Lab visit scheduled    Education provided: Please call back if any changes to your diet, medications or how you've been taking warfarin    Plan made per [facility] anticoagulation protocol    Cirilo Jones RN  6/18/2025  Anticoagulation Clinic  [employer] for routing messages: p [facility] patient phone line: [phone]        _______________________________________________________________________     Anticoagulation Episode Summary       Current INR goal:  2.5-3.5   TTR:  53.7% (7.2 mo)   Target end date:  Indefinite   Send INR reminders to:  [facility]    Indications    H/O mitral valve replacement with mechanical valve [Z95.2]             Comments:  --             Anticoagulation Care Providers       Provider Role Specialty Phone number    Catherine Galvez PA-C Referring Radiation Oncology [phone]

## 2025-06-18 NOTE — PATIENT INSTRUCTIONS
Jory Ambrose,    It was a pleasure to see you today at [facility].     My recommendations after this visit include:    Increase metoprolol to 25 mg a day  Blood work    RICK Rodriguez MD, FACC, [facility]

## 2025-06-23 ENCOUNTER — TELEPHONE (OUTPATIENT)
Dept: CARDIOLOGY | Facility: CLINIC | Age: 50
End: 2025-06-23
Payer: COMMERCIAL

## 2025-06-23 DIAGNOSIS — R06.09 DYSPNEA ON EXERTION: ICD-10-CM

## 2025-06-23 DIAGNOSIS — R00.0 TACHYCARDIA: ICD-10-CM

## 2025-06-23 DIAGNOSIS — I48.0 PAROXYSMAL ATRIAL FIBRILLATION (H): Primary | ICD-10-CM

## 2025-06-23 DIAGNOSIS — Z95.2 H/O MITRAL VALVE REPLACEMENT WITH MECHANICAL VALVE: ICD-10-CM

## 2025-06-23 DIAGNOSIS — I48.0 PAROXYSMAL ATRIAL FIBRILLATION (H): ICD-10-CM

## 2025-06-23 DIAGNOSIS — I48.19 PERSISTENT ATRIAL FIBRILLATION (H): ICD-10-CM

## 2025-06-23 DIAGNOSIS — R06.09 DYSPNEA ON EXERTION: Primary | ICD-10-CM

## 2025-06-23 DIAGNOSIS — I10 PRIMARY HYPERTENSION: ICD-10-CM

## 2025-06-23 NOTE — TELEPHONE ENCOUNTER
Noted.  Phone call to patient, she states that her heart rates at rest are  at rest, with exertion she is noting rates in the range of 115 -128.   She states she has been tired and short of breath with activity, she notes that she has trouble tolerating exercise, she has noted an increase in her symptoms since she became tachycardic.  She continues metoprolol succinate 25 mg daily, other cardiac medications include bumex and warfarin.     She is agreeable to 14 day Zio, will place order and send in the mail.  Will forward to Dr. Balbuena for any additional recommendations.

## 2025-06-23 NOTE — TELEPHONE ENCOUNTER
----- Message from Arlette Balbuena sent at 6/20/2025  7:31 PM CDT -----  Hello,    I received a message from Dr. Rodriguez re: Mrs. Rudy Ambrose - ongoing symptoms, degree of tachycardia, unclear mechanism (sinus tachycardia vs atrial tachycardia vs other).  Can we please check in with her and offer Melissad Eve, mail out.    Thank you,  Radha

## 2025-06-24 ENCOUNTER — TELEPHONE (OUTPATIENT)
Dept: CARDIOLOGY | Facility: CLINIC | Age: 50
End: 2025-06-24
Payer: COMMERCIAL

## 2025-06-24 DIAGNOSIS — I48.0 PAROXYSMAL ATRIAL FIBRILLATION (H): ICD-10-CM

## 2025-06-24 DIAGNOSIS — Z95.2 H/O MITRAL VALVE REPLACEMENT WITH MECHANICAL VALVE: ICD-10-CM

## 2025-06-24 RX ORDER — WARFARIN SODIUM 1 MG/1
TABLET ORAL
Qty: 120 TABLET | Refills: 3 | OUTPATIENT
Start: 2025-06-24

## 2025-06-24 RX ORDER — WARFARIN SODIUM 1 MG/1
TABLET ORAL
Qty: 120 TABLET | Refills: 5 | Status: SHIPPED | OUTPATIENT
Start: 2025-06-24

## 2025-06-24 NOTE — TELEPHONE ENCOUNTER
"[facility] Call Center    Phone Message    May a detailed message be left on voicemail: yes     Reason for Call: Other: Jory called stating she has been working with nurse Esther to replace some medication that she misplaced. Jory states she found the medication and her team can \"call off the carnes\". Jory states she found an entirely new bottle of her medication and no further action is needed. Thank you!     Action Taken: Other: Cardiology    Travel Screening: Not Applicable    Thank you!  Specialty Access Center         Date of Service:                                                                      "

## 2025-06-24 NOTE — TELEPHONE ENCOUNTER
[facility] Call Center    Phone Message    May a detailed message be left on voicemail: yes     Reason for Call: Medication Refill Request    Has the patient contacted the pharmacy for the refill? Yes   Name of medication being requested: warfarin ANTICOAGULANT (COUMADIN) 1 MG tablet   Provider who prescribed the medication: Sree Lund  Pharmacy:      [facility] [id number] [place] [address] [facility]      Date medication is needed: 06/24/25       Action Taken: Other: cardiology    Travel Screening: Not Applicable  Thank you!  Specialty Access Center       Date of Service:

## 2025-06-24 NOTE — TELEPHONE ENCOUNTER
"Newton Quinteros  AM    6/24/25  3:58 PM  Note  [facility] Call Center     Phone Message     May a detailed message be left on voicemail: yes      Reason for Call: Other: Jory called stating she has been working with nurse Esther to replace some medication that she misplaced. Jory states she found the medication and her team can \"call off the carnes\". Jory states she found an entirely new bottle of her medication and no further action is needed. Thank you!      Action Taken: Other: Cardiology     Travel Screening: Not Applicable     Thank you!  Specialty Access Center        "

## 2025-06-24 NOTE — TELEPHONE ENCOUNTER
Noted.  Phone call to patient, reviewed recommendations from Dr. Balbuena, she states understanding.  Requested she call our team with worsening symptoms or concerns while wearing or waiting for results.

## 2025-06-24 NOTE — TELEPHONE ENCOUNTER
Spoke with patient who states that she is unable to  her prescription for warfarin because it is too soon, she has no tablets left.      Phone call to pharmacy who explains she picked up a 40 day supply on 6-4-25 and should not need a refill.      Phone call to patient who states that she will check around her house as she does not recall having another bottle filled, but sometimes her  does  her prescriptions for her.

## 2025-06-24 NOTE — TELEPHONE ENCOUNTER
Arlette Balbuena MD to Me (Selected Message)  AW      6/24/25 11:59 AM  Thanks Esther - let's await Zio results.     Radha Raymond to Arlette Balbuena MD        6/23/25  8:43 AM  Dr. Balbuena,   Pt is struggling with rates and activity.  Would you recommend any further medication changes or would you like to wait for Zio results?  Thank you!  Esther

## 2025-06-25 ENCOUNTER — HOSPITAL ENCOUNTER (OUTPATIENT)
Dept: CARDIAC REHAB | Facility: CLINIC | Age: 50
Discharge: HOME | End: 2025-06-25
Attending: INTERNAL MEDICINE
Payer: COMMERCIAL

## 2025-06-25 PROCEDURE — 93798 PHYS/QHP OP CAR RHAB W/ECG: CPT

## 2025-06-26 RX ORDER — BUMETANIDE 1 MG/1
1 TABLET ORAL DAILY
Qty: 90 TABLET | Refills: 2 | Status: SHIPPED | OUTPATIENT
Start: 2025-06-26

## 2025-06-30 ENCOUNTER — LAB (OUTPATIENT)
Dept: CARDIOLOGY | Facility: CLINIC | Age: 50
End: 2025-06-30
Payer: COMMERCIAL

## 2025-06-30 ENCOUNTER — TELEPHONE (OUTPATIENT)
Dept: CARDIOLOGY | Facility: CLINIC | Age: 50
End: 2025-06-30

## 2025-06-30 ENCOUNTER — ANTICOAGULATION THERAPY VISIT (OUTPATIENT)
Dept: ANTICOAGULATION | Facility: CLINIC | Age: 50
End: 2025-06-30

## 2025-06-30 DIAGNOSIS — Z95.2 H/O MITRAL VALVE REPLACEMENT WITH MECHANICAL VALVE: Primary | ICD-10-CM

## 2025-06-30 DIAGNOSIS — Z95.2 H/O MITRAL VALVE REPLACEMENT WITH MECHANICAL VALVE: ICD-10-CM

## 2025-06-30 LAB — INR POINT OF CARE: 4.7 (ref 0.9–1.1)

## 2025-06-30 PROCEDURE — 36416 COLLJ CAPILLARY BLOOD SPEC: CPT

## 2025-06-30 PROCEDURE — 85610 PROTHROMBIN TIME: CPT

## 2025-06-30 NOTE — TELEPHONE ENCOUNTER
----- Message from Leslie Sigala sent at 6/30/2025 11:35 AM CDT -----  General phone call:    Caller: PATIENT  Primary cardiologist: DR NGUYỄN  Detailed reason for call: PATIENT WANTS TO KNOW IF SHE CAN GET THE IRON INJECTIONS INSTEAD OF THE PILLS? PLEASE CALL  New or active symptoms? NO3  Best phone number:  [phone]  Best time to contact: ANY TIME  Ok to leave a detailedmessage? YES  Device? NO    Additional Info:

## 2025-06-30 NOTE — TELEPHONE ENCOUNTER
"Noted per Dr. Mane's 6-18-25 visit note \"She has been taking iron pills. She became constipated and she has to use stool softeners.\" - confirmed Dr. Rodriguez prescribed Fe supp. mg    Left detailed msg for patient instructing her to contact PCP if she wants to transition to Fe infusions since Dr. Rodriguez would not manage that form of tx - requested call back with any further questions.  mg  "

## 2025-06-30 NOTE — PROGRESS NOTES
ANTICOAGULATION MANAGEMENT     Jory R Grant Halie 49 year old female is on warfarin with supratherapeutic INR result. (Goal INR 2.5-3.5)    Recent labs: (last 7 days)     06/30/25  1128   INR 4.7*       ASSESSMENT     Source(s): Chart Review and Patient/Caregiver Call     Warfarin doses taken: Warfarin taken as instructed  Diet: No new diet changes identified  Medication/supplement changes: None noted  New illness, injury, or hospitalization: No  Signs or symptoms of bleeding or clotting: No  Previous result: Subtherapeutic  Additional findings: None       PLAN     Recommended plan for no diet, medication or health factor changes affecting INR     Dosing Instructions: partial hold then decrease your warfarin dose (9.5% change) with next INR in 1 week       Summary  As of 6/30/2025      Full warfarin instructions:  6/30: 1 mg; Otherwise 2 mg every Mon, Fri; 3 mg all other days   Next INR check:  7/9/2025               Telephone call with Jory who verbalizes understanding and agrees to plan    Lab visit scheduled    Education provided: Please call back if any changes to your diet, medications or how you've been taking warfarin    Plan made with [facility] Pharmacist Maine Barroso, AMARILYS  6/30/2025  Anticoagulation Clinic  [place] for routing messages: p [facility] HEART [facility] patient phone line: [phone]        _______________________________________________________________________     Anticoagulation Episode Summary       Current INR goal:  2.5-3.5   TTR:  53.1% (7.6 mo)   Target end date:  Indefinite   Send INR reminders to:  [facility] HEART [facility]    Indications    H/O mitral valve replacement with mechanical valve [Z95.2]             Comments:  --             Anticoagulation Care Providers       Provider Role Specialty Phone number    Catherine Galvez PA-C Referring Radiation Oncology [phone]

## 2025-07-02 ENCOUNTER — HOSPITAL ENCOUNTER (OUTPATIENT)
Dept: CARDIAC REHAB | Facility: CLINIC | Age: 50
Discharge: HOME | End: 2025-07-02
Attending: INTERNAL MEDICINE
Payer: COMMERCIAL

## 2025-07-02 PROCEDURE — 93798 PHYS/QHP OP CAR RHAB W/ECG: CPT

## 2025-07-09 ENCOUNTER — HOSPITAL ENCOUNTER (OUTPATIENT)
Dept: CARDIAC REHAB | Facility: CLINIC | Age: 50
Discharge: HOME | End: 2025-07-09
Attending: INTERNAL MEDICINE
Payer: COMMERCIAL

## 2025-07-09 ENCOUNTER — TELEPHONE (OUTPATIENT)
Dept: CARDIOLOGY | Facility: CLINIC | Age: 50
End: 2025-07-09
Payer: COMMERCIAL

## 2025-07-09 PROCEDURE — 93798 PHYS/QHP OP CAR RHAB W/ECG: CPT

## 2025-07-09 NOTE — TELEPHONE ENCOUNTER
Spoke with patient in clinic today- she presented due to cardiac rehab. She is working with her family medicine team/ psychiatrist and inquires if there are any cardiac contraindications to her going back on Wellbutrin. Will route. -[facility] [place]        Dr. Rodriguez,  See above- any issues with her going back on Wellbutrin as managed by mental health provider?  Thanks,  Mal

## 2025-07-09 NOTE — TELEPHONE ENCOUNTER
View All Conversations on this Encounter  Miles Rodriguez MD to Me (Selected Message)        7/9/25  9:48 AM  No          Called patient and updated on above. No cardiac concerns with taking wellbutrin- pt verbalized understanding. -[facility] [place]

## 2025-07-16 ENCOUNTER — MYC MEDICAL ADVICE (OUTPATIENT)
Dept: ANTICOAGULATION | Facility: CLINIC | Age: 50
End: 2025-07-16
Payer: COMMERCIAL

## 2025-07-17 ENCOUNTER — LAB (OUTPATIENT)
Dept: LAB | Facility: CLINIC | Age: 50
End: 2025-07-17
Payer: COMMERCIAL

## 2025-07-17 ENCOUNTER — ANTICOAGULATION THERAPY VISIT (OUTPATIENT)
Dept: ANTICOAGULATION | Facility: CLINIC | Age: 50
End: 2025-07-17

## 2025-07-17 DIAGNOSIS — Z95.2 H/O MITRAL VALVE REPLACEMENT WITH MECHANICAL VALVE: ICD-10-CM

## 2025-07-17 DIAGNOSIS — Z95.2 H/O MITRAL VALVE REPLACEMENT WITH MECHANICAL VALVE: Primary | ICD-10-CM

## 2025-07-17 LAB — INR BLD: 2.7 (ref 0.9–1.1)

## 2025-07-17 NOTE — PROGRESS NOTES
ANTICOAGULATION MANAGEMENT     Jory Grant Halie 49 year old female is on warfarin with therapeutic INR result. (Goal INR 2.5-3.5)    Recent labs: (last 7 days)     07/17/25  1325   INR 2.7*       ASSESSMENT     Warfarin Lab Questionnaire    Warfarin Doses Last 7 Days      7/17/2025     9:01 AM   Dose in Tablet or Mg   TAB or MG? tablet (tab)     Pt Rptd Dose SUNDAY MONDAY TUESDAY WED THURS FRIDAY SATURDAY 7/17/2025   9:01 AM 3 2 3 3 3 2 0         7/17/2025   Warfarin Lab Questionnaire   Missed doses within past 14 days? Yes   If yes; please list when: Saturday 12 July   Changes in diet or alcohol within past 14 days? No   Medication changes since last result? Yes   Please list: Bupropion   Injuries or illness since last result? Yes   If yes, please explain: Cold 10 July to 17 July   New shortness of breath, severe headaches or sudden changes in vision since last result? Yes   If yes, please explain:  Friday 11 July to Monday 14 July due to cold   Abnormal bleeding since last result? No   Upcoming surgery, procedure? No   Best number to call with results? [phone]     Previous result: Supratherapeutic  Additional findings: None       PLAN     Recommended plan for temporary change(s) affecting INR     Dosing Instructions: Continue your current warfarin dose with next INR in 2 weeks       Summary  As of 7/17/2025      Full warfarin instructions:  2 mg every Mon, Fri; 3 mg all other days   Next INR check:  7/30/2025               Telephone call with Jory who verbalizes understanding and agrees to plan    Lab visit scheduled    Education provided: Goal range and lab monitoring: goal range and significance of current result    Plan made per [facility] anticoagulation protocol    Paz Mendoza, RN  7/17/2025  Anticoagulation Clinic  [facility] for routing messages: [facility] HEART [facility] patient phone line:  [phone]        _______________________________________________________________________     Anticoagulation Episode Summary       Current INR goal:  2.5-3.5   TTR:  52.2% (8.2 mo)   Target end date:  Indefinite   Send INR reminders to:  [facility] HEART [facility]    Indications    H/O mitral valve replacement with mechanical valve [Z95.2]             Comments:  --             Anticoagulation Care Providers       Provider Role Specialty Phone number    Catherine Galvez PA-C Referring Radiation Oncology [phone]

## 2025-07-21 ENCOUNTER — MEDICAL CORRESPONDENCE (OUTPATIENT)
Dept: HEALTH INFORMATION MANAGEMENT | Facility: CLINIC | Age: 50
End: 2025-07-21
Payer: COMMERCIAL

## 2025-07-23 ENCOUNTER — HOSPITAL ENCOUNTER (OUTPATIENT)
Dept: CARDIAC REHAB | Facility: CLINIC | Age: 50
Discharge: HOME | End: 2025-07-23
Attending: INTERNAL MEDICINE
Payer: COMMERCIAL

## 2025-07-23 LAB — GLUCOSE BLDC GLUCOMTR-MCNC: 119 MG/DL (ref 70–99)

## 2025-07-23 PROCEDURE — 93798 PHYS/QHP OP CAR RHAB W/ECG: CPT

## 2025-07-28 ENCOUNTER — HOSPITAL ENCOUNTER (OUTPATIENT)
Dept: CARDIAC REHAB | Facility: CLINIC | Age: 50
Discharge: HOME | End: 2025-07-28
Attending: INTERNAL MEDICINE
Payer: COMMERCIAL

## 2025-07-28 PROCEDURE — 93798 PHYS/QHP OP CAR RHAB W/ECG: CPT

## 2025-07-30 ENCOUNTER — LAB REQUISITION (OUTPATIENT)
Dept: LAB | Facility: CLINIC | Age: 50
End: 2025-07-30

## 2025-07-30 ENCOUNTER — LAB (OUTPATIENT)
Dept: CARDIOLOGY | Facility: CLINIC | Age: 50
End: 2025-07-30
Payer: COMMERCIAL

## 2025-07-30 ENCOUNTER — HOSPITAL ENCOUNTER (OUTPATIENT)
Dept: CARDIAC REHAB | Facility: CLINIC | Age: 50
Discharge: HOME | End: 2025-07-30
Attending: INTERNAL MEDICINE
Payer: COMMERCIAL

## 2025-07-30 ENCOUNTER — TELEPHONE (OUTPATIENT)
Dept: FAMILY MEDICINE | Facility: CLINIC | Age: 50
End: 2025-07-30

## 2025-07-30 ENCOUNTER — ANTICOAGULATION THERAPY VISIT (OUTPATIENT)
Dept: ANTICOAGULATION | Facility: CLINIC | Age: 50
End: 2025-07-30

## 2025-07-30 DIAGNOSIS — Z95.2 H/O MITRAL VALVE REPLACEMENT WITH MECHANICAL VALVE: Primary | ICD-10-CM

## 2025-07-30 DIAGNOSIS — Z95.2 H/O MITRAL VALVE REPLACEMENT WITH MECHANICAL VALVE: ICD-10-CM

## 2025-07-30 DIAGNOSIS — E03.9 HYPOTHYROIDISM, UNSPECIFIED: ICD-10-CM

## 2025-07-30 DIAGNOSIS — E55.9 VITAMIN D DEFICIENCY, UNSPECIFIED: ICD-10-CM

## 2025-07-30 LAB
GLUCOSE BLDC GLUCOMTR-MCNC: 123 MG/DL (ref 70–99)
GLUCOSE BLDC GLUCOMTR-MCNC: 96 MG/DL (ref 70–99)
INR POINT OF CARE: 1.7 (ref 0.9–1.1)

## 2025-07-30 PROCEDURE — 82306 VITAMIN D 25 HYDROXY: CPT | Performed by: NURSE PRACTITIONER

## 2025-07-30 PROCEDURE — 84443 ASSAY THYROID STIM HORMONE: CPT | Performed by: NURSE PRACTITIONER

## 2025-07-30 PROCEDURE — 93798 PHYS/QHP OP CAR RHAB W/ECG: CPT

## 2025-07-30 NOTE — TELEPHONE ENCOUNTER
Patient Returning Call    Reason for call:  Returning call. Please reach out at your earliest convenience.      Could we send this information to you in [facility] or would you prefer to receive a phone call?:   Patient would prefer a phone call   Okay to leave a detailed message?: Yes at Cell number on file:    Telephone Information:   Mobile [phone]

## 2025-07-30 NOTE — PROGRESS NOTES
ANTICOAGULATION MANAGEMENT     Jory Ambrose 49 year old female is on warfarin with subtherapeutic INR result. (Goal INR 2.5-3.5)    Recent labs: (last 7 days)     07/30/25  0852   INR 1.7*       ASSESSMENT     Source(s): Chart Review  Previous INR was Therapeutic last 2(+) visits  Medication, diet, health changes since last INR: chart reviewed; none identified         PLAN     Unable to reach Jory today.    Left message to take a booster dose of warfarin,  5 mg tonight. Request call back for assessment.    Follow up required to confirm warfarin dose taken and assess for changes    Paz Mendoza RN  7/30/2025  Anticoagulation Clinic  [facility] for routing messages: [facility] HEART [facility]  [facility] patient phone line: [phone]

## 2025-07-31 LAB
TSH SERPL DL<=0.005 MIU/L-ACNC: 1.89 UIU/ML (ref 0.3–4.2)
VIT D+METAB SERPL-MCNC: 95 NG/ML (ref 20–50)

## 2025-07-31 NOTE — PROGRESS NOTES
ANTICOAGULATION MANAGEMENT     Jory Ambrose 49 year old female is on warfarin with subtherapeutic INR result. (Goal INR 2.5-3.5)    Recent labs: (last 7 days)     07/30/25  0852   INR 1.7*       ASSESSMENT     Source(s): Chart Review and Patient/Caregiver Call     Warfarin doses taken: Missed dose(s) may be affecting INR - Jory isn't certain, but thinks she likely missed a dose as she was sick with a cold for a couple weeks and possible that she forgot one day.   Diet: No new diet changes identified  Medication/supplement changes: None noted  New illness, injury, or hospitalization: Cold symptoms the last couple weeks. - now resolved.   Signs or symptoms of bleeding or clotting: No  Previous result: Therapeutic last visit; previously outside of goal range  Additional findings: Patient increased to 3 mg daily in June and INR jumped to 4.7. Will boost and continue for today with close INR follow up. If INR remains low on Monday, will plan to increase dose.        PLAN     Recommended plan for temporary change(s) affecting INR     Dosing Instructions: booster dose then continue your current warfarin dose with next INR in 5 days       Summary  As of 7/30/2025      Full warfarin instructions:  7/30: 5 mg; Otherwise 2 mg every Mon, Fri; 3 mg all other days   Next INR check:  8/4/2025               Telephone call with Jory who agrees to plan and repeated back plan correctly    Lab visit scheduled    Education provided: Goal range and lab monitoring: goal range and significance of current result, Importance of therapeutic range, and Importance of following up at instructed interval    Plan made per [facility] anticoagulation protocol    Paz Mendoza RN  7/31/2025  Anticoagulation Clinic  [place] for routing messages: [facility] HEART [facility] patient phone line: [phone]        _______________________________________________________________________     Anticoagulation Episode Summary        Current INR goal:  2.5-3.5   TTR:  50.6% (8.6 mo)   Target end date:  Indefinite   Send INR reminders to:  [facility] HEART [facility]    Indications    H/O mitral valve replacement with mechanical valve [Z95.2]             Comments:  --             Anticoagulation Care Providers       Provider Role Specialty Phone number    Catherine Galvez PA-C Referring Radiation Oncology [phone]

## 2025-08-04 ENCOUNTER — HOSPITAL ENCOUNTER (OUTPATIENT)
Dept: CARDIAC REHAB | Facility: CLINIC | Age: 50
Discharge: HOME | End: 2025-08-04
Attending: INTERNAL MEDICINE
Payer: COMMERCIAL

## 2025-08-04 ENCOUNTER — LAB (OUTPATIENT)
Dept: CARDIOLOGY | Facility: CLINIC | Age: 50
End: 2025-08-04
Payer: COMMERCIAL

## 2025-08-04 ENCOUNTER — ANTICOAGULATION THERAPY VISIT (OUTPATIENT)
Dept: ANTICOAGULATION | Facility: CLINIC | Age: 50
End: 2025-08-04

## 2025-08-04 DIAGNOSIS — Z79.01 LONG TERM (CURRENT) USE OF ANTICOAGULANTS: Primary | ICD-10-CM

## 2025-08-04 DIAGNOSIS — Z95.2 H/O MITRAL VALVE REPLACEMENT WITH MECHANICAL VALVE: Primary | ICD-10-CM

## 2025-08-04 LAB — INR POINT OF CARE: 2 (ref 0.9–1.1)

## 2025-08-04 PROCEDURE — 36416 COLLJ CAPILLARY BLOOD SPEC: CPT

## 2025-08-04 PROCEDURE — 93798 PHYS/QHP OP CAR RHAB W/ECG: CPT

## 2025-08-04 PROCEDURE — 85610 PROTHROMBIN TIME: CPT

## 2025-08-06 ENCOUNTER — HOSPITAL ENCOUNTER (OUTPATIENT)
Dept: CARDIAC REHAB | Facility: CLINIC | Age: 50
Discharge: HOME | End: 2025-08-06
Attending: INTERNAL MEDICINE
Payer: COMMERCIAL

## 2025-08-06 PROCEDURE — 93798 PHYS/QHP OP CAR RHAB W/ECG: CPT

## 2025-08-11 ENCOUNTER — ANTICOAGULATION THERAPY VISIT (OUTPATIENT)
Dept: ANTICOAGULATION | Facility: CLINIC | Age: 50
End: 2025-08-11

## 2025-08-11 ENCOUNTER — LAB (OUTPATIENT)
Dept: CARDIOLOGY | Facility: CLINIC | Age: 50
End: 2025-08-11
Payer: COMMERCIAL

## 2025-08-11 ENCOUNTER — HOSPITAL ENCOUNTER (OUTPATIENT)
Dept: CARDIAC REHAB | Facility: CLINIC | Age: 50
Discharge: HOME | End: 2025-08-11
Attending: INTERNAL MEDICINE
Payer: COMMERCIAL

## 2025-08-11 DIAGNOSIS — Z95.2 H/O MITRAL VALVE REPLACEMENT WITH MECHANICAL VALVE: Primary | ICD-10-CM

## 2025-08-11 DIAGNOSIS — Z95.2 H/O MITRAL VALVE REPLACEMENT WITH MECHANICAL VALVE: ICD-10-CM

## 2025-08-11 LAB — INR POINT OF CARE: 4.5 (ref 0.9–1.1)

## 2025-08-11 PROCEDURE — 85610 PROTHROMBIN TIME: CPT

## 2025-08-11 PROCEDURE — 36416 COLLJ CAPILLARY BLOOD SPEC: CPT

## 2025-08-11 PROCEDURE — 93798 PHYS/QHP OP CAR RHAB W/ECG: CPT

## 2025-08-13 ENCOUNTER — HOSPITAL ENCOUNTER (OUTPATIENT)
Dept: CARDIAC REHAB | Facility: CLINIC | Age: 50
Discharge: HOME | End: 2025-08-13
Attending: INTERNAL MEDICINE
Payer: COMMERCIAL

## 2025-08-13 PROCEDURE — 93798 PHYS/QHP OP CAR RHAB W/ECG: CPT

## 2025-08-19 ENCOUNTER — MYC MEDICAL ADVICE (OUTPATIENT)
Dept: ANTICOAGULATION | Facility: CLINIC | Age: 50
End: 2025-08-19
Payer: COMMERCIAL

## 2025-08-20 ENCOUNTER — ANTICOAGULATION THERAPY VISIT (OUTPATIENT)
Dept: ANTICOAGULATION | Facility: CLINIC | Age: 50
End: 2025-08-20

## 2025-08-20 ENCOUNTER — LAB (OUTPATIENT)
Dept: CARDIOLOGY | Facility: CLINIC | Age: 50
End: 2025-08-20
Payer: COMMERCIAL

## 2025-08-20 ENCOUNTER — HOSPITAL ENCOUNTER (OUTPATIENT)
Dept: CARDIAC REHAB | Facility: CLINIC | Age: 50
Discharge: HOME | End: 2025-08-20
Attending: INTERNAL MEDICINE
Payer: COMMERCIAL

## 2025-08-20 DIAGNOSIS — Z95.2 H/O MITRAL VALVE REPLACEMENT WITH MECHANICAL VALVE: ICD-10-CM

## 2025-08-20 DIAGNOSIS — Z95.2 H/O MITRAL VALVE REPLACEMENT WITH MECHANICAL VALVE: Primary | ICD-10-CM

## 2025-08-20 LAB — INR POINT OF CARE: 4.9 (ref 0.9–1.1)

## 2025-08-20 PROCEDURE — 93798 PHYS/QHP OP CAR RHAB W/ECG: CPT

## 2025-09-03 ENCOUNTER — ANTICOAGULATION THERAPY VISIT (OUTPATIENT)
Dept: ANTICOAGULATION | Facility: CLINIC | Age: 50
End: 2025-09-03

## 2025-09-03 ENCOUNTER — LAB (OUTPATIENT)
Dept: CARDIOLOGY | Facility: CLINIC | Age: 50
End: 2025-09-03
Payer: COMMERCIAL

## 2025-09-03 ENCOUNTER — HOSPITAL ENCOUNTER (OUTPATIENT)
Dept: CARDIAC REHAB | Facility: CLINIC | Age: 50
Discharge: HOME | End: 2025-09-03
Attending: INTERNAL MEDICINE
Payer: COMMERCIAL

## 2025-09-03 DIAGNOSIS — Z95.2 H/O MITRAL VALVE REPLACEMENT WITH MECHANICAL VALVE: Primary | ICD-10-CM

## 2025-09-03 LAB — INR POINT OF CARE: 2.5 (ref 0.9–1.1)

## 2025-09-03 PROCEDURE — 93798 PHYS/QHP OP CAR RHAB W/ECG: CPT

## (undated) DEVICE — SU PLEDGET SOFT TFE 3/8"X3/26"X1/16" PCP40

## (undated) DEVICE — GOWN IMPERVIOUS BREATHABLE SMART XLG 89045

## (undated) DEVICE — ESU ELEC BLADE 2.75" COATED/INSULATED E1455

## (undated) DEVICE — SU VICRYL+ 3-0 27IN SH UND VCP416H

## (undated) DEVICE — PATCH CARTO 3 EXTERNAL REFERENCE 3D MAPPING CREFP6

## (undated) DEVICE — INTRODUCER SHEATH 8.5FR VIZIGO BI-DIRECT LARGE CURVE D138503

## (undated) DEVICE — CATH ABLATION 8FR 115CM F-J CURVE BI-DIR QDOT MICRO D139504

## (undated) DEVICE — CATH ANGIO INFINITI JL3.5 4FRX100CM 538418

## (undated) DEVICE — SLEEVE TR BAND RADIAL COMPRESSION DEVICE 24CM TRB24-REG

## (undated) DEVICE — BLADE KNIFE SURG 10 371110

## (undated) DEVICE — TUBE SET SMARKABLATE IRRIGATION

## (undated) DEVICE — CATH TRANSSEPTAL VERSACROSS 45D 63X230CM VXSK0132

## (undated) DEVICE — SU ETHIBOND 2-0 SHDA 30" X563H

## (undated) DEVICE — GUIDEWIRE FORTE FLOPPY J TOP 34949-05J

## (undated) DEVICE — SUTURE PDS 0 27IN CT1 + VIOLET PDP340H

## (undated) DEVICE — DEVICE TISSUE STABILIZATION OCTOBASE 28707

## (undated) DEVICE — CABLE MYO/LEAD PACING BLUE DISP 019-535

## (undated) DEVICE — INTRO MICRO MINI STICK 4FR STIFF NITINOL 45-753

## (undated) DEVICE — Device

## (undated) DEVICE — HEMOSTASIS BONE OSTENE 2.5G SYNTHETIC 1503832

## (undated) DEVICE — CATH URETHRAL 18FRX16IN LATEX 0277718

## (undated) DEVICE — APPLICATORS COTTON TIP 6"X2 STERILE LF C15053-006

## (undated) DEVICE — DRSG KERLIX 4 1/2"X4YDS ROLL 6715

## (undated) DEVICE — RX SURGIFLO HEMOSTATIC MATRIX W/THROMBIN 8ML 2994

## (undated) DEVICE — INTRO SHEATH 9FRX10CM PINNACLE RSS902

## (undated) DEVICE — CUSTOM PACK EP

## (undated) DEVICE — KIT ENDO VASOVIEW HEMOPRO 2 VH-4000

## (undated) DEVICE — CATH DIAG 4FR JR 5.0 538423

## (undated) DEVICE — SU STERNAL WIRE SINGLE #6 046-032

## (undated) DEVICE — INTRO SHEATH 8FRX10CM PINNACLE RSS802

## (undated) DEVICE — INTRO SHEATH 4FRX10CM PINNACLE RSS402

## (undated) DEVICE — 8F SOUNDSTAR ECO ULTRASOUND CATHETER

## (undated) DEVICE — CATHETER OCTARAY LONG SPLINE CURVE F 3-3-3-3-3 D160906

## (undated) DEVICE — SYR BULB IRRIG DOVER 60 ML LATEX FREE 67000

## (undated) DEVICE — SET CANNULATION TOUNIQUET TS-10061

## (undated) DEVICE — SU MYOSTERNAL WIRE  046-267

## (undated) DEVICE — PROBE TEMP CIRCA S-CATH M ESPH 12-SNSR 3D MAP CS-21EP

## (undated) DEVICE — SUCTION CANISTER MEDIVAC LINER 3000ML W/LID 65651-530

## (undated) DEVICE — CATH ANGIO INFINITI JL4 4FRX100CM 538420

## (undated) DEVICE — GLOVE BIOGEL PI SZ 6.5 40865

## (undated) DEVICE — SU DEVICE COR-KNOT MINI 031300

## (undated) DEVICE — TAPE UMBILICAL COTTON 30X1/16IN 2 STRAND 8619-03A 8886861903

## (undated) DEVICE — IMPLANTABLE DEVICE: Type: IMPLANTABLE DEVICE | Site: HEART | Status: NON-FUNCTIONAL

## (undated) DEVICE — SU PROLENE 3-0 SHDA 36" 8522H

## (undated) DEVICE — SUTURE ETHIBOND 2-0 V-7V-7 10X66

## (undated) DEVICE — SU PROLENE 4-0 RB-1DA 36" 8557H

## (undated) DEVICE — KIT HAND CONTROL ACIST 014644 AR-P54

## (undated) DEVICE — SU ETHIBOND 2-0 V-5SA SXP77

## (undated) DEVICE — TRANSDUCER TRAY ARTERIAL 42646-06

## (undated) DEVICE — GEL ULTRASOUND AQUASONIC 20GM 01-01

## (undated) DEVICE — CATH DIAGNOSTIC RADIAL 5FR TIG 4.0

## (undated) DEVICE — INTRODUCER SHEATH VASC CATH 8.5FR CARTO GIDE STH MED D138502

## (undated) DEVICE — ESU PENCIL SMOKE EVAC W/ROCKER SWITCH 0703-047-000

## (undated) DEVICE — CLIP HORIZON MED BLUE 002200

## (undated) DEVICE — GLOVE BIOGEL PI SZ 7.0 40870

## (undated) DEVICE — INTRO SHEATH 6FRX10CM PINNACLE RSS602

## (undated) DEVICE — SU PROLENE 6-0 C-1DA 30" 8706H

## (undated) DEVICE — PITCHER STERILE 1000ML  SSK9004A

## (undated) DEVICE — COUNTER NEEDLE ADH & FOAM 20CT 9106

## (undated) DEVICE — MANIFOLD KIT ANGIO AUTOMATED 014613

## (undated) DEVICE — SUTURE MONOCRYL+ 4-0 PS-2 27IN MCP426H

## (undated) DEVICE — TRAY CATH SURESTEP OD14 FR INTERMITTENT STER LTX INTS14

## (undated) DEVICE — CLIP APPLIER 11" MED LIGACLIP MCM20

## (undated) DEVICE — ELECTRODE DEFIB CADENCE 22550R

## (undated) DEVICE — SU ETHIBOND 2-0 RB-1DA 30" MX553

## (undated) DEVICE — CUSTOM PACK CV ST JOES SCV5BCVHEA

## (undated) DEVICE — CUFF TOURN 18IN STRL DISP

## (undated) DEVICE — CUSTOM PACK CORONARY SAN5BCRHEA

## (undated) DEVICE — SU PROLENE 4-0 SHDA 36" 8521H

## (undated) DEVICE — SUCTION DRY CHEST DRAIN OASIS 3600-100

## (undated) DEVICE — PACK MAJOR BASIN 673

## (undated) DEVICE — BLADE KNIFE SURG 15 371115

## (undated) DEVICE — BLADE KNIFE SURG 11 371111

## (undated) DEVICE — SU PROLENE 7-0 BV175-6 24" 8735H

## (undated) DEVICE — SOL NACL 0.9% IRRIG 1000ML BOTTLE 2F7124

## (undated) DEVICE — CLIP SPRING FOGARTY SOFTJAW CSOFT6

## (undated) DEVICE — CATH EP 7FR X 115CM DECANAV CA

## (undated) DEVICE — CLIP HORIZON MULTI SM YELLOW 001204

## (undated) DEVICE — EXCHANGE WIRE .035 260 STAR/JFC/035/260/ M001491681

## (undated) DEVICE — CONTAINER URINE SPEC 4OZ STRL 1053

## (undated) DEVICE — PROTECTOR ARM STANDARD ONE STEP 40433 (COI)

## (undated) DEVICE — SOL WATER IRRIG 1000ML BOTTLE 2F7114

## (undated) DEVICE — PREP CHLORAPREP 26ML TINTED HI-LITE ORANGE 930815

## (undated) DEVICE — LEAD PACER MYOCARDIAL BIPOLAR TEMPORARY 53CM 6495F

## (undated) DEVICE — PACK MINOR SINGLE BASIN SSK3001

## (undated) DEVICE — SU DERMABOND ADVANCED .7ML DNX12

## (undated) DEVICE — BANDAGE ESMARK 4 X 3 YARDS STL 23578-143

## (undated) DEVICE — CONNECTOR CARDIO BLAKE DRAIN BCC2

## (undated) DEVICE — SU ETHIBOND 0 CT-1 CR 8X18" CX21D

## (undated) DEVICE — CUSTOM PACK CAB SCV5BCBHEA

## (undated) DEVICE — SU PDS II 2-0 CT 36"  Z357H

## (undated) DEVICE — CELL SAVER

## (undated) DEVICE — CABLE MYO/LEAD PACING WHITE DISP 019-530

## (undated) DEVICE — SU DEVICE ENDO COR KNOT QUICK LOAD RELOAD 030874

## (undated) DEVICE — BLANKET BAIR ADLT PLYMR 60X36IN 63000

## (undated) DEVICE — SU ETHIBOND EXCEL 2-0 RB-1 L30 MX523

## (undated) DEVICE — SU PDS II 3-0 SH 27" Z316H

## (undated) DEVICE — SYR 10ML LL W/O NDL 302995

## (undated) DEVICE — SYR ANGIOGRAPHY MULTIUSE KIT ACIST 014612

## (undated) DEVICE — SU PROLENE 8-0 BV130-5DA 24" 8732H

## (undated) DEVICE — DRSG DRAIN 4X4" 7086

## (undated) DEVICE — CATH SUCTION 14FR W/O CTRL DYND41962

## (undated) DEVICE — ESU GROUND PAD ADULT REM W/15' CORD E7507DB

## (undated) DEVICE — SPONGE KITNER DISSECTING 7102*

## (undated) DEVICE — SHTH INTRO 0.021IN ID 6FR DIA

## (undated) DEVICE — SU MONOCRYL+ 4-0 18IN PS2 UND MCP496G

## (undated) DEVICE — CATH THORACIC STRAIGHT CLOTSTOP 28FR

## (undated) DEVICE — TUBING INSUFFLATION W/FILTER 28-0207

## (undated) DEVICE — CLIP HORIZON MULTI MED BLUE 002204

## (undated) DEVICE — SUCTION MANIFOLD NEPTUNE 2 SYS 4 PORT 0702-020-000

## (undated) RX ORDER — HEPARIN SODIUM 10000 [USP'U]/100ML
INJECTION, SOLUTION INTRAVENOUS
Status: DISPENSED
Start: 2025-03-06

## (undated) RX ORDER — FENTANYL CITRATE 50 UG/ML
INJECTION, SOLUTION INTRAMUSCULAR; INTRAVENOUS
Status: DISPENSED
Start: 2024-09-13

## (undated) RX ORDER — GLYCOPYRROLATE 0.2 MG/ML
INJECTION, SOLUTION INTRAMUSCULAR; INTRAVENOUS
Status: DISPENSED
Start: 2024-10-08

## (undated) RX ORDER — FENTANYL CITRATE 50 UG/ML
INJECTION, SOLUTION INTRAMUSCULAR; INTRAVENOUS
Status: DISPENSED
Start: 2024-10-08

## (undated) RX ORDER — IPRATROPIUM BROMIDE AND ALBUTEROL SULFATE 2.5; .5 MG/3ML; MG/3ML
SOLUTION RESPIRATORY (INHALATION)
Status: DISPENSED
Start: 2025-03-06

## (undated) RX ORDER — FENTANYL CITRATE 50 UG/ML
INJECTION, SOLUTION INTRAMUSCULAR; INTRAVENOUS
Status: DISPENSED
Start: 2025-03-06

## (undated) RX ORDER — PROPOFOL 10 MG/ML
INJECTION, EMULSION INTRAVENOUS
Status: DISPENSED
Start: 2024-10-08

## (undated) RX ORDER — ONDANSETRON 2 MG/ML
INJECTION INTRAMUSCULAR; INTRAVENOUS
Status: DISPENSED
Start: 2024-10-08

## (undated) RX ORDER — ACETAMINOPHEN 325 MG/1
TABLET ORAL
Status: DISPENSED
Start: 2025-03-06

## (undated) RX ORDER — HEPARIN SODIUM 1000 [USP'U]/ML
INJECTION, SOLUTION INTRAVENOUS; SUBCUTANEOUS
Status: DISPENSED
Start: 2024-10-23

## (undated) RX ORDER — FENTANYL CITRATE 50 UG/ML
INJECTION, SOLUTION INTRAMUSCULAR; INTRAVENOUS
Status: DISPENSED
Start: 2024-10-24

## (undated) RX ORDER — DIAZEPAM 10 MG
TABLET ORAL
Status: DISPENSED
Start: 2024-09-13

## (undated) RX ORDER — ADENOSINE 3 MG/ML
INJECTION, SOLUTION INTRAVENOUS
Status: DISPENSED
Start: 2025-03-06

## (undated) RX ORDER — NEOSTIGMINE METHYLSULFATE 1 MG/ML
VIAL (ML) INJECTION
Status: DISPENSED
Start: 2024-10-08

## (undated) RX ORDER — LIDOCAINE HYDROCHLORIDE AND EPINEPHRINE 10; 10 MG/ML; UG/ML
INJECTION, SOLUTION INFILTRATION; PERINEURAL
Status: DISPENSED
Start: 2024-10-24

## (undated) RX ORDER — PROPOFOL 10 MG/ML
INJECTION, EMULSION INTRAVENOUS
Status: DISPENSED
Start: 2025-03-06

## (undated) RX ORDER — LIDOCAINE HYDROCHLORIDE 10 MG/ML
INJECTION, SOLUTION EPIDURAL; INFILTRATION; INTRACAUDAL; PERINEURAL
Status: DISPENSED
Start: 2024-10-08

## (undated) RX ORDER — PROTAMINE SULFATE 10 MG/ML
INJECTION, SOLUTION INTRAVENOUS
Status: DISPENSED
Start: 2025-03-06

## (undated) RX ORDER — DEXAMETHASONE SODIUM PHOSPHATE 10 MG/ML
INJECTION, SOLUTION INTRAMUSCULAR; INTRAVENOUS
Status: DISPENSED
Start: 2025-03-06

## (undated) RX ORDER — CEFAZOLIN SODIUM 1 G/3ML
INJECTION, POWDER, FOR SOLUTION INTRAMUSCULAR; INTRAVENOUS
Status: DISPENSED
Start: 2024-10-08

## (undated) RX ORDER — METHOHEXITAL IN WATER/PF 100MG/10ML
SYRINGE (ML) INTRAVENOUS
Status: DISPENSED
Start: 2024-11-29

## (undated) RX ORDER — FENTANYL CITRATE 50 UG/ML
INJECTION, SOLUTION INTRAMUSCULAR; INTRAVENOUS
Status: DISPENSED
Start: 2024-10-11

## (undated) RX ORDER — VANCOMYCIN HYDROCHLORIDE 1 G/20ML
INJECTION, POWDER, LYOPHILIZED, FOR SOLUTION INTRAVENOUS
Status: DISPENSED
Start: 2024-10-08

## (undated) RX ORDER — LIDOCAINE HYDROCHLORIDE 10 MG/ML
INJECTION, SOLUTION INFILTRATION; PERINEURAL
Status: DISPENSED
Start: 2024-10-11

## (undated) RX ORDER — ISOPROTERENOL HYDROCHLORIDE 0.2 MG/ML
INJECTION, SOLUTION INTRAVENOUS
Status: DISPENSED
Start: 2025-03-06

## (undated) RX ORDER — EPHEDRINE SULFATE 50 MG/ML
INJECTION, SOLUTION INTRAMUSCULAR; INTRAVENOUS; SUBCUTANEOUS
Status: DISPENSED
Start: 2024-10-08

## (undated) RX ORDER — HEPARIN SODIUM 1000 [USP'U]/ML
INJECTION, SOLUTION INTRAVENOUS; SUBCUTANEOUS
Status: DISPENSED
Start: 2024-10-24

## (undated) RX ORDER — FENTANYL CITRATE-0.9 % NACL/PF 10 MCG/ML
PLASTIC BAG, INJECTION (ML) INTRAVENOUS
Status: DISPENSED
Start: 2024-10-08

## (undated) RX ORDER — HEPARIN SODIUM 1000 [USP'U]/ML
INJECTION, SOLUTION INTRAVENOUS; SUBCUTANEOUS
Status: DISPENSED
Start: 2024-10-11